# Patient Record
Sex: MALE | Employment: OTHER | URBAN - METROPOLITAN AREA
[De-identification: names, ages, dates, MRNs, and addresses within clinical notes are randomized per-mention and may not be internally consistent; named-entity substitution may affect disease eponyms.]

---

## 2018-01-02 ENCOUNTER — HOSPITAL ENCOUNTER (OUTPATIENT)
Age: 63
Setting detail: SPECIMEN
Discharge: HOME | End: 2018-01-02

## 2018-01-02 LAB
ABSOLUTE EOS #: 0.1 K/UL (ref 0–0.44)
ABSOLUTE IMMATURE GRANULOCYTE: 0.04 K/UL (ref 0–0.3)
ABSOLUTE LYMPH #: 1.85 K/UL (ref 1.1–3.7)
ABSOLUTE MONO #: 0.49 K/UL (ref 0.1–1.2)
ALBUMIN SERPL-MCNC: 4.2 G/DL (ref 3.5–5.2)
ALBUMIN/GLOBULIN RATIO: 1.1 (ref 1–2.5)
ALP BLD-CCNC: 65 U/L (ref 40–129)
ALT SERPL-CCNC: 13 U/L (ref 5–41)
AMYLASE: 134 U/L (ref 28–100)
ANION GAP SERPL CALCULATED.3IONS-SCNC: 16 MMOL/L (ref 9–17)
AST SERPL-CCNC: 19 U/L
BASOPHILS # BLD: 0 % (ref 0–2)
BASOPHILS ABSOLUTE: 0.04 K/UL (ref 0–0.2)
BILIRUB SERPL-MCNC: 0.52 MG/DL (ref 0.3–1.2)
BUN BLDV-MCNC: 10 MG/DL (ref 8–23)
BUN/CREAT BLD: ABNORMAL (ref 9–20)
C-REACTIVE PROTEIN: 0.5 MG/L (ref 0–5)
CALCIUM SERPL-MCNC: 8.9 MG/DL (ref 8.6–10.4)
CHLORIDE BLD-SCNC: 97 MMOL/L (ref 98–107)
CO2: 26 MMOL/L (ref 20–31)
CREAT SERPL-MCNC: 0.85 MG/DL (ref 0.7–1.2)
DIFFERENTIAL TYPE: ABNORMAL
EOSINOPHILS RELATIVE PERCENT: 1 % (ref 1–4)
GFR AFRICAN AMERICAN: >60 ML/MIN
GFR NON-AFRICAN AMERICAN: >60 ML/MIN
GFR SERPL CREATININE-BSD FRML MDRD: ABNORMAL ML/MIN/{1.73_M2}
GFR SERPL CREATININE-BSD FRML MDRD: ABNORMAL ML/MIN/{1.73_M2}
GLUCOSE BLD-MCNC: 146 MG/DL (ref 70–99)
HCT VFR BLD CALC: 45.7 % (ref 40.7–50.3)
HEMOGLOBIN: 16 G/DL (ref 13–17)
IMMATURE GRANULOCYTES: 0 %
LIPASE: 37 U/L (ref 13–60)
LYMPHOCYTES # BLD: 20 % (ref 24–43)
MCH RBC QN AUTO: 33.2 PG (ref 25.2–33.5)
MCHC RBC AUTO-ENTMCNC: 35 G/DL (ref 28.4–34.8)
MCV RBC AUTO: 94.8 FL (ref 82.6–102.9)
MONOCYTES # BLD: 5 % (ref 3–12)
PDW BLD-RTO: 12 % (ref 11.8–14.4)
PLATELET # BLD: 224 K/UL (ref 138–453)
PLATELET ESTIMATE: ABNORMAL
PMV BLD AUTO: 9.6 FL (ref 8.1–13.5)
POTASSIUM SERPL-SCNC: 4.6 MMOL/L (ref 3.7–5.3)
RBC # BLD: 4.82 M/UL (ref 4.21–5.77)
RBC # BLD: ABNORMAL 10*6/UL
SEDIMENTATION RATE, ERYTHROCYTE: 3 MM (ref 0–10)
SEG NEUTROPHILS: 74 % (ref 36–65)
SEGMENTED NEUTROPHILS ABSOLUTE COUNT: 6.57 K/UL (ref 1.5–8.1)
SODIUM BLD-SCNC: 139 MMOL/L (ref 135–144)
TOTAL PROTEIN: 7.9 G/DL (ref 6.4–8.3)
TRIGL SERPL-MCNC: 305 MG/DL
WBC # BLD: 9.1 K/UL (ref 3.5–11.3)
WBC # BLD: ABNORMAL 10*3/UL

## 2021-02-23 ENCOUNTER — HOSPITAL ENCOUNTER (INPATIENT)
Age: 66
LOS: 3 days | Discharge: HOME | DRG: 683 | End: 2021-02-26
Attending: EMERGENCY MEDICINE | Admitting: HOSPITALIST

## 2021-02-23 ENCOUNTER — APPOINTMENT (OUTPATIENT)
Dept: CT IMAGING | Age: 66
DRG: 683 | End: 2021-02-23

## 2021-02-23 ENCOUNTER — APPOINTMENT (OUTPATIENT)
Dept: GENERAL RADIOLOGY | Age: 66
DRG: 683 | End: 2021-02-23

## 2021-02-23 DIAGNOSIS — E16.2 HYPOGLYCEMIA: ICD-10-CM

## 2021-02-23 DIAGNOSIS — I63.532 ACUTE ISCHEMIC LEFT PCA STROKE (HCC): ICD-10-CM

## 2021-02-23 DIAGNOSIS — E83.42 HYPOMAGNESEMIA: Primary | ICD-10-CM

## 2021-02-23 PROBLEM — N17.9 AKI (ACUTE KIDNEY INJURY) (HCC): Status: ACTIVE | Noted: 2021-02-23

## 2021-02-23 LAB
ABSOLUTE EOS #: 0.1 K/UL (ref 0–0.44)
ABSOLUTE IMMATURE GRANULOCYTE: 0.05 K/UL (ref 0–0.3)
ABSOLUTE LYMPH #: 1.18 K/UL (ref 1.1–3.7)
ABSOLUTE MONO #: 0.58 K/UL (ref 0.1–1.2)
ALBUMIN SERPL-MCNC: 3.5 G/DL (ref 3.5–5.2)
ALBUMIN/GLOBULIN RATIO: ABNORMAL (ref 1–2.5)
ALP BLD-CCNC: 61 U/L (ref 40–129)
ALT SERPL-CCNC: 22 U/L (ref 5–41)
ANION GAP SERPL CALCULATED.3IONS-SCNC: 14 MMOL/L (ref 9–17)
AST SERPL-CCNC: 37 U/L
BASOPHILS # BLD: 1 % (ref 0–2)
BASOPHILS ABSOLUTE: 0.03 K/UL (ref 0–0.2)
BILIRUB SERPL-MCNC: 0.36 MG/DL (ref 0.3–1.2)
BUN BLDV-MCNC: 19 MG/DL (ref 8–23)
BUN/CREAT BLD: 13 (ref 9–20)
CALCIUM SERPL-MCNC: 8 MG/DL (ref 8.6–10.4)
CHLORIDE BLD-SCNC: 95 MMOL/L (ref 98–107)
CHP ED QC CHECK: NORMAL
CO2: 23 MMOL/L (ref 20–31)
CREAT SERPL-MCNC: 1.41 MG/DL (ref 0.7–1.2)
DIFFERENTIAL TYPE: ABNORMAL
EOSINOPHILS RELATIVE PERCENT: 2 % (ref 1–4)
ESTIMATED AVERAGE GLUCOSE: 126 MG/DL
GFR AFRICAN AMERICAN: >60 ML/MIN
GFR NON-AFRICAN AMERICAN: 51 ML/MIN
GFR SERPL CREATININE-BSD FRML MDRD: ABNORMAL ML/MIN/{1.73_M2}
GFR SERPL CREATININE-BSD FRML MDRD: ABNORMAL ML/MIN/{1.73_M2}
GLUCOSE BLD-MCNC: 254 MG/DL (ref 75–110)
GLUCOSE BLD-MCNC: 256 MG/DL (ref 75–110)
GLUCOSE BLD-MCNC: 258 MG/DL (ref 75–110)
GLUCOSE BLD-MCNC: 281 MG/DL (ref 75–110)
GLUCOSE BLD-MCNC: 74 MG/DL
GLUCOSE BLD-MCNC: 74 MG/DL (ref 75–110)
GLUCOSE BLD-MCNC: 88 MG/DL (ref 70–99)
GLUCOSE BLD-MCNC: 94 MG/DL (ref 75–110)
HBA1C MFR BLD: 6 % (ref 4–6)
HCT VFR BLD CALC: 35.2 % (ref 40.7–50.3)
HEMOGLOBIN: 12.3 G/DL (ref 13–17)
IMMATURE GRANULOCYTES: 1 %
LACTIC ACID: 1.9 MMOL/L (ref 0.5–2.2)
LIPASE: 78 U/L (ref 13–60)
LYMPHOCYTES # BLD: 19 % (ref 24–43)
MAGNESIUM: 0.9 MG/DL (ref 1.6–2.6)
MCH RBC QN AUTO: 35 PG (ref 25.2–33.5)
MCHC RBC AUTO-ENTMCNC: 34.9 G/DL (ref 28.4–34.8)
MCV RBC AUTO: 100.3 FL (ref 82.6–102.9)
MONOCYTES # BLD: 9 % (ref 3–12)
MYOGLOBIN: 52 NG/ML (ref 28–72)
NRBC AUTOMATED: 0 PER 100 WBC
PDW BLD-RTO: 13.6 % (ref 11.8–14.4)
PLATELET # BLD: 122 K/UL (ref 138–453)
PLATELET ESTIMATE: ABNORMAL
PMV BLD AUTO: 9.3 FL (ref 8.1–13.5)
POTASSIUM SERPL-SCNC: 3.5 MMOL/L (ref 3.7–5.3)
RBC # BLD: 3.51 M/UL (ref 4.21–5.77)
RBC # BLD: ABNORMAL 10*6/UL
SEG NEUTROPHILS: 68 % (ref 36–65)
SEGMENTED NEUTROPHILS ABSOLUTE COUNT: 4.36 K/UL (ref 1.5–8.1)
SODIUM BLD-SCNC: 132 MMOL/L (ref 135–144)
TOTAL CK: 94 U/L (ref 39–308)
TOTAL PROTEIN: 6.6 G/DL (ref 6.4–8.3)
TROPONIN INTERP: NORMAL
TROPONIN INTERP: NORMAL
TROPONIN T: NORMAL NG/ML
TROPONIN T: NORMAL NG/ML
TROPONIN, HIGH SENSITIVITY: 16 NG/L (ref 0–22)
TROPONIN, HIGH SENSITIVITY: 16 NG/L (ref 0–22)
WBC # BLD: 6.3 K/UL (ref 3.5–11.3)
WBC # BLD: ABNORMAL 10*3/UL

## 2021-02-23 PROCEDURE — 85025 COMPLETE CBC W/AUTO DIFF WBC: CPT

## 2021-02-23 PROCEDURE — 6360000002 HC RX W HCPCS: Performed by: HOSPITALIST

## 2021-02-23 PROCEDURE — 82550 ASSAY OF CK (CPK): CPT

## 2021-02-23 PROCEDURE — 71045 X-RAY EXAM CHEST 1 VIEW: CPT

## 2021-02-23 PROCEDURE — 96366 THER/PROPH/DIAG IV INF ADDON: CPT

## 2021-02-23 PROCEDURE — 6360000002 HC RX W HCPCS: Performed by: EMERGENCY MEDICINE

## 2021-02-23 PROCEDURE — 96365 THER/PROPH/DIAG IV INF INIT: CPT

## 2021-02-23 PROCEDURE — 99284 EMERGENCY DEPT VISIT MOD MDM: CPT

## 2021-02-23 PROCEDURE — 2580000003 HC RX 258: Performed by: HOSPITALIST

## 2021-02-23 PROCEDURE — 83605 ASSAY OF LACTIC ACID: CPT

## 2021-02-23 PROCEDURE — 93005 ELECTROCARDIOGRAM TRACING: CPT | Performed by: EMERGENCY MEDICINE

## 2021-02-23 PROCEDURE — 2580000003 HC RX 258: Performed by: EMERGENCY MEDICINE

## 2021-02-23 PROCEDURE — 1200000000 HC SEMI PRIVATE

## 2021-02-23 PROCEDURE — 83735 ASSAY OF MAGNESIUM: CPT

## 2021-02-23 PROCEDURE — 84484 ASSAY OF TROPONIN QUANT: CPT

## 2021-02-23 PROCEDURE — 83874 ASSAY OF MYOGLOBIN: CPT

## 2021-02-23 PROCEDURE — 6370000000 HC RX 637 (ALT 250 FOR IP): Performed by: EMERGENCY MEDICINE

## 2021-02-23 PROCEDURE — 83036 HEMOGLOBIN GLYCOSYLATED A1C: CPT

## 2021-02-23 PROCEDURE — 70450 CT HEAD/BRAIN W/O DYE: CPT

## 2021-02-23 PROCEDURE — 80053 COMPREHEN METABOLIC PANEL: CPT

## 2021-02-23 PROCEDURE — 36415 COLL VENOUS BLD VENIPUNCTURE: CPT

## 2021-02-23 PROCEDURE — 6370000000 HC RX 637 (ALT 250 FOR IP): Performed by: HOSPITALIST

## 2021-02-23 PROCEDURE — 96361 HYDRATE IV INFUSION ADD-ON: CPT

## 2021-02-23 PROCEDURE — 82947 ASSAY GLUCOSE BLOOD QUANT: CPT

## 2021-02-23 PROCEDURE — 83690 ASSAY OF LIPASE: CPT

## 2021-02-23 RX ORDER — LOSARTAN POTASSIUM 100 MG/1
100 TABLET ORAL DAILY
COMMUNITY

## 2021-02-23 RX ORDER — POTASSIUM CHLORIDE 20 MEQ/1
40 TABLET, EXTENDED RELEASE ORAL PRN
Status: DISCONTINUED | OUTPATIENT
Start: 2021-02-23 | End: 2021-02-26 | Stop reason: HOSPADM

## 2021-02-23 RX ORDER — LORAZEPAM 1 MG/1
1 TABLET ORAL
Status: DISCONTINUED | OUTPATIENT
Start: 2021-02-23 | End: 2021-02-26 | Stop reason: HOSPADM

## 2021-02-23 RX ORDER — LORAZEPAM 2 MG/ML
3 INJECTION INTRAMUSCULAR
Status: DISCONTINUED | OUTPATIENT
Start: 2021-02-23 | End: 2021-02-26 | Stop reason: HOSPADM

## 2021-02-23 RX ORDER — LORAZEPAM 2 MG/ML
4 INJECTION INTRAMUSCULAR
Status: DISCONTINUED | OUTPATIENT
Start: 2021-02-23 | End: 2021-02-26 | Stop reason: HOSPADM

## 2021-02-23 RX ORDER — ACETAMINOPHEN 325 MG/1
650 TABLET ORAL EVERY 6 HOURS PRN
Status: DISCONTINUED | OUTPATIENT
Start: 2021-02-23 | End: 2021-02-23

## 2021-02-23 RX ORDER — SODIUM CHLORIDE 0.9 % (FLUSH) 0.9 %
10 SYRINGE (ML) INJECTION EVERY 12 HOURS SCHEDULED
Status: DISCONTINUED | OUTPATIENT
Start: 2021-02-23 | End: 2021-02-23

## 2021-02-23 RX ORDER — PROMETHAZINE HYDROCHLORIDE 25 MG/1
12.5 TABLET ORAL EVERY 6 HOURS PRN
Status: DISCONTINUED | OUTPATIENT
Start: 2021-02-23 | End: 2021-02-23

## 2021-02-23 RX ORDER — SODIUM CHLORIDE 0.9 % (FLUSH) 0.9 %
10 SYRINGE (ML) INJECTION PRN
Status: DISCONTINUED | OUTPATIENT
Start: 2021-02-23 | End: 2021-02-26 | Stop reason: HOSPADM

## 2021-02-23 RX ORDER — MAGNESIUM SULFATE 1 G/100ML
1000 INJECTION INTRAVENOUS PRN
Status: DISCONTINUED | OUTPATIENT
Start: 2021-02-23 | End: 2021-02-26 | Stop reason: HOSPADM

## 2021-02-23 RX ORDER — SENNA PLUS 8.6 MG/1
1 TABLET ORAL 2 TIMES DAILY PRN
Status: DISCONTINUED | OUTPATIENT
Start: 2021-02-23 | End: 2021-02-26 | Stop reason: HOSPADM

## 2021-02-23 RX ORDER — ACETAMINOPHEN 650 MG/1
650 SUPPOSITORY RECTAL EVERY 6 HOURS PRN
Status: DISCONTINUED | OUTPATIENT
Start: 2021-02-23 | End: 2021-02-23

## 2021-02-23 RX ORDER — GLIMEPIRIDE 4 MG/1
4 TABLET ORAL 2 TIMES DAILY
Status: ON HOLD | COMMUNITY
End: 2021-02-26 | Stop reason: HOSPADM

## 2021-02-23 RX ORDER — SODIUM CHLORIDE 9 MG/ML
INJECTION, SOLUTION INTRAVENOUS CONTINUOUS
Status: DISCONTINUED | OUTPATIENT
Start: 2021-02-23 | End: 2021-02-24

## 2021-02-23 RX ORDER — MAGNESIUM SULFATE IN WATER 40 MG/ML
2000 INJECTION, SOLUTION INTRAVENOUS ONCE
Status: COMPLETED | OUTPATIENT
Start: 2021-02-23 | End: 2021-02-23

## 2021-02-23 RX ORDER — PAROXETINE HYDROCHLORIDE 40 MG/1
40 TABLET, FILM COATED ORAL DAILY
COMMUNITY

## 2021-02-23 RX ORDER — LORAZEPAM 1 MG/1
2 TABLET ORAL
Status: DISCONTINUED | OUTPATIENT
Start: 2021-02-23 | End: 2021-02-26 | Stop reason: HOSPADM

## 2021-02-23 RX ORDER — ATENOLOL 50 MG/1
50 TABLET ORAL DAILY
Status: ON HOLD | COMMUNITY
End: 2022-06-09 | Stop reason: HOSPADM

## 2021-02-23 RX ORDER — POLYETHYLENE GLYCOL 3350 17 G/17G
17 POWDER, FOR SOLUTION ORAL DAILY PRN
Status: DISCONTINUED | OUTPATIENT
Start: 2021-02-23 | End: 2021-02-26 | Stop reason: HOSPADM

## 2021-02-23 RX ORDER — ATENOLOL 25 MG/1
50 TABLET ORAL DAILY
Status: DISCONTINUED | OUTPATIENT
Start: 2021-02-23 | End: 2021-02-26 | Stop reason: HOSPADM

## 2021-02-23 RX ORDER — LORAZEPAM 2 MG/ML
2 INJECTION INTRAMUSCULAR
Status: DISCONTINUED | OUTPATIENT
Start: 2021-02-23 | End: 2021-02-26 | Stop reason: HOSPADM

## 2021-02-23 RX ORDER — ACETAMINOPHEN 650 MG/1
650 SUPPOSITORY RECTAL EVERY 6 HOURS PRN
Status: DISCONTINUED | OUTPATIENT
Start: 2021-02-23 | End: 2021-02-26 | Stop reason: HOSPADM

## 2021-02-23 RX ORDER — HEPARIN SODIUM 5000 [USP'U]/ML
5000 INJECTION, SOLUTION INTRAVENOUS; SUBCUTANEOUS 2 TIMES DAILY
Status: DISCONTINUED | OUTPATIENT
Start: 2021-02-23 | End: 2021-02-26 | Stop reason: HOSPADM

## 2021-02-23 RX ORDER — PROMETHAZINE HYDROCHLORIDE 12.5 MG/1
12.5 TABLET ORAL EVERY 6 HOURS PRN
Status: DISCONTINUED | OUTPATIENT
Start: 2021-02-23 | End: 2021-02-26 | Stop reason: HOSPADM

## 2021-02-23 RX ORDER — ONDANSETRON 2 MG/ML
4 INJECTION INTRAMUSCULAR; INTRAVENOUS EVERY 6 HOURS PRN
Status: DISCONTINUED | OUTPATIENT
Start: 2021-02-23 | End: 2021-02-26 | Stop reason: HOSPADM

## 2021-02-23 RX ORDER — SODIUM CHLORIDE 0.9 % (FLUSH) 0.9 %
10 SYRINGE (ML) INJECTION PRN
Status: DISCONTINUED | OUTPATIENT
Start: 2021-02-23 | End: 2021-02-23

## 2021-02-23 RX ORDER — ACETAMINOPHEN 325 MG/1
650 TABLET ORAL EVERY 6 HOURS PRN
Status: DISCONTINUED | OUTPATIENT
Start: 2021-02-23 | End: 2021-02-26 | Stop reason: HOSPADM

## 2021-02-23 RX ORDER — POTASSIUM CHLORIDE 7.45 MG/ML
10 INJECTION INTRAVENOUS PRN
Status: DISCONTINUED | OUTPATIENT
Start: 2021-02-23 | End: 2021-02-26 | Stop reason: HOSPADM

## 2021-02-23 RX ORDER — LOSARTAN POTASSIUM 100 MG/1
100 TABLET ORAL DAILY
Status: DISCONTINUED | OUTPATIENT
Start: 2021-02-23 | End: 2021-02-26 | Stop reason: HOSPADM

## 2021-02-23 RX ORDER — LORAZEPAM 1 MG/1
4 TABLET ORAL
Status: DISCONTINUED | OUTPATIENT
Start: 2021-02-23 | End: 2021-02-26 | Stop reason: HOSPADM

## 2021-02-23 RX ORDER — SODIUM CHLORIDE 0.9 % (FLUSH) 0.9 %
10 SYRINGE (ML) INJECTION EVERY 12 HOURS SCHEDULED
Status: DISCONTINUED | OUTPATIENT
Start: 2021-02-23 | End: 2021-02-26 | Stop reason: HOSPADM

## 2021-02-23 RX ORDER — LORAZEPAM 1 MG/1
3 TABLET ORAL
Status: DISCONTINUED | OUTPATIENT
Start: 2021-02-23 | End: 2021-02-26 | Stop reason: HOSPADM

## 2021-02-23 RX ORDER — LORAZEPAM 2 MG/ML
1 INJECTION INTRAMUSCULAR
Status: DISCONTINUED | OUTPATIENT
Start: 2021-02-23 | End: 2021-02-26 | Stop reason: HOSPADM

## 2021-02-23 RX ORDER — FAMOTIDINE 20 MG/1
20 TABLET, FILM COATED ORAL 2 TIMES DAILY
Status: DISCONTINUED | OUTPATIENT
Start: 2021-02-23 | End: 2021-02-26 | Stop reason: HOSPADM

## 2021-02-23 RX ORDER — 0.9 % SODIUM CHLORIDE 0.9 %
1000 INTRAVENOUS SOLUTION INTRAVENOUS ONCE
Status: COMPLETED | OUTPATIENT
Start: 2021-02-23 | End: 2021-02-23

## 2021-02-23 RX ORDER — ONDANSETRON 2 MG/ML
4 INJECTION INTRAMUSCULAR; INTRAVENOUS EVERY 6 HOURS PRN
Status: DISCONTINUED | OUTPATIENT
Start: 2021-02-23 | End: 2021-02-23

## 2021-02-23 RX ADMIN — LOSARTAN POTASSIUM 100 MG: 100 TABLET, FILM COATED ORAL at 16:05

## 2021-02-23 RX ADMIN — SODIUM CHLORIDE: 9 INJECTION, SOLUTION INTRAVENOUS at 17:20

## 2021-02-23 RX ADMIN — ATENOLOL 50 MG: 25 TABLET ORAL at 16:05

## 2021-02-23 RX ADMIN — FAMOTIDINE 20 MG: 20 TABLET ORAL at 21:42

## 2021-02-23 RX ADMIN — SODIUM CHLORIDE 1000 ML: 9 INJECTION, SOLUTION INTRAVENOUS at 09:01

## 2021-02-23 RX ADMIN — MAGNESIUM SULFATE HEPTAHYDRATE 2000 MG: 40 INJECTION, SOLUTION INTRAVENOUS at 10:38

## 2021-02-23 RX ADMIN — HEPARIN SODIUM 5000 UNITS: 5000 INJECTION INTRAVENOUS; SUBCUTANEOUS at 21:41

## 2021-02-23 ASSESSMENT — ENCOUNTER SYMPTOMS
BLOOD IN STOOL: 0
BACK PAIN: 0
EYE REDNESS: 0
SHORTNESS OF BREATH: 0
DIARRHEA: 1
VOMITING: 1
TROUBLE SWALLOWING: 0
ABDOMINAL PAIN: 0

## 2021-02-23 ASSESSMENT — PAIN SCALES - GENERAL: PAINLEVEL_OUTOF10: 0

## 2021-02-23 NOTE — CARE COORDINATION
Case Management Initial Discharge Plan  Sandra Perez,         Readmission Risk              Risk of Unplanned Readmission:        0             Met with:patient to discuss discharge plans. Information verified: address, contacts, phone number, , insurance Yes  PCP: DENNIS Garibay  Date of last visit: 3 months ago    Insurance Provider: self pay    Discharge Planning  Current Residence:   apartment  Living Arrangements:   alone   Home has 2 stories/10 stairs to climb  Support Systems:   friends  Current Services PTA:   no Supplier: none  Patient able to perform ADL's:Independent  DME used to aid ambulation prior to admission: none  During admission: none    Potential Assistance Needed:   tbd    Pharmacy: [facility]   Potential Assistance Purchasing Medications:   no  Does patient want to participate in local refill/ meds to beds program?   no    Patient agreeable to home care: No  [place] of choice provided:  yes      Type of Home Care Services:     Patient expects to be discharged to:   home    Prior SNF/Rehab Placement and Facility: no  Agreeable to SNF/Rehab: No  [place] of choice provided: yes   Evaluation: yes    Expected Discharge date:   21  Follow Up Appointment: Best Day/ Time:      Transportation provider: self/friends  Transportation arrangements needed for discharge: tbd    Discharge Plan:   Patient lives at home alone in [address] [facility] apartment with 10 steps to enter. Patient was independent with adl's prior to admission. Patient is admitted for Hypomagnesemia and Hypoglycemia. Patient is prescribed Metformin. Patient to have MRI and PT/OT consult. Discharge needs tbd.         Electronically signed by BRANDON Franco on 21 at 12:54 PM EST

## 2021-02-23 NOTE — PROGRESS NOTES
Pt admitted to room 2026 from ER  Oriented to room and call light/tv controls. Bed in lowest position, wheels locked, 2/4 side rails up  Call light in reach, room free of clutter, adequate lighting provided.   Electronically signed by Leila Smart RN on 2/23/2021 at 6:56 PM

## 2021-02-23 NOTE — ED NOTES
Pt given boxed lunch at this time. Pt is A&Ox4 with no complaints. Will continue to monitor.      Donna Hobbs RN  02/23/21 9831

## 2021-02-23 NOTE — H&P
History & Physical  [facility]    Adult Hospitalist      Name: Laura Soni  MRN: [id number]     Acct: [de-identified]  Room: [facility]/    Admit Date: 2/23/2021  8:36 AM  PCP: DENNIS Madden    Primary Problem  Active Problems:    ADRIANA (acute kidney injury) ([facility] [address]  Resolved Problems:    * No resolved hospital problems. *        Assesment:     · Presyncope  · Generalized weakness  · Acute renal failure  · Hyponatremia  · Hypoglycemia  · Hypo magnesemia  · Hypertension  · Diabetes type 2  ·   Tobacco use   · Alcohol use        Plan:     · Admit to MedSurg telemetry  · O2 maintain oxygen saturation greater than 92%  · Serial troponin  · MRI brain  · Carotid Doppler  · IV hydration  · Monitor creatinine  · Hold Metformin  · Check hemoglobin A1c  · Monitor blood glucose  ·  CIWA protocol  · Continue to monitor/telemetry/CBC with differential daily/BMP daily  · DVT and GI prophylaxis.   Continue medications as below      Scheduled Meds:   sodium chloride flush  10 mL Intravenous 2 times per day    famotidine  20 mg Oral BID    heparin (porcine)  5,000 Units Subcutaneous BID    losartan  100 mg Oral Daily    atenolol  50 mg Oral Daily    sodium chloride flush  10 mL Intravenous 2 times per day     Continuous Infusions:   sodium chloride 75 mL/hr at 02/23/21 1720     PRN Meds:      sodium chloride flush, 10 mL, PRN      potassium chloride, 40 mEq, PRN    Or      potassium alternative oral replacement, 40 mEq, PRN    Or      potassium chloride, 10 mEq, PRN      magnesium sulfate, 1,000 mg, PRN      promethazine, 12.5 mg, Q6H PRN    Or      ondansetron, 4 mg, Q6H PRN      senna, 1 tablet, BID PRN      acetaminophen, 650 mg, Q6H PRN    Or      acetaminophen, 650 mg, Q6H PRN      sodium chloride flush, 10 mL, PRN      promethazine, 12.5 mg, Q6H PRN    Or      ondansetron, 4 mg, Q6H PRN      polyethylene glycol, 17 g, Daily PRN      acetaminophen, 650 mg, Q6H PRN    Or   acetaminophen, 650 mg, Q6H PRN      LORazepam, 1 mg, Q1H PRN    Or      LORazepam, 1 mg, Q1H PRN    Or      LORazepam, 2 mg, Q1H PRN    Or      LORazepam, 2 mg, Q1H PRN    Or      LORazepam, 3 mg, Q1H PRN    Or      LORazepam, 3 mg, Q1H PRN    Or      LORazepam, 4 mg, Q1H PRN    Or      LORazepam, 4 mg, Q1H PRN        Chief Complaint:     Chief Complaint   Patient presents with    Fatigue    Hypoglycemia         History of Present Illness:      Nitin Rodríguez is a 60 y.o.  male who presents with Fatigue and Hypoglycemia    81-year-old gentleman past medical history of hypertension, diabetes presented to ER complaining of generalized fatigue and fall/near syncope. Per description, patient called ambulance this morning since he was feeling very weak. He stated that his legs could not carry his weight and he dropped to his knees and fell to the ground. On arrival of EMS his blood glucose was 50. Patient was given dextrose which improved his blood glucose to 70s. Patient was on the ground for almost an hour. Patient denies any loss of consciousness. Patient denies any chest pain, cough, cold, change urination, bowel habit or rash. Patient past medical history significant for hypertension and diabetes. He takes Metformin. Patient also stated that he smokes cigarettes 2 packs per day. Also admits to drinking wine 2-3 drinks every night. On presentation sodium was 132, potassium was 3.5. Creatinine was 1.41. His creatinine was normal before. Troponin was negative. WBC was normal, hemoglobin was 12.3. Blood pressure was elevated 167/78. Patient had a CT brain without any acute intracranial abnormality. X-ray chest was unremarkable. I have personally reviewed the past medical history, past surgical history, medications, social history, and family history, and summarized in the note. Review of Systems:     All 10 point system is reviewed and negative otherwise mentioned in HPI. Past Medical History:     Past Medical History:   Diagnosis Date    Diabetes mellitus ([facility] [address]     Hypertension         Past Surgical History:     Past Surgical History:   Procedure Laterality Date    KNEE SURGERY          Medications Prior to Admission:       Prior to Admission medications    Medication Sig Start Date End Date Taking? Authorizing Provider   glimepiride (AMARYL) 4 MG tablet Take 4 mg by mouth 2 times daily   Yes Historical Provider, MD   PARoxetine (PAXIL) 40 MG tablet Take 40 mg by mouth daily   Yes Historical Provider, MD   atenolol (TENORMIN) 50 MG tablet Take 50 mg by mouth daily   Yes Historical Provider, MD   losartan (COZAAR) 100 MG tablet Take 100 mg by mouth daily   Yes Historical Provider, MD   metFORMIN (GLUCOPHAGE) 500 MG tablet Take 1,000 mg by mouth 2 times daily (with meals)     Historical Provider, MD        Allergies:       Patient has no known allergies. Social History:     Tobacco:    reports that he has been smoking. He has never used smokeless tobacco.  Alcohol:      reports current alcohol use. Drug Use:  reports no history of drug use. Family History:     History reviewed. No pertinent family history.       Physical Exam:     Vitals:  BP (!) 157/85   Pulse 68   Temp 97.5 °F (36.4 °C) (Oral)   Resp 18   Ht 5' 7\" (1.702 m)   Wt 170 lb (77.1 kg)   SpO2 98%   BMI 26.63 kg/m²   Temp (24hrs), Av.5 °F (36.4 °C), Min:97.5 °F (36.4 °C), Max:97.5 °F (36.4 °C)          General appearance - alert, well appearing, and in no acute distress  Mental status - oriented to person, place, and time with normal affect  Head - normocephalic and atraumatic  Eyes - pupils equal and reactive, extraocular eye movements intact, conjunctiva clear  Ears - hearing appears to be intact  Nose - no drainage noted  Mouth - mucous membranes moist  Neck - supple, no carotid bruits, thyroid not palpable  Chest - clear to auscultation, normal effort  Heart - normal rate, regular rhythm, no murmur

## 2021-02-23 NOTE — ED NOTES
Pt is requesting his BP meds. No home meds on patient's list. [id number] Janette Howard per Dr. Judson Scruggs to order home meds and give as needed. Pt has no other needs. Pt denies cp, sob at this time. Respirations even and unlabored. Pt eating apple sauce at this time. Will continue to monitor.       Kvng Berman, VIPIN  02/23/21 7769

## 2021-02-23 NOTE — ED PROVIDER NOTES
EMERGENCY DEPARTMENT ENCOUNTER    Pt Name: Saman Ta  MRN: [id number]  [place] 1955  Date of evaluation: 2/23/21  CHIEF COMPLAINT       Chief Complaint   Patient presents with    Fatigue    Hypoglycemia     HISTORY OF PRESENT ILLNESS   Patient is a 76-year-old male here with fatigue, generalized weakness. He has history of hypertension diabetes. He called ambulance this morning after he states his legs felt very weak and he dropped to his knees and fell to the ground. He was hypoglycemic in the 50s and given dextrose by EMS and came up to the 70s he was down on the ground for about an hour. Denies hitting his head or passing out. Denies headache neck pain back pain. Denies any proceeding chest pain shortness of breath or abdominal pain. Denies palpitations. He states he drinks alcohol about 3 days a week and drink last night. He is on Metformin, not on insulin. He states he is taking his medications as prescribed. He has been having vomiting and loose stools as well. Denies any hematemesis melena hematochezia. Denies fever cough. REVIEW OF SYSTEMS     Review of Systems   Constitutional: Positive for fatigue. Negative for fever. HENT: Negative for trouble swallowing. Eyes: Negative for redness. Respiratory: Negative for shortness of breath. Cardiovascular: Negative for chest pain. Gastrointestinal: Positive for diarrhea and vomiting. Negative for abdominal pain and blood in stool. Genitourinary: Negative for difficulty urinating. Musculoskeletal: Negative for back pain, neck pain and neck stiffness. Skin: Negative for rash. Neurological: Positive for weakness and numbness. Negative for seizures and headaches. Psychiatric/Behavioral: Negative for confusion.      PASTMEDICAL HISTORY     Past Medical History:   Diagnosis Date    Diabetes mellitus ([address] [address]     Hypertension      SURGICAL HISTORY       Past Surgical History:   Procedure Laterality Date    KNEE SURGERY CURRENT MEDICATIONS       Previous Medications    METFORMIN (GLUCOPHAGE) 500 MG TABLET    Take 500 mg by mouth 2 times daily (with meals)     ALLERGIES     has No Known Allergies. FAMILY HISTORY     has no family status information on file. SOCIAL HISTORY       Social History     Tobacco Use    Smoking status: Current Every Day Smoker    Smokeless tobacco: Never Used   Substance Use Topics    Alcohol use: Yes    Drug use: No     PHYSICAL EXAM     INITIAL VITALS: BP (!) 167/78   Pulse 78   Temp 97.5 °F (36.4 °C) (Oral)   Resp 18   Ht 5' 7\" (1.702 m)   Wt 170 lb (77.1 kg)   SpO2 98%   BMI 26.63 kg/m²    Physical Exam  Vitals signs and nursing note reviewed. Constitutional:       General: He is not in acute distress. Appearance: Normal appearance. He is diaphoretic. He is not ill-appearing or toxic-appearing. HENT:      Head: Normocephalic and atraumatic. Mouth/Throat:      Mouth: Mucous membranes are dry. Pharynx: Oropharynx is clear. Eyes:      General: No visual field deficit. Extraocular Movements: Extraocular movements intact. Pupils: Pupils are equal, round, and reactive to light. Neck:      Musculoskeletal: Normal range of motion and neck supple. No neck rigidity. Cardiovascular:      Rate and Rhythm: Normal rate and regular rhythm. Pulses: Normal pulses. Pulmonary:      Effort: Pulmonary effort is normal. No respiratory distress. Abdominal:      General: There is no distension. Palpations: Abdomen is soft. There is no mass. Tenderness: There is no abdominal tenderness. There is no guarding or rebound. Musculoskeletal: Normal range of motion. General: No deformity. Cervical back: Normal.      Thoracic back: Normal.      Lumbar back: Normal.      Right lower leg: No edema. Left lower leg: No edema. Skin:     General: Skin is warm. Capillary Refill: Capillary refill takes less than 2 seconds. Findings: No rash. Neurological:      General: No focal deficit present. Mental Status: He is alert and oriented to person, place, and time. GCS: GCS eye subscore is 4. GCS verbal subscore is 5. GCS motor subscore is 6. Cranial Nerves: No cranial nerve deficit, dysarthria or facial asymmetry. Sensory: Sensation is intact. Motor: Motor function is intact. No pronator drift. Coordination: Finger-Nose-Finger Test normal.      Comments: Cranial nerves II through XII grossly intact   Psychiatric:         Thought Content: Thought content normal.         MEDICAL DECISION MAKING:          Please see ED Course below for MDM/ED course. DDx: Arrhythmia, infection, dehydration, stroke, ACS    All patient's question's and concerns were answered prior to disposition and patient and/or family expressed understanding and agreement of treatment plan. CRITICAL CARE:   CRITICAL CARE: There was a high probability of clinically significant/life threatening deterioration in this patient's condition which required my urgent intervention. Total critical care time was 32 minutes. This excludes any time for separately reportable procedures. NIH STROKE SCALE:            PROCEDURES:    Procedures    DIAGNOSTIC RESULTS   EKG:All EKG's are interpreted by the Emergency Department Physician who either signs or Co-signs this chart in the absence of a cardiologist.    Normal sinus rhythm rate of 73 normal intervals normal axis no ST elevations or depressions nonspecific ST anterior changes, prolonged QT, abnormal EKG; when compared to prior on 7/31/2016 no significant changes    RADIOLOGY:All plain film, CT, MRI, and formal ultrasound images (except ED bedside ultrasound) are read by the radiologist, see reports below, unless otherwisenoted in MDM or here. CT HEAD WO CONTRAST   Preliminary Result   No acute intracranial abnormality. Senescent changes including chronic   microvascular change. Sinus inflammation. XR CHEST 1 VIEW   Final Result   No acute process. LABS: All lab results were reviewed by myself, and all abnormals are listed below. Labs Reviewed   CBC WITH AUTO DIFFERENTIAL - Abnormal; Notable for the following components:       Result Value    RBC 3.51 (*)     Hemoglobin 12.3 (*)     Hematocrit 35.2 (*)     MCH 35.0 (*)     MCHC 34.9 (*)     Platelets 461 (*)     Seg Neutrophils 68 (*)     Lymphocytes 19 (*)     Immature Granulocytes 1 (*)     All other components within normal limits   COMPREHENSIVE METABOLIC PANEL W/ REFLEX TO MG FOR LOW K - Abnormal; Notable for the following components:    CREATININE 1.41 (*)     Calcium 8.0 (*)     Sodium 132 (*)     Potassium 3.5 (*)     Chloride 95 (*)     GFR Non- 51 (*)     All other components within normal limits   LIPASE - Abnormal; Notable for the following components:    Lipase 78 (*)     All other components within normal limits   MAGNESIUM - Abnormal; Notable for the following components:    Magnesium 0.9 (*)     All other components within normal limits   POC GLUCOSE FINGERSTICK - Abnormal; Notable for the following components:    POC Glucose 74 (*)     All other components within normal limits   POCT GLUCOSE - Normal   TROP/MYOGLOBIN   CK   LACTIC ACID   POC GLUCOSE FINGERSTICK       EMERGENCY DEPARTMENTCOURSE:     Patient is a 36-year-old male with hypertension diabetes here after fatigue generalized weakness and fall. He was hypoglycemic for EMS received dextrose. Recheck of his sugar here is 94. He is in no distress he does appear slightly diaphoretic. He is afebrile nontoxic. No focal neurologic deficits. No signs of trauma. Will check cardiac work-up, infectious work-up, CT head and chest x-ray, will give fluids and reassess. Patient's work-up shows hypomagnesemia of 0.9. He is also been hypoglycemic prior to arrival.  Recheck here again is in the 76s. Given food to eat. His CT head and chest x-ray are negative. No significant leukocytosis or anemia. Replacing magnesium and potassium. Spoke with Dr. Nehemiah Grant who will admit the patient. Vitals:    Vitals:    02/23/21 0901 02/23/21 1158   BP: (!) 145/87 (!) 167/78   Pulse: 70 78   Resp: 16 18   Temp: 97.5 °F (36.4 °C)    TempSrc: Oral    SpO2: 98% 98%   Weight: 170 lb (77.1 kg)    Height: 5' 7\" (1.702 m)        The patient was given the following medications while in the emergency department:  Orders Placed This Encounter   Medications    0.9 % sodium chloride bolus    magnesium sulfate 2000 mg in 50 mL IVPB premix     CONSULTS:  IP CONSULT TO HOSPITALIST  IP CONSULT TO SOCIAL WORK    FINAL IMPRESSION      1. Hypomagnesemia    2. Hypoglycemia          DISPOSITION/PLAN   DISPOSITION Admitted 02/23/2021 12:21:52 PM      PATIENT REFERRED TO:  No follow-up provider specified. DISCHARGE MEDICATIONS:  New Prescriptions    No medications on file     Alisa Corcoran MD  Attending Emergency Physician    This note was created with the assistance of a speech-recognition program. While intending to generate a document that actually reflects the content of the visit, no guarantees can be provided that every mistake has been identified and corrected by editing.                     Alisa Corcoran MD  02/23/21 6148

## 2021-02-23 NOTE — ED NOTES
Bed: 23  Expected date: 2/23/21  Expected time: 8:33 AM  [facility]  Comments:  [facility]       Laci Wallace , [place]  02/23/21 6562

## 2021-02-23 NOTE — ED NOTES
Pt to ed via ems with c/o low blood sugar and weakness. Pt states he has been feeling weak and fatigued for the past 2-3 days. Pt states he got up this morning and his legs gave out. Pt states he was on the floor for approximately 1-2 hours. Pt denies loc. Pt denies recent fever or chills. Pt denies chest pain or sob. Pt blood sugar was in 50s for ems. Pt was given amp of dextrose. Pt states he has not eaten breakfast yet this morning. Pt a&ox3. Skin warm and dry. Respirations even and non-labored.       Mil Mora RN  02/23/21 0658

## 2021-02-23 NOTE — ED NOTES
Pt updated on plan of care and that we are still waiting on bed assignment. Pt given water. MRI checklist completed. No other needs. Will continue to monitor.       Brittany Gomez RN  02/23/21 4177

## 2021-02-24 ENCOUNTER — APPOINTMENT (OUTPATIENT)
Dept: MRI IMAGING | Age: 66
DRG: 683 | End: 2021-02-24

## 2021-02-24 LAB
ABSOLUTE EOS #: 0.14 K/UL (ref 0–0.44)
ABSOLUTE IMMATURE GRANULOCYTE: 0.02 K/UL (ref 0–0.3)
ABSOLUTE LYMPH #: 1.67 K/UL (ref 1.1–3.7)
ABSOLUTE MONO #: 0.58 K/UL (ref 0.1–1.2)
ANION GAP SERPL CALCULATED.3IONS-SCNC: 12 MMOL/L (ref 9–17)
BASOPHILS # BLD: 1 % (ref 0–2)
BASOPHILS ABSOLUTE: 0.03 K/UL (ref 0–0.2)
BUN BLDV-MCNC: 12 MG/DL (ref 8–23)
BUN/CREAT BLD: 11 (ref 9–20)
CALCIUM SERPL-MCNC: 8.3 MG/DL (ref 8.6–10.4)
CHLORIDE BLD-SCNC: 99 MMOL/L (ref 98–107)
CO2: 25 MMOL/L (ref 20–31)
CREAT SERPL-MCNC: 1.1 MG/DL (ref 0.7–1.2)
DIFFERENTIAL TYPE: ABNORMAL
EKG ATRIAL RATE: 73 BPM
EKG P AXIS: 85 DEGREES
EKG P-R INTERVAL: 136 MS
EKG Q-T INTERVAL: 426 MS
EKG QRS DURATION: 84 MS
EKG QTC CALCULATION (BAZETT): 469 MS
EKG R AXIS: 56 DEGREES
EKG T AXIS: 117 DEGREES
EKG VENTRICULAR RATE: 73 BPM
EOSINOPHILS RELATIVE PERCENT: 2 % (ref 1–4)
GFR AFRICAN AMERICAN: >60 ML/MIN
GFR NON-AFRICAN AMERICAN: >60 ML/MIN
GFR SERPL CREATININE-BSD FRML MDRD: ABNORMAL ML/MIN/{1.73_M2}
GFR SERPL CREATININE-BSD FRML MDRD: ABNORMAL ML/MIN/{1.73_M2}
GLUCOSE BLD-MCNC: 148 MG/DL (ref 70–99)
GLUCOSE BLD-MCNC: 156 MG/DL (ref 75–110)
GLUCOSE BLD-MCNC: 172 MG/DL (ref 75–110)
GLUCOSE BLD-MCNC: 206 MG/DL (ref 75–110)
GLUCOSE BLD-MCNC: 274 MG/DL (ref 75–110)
HCT VFR BLD CALC: 36.9 % (ref 40.7–50.3)
HEMOGLOBIN: 12.7 G/DL (ref 13–17)
IMMATURE GRANULOCYTES: 0 %
LYMPHOCYTES # BLD: 27 % (ref 24–43)
MAGNESIUM: 1.3 MG/DL (ref 1.6–2.6)
MCH RBC QN AUTO: 34.8 PG (ref 25.2–33.5)
MCHC RBC AUTO-ENTMCNC: 34.4 G/DL (ref 28.4–34.8)
MCV RBC AUTO: 101.1 FL (ref 82.6–102.9)
MONOCYTES # BLD: 10 % (ref 3–12)
MYOGLOBIN: 33 NG/ML (ref 28–72)
NRBC AUTOMATED: 0 PER 100 WBC
PDW BLD-RTO: 13.6 % (ref 11.8–14.4)
PLATELET # BLD: 137 K/UL (ref 138–453)
PLATELET ESTIMATE: ABNORMAL
PMV BLD AUTO: 9.8 FL (ref 8.1–13.5)
POTASSIUM SERPL-SCNC: 3.9 MMOL/L (ref 3.7–5.3)
RBC # BLD: 3.65 M/UL (ref 4.21–5.77)
RBC # BLD: ABNORMAL 10*6/UL
SEG NEUTROPHILS: 60 % (ref 36–65)
SEGMENTED NEUTROPHILS ABSOLUTE COUNT: 3.66 K/UL (ref 1.5–8.1)
SODIUM BLD-SCNC: 136 MMOL/L (ref 135–144)
TOTAL CK: 86 U/L (ref 39–308)
TROPONIN INTERP: NORMAL
TROPONIN INTERP: NORMAL
TROPONIN T: NORMAL NG/ML
TROPONIN T: NORMAL NG/ML
TROPONIN, HIGH SENSITIVITY: 18 NG/L (ref 0–22)
TROPONIN, HIGH SENSITIVITY: 18 NG/L (ref 0–22)
WBC # BLD: 6.1 K/UL (ref 3.5–11.3)
WBC # BLD: ABNORMAL 10*3/UL

## 2021-02-24 PROCEDURE — 97530 THERAPEUTIC ACTIVITIES: CPT

## 2021-02-24 PROCEDURE — 97163 PT EVAL HIGH COMPLEX 45 MIN: CPT

## 2021-02-24 PROCEDURE — 36415 COLL VENOUS BLD VENIPUNCTURE: CPT

## 2021-02-24 PROCEDURE — 97116 GAIT TRAINING THERAPY: CPT

## 2021-02-24 PROCEDURE — 6370000000 HC RX 637 (ALT 250 FOR IP): Performed by: HOSPITALIST

## 2021-02-24 PROCEDURE — 1200000000 HC SEMI PRIVATE

## 2021-02-24 PROCEDURE — 6370000000 HC RX 637 (ALT 250 FOR IP): Performed by: FAMILY MEDICINE

## 2021-02-24 PROCEDURE — 80048 BASIC METABOLIC PNL TOTAL CA: CPT

## 2021-02-24 PROCEDURE — 6360000002 HC RX W HCPCS: Performed by: HOSPITALIST

## 2021-02-24 PROCEDURE — 70551 MRI BRAIN STEM W/O DYE: CPT

## 2021-02-24 PROCEDURE — 97165 OT EVAL LOW COMPLEX 30 MIN: CPT

## 2021-02-24 PROCEDURE — 83874 ASSAY OF MYOGLOBIN: CPT

## 2021-02-24 PROCEDURE — 83735 ASSAY OF MAGNESIUM: CPT

## 2021-02-24 PROCEDURE — 82550 ASSAY OF CK (CPK): CPT

## 2021-02-24 PROCEDURE — 2580000003 HC RX 258: Performed by: HOSPITALIST

## 2021-02-24 PROCEDURE — 82947 ASSAY GLUCOSE BLOOD QUANT: CPT

## 2021-02-24 PROCEDURE — 84484 ASSAY OF TROPONIN QUANT: CPT

## 2021-02-24 PROCEDURE — 93010 ELECTROCARDIOGRAM REPORT: CPT | Performed by: INTERNAL MEDICINE

## 2021-02-24 PROCEDURE — 85025 COMPLETE CBC W/AUTO DIFF WBC: CPT

## 2021-02-24 PROCEDURE — 6370000000 HC RX 637 (ALT 250 FOR IP): Performed by: EMERGENCY MEDICINE

## 2021-02-24 PROCEDURE — 93880 EXTRACRANIAL BILAT STUDY: CPT

## 2021-02-24 RX ORDER — ASPIRIN 81 MG/1
81 TABLET, CHEWABLE ORAL DAILY
Status: DISCONTINUED | OUTPATIENT
Start: 2021-02-24 | End: 2021-02-26 | Stop reason: HOSPADM

## 2021-02-24 RX ORDER — ATORVASTATIN CALCIUM 10 MG/1
10 TABLET, FILM COATED ORAL NIGHTLY
Status: DISCONTINUED | OUTPATIENT
Start: 2021-02-24 | End: 2021-02-25

## 2021-02-24 RX ORDER — NICOTINE POLACRILEX 4 MG
15 LOZENGE BUCCAL PRN
Status: DISCONTINUED | OUTPATIENT
Start: 2021-02-24 | End: 2021-02-26 | Stop reason: HOSPADM

## 2021-02-24 RX ORDER — LOSARTAN POTASSIUM 100 MG/1
100 TABLET ORAL DAILY
Status: DISCONTINUED | OUTPATIENT
Start: 2021-02-24 | End: 2021-02-24 | Stop reason: SDUPTHER

## 2021-02-24 RX ORDER — DEXTROSE MONOHYDRATE 25 G/50ML
12.5 INJECTION, SOLUTION INTRAVENOUS PRN
Status: DISCONTINUED | OUTPATIENT
Start: 2021-02-24 | End: 2021-02-26 | Stop reason: HOSPADM

## 2021-02-24 RX ORDER — DEXTROSE MONOHYDRATE 50 MG/ML
100 INJECTION, SOLUTION INTRAVENOUS PRN
Status: DISCONTINUED | OUTPATIENT
Start: 2021-02-24 | End: 2021-02-26 | Stop reason: HOSPADM

## 2021-02-24 RX ORDER — ATENOLOL 50 MG/1
50 TABLET ORAL DAILY
Status: DISCONTINUED | OUTPATIENT
Start: 2021-02-24 | End: 2021-02-24 | Stop reason: SDUPTHER

## 2021-02-24 RX ORDER — PAROXETINE HYDROCHLORIDE 20 MG/1
40 TABLET, FILM COATED ORAL DAILY
Status: DISCONTINUED | OUTPATIENT
Start: 2021-02-24 | End: 2021-02-26 | Stop reason: HOSPADM

## 2021-02-24 RX ADMIN — ASPIRIN 81 MG: 81 TABLET, CHEWABLE ORAL at 18:48

## 2021-02-24 RX ADMIN — HEPARIN SODIUM 5000 UNITS: 5000 INJECTION INTRAVENOUS; SUBCUTANEOUS at 10:33

## 2021-02-24 RX ADMIN — PAROXETINE HYDROCHLORIDE 40 MG: 20 TABLET, FILM COATED ORAL at 18:48

## 2021-02-24 RX ADMIN — ATENOLOL 50 MG: 25 TABLET ORAL at 10:33

## 2021-02-24 RX ADMIN — FAMOTIDINE 20 MG: 20 TABLET ORAL at 10:33

## 2021-02-24 RX ADMIN — MAGNESIUM SULFATE 1000 MG: 1 INJECTION INTRAVENOUS at 18:51

## 2021-02-24 RX ADMIN — HEPARIN SODIUM 5000 UNITS: 5000 INJECTION INTRAVENOUS; SUBCUTANEOUS at 21:46

## 2021-02-24 RX ADMIN — ATORVASTATIN CALCIUM 10 MG: 10 TABLET, FILM COATED ORAL at 21:46

## 2021-02-24 RX ADMIN — MAGNESIUM SULFATE 1000 MG: 1 INJECTION INTRAVENOUS at 14:43

## 2021-02-24 RX ADMIN — INSULIN LISPRO 3 UNITS: 100 INJECTION, SOLUTION INTRAVENOUS; SUBCUTANEOUS at 22:59

## 2021-02-24 RX ADMIN — LOSARTAN POTASSIUM 100 MG: 100 TABLET, FILM COATED ORAL at 10:33

## 2021-02-24 RX ADMIN — MAGNESIUM SULFATE 1000 MG: 1 INJECTION INTRAVENOUS at 16:09

## 2021-02-24 RX ADMIN — SODIUM CHLORIDE, PRESERVATIVE FREE 10 ML: 5 INJECTION INTRAVENOUS at 10:33

## 2021-02-24 RX ADMIN — MAGNESIUM SULFATE 1000 MG: 1 INJECTION INTRAVENOUS at 13:15

## 2021-02-24 RX ADMIN — FAMOTIDINE 20 MG: 20 TABLET ORAL at 21:46

## 2021-02-24 NOTE — PROGRESS NOTES
Physical Therapy    Facility/Department: [facility]  Initial Assessment    NAME: Rosi Sarmiento  : 1955  MRN: [id number]    Date of Service: 2021    Discharge Recommendations:  Home with assist PRN        Assessment   Assessment: No further PT intervention required at this time  Prognosis: Excellent  Decision Making: High Complexity  PT Education: PT Role;General Safety  Patient Education: Fall prevention handout/ankle pumps handout  No Skilled PT: Independent with functional mobility   REQUIRES PT FOLLOW UP: No  Activity Tolerance  Activity Tolerance: Patient Tolerated treatment well       Patient Diagnosis(es): The primary encounter diagnosis was Hypomagnesemia. A diagnosis of Hypoglycemia was also pertinent to this visit. has a past medical history of Diabetes mellitus ([facility] [address] and Hypertension. has a past surgical history that includes knee surgery. Restrictions  Restrictions/Precautions  Restrictions/Precautions: General Precautions, Fall Risk(Recent seizure w/ subsequent fall)  Required Braces or Orthoses?: No  Position Activity Restriction  Other position/activity restrictions: RUE IV, up as ancelmo  Vision/Hearing        Subjective  General  Chart Reviewed: Yes  Patient assessed for rehabilitation services?: Yes  Response To Previous Treatment: Not applicable  Family / Caregiver Present: No  Follows Commands: Within Functional Limits  General Comment  Comments: OK for PT per Antonio Kothari RN  Pain Screening  Patient Currently in Pain: Denies  Vital Signs  Patient Currently in Pain: Denies       Orientation  Orientation  Overall Orientation Status: Within Normal Limits  Social/Functional History  Social/Functional History  Lives With: Alone  Type of Home: Apartment(hotel/apt 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   Laundry unit is on the main floor)  Home Access: Level entry  Bathroom Shower/Tub: Walk-in shower Type of devices: Gait belt, Left in chair, Call light within reach(No chair alarm needed per Jess Nielsen RN)  Restraints  Initially in place: No    G-Code       OutComes Score                                                  AM-PAC Score             Goals  Short term goals  Time Frame for Short term goals: No further PT intervention required at this time  Patient Goals   Patient goals : Home ASAP,find out why I had a seizure       Therapy Time   Individual Concurrent Group Co-treatment   Time In 1102         Time Out 1134         Minutes [address] [place], [address] [facility]

## 2021-02-24 NOTE — CARE COORDINATION
Social Work-Received referral to meet with patient due to alcohol abuse. ER  completed a SBIRT assessment. Met with patient. He admits to drinking 3-4 mixed drinks a night. He states that it has not caused any problems for him. He does not drive intoxicated. He does not feel that he has an issue with alcohol abuse. He declined any resources.  Corazon Mojica

## 2021-02-24 NOTE — PROGRESS NOTES
Occupational Therapy  DATE: 2021    NAME: Johanna Yi  MRN: [id number]   : 1955    Patient not seen this date for Occupational Therapy due to:  [] Blood transfusion in progress  [] Cancel by RN  [] Hemodialysis  []  Refusal by Patient   [] Spine Precautions   [] Strict Bedrest  [] Surgery  [x] Testing- attempted OT eval however pt with testing in room; continue to follow      [] Other        [] PT being discontinued at this time. Patient independent. No further needs. [] PT being discontinued at this time as the patient has been transferred to hospice care. No further needs.     Taryn Riggs, OT

## 2021-02-24 NOTE — PROGRESS NOTES
  acetaminophen, 650 mg, Q6H PRN      polyethylene glycol, 17 g, Daily PRN      LORazepam, 1 mg, Q1H PRN    Or      LORazepam, 1 mg, Q1H PRN    Or      LORazepam, 2 mg, Q1H PRN    Or      LORazepam, 2 mg, Q1H PRN    Or      LORazepam, 3 mg, Q1H PRN    Or      LORazepam, 3 mg, Q1H PRN    Or      LORazepam, 4 mg, Q1H PRN    Or      LORazepam, 4 mg, Q1H PRN        Chief Complaint:     Chief Complaint   Patient presents with    Fatigue    Hypoglycemia         History of Present Illness:        Patient seen and examined at bedside. No overnight events. No acute complaints today. afebrile   Denies any chest pain, shortness of breath, palpitation, headache, dizziness, cough, cold, changes in urination or bowel habits    HPI:      Dinaelys Zamora is a 60 y.o.  male who presents with Fatigue and Hypoglycemia    78-year-old gentleman past medical history of hypertension, diabetes presented to ER complaining of generalized fatigue and fall/near syncope. Per description, patient called ambulance this morning since he was feeling very weak. He stated that his legs could not carry his weight and he dropped to his knees and fell to the ground. On arrival of EMS his blood glucose was 50. Patient was given dextrose which improved his blood glucose to 70s. Patient was on the ground for almost an hour. Patient denies any loss of consciousness. Patient denies any chest pain, cough, cold, change urination, bowel habit or rash. Patient past medical history significant for hypertension and diabetes. He takes Metformin. Patient also stated that he smokes cigarettes 2 packs per day. Also admits to drinking wine 2-3 drinks every night. On presentation sodium was 132, potassium was 3.5. Creatinine was 1.41. His creatinine was normal before. Troponin was negative. WBC was normal, hemoglobin was 12.3. Blood pressure was elevated 167/78. Patient had a CT brain without any acute intracranial abnormality. X-ray chest was unremarkable. I have personally reviewed the past medical history, past surgical history, medications, social history, and family history, and summarized in the note. Review of Systems:     All 10 point system is reviewed and negative otherwise mentioned in HPI. Past Medical History:     Past Medical History:   Diagnosis Date    Diabetes mellitus ([facility] [address]     Hypertension         Past Surgical History:     Past Surgical History:   Procedure Laterality Date    KNEE SURGERY          Medications Prior to Admission:       Prior to Admission medications    Medication Sig Start Date End Date Taking? Authorizing Provider   glimepiride (AMARYL) 4 MG tablet Take 4 mg by mouth 2 times daily   Yes Historical Provider, MD   PARoxetine (PAXIL) 40 MG tablet Take 40 mg by mouth daily   Yes Historical Provider, MD   atenolol (TENORMIN) 50 MG tablet Take 50 mg by mouth daily   Yes Historical Provider, MD   losartan (COZAAR) 100 MG tablet Take 100 mg by mouth daily   Yes Historical Provider, MD   metFORMIN (GLUCOPHAGE) 500 MG tablet Take 1,000 mg by mouth 2 times daily (with meals)     Historical Provider, MD        Allergies:       Patient has no known allergies. Social History:     Tobacco:    reports that he has been smoking. He started smoking about 47 years ago. He has been smoking about 1.50 packs per day. He has never used smokeless tobacco.  Alcohol:      reports current alcohol use of about 4.0 standard drinks of alcohol per week. Drug Use:  reports no history of drug use. Family History:     History reviewed. No pertinent family history.       Physical Exam: Vitals:  BP (!) 185/84   Pulse 63   Temp 98.1 °F (36.7 °C) (Oral)   Resp 12   Ht 5' 7\" (1.702 m)   Wt 174 lb 9.6 oz (79.2 kg)   SpO2 97%   BMI 27.35 kg/m²   Temp (24hrs), Av.2 °F (36.8 °C), Min:98.1 °F (36.7 °C), Max:98.2 °F (36.8 °C)          General appearance - alert, well appearing, and in no acute distress  Mental status - oriented to person, place, and time with normal affect  Head - normocephalic and atraumatic  Eyes - pupils equal and reactive, extraocular eye movements intact, conjunctiva clear  Ears - hearing appears to be intact  Nose - no drainage noted  Mouth - mucous membranes moist  Neck - supple, no carotid bruits, thyroid not palpable  Chest - clear to auscultation, normal effort  Heart - normal rate, regular rhythm, no murmur  Abdomen - soft, nontender, nondistended, bowel sounds present all four quadrants, no masses, hepatomegaly or splenomegaly  Neurological - normal speech, no focal findings or movement disorder noted, cranial nerves II through XII grossly intact  Extremities - peripheral pulses palpable, no pedal edema or calf pain with palpation  Skin - no gross lesions, rashes, or induration noted        Data:     Labs:    Hematology:  Recent Labs     21  0921  0552   WBC 6.3 6.1   RBC 3.51* 3.65*   HGB 12.3* 12.7*   HCT 35.2* 36.9*   .3 101.1   MCH 35.0* 34.8*   MCHC 34.9* 34.4   RDW 13.6 13.6   * 137*   MPV 9.3 9.8     Chemistry:  Recent Labs     21  0919 21  1046 21  1752 21  0214 21  0552   *  --   --   --  136   K 3.5*  --   --   --  3.9   CL 95*  --   --   --  99   CO2 23  --   --   --  25   GLUCOSE 88 74  --   --  148*   BUN 19  --   --   --  12   CREATININE 1.41*  --   --   --  1.10   MG 0.9*  --   --   --  1.3*   ANIONGAP 14  --   --   --  12   LABGLOM 51*  --   --   --  >60   GFRAA >60  --   --   --  >60   CALCIUM 8.0*  --   --   --  8.3*   TROPHS 16  --  16 18 18   CKTOTAL 94  --   --   --  86 MYOGLOBIN 52  --   --   --  33     Recent Labs     02/23/21  0919 02/23/21  1042 02/23/21  1258 02/23/21  1747 02/23/21  1839 02/23/21 2002 02/24/21  1132   PROT 6.6  --   --   --   --   --   --    LABALBU 3.5  --   --   --   --   --   --    LABA1C 6.0  --   --   --   --   --   --    AST 37  --   --   --   --   --   --    ALT 22  --   --   --   --   --   --    ALKPHOS 61  --   --   --   --   --   --    BILITOT 0.36  --   --   --   --   --   --    LIPASE 78*  --   --   --   --   --   --    POCGLU  --  74* 258* 256* 254* 281* 156*       Lab Results   Component Value Date    INR 1.2 07/31/2016    PROTIME 12.4 (H) 07/31/2016       Lab Results   Component Value Date/Time    SPECIAL NOT REPORTED 07/31/2016 06:00 AM     Lab Results   Component Value Date/Time    CULTURE NO GROWTH 6 DAYS 07/31/2016 06:00 AM    CULTURE  07/31/2016 06:00 AM     Performed at [facility], [address] [place] [address] [phone]       No results found for: POCPH, PHART, PH, POCPCO2, MXW8XXP, PCO2, POCPO2, PO2ART, PO2, POCHCO3, YLM6BWH, HCO3, NBEA, PBEA, BEART, BE, THGBART, THB, QNO1VUD, DSCR8DBN, P3LZAQRA, O2SAT, FIO2    Radiology:    Ct Head Wo Contrast    Result Date: 2/23/2021  No acute intracranial abnormality. Senescent changes including chronic microvascular change. Sinus inflammation. Xr Chest 1 View    Result Date: 2/23/2021  No acute process. All radiological studies reviewed                Code Status:  Full Code    Electronically signed by Juan Pablo Irby MD on 2/24/2021 at 4:21 PM     Copy sent to Dr. Xu Monsivais PA    This note was created with the assistance of a speech-recognition program.  Although the intention is to generate a document that actually reflects the content of the visit, no guarantees can be provided that every mistake has been identified and corrected by editing. Note was updated later by me after  physical examination and  completion of the assessment.

## 2021-02-24 NOTE — PROGRESS NOTES
Occupational Therapy   Occupational Therapy Initial Assessment  Date: 2021   Patient Name: Johanna Yi  MRN: [id number]     : 1955    RN Bettie Saha reports patient is medically stable for therapy treatment this date. Chart reviewed prior to treatment and patient is agreeable for therapy. All lines intact and patient positioned comfortably at end of treatment. All patient needs addressed prior to ending therapy session. Date of Service: 2021    Discharge Recommendations:  Home independently, Home with assist PRN  OT Equipment Recommendations  Equipment Needed: Yes  Mobility Devices: ADL Assistive Devices  ADL Assistive Devices: Reacher;Long-handled Shoe Horn;Long-handled Sponge; Toileting - 3-in-1 Commode;Emergency Alert System    Assessment   Assessment: Pt does not require skilled OT at this setting and is I in room. OT eval and 1 tx only for OT education. DC OT once completed. Prognosis: Good  Decision Making: Low Complexity  OT Education: OT Role;Plan of Care;Energy Conservation  Patient Education: safety in function, call light/fall prevention, recommendations for no further OT after eval  REQUIRES OT FOLLOW UP: No  Activity Tolerance  Activity Tolerance: Patient Tolerated treatment well  Activity Tolerance: fair plus  Safety Devices  Safety Devices in place: Yes  Type of devices: Call light within reach; Left in chair;Gait belt;Nurse notified           Patient Diagnosis(es): The primary encounter diagnosis was Hypomagnesemia. A diagnosis of Hypoglycemia was also pertinent to this visit. has a past medical history of Diabetes mellitus ([facility] [address] and Hypertension. has a past surgical history that includes knee surgery.       PER H&P: Johanna Yi is a 60 y.o.  male who presents with Fatigue and Hypoglycemia     52-year-old gentleman past medical history of hypertension, diabetes presented to ER complaining of generalized fatigue and fall/near syncope     Restrictions Restrictions/Precautions  Restrictions/Precautions: Seizure  Position Activity Restriction  Other position/activity restrictions: RUE IV, up as ancelmo    Subjective   General  Chart Reviewed: Yes  Patient assessed for rehabilitation services?: Yes  Family / Caregiver Present: No  Patient Currently in Pain: Denies  Vital Signs  Patient Currently in Pain: Denies     Social/Functional History  Social/Functional History  Lives With: Alone  Type of Home: Apartment(hotel/apt 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 Objective   Vision: Impaired(pt denies visual changes)  Vision Exceptions: Wears glasses at all times  Hearing: Within functional limits    Orientation  Overall Orientation Status: Within Functional Limits  Observation/Palpation  Posture: Good  Observation: RUE IV  Edema: none  Balance  Sitting Balance: Independent  Standing Balance: Independent  Functional Mobility  Functional - Mobility Device: No device  Activity: (in room and gusman and pt manages own IV pole)  Assist Level: Independent  Functional Mobility Comments: Pt is steady and with no LOB. Toilet Transfers  Toilet - Technique: Ambulating  Equipment Used: Grab bars  Toilet Transfer: Independent; Modified independent     ADL  Feeding: Independent Grooming: Independent  UE Bathing: Independent  LE Bathing: Independent  UE Dressing: Independent  LE Dressing: Independent  Toileting: Independent  Tone RUE  RUE Tone: Normotonic  Tone LUE  LUE Tone: Normotonic  Coordination  Movements Are Fluid And Coordinated: Yes     Bed mobility  Supine to Sit: Modified independent  Sit to Supine: Modified independent  Comment: Pt with good use of rail as needed. Transfers  Stand Step Transfers: Independent(pt manages own IV pole)  Sit to stand: Independent  Stand to sit: Independent  Transfer Comments: Pt with no safety issues noted. Cognition  Overall Cognitive Status: WFL  Perception  Overall Perceptual Status: WFL     Sensation  Overall Sensation Status: WFL        LUE AROM (degrees)  LUE AROM : WFL  RUE AROM (degrees)  RUE AROM : WFL  LUE Strength  Gross LUE Strength: WFL  LUE Strength Comment: BUE strength grossly 5/5  RUE Strength  Gross RUE Strength: WFL                   Plan   Plan  Times per week: OT eval and 1 tx only for education  Current Treatment Recommendations: Patient/Caregiver Education & Training                                                 AM-PAC Score   24    *Verbal edu provided to pt on safe ADL completion techniques and tips during bathing/showering, and toileting; EC/WS techniques of pacing, posturing, body mechanics, planning and organizing tasks, avoiding fatigue, and task simplification in regards to ADLs of eating, grooming, bathing/showering, dressing, and IADLs of cooking, meal cleanup, marketing and meal planning, laundry, bed making, and housework. Goals  Short term goals  Time Frame for Short term goals: NO formal goals established as pt is I in room. OT eval and 1 tx only for education. Short term goal 1: Pt to verbalize good understanding of OT education for EC/WS tech.(MET)  Patient Goals   Patient goals : Pt states I just want to get home!        Therapy Time   Individual Concurrent Group Co-treatment Time In 1052(plus 10 min chart review/RN communication)         Time Out 1119         Minutes [address] [place], [place]

## 2021-02-24 NOTE — PROGRESS NOTES
Dr Rafy Gould contacted regarding the lack of a Mg level redraw after replacement. Dr stated leave it until the morning lab draw.   Restated levels prior and what was done no new orders recd

## 2021-02-24 NOTE — FLOWSHEET NOTE
Patient states well, states better, states about numbness that was in his body. Nom major needs expressed.  shared in presence, . Follow up as needed. 02/23/21 2047   Encounter Summary   Services provided to: Patient   Referral/Consult From: [facility] Family members   Continue Visiting   (2--23-21)   Length of Encounter 15 minutes   Spiritual Assessment Completed Yes   Routine   Type Initial   Assessment Approachable;Calm   Intervention Explored feelings, thoughts, concerns; Discussed illness/injury and it's impact; Discussed belief system/Christian practices/justice   Outcome Expressed gratitude;Receptive

## 2021-02-25 ENCOUNTER — APPOINTMENT (OUTPATIENT)
Dept: MRI IMAGING | Age: 66
DRG: 683 | End: 2021-02-25

## 2021-02-25 LAB
ABSOLUTE EOS #: 0.16 K/UL (ref 0–0.44)
ABSOLUTE IMMATURE GRANULOCYTE: 0.02 K/UL (ref 0–0.3)
ABSOLUTE LYMPH #: 1.57 K/UL (ref 1.1–3.7)
ABSOLUTE MONO #: 0.63 K/UL (ref 0.1–1.2)
ANION GAP SERPL CALCULATED.3IONS-SCNC: 10 MMOL/L (ref 9–17)
BASOPHILS # BLD: 1 % (ref 0–2)
BASOPHILS ABSOLUTE: 0.05 K/UL (ref 0–0.2)
BUN BLDV-MCNC: 11 MG/DL (ref 8–23)
BUN/CREAT BLD: 11 (ref 9–20)
CALCIUM SERPL-MCNC: 8.7 MG/DL (ref 8.6–10.4)
CHLORIDE BLD-SCNC: 98 MMOL/L (ref 98–107)
CHOLESTEROL/HDL RATIO: 2.1
CHOLESTEROL: 185 MG/DL
CO2: 26 MMOL/L (ref 20–31)
CREAT SERPL-MCNC: 0.97 MG/DL (ref 0.7–1.2)
DIFFERENTIAL TYPE: ABNORMAL
EOSINOPHILS RELATIVE PERCENT: 3 % (ref 1–4)
GFR AFRICAN AMERICAN: >60 ML/MIN
GFR NON-AFRICAN AMERICAN: >60 ML/MIN
GFR SERPL CREATININE-BSD FRML MDRD: ABNORMAL ML/MIN/{1.73_M2}
GFR SERPL CREATININE-BSD FRML MDRD: ABNORMAL ML/MIN/{1.73_M2}
GLUCOSE BLD-MCNC: 114 MG/DL (ref 75–110)
GLUCOSE BLD-MCNC: 121 MG/DL (ref 70–99)
GLUCOSE BLD-MCNC: 180 MG/DL (ref 75–110)
GLUCOSE BLD-MCNC: 181 MG/DL (ref 75–110)
GLUCOSE BLD-MCNC: 191 MG/DL (ref 75–110)
HCT VFR BLD CALC: 36.5 % (ref 40.7–50.3)
HDLC SERPL-MCNC: 87 MG/DL
HEMOGLOBIN: 12.5 G/DL (ref 13–17)
IMMATURE GRANULOCYTES: 0 %
LDL CHOLESTEROL: 79 MG/DL (ref 0–130)
LV EF: 65 %
LVEF MODALITY: NORMAL
LYMPHOCYTES # BLD: 26 % (ref 24–43)
MAGNESIUM: 1.8 MG/DL (ref 1.6–2.6)
MCH RBC QN AUTO: 34.9 PG (ref 25.2–33.5)
MCHC RBC AUTO-ENTMCNC: 34.2 G/DL (ref 28.4–34.8)
MCV RBC AUTO: 102 FL (ref 82.6–102.9)
MONOCYTES # BLD: 10 % (ref 3–12)
MYOGLOBIN: 35 NG/ML (ref 28–72)
NRBC AUTOMATED: 0 PER 100 WBC
PDW BLD-RTO: 13.3 % (ref 11.8–14.4)
PLATELET # BLD: 125 K/UL (ref 138–453)
PLATELET ESTIMATE: ABNORMAL
PMV BLD AUTO: 10.2 FL (ref 8.1–13.5)
POTASSIUM SERPL-SCNC: 3.6 MMOL/L (ref 3.7–5.3)
RBC # BLD: 3.58 M/UL (ref 4.21–5.77)
RBC # BLD: ABNORMAL 10*6/UL
SEG NEUTROPHILS: 60 % (ref 36–65)
SEGMENTED NEUTROPHILS ABSOLUTE COUNT: 3.72 K/UL (ref 1.5–8.1)
SODIUM BLD-SCNC: 134 MMOL/L (ref 135–144)
TOTAL CK: 77 U/L (ref 39–308)
TRIGL SERPL-MCNC: 93 MG/DL
VLDLC SERPL CALC-MCNC: NORMAL MG/DL (ref 1–30)
WBC # BLD: 6.2 K/UL (ref 3.5–11.3)
WBC # BLD: ABNORMAL 10*3/UL

## 2021-02-25 PROCEDURE — 85025 COMPLETE CBC W/AUTO DIFF WBC: CPT

## 2021-02-25 PROCEDURE — 6370000000 HC RX 637 (ALT 250 FOR IP): Performed by: PSYCHIATRY & NEUROLOGY

## 2021-02-25 PROCEDURE — 80048 BASIC METABOLIC PNL TOTAL CA: CPT

## 2021-02-25 PROCEDURE — 70546 MR ANGIOGRAPH HEAD W/O&W/DYE: CPT

## 2021-02-25 PROCEDURE — 80061 LIPID PANEL: CPT

## 2021-02-25 PROCEDURE — 6360000002 HC RX W HCPCS: Performed by: FAMILY MEDICINE

## 2021-02-25 PROCEDURE — 82550 ASSAY OF CK (CPK): CPT

## 2021-02-25 PROCEDURE — 99254 IP/OBS CNSLTJ NEW/EST MOD 60: CPT | Performed by: SURGERY

## 2021-02-25 PROCEDURE — 6370000000 HC RX 637 (ALT 250 FOR IP): Performed by: FAMILY MEDICINE

## 2021-02-25 PROCEDURE — 6360000004 HC RX CONTRAST MEDICATION: Performed by: PSYCHIATRY & NEUROLOGY

## 2021-02-25 PROCEDURE — 82947 ASSAY GLUCOSE BLOOD QUANT: CPT

## 2021-02-25 PROCEDURE — 1200000000 HC SEMI PRIVATE

## 2021-02-25 PROCEDURE — 36415 COLL VENOUS BLD VENIPUNCTURE: CPT

## 2021-02-25 PROCEDURE — 99223 1ST HOSP IP/OBS HIGH 75: CPT | Performed by: PSYCHIATRY & NEUROLOGY

## 2021-02-25 PROCEDURE — 2580000003 HC RX 258: Performed by: HOSPITALIST

## 2021-02-25 PROCEDURE — 70552 MRI BRAIN STEM W/DYE: CPT

## 2021-02-25 PROCEDURE — 6370000000 HC RX 637 (ALT 250 FOR IP): Performed by: HOSPITALIST

## 2021-02-25 PROCEDURE — 6370000000 HC RX 637 (ALT 250 FOR IP): Performed by: EMERGENCY MEDICINE

## 2021-02-25 PROCEDURE — 93306 TTE W/DOPPLER COMPLETE: CPT

## 2021-02-25 PROCEDURE — 83874 ASSAY OF MYOGLOBIN: CPT

## 2021-02-25 PROCEDURE — 70549 MR ANGIOGRAPH NECK W/O&W/DYE: CPT

## 2021-02-25 PROCEDURE — 83735 ASSAY OF MAGNESIUM: CPT

## 2021-02-25 PROCEDURE — 6360000002 HC RX W HCPCS: Performed by: HOSPITALIST

## 2021-02-25 PROCEDURE — A9579 GAD-BASE MR CONTRAST NOS,1ML: HCPCS | Performed by: PSYCHIATRY & NEUROLOGY

## 2021-02-25 RX ORDER — ATORVASTATIN CALCIUM 40 MG/1
40 TABLET, FILM COATED ORAL NIGHTLY
Status: DISCONTINUED | OUTPATIENT
Start: 2021-02-25 | End: 2021-02-26 | Stop reason: HOSPADM

## 2021-02-25 RX ORDER — NICOTINE 21 MG/24HR
1 PATCH, TRANSDERMAL 24 HOURS TRANSDERMAL DAILY PRN
Status: DISCONTINUED | OUTPATIENT
Start: 2021-02-25 | End: 2021-02-26 | Stop reason: HOSPADM

## 2021-02-25 RX ORDER — AMLODIPINE BESYLATE 5 MG/1
5 TABLET ORAL DAILY
Status: DISCONTINUED | OUTPATIENT
Start: 2021-02-25 | End: 2021-02-26 | Stop reason: HOSPADM

## 2021-02-25 RX ORDER — HYDRALAZINE HYDROCHLORIDE 20 MG/ML
10 INJECTION INTRAMUSCULAR; INTRAVENOUS EVERY 6 HOURS PRN
Status: DISCONTINUED | OUTPATIENT
Start: 2021-02-25 | End: 2021-02-26 | Stop reason: HOSPADM

## 2021-02-25 RX ADMIN — AMLODIPINE BESYLATE 5 MG: 5 TABLET ORAL at 13:39

## 2021-02-25 RX ADMIN — HYDRALAZINE HYDROCHLORIDE 10 MG: 20 INJECTION INTRAMUSCULAR; INTRAVENOUS at 15:08

## 2021-02-25 RX ADMIN — ATENOLOL 50 MG: 25 TABLET ORAL at 05:09

## 2021-02-25 RX ADMIN — HEPARIN SODIUM 5000 UNITS: 5000 INJECTION INTRAVENOUS; SUBCUTANEOUS at 08:24

## 2021-02-25 RX ADMIN — FAMOTIDINE 20 MG: 20 TABLET ORAL at 20:56

## 2021-02-25 RX ADMIN — SODIUM CHLORIDE, PRESERVATIVE FREE 10 ML: 5 INJECTION INTRAVENOUS at 08:25

## 2021-02-25 RX ADMIN — PAROXETINE HYDROCHLORIDE 40 MG: 20 TABLET, FILM COATED ORAL at 08:25

## 2021-02-25 RX ADMIN — ASPIRIN 81 MG: 81 TABLET, CHEWABLE ORAL at 08:31

## 2021-02-25 RX ADMIN — INSULIN LISPRO 2 UNITS: 100 INJECTION, SOLUTION INTRAVENOUS; SUBCUTANEOUS at 20:55

## 2021-02-25 RX ADMIN — METFORMIN HYDROCHLORIDE 500 MG: 500 TABLET ORAL at 16:48

## 2021-02-25 RX ADMIN — HEPARIN SODIUM 5000 UNITS: 5000 INJECTION INTRAVENOUS; SUBCUTANEOUS at 20:55

## 2021-02-25 RX ADMIN — GADOTERIDOL 16 ML: 279.3 INJECTION, SOLUTION INTRAVENOUS at 13:25

## 2021-02-25 RX ADMIN — LOSARTAN POTASSIUM 100 MG: 100 TABLET, FILM COATED ORAL at 05:08

## 2021-02-25 RX ADMIN — LORAZEPAM 1 MG: 2 INJECTION, SOLUTION INTRAMUSCULAR; INTRAVENOUS at 20:55

## 2021-02-25 RX ADMIN — ATORVASTATIN CALCIUM 40 MG: 40 TABLET, FILM COATED ORAL at 20:56

## 2021-02-25 RX ADMIN — SODIUM CHLORIDE, PRESERVATIVE FREE 10 ML: 5 INJECTION INTRAVENOUS at 21:00

## 2021-02-25 RX ADMIN — HYDRALAZINE HYDROCHLORIDE 10 MG: 20 INJECTION INTRAMUSCULAR; INTRAVENOUS at 01:25

## 2021-02-25 RX ADMIN — FAMOTIDINE 20 MG: 20 TABLET ORAL at 08:25

## 2021-02-25 ASSESSMENT — PAIN SCALES - GENERAL
PAINLEVEL_OUTOF10: 0

## 2021-02-25 NOTE — CONSULTS
VASCULAR SURGERY   CONSULT        Name: Sánchez Machado  MRN: [id number]     Acct: [de-identified]  Room: 2026/2026-01    Admit Date: 2/23/2021  PCP: DENNIS Garcia    Physician Requesting Consult:  Dr. Ilene Ocampo    Reason for Consult: Severe symptomatic left carotid stenosis    Chief Complaint:     Chief Complaint   Patient presents with    Fatigue    Hypoglycemia         History Obtained From:     patient, electronic medical record and nursing    History of Present Illness:      Sánchez Machado is a  60 y.o.  male who presents with Fatigue and Hypoglycemia  Found to have severe greater than 80% left internal carotid artery stenosis by duplex and MRA. MRA report is inaccurate and on review of the images there is a high-grade stenosis at the origin of the left internal carotid. [facility] radiology has been notified to amend the report. 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 On questioning me denies any amaurosis fugax or lateralizing symptoms. Patient states that on admission he had bilateral weakness which resolved. He does smoke approximately 2 packs of cigarettes per day and has done so since age 17. He denies any family history of aortic aneurysm. He is diabetic. Past Medical History:     Past Medical History:   Diagnosis Date    Diabetes mellitus ([address]     Hypertension         Past Surgical History:     Past Surgical History:   Procedure Laterality Date    KNEE SURGERY          Medications Prior to Admission:       Prior to Admission medications    Medication Sig Start Date End Date Taking?  Authorizing Provider   glimepiride (AMARYL) 4 MG tablet Take 4 mg by mouth 2 times daily   Yes Historical Provider, MD   PARoxetine (PAXIL) 40 MG tablet Take 40 mg by mouth daily   Yes Historical Provider, MD   atenolol (TENORMIN) 50 MG tablet Take 50 mg by mouth daily   Yes Historical Provider, MD losartan (COZAAR) 100 MG tablet Take 100 mg by mouth daily   Yes Historical Provider, MD   metFORMIN (GLUCOPHAGE) 500 MG tablet Take 1,000 mg by mouth 2 times daily (with meals)     Historical Provider, MD        Allergies:       Patient has no known allergies. Social History:     Tobacco:    reports that he has been smoking. He started smoking about 47 years ago. He has been smoking about 1.50 packs per day. He has never used smokeless tobacco.  Alcohol:      reports current alcohol use of about 4.0 standard drinks of alcohol per week. Drug Use:  reports no history of drug use. Family History:     History reviewed. No pertinent family history.     Review of Systems:     Positive and Negative as described in HPI    Constitutional:  negative for  fevers, chills, sweats, fatigue, and weight loss  HEENT:  negative for vision or hearing changes,   Respiratory:  negative for shortness of breath, cough, or congestion  Cardiovascular:  negative for  chest pain, palpitations  Gastrointestinal:  negative for nausea, vomiting, diarrhea, constipation, abdominal pain  Genitourinary:  negative for frequency, dysuria  Integument/Breast:  negative for rash, skin lesions  Musculoskeletal:  negative for muscle aches or joint pain  Neurological:  negative for headaches, dizziness, lightheadedness, numbness, pain and tingling extremities  Behavior/Psych:  negative for depression and anxiety    Code Status:  Full Code    Physical Exam:     Vitals:  BP (!) 175/70   Pulse 65   Temp 98.3 °F (36.8 °C) (Oral)   Resp 17   Ht 5' 7\" (1.702 m)   Wt 174 lb 11.2 oz (79.2 kg)   SpO2 98%   BMI 27.36 kg/m²   Temp (24hrs), Av.1 °F (36.7 °C), Min:97.4 °F (36.3 °C), Max:98.8 °F (37.1 °C)      General appearance - alert, well appearing and in no acute distress  Mental status - oriented to person, place and time with normal affect  Head - normocephalic and atraumatic Eyes - pupils equal and reactive, extraocular eye movements intact, conjunctiva clear  Ears - hearing appears to be intact  Nose - no drainage noted  Mouth - mucous membranes moist  Neck - supple, left carotid bruit, thyroid not palpable, no JVD  Chest - clear to auscultation, normal effort  Heart - normal rate, regular rhythm, no murmurs  Abdomen - soft, non-tender, non-distended, bowel sounds present all four quadrants, no masses, hepatomegaly, splenomegaly or aortic enlargement  Neurological - normal speech, no focal findings or movement disorder noted, cranial nerves II through XII grossly intact  Extremities - peripheral pulses by Doppler distally, no pedal edema or calf pain with palpation  Skin - no gross lesions, rashes, or induration noted      Data:     Hematology:  Recent Labs     02/23/21  0919 02/24/21  0552 02/25/21  0557   WBC 6.3 6.1 6.2   RBC 3.51* 3.65* 3.58*   HGB 12.3* 12.7* 12.5*   HCT 35.2* 36.9* 36.5*   .3 101.1 102.0   MCH 35.0* 34.8* 34.9*   MCHC 34.9* 34.4 34.2   RDW 13.6 13.6 13.3   * 137* 125*   MPV 9.3 9.8 10.2   SEGS 68* 60 60   LYMPHOPCT 19* 27 26   MONOPCT 9 10 10   EOSRELPCT 2 2 3   BASOPCT 1 1 1     Chemistry:  Recent Labs     02/23/21  0919 02/23/21  1046 02/23/21  1752 02/24/21  0214 02/24/21  0552 02/25/21  0557   *  --   --   --  136 134*   K 3.5*  --   --   --  3.9 3.6*   CL 95*  --   --   --  99 98   CO2 23  --   --   --  25 26   GLUCOSE 88 74  --   --  148* 121*   BUN 19  --   --   --  12 11   CREATININE 1.41*  --   --   --  1.10 0.97   MG 0.9*  --   --   --  1.3* 1.8   ANIONGAP 14  --   --   --  12 10   LABGLOM 51*  --   --   --  >60 >60   GFRAA >60  --   --   --  >60 >60   CALCIUM 8.0*  --   --   --  8.3* 8.7   TROPHS 16  --  16 18 18  --    CKTOTAL 94  --   --   --  86 77   MYOGLOBIN 52  --   --   --  33 35   LACTA 1.9  --   --   --   --   --      Recent Labs     02/23/21  0919 02/25/21  0557   PROT 6.6  --    LABALBU 3.5  --      --

## 2021-02-25 NOTE — CONSULTS
[facility] Neurology   IN-PATIENT SERVICE      NEUROLOGY CONSULT  NOTE            Date:   2/25/2021  Patient name:  Gricel Fournier  Date of admission:  2/23/2021  YOB: 1955      Chief Complaint:     Chief Complaint   Patient presents with    Fatigue    Hypoglycemia       Reason for Consult:      Stroke    History of Present Illness: The patient is a 60 y.o. male who presents with Fatigue and Hypoglycemia  . The patient was seen and examined and the chart was reviewed. Tuesday morning patient states he got up out of bed and felt very fatigued, weakness in all 4 extremities and fell to the ground. There was no loss of consciousness. EMS was called, patient found to be hypoglycemic in the 50s. He is admitted for further work-up. He had an MRI brain ordered by primary team which showed area of diffusion restriction left occipital region consistent with acute infarct. Neurology was consulted for further work-up. Patient underwent carotid ultrasound showing severe stenosis left ICA 70-99%. Systolic blood pressures have been ranging from 140 up to 200s. He was started on aspirin 81 mg Lipitor 10 mg yesterday. He does have history of hypertension, diabetes and smokes 1.5 pack/day. LDL cholesterol 79. Hemoglobin A1c 6.0. Only he is sitting up at bedside with no neurologic deficits. He denies any headache. Denies any visual field deficits. He states his strength feels back to normal.  He is eager to go home. Past Medical History:     Past Medical History:   Diagnosis Date    Diabetes mellitus ([address]     Hypertension         Past Surgical History:     Past Surgical History:   Procedure Laterality Date    KNEE SURGERY          Medications Prior to Admission:     Prior to Admission medications    Medication Sig Start Date End Date Taking?  Authorizing Provider   glimepiride (AMARYL) 4 MG tablet Take 4 mg by mouth 2 times daily   Yes Historical Provider, MD PARoxetine (PAXIL) 40 MG tablet Take 40 mg by mouth daily   Yes Historical Provider, MD   atenolol (TENORMIN) 50 MG tablet Take 50 mg by mouth daily   Yes Historical Provider, MD   losartan (COZAAR) 100 MG tablet Take 100 mg by mouth daily   Yes Historical Provider, MD   metFORMIN (GLUCOPHAGE) 500 MG tablet Take 1,000 mg by mouth 2 times daily (with meals)     Historical Provider, MD        Allergies:     Patient has no known allergies. Social History:     Tobacco:    reports that he has been smoking. He started smoking about 47 years ago. He has been smoking about 1.50 packs per day. He has never used smokeless tobacco.  Alcohol:      reports current alcohol use of about 4.0 standard drinks of alcohol per week. Drug Use:  reports no history of drug use. Family History:     History reviewed. No pertinent family history. Review of Systems:       Constitutional Negative for fever and chills   HEENT Negative for ear discharge, ear pain, nosebleed. Negative for photophobia, headache. Musculoskeletal Negative for joint pain, negative for myalgia   Respiratory Negative for cough, dyspnea. Negative for hemoptysis and sputum. Cardiovascular Negative for palpitations, chest pain. Negative for orthopnea, claudication. Gastrointestinal Negative for nausea, vomiting. Negative for abdominal pain, diarrhea, blood in stool   Genitourinary  Negative for dysuria, hematuria. Negative for suprapubic pain. Negative for bladder incontinence. Skin Negative for rash or itching   Hematology Negative for ecchymosis, anemia   Psychiatric Negative for anxiety, depression.  Negative for suicidal ideation, hallucinations         Physical Exam:   BP (!) 141/75   Pulse 63   Temp 97.4 °F (36.3 °C) (Oral)   Resp 18   Ht 5' 7\" (1.702 m)   Wt 174 lb 9.6 oz (79.2 kg)   SpO2 99%   BMI 27.35 kg/m²   Temp (24hrs), Av.2 °F (36.8 °C), Min:97.4 °F (36.3 °C), Max:98.8 °F (37.1 °C)        General examination: General Appearance:  alert, well appearing, and in no acute distress  HEENT: Normocephalic, atraumatic, moist mucus membranes  Neck: supple, no carotid bruits, (-) nuchal rigidity  Lungs:  Respirations unlabored, chest wall no deformity, BS normal  Cardiovascular: normal rate, regular rhythm  Abdomen: Soft, nontender, nondistended, normal bowel sounds  Skin: No gross lesions, rashes, bruising or bleeding on exposed skin area  Extremities:  peripheral pulses palpable, no cyanosis, clubbing or edema  Psych: normal affect      Neurological examination:    Mental status   Alert and oriented x 3; following all commands; speech is fluent, no dysarthria, aphasia. Cranial nerves   II - visual fields intact to confrontation; pupils reactive  III, IV, VI  extraocular muscles intact; no WILBERT; no nystagmus; no ptosis   V - normal facial sensation                                                               VII - normal facial symmetry                                                             VIII - intact hearing                                                                             IX, X - symmetrical palate elevation                                               XI - symmetrical shoulder shrug                                                       XII - midline tongue without atrophy or fasciculation     Motor function  Strength: 5/5 RUE, 5/5 RLE, 5/5 LUE, 5/5  LLE  Normal bulk and tone. No tremors                      Sensory function Intact to touch, pin, vibration, proprioception throughout     Cerebellar Intact finger-nose-finger testing. Intact heel-shin testing. No dysdiadochokinesia present. Reflex function 2/4 symmetric throughout . Downgoing plantar response bilaterally.  (-)Goldberg's sign bilaterally    Gait                  Normal station and gait             Diagnostics:      Laboratory Testing:  CBC:   Recent Labs     02/23/21  0919 02/24/21  0552 02/25/21  0557   WBC 6.3 6.1 6.2 HGB 12.3* 12.7* 12.5*   * 137* 125*     BMP:    Recent Labs     02/23/21  0919 02/23/21  1046 02/24/21  0552 02/25/21  0557   *  --  136 134*   K 3.5*  --  3.9 3.6*   CL 95*  --  99 98   CO2 23  --  25 26   BUN 19  --  12 11   CREATININE 1.41*  --  1.10 0.97   GLUCOSE 88 74 148* 121*         Lab Results   Component Value Date    CHOL 185 02/25/2021    LDLCHOLESTEROL 79 02/25/2021    HDL 87 02/25/2021    TRIG 93 02/25/2021    ALT 22 02/23/2021    AST 37 02/23/2021    INR 1.2 07/31/2016    LABA1C 6.0 02/23/2021       Imaging/Diagnostics:      CT head: Hypodensity noted left occipital region. MRA head and neck: Pending    MRI brain: Area of restricted diffusion left occipital region, PCA distribution. Carotid ultrasound: Left ICA severe stenosis 70-99%    2D echo: Pending     I personally reviewed all of the above medications, clinical laboratory, imaging and other diagnostic tests. Impression:      1. Acute left occipital infarct  2. Severe left ICA stenosis  3. Tobacco abuse   4. hypertensive urgency    Plan:     ? Recheck MRI brain with contrast to rule out infection/inflammation/tumor due to somewhat atypical appearance of diffusion restriction  ? MRA head and neck with and without contrast  ? Left ICA stenosis would not explain the left occipital infarct, this requires further investigation  ? 2D echo is pending  ? Increase Lipitor to 40 mg  ? Maintain aspirin 81 mg daily  ? Normalize blood pressures gradually  ? PT OT  ? Discussed with patient at bedside. He is eager to to go home but explained that this is a significant finding and warrants further work-up. ? Will follow        Thank you for this very interesting consultation.       Electronically signed by Jose Freire DO on 2/25/2021 at 10:08 AM      Brian Horton  Neurology

## 2021-02-25 NOTE — PROGRESS NOTES
Added aspirin and Lipitor_  Progress Note  [facility].,    Adult Hospitalist      Name: Lyudmila Díaz  MRN: [id number]     Acct: [de-identified]  Room: 2026/2026-01    Admit Date: 2/23/2021  8:36 AM  PCP: DENNIS Barnes    Primary Problem  Active Problems:    ADRIANA (acute kidney injury) ([address]  Resolved Problems:    * No resolved hospital problems. *        Assesment:     · Presyncope  · acute/subacute ischemia in the left occipital lobe. · Generalized weakness  · Acute renal failure  · Hyponatremia  · Hypoglycemia  · Hypo magnesemia  · Hypertension  · Diabetes type 2  ·   Tobacco use   · Alcohol use        Plan:     · Admitted to [place] [address] telemetry  · O2 maintain oxygen saturation greater than 92%  · Serial troponin negative  · MRI brain-->Focus of acute/subacute ischemia in the left occipital lobe. · Continue aspirin, Lipitor increased to 40 mg p.o. daily  · Neurology following  · Repeat MRI brain with contrast, MRA head and neck  · Carotid Doppler showed 70% left ICA infarct  · Vascular consult  · DC IV hydration  · Monitor creatinine-improved  · Hold Metformin and glipizide  · Check hemoglobin A1c: 6  · Monitor blood glucose  ·  CIWA protocol  · Continue to monitor/telemetry/CBC with differential daily/BMP daily  · DVT and GI prophylaxis.   Continue medications as below      Scheduled Meds:   atorvastatin  40 mg Oral Nightly    amLODIPine  5 mg Oral Daily    PARoxetine  40 mg Oral Daily    aspirin  81 mg Oral Daily    insulin lispro  0-18 Units Subcutaneous TID WC    insulin lispro  0-9 Units Subcutaneous Nightly    sodium chloride flush  10 mL Intravenous 2 times per day    famotidine  20 mg Oral BID    heparin (porcine)  5,000 Units Subcutaneous BID    losartan  100 mg Oral Daily    atenolol  50 mg Oral Daily     Continuous Infusions:   dextrose       PRN Meds:      hydrALAZINE, 10 mg, Q6H PRN      glucose, 15 g, PRN      dextrose, 12.5 g, PRN      glucagon (rDNA), 1 mg, PRN   dextrose, 100 mL/hr, PRN      sodium chloride flush, 10 mL, PRN      potassium chloride, 40 mEq, PRN    Or      potassium alternative oral replacement, 40 mEq, PRN    Or      potassium chloride, 10 mEq, PRN      magnesium sulfate, 1,000 mg, PRN      promethazine, 12.5 mg, Q6H PRN    Or      ondansetron, 4 mg, Q6H PRN      senna, 1 tablet, BID PRN      acetaminophen, 650 mg, Q6H PRN    Or      acetaminophen, 650 mg, Q6H PRN      polyethylene glycol, 17 g, Daily PRN      LORazepam, 1 mg, Q1H PRN    Or      LORazepam, 1 mg, Q1H PRN    Or      LORazepam, 2 mg, Q1H PRN    Or      LORazepam, 2 mg, Q1H PRN    Or      LORazepam, 3 mg, Q1H PRN    Or      LORazepam, 3 mg, Q1H PRN    Or      LORazepam, 4 mg, Q1H PRN    Or      LORazepam, 4 mg, Q1H PRN        Chief Complaint:     Chief Complaint   Patient presents with    Fatigue    Hypoglycemia         History of Present Illness:        Patient seen and examined at bedside. No overnight events. No acute complaints today.    afebrile   Denies any chest pain, shortness of breath, palpitation, headache, dizziness, cough, cold, changes in urination or bowel habits  Anxious to leave    HPI:      Rosi Sarmiento is a 60 y.o.  male who presents with Fatigue and Hypoglycemia 62-year-old gentleman past medical history of hypertension, diabetes presented to ER complaining of generalized fatigue and fall/near syncope. Per description, patient called ambulance this morning since he was feeling very weak. He stated that his legs could not carry his weight and he dropped to his knees and fell to the ground. On arrival of EMS his blood glucose was 50. Patient was given dextrose which improved his blood glucose to 70s. Patient was on the ground for almost an hour. Patient denies any loss of consciousness. Patient denies any chest pain, cough, cold, change urination, bowel habit or rash. Patient past medical history significant for hypertension and diabetes. He takes Metformin. Patient also stated that he smokes cigarettes 2 packs per day. Also admits to drinking wine 2-3 drinks every night. On presentation sodium was 132, potassium was 3.5. Creatinine was 1.41. His creatinine was normal before. Troponin was negative. WBC was normal, hemoglobin was 12.3. Blood pressure was elevated 167/78. Patient had a CT brain without any acute intracranial abnormality. X-ray chest was unremarkable. I have personally reviewed the past medical history, past surgical history, medications, social history, and family history, and summarized in the note. Review of Systems:     All 10 point system is reviewed and negative otherwise mentioned in HPI. Past Medical History:     Past Medical History:   Diagnosis Date    Diabetes mellitus ([facility] [address]     Hypertension         Past Surgical History:     Past Surgical History:   Procedure Laterality Date    KNEE SURGERY          Medications Prior to Admission:       Prior to Admission medications    Medication Sig Start Date End Date Taking?  Authorizing Provider   glimepiride (AMARYL) 4 MG tablet Take 4 mg by mouth 2 times daily   Yes Historical Provider, MD   PARoxetine (PAXIL) 40 MG tablet Take 40 mg by mouth daily   Yes Historical Provider, MD atenolol (TENORMIN) 50 MG tablet Take 50 mg by mouth daily   Yes Historical Provider, MD   losartan (COZAAR) 100 MG tablet Take 100 mg by mouth daily   Yes Historical Provider, MD   metFORMIN (GLUCOPHAGE) 500 MG tablet Take 1,000 mg by mouth 2 times daily (with meals)     Historical Provider, MD        Allergies:       Patient has no known allergies. Social History:     Tobacco:    reports that he has been smoking. He started smoking about 47 years ago. He has been smoking about 1.50 packs per day. He has never used smokeless tobacco.  Alcohol:      reports current alcohol use of about 4.0 standard drinks of alcohol per week. Drug Use:  reports no history of drug use. Family History:     History reviewed. No pertinent family history.       Physical Exam:     Vitals:  BP (!) 148/95   Pulse 65   Temp 97.6 °F (36.4 °C) (Oral)   Resp 17   Ht 5' 7\" (1.702 m)   Wt 174 lb 9.6 oz (79.2 kg)   SpO2 99%   BMI 27.35 kg/m²   Temp (24hrs), Av.1 °F (36.7 °C), Min:97.4 °F (36.3 °C), Max:98.8 °F (37.1 °C)          General appearance - alert, well appearing, and in no acute distress  Mental status - oriented to person, place, and time with normal affect  Head - normocephalic and atraumatic  Eyes - pupils equal and reactive, extraocular eye movements intact, conjunctiva clear  Ears - hearing appears to be intact  Nose - no drainage noted  Mouth - mucous membranes moist  Neck - supple, no carotid bruits, thyroid not palpable  Chest - clear to auscultation, normal effort  Heart - normal rate, regular rhythm, no murmur  Abdomen - soft, nontender, nondistended, bowel sounds present all four quadrants, no masses, hepatomegaly or splenomegaly  Neurological - normal speech, no focal findings or movement disorder noted, cranial nerves II through XII grossly intact  Extremities - peripheral pulses palpable, no pedal edema or calf pain with palpation  Skin - no gross lesions, rashes, or induration noted        Data:     Labs: Hematology:  Recent Labs     02/23/21  0919 02/24/21  0552 02/25/21  0557   WBC 6.3 6.1 6.2   RBC 3.51* 3.65* 3.58*   HGB 12.3* 12.7* 12.5*   HCT 35.2* 36.9* 36.5*   .3 101.1 102.0   MCH 35.0* 34.8* 34.9*   MCHC 34.9* 34.4 34.2   RDW 13.6 13.6 13.3   * 137* 125*   MPV 9.3 9.8 10.2     Chemistry:  Recent Labs     02/23/21  0919 02/23/21  1046 02/23/21  1752 02/24/21 0214 02/24/21  0552 02/25/21  0557   *  --   --   --  136 134*   K 3.5*  --   --   --  3.9 3.6*   CL 95*  --   --   --  99 98   CO2 23  --   --   --  25 26   GLUCOSE 88 74  --   --  148* 121*   BUN 19  --   --   --  12 11   CREATININE 1.41*  --   --   --  1.10 0.97   MG 0.9*  --   --   --  1.3* 1.8   ANIONGAP 14  --   --   --  12 10   LABGLOM 51*  --   --   --  >60 >60   GFRAA >60  --   --   --  >60 >60   CALCIUM 8.0*  --   --   --  8.3* 8.7   TROPHS 16  --  16 18 18  --    CKTOTAL 94  --   --   --  86 77   MYOGLOBIN 52  --   --   --  33 35     Recent Labs     02/23/21  0919 02/23/21  0919 02/24/21  1132 02/24/21  1632 02/24/21 2014 02/24/21  2158 02/25/21  0557 02/25/21  0627 02/25/21  1108   PROT 6.6  --   --   --   --   --   --   --   --    LABALBU 3.5  --   --   --   --   --   --   --   --    LABA1C 6.0  --   --   --   --   --   --   --   --    AST 37  --   --   --   --   --   --   --   --    ALT 22  --   --   --   --   --   --   --   --    ALKPHOS 61  --   --   --   --   --   --   --   --    BILITOT 0.36  --   --   --   --   --   --   --   --    LIPASE 78*  --   --   --   --   --   --   --   --    CHOL  --   --   --   --   --   --  185  --   --    HDL  --   --   --   --   --   --  87  --   --    LDLCHOLESTEROL  --   --   --   --   --   --  79  --   --    CHOLHDLRATIO  --   --   --   --   --   --  2.1  --   --    TRIG  --   --   --   --   --   --  93  --   --    VLDL  --   --   --   --   --   --  NOT REPORTED  --   --    POCGLU  --    < > 156* 172* 274* 206*  --  114* 191*    < > = values in this interval not displayed. Lab Results   Component Value Date    INR 1.2 07/31/2016    PROTIME 12.4 (H) 07/31/2016       Lab Results   Component Value Date/Time    SPECIAL NOT REPORTED 07/31/2016 06:00 AM     Lab Results   Component Value Date/Time    CULTURE NO GROWTH 6 DAYS 07/31/2016 06:00 AM    CULTURE  07/31/2016 06:00 AM     Performed at [facility] with [facility], [address] [place] [address] [phone]       No results found for: POCPH, PHART, PH, POCPCO2, HNI7XNI, PCO2, POCPO2, PO2ART, PO2, POCHCO3, TYR6XZO, HCO3, NBEA, PBEA, BEART, BE, THGBART, THB, OPQ3TXG, IBTN9OKD, H0PVBWCU, O2SAT, FIO2    Radiology:    Ct Head Wo Contrast    Result Date: 2/23/2021  No acute intracranial abnormality. Senescent changes including chronic microvascular change. Sinus inflammation. Xr Chest 1 View    Result Date: 2/23/2021  No acute process. All radiological studies reviewed                Code Status:  Full Code    Electronically signed by Monico Agosto MD on 2/25/2021 at 12:55 PM     Copy sent to Dr. Ashish Mercer PA    This note was created with the assistance of a speech-recognition program.  Although the intention is to generate a document that actually reflects the content of the visit, no guarantees can be provided that every mistake has been identified and corrected by editing. Note was updated later by me after  physical examination and  completion of the assessment.

## 2021-02-25 NOTE — FLOWSHEET NOTE
Patient in chair on hi-flow O2; daughter-in-law Michelle Maynard) seated nearby. Patient is sleepy. Princess Mateo requests \"someone to pray the rosary\" with patient. Writer states a Spiritism  can follow up later. Princess Seaseverino expresses gratitude and agreement; requests prayer. Writer provides listening presence, supportive conversation, and prayer. Writer makes referral to  May who will follow up with nuno later today. Spiritual Care will follow as needed. 02/25/21 1120   Encounter Summary   Services provided to: Patient and family together   Referral/Consult From: [facility] Family members   Continue Visiting   (2/25/21)   Complexity of Encounter Moderate   Length of Encounter 15 minutes   Spiritual Assessment Completed Yes   Routine   Type Follow up   Spiritual/Religion   Type Spiritual support   Assessment Calm; Approachable   Intervention Active listening;Explored feelings, thoughts, concerns;Prayer   Outcome Comfort;Expressed gratitude;Engaged in conversation;Expressed feelings/needs/concerns

## 2021-02-25 NOTE — PLAN OF CARE
Problem: Falls - Risk of:  Goal: Will remain free from falls  Description: Will remain free from falls  Outcome: Ongoing  Note: Pt refused bed alarm-refusal form in chart. Siderails up x 2  Hourly rounding  Call light in reach  Instructed to call for assist.  Remains free from falls and accidental injury at this time   Floor free from obstacles  Bed is locked and in lowest position  Adequate lighting provided  [place] star and Stay with Me signs posted  Will continue to monitor.          Problem: Fluid Volume - Imbalance:  Goal: Absence of imbalanced fluid volume signs and symptoms  Description: Absence of imbalanced fluid volume signs and symptoms  Outcome: Ongoing

## 2021-02-26 ENCOUNTER — APPOINTMENT (OUTPATIENT)
Dept: INTERVENTIONAL RADIOLOGY/VASCULAR | Age: 66
DRG: 683 | End: 2021-02-26

## 2021-02-26 VITALS
WEIGHT: 174.3 LBS | HEART RATE: 85 BPM | BODY MASS INDEX: 27.36 KG/M2 | HEIGHT: 67 IN | TEMPERATURE: 97.9 F | SYSTOLIC BLOOD PRESSURE: 132 MMHG | DIASTOLIC BLOOD PRESSURE: 59 MMHG | RESPIRATION RATE: 20 BRPM | OXYGEN SATURATION: 100 %

## 2021-02-26 PROBLEM — I63.532 ACUTE ISCHEMIC LEFT PCA STROKE (HCC): Status: ACTIVE | Noted: 2021-02-26

## 2021-02-26 LAB
ABSOLUTE EOS #: 0.2 K/UL (ref 0–0.44)
ABSOLUTE IMMATURE GRANULOCYTE: 0.03 K/UL (ref 0–0.3)
ABSOLUTE LYMPH #: 1.48 K/UL (ref 1.1–3.7)
ABSOLUTE MONO #: 0.63 K/UL (ref 0.1–1.2)
ALBUMIN CSF: 680 MG/L (ref 70–350)
ALBUMIN INDEX: 188.9
ALBUMIN SERPL-MCNC: 3.6 G/DL (ref 3.5–5.2)
ANION GAP SERPL CALCULATED.3IONS-SCNC: 9 MMOL/L (ref 9–17)
APPEARANCE CSF: CLEAR
BASOPHILS # BLD: 1 % (ref 0–2)
BASOPHILS ABSOLUTE: 0.05 K/UL (ref 0–0.2)
BUN BLDV-MCNC: 14 MG/DL (ref 8–23)
BUN/CREAT BLD: 12 (ref 9–20)
CALCIUM SERPL-MCNC: 8.6 MG/DL (ref 8.6–10.4)
CASE NUMBER:: NORMAL
CHLORIDE BLD-SCNC: 99 MMOL/L (ref 98–107)
CO2: 25 MMOL/L (ref 20–31)
CREAT SERPL-MCNC: 1.18 MG/DL (ref 0.7–1.2)
CRYPTOCOCCUS NEOFORMANS/GATTI CSF FILM ARR.: NOT DETECTED
CYTOMEGALOVIRUS (CMV) CSF FILM ARRAY: NOT DETECTED
DIFFERENTIAL TYPE: ABNORMAL
ENTEROVIRUS CSF FILM ARRAY: NOT DETECTED
EOSINOPHILS RELATIVE PERCENT: 4 % (ref 1–4)
ESCHERICHIA COLI K1 CSF FILM ARRAY: NOT DETECTED
GFR AFRICAN AMERICAN: >60 ML/MIN
GFR NON-AFRICAN AMERICAN: >60 ML/MIN
GFR SERPL CREATININE-BSD FRML MDRD: ABNORMAL ML/MIN/{1.73_M2}
GFR SERPL CREATININE-BSD FRML MDRD: ABNORMAL ML/MIN/{1.73_M2}
GLUCOSE BLD-MCNC: 135 MG/DL (ref 75–110)
GLUCOSE BLD-MCNC: 137 MG/DL (ref 70–99)
GLUCOSE BLD-MCNC: 164 MG/DL (ref 75–110)
GLUCOSE BLD-MCNC: 285 MG/DL (ref 75–110)
GLUCOSE BLD-MCNC: 61 MG/DL (ref 75–110)
GLUCOSE, CSF: 90 MG/DL (ref 40–70)
HAEMOPHILUS INFLUENZA CSF FILM ARRAY: NOT DETECTED
HCT VFR BLD CALC: 36.2 % (ref 40.7–50.3)
HEMOGLOBIN: 12.5 G/DL (ref 13–17)
HHV-6 (HERPESVIRUS 6) CSF FILM ARRAY: NOT DETECTED
HSV-1 CSF FILM ARRAY: NOT DETECTED
HSV-2 CSF FILM ARRAY: NOT DETECTED
IGG CSF: 10.3 MG/DL (ref 0.5–7)
IGG INDEX CSF: 0.5
IGG SYNTHESIS RATE CSF: 2.4 MG/24 H
IGG: 1095 MG/DL (ref 700–1600)
IMMATURE GRANULOCYTES: 1 %
INR BLD: 1
LISTERIA MONOCYTOGENES CSF FILM ARRAY: NOT DETECTED
LYMPHOCYTES # BLD: 26 % (ref 24–43)
MCH RBC QN AUTO: 35 PG (ref 25.2–33.5)
MCHC RBC AUTO-ENTMCNC: 34.5 G/DL (ref 28.4–34.8)
MCV RBC AUTO: 101.4 FL (ref 82.6–102.9)
MONOCYTES # BLD: 11 % (ref 3–12)
MYOGLOBIN: 28 NG/ML (ref 28–72)
NEISSERIA MENIGITIDIS CSF FILM ARRAY: NOT DETECTED
NRBC AUTOMATED: 0 PER 100 WBC
OLIGOCLONAL BANDS: ABNORMAL
PARECHOVIRUS CSF FILM ARRAY: NOT DETECTED
PDW BLD-RTO: 13.4 % (ref 11.8–14.4)
PLATELET # BLD: 127 K/UL (ref 138–453)
PLATELET ESTIMATE: ABNORMAL
PMV BLD AUTO: 9.6 FL (ref 8.1–13.5)
POTASSIUM SERPL-SCNC: 3.9 MMOL/L (ref 3.7–5.3)
PROTEIN CSF: 111 MG/DL (ref 15–45)
PROTHROMBIN TIME: 12.8 SEC (ref 11.5–14.2)
RBC # BLD: 3.57 M/UL (ref 4.21–5.77)
RBC # BLD: ABNORMAL 10*6/UL
RBC CSF: 0 /MM3
SEG NEUTROPHILS: 57 % (ref 36–65)
SEGMENTED NEUTROPHILS ABSOLUTE COUNT: 3.38 K/UL (ref 1.5–8.1)
SODIUM BLD-SCNC: 133 MMOL/L (ref 135–144)
SPECIMEN DESCRIPTION: NORMAL
SPECIMEN DESCRIPTION: NORMAL
STREPTOCOCCUS AGALACTIAE CSF FILM ARRAY: NOT DETECTED
STREPTOCOCCUS PNEUMONIAE CSF FILM ARRAY: NOT DETECTED
SUPERNAT COLOR CSF: COLORLESS
TOTAL CK: 55 U/L (ref 39–308)
TUBE NUMBER CSF: 3
VARICELLA-ZOSTER CSF FILM ARRAY: NOT DETECTED
VOLUME CSF: 13
WBC # BLD: 5.8 K/UL (ref 3.5–11.3)
WBC # BLD: ABNORMAL 10*3/UL
WBC CSF: 0 /MM3
XANTHOCHROMIA: NORMAL

## 2021-02-26 PROCEDURE — 88112 CYTOPATH CELL ENHANCE TECH: CPT

## 2021-02-26 PROCEDURE — 36415 COLL VENOUS BLD VENIPUNCTURE: CPT

## 2021-02-26 PROCEDURE — 99233 SBSQ HOSP IP/OBS HIGH 50: CPT | Performed by: PSYCHIATRY & NEUROLOGY

## 2021-02-26 PROCEDURE — 00JU3ZZ INSPECTION OF SPINAL CANAL, PERCUTANEOUS APPROACH: ICD-10-PCS | Performed by: RADIOLOGY

## 2021-02-26 PROCEDURE — 82550 ASSAY OF CK (CPK): CPT

## 2021-02-26 PROCEDURE — 83916 OLIGOCLONAL BANDS: CPT

## 2021-02-26 PROCEDURE — 83874 ASSAY OF MYOGLOBIN: CPT

## 2021-02-26 PROCEDURE — 86592 SYPHILIS TEST NON-TREP QUAL: CPT

## 2021-02-26 PROCEDURE — 85610 PROTHROMBIN TIME: CPT

## 2021-02-26 PROCEDURE — 85025 COMPLETE CBC W/AUTO DIFF WBC: CPT

## 2021-02-26 PROCEDURE — 87483 CNS DNA AMP PROBE TYPE 12-25: CPT

## 2021-02-26 PROCEDURE — 87205 SMEAR GRAM STAIN: CPT

## 2021-02-26 PROCEDURE — 80048 BASIC METABOLIC PNL TOTAL CA: CPT

## 2021-02-26 PROCEDURE — 87070 CULTURE OTHR SPECIMN AEROBIC: CPT

## 2021-02-26 PROCEDURE — 82784 ASSAY IGA/IGD/IGG/IGM EACH: CPT

## 2021-02-26 PROCEDURE — 82040 ASSAY OF SERUM ALBUMIN: CPT

## 2021-02-26 PROCEDURE — 6360000002 HC RX W HCPCS: Performed by: FAMILY MEDICINE

## 2021-02-26 PROCEDURE — 2709999900 IR LUMBAR PUNCTURE FOR DIAGNOSIS

## 2021-02-26 PROCEDURE — 84157 ASSAY OF PROTEIN OTHER: CPT

## 2021-02-26 PROCEDURE — 6370000000 HC RX 637 (ALT 250 FOR IP): Performed by: FAMILY MEDICINE

## 2021-02-26 PROCEDURE — 2580000003 HC RX 258: Performed by: HOSPITALIST

## 2021-02-26 PROCEDURE — 62328 DX LMBR SPI PNXR W/FLUOR/CT: CPT

## 2021-02-26 PROCEDURE — 87015 SPECIMEN INFECT AGNT CONCNTJ: CPT

## 2021-02-26 PROCEDURE — 6370000000 HC RX 637 (ALT 250 FOR IP): Performed by: EMERGENCY MEDICINE

## 2021-02-26 PROCEDURE — 89050 BODY FLUID CELL COUNT: CPT

## 2021-02-26 PROCEDURE — 83873 ASSAY OF CSF PROTEIN: CPT

## 2021-02-26 PROCEDURE — 82945 GLUCOSE OTHER FLUID: CPT

## 2021-02-26 PROCEDURE — 6370000000 HC RX 637 (ALT 250 FOR IP): Performed by: HOSPITALIST

## 2021-02-26 PROCEDURE — 82947 ASSAY GLUCOSE BLOOD QUANT: CPT

## 2021-02-26 PROCEDURE — 99232 SBSQ HOSP IP/OBS MODERATE 35: CPT | Performed by: SURGERY

## 2021-02-26 PROCEDURE — 82042 OTHER SOURCE ALBUMIN QUAN EA: CPT

## 2021-02-26 RX ORDER — AMLODIPINE BESYLATE 5 MG/1
5 TABLET ORAL DAILY
Qty: 30 TABLET | Refills: 3 | Status: SHIPPED | OUTPATIENT
Start: 2021-02-27

## 2021-02-26 RX ORDER — ASPIRIN 81 MG/1
81 TABLET, CHEWABLE ORAL DAILY
Qty: 30 TABLET | Refills: 3 | Status: SHIPPED | OUTPATIENT
Start: 2021-02-26

## 2021-02-26 RX ORDER — ATORVASTATIN CALCIUM 40 MG/1
40 TABLET, FILM COATED ORAL NIGHTLY
Qty: 30 TABLET | Refills: 3 | Status: SHIPPED | OUTPATIENT
Start: 2021-02-26

## 2021-02-26 RX ADMIN — SODIUM CHLORIDE, PRESERVATIVE FREE 10 ML: 5 INJECTION INTRAVENOUS at 12:03

## 2021-02-26 RX ADMIN — METFORMIN HYDROCHLORIDE 500 MG: 500 TABLET ORAL at 18:17

## 2021-02-26 RX ADMIN — ATENOLOL 50 MG: 25 TABLET ORAL at 11:57

## 2021-02-26 RX ADMIN — HYDRALAZINE HYDROCHLORIDE 10 MG: 20 INJECTION INTRAMUSCULAR; INTRAVENOUS at 00:38

## 2021-02-26 RX ADMIN — METFORMIN HYDROCHLORIDE 500 MG: 500 TABLET ORAL at 11:57

## 2021-02-26 RX ADMIN — LOSARTAN POTASSIUM 100 MG: 100 TABLET, FILM COATED ORAL at 11:56

## 2021-02-26 RX ADMIN — INSULIN LISPRO 9 UNITS: 100 INJECTION, SOLUTION INTRAVENOUS; SUBCUTANEOUS at 12:17

## 2021-02-26 RX ADMIN — FAMOTIDINE 20 MG: 20 TABLET ORAL at 12:18

## 2021-02-26 RX ADMIN — PAROXETINE HYDROCHLORIDE 40 MG: 20 TABLET, FILM COATED ORAL at 11:56

## 2021-02-26 RX ADMIN — HYDRALAZINE HYDROCHLORIDE 10 MG: 20 INJECTION INTRAMUSCULAR; INTRAVENOUS at 05:59

## 2021-02-26 RX ADMIN — AMLODIPINE BESYLATE 5 MG: 5 TABLET ORAL at 12:03

## 2021-02-26 ASSESSMENT — PAIN SCALES - GENERAL: PAINLEVEL_OUTOF10: 0

## 2021-02-26 NOTE — DISCHARGE SUMMARY
[facility]    Adult Hospitalist      Patient ID: Valentine Johnson  MRN: [id number]     Acct:  [de-identified]       Patient's PCP: DENNIS Little    Admit Date: 2/23/2021     Discharge Date:   2/26/2021    Admitting Physician: Zoila Boateng MD    Discharge Physician: Zoila Boateng MD     CONSULTANTS: Patient Care Team:  DENNIS Caicedo as PCP - General (Physician Assistant)    PROCEDURES PERFORMED:     Active Discharge Diagnoses:  · Presyncope  · acute/subacute ischemia in the left occipital lobe. · Generalized weakness  · Acute renal failure  · Hyponatremia  · Hypoglycemia  · Hypo magnesemia  · Hypertension  · Diabetes type 2  ·   Tobacco use   · Alcohol use      Primary Problem  <principal problem not specified>    Hospital Course:   73-year-old gentleman past medical history of hypertension, diabetes presented to ER complaining of generalized fatigue and fall/near syncope. Per description, patient called ambulance this morning since he was feeling very weak. He stated that his legs could not carry his weight and he dropped to his knees and fell to the ground. On arrival of EMS his blood glucose was 50. Patient was given dextrose which improved his blood glucose to 70s. Patient was on the ground for almost an hour. Patient denies any loss of consciousness. Patient denies any chest pain, cough, cold, change urination, bowel habit or rash. Patient past medical history significant for hypertension and diabetes. He takes Metformin. Patient also stated that he smokes cigarettes 2 packs per day. Also admits to drinking wine 2-3 drinks every night. On presentation sodium was 132, potassium was 3.5. Creatinine was 1.41. His creatinine was normal before. Troponin was negative. WBC was normal, hemoglobin was 12.3. Blood pressure was elevated 167/78. Patient had a CT brain without any acute intracranial abnormality. X-ray chest was unremarkable.     Patient had MRI brain which shows focus of acute / subacute ischemia in the left occipital lobe. Aspirin was initiated. Lipitor was initiated and increased to 40 mg p.o. daily. Carotid Doppler showed 70% left ICA infarct. Vascular was consulted. They thought that left ICA stenosis is greater than 80%. They recommended for patient to undergo an elective left carotid endarterectomy after cardiac clearance for which patient will follow up outpatient. Neurology was consulted. As per Neurology, initial MRI findings are bit atypical.  Patient also has a lumbar puncture which was negative for any infection. Neurology was involved in the patient. Neurosurgery was consulted. They felt that patient most likely has left parieto-occipital lobe stroke. Patient was started on aspirin and Lipitor during hospital stay. He is already on Plavix. Patient will need a repeat MRI brain in 6 to 8 weeks and follow-up with neurology. Is also counseled on tobacco cessation. His diabetes medications were adjusted. Hemoglobin A1c was 6. His glipizide was discontinued. Metformin was reduced to 500 mg p.o. twice daily. Patient also received IV fluids. Creatinine was normalized. At the time of discharge patient was notably stable and asymptomatic. The plan was discussed in detail with patient who agreed with the plan and verbalized understanding . The patient was seen and examined on day of discharge and this discharge summary is in conjunction with any daily progress note from day of discharge.     Hospital Data:    Labs:    Hematology:  Recent Labs     02/24/21  0552 02/25/21  0557 02/26/21  0541 02/26/21  0809   WBC 6.1 6.2 5.8  --    RBC 3.65* 3.58* 3.57*  --    HGB 12.7* 12.5* 12.5*  --    HCT 36.9* 36.5* 36.2*  --    .1 102.0 101.4  --    MCH 34.8* 34.9* 35.0*  --    MCHC 34.4 34.2 34.5  --    RDW 13.6 13.3 13.4  --    * 125* 127*  --    MPV 9.8 10.2 9.6  --    INR  --   --   --  1.0     Chemistry: Recent Labs     02/23/21  1752 02/24/21  0214 02/24/21  0552 02/25/21  0557 02/26/21  0541   NA  --   --  136 134* 133*   K  --   --  3.9 3.6* 3.9   CL  --   --  99 98 99   CO2  --   --  25 26 25   GLUCOSE  --   --  148* 121* 137*   BUN  --   --  12 11 14   CREATININE  --   --  1.10 0.97 1.18   MG  --   --  1.3* 1.8  --    ANIONGAP  --   --  12 10 9   LABGLOM  --   --  >60 >60 >60   GFRAA  --   --  >60 >60 >60   CALCIUM  --   --  8.3* 8.7 8.6   TROPHS 16 18 18  --   --    CKTOTAL  --   --  86 77 55   MYOGLOBIN  --   --  33 35 28     Recent Labs     02/25/21  0557 02/25/21  0557 02/25/21  1108 02/25/21  1611 02/25/21 2016 02/26/21  0617 02/26/21  0930 02/26/21  1107 02/26/21  1601   LABALBU  --   --   --   --   --   --  3.6  --   --    CHOL 185  --   --   --   --   --   --   --   --    HDL 87  --   --   --   --   --   --   --   --    LDLCHOLESTEROL 79  --   --   --   --   --   --   --   --    CHOLHDLRATIO 2.1  --   --   --   --   --   --   --   --    TRIG 93  --   --   --   --   --   --   --   --    VLDL NOT REPORTED  --   --   --   --   --   --   --   --    POCGLU  --    < > 191* 180* 181* 135*  --  285* 61*    < > = values in this interval not displayed. Lab Results   Component Value Date    INR 1.0 02/26/2021    INR 1.2 07/31/2016    PROTIME 12.8 02/26/2021    PROTIME 12.4 (H) 07/31/2016     Lab Results   Component Value Date/Time    SPECIAL NOT REPORTED 02/26/2021 09:30 AM     Lab Results   Component Value Date/Time    CULTURE PENDING 02/26/2021 09:30 AM       No results found for: POCPH, PHART, PH, POCPCO2, LQP4NEM, PCO2, POCPO2, PO2ART, PO2, POCHCO3, MOE3NKF, HCO3, NBEA, PBEA, BEART, BE, THGBART, THB, FBI5WVF, YTNN0HTE, L1CFBIQU, O2SAT, FIO2    Radiology:    Ct Head Wo Contrast    Result Date: 2/23/2021  No acute intracranial abnormality. Senescent changes including chronic microvascular change. Sinus inflammation.      Mri Brain W Contrast    Result Date: 2/25/2021  Enhancement in the area of restricted diffusion and FLAIR signal abnormality in the medial left parietooccipital region. Acute/subacute ischemia remains a consideration. Other etiologies in the differential include an infectious etiology or underlying low-grade glioma. Follow-up pre and post-contrast MRI is recommended in 3 months. Xr Chest 1 View    Result Date: 2/23/2021  No acute process. Ir Lumbar Puncture For Diagnosis    Result Date: 2/26/2021  Successful fluoroscopic-guided lumbar puncture. Mra Neck W Wo Contrast    Addendum Date: 2/25/2021    ADDENDUM: On further evaluation per the referring physician's request there appears to be a severe stenosis at the origin of the left internal carotid artery. This may be better evaluated via CTA of the neck. Result Date: 2/25/2021  Unremarkable MRA of the neck. Mild stenosis in proximal and distal cavernous portion of the right internal carotid artery. Mild stenosis in the proximal supraclinoid portion of the left internal carotid. Mri Brain Wo Contrast    Result Date: 2/24/2021  Focus of acute/subacute ischemia in the left occipital lobe. Chronic sinusitis most pronounced in left maxillary sinus. Mra Head W Wo Contrast    Addendum Date: 2/25/2021    ADDENDUM: On further evaluation per the referring physician's request there appears to be a severe stenosis at the origin of the left internal carotid artery. This may be better evaluated via CTA of the neck. Result Date: 2/25/2021  Unremarkable MRA of the neck. Mild stenosis in proximal and distal cavernous portion of the right internal carotid artery. Mild stenosis in the proximal supraclinoid portion of the left internal carotid.          All radiological studies reviewed      Reviews of Symptoms:    A 10 point system is reviewed and  negative except described in hospital course    Physical Exam:    Vitals:  BP (!) 132/59   Pulse 85   Temp 97.9 °F (36.6 °C) (Oral)   Resp 20   Ht 5' 7\" (1.702 m)   Wt 174 lb 4.8 oz (79.1 CK DAILY    Myoglobin, Serum DAILY    Initiate Oxygen Therapy Protocol DAILY (RT)    Up as tolerated PRN    HYPOGLYCEMIA TREATMENT: blood glucose less than 50 mg/dL and patient  ALERT and TOLERATING PO PRN    HYPOGLYCEMIA TREATMENT: blood glucose less than 70 mg/dL and patient ALERT and TOLERATING PO PRN    HYPOGLYCEMIA TREATMENT: blood glucose less than 70 mg/dL and patient NOT ALERT or NPO PRN    POCT Glucose PRN          Diet: DIET GENERAL;    Discharge Medications:    [facility] CYA[facility][id number]    Printed on:02/26/21 4449   Medication Information                      amLODIPine (NORVASC) 5 MG tablet  Take 1 tablet by mouth daily             aspirin 81 MG chewable tablet  Take 1 tablet by mouth daily             atenolol (TENORMIN) 50 MG tablet  Take 50 mg by mouth daily             atorvastatin (LIPITOR) 40 MG tablet  Take 1 tablet by mouth nightly             losartan (COZAAR) 100 MG tablet  Take 100 mg by mouth daily             metFORMIN (GLUCOPHAGE) 500 MG tablet  Take 1 tablet by mouth 2 times daily (with meals)             PARoxetine (PAXIL) 40 MG tablet  Take 40 mg by mouth daily                 Code Status:  Full Code    Time Spent on discharge is  41 minutes in patient examination, evaluation, counseling as well as medication reconciliation, prescriptions for required medications, discharge plan and follow up. Electronically signed by Obey Khanna MD on 2/26/2021 at 4:51 PM     Thank you DENNIS King for the opportunity to be involved in this patient's care. This note was created with the assistance of a speech-recognition program.  Although the intention is to generate a document that actually reflects the content of the visit, no guarantees can be provided that every mistake has been identified and corrected by editing. Note was updated later by me after  physical examination and  completion of the assessment.

## 2021-02-26 NOTE — PROGRESS NOTES
Patient tolerated lumbar puncture without distress. 10 ml of clear fluid removed. Dry dressing to site. Patient returned to room. Nurse updated.

## 2021-02-26 NOTE — CONSULTS
Department of Neurosurgery  Attending Consult Note        Reason for Consult:  Abnormal MRI Brain  Requesting Physician:  Dr. Susanna Arriaza:  Weakness    History Obtained From:  patient, electronic medical record    HISTORY OF PRESENT ILLNESS:                The patient is a 60 y.o. male who presented to the emergency department three days ago after sudden on set of a feeling of weakness. He states that his legs gave out and he was unable to get back up. EMS was called and found his glucose was 50 on arrival, which improved to 70s with some dextrose. He had not had a feeling like this before. He had not been feeling particularly unwell prior to this episode. He does describe some tingling in his hands and feet over the weekend, which is not anything particularly unusual for him. He has since been up and about without difficulty. He has some chronic aches and pains that have not been any different than what he normally experiences. He denies any changes with vision or hearing. He is diabetic and has a substantial smoking history, smoking 2ppd.     Past Medical History:        Diagnosis Date    Diabetes mellitus ([facility] [address]     Hypertension      Past Surgical History:        Procedure Laterality Date    KNEE SURGERY       Current Medications:   Current Facility-Administered Medications: hydrALAZINE (APRESOLINE) injection 10 mg, 10 mg, Intravenous, Q6H PRN  atorvastatin (LIPITOR) tablet 40 mg, 40 mg, Oral, Nightly  amLODIPine (NORVASC) tablet 5 mg, 5 mg, Oral, Daily  nicotine (NICODERM CQ) 21 MG/24HR 1 patch, 1 patch, Transdermal, Daily PRN  metFORMIN (GLUCOPHAGE) tablet 500 mg, 500 mg, Oral, BID WC  PARoxetine (PAXIL) tablet 40 mg, 40 mg, Oral, Daily  [Held by provider] aspirin chewable tablet 81 mg, 81 mg, Oral, Daily  insulin lispro (HUMALOG) injection vial 0-18 Units, 0-18 Units, Subcutaneous, TID WC  insulin lispro (HUMALOG) injection vial 0-9 Units, 0-9 Units, Subcutaneous, Nightly glucose (GLUTOSE) 40 % oral gel 15 g, 15 g, Oral, PRN  dextrose 50 % IV solution, 12.5 g, Intravenous, PRN  glucagon (rDNA) injection 1 mg, 1 mg, Intramuscular, PRN  dextrose 5 % solution, 100 mL/hr, Intravenous, PRN  sodium chloride flush 0.9 % injection 10 mL, 10 mL, Intravenous, 2 times per day  sodium chloride flush 0.9 % injection 10 mL, 10 mL, Intravenous, PRN  potassium chloride (KLOR-CON M) extended release tablet 40 mEq, 40 mEq, Oral, PRN **OR** potassium bicarb-citric acid (EFFER-K) effervescent tablet 40 mEq, 40 mEq, Oral, PRN **OR** potassium chloride 10 mEq/100 mL IVPB (Peripheral Line), 10 mEq, Intravenous, PRN  magnesium sulfate 1000 mg in dextrose 5% 100 mL IVPB, 1,000 mg, Intravenous, PRN  promethazine (PHENERGAN) tablet 12.5 mg, 12.5 mg, Oral, Q6H PRN **OR** ondansetron (ZOFRAN) injection 4 mg, 4 mg, Intravenous, Q6H PRN  senna (SENOKOT) tablet 8.6 mg, 1 tablet, Oral, BID PRN  famotidine (PEPCID) tablet 20 mg, 20 mg, Oral, BID  acetaminophen (TYLENOL) tablet 650 mg, 650 mg, Oral, Q6H PRN **OR** acetaminophen (TYLENOL) suppository 650 mg, 650 mg, Rectal, Q6H PRN  [Held by provider] heparin (porcine) injection 5,000 Units, 5,000 Units, Subcutaneous, BID  losartan (COZAAR) tablet 100 mg, 100 mg, Oral, Daily  atenolol (TENORMIN) tablet 50 mg, 50 mg, Oral, Daily  polyethylene glycol (GLYCOLAX) packet 17 g, 17 g, Oral, Daily PRN  LORazepam (ATIVAN) tablet 1 mg, 1 mg, Oral, Q1H PRN **OR** LORazepam (ATIVAN) injection 1 mg, 1 mg, Intravenous, Q1H PRN **OR** LORazepam (ATIVAN) tablet 2 mg, 2 mg, Oral, Q1H PRN **OR** LORazepam (ATIVAN) injection 2 mg, 2 mg, Intravenous, Q1H PRN **OR** LORazepam (ATIVAN) tablet 3 mg, 3 mg, Oral, Q1H PRN **OR** LORazepam (ATIVAN) injection 3 mg, 3 mg, Intravenous, Q1H PRN **OR** LORazepam (ATIVAN) tablet 4 mg, 4 mg, Oral, Q1H PRN **OR** LORazepam (ATIVAN) injection 4 mg, 4 mg, Intravenous, Q1H PRN  Allergies:  Patient has no known allergies.     Social History: He reports smoking 2ppd. He drinks 2-3 glasses of wine or cocktails most every day. He denies any difficulty if he goes without drinking for any length of time. He denies illicit drug use. Family History:   History reviewed. No pertinent family history. REVIEW OF SYSTEMS:  As discussed in the history of present illness. PHYSICAL EXAM:  VITALS:  BP (!) 114/59   Pulse 85   Temp 97.9 °F (36.6 °C) (Oral)   Resp 20   Ht 5' 7\" (1.702 m)   Wt 174 lb 4.8 oz (79.1 kg)   SpO2 100%   BMI 27.30 kg/m²     Awake, alert, NAD  Answering questions appropriately with clear and fluent speech  PERRL, EOMI, Sensation intact x3 divisions bilaterally, face moves symmetrically, hearing intact to finger rub bilaterally, palate elevates in midline, visual fields full to confrontation  No pronator drift  MAEW with 5/5 strength in all major muscle groups  Sensation intact to light touch throughout  Does well with finger-to-nose testing and finger-nose-finger  Gait testing deferred - currently on flat bedrest after LP. DATA:  I personally reviewed a CT of the head, an MRI of the brain, and an MRA of the head and neck as well as the radiologists' reports. There is an area along the medial aspect of the left parieto-occipital region that demonstrates some hyopdensity on CT. There is diffusion restriction in a pattern consistent with acute or subacute ischemia on MRI. There is some enhancement in this area, but there is no discrete mass. There is mild FLAIR hyperintensity in the area. There is no blooming artifact on gradient echo. There is no hydrocephalus or midline shift. The area of concern does not demonstrate any mass effect. There does appear to be stenosis of the left internal carotid artery at its origin. IMPRESSION/RECOMMENDATIONS:      60year old man with likely stroke of the left parieto-occipital lobe. -Though the imaging findings are a bit atypical, I suspect this is most likely an acute or subacute stroke. There is no discrete mass on MRI, so I do not think this is metastatic disease. Glioma would be a consideration, but the pattern of enhancement would be unusual for a low grade lesion, and aside from enhancement there are no other features that would be particularly worrisome for a higher grade lesion. Given the patient's smoking history and the presence of significant stenosis in the left internal carotid artery he is at risk for stroke as well. An LP was just completed a short while ago, and CSF studies should help to rule out infection, which is another consideration. Based on the patient's clinical appearance I think infection is less likely. Also, his WBC count is normal at 5.8 and he has not had any fevers during his stay. I do think it would be prudent to repeat the MRI in the near future, sometime in the next 6-8 weeks. If there is still concern about the etiology of these findings after that image I would probably suggest biopsy at that time. I will arrange for him to have a repeat MRI of the brain with and without contrast in 6-8 weeks and follow-up with me after it is done.

## 2021-02-26 NOTE — PLAN OF CARE
Neurology ---    MRI Brain with contrast shows prominent enhancement in left occipital region, same area of diffusion restriction. This could be infarct but does not appear typical in appearance. Tumor/neoplasm vs infection vs inflammatory process also in the differential.              Pt would likely benefit from lumbar puncture to rule out infectious/inflammatory/neoplastic process. Also would benefit from neurosurgical consultation.      Carole Alvarez, [address] [facility]

## 2021-02-26 NOTE — CARE COORDINATION
Discharge Planning:    Met with patient at bedside to see if patient had any d/c needs. Patient denied any needs at this time.

## 2021-02-26 NOTE — PLAN OF CARE
Problem: Falls - Risk of:  Goal: Will remain free from falls  Description: Will remain free from falls  Outcome: Ongoing  Note: Siderails up x 2  Hourly rounding  Call light in reach  Remains free from falls and accidental injury at this time   Floor free from obstacles  Bed is locked and in lowest position  Adequate lighting provided  Patient signed bed alarm refusal form  Goal: Absence of physical injury  Description: Absence of physical injury  Outcome: Ongoing     Problem: Pain:  Goal: Pain level will decrease  Description: Pain level will decrease  Outcome: Ongoing  Note: Patient denies pain at this time     Problem: Fluid Volume - Imbalance:  Goal: Absence of imbalanced fluid volume signs and symptoms  Description: Absence of imbalanced fluid volume signs and symptoms  Outcome: Ongoing

## 2021-02-26 NOTE — PROGRESS NOTES
VASCULAR SURGERY  PROGRESS NOTE      2/26/2021 6:36 PM  Subjective:   Admit Date: 2/23/2021  PCP: DENNIS Sinclair    Chief Complaint   Patient presents with    Fatigue    Hypoglycemia     Interval History: No complaints. Plan for discharge noted. Diet: DIET GENERAL;    Medications:   Scheduled Meds:   atorvastatin  40 mg Oral Nightly    amLODIPine  5 mg Oral Daily    metFORMIN  500 mg Oral BID WC    PARoxetine  40 mg Oral Daily    [Held by provider] aspirin  81 mg Oral Daily    insulin lispro  0-18 Units Subcutaneous TID WC    insulin lispro  0-9 Units Subcutaneous Nightly    sodium chloride flush  10 mL Intravenous 2 times per day    famotidine  20 mg Oral BID    [Held by provider] heparin (porcine)  5,000 Units Subcutaneous BID    losartan  100 mg Oral Daily    atenolol  50 mg Oral Daily     Continuous Infusions:   dextrose           Labs:   CBC:   Recent Labs     02/24/21  0552 02/25/21  0557 02/26/21  0541   WBC 6.1 6.2 5.8   HGB 12.7* 12.5* 12.5*   * 125* 127*     BMP:    Recent Labs     02/24/21  0552 02/25/21  0557 02/26/21  0541    134* 133*   K 3.9 3.6* 3.9   CL 99 98 99   CO2 25 26 25   BUN 12 11 14   CREATININE 1.10 0.97 1.18   GLUCOSE 148* 121* 137*     Hepatic: No results for input(s): AST, ALT, ALB, BILITOT, ALKPHOS in the last 72 hours. Troponin: Invalid input(s): TROPONIN  BNP: No results for input(s): BNP in the last 72 hours.   Lipids:   Recent Labs     02/25/21  0557   CHOL 185   HDL 87     INR:   Recent Labs     02/26/21  0809   INR 1.0       Objective:   Vitals: BP (!) 132/59   Pulse 85   Temp 97.9 °F (36.6 °C) (Oral)   Resp 20   Ht 5' 7\" (1.702 m)   Wt 174 lb 4.8 oz (79.1 kg)   SpO2 100%   BMI 27.30 kg/m²   General appearance: alert, cooperative and no distress  Mental Status: oriented to person, place and time with normal affect  Neck: good carotid pulses, no JVD  Lungs: clear to auscultation bilaterally, normal effort

## 2021-02-26 NOTE — PLAN OF CARE
Problem: Falls - Risk of:  Goal: Will remain free from falls  Description: Will remain free from falls  2/26/2021 1403 by Jostin Hebert RN  Outcome: Ongoing  2/26/2021 0331 by Nikki Feliz RN  Outcome: Ongoing  Note: Siderails up x 2  Hourly rounding  Call light in reach  Instructed to call for assist.  Remains free from falls and accidental injury at this time   Floor free from obstacles  Bed is locked and in lowest position  Adequate lighting provided  Pt refuses bed alarm. [place]alling star posted. Will continue to monitor. 2/26/2021 0330 by Nikki Feliz RN  Outcome: Ongoing  Note: Siderails up x 2  Hourly rounding  Call light in reach. Remains free from falls and accidental injury at this time   Floor free from obstacles  Bed is locked and in lowest position  Adequate lighting provided  Pt refuses bed alarm-refusal form signed and placed in chart  Red Falling star and Stay with Me signs posted      Goal: Absence of physical injury  Description: Absence of physical injury  Outcome: Ongoing     Problem: Pain:  Goal: Pain level will decrease  Description: Pain level will decrease  Outcome: Ongoing     Problem: Fluid Volume - Imbalance:  Goal: Absence of imbalanced fluid volume signs and symptoms  Description: Absence of imbalanced fluid volume signs and symptoms  Outcome: Ongoing     Problem: Serum Glucose Level - Abnormal:  Goal: Ability to maintain appropriate glucose levels has stabilized  Description: Ability to maintain appropriate glucose levels has stabilized  2/26/2021 1403 by Jostin Hebert RN  Outcome: Ongoing  2/26/2021 0331 by Nikki Feliz RN  Outcome: Ongoing  Note: ACHS glucose checks. Humalog sliding scale ordered. Metformin resumed. Carb control diet. Will continue to monitor.

## 2021-02-26 NOTE — PROGRESS NOTES
[facility] Neurology   IN-PATIENT SERVICE      NEUROLOGY PROGRESS  NOTE            Date:   2/26/2021  Patient name:  Tasia Winston  Date of admission:  2/23/2021  YOB: 1955      Interval History:     Patient is resting in bed comfortably after undergoing lumbar puncture by IR. So far CSF results are showing 0 WBC, 0 RBC, 113 protein, 90 glucose. Neurosurgery recommendations to repeat imaging in 6 to 8 weeks. No current complaints at this time. 160s    History of Present Illness: The patient is a 60 y.o. male who presents with Fatigue and Hypoglycemia  . The patient was seen and examined and the chart was reviewed. Tuesday morning patient states he got up out of bed and felt very fatigued, weakness in all 4 extremities and fell to the ground. There was no loss of consciousness. EMS was called, patient found to be hypoglycemic in the 50s. He is admitted for further work-up. He had an MRI brain ordered by primary team which showed area of diffusion restriction left occipital region consistent with acute infarct. Neurology was consulted for further work-up. Patient underwent carotid ultrasound showing severe stenosis left ICA 70-99%. Systolic blood pressures have been ranging from 140 up to 200s. He was started on aspirin 81 mg Lipitor 10 mg yesterday. He does have history of hypertension, diabetes and smokes 1.5 pack/day. LDL cholesterol 79. Hemoglobin A1c 6.0.     Only he is sitting up at bedside with no neurologic deficits. He denies any headache. Denies any visual field deficits. He states his strength feels back to normal.  He is eager to go home.     Past Medical History:     Past Medical History:   Diagnosis Date    Diabetes mellitus ([address]     Hypertension         Past Surgical History:     Past Surgical History:   Procedure Laterality Date    KNEE SURGERY          Medications during admission:      atorvastatin  40 mg Oral Nightly  amLODIPine  5 mg Oral Daily    metFORMIN  500 mg Oral BID WC    PARoxetine  40 mg Oral Daily    [Held by provider] aspirin  81 mg Oral Daily    insulin lispro  0-18 Units Subcutaneous TID WC    insulin lispro  0-9 Units Subcutaneous Nightly    sodium chloride flush  10 mL Intravenous 2 times per day    famotidine  20 mg Oral BID    [Held by provider] heparin (porcine)  5,000 Units Subcutaneous BID    losartan  100 mg Oral Daily    atenolol  50 mg Oral Daily         Physical Exam:   BP (!) 145/68   Pulse 80   Temp 97.9 °F (36.6 °C) (Oral)   Resp 16   Ht 5' 7\" (1.702 m)   Wt 174 lb 4.8 oz (79.1 kg)   SpO2 97%   BMI 27.30 kg/m²   Temp (24hrs), Av.9 °F (36.6 °C), Min:97.5 °F (36.4 °C), Max:98.3 °F (36.8 °C)          Neurological examination:    Mental status   Alert and oriented x 3; following all commands; speech is fluent, no dysarthria, aphasia. Cranial nerves   II - visual fields intact to confrontation; pupils reactive  III, IV, VI  extraocular muscles intact; no WILBERT; no nystagmus; no ptosis   V - normal facial sensation                                                               VII - normal facial symmetry                                                             VIII - intact hearing                                                                             IX, X - symmetrical palate elevation                                               XI - symmetrical shoulder shrug                                                       XII - midline tongue without atrophy or fasciculation     Motor function  Strength: 5/5 RUE, 5/5 RLE, 5/5 LUE, 5/5  LLE  Normal bulk and tone. No tremors                      Sensory function Intact to touch, pin, vibration, proprioception throughout     Cerebellar Intact finger-nose-finger testing. Intact heel-shin testing. No dysdiadochokinesia present. Reflex function 2/4 symmetric throughout . Downgoing plantar response bilaterally. (-)Goldberg's sign bilaterally    Gait                  Normal station and gait             Diagnostics:      Laboratory Testing:  CBC:   Recent Labs     02/24/21  0552 02/25/21  0557 02/26/21  0541   WBC 6.1 6.2 5.8   HGB 12.7* 12.5* 12.5*   * 125* 127*     BMP:    Recent Labs     02/24/21  0552 02/25/21  0557 02/26/21  0541    134* 133*   K 3.9 3.6* 3.9   CL 99 98 99   CO2 25 26 25   BUN 12 11 14   CREATININE 1.10 0.97 1.18   GLUCOSE 148* 121* 137*         Lab Results   Component Value Date    CHOL 185 02/25/2021    LDLCHOLESTEROL 79 02/25/2021    HDL 87 02/25/2021    TRIG 93 02/25/2021    ALT 22 02/23/2021    AST 37 02/23/2021    INR 1.2 07/31/2016    LABA1C 6.0 02/23/2021         Imaging/Diagnostics:      CT head: Hypodensity noted left occipital region.     MRA head and neck:  Left cervical ICA high-grade stenosis.     MRI brain: Area of restricted diffusion left occipital region, PCA distribution. Enhancement in the area of restricted diffusion and FLAIR signal abnormality in the medial left parieto-occipital region. Acute/subacute ischemia versus infectious versus low-grade glioma.                      Carotid ultrasound: Left ICA severe stenosis 70-99%    2D echo:  Preserved EF. Otherwise normal.      I personally reviewed all of the above medications, clinical laboratory, imaging and other diagnostic tests. Impression:      1. Area of diffusion restriction, contrast-enhancement in the left occipital region suspicious for acute/subacute infarct versus low-grade glioma (considered less likely) versus infection (considered less likely)  2. Severe left cervical ICA stenosis  3. Hypertensive urgency  4. Tobacco abuse    Plan:     ? Await further CSF studies.   If no evidence of active infection patient is stable to be discharged from neurology standpoint at this time ? He will need repeat MRI brain in the next 6 to 8 weeks with and without contrast  ? Appreciate neurosurgery recommendations  ? Per vascular surgery follow-up as outpatient for carotid endarterectomy  ? At this time continue aspirin 81 mg, Lipitor 40 mg  ? Patient is independent with therapies  ?  Discussed plan with patient at bedside        Electronically signed by Monik Prieto DO on 2/26/2021 at 8:04 AM      Monik Prieto, [address] [place] [address]

## 2021-02-27 NOTE — PROGRESS NOTES
Discharge instructions given to patient and patient waiting for ride. All belongings in patient possession. Report given to [facility]

## 2021-02-27 NOTE — PROGRESS NOTES
Patient wheeled down to car awaiting him. Escorted by rn. Patient had all his belongings with him and he ambulated to the car by himself.

## 2021-02-28 LAB — MYELIN BASIC PROTEIN, CSF: 2.84 NG/ML (ref 0–5.5)

## 2021-03-01 LAB
CULTURE: NORMAL
DIRECT EXAM: NORMAL
Lab: NORMAL
SPECIMEN DESCRIPTION: NORMAL
SURGICAL PATHOLOGY REPORT: NORMAL
VDRL CSF SCREEN: NONREACTIVE

## 2021-03-02 LAB
CSF ISOELECTRIC FOCUSING INTERPRETATION: NORMAL
OLIGOCLONAL BANDS NUMBER: NORMAL BANDS (ref 0–1)
OLIGOCLONAL BANDS: NEGATIVE

## 2021-09-20 ENCOUNTER — APPOINTMENT (OUTPATIENT)
Dept: GENERAL RADIOLOGY | Age: 66
DRG: 038 | End: 2021-09-20
Payer: MEDICARE

## 2021-09-20 ENCOUNTER — HOSPITAL ENCOUNTER (INPATIENT)
Age: 66
LOS: 2 days | Discharge: HOME | DRG: 038 | End: 2021-09-23
Attending: STUDENT IN AN ORGANIZED HEALTH CARE EDUCATION/TRAINING PROGRAM | Admitting: FAMILY MEDICINE
Payer: MEDICARE

## 2021-09-20 ENCOUNTER — APPOINTMENT (OUTPATIENT)
Dept: CT IMAGING | Age: 66
DRG: 038 | End: 2021-09-20
Payer: MEDICARE

## 2021-09-20 DIAGNOSIS — R53.1 RIGHT SIDED WEAKNESS: Primary | ICD-10-CM

## 2021-09-20 DIAGNOSIS — F10.920 ACUTE ALCOHOLIC INTOXICATION WITHOUT COMPLICATION (HCC): ICD-10-CM

## 2021-09-20 LAB
ABSOLUTE EOS #: 0.17 K/UL (ref 0–0.44)
ABSOLUTE IMMATURE GRANULOCYTE: 0.02 K/UL (ref 0–0.3)
ABSOLUTE LYMPH #: 2.5 K/UL (ref 1.1–3.7)
ABSOLUTE MONO #: 0.55 K/UL (ref 0.1–1.2)
ANION GAP SERPL CALCULATED.3IONS-SCNC: 14 MMOL/L (ref 9–17)
BASOPHILS # BLD: 1 % (ref 0–2)
BASOPHILS ABSOLUTE: 0.07 K/UL (ref 0–0.2)
BUN BLDV-MCNC: 8 MG/DL (ref 8–23)
BUN/CREAT BLD: 8 (ref 9–20)
CALCIUM SERPL-MCNC: 9 MG/DL (ref 8.6–10.4)
CHLORIDE BLD-SCNC: 96 MMOL/L (ref 98–107)
CHP ED QC CHECK: NORMAL
CO2: 24 MMOL/L (ref 20–31)
CREAT SERPL-MCNC: 0.96 MG/DL (ref 0.7–1.2)
DIFFERENTIAL TYPE: ABNORMAL
EOSINOPHILS RELATIVE PERCENT: 3 % (ref 1–4)
ETHANOL PERCENT: 0.34 %
ETHANOL: 343 MG/DL
GFR AFRICAN AMERICAN: >60 ML/MIN
GFR NON-AFRICAN AMERICAN: >60 ML/MIN
GFR SERPL CREATININE-BSD FRML MDRD: ABNORMAL ML/MIN/{1.73_M2}
GFR SERPL CREATININE-BSD FRML MDRD: ABNORMAL ML/MIN/{1.73_M2}
GLUCOSE BLD-MCNC: 111 MG/DL
GLUCOSE BLD-MCNC: 98 MG/DL (ref 70–99)
HCT VFR BLD CALC: 37.7 % (ref 40.7–50.3)
HEMOGLOBIN: 13.3 G/DL (ref 13–17)
IMMATURE GRANULOCYTES: 0 %
INR BLD: 1
LYMPHOCYTES # BLD: 41 % (ref 24–43)
MAGNESIUM: 1.3 MG/DL (ref 1.6–2.6)
MCH RBC QN AUTO: 35.2 PG (ref 25.2–33.5)
MCHC RBC AUTO-ENTMCNC: 35.3 G/DL (ref 28.4–34.8)
MCV RBC AUTO: 99.7 FL (ref 82.6–102.9)
MONOCYTES # BLD: 9 % (ref 3–12)
MYOGLOBIN: 56 NG/ML (ref 28–72)
NRBC AUTOMATED: 0 PER 100 WBC
PARTIAL THROMBOPLASTIN TIME: 29 SEC (ref 23.9–33.8)
PDW BLD-RTO: 12.4 % (ref 11.8–14.4)
PLATELET # BLD: 271 K/UL (ref 138–453)
PLATELET ESTIMATE: ABNORMAL
PMV BLD AUTO: 8.7 FL (ref 8.1–13.5)
POTASSIUM SERPL-SCNC: 4.3 MMOL/L (ref 3.7–5.3)
PROTHROMBIN TIME: 12.7 SEC (ref 11.5–14.2)
RBC # BLD: 3.78 M/UL (ref 4.21–5.77)
RBC # BLD: ABNORMAL 10*6/UL
SEG NEUTROPHILS: 46 % (ref 36–65)
SEGMENTED NEUTROPHILS ABSOLUTE COUNT: 2.8 K/UL (ref 1.5–8.1)
SODIUM BLD-SCNC: 134 MMOL/L (ref 135–144)
TOTAL CK: 68 U/L (ref 39–308)
TROPONIN INTERP: NORMAL
TROPONIN INTERP: NORMAL
TROPONIN T: NORMAL NG/ML
TROPONIN T: NORMAL NG/ML
TROPONIN, HIGH SENSITIVITY: 18 NG/L (ref 0–22)
TROPONIN, HIGH SENSITIVITY: 19 NG/L (ref 0–22)
WBC # BLD: 6.1 K/UL (ref 3.5–11.3)
WBC # BLD: ABNORMAL 10*3/UL

## 2021-09-20 PROCEDURE — 83735 ASSAY OF MAGNESIUM: CPT

## 2021-09-20 PROCEDURE — 82947 ASSAY GLUCOSE BLOOD QUANT: CPT

## 2021-09-20 PROCEDURE — 85025 COMPLETE CBC W/AUTO DIFF WBC: CPT

## 2021-09-20 PROCEDURE — 93005 ELECTROCARDIOGRAM TRACING: CPT | Performed by: STUDENT IN AN ORGANIZED HEALTH CARE EDUCATION/TRAINING PROGRAM

## 2021-09-20 PROCEDURE — 70450 CT HEAD/BRAIN W/O DYE: CPT

## 2021-09-20 PROCEDURE — 71045 X-RAY EXAM CHEST 1 VIEW: CPT

## 2021-09-20 PROCEDURE — G0480 DRUG TEST DEF 1-7 CLASSES: HCPCS

## 2021-09-20 PROCEDURE — 70498 CT ANGIOGRAPHY NECK: CPT

## 2021-09-20 PROCEDURE — 2580000003 HC RX 258: Performed by: STUDENT IN AN ORGANIZED HEALTH CARE EDUCATION/TRAINING PROGRAM

## 2021-09-20 PROCEDURE — 96365 THER/PROPH/DIAG IV INF INIT: CPT

## 2021-09-20 PROCEDURE — 85730 THROMBOPLASTIN TIME PARTIAL: CPT

## 2021-09-20 PROCEDURE — 6360000004 HC RX CONTRAST MEDICATION: Performed by: STUDENT IN AN ORGANIZED HEALTH CARE EDUCATION/TRAINING PROGRAM

## 2021-09-20 PROCEDURE — 99285 EMERGENCY DEPT VISIT HI MDM: CPT

## 2021-09-20 PROCEDURE — 85610 PROTHROMBIN TIME: CPT

## 2021-09-20 PROCEDURE — 84484 ASSAY OF TROPONIN QUANT: CPT

## 2021-09-20 PROCEDURE — 83874 ASSAY OF MYOGLOBIN: CPT

## 2021-09-20 PROCEDURE — 82550 ASSAY OF CK (CPK): CPT

## 2021-09-20 PROCEDURE — 80048 BASIC METABOLIC PNL TOTAL CA: CPT

## 2021-09-20 RX ORDER — 0.9 % SODIUM CHLORIDE 0.9 %
80 INTRAVENOUS SOLUTION INTRAVENOUS ONCE
Status: COMPLETED | OUTPATIENT
Start: 2021-09-20 | End: 2021-09-20

## 2021-09-20 RX ORDER — SODIUM CHLORIDE 0.9 % (FLUSH) 0.9 %
10 SYRINGE (ML) INJECTION PRN
Status: DISCONTINUED | OUTPATIENT
Start: 2021-09-20 | End: 2021-09-22 | Stop reason: SDUPTHER

## 2021-09-20 RX ADMIN — IOPAMIDOL 75 ML: 755 INJECTION, SOLUTION INTRAVENOUS at 21:58

## 2021-09-20 RX ADMIN — SODIUM CHLORIDE 80 ML: 9 INJECTION, SOLUTION INTRAVENOUS at 21:58

## 2021-09-20 RX ADMIN — SODIUM CHLORIDE, PRESERVATIVE FREE 10 ML: 5 INJECTION INTRAVENOUS at 21:58

## 2021-09-20 ASSESSMENT — PAIN DESCRIPTION - ORIENTATION: ORIENTATION: LEFT;RIGHT

## 2021-09-20 ASSESSMENT — PAIN DESCRIPTION - FREQUENCY: FREQUENCY: CONTINUOUS

## 2021-09-20 ASSESSMENT — PAIN SCALES - GENERAL: PAINLEVEL_OUTOF10: 10

## 2021-09-20 ASSESSMENT — PAIN DESCRIPTION - LOCATION: LOCATION: HIP

## 2021-09-20 ASSESSMENT — PAIN DESCRIPTION - PAIN TYPE: TYPE: ACUTE PAIN

## 2021-09-21 ENCOUNTER — APPOINTMENT (OUTPATIENT)
Dept: MRI IMAGING | Age: 66
DRG: 038 | End: 2021-09-21
Payer: MEDICARE

## 2021-09-21 PROBLEM — R53.1 RIGHT SIDED WEAKNESS: Status: ACTIVE | Noted: 2021-09-21

## 2021-09-21 LAB
ANTI-XA UNFRAC HEPARIN: 0.34 IU/L (ref 0.3–0.7)
ANTI-XA UNFRAC HEPARIN: 0.43 IU/L (ref 0.3–0.7)
EKG ATRIAL RATE: 87 BPM
EKG P AXIS: 75 DEGREES
EKG P-R INTERVAL: 152 MS
EKG Q-T INTERVAL: 376 MS
EKG QRS DURATION: 86 MS
EKG QTC CALCULATION (BAZETT): 452 MS
EKG R AXIS: 35 DEGREES
EKG T AXIS: 79 DEGREES
EKG VENTRICULAR RATE: 87 BPM
GLUCOSE BLD-MCNC: 105 MG/DL (ref 75–110)
GLUCOSE BLD-MCNC: 111 MG/DL (ref 75–110)
GLUCOSE BLD-MCNC: 151 MG/DL (ref 75–110)

## 2021-09-21 PROCEDURE — 86850 RBC ANTIBODY SCREEN: CPT

## 2021-09-21 PROCEDURE — 6370000000 HC RX 637 (ALT 250 FOR IP): Performed by: STUDENT IN AN ORGANIZED HEALTH CARE EDUCATION/TRAINING PROGRAM

## 2021-09-21 PROCEDURE — 4A03X5D MEASUREMENT OF ARTERIAL FLOW, INTRACRANIAL, EXTERNAL APPROACH: ICD-10-PCS | Performed by: HOSPITALIST

## 2021-09-21 PROCEDURE — 6360000002 HC RX W HCPCS: Performed by: FAMILY MEDICINE

## 2021-09-21 PROCEDURE — 99222 1ST HOSP IP/OBS MODERATE 55: CPT | Performed by: PSYCHIATRY & NEUROLOGY

## 2021-09-21 PROCEDURE — 6360000002 HC RX W HCPCS: Performed by: STUDENT IN AN ORGANIZED HEALTH CARE EDUCATION/TRAINING PROGRAM

## 2021-09-21 PROCEDURE — 85520 HEPARIN ASSAY: CPT

## 2021-09-21 PROCEDURE — 86900 BLOOD TYPING SEROLOGIC ABO: CPT

## 2021-09-21 PROCEDURE — 70551 MRI BRAIN STEM W/O DYE: CPT

## 2021-09-21 PROCEDURE — 2060000000 HC ICU INTERMEDIATE R&B

## 2021-09-21 PROCEDURE — 6370000000 HC RX 637 (ALT 250 FOR IP): Performed by: FAMILY MEDICINE

## 2021-09-21 PROCEDURE — 83036 HEMOGLOBIN GLYCOSYLATED A1C: CPT

## 2021-09-21 PROCEDURE — 86901 BLOOD TYPING SEROLOGIC RH(D): CPT

## 2021-09-21 PROCEDURE — 36415 COLL VENOUS BLD VENIPUNCTURE: CPT

## 2021-09-21 RX ORDER — HYDRALAZINE HYDROCHLORIDE 20 MG/ML
10 INJECTION INTRAMUSCULAR; INTRAVENOUS EVERY 6 HOURS PRN
Status: DISCONTINUED | OUTPATIENT
Start: 2021-09-21 | End: 2021-09-23 | Stop reason: HOSPADM

## 2021-09-21 RX ORDER — LOSARTAN POTASSIUM 100 MG/1
100 TABLET ORAL DAILY
Status: DISCONTINUED | OUTPATIENT
Start: 2021-09-21 | End: 2021-09-23 | Stop reason: HOSPADM

## 2021-09-21 RX ORDER — ACETAMINOPHEN 325 MG/1
650 TABLET ORAL EVERY 4 HOURS PRN
Status: DISCONTINUED | OUTPATIENT
Start: 2021-09-21 | End: 2021-09-23 | Stop reason: HOSPADM

## 2021-09-21 RX ORDER — HEPARIN SODIUM 1000 [USP'U]/ML
2000 INJECTION, SOLUTION INTRAVENOUS; SUBCUTANEOUS PRN
Status: DISCONTINUED | OUTPATIENT
Start: 2021-09-21 | End: 2021-09-21

## 2021-09-21 RX ORDER — CLOPIDOGREL BISULFATE 75 MG/1
75 TABLET ORAL DAILY
Status: DISCONTINUED | OUTPATIENT
Start: 2021-09-22 | End: 2021-09-23 | Stop reason: HOSPADM

## 2021-09-21 RX ORDER — CLOPIDOGREL BISULFATE 75 MG/1
75 TABLET ORAL ONCE
Status: COMPLETED | OUTPATIENT
Start: 2021-09-21 | End: 2021-09-21

## 2021-09-21 RX ORDER — SODIUM CHLORIDE 0.9 % (FLUSH) 0.9 %
5-40 SYRINGE (ML) INJECTION EVERY 12 HOURS SCHEDULED
Status: DISCONTINUED | OUTPATIENT
Start: 2021-09-21 | End: 2021-09-22 | Stop reason: SDUPTHER

## 2021-09-21 RX ORDER — NICOTINE POLACRILEX 4 MG
15 LOZENGE BUCCAL PRN
Status: DISCONTINUED | OUTPATIENT
Start: 2021-09-21 | End: 2021-09-23 | Stop reason: HOSPADM

## 2021-09-21 RX ORDER — SODIUM CHLORIDE 0.9 % (FLUSH) 0.9 %
5-40 SYRINGE (ML) INJECTION PRN
Status: DISCONTINUED | OUTPATIENT
Start: 2021-09-21 | End: 2021-09-22 | Stop reason: SDUPTHER

## 2021-09-21 RX ORDER — HEPARIN SODIUM 10000 [USP'U]/100ML
5-30 INJECTION, SOLUTION INTRAVENOUS CONTINUOUS
Status: DISCONTINUED | OUTPATIENT
Start: 2021-09-21 | End: 2021-09-22

## 2021-09-21 RX ORDER — AMLODIPINE BESYLATE 5 MG/1
5 TABLET ORAL DAILY
Status: DISCONTINUED | OUTPATIENT
Start: 2021-09-21 | End: 2021-09-23 | Stop reason: HOSPADM

## 2021-09-21 RX ORDER — ASPIRIN 81 MG/1
324 TABLET, CHEWABLE ORAL ONCE
Status: COMPLETED | OUTPATIENT
Start: 2021-09-21 | End: 2021-09-21

## 2021-09-21 RX ORDER — HEPARIN SODIUM 1000 [USP'U]/ML
2000 INJECTION, SOLUTION INTRAVENOUS; SUBCUTANEOUS ONCE
Status: COMPLETED | OUTPATIENT
Start: 2021-09-21 | End: 2021-09-21

## 2021-09-21 RX ORDER — PAROXETINE HYDROCHLORIDE 20 MG/1
40 TABLET, FILM COATED ORAL DAILY
Status: DISCONTINUED | OUTPATIENT
Start: 2021-09-21 | End: 2021-09-23 | Stop reason: HOSPADM

## 2021-09-21 RX ORDER — ATENOLOL 50 MG/1
50 TABLET ORAL DAILY
Status: DISCONTINUED | OUTPATIENT
Start: 2021-09-21 | End: 2021-09-23 | Stop reason: HOSPADM

## 2021-09-21 RX ORDER — DEXTROSE MONOHYDRATE 50 MG/ML
100 INJECTION, SOLUTION INTRAVENOUS PRN
Status: DISCONTINUED | OUTPATIENT
Start: 2021-09-21 | End: 2021-09-23 | Stop reason: HOSPADM

## 2021-09-21 RX ORDER — ATORVASTATIN CALCIUM 40 MG/1
40 TABLET, FILM COATED ORAL NIGHTLY
Status: DISCONTINUED | OUTPATIENT
Start: 2021-09-21 | End: 2021-09-23 | Stop reason: HOSPADM

## 2021-09-21 RX ORDER — SODIUM CHLORIDE 9 MG/ML
25 INJECTION, SOLUTION INTRAVENOUS PRN
Status: DISCONTINUED | OUTPATIENT
Start: 2021-09-21 | End: 2021-09-22 | Stop reason: SDUPTHER

## 2021-09-21 RX ORDER — DEXTROSE MONOHYDRATE 25 G/50ML
12.5 INJECTION, SOLUTION INTRAVENOUS PRN
Status: DISCONTINUED | OUTPATIENT
Start: 2021-09-21 | End: 2021-09-23 | Stop reason: HOSPADM

## 2021-09-21 RX ORDER — ONDANSETRON 2 MG/ML
4 INJECTION INTRAMUSCULAR; INTRAVENOUS EVERY 6 HOURS PRN
Status: DISCONTINUED | OUTPATIENT
Start: 2021-09-21 | End: 2021-09-23 | Stop reason: HOSPADM

## 2021-09-21 RX ADMIN — HEPARIN SODIUM 12 UNITS/KG/HR: 10000 INJECTION, SOLUTION INTRAVENOUS at 01:43

## 2021-09-21 RX ADMIN — ASPIRIN 324 MG: 81 TABLET, CHEWABLE ORAL at 01:40

## 2021-09-21 RX ADMIN — PAROXETINE HYDROCHLORIDE 40 MG: 20 TABLET, FILM COATED ORAL at 18:19

## 2021-09-21 RX ADMIN — HYDRALAZINE HYDROCHLORIDE 10 MG: 20 INJECTION INTRAMUSCULAR; INTRAVENOUS at 21:31

## 2021-09-21 RX ADMIN — HEPARIN SODIUM 2000 UNITS: 1000 INJECTION INTRAVENOUS; SUBCUTANEOUS at 01:40

## 2021-09-21 RX ADMIN — ATORVASTATIN CALCIUM 40 MG: 40 TABLET, FILM COATED ORAL at 21:16

## 2021-09-21 RX ADMIN — CLOPIDOGREL BISULFATE 75 MG: 75 TABLET ORAL at 01:40

## 2021-09-21 RX ADMIN — INSULIN LISPRO 3 UNITS: 100 INJECTION, SOLUTION INTRAVENOUS; SUBCUTANEOUS at 18:24

## 2021-09-21 RX ADMIN — HYDRALAZINE HYDROCHLORIDE 10 MG: 20 INJECTION INTRAMUSCULAR; INTRAVENOUS at 10:50

## 2021-09-21 RX ADMIN — LOSARTAN POTASSIUM 100 MG: 100 TABLET, FILM COATED ORAL at 08:59

## 2021-09-21 RX ADMIN — ATENOLOL 50 MG: 50 TABLET ORAL at 08:59

## 2021-09-21 RX ADMIN — AMLODIPINE BESYLATE 5 MG: 5 TABLET ORAL at 08:59

## 2021-09-21 RX ADMIN — METFORMIN HYDROCHLORIDE 500 MG: 500 TABLET ORAL at 18:19

## 2021-09-21 ASSESSMENT — ENCOUNTER SYMPTOMS
BACK PAIN: 0
SHORTNESS OF BREATH: 0
COLOR CHANGE: 0
EYE DISCHARGE: 0
ABDOMINAL PAIN: 0
EYE REDNESS: 0

## 2021-09-21 ASSESSMENT — PAIN DESCRIPTION - PROGRESSION: CLINICAL_PROGRESSION: GRADUALLY IMPROVING

## 2021-09-21 ASSESSMENT — PAIN SCALES - GENERAL: PAINLEVEL_OUTOF10: 0

## 2021-09-21 NOTE — CONSULTS
VASCULAR SURGERY   CONSULT        Name: Sergey Knox  MRN: [id number]     Acct: [de-identified]  Room: [facility]/    Admit Date: 9/20/2021  PCP: DENNIS Rand    Physician Requesting Consult:  Dr. Anneliese Powers    Reason for Consult: Severe bilateral carotid stenosis, symptomatic on the left    Chief Complaint:     Chief Complaint   Patient presents with    Fatigue         History Obtained From:     patient, electronic medical record and nursing    History of Present Illness:      Sergey Knox is a  73 y.o.  male who presents with Fatigue  Initially presented with a fall and right-sided weakness. On work-up was found to be intoxicated however CTA of the carotids demonstrates at least 70-80% stenosis in the right internal carotid artery and on the left there is at least 95% stenosis in the proximal internal carotid with extensive soft plaque and ulceration. This was initially read as a dissection however it is not and instead is plaque ulceration. He denies any family history of aortic aneurysm. He does smoke approximately 1 1/2 packs of cigarettes per day and has done so for many years. Patient previously was evaluated in February with a small left-sided stroke and advised to undergo an endarterectomy at that time following recovery and outpatient cardiac work-up. He failed to follow-up. Past Medical History:     Past Medical History:   Diagnosis Date    Diabetes mellitus ([facility] [address]     Hypertension         Past Surgical History:     Past Surgical History:   Procedure Laterality Date    KNEE SURGERY          Medications Prior to Admission:       Prior to Admission medications    Medication Sig Start Date End Date Taking?  Authorizing Provider   aspirin 81 MG chewable tablet Take 1 tablet by mouth daily 2/26/21  Yes Blair Ball MD   metFORMIN (GLUCOPHAGE) 500 MG tablet Take 1 tablet by mouth 2 times daily (with meals) 2/26/21  Yes Blair Ball MD   atorvastatin (LIPITOR) 40 MG tablet Take 1 tablet by mouth nightly 21  Yes Josh Dickson MD   amLODIPine (NORVASC) 5 MG tablet Take 1 tablet by mouth daily 21  Yes Josh Dickson MD   PARoxetine (PAXIL) 40 MG tablet Take 40 mg by mouth daily   Yes Historical Provider, MD   atenolol (TENORMIN) 50 MG tablet Take 50 mg by mouth daily   Yes Historical Provider, MD   losartan (COZAAR) 100 MG tablet Take 100 mg by mouth daily   Yes Historical Provider, MD        Allergies:       Patient has no known allergies. Social History:     Tobacco:    reports that he has been smoking. He started smoking about 48 years ago. He has been smoking about 1.50 packs per day. He has never used smokeless tobacco.  Alcohol:      reports current alcohol use of about 4.0 standard drinks of alcohol per week. Drug Use:  reports no history of drug use. Family History:     History reviewed. No pertinent family history.     Review of Systems:     Positive and Negative as described in HPI    Constitutional:  negative for  fevers, chills, sweats, fatigue, and weight loss  HEENT:  negative for vision or hearing changes,   Respiratory:  negative for shortness of breath, cough, or congestion  Cardiovascular:  negative for  chest pain, palpitations  Gastrointestinal:  negative for nausea, vomiting, diarrhea, constipation, abdominal pain  Genitourinary:  negative for frequency, dysuria  Integument/Breast:  negative for rash, skin lesions  Musculoskeletal:  negative for muscle aches or joint pain  Neurological:  negative for headaches, dizziness, lightheadedness, numbness, pain and tingling extremities  Behavior/Psych:  negative for depression and anxiety    Code Status:  Full Code    Physical Exam:     Vitals:  BP (!) 168/86   Pulse 74   Temp 98.6 °F (37 °C) (Oral)   Resp 16   Ht 5' 6\" (1.676 m)   Wt 170 lb (77.1 kg)   SpO2 96%   BMI 27.44 kg/m²   Temp (24hrs), Av.6 °F (37 °C), Min:98.6 °F (37 °C), Max:98.6 °F (37 °C)      General appearance - alert, well appearing and in no acute distress  Mental status - oriented to person, place and time with normal affect  Head - normocephalic and atraumatic  Eyes - pupils equal and reactive, extraocular eye movements intact, conjunctiva clear  Ears - hearing appears to be intact  Nose - no drainage noted  Mouth - mucous membranes moist  Neck - supple, no carotid bruits, thyroid not palpable, no JVD  Chest - clear to auscultation, normal effort  Heart - normal rate, regular rhythm, no murmurs  Abdomen - soft, non-tender, non-distended, bowel sounds present all four quadrants, no masses, hepatomegaly, splenomegaly or aortic enlargement  Neurological - normal speech, mild right arm weakness, cranial nerves II through XII grossly intact  Extremities - peripheral pulses palpable, no pedal edema or calf pain with palpation  Skin - no gross lesions, rashes, or induration noted      Data:     Hematology:  Recent Labs     09/20/21 2100   WBC 6.1   RBC 3.78*   HGB 13.3   HCT 37.7*   MCV 99.7   MCH 35.2*   MCHC 35.3*   RDW 12.4      MPV 8.7   SEGS 46   LYMPHOPCT 41   MONOPCT 9   EOSRELPCT 3   BASOPCT 1   PROTIME 12.7   APTT 29.0   INR 1.0     Chemistry:  Recent Labs     09/20/21  2100 09/20/21  2140 09/20/21  2226   *  --   --    K 4.3  --   --    CL 96*  --   --    CO2 24  --   --    GLUCOSE 98 111  --    BUN 8  --   --    CREATININE 0.96  --   --    MG 1.3*  --   --    ANIONGAP 14  --   --    LABGLOM >60  --   --    GFRAA >60  --   --    CALCIUM 9.0  --   --    TROPHS 19  --  18   CKTOTAL 68  --   --    MYOGLOBIN 56  --   --      No results for input(s): PROT, LABALBU, EAG, M0WNRNQ, D0RWJFD, FT4, TSH, CORTISOL, PROLACTIN, AST, ALT, LDH, GGT, ALKPHOS, LABGGT, BILITOT, BILIDIR, AMMONIA, AMYLASE, LIPASE, LACTATE, CHOL, HDL, LDLCHOLESTEROL, CHOLHDLRATIO, TRIG, VLDL, PHENYTOIN, PHENYF, VALPROATE in the last 72 hours.   Glucose:  Recent Labs     09/20/21  2134   POCGLU 111*         Assessment:     Primary Problem  <principal

## 2021-09-21 NOTE — PROGRESS NOTES
Transitions of Care Pharmacy Service   Medication Review    The patient's list of current home medications has been reviewed. Source(s) of information: patient, Jackr (Josy Garcia)    Based on information provided by the above source(s), no changes to the patient's home medication list were necessary. Please feel free to call me with any questions about this encounter. Thank you. This note will be reviewed and co-signed by the [facility] Pharmacist. The pharmacist will review inpatient orders and contact the physician about any discrepancies. Abigail Reyes, pharmacy technician  Transitions [facility] Pharmacy Service  Phone:  [phone]  Fax: [phone]      Electronically signed by Abigail Reyes on 9/21/2021 at 11:06 AM     Prior to Admission medications    Medication Sig Start Date End Date Taking?  Authorizing Provider   aspirin 81 MG chewable tablet Take 1 tablet by mouth daily       metFORMIN (GLUCOPHAGE) 500 MG tablet Take 1 tablet by mouth 2 times daily (with meals)       atorvastatin (LIPITOR) 40 MG tablet Take 1 tablet by mouth nightly       amLODIPine (NORVASC) 5 MG tablet Take 1 tablet by mouth daily       PARoxetine (PAXIL) 40 MG tablet Take 40 mg by mouth daily       atenolol (TENORMIN) 50 MG tablet Take 50 mg by mouth daily       losartan (COZAAR) 100 MG tablet Take 100 mg by mouth daily

## 2021-09-21 NOTE — ED PROVIDER NOTES
[facility]  Emergency Department Encounter     Pt Name: Seble Bauer  MRN: [id number]  [place] 1955  Date of evaluation: 9/21/21  PCP:  Aurora Henning, [facility]       Chief Complaint   Patient presents with    Fatigue       HISTORY Mansi  (Location/Symptom, Timing/Onset, Context/Setting, Quality, Duration, Modifying Debara Bearded.)      Seble Bauer is a 73 y.o. male who presents with generalized fatigue with right sided weakness. Does have a history of left sided occipital stroke in the past with minimal residual right-sided deficits in February. States he has noticed that he has had right-sided deficits which have worsened over the past month. Does endorse some back pain which is chronic in nature. Worse with movement. Low back. Sharp. PAST MEDICAL / SURGICAL / SOCIAL / FAMILY HISTORY      has a past medical history of Diabetes mellitus ([address] and Hypertension. has a past surgical history that includes knee surgery. Social History     Socioeconomic History    Marital status:      Spouse name: Not on file    Number of children: Not on file    Years of education: Not on file    Highest education level: Not on file   Occupational History    Not on file   Tobacco Use    Smoking status: Current Every Day Smoker     Packs/day: 1.50     Start date: 6/1/1973    Smokeless tobacco: Never Used   Substance and Sexual Activity    Alcohol use:  Yes     Alcohol/week: 4.0 standard drinks     Types: 4 Cans of beer per week     Comment: Occassional, four times weekly    Drug use: No    Sexual activity: Yes     Partners: Female   Other Topics Concern    Not on file   Social History Narrative    Not on file     Social Determinants of Health     Financial Resource Strain:     Difficulty of Paying Living Expenses:    Food Insecurity:     Worried About Running Out of Food in the Last Year:     [address] in the Last Year: Transportation Needs:     Lack of Transportation (Medical):  Lack of Transportation (Non-Medical):    Physical Activity:     Days of Exercise per Week:     Minutes of Exercise per Session:    Stress:     Feeling of Stress :    Social Connections:     Frequency of Communication with Friends and Family:     Frequency of Social Gatherings with Friends and Family:     Attends Oriental orthodox Services:     Active Member of Clubs or Organizations:     Attends Club or Organization Meetings:     Marital Status:    Intimate Partner Violence:     Fear of Current or Ex-Partner:     Emotionally Abused:     Physically Abused:     Sexually Abused:        History reviewed. No pertinent family history. Allergies:  Patient has no known allergies. Home Medications:  Prior to Admission medications    Medication Sig Start Date End Date Taking? Authorizing Provider   aspirin 81 MG chewable tablet Take 1 tablet by mouth daily 2/26/21   Blair Ball MD   metFORMIN (GLUCOPHAGE) 500 MG tablet Take 1 tablet by mouth 2 times daily (with meals) 2/26/21   Blair Ball MD   atorvastatin (LIPITOR) 40 MG tablet Take 1 tablet by mouth nightly 2/26/21   Blair Ball MD   amLODIPine (NORVASC) 5 MG tablet Take 1 tablet by mouth daily 2/27/21   Blair Ball MD   PARoxetine (PAXIL) 40 MG tablet Take 40 mg by mouth daily    Historical Provider, MD   atenolol (TENORMIN) 50 MG tablet Take 50 mg by mouth daily    Historical Provider, MD   losartan (COZAAR) 100 MG tablet Take 100 mg by mouth daily    Historical Provider, MD       REVIEW OF SYSTEMS    (2-9 systems for level 4, 10 or more for level 5)      Review of Systems   Constitutional: Negative for chills and fever. Eyes: Negative for discharge and redness. Respiratory: Negative for shortness of breath. Cardiovascular: Negative for chest pain. Gastrointestinal: Negative for abdominal pain. Genitourinary: Negative for dysuria and flank pain. Musculoskeletal: Negative for arthralgias and back pain. Skin: Negative for color change and rash. Allergic/Immunologic: Negative for environmental allergies. Neurological: Positive for weakness. Psychiatric/Behavioral: Negative for agitation and confusion. PHYSICAL EXAM   (up to 7 for level 4, 8 or more for level 5)     INITIAL VITALS:    height is 5' 6\" (1.676 m) and weight is 170 lb (77.1 kg). His oral temperature is 98.6 °F (37 °C). His blood pressure is 146/76 (abnormal) and his pulse is 94. His respiration is 16 and oxygen saturation is 97%. Physical Exam  Vitals and nursing note reviewed. Constitutional:       Appearance: He is well-developed. HENT:      Head: Normocephalic and atraumatic. Nose: Nose normal.      Mouth/Throat:      Mouth: Mucous membranes are moist.   Eyes:      General: No scleral icterus. Conjunctiva/sclera: Conjunctivae normal.      Pupils: Pupils are equal, round, and reactive to light. Neck:      Trachea: No tracheal deviation. Cardiovascular:      Rate and Rhythm: Normal rate and regular rhythm. Heart sounds: Normal heart sounds. No murmur heard. No friction rub. No gallop. Pulmonary:      Effort: Pulmonary effort is normal. No respiratory distress. Breath sounds: Normal breath sounds. No wheezing or rales. Abdominal:      General: There is no distension. Palpations: Abdomen is soft. There is no mass. Tenderness: There is no abdominal tenderness. There is no guarding. Hernia: No hernia is present. Musculoskeletal:         General: Normal range of motion. Cervical back: Neck supple. Skin:     General: Skin is warm and dry. Findings: No erythema or rash. Neurological:      Mental Status: He is alert and oriented to person, place, and time.       Comments: No facial asymmetry, tongue midline on protrusion, no aphasia, no dysarthria, EOMI, PERRLA; 5/5 strength in upper and lower extremities on left side with 3+ strength in right upper extremity and 4/5 strength in right lower extremity no changes in sensation; finger-to-nose ataxic bilaterally     Psychiatric:         Behavior: Behavior normal.          INITIAL NIH STROKE SCALE    Time Performed:       Administer stroke scale items in the order listed. Record performance in each category after each subscale exam. Do not go back and change scores. Follow directions provided for each exam technique. Scores should reflect what the patient does, not what the clinician thinks the patient can do. The clinician should record answers while administering the exam and work quickly. Except where indicated, the patient should not be coached (i.e., repeated requests to patient to make a special effort). 1a.  Level of consciousness:  0 - alert; keenly responsive  1b. Level of consciousness questions:  0 - answers both questions correctly  1c. Level of consciousness questions:  0 - performs both tasks correctly  2. Best Gaze:  0 - normal  3. Visual:  1 - partial hemianopia  4. Facial Palsy:  0 - normal symmetric movement  5a. Motor left arm:  0 - no drift, limb holds 90 (or 45) degrees for full 10 seconds  5b. Motor right arm:  2 - some effort against gravity, limb cannot get to or maintain (if cued) 90 (or 45) degrees, drifts down to bed, but has some effort against gravity   6a. Motor left le - no drift; leg holds 30 degree position for full 5 seconds  6b. Motor right le - some effort against gravity; leg falls to bed by 5 seconds but has some effort against gravity  7. Limb Ataxia:  1 - present in one limb  8. Sensory:  0 - normal; no sensory loss  9. Best Language:  0 - no aphasia, normal  10. Dysarthria:  0 - normal  11.   Extinction and Inattention:  0 - no abnormality    TOTAL:  6      DIFFERENTIAL  DIAGNOSIS     PLAN (LABS / IMAGING / EKG):  Orders Placed This Encounter   Procedures    CT HEAD WO CONTRAST    CTA HEAD NECK W CONTRAST  XR CHEST PORTABLE    Basic Metabolic Panel    CBC Auto Differential    CK    Myoglobin    Troponin    Protime-INR    APTT    Magnesium    Troponin    Ethanol    HEPARIN LEVEL/ANTI-XA    CBC    Vital signs per unit routine    Notify physician    Notify physician    Up with assistance    Full Code    Inpatient consult to Hospitalist    Inpatient consult to Vascular Surgery    Inpatient Consult to Neurology    POCT Glucose    POC Glucose Fingerstick    EKG 12 Lead    PATIENT STATUS (FROM ED OR OR/PROCEDURAL) Inpatient       MEDICATIONS ORDERED:  Orders Placed This Encounter   Medications    0.9 % sodium chloride bolus    iopamidol (ISOVUE-370) 76 % injection 75 mL    sodium chloride flush 0.9 % injection 10 mL    aspirin chewable tablet 324 mg    clopidogrel (PLAVIX) tablet 75 mg    heparin 25,000 units in dextrose 5% 250 mL (premix) infusion    DISCONTD: heparin (porcine) injection 2,000 Units    heparin (porcine) injection 2,000 Units    sodium chloride flush 0.9 % injection 5-40 mL    sodium chloride flush 0.9 % injection 5-40 mL    0.9 % sodium chloride infusion    acetaminophen (TYLENOL) tablet 650 mg    ondansetron (ZOFRAN) injection 4 mg       DDX: Stroke versus dissection versus electrolyte normality versus alcohol intoxication    Initial MDM/Plan: 73 y.o. male who presents with right-sided weakness generalized fatigue. When pressed patient does state that he drinks \"a couple\" beers every day. Denies any withdrawal history. Will get CT CTA. Extensive laboratory work-up. Possible admission.     DIAGNOSTIC RESULTS / EMERGENCY DEPARTMENT COURSE / MDM     LABS:  Labs Reviewed   BASIC METABOLIC PANEL - Abnormal; Notable for the following components:       Result Value    Bun/Cre Ratio 8 (*)     Sodium 134 (*)     Chloride 96 (*)     All other components within normal limits   CBC WITH AUTO DIFFERENTIAL - Abnormal; Notable for the following components:    RBC 3.78 (*) Hematocrit 37.7 (*)     MCH 35.2 (*)     MCHC 35.3 (*)     All other components within normal limits   MAGNESIUM - Abnormal; Notable for the following components:    Magnesium 1.3 (*)     All other components within normal limits   ETHANOL - Abnormal; Notable for the following components:    Ethanol 343 (*)     Ethanol percent 0.343 (*)     All other components within normal limits   POC GLUCOSE FINGERSTICK - Abnormal; Notable for the following components:    POC Glucose 111 (*)     All other components within normal limits   POCT GLUCOSE - Normal   CK   MYOGLOBIN, SERUM   TROPONIN   PROTIME-INR   APTT   TROPONIN   HEPARIN LEVEL/ANTI-XA   HEPARIN LEVEL/ANTI-XA         RADIOLOGY:  CT HEAD WO CONTRAST    Result Date: 9/20/2021  EXAMINATION: CTA OF THE HEAD AND NECK WITH CONTRAST; CT OF THE HEAD WITHOUT CONTRAST 9/20/2021 9:57 pm: TECHNIQUE: CTA of the head and neck was performed with the administration of intravenous contrast. Multiplanar reformatted images are provided for review. MIP images are provided for review. Stenosis of the internal carotid arteries measured using NASCET criteria. Dose modulation, iterative reconstruction, and/or weight based adjustment of the mA/kV was utilized to reduce the radiation dose to as low as reasonably achievable.; CT of the head was performed without the administration of intravenous contrast. Dose modulation, iterative reconstruction, and/or weight based adjustment of the mA/kV was utilized to reduce the radiation dose to as low as reasonably achievable. Noncontrast CT of the head with reconstructed 2-D images are also provided for review. COMPARISON: None.  HISTORY: ORDERING SYSTEM PROVIDED HISTORY: History of left occipital stroke, worsening deficits over past month TECHNOLOGIST PROVIDED HISTORY: History of left occipital stroke, worsening deficits over past month Decision Support Exception - unselect if not a suspected or confirmed emergency medical condition->Emergency Medical Condition (MA) Reason for Exam: History left occipital stroke, worsening deficits over the past month, Hx: diabetes, htn Acuity: Acute Type of Exam: Initial; ORDERING SYSTEM PROVIDED HISTORY: History left occipital stroke, worsening deficits over the past month TECHNOLOGIST PROVIDED HISTORY: History left occipital stroke, worsening deficits over the past month Decision Support Exception - unselect if not a suspected or confirmed emergency medical condition->Emergency Medical Condition (MA) Reason for Exam: History left occipital stroke, worsening deficits over the past month, Hx: diabetes, htn Acuity: Acute Type of Exam: Initial FINDINGS: CT HEAD: BRAIN/VENTRICLES:  An old left occipital infarct is noted. No acute intracranial hemorrhage or extraaxial fluid collection. Grey-white differentiation is maintained. No evidence of mass, mass effect or midline shift. No evidence of hydrocephalus. ORBITS: The visualized portion of the orbits demonstrate no acute abnormality. SINUSES:  Left maxillary mucosal thickening with frothy debris. The remainder of the visualized paranasal sinuses and mastoid air cells demonstrate no acute abnormality. SOFT TISSUES/SKULL: No acute abnormality of the visualized skull or soft tissues. CTA NECK: AORTIC ARCH/ARCH VESSELS: No dissection or arterial injury. No significant stenosis of the brachiocephalic or subclavian arteries. CAROTID ARTERIES: Thick atherosclerotic calcified plaque is noted at the bilateral carotid bulbs and extending into the origin of the bilateral cervical ICAs. There is a short segment of high-grade stenosis at the origin of the right cervical ICA measuring about 70%. There is a short segment dissection flap at the origin of the left cervical ICA measuring about 1 cm length. VERTEBRAL ARTERIES: No dissection, arterial injury, or significant stenosis. SOFT TISSUES: The lung apices are clear. No cervical or superior mediastinal lymphadenopathy.   The Stenosis of the internal carotid arteries measured using NASCET criteria. Dose modulation, iterative reconstruction, and/or weight based adjustment of the mA/kV was utilized to reduce the radiation dose to as low as reasonably achievable.; CT of the head was performed without the administration of intravenous contrast. Dose modulation, iterative reconstruction, and/or weight based adjustment of the mA/kV was utilized to reduce the radiation dose to as low as reasonably achievable. Noncontrast CT of the head with reconstructed 2-D images are also provided for review. COMPARISON: None. HISTORY: ORDERING SYSTEM PROVIDED HISTORY: History of left occipital stroke, worsening deficits over past month TECHNOLOGIST PROVIDED HISTORY: History of left occipital stroke, worsening deficits over past month Decision Support Exception - unselect if not a suspected or confirmed emergency medical condition->Emergency Medical Condition (MA) Reason for Exam: History left occipital stroke, worsening deficits over the past month, Hx: diabetes, htn Acuity: Acute Type of Exam: Initial; ORDERING SYSTEM PROVIDED HISTORY: History left occipital stroke, worsening deficits over the past month TECHNOLOGIST PROVIDED HISTORY: History left occipital stroke, worsening deficits over the past month Decision Support Exception - unselect if not a suspected or confirmed emergency medical condition->Emergency Medical Condition (MA) Reason for Exam: History left occipital stroke, worsening deficits over the past month, Hx: diabetes, htn Acuity: Acute Type of Exam: Initial FINDINGS: CT HEAD: BRAIN/VENTRICLES:  An old left occipital infarct is noted. No acute intracranial hemorrhage or extraaxial fluid collection. Grey-white differentiation is maintained. No evidence of mass, mass effect or midline shift. No evidence of hydrocephalus. ORBITS: The visualized portion of the orbits demonstrate no acute abnormality.  SINUSES:  Left maxillary mucosal thickening normal  Axis: normal  Conduction: normal  ST Segments: no acute change  T Waves: no acute change  Q Waves: no acute change    Clinical Impression: no acute change, but this is a nonspecific EKG. All EKG's are interpreted by the Emergency Department Physician who either signs or Co-signs this chart in the absence of a cardiologist.    EMERGENCY DEPARTMENT COURSE:  ED Course as of Sep 21 0501   Mon Sep 20, 2021   2241 Ethanol(!!): 343 [MS]   Tue Sep 21, 2021   0025 There is a short segment dissection flap at the origin of the left  cervical ICA measuring about 1 cm length. [MS]   7621 Discussed with on-call stroke neurologist at [facility] [address] [facility]. No indication for immediate neuro endovascular intervention. Recommending heparin, 325 aspirin, 75 Plavix and reevaluate when sober. [MS]   [address]    [MS]      ED Course User Index  [MS] Sherryle Creamer, DO     Patient's ethanol markedly elevated at 343. Does still have significant weakness on the right when compared to left. Has a small 1 cm flap dissection to left ICA. I did discuss with Dr. Nikhil Kincaid findings who recommends heparin, Plavix, aspirin and reevaluation when sober. Case discussed with Dr. Brayan Varner who is agreeable to admission. Admission orders placed. PROCEDURES:  None    CONSULTS:  IP CONSULT TO HOSPITALIST  IP CONSULT TO VASCULAR SURGERY  IP CONSULT TO NEUROLOGY    CRITICAL CARE:  0    FINAL IMPRESSION      1. Right sided weakness    2. Acute alcoholic intoxication without complication ([employer] [facility] [address]          DISPOSITION / PLAN     DISPOSITION Admitted 09/21/2021 02:56:13 AM        PATIENTREFERRED TO:  No follow-up provider specified.     DISCHARGE MEDICATIONS:  New Prescriptions    No medications on file       Sherryle Creamer, DO  EmergencyMedicine Attending    (Please note that portions of this note were completed with a voice recognition program.  Efforts were made to edit the dictations but occasionally words are mis-transcribed.) Kym Hahn, DO  09/21/21 [id number]

## 2021-09-21 NOTE — ED NOTES
Patient eating boxed lunch. Appears to be resting comfortably, will continue to monitor.       Estevan Moncada RN  09/21/21 [id number]

## 2021-09-21 NOTE — H&P
History & Physical  [facility]ult [facility]      Name: Angelina Harrell  MRN: [id number]     Acct: [de-identified]  Room: [facility]/08    Admit Date: 9/20/2021  8:47 PM  PCP: DENNIS Kingsley    Primary Problem  Active Problems:    Right sided weakness  Resolved Problems:    * No resolved hospital problems.  *        Assesment:   Suspected left carotid artery dissection/plaque  History of left occipital CVA February 2021, with residual right-sided weakness  Acute on chronic alcoholism  Progressive right-sided weakness  Essential hypertension  Diabetes mellitus type 2  Mixed hyperlipidemia  Depression with anxiety  Tobacco abuse        Plan:   Admit patient to progressive unit   Oxygen to keep SPO2 more than 90%  Telemetry  Check vitals closely  CBC BMP daily  Watch for alcohol withdrawal, if needed we will start him on CIWA protocol  Patient started on Plavix but on hold for planned surgery  Aspirin on hold  Continue atorvastatin  Continue atenolol, amlodipine, losartan  Continue metformin, insulin sliding scale and close medical blood sugar  Continue Paxil  Consult neurology  Consult vascular surgery, case discussed with them, they have evaluated the study they recommended patient does not have dissection, is likely a plaque they are recommending endarterectomy  Consult cardiology for preoperative clearance      Scheduled Meds:   sodium chloride flush  5-40 mL IntraVENous 2 times per day    atenolol  50 mg Oral Daily    losartan  100 mg Oral Daily    amLODIPine  5 mg Oral Daily    atorvastatin  40 mg Oral Nightly    metFORMIN  500 mg Oral BID WC    PARoxetine  40 mg Oral Daily    insulin lispro  0-18 Units SubCUTAneous TID WC    insulin lispro  0-9 Units SubCUTAneous Nightly     Continuous Infusions:   heparin (PORCINE) Infusion 12 Units/kg/hr (09/21/21 0800)    sodium chloride      dextrose       PRN Meds:  sodium chloride flush, 5-40 mL, PRN  sodium chloride, 25 mL, PRN  acetaminophen, 650 mg, Q4H PRN  ondansetron, 4 mg, Q6H PRN  hydrALAZINE, 10 mg, Q6H PRN  glucose, 15 g, PRN  dextrose, 12.5 g, PRN  glucagon (rDNA), 1 mg, PRN  dextrose, 100 mL/hr, PRN  sodium chloride flush, 10 mL, PRN        Chief Complaint:     Chief Complaint   Patient presents with    Fatigue         History of Present Illness:      Darylene Michael is a 73 y.o.  male who presents with Fatigue  A 73-year-old gentleman has been admitted via emergency room, patient came to the ER with a complaint of having fatigue and right-sided weakness, patient have history of left-sided occipital stroke in the past, further patient has some minimal residual right-sided deficits that all happened in February, patient noticed that patient has right-sided deficits which have worsened, for past 1 month, patient also been having back pain, further also been drinking at home, patient initial testing in the emergency room included CT head which is negative for acute medical abnormalities, further CT head and neck showed short segment dissection flap of the origin of left cervical ICA measuring 1 cm, for further management    I have personally reviewed the past medical history, past surgical history, medications, social history, and family history, and summarized in the note. Review of Systems:     All 10 point system is reviewed and negative otherwise mentioned in HPI. Past Medical History:     Past Medical History:   Diagnosis Date    Diabetes mellitus ([facility] [place] [address]     Hypertension         Past Surgical History:     Past Surgical History:   Procedure Laterality Date    KNEE SURGERY          Medications Prior to Admission:       Prior to Admission medications    Medication Sig Start Date End Date Taking?  Authorizing Provider   aspirin 81 MG chewable tablet Take 1 tablet by mouth daily 2/26/21  Yes Claudetta Bias, MD   metFORMIN (GLUCOPHAGE) 500 MG tablet Take 1 tablet by mouth 2 times daily (with meals) 2/26/21  Yes Claudetta Bias, MD atorvastatin (LIPITOR) 40 MG tablet Take 1 tablet by mouth nightly 21  Yes Tez Calles MD   amLODIPine (NORVASC) 5 MG tablet Take 1 tablet by mouth daily 21  Yes Tez Calles MD   PARoxetine (PAXIL) 40 MG tablet Take 40 mg by mouth daily   Yes Historical Provider, MD   atenolol (TENORMIN) 50 MG tablet Take 50 mg by mouth daily   Yes Historical Provider, MD   losartan (COZAAR) 100 MG tablet Take 100 mg by mouth daily   Yes Historical Provider, MD        Allergies:       Patient has no known allergies. Social History:     Tobacco:    reports that he has been smoking. He started smoking about 48 years ago. He has been smoking about 1.50 packs per day. He has never used smokeless tobacco.  Alcohol:      reports current alcohol use of about 4.0 standard drinks of alcohol per week. Drug Use:  reports no history of drug use. Family History:     History reviewed. No pertinent family history.       Physical Exam:     Vitals:  BP (!) 168/86   Pulse 74   Temp 98.6 °F (37 °C) (Oral)   Resp 16   Ht 5' 6\" (1.676 m)   Wt 170 lb (77.1 kg)   SpO2 96%   BMI 27.44 kg/m²   Temp (24hrs), Av.6 °F (37 °C), Min:98.6 °F (37 °C), Max:98.6 °F (37 °C)          General appearance - alert, well appearing, and in no acute distress  Mental status - oriented to person, place, and time with normal affect  Head - normocephalic and atraumatic  Eyes - pupils equal and reactive, extraocular eye movements intact, conjunctiva clear  Ears - hearing appears to be intact  Nose - no drainage noted  Mouth - mucous membranes moist  Neck - supple, no carotid bruits, thyroid not palpable  Chest - clear to auscultation, normal effort  Heart - normal rate, regular rhythm, no murmur  Abdomen - soft, nontender, nondistended, bowel sounds present all four quadrants, no masses, hepatomegaly or splenomegaly  Neurological - normal speech, no focal findings or movement disorder noted, cranial nerves II through XII grossly intact  Extremities - peripheral pulses palpable, no pedal edema or calf pain with palpation  Skin - no gross lesions, rashes, or induration noted        Data:     Labs:    Hematology:  Recent Labs     09/20/21  2100   WBC 6.1   RBC 3.78*   HGB 13.3   HCT 37.7*   MCV 99.7   MCH 35.2*   MCHC 35.3*   RDW 12.4      MPV 8.7   INR 1.0     Chemistry:  Recent Labs     09/20/21  2100 09/20/21  2140 09/20/21  2226   *  --   --    K 4.3  --   --    CL 96*  --   --    CO2 24  --   --    GLUCOSE 98 111  --    BUN 8  --   --    CREATININE 0.96  --   --    MG 1.3*  --   --    ANIONGAP 14  --   --    LABGLOM >60  --   --    GFRAA >60  --   --    CALCIUM 9.0  --   --    TROPHS 19  --  18   CKTOTAL 68  --   --    MYOGLOBIN 56  --   --      Recent Labs     09/20/21  2134   POCGLU 111*       Lab Results   Component Value Date    INR 1.0 09/20/2021    INR 1.0 02/26/2021    INR 1.2 07/31/2016    PROTIME 12.7 09/20/2021    PROTIME 12.8 02/26/2021    PROTIME 12.4 (H) 07/31/2016       Lab Results   Component Value Date/Time    SPECIAL NOT REPORTED 02/26/2021 09:30 AM     Lab Results   Component Value Date/Time    CULTURE NO GROWTH 3 DAYS 02/26/2021 09:30 AM       No results found for: POCPH, PHART, PH, POCPCO2, KCE2ZDN, PCO2, POCPO2, PO2ART, PO2, POCHCO3, NOR6SYU, HCO3, NBEA, PBEA, BEART, BE, THGBART, THB, KWV4FIC, JJMC2GEB, J6XRRUHW, O2SAT, FIO2    Radiology:    CT HEAD WO CONTRAST    Result Date: 9/20/2021  1. No acute intracranial abnormality. 2. Old left occipital lobe infarct. 3. Thick atherosclerotic calcified plaque is noted at the bilateral carotid bulbs and extending into the origin of the bilateral cervical ICAs. 4. There is a short segment of high-grade stenosis at the origin of the right cervical ICA measuring about 70%. 5. There is a short segment dissection flap at the origin of the left cervical ICA measuring about 1 cm length. XR CHEST PORTABLE    Result Date: 9/20/2021  No acute process.      CTA HEAD NECK W CONTRAST    Result Date: 9/20/2021  1. No acute intracranial abnormality. 2. Old left occipital lobe infarct. 3. Thick atherosclerotic calcified plaque is noted at the bilateral carotid bulbs and extending into the origin of the bilateral cervical ICAs. 4. There is a short segment of high-grade stenosis at the origin of the right cervical ICA measuring about 70%. 5. There is a short segment dissection flap at the origin of the left cervical ICA measuring about 1 cm length. MRI BRAIN WO CONTRAST    Result Date: 9/21/2021  1. No acute intracranial abnormality. 2. Chronic infarction in the left parieto-occipital region. All radiological studies reviewed                Code Status:  Full Code    Electronically signed by Shaye Saldivar MD on 9/21/2021 at 5:07 PM     Copy sent to DENNIS Armendariz    This note was created with the assistance of a speech-recognition program.  Although the intention is to generate a document that actually reflects the content of the visit, no guarantees can be provided that every mistake has been identified and corrected by editing. Note was updated later by me after  physical examination and  completion of the assessment.

## 2021-09-21 NOTE — CONSULTS
NEUROLOGY INPATIENT CONSULT NOTE    9/21/2021         Rocío Hightower is a  73 y.o. male admitted on 9/20/2021 with  Right sided weakness [R53.1]      History is obtained mostly from the patient and the medical record and from the caregivers. Chart is reviewed and patient is examined. Briefly, this is a  73 y.o. male with hx of htn, dm and prior Lt occipital cva was admitted on 9/20/2021 with c/o generalized fatigue with right-sided weakness. He has baseline residual right-sided deficits from prior left occipital stroke in February and that weakness has worsened over the past 1 month. He also has chronic back pain. Home meds include aspirin 81 mg daily, atorvastatin 40 mg nightly. Vitals on admit: 164/104, 92/min, 16/min, 98.6 °F.   CT head on admit is negative for acute intracranial abnormalities. CTA head and neck showed a short segment dissection flap at the origin of left cervical ICA measuring 1 cm. Stroke team consulted; advised heparin, Plavix and aspirin and to reevaluate when patient is sober as his ethanol level markedly elevated at 343 on admit. Presently patient is more alert and awake and oriented x3. Following commands well. He also stated that he had recent falls from increased right-sided weakness. Yesterday he was working at [employer] and he fell down with right-sided weakness, making him to seek medical care. .  Denied LOC. He was ambulating independently prior to this hospitalization. Has been compliant with medications. Patient also admits to having chronic back pain but presently denies radiating pain and paresthesias down the extremities. Denies bladder dysfunction. No current facility-administered medications on file prior to encounter.      Current Outpatient Medications on File Prior to Encounter   Medication Sig Dispense Refill    aspirin 81 MG chewable tablet Take 1 tablet by mouth daily 30 tablet 3    metFORMIN (GLUCOPHAGE) 500 MG tablet Take 1 tablet by mouth 2 times daily (with meals) 60 tablet 3    atorvastatin (LIPITOR) 40 MG tablet Take 1 tablet by mouth nightly 30 tablet 3    amLODIPine (NORVASC) 5 MG tablet Take 1 tablet by mouth daily 30 tablet 3    PARoxetine (PAXIL) 40 MG tablet Take 40 mg by mouth daily      atenolol (TENORMIN) 50 MG tablet Take 50 mg by mouth daily      losartan (COZAAR) 100 MG tablet Take 100 mg by mouth daily       Allergies: Bhavna Araya has No Known Allergies. Past Medical History:   Diagnosis Date    Diabetes mellitus ([address]     Hypertension        Past Surgical History:   Procedure Laterality Date    KNEE SURGERY       Social History: Bhavna Araya  reports that he has been smoking. He started smoking about 48 years ago. He has been smoking about 1.50 packs per day. He has never used smokeless tobacco. He reports current alcohol use of about 4.0 standard drinks of alcohol per week. He reports that he does not use drugs. History reviewed. No pertinent family history. Current Medications:     sodium chloride flush  5-40 mL IntraVENous 2 times per day     PRN Meds include: sodium chloride flush, sodium chloride, acetaminophen, ondansetron, sodium chloride flush    ROS:   Constitutional Negative for fever and chills   HEENT Negative for ear discharge, ear pain, nosebleed   Eyes Negative for photophobia, pain and discharge   Respiratory Negative for hemoptysis and sputum   Cardiovascular Negative for orthopnea, claudication and PND   Gastrointestinal Negative for abdominal pain, diarrhea, blood in stool   Musculoskeletal  positive for chronic back pain and negative for joint pain, negative for myalgia   Skin Negative for rash or itching   Endo/heme/allergies Negative for polydipsia, environmental allergy   Psychiatric Negative for suicidal ideation.   Patient is not anxious           Objective:   BP (!) 179/105   Pulse 90   Temp 98.6 °F (37 °C) (Oral)   Resp 15   Ht 5' 6\" (1.676 m)   Wt 170 lb (77.1 kg)   SpO2 94% BMI 27.44 kg/m²     Blood pressure range: Systolic (29OBR), RHH:838 , Min:126 , UWZ:885   ; Diastolic (09XOL), IKN:73, Min:72, Max:134      Continuous infusions:    heparin (PORCINE) Infusion 12 Units/kg/hr (09/21/21 0143)    sodium chloride         ON EXAMINATION:  GENERAL  Appears comfortable and in no distress   HEENT  NC/ AT   NECK  Supple and no bruits heard   Cardiovascular  S1, S2 heard; radial pulse intact   MENTAL STATUS:  Alert, oriented, intact memory, no confusion, normal speech, normal language, no hallucination or delusion   CRANIAL NERVES: II     -       Pupils reactive b/l., Fundus exam: intact venous pulsations; right hemianopsia noted on visual threat. III,IV,VI -  EOMs full, no afferent defect, no                      WILBERT, no ptosis  V     -     Normal facial sensation  VII    -     Normal facial symmetry  VIII   -     Intact hearing  IX,X -     Symmetrical palate  XI    -     Symmetrical shoulder shrug  XII   -     Midline tongue, no atrophy    MOTOR FUNCTION:  Mild weakness of grade 4+/5 in right upper extremity, 5 -/5 in right lower extremity with normal bulk and normal tone. 5/5 in left upper and left lower extremities. Tremulousness of extremities noted on exertion. SENSORY FUNCTION:  Normal touch, normal pin in all 4 extremities. CEREBELLAR FUNCTION:  Intact fine motor control over upper limbs   REFLEX FUNCTION:  Symmetric, no perverted reflex, extensor plantar response on right side and flexor plantar response on left side.      STATION and GAIT  Not tested         Data:    Lab Results:     Lab Results   Component Value Date    CHOL 185 02/25/2021    LDLCHOLESTEROL 79 02/25/2021    HDL 87 02/25/2021    TRIG 93 02/25/2021    ALT 22 02/23/2021    AST 37 02/23/2021    INR 1.0 09/20/2021    LABA1C 6.0 02/23/2021     Hematology:  Recent Labs     09/20/21  2100   WBC 6.1   HGB 13.3   HCT 37.7*      INR 1.0     Chemistry:  Recent Labs     09/20/21  2100 09/20/21  2140   * --    K 4.3  --    CL 96*  --    CO2 24  --    GLUCOSE 98 111   BUN 8  --    CREATININE 0.96  --    MG 1.3*  --    CALCIUM 9.0  --      Recent Labs     09/20/21  2100   CKTOTAL 68       No results found for: PHENYTOIN, PHENYTOIN, VALPROATE, CBMZ    Toxicology (9/20/2021): Ethanol 343      Diagnostic data reviewed:  CT head (9/20/2021): No acute intracranial abnormality. Old left occipital lobe infarct. CTA head and neck 9/20/2021: There is a short segment high-grade stenosis at origin of right cervical ICA measuring about 70%. There is a short segment dissection flap at the origin of left cervical ICA measuring 1 cm. Thick atherosclerotic calcified plaque noted at bilateral carotid bulbs extending into origin of bilateral cervical ICAs. Impression and Plan: Mr. Darylene Michael is a 73 y.o. male with   Left carotid artery dissection (abnl CTA ) with exacerbation of baseline right-sided weakness  Hx of Lt occipital CVA (Feb 2021) with residual Rt hemiparesis  Initial CT head -ve; was initiated on IV heparin, ASA, Plavix; will get MRI brain now and proceed accordingly. Chronic alcoholism; recent falls with increased right-sided weakness  PT/OT/speech therapy eval today. Comorbid conditions include hypertension, diabetes. Care plan is discussed with patient and his nurse at bedside. Will follow with you. Thank you for consultation.              Misael Myrick MD 9/21/2021 11:07 AM

## 2021-09-21 NOTE — ED NOTES
Patient placed in hospital bed for comfort. Updated on plan of care. States that he still feels weakness on his right side and that he is \"unable to move. \" Patient was able to stand and pivot to hospital bed with standby assist.      Ranjeet Benitez RN  09/21/21 [id number]

## 2021-09-22 ENCOUNTER — ANESTHESIA EVENT (OUTPATIENT)
Dept: OPERATING ROOM | Age: 66
DRG: 038 | End: 2021-09-22
Payer: MEDICARE

## 2021-09-22 ENCOUNTER — ANESTHESIA (OUTPATIENT)
Dept: OPERATING ROOM | Age: 66
DRG: 038 | End: 2021-09-22
Payer: MEDICARE

## 2021-09-22 VITALS — DIASTOLIC BLOOD PRESSURE: 58 MMHG | SYSTOLIC BLOOD PRESSURE: 105 MMHG | TEMPERATURE: 98.8 F | OXYGEN SATURATION: 99 %

## 2021-09-22 LAB
ABO/RH: NORMAL
ABSOLUTE EOS #: <0.03 K/UL (ref 0–0.44)
ABSOLUTE IMMATURE GRANULOCYTE: 0.04 K/UL (ref 0–0.3)
ABSOLUTE LYMPH #: 0.99 K/UL (ref 1.1–3.7)
ABSOLUTE MONO #: 0.48 K/UL (ref 0.1–1.2)
ANION GAP SERPL CALCULATED.3IONS-SCNC: 12 MMOL/L (ref 9–17)
ANTI-XA UNFRAC HEPARIN: 0.41 IU/L (ref 0.3–0.7)
ANTIBODY SCREEN: NEGATIVE
ARM BAND NUMBER: NORMAL
BASOPHILS # BLD: 1 % (ref 0–2)
BASOPHILS ABSOLUTE: 0.04 K/UL (ref 0–0.2)
BUN BLDV-MCNC: 11 MG/DL (ref 8–23)
BUN/CREAT BLD: 7 (ref 9–20)
CALCIUM SERPL-MCNC: 7.8 MG/DL (ref 8.6–10.4)
CHLORIDE BLD-SCNC: 99 MMOL/L (ref 98–107)
CO2: 23 MMOL/L (ref 20–31)
CREAT SERPL-MCNC: 1.68 MG/DL (ref 0.7–1.2)
DIFFERENTIAL TYPE: ABNORMAL
EOSINOPHILS RELATIVE PERCENT: 0 % (ref 1–4)
ESTIMATED AVERAGE GLUCOSE: 117 MG/DL
EXPIRATION DATE: NORMAL
GFR AFRICAN AMERICAN: 50 ML/MIN
GFR NON-AFRICAN AMERICAN: 41 ML/MIN
GFR SERPL CREATININE-BSD FRML MDRD: ABNORMAL ML/MIN/{1.73_M2}
GFR SERPL CREATININE-BSD FRML MDRD: ABNORMAL ML/MIN/{1.73_M2}
GLUCOSE BLD-MCNC: 131 MG/DL (ref 75–110)
GLUCOSE BLD-MCNC: 138 MG/DL (ref 75–110)
GLUCOSE BLD-MCNC: 143 MG/DL (ref 75–110)
GLUCOSE BLD-MCNC: 150 MG/DL (ref 70–99)
GLUCOSE BLD-MCNC: 192 MG/DL (ref 75–110)
HBA1C MFR BLD: 5.7 % (ref 4–6)
HCT VFR BLD CALC: 31.5 % (ref 40.7–50.3)
HEMOGLOBIN: 10.6 G/DL (ref 13–17)
IMMATURE GRANULOCYTES: 1 %
INR BLD: 1.1
LYMPHOCYTES # BLD: 14 % (ref 24–43)
MCH RBC QN AUTO: 34.4 PG (ref 25.2–33.5)
MCHC RBC AUTO-ENTMCNC: 33.7 G/DL (ref 28.4–34.8)
MCV RBC AUTO: 102.3 FL (ref 82.6–102.9)
MONOCYTES # BLD: 7 % (ref 3–12)
NRBC AUTOMATED: 0 PER 100 WBC
PDW BLD-RTO: 12.5 % (ref 11.8–14.4)
PLATELET # BLD: 165 K/UL (ref 138–453)
PLATELET ESTIMATE: ABNORMAL
PMV BLD AUTO: 9.2 FL (ref 8.1–13.5)
POTASSIUM SERPL-SCNC: 4.5 MMOL/L (ref 3.7–5.3)
PROTHROMBIN TIME: 13.7 SEC (ref 11.5–14.2)
RBC # BLD: 3.08 M/UL (ref 4.21–5.77)
RBC # BLD: ABNORMAL 10*6/UL
SEG NEUTROPHILS: 78 % (ref 36–65)
SEGMENTED NEUTROPHILS ABSOLUTE COUNT: 5.43 K/UL (ref 1.5–8.1)
SODIUM BLD-SCNC: 134 MMOL/L (ref 135–144)
WBC # BLD: 7 K/UL (ref 3.5–11.3)
WBC # BLD: ABNORMAL 10*3/UL

## 2021-09-22 PROCEDURE — 6360000002 HC RX W HCPCS: Performed by: SPECIALIST

## 2021-09-22 PROCEDURE — 2500000003 HC RX 250 WO HCPCS: Performed by: SURGERY

## 2021-09-22 PROCEDURE — 2780000010 HC IMPLANT OTHER: Performed by: SURGERY

## 2021-09-22 PROCEDURE — 03UJ07Z SUPPLEMENT LEFT COMMON CAROTID ARTERY WITH AUTOLOGOUS TISSUE SUBSTITUTE, OPEN APPROACH: ICD-10-PCS | Performed by: SURGERY

## 2021-09-22 PROCEDURE — C1768 GRAFT, VASCULAR: HCPCS | Performed by: SURGERY

## 2021-09-22 PROCEDURE — 36415 COLL VENOUS BLD VENIPUNCTURE: CPT

## 2021-09-22 PROCEDURE — 6360000002 HC RX W HCPCS: Performed by: SURGERY

## 2021-09-22 PROCEDURE — 2580000003 HC RX 258: Performed by: SURGERY

## 2021-09-22 PROCEDURE — 85520 HEPARIN ASSAY: CPT

## 2021-09-22 PROCEDURE — 2060000000 HC ICU INTERMEDIATE R&B

## 2021-09-22 PROCEDURE — 82947 ASSAY GLUCOSE BLOOD QUANT: CPT

## 2021-09-22 PROCEDURE — 7100000001 HC PACU RECOVERY - ADDTL 15 MIN: Performed by: SURGERY

## 2021-09-22 PROCEDURE — 6360000002 HC RX W HCPCS: Performed by: FAMILY MEDICINE

## 2021-09-22 PROCEDURE — 2709999900 HC NON-CHARGEABLE SUPPLY: Performed by: SURGERY

## 2021-09-22 PROCEDURE — 3600000012 HC SURGERY LEVEL 2 ADDTL 15MIN: Performed by: SURGERY

## 2021-09-22 PROCEDURE — 6370000000 HC RX 637 (ALT 250 FOR IP): Performed by: SURGERY

## 2021-09-22 PROCEDURE — 85610 PROTHROMBIN TIME: CPT

## 2021-09-22 PROCEDURE — 88304 TISSUE EXAM BY PATHOLOGIST: CPT

## 2021-09-22 PROCEDURE — 80048 BASIC METABOLIC PNL TOTAL CA: CPT

## 2021-09-22 PROCEDURE — 2580000003 HC RX 258: Performed by: SPECIALIST

## 2021-09-22 PROCEDURE — 85025 COMPLETE CBC W/AUTO DIFF WBC: CPT

## 2021-09-22 PROCEDURE — 6360000002 HC RX W HCPCS: Performed by: ANESTHESIOLOGY

## 2021-09-22 PROCEDURE — 3700000001 HC ADD 15 MINUTES (ANESTHESIA): Performed by: SURGERY

## 2021-09-22 PROCEDURE — 2500000003 HC RX 250 WO HCPCS: Performed by: SPECIALIST

## 2021-09-22 PROCEDURE — 7100000000 HC PACU RECOVERY - FIRST 15 MIN: Performed by: SURGERY

## 2021-09-22 PROCEDURE — 3600000002 HC SURGERY LEVEL 2 BASE: Performed by: SURGERY

## 2021-09-22 PROCEDURE — 88311 DECALCIFY TISSUE: CPT

## 2021-09-22 PROCEDURE — 99232 SBSQ HOSP IP/OBS MODERATE 35: CPT | Performed by: PSYCHIATRY & NEUROLOGY

## 2021-09-22 PROCEDURE — 03CJ0ZZ EXTIRPATION OF MATTER FROM LEFT COMMON CAROTID ARTERY, OPEN APPROACH: ICD-10-PCS | Performed by: SURGERY

## 2021-09-22 PROCEDURE — 6370000000 HC RX 637 (ALT 250 FOR IP): Performed by: HOSPITALIST

## 2021-09-22 PROCEDURE — 3700000000 HC ANESTHESIA ATTENDED CARE: Performed by: SURGERY

## 2021-09-22 DEVICE — PATCH VASC W0.8XL8CM PERIPH BOV PERICARD N PVC N DEHP CRSS: Type: IMPLANTABLE DEVICE | Site: NECK | Status: FUNCTIONAL

## 2021-09-22 RX ORDER — SODIUM CHLORIDE 0.9 % (FLUSH) 0.9 %
5-40 SYRINGE (ML) INJECTION PRN
Status: DISCONTINUED | OUTPATIENT
Start: 2021-09-22 | End: 2021-09-23 | Stop reason: HOSPADM

## 2021-09-22 RX ORDER — PHENYLEPHRINE HCL IN 0.9% NACL 1 MG/10 ML
SYRINGE (ML) INTRAVENOUS PRN
Status: DISCONTINUED | OUTPATIENT
Start: 2021-09-22 | End: 2021-09-22 | Stop reason: SDUPTHER

## 2021-09-22 RX ORDER — HYDROCODONE BITARTRATE AND ACETAMINOPHEN 5; 325 MG/1; MG/1
1 TABLET ORAL EVERY 4 HOURS PRN
Status: DISCONTINUED | OUTPATIENT
Start: 2021-09-22 | End: 2021-09-23 | Stop reason: HOSPADM

## 2021-09-22 RX ORDER — SODIUM CHLORIDE 0.9 % (FLUSH) 0.9 %
5-40 SYRINGE (ML) INJECTION EVERY 12 HOURS SCHEDULED
Status: DISCONTINUED | OUTPATIENT
Start: 2021-09-22 | End: 2021-09-23 | Stop reason: HOSPADM

## 2021-09-22 RX ORDER — GLYCOPYRROLATE 1 MG/5 ML
SYRINGE (ML) INTRAVENOUS PRN
Status: DISCONTINUED | OUTPATIENT
Start: 2021-09-22 | End: 2021-09-22 | Stop reason: SDUPTHER

## 2021-09-22 RX ORDER — SODIUM CHLORIDE, SODIUM LACTATE, POTASSIUM CHLORIDE, CALCIUM CHLORIDE 600; 310; 30; 20 MG/100ML; MG/100ML; MG/100ML; MG/100ML
INJECTION, SOLUTION INTRAVENOUS CONTINUOUS
Status: DISCONTINUED | OUTPATIENT
Start: 2021-09-22 | End: 2021-09-23 | Stop reason: HOSPADM

## 2021-09-22 RX ORDER — SODIUM CHLORIDE, SODIUM LACTATE, POTASSIUM CHLORIDE, CALCIUM CHLORIDE 600; 310; 30; 20 MG/100ML; MG/100ML; MG/100ML; MG/100ML
INJECTION, SOLUTION INTRAVENOUS CONTINUOUS PRN
Status: DISCONTINUED | OUTPATIENT
Start: 2021-09-22 | End: 2021-09-22 | Stop reason: SDUPTHER

## 2021-09-22 RX ORDER — FENTANYL CITRATE 50 UG/ML
25 INJECTION, SOLUTION INTRAMUSCULAR; INTRAVENOUS EVERY 5 MIN PRN
Status: DISCONTINUED | OUTPATIENT
Start: 2021-09-22 | End: 2021-09-22 | Stop reason: HOSPADM

## 2021-09-22 RX ORDER — MORPHINE SULFATE 2 MG/ML
2 INJECTION, SOLUTION INTRAMUSCULAR; INTRAVENOUS
Status: DISCONTINUED | OUTPATIENT
Start: 2021-09-22 | End: 2021-09-23 | Stop reason: HOSPADM

## 2021-09-22 RX ORDER — LABETALOL HYDROCHLORIDE 5 MG/ML
5 INJECTION, SOLUTION INTRAVENOUS EVERY 10 MIN PRN
Status: DISCONTINUED | OUTPATIENT
Start: 2021-09-22 | End: 2021-09-22 | Stop reason: HOSPADM

## 2021-09-22 RX ORDER — HYDROMORPHONE HYDROCHLORIDE 1 MG/ML
0.5 INJECTION, SOLUTION INTRAMUSCULAR; INTRAVENOUS; SUBCUTANEOUS EVERY 5 MIN PRN
Status: DISCONTINUED | OUTPATIENT
Start: 2021-09-22 | End: 2021-09-22 | Stop reason: HOSPADM

## 2021-09-22 RX ORDER — 0.9 % SODIUM CHLORIDE 0.9 %
500 INTRAVENOUS SOLUTION INTRAVENOUS ONCE
Status: DISCONTINUED | OUTPATIENT
Start: 2021-09-22 | End: 2021-09-22 | Stop reason: SDUPTHER

## 2021-09-22 RX ORDER — ATORVASTATIN CALCIUM 20 MG/1
20 TABLET, FILM COATED ORAL NIGHTLY
Status: DISCONTINUED | OUTPATIENT
Start: 2021-09-23 | End: 2021-09-22 | Stop reason: SDUPTHER

## 2021-09-22 RX ORDER — FENTANYL CITRATE 50 UG/ML
INJECTION, SOLUTION INTRAMUSCULAR; INTRAVENOUS PRN
Status: DISCONTINUED | OUTPATIENT
Start: 2021-09-22 | End: 2021-09-22 | Stop reason: SDUPTHER

## 2021-09-22 RX ORDER — ONDANSETRON 2 MG/ML
4 INJECTION INTRAMUSCULAR; INTRAVENOUS
Status: COMPLETED | OUTPATIENT
Start: 2021-09-22 | End: 2021-09-22

## 2021-09-22 RX ORDER — HEPARIN SODIUM 1000 [USP'U]/ML
INJECTION, SOLUTION INTRAVENOUS; SUBCUTANEOUS PRN
Status: DISCONTINUED | OUTPATIENT
Start: 2021-09-22 | End: 2021-09-22 | Stop reason: SDUPTHER

## 2021-09-22 RX ORDER — PROMETHAZINE HYDROCHLORIDE 25 MG/ML
6.25 INJECTION, SOLUTION INTRAMUSCULAR; INTRAVENOUS
Status: DISCONTINUED | OUTPATIENT
Start: 2021-09-22 | End: 2021-09-22 | Stop reason: HOSPADM

## 2021-09-22 RX ORDER — LIDOCAINE HYDROCHLORIDE 20 MG/ML
INJECTION, SOLUTION EPIDURAL; INFILTRATION; INTRACAUDAL; PERINEURAL PRN
Status: DISCONTINUED | OUTPATIENT
Start: 2021-09-22 | End: 2021-09-22 | Stop reason: SDUPTHER

## 2021-09-22 RX ORDER — OXYCODONE HYDROCHLORIDE AND ACETAMINOPHEN 5; 325 MG/1; MG/1
2 TABLET ORAL PRN
Status: DISCONTINUED | OUTPATIENT
Start: 2021-09-22 | End: 2021-09-22 | Stop reason: HOSPADM

## 2021-09-22 RX ORDER — SODIUM CHLORIDE 9 MG/ML
25 INJECTION, SOLUTION INTRAVENOUS PRN
Status: DISCONTINUED | OUTPATIENT
Start: 2021-09-22 | End: 2021-09-23 | Stop reason: HOSPADM

## 2021-09-22 RX ORDER — HYDRALAZINE HYDROCHLORIDE 20 MG/ML
5 INJECTION INTRAMUSCULAR; INTRAVENOUS EVERY 10 MIN PRN
Status: DISCONTINUED | OUTPATIENT
Start: 2021-09-22 | End: 2021-09-22 | Stop reason: HOSPADM

## 2021-09-22 RX ORDER — ASPIRIN 81 MG/1
81 TABLET ORAL DAILY
Status: DISCONTINUED | OUTPATIENT
Start: 2021-09-22 | End: 2021-09-23 | Stop reason: HOSPADM

## 2021-09-22 RX ORDER — SODIUM CHLORIDE 9 MG/ML
INJECTION, SOLUTION INTRAVENOUS CONTINUOUS PRN
Status: DISCONTINUED | OUTPATIENT
Start: 2021-09-22 | End: 2021-09-22 | Stop reason: SDUPTHER

## 2021-09-22 RX ORDER — PROPOFOL 10 MG/ML
INJECTION, EMULSION INTRAVENOUS PRN
Status: DISCONTINUED | OUTPATIENT
Start: 2021-09-22 | End: 2021-09-22 | Stop reason: SDUPTHER

## 2021-09-22 RX ORDER — ONDANSETRON 2 MG/ML
INJECTION INTRAMUSCULAR; INTRAVENOUS PRN
Status: DISCONTINUED | OUTPATIENT
Start: 2021-09-22 | End: 2021-09-22 | Stop reason: SDUPTHER

## 2021-09-22 RX ORDER — OXYCODONE HYDROCHLORIDE AND ACETAMINOPHEN 5; 325 MG/1; MG/1
1 TABLET ORAL PRN
Status: DISCONTINUED | OUTPATIENT
Start: 2021-09-22 | End: 2021-09-22 | Stop reason: HOSPADM

## 2021-09-22 RX ORDER — MORPHINE SULFATE 4 MG/ML
4 INJECTION, SOLUTION INTRAMUSCULAR; INTRAVENOUS
Status: DISCONTINUED | OUTPATIENT
Start: 2021-09-22 | End: 2021-09-23 | Stop reason: HOSPADM

## 2021-09-22 RX ORDER — CEFAZOLIN SODIUM 1 G/3ML
INJECTION, POWDER, FOR SOLUTION INTRAMUSCULAR; INTRAVENOUS PRN
Status: DISCONTINUED | OUTPATIENT
Start: 2021-09-22 | End: 2021-09-22 | Stop reason: SDUPTHER

## 2021-09-22 RX ORDER — ROCURONIUM BROMIDE 10 MG/ML
INJECTION, SOLUTION INTRAVENOUS PRN
Status: DISCONTINUED | OUTPATIENT
Start: 2021-09-22 | End: 2021-09-22 | Stop reason: SDUPTHER

## 2021-09-22 RX ORDER — NEOSTIGMINE METHYLSULFATE 5 MG/5 ML
SYRINGE (ML) INTRAVENOUS PRN
Status: DISCONTINUED | OUTPATIENT
Start: 2021-09-22 | End: 2021-09-22 | Stop reason: SDUPTHER

## 2021-09-22 RX ORDER — PROTAMINE SULFATE 10 MG/ML
INJECTION, SOLUTION INTRAVENOUS PRN
Status: DISCONTINUED | OUTPATIENT
Start: 2021-09-22 | End: 2021-09-22 | Stop reason: SDUPTHER

## 2021-09-22 RX ADMIN — CEFAZOLIN SODIUM 2000 MG: 10 INJECTION, POWDER, FOR SOLUTION INTRAVENOUS at 23:08

## 2021-09-22 RX ADMIN — PROTAMINE SULFATE 15 MG: 10 INJECTION, SOLUTION INTRAVENOUS at 10:54

## 2021-09-22 RX ADMIN — LIDOCAINE HYDROCHLORIDE 80 MG: 20 INJECTION, SOLUTION EPIDURAL; INFILTRATION; INTRACAUDAL; PERINEURAL at 08:45

## 2021-09-22 RX ADMIN — HYDROCODONE BITARTRATE AND ACETAMINOPHEN 1 TABLET: 5; 325 TABLET ORAL at 16:49

## 2021-09-22 RX ADMIN — PROPOFOL 120 MG: 10 INJECTION, EMULSION INTRAVENOUS at 08:45

## 2021-09-22 RX ADMIN — HYDRALAZINE HYDROCHLORIDE 10 MG: 20 INJECTION INTRAMUSCULAR; INTRAVENOUS at 04:15

## 2021-09-22 RX ADMIN — ONDANSETRON 4 MG: 2 INJECTION, SOLUTION INTRAMUSCULAR; INTRAVENOUS at 11:01

## 2021-09-22 RX ADMIN — ROCURONIUM BROMIDE 50 MG: 10 INJECTION, SOLUTION INTRAVENOUS at 08:45

## 2021-09-22 RX ADMIN — Medication 100 MCG: at 09:03

## 2021-09-22 RX ADMIN — HEPARIN SODIUM 1000 UNITS: 1000 INJECTION, SOLUTION INTRAVENOUS; SUBCUTANEOUS at 10:18

## 2021-09-22 RX ADMIN — PHENYLEPHRINE HYDROCHLORIDE 35 MCG/MIN: 10 INJECTION, SOLUTION INTRAMUSCULAR; INTRAVENOUS; SUBCUTANEOUS at 09:30

## 2021-09-22 RX ADMIN — HYDROCODONE BITARTRATE AND ACETAMINOPHEN 1 TABLET: 5; 325 TABLET ORAL at 20:50

## 2021-09-22 RX ADMIN — Medication 3 MG: at 11:01

## 2021-09-22 RX ADMIN — HEPARIN SODIUM 12 UNITS/KG/HR: 10000 INJECTION, SOLUTION INTRAVENOUS at 02:40

## 2021-09-22 RX ADMIN — INSULIN LISPRO 2 UNITS: 100 INJECTION, SOLUTION INTRAVENOUS; SUBCUTANEOUS at 20:50

## 2021-09-22 RX ADMIN — ROCURONIUM BROMIDE 10 MG: 10 INJECTION, SOLUTION INTRAVENOUS at 10:12

## 2021-09-22 RX ADMIN — Medication 200 MCG: at 09:12

## 2021-09-22 RX ADMIN — METFORMIN HYDROCHLORIDE 500 MG: 500 TABLET ORAL at 16:54

## 2021-09-22 RX ADMIN — ATORVASTATIN CALCIUM 40 MG: 40 TABLET, FILM COATED ORAL at 20:50

## 2021-09-22 RX ADMIN — ASPIRIN 81 MG: 81 TABLET, COATED ORAL at 16:54

## 2021-09-22 RX ADMIN — ONDANSETRON 4 MG: 2 INJECTION INTRAMUSCULAR; INTRAVENOUS at 11:54

## 2021-09-22 RX ADMIN — Medication 200 MCG: at 09:09

## 2021-09-22 RX ADMIN — CEFAZOLIN SODIUM 2000 MG: 10 INJECTION, POWDER, FOR SOLUTION INTRAVENOUS at 15:08

## 2021-09-22 RX ADMIN — Medication 200 MCG: at 09:29

## 2021-09-22 RX ADMIN — Medication 0.4 MG: at 11:01

## 2021-09-22 RX ADMIN — ACETAMINOPHEN 650 MG: 325 TABLET ORAL at 15:06

## 2021-09-22 RX ADMIN — SODIUM CHLORIDE: 9 INJECTION, SOLUTION INTRAVENOUS at 08:33

## 2021-09-22 RX ADMIN — SODIUM CHLORIDE: 9 INJECTION, SOLUTION INTRAVENOUS at 08:56

## 2021-09-22 RX ADMIN — CEFAZOLIN 2000 MG: 1 INJECTION, POWDER, FOR SOLUTION INTRAMUSCULAR; INTRAVENOUS at 09:05

## 2021-09-22 RX ADMIN — ROCURONIUM BROMIDE 10 MG: 10 INJECTION, SOLUTION INTRAVENOUS at 09:26

## 2021-09-22 RX ADMIN — SODIUM CHLORIDE, POTASSIUM CHLORIDE, SODIUM LACTATE AND CALCIUM CHLORIDE: 600; 310; 30; 20 INJECTION, SOLUTION INTRAVENOUS at 15:13

## 2021-09-22 RX ADMIN — HEPARIN SODIUM 6000 UNITS: 1000 INJECTION, SOLUTION INTRAVENOUS; SUBCUTANEOUS at 09:49

## 2021-09-22 RX ADMIN — FENTANYL CITRATE 50 MCG: 50 INJECTION, SOLUTION INTRAMUSCULAR; INTRAVENOUS at 08:45

## 2021-09-22 RX ADMIN — SODIUM CHLORIDE, POTASSIUM CHLORIDE, SODIUM LACTATE AND CALCIUM CHLORIDE: 600; 310; 30; 20 INJECTION, SOLUTION INTRAVENOUS at 08:56

## 2021-09-22 ASSESSMENT — PAIN DESCRIPTION - ORIENTATION
ORIENTATION: LEFT

## 2021-09-22 ASSESSMENT — PAIN DESCRIPTION - PROGRESSION
CLINICAL_PROGRESSION: NOT CHANGED
CLINICAL_PROGRESSION: GRADUALLY WORSENING

## 2021-09-22 ASSESSMENT — PULMONARY FUNCTION TESTS
PIF_VALUE: 17
PIF_VALUE: 0
PIF_VALUE: 18
PIF_VALUE: 1
PIF_VALUE: 17
PIF_VALUE: 18
PIF_VALUE: 18
PIF_VALUE: 17
PIF_VALUE: 17
PIF_VALUE: 18
PIF_VALUE: 17
PIF_VALUE: 18
PIF_VALUE: 18
PIF_VALUE: 15
PIF_VALUE: 1
PIF_VALUE: 19
PIF_VALUE: 17
PIF_VALUE: 19
PIF_VALUE: 15
PIF_VALUE: 18
PIF_VALUE: 19
PIF_VALUE: 18
PIF_VALUE: 20
PIF_VALUE: 17
PIF_VALUE: 19
PIF_VALUE: 20
PIF_VALUE: 17
PIF_VALUE: 18
PIF_VALUE: 16
PIF_VALUE: 18
PIF_VALUE: 5
PIF_VALUE: 18
PIF_VALUE: 17
PIF_VALUE: 18
PIF_VALUE: 14
PIF_VALUE: 24
PIF_VALUE: 17
PIF_VALUE: 3
PIF_VALUE: 17
PIF_VALUE: 17
PIF_VALUE: 18
PIF_VALUE: 17
PIF_VALUE: 17
PIF_VALUE: 12
PIF_VALUE: 17
PIF_VALUE: 17
PIF_VALUE: 15
PIF_VALUE: 18
PIF_VALUE: 17
PIF_VALUE: 17
PIF_VALUE: 12
PIF_VALUE: 17
PIF_VALUE: 21
PIF_VALUE: 18
PIF_VALUE: 17
PIF_VALUE: 15
PIF_VALUE: 19
PIF_VALUE: 18
PIF_VALUE: 18
PIF_VALUE: 17
PIF_VALUE: 1
PIF_VALUE: 17
PIF_VALUE: 21
PIF_VALUE: 17
PIF_VALUE: 1
PIF_VALUE: 18
PIF_VALUE: 17
PIF_VALUE: 17
PIF_VALUE: 16
PIF_VALUE: 18
PIF_VALUE: 1
PIF_VALUE: 18
PIF_VALUE: 19
PIF_VALUE: 17
PIF_VALUE: 18
PIF_VALUE: 17
PIF_VALUE: 17
PIF_VALUE: 18
PIF_VALUE: 17
PIF_VALUE: 2
PIF_VALUE: 18
PIF_VALUE: 17
PIF_VALUE: 17
PIF_VALUE: 15
PIF_VALUE: 18
PIF_VALUE: 2
PIF_VALUE: 16
PIF_VALUE: 17
PIF_VALUE: 18
PIF_VALUE: 18
PIF_VALUE: 17
PIF_VALUE: 16
PIF_VALUE: 18
PIF_VALUE: 17
PIF_VALUE: 16
PIF_VALUE: 17
PIF_VALUE: 17
PIF_VALUE: 18
PIF_VALUE: 18
PIF_VALUE: 1
PIF_VALUE: 18
PIF_VALUE: 18
PIF_VALUE: 17
PIF_VALUE: 17
PIF_VALUE: 19
PIF_VALUE: 16
PIF_VALUE: 18
PIF_VALUE: 1
PIF_VALUE: 17
PIF_VALUE: 17
PIF_VALUE: 19
PIF_VALUE: 6
PIF_VALUE: 1
PIF_VALUE: 16
PIF_VALUE: 29
PIF_VALUE: 17
PIF_VALUE: 17
PIF_VALUE: 18
PIF_VALUE: 18
PIF_VALUE: 19
PIF_VALUE: 18
PIF_VALUE: 17
PIF_VALUE: 16
PIF_VALUE: 16
PIF_VALUE: 17
PIF_VALUE: 18
PIF_VALUE: 1
PIF_VALUE: 19
PIF_VALUE: 17
PIF_VALUE: 19
PIF_VALUE: 16
PIF_VALUE: 18
PIF_VALUE: 17
PIF_VALUE: 18
PIF_VALUE: 17
PIF_VALUE: 16
PIF_VALUE: 16
PIF_VALUE: 18
PIF_VALUE: 16
PIF_VALUE: 17
PIF_VALUE: 20
PIF_VALUE: 18
PIF_VALUE: 17
PIF_VALUE: 17
PIF_VALUE: 18
PIF_VALUE: 17
PIF_VALUE: 18
PIF_VALUE: 17
PIF_VALUE: 17

## 2021-09-22 ASSESSMENT — PAIN SCALES - GENERAL
PAINLEVEL_OUTOF10: 0
PAINLEVEL_OUTOF10: 4
PAINLEVEL_OUTOF10: 0
PAINLEVEL_OUTOF10: 8
PAINLEVEL_OUTOF10: 0
PAINLEVEL_OUTOF10: 8
PAINLEVEL_OUTOF10: 4
PAINLEVEL_OUTOF10: 8
PAINLEVEL_OUTOF10: 0
PAINLEVEL_OUTOF10: 8

## 2021-09-22 ASSESSMENT — PAIN DESCRIPTION - DESCRIPTORS
DESCRIPTORS: DISCOMFORT

## 2021-09-22 ASSESSMENT — PAIN DESCRIPTION - ONSET
ONSET: ON-GOING

## 2021-09-22 ASSESSMENT — PAIN DESCRIPTION - PAIN TYPE
TYPE: SURGICAL PAIN

## 2021-09-22 ASSESSMENT — PAIN - FUNCTIONAL ASSESSMENT
PAIN_FUNCTIONAL_ASSESSMENT: PREVENTS OR INTERFERES SOME ACTIVE ACTIVITIES AND ADLS
PAIN_FUNCTIONAL_ASSESSMENT: ACTIVITIES ARE NOT PREVENTED

## 2021-09-22 ASSESSMENT — PAIN DESCRIPTION - FREQUENCY
FREQUENCY: CONTINUOUS
FREQUENCY: CONTINUOUS

## 2021-09-22 ASSESSMENT — PAIN DESCRIPTION - LOCATION
LOCATION: NECK
LOCATION: NECK

## 2021-09-22 NOTE — PROGRESS NOTES
PATIENT NEURO CHECKS PERFORMED PRE-OP:  NO TONGUE DEVIATION   EYE'S FOLLOW  COMMNADS  EQUAL HAND GRASPS  ROM OF EXTREMITIES INTACT AND EQUAL  TOES BILATERAL MOVEMENT

## 2021-09-22 NOTE — PLAN OF CARE
Problem: Falls - Risk of:  Goal: Will remain free from falls  Description: Will remain free from falls  Outcome: Ongoing  Note: Pt fall risk, fall band present, falling star, safety alarm activated and in use as needed. Hourly rounding performed. Pt encouraged to use call light. See Fabián Ingram fall risk assessment. Goal: Absence of physical injury  Description: Absence of physical injury  Outcome: Ongoing  Note: Non-skid socks in place, up with assistance, bed in lowest position, bed exit & alarm as needed, provide toileting every 2 hours an d as needed. Problem: HEMODYNAMIC STATUS  Goal: Patient has stable vital signs and fluid balance  Outcome: Ongoing  Note: Monitor vital signs at least every shift & as needed. Monitor intake & output at least every shift. Problem: ACTIVITY INTOLERANCE/IMPAIRED MOBILITY  Goal: Mobility/activity is maintained at optimum level for patient  Outcome: Ongoing  Note: Assessed musculoskeletal functional level. Assessed ROM & ability to care for self. Problem: COMMUNICATION IMPAIRMENT  Goal: Ability to express needs and understand communication  Outcome: Ongoing  Note: Assess communication skills & deficits. Provide alternative means of communication. Work with case management regarding discharge needs & followup.

## 2021-09-22 NOTE — PLAN OF CARE
Problem: Falls - Risk of:  Goal: Will remain free from falls  Description: Will remain free from falls  Outcome: Met This Shift  Patient is a fall risk during this admission. Fall risk assessment was performed. Patient is absent of falls. Bed is in the lowest position. Wheels on the bed are locked. Call light and bed side table are within reach. Clutter is removed. Patient was educated to call out when needing assistance or wanting to get out of bed. Patient offered toileting assistance during rounding. Hourly rounds have been performed. Problem: HEMODYNAMIC STATUS  Goal: Patient has stable vital signs and fluid balance  Outcome: Ongoing       Problem: Pain:  Goal: Pain level will decrease  Description: Pain level will decrease  Outcome: Ongoing   Patient communicates presence of pain, pain characteristics, and pain relief response.

## 2021-09-22 NOTE — PROGRESS NOTES
Patient arrived to unit from recovery at this time. Per Report:   Patient underwent  Left carotid endarterectomy with Dr. Bridgette Moise     Pt received 4 Zofran & 50 of Fentanyl. Left neck incision dressing  clean dry intact     Pt denies all pain and complaints at this time. See flowsheets for further info. Writer will continue to monitor.

## 2021-09-22 NOTE — PROGRESS NOTES
Patient admitted from ED via hospital bed to Progressive Care Unit, room 1008. Bed alarm activated & side rails x2 elevated for patient safety. Vital signs taken. Placed on heart monitor. Admission database completed. Patient is on heparin gtt started in ED. Will monitor.

## 2021-09-22 NOTE — ANESTHESIA PRE PROCEDURE
Department of Anesthesiology  Preprocedure Note       Name:  Bhavna Araya   Age:  73 y.o.  :  1955                                          MRN:  [id number]         Date:  2021      Surgeon: Nelson Pelletier):  Jose Bocanegra MD    Procedure: Procedure(s):  CAROTID ENDARTERECTOMY    Medications prior to admission:   Prior to Admission medications    Medication Sig Start Date End Date Taking?  Authorizing Provider   aspirin 81 MG chewable tablet Take 1 tablet by mouth daily 21  Yes Aliyah Lauren MD   metFORMIN (GLUCOPHAGE) 500 MG tablet Take 1 tablet by mouth 2 times daily (with meals) 21  Yes Aliyah Lauren MD   atorvastatin (LIPITOR) 40 MG tablet Take 1 tablet by mouth nightly 21  Yes Aliyah Lauren MD   amLODIPine (NORVASC) 5 MG tablet Take 1 tablet by mouth daily 21  Yes Aliyah Lauren MD   PARoxetine (PAXIL) 40 MG tablet Take 40 mg by mouth daily   Yes Historical Provider, MD   atenolol (TENORMIN) 50 MG tablet Take 50 mg by mouth daily   Yes Historical Provider, MD   losartan (COZAAR) 100 MG tablet Take 100 mg by mouth daily   Yes Historical Provider, MD       Current medications:    Current Facility-Administered Medications   Medication Dose Route Frequency Provider Last Rate Last Admin    gelatin adsorbable (GELFOAM) sponge    PRAYSHA Bocanegra MD   1 each at 21 8513    thrombin kit    PRAYSHA Bocanegra MD   15,000 Units at 21 0944    sodium chloride 0.9 % 1,000 mL with heparin (porcine) 5,000 Units    PRAYSHA Bocanegra MD   1,000 mL at 21 0945    sodium chloride 0.9 % 1,000 mL with polymyxin B 500,000 Units    PRAYSHA Bocanegra MD   500 mL at 21 0948    [MAR Hold] heparin 25,000 units in dextrose 5% 250 mL (premix) infusion  5-30 Units/kg/hr IntraVENous Continuous Shay Hawkins MD 9.3 mL/hr at 21 0240 12 Units/kg/hr at 21 0240    [MAR Hold] sodium chloride flush 0.9 % injection 5-40 mL  5-40 mL IntraVENous 2 times per day Marissa Elizabeth MD       [facility] Hold] sodium chloride flush 0.9 % injection 5-40 mL  5-40 mL IntraVENous PRN Marissa Elizabeth MD        [facility] Hold] 0.9 % sodium chloride infusion  25 mL IntraVENous PRN Marissa Elizabeth MD        [MAR Hold] acetaminophen (TYLENOL) tablet 650 mg  650 mg Oral Q4H PRN Marissa Elizabeth MD        [MAR Hold] ondansetron (ZOFRAN) injection 4 mg  4 mg IntraVENous Q6H PRN Shay Hawkins MD        [MAR Hold] atenolol (TENORMIN) tablet 50 mg  50 mg Oral Daily Clair Gifford MD   50 mg at 09/21/21 0859    [MAR Hold] losartan (COZAAR) tablet 100 mg  100 mg Oral Daily Clair Gifford MD   100 mg at 09/21/21 0859    [MAR Hold] amLODIPine (NORVASC) tablet 5 mg  5 mg Oral Daily Chapo Diaz MD   5 mg at 09/21/21 0859    [MAR Hold] hydrALAZINE (APRESOLINE) injection 10 mg  10 mg IntraVENous Q6H PRN Chapo Diaz MD   10 mg at 09/22/21 0415    [MAR Hold] atorvastatin (LIPITOR) tablet 40 mg  40 mg Oral Nightly Clair Gifford MD   40 mg at 09/21/21 2116    [MAR Hold] metFORMIN (GLUCOPHAGE) tablet 500 mg  500 mg Oral BID  Clair Gifford MD   500 mg at 09/21/21 1819    [MAR Hold] PARoxetine (PAXIL) tablet 40 mg  40 mg Oral Daily Clair Gifford MD   40 mg at 09/21/21 1819    [MAR Hold] insulin lispro (HUMALOG) injection vial 0-18 Units  0-18 Units SubCUTAneous TID  Clair Gifford MD   3 Units at 09/21/21 1824    [MAR Hold] insulin lispro (HUMALOG) injection vial 0-9 Units  0-9 Units SubCUTAneous Nightly Chapo Diaz MD        [MAR Hold] glucose (GLUTOSE) 40 % oral gel 15 g  15 g Oral PRN Chapo Diaz MD        [facility] Hold] dextrose 50 % IV solution  12.5 g IntraVENous PRN Chapo Diaz MD        [MAR Hold] glucagon (rDNA) injection 1 mg  1 mg IntraMUSCular PRN Chapo Diaz MD        [facility] Hold] dextrose 5 % solution  100 mL/hr IntraVENous PRN Chapo Diaz MD        [Held by provider] clopidogrel (PLAVIX) tablet 75 mg  75 mg Oral Daily Clair Pedro Armstrong MD        [facility]] sodium chloride flush 0.9 % injection 10 mL  10 mL IntraVENous PRN Shay Hawkins MD   10 mL at 09/20/21 2158     Facility-Administered Medications Ordered in Other Encounters   Medication Dose Route Frequency Provider Last Rate Last Admin    0.9 % sodium chloride infusion   IntraVENous Continuous PRN Fredia Samuel, APRN - CRNA   New Bag at 09/22/21 0856    lidocaine PF 2 % injection   IntraVENous PRN Fredia Samuel, APRN - CRNA   80 mg at 09/22/21 0845    rocuronium (ZEMURON) injection   IntraVENous PRN Fredia Samuel, APRN - CRNA   10 mg at 09/22/21 1012    propofol injection   IntraVENous PRN Fredia Samuel, APRN - CRNA   120 mg at 09/22/21 0845    fentaNYL (SUBLIMAZE) injection   IntraVENous PRN Fredia Samuel, APRN - CRNA   50 mcg at 09/22/21 0845    phenylephrine (ANA MARÍA-SYNEPHRINE) 1 MG/10ML prefilled syringe   IntraVENous PRN Fredia Samuel, APRN - CRNA   200 mcg at 09/22/21 5331    ceFAZolin (ANCEF) injection   IntraVENous PRN Fredia Samuel, APRN - CRNA   2,000 mg at 09/22/21 0413    lactated ringers infusion   IntraVENous Continuous PRN Fredia Samuel, APRN - CRNA   New Bag at 09/22/21 0856    phenylephrine (ANA MARÍA-SYNEPHRINE) 50 mg in dextrose 5 % 250 mL infusion   IntraVENous Continuous PRN Fredia Samuel, APRN - CRNA 10.5 mL/hr at 09/22/21 1051 35 mcg/min at 09/22/21 1051    heparin (porcine) injection   IntraVENous PRN Fredia Samuel, APRN - CRNA   1,000 Units at 09/22/21 1018    protamine injection   IntraVENous PRN Fredia Samuel, APRN - CRNA   15 mg at 09/22/21 1054    ondansetron (ZOFRAN) injection   IntraVENous PRN Fredia Samuel, APRN - CRNA   4 mg at 09/22/21 1101    glycopyrrolate (ROBINUL) injection   IntraVENous PRN Fredia Samuel, APRN - CRNA   0.4 mg at 09/22/21 1101    neostigmine (PROSTIGMINE) injection   IntraVENous PRN Fredia Samuel, APRN - CRNA   3 mg at 09/22/21 1101       Allergies:  No Known Allergies    Problem List: Patient Active Problem List   Diagnosis Code    ADRIANA (acute kidney injury) ([facility] [address] N17.9    Acute ischemic left PCA stroke (HCC) Z53.531    Right sided weakness R53.1       Past Medical History:        Diagnosis Date    Diabetes mellitus ([facility] [address]     Hypertension        Past Surgical History:        Procedure Laterality Date    KNEE SURGERY         Social History:    Social History     Tobacco Use    Smoking status: Current Every Day Smoker     Packs/day: 1.50     Start date: 6/1/1973    Smokeless tobacco: Never Used   Substance Use Topics    Alcohol use: Yes     Alcohol/week: 4.0 standard drinks     Types: 4 Cans of beer per week     Comment: Occassional, four times weekly                                Ready to quit: Not Answered  Counseling given: Not Answered      Vital Signs (Current):   Vitals:    09/21/21 2349 09/22/21 0327 09/22/21 0510 09/22/21 0745   BP: (!) 147/70 (!) 169/94 128/76 (!) 174/79   Pulse: 76 79 71 79   Resp: 18 14 16 14   Temp: 98.6 °F (37 °C) 98.2 °F (36.8 °C)     TempSrc: Oral Oral  Oral   SpO2: 97% 97%  99%   Weight:       Height:                                                  BP Readings from Last 3 Encounters:   09/22/21 (!) 174/79   09/22/21 (!) 105/58   02/26/21 (!) 132/59       NPO Status:                                                                                 BMI:   Wt Readings from Last 3 Encounters:   09/21/21 156 lb 3.2 oz (70.9 kg)   02/26/21 174 lb 4.8 oz (79.1 kg)   07/31/16 190 lb (86.2 kg)     Body mass index is 25.21 kg/m².     CBC:   Lab Results   Component Value Date    WBC 6.1 09/20/2021    RBC 3.78 09/20/2021    HGB 13.3 09/20/2021    HCT 37.7 09/20/2021    MCV 99.7 09/20/2021    RDW 12.4 09/20/2021     09/20/2021       CMP:   Lab Results   Component Value Date     09/20/2021    K 4.3 09/20/2021    CL 96 09/20/2021    CO2 24 09/20/2021    BUN 8 09/20/2021    CREATININE 0.96 09/20/2021    GFRAA >60 09/20/2021    LABGLOM >60 09/20/2021    GLUCOSE 111 09/20/2021    PROT 6.6 02/23/2021    CALCIUM 9.0 09/20/2021    BILITOT 0.36 02/23/2021    ALKPHOS 61 02/23/2021    AST 37 02/23/2021    ALT 22 02/23/2021       POC Tests:   Recent Labs     09/22/21  0716   POCGLU 143*       Coags:   Lab Results   Component Value Date    PROTIME 13.7 09/22/2021    INR 1.1 09/22/2021    APTT 29.0 09/20/2021       HCG (If Applicable): No results found for: PREGTESTUR, PREGSERUM, HCG, HCGQUANT     ABGs: No results found for: PHART, PO2ART, ZAQ7QOK, TSK3IVZ, BEART, J7PNNKXV     Type & Screen (If Applicable):  No results found for: LABABO, LABRH    Drug/Infectious Status (If Applicable):  No results found for: HIV, HEPCAB    COVID-19 Screening (If Applicable): No results found for: COVID19        Anesthesia Evaluation  Patient summary reviewed and Nursing notes reviewed no history of anesthetic complications:   Airway: Mallampati: II  TM distance: >3 FB   Neck ROM: full  Mouth opening: > = 3 FB Dental: normal exam         Pulmonary:normal exam        (-) COPD                           Cardiovascular:  Exercise tolerance: no interval change,   (+) hypertension:,     (-) CAD and CABG/stent    ECG reviewed    Rate: normal                    Neuro/Psych:   (+) CVA:,             GI/Hepatic/Renal:        (-) GERD       Endo/Other:    (+) Diabetes, . Abdominal:             Vascular: Other Findings:             Anesthesia Plan      general     ASA 3       Induction: intravenous. arterial line  MIPS: Postoperative opioids intended and Prophylactic antiemetics administered. Anesthetic plan and risks discussed with patient. Plan discussed with CRNA.     Attending anesthesiologist reviewed and agrees with Preprocedure content              Yvette Medina DO   9/22/2021

## 2021-09-22 NOTE — PROGRESS NOTES
Progress note  [facility]    Adult [facility]      Name: Judy Gannon  MRN: [id number]     Acct: [de-identified]  Room: 2029/2029-01    Admit Date: 9/20/2021  8:47 PM  PCP: DENNIS Turner    Primary Problem  Active Problems:    Right sided weakness  Resolved Problems:    * No resolved hospital problems.  *        Assesment:   Suspected left carotid artery dissection/plaque  History of left occipital CVA February 2021, with residual right-sided weakness  Acute on chronic alcoholism  Progressive right-sided weakness  Essential hypertension  Diabetes mellitus type 2  Mixed hyperlipidemia  Depression with anxiety  Tobacco abuse        Plan:   Admit patient to progressive unit   Oxygen to keep SPO2 more than 90%  Telemetry  Check vitals closely  CBC BMP daily    Watch for alcohol withdrawal, if needed we will start him on CIWA protocol    Vascular saw the patient, s/p left carotid endarterectomy    Continue atorvastatin  Continue atenolol, amlodipine, losartan  Continue metformin, insulin sliding scale and close medical blood sugar  Continue Paxil  Consult neurology      Scheduled Meds:   sodium chloride flush  5-40 mL IntraVENous 2 times per day    [START ON 9/23/2021] enoxaparin  40 mg SubCUTAneous Daily    aspirin  81 mg Oral Daily    ceFAZolin (ANCEF) IVPB  2,000 mg IntraVENous Q8H    atenolol  50 mg Oral Daily    losartan  100 mg Oral Daily    amLODIPine  5 mg Oral Daily    atorvastatin  40 mg Oral Nightly    metFORMIN  500 mg Oral BID WC    PARoxetine  40 mg Oral Daily    insulin lispro  0-18 Units SubCUTAneous TID WC    insulin lispro  0-9 Units SubCUTAneous Nightly    [Held by provider] clopidogrel  75 mg Oral Daily     Continuous Infusions:   lactated ringers 125 mL/hr at 09/22/21 1513    sodium chloride      dextrose       PRN Meds:  sodium chloride flush, 5-40 mL, PRN  sodium chloride, 25 mL, PRN  morphine, 2 mg, Q2H PRN   Or  morphine, 4 mg, Q2H PRN  acetaminophen, 650 mg, Q4H PRN  ondansetron, 4 mg, Q6H PRN  hydrALAZINE, 10 mg, Q6H PRN  glucose, 15 g, PRN  dextrose, 12.5 g, PRN  glucagon (rDNA), 1 mg, PRN  dextrose, 100 mL/hr, PRN        Chief Complaint:     Chief Complaint   Patient presents with    Fatigue         History of Present Illness:        Patient seen and examined at bedside. Patient has some intermittent low blood pressures. No overnight events. No acute complaints today. afebrile   Denies any chest pain, shortness of breath, palpitation, headache, dizziness, cough, cold, changes in urination or bowel habits        HPI:    Killian Hopkins is a 73 y.o.  male who presents with Fatigue  A 31-year-old gentleman has been admitted via emergency room, patient came to the ER with a complaint of having fatigue and right-sided weakness, patient have history of left-sided occipital stroke in the past, further patient has some minimal residual right-sided deficits that all happened in February, patient noticed that patient has right-sided deficits which have worsened, for past 1 month, patient also been having back pain, further also been drinking at home, patient initial testing in the emergency room included CT head which is negative for acute medical abnormalities, further CT head and neck showed short segment dissection flap of the origin of left cervical ICA measuring 1 cm, for further management    I have personally reviewed the past medical history, past surgical history, medications, social history, and family history, and summarized in the note. Review of Systems:     All 10 point system is reviewed and negative otherwise mentioned in HPI.       Past Medical History:     Past Medical History:   Diagnosis Date    Diabetes mellitus ([facility] [address]     Hypertension         Past Surgical History:     Past Surgical History:   Procedure Laterality Date    CAROTID ENDARTERECTOMY  09/22/2021    CAROTID ENDARTERECTOMY (N/A Neck    CAROTID ENDARTERECTOMY N/A 9/22/2021 CAROTID ENDARTERECTOMY performed by Alex Steinberg MD at [facility] [address]          Medications Prior to Admission:       Prior to Admission medications    Medication Sig Start Date End Date Taking? Authorizing Provider   aspirin 81 MG chewable tablet Take 1 tablet by mouth daily 21  Yes Sarah Prescott MD   metFORMIN (GLUCOPHAGE) 500 MG tablet Take 1 tablet by mouth 2 times daily (with meals) 21  Yes Sarah Prescott MD   atorvastatin (LIPITOR) 40 MG tablet Take 1 tablet by mouth nightly 21  Yes Sarah Prescott MD   amLODIPine (NORVASC) 5 MG tablet Take 1 tablet by mouth daily 21  Yes Sarah Prescott MD   PARoxetine (PAXIL) 40 MG tablet Take 40 mg by mouth daily   Yes Historical Provider, MD   atenolol (TENORMIN) 50 MG tablet Take 50 mg by mouth daily   Yes Historical Provider, MD   losartan (COZAAR) 100 MG tablet Take 100 mg by mouth daily   Yes Historical Provider, MD        Allergies:       Patient has no known allergies. Social History:     Tobacco:    reports that he has been smoking. He started smoking about 48 years ago. He has been smoking about 1.50 packs per day. He has never used smokeless tobacco.  Alcohol:      reports current alcohol use of about 4.0 standard drinks of alcohol per week. Drug Use:  reports no history of drug use. Family History:     History reviewed. No pertinent family history.       Physical Exam:     Vitals:  BP (!) 104/49   Pulse 68   Temp 99.5 °F (37.5 °C) (Temporal)   Resp 16   Ht 5' 6\" (1.676 m)   Wt 156 lb 3.2 oz (70.9 kg)   SpO2 92%   BMI 25.21 kg/m²   Temp (24hrs), Av.2 °F (36.8 °C), Min:96.4 °F (35.8 °C), Max:99.5 °F (37.5 °C)          General appearance - alert, well appearing, and in no acute distress  Mental status - oriented to person, place, and time with normal affect  Head - normocephalic and atraumatic  Eyes - pupils equal and reactive, extraocular eye movements intact, conjunctiva clear  Ears - hearing appears to be intact  Nose - no drainage noted  Mouth - mucous membranes moist  Neck - supple, no carotid bruits, thyroid not palpable  Chest - clear to auscultation, normal effort  Heart - normal rate, regular rhythm, no murmur  Abdomen - soft, nontender, nondistended, bowel sounds present all four quadrants, no masses, hepatomegaly or splenomegaly  Neurological - normal speech, no focal findings or movement disorder noted, cranial nerves II through XII grossly intact  Extremities - peripheral pulses palpable, no pedal edema or calf pain with palpation  Skin - no gross lesions, rashes, or induration noted        Data:     Labs:    Hematology:  Recent Labs     09/20/21 2100 09/22/21  0614   WBC 6.1  --    RBC 3.78*  --    HGB 13.3  --    HCT 37.7*  --    MCV 99.7  --    MCH 35.2*  --    MCHC 35.3*  --    RDW 12.4  --      --    MPV 8.7  --    INR 1.0 1.1     Chemistry:  Recent Labs     09/20/21 2100 09/20/21 2140 09/20/21  2226   *  --   --    K 4.3  --   --    CL 96*  --   --    CO2 24  --   --    GLUCOSE 98 111  --    BUN 8  --   --    CREATININE 0.96  --   --    MG 1.3*  --   --    ANIONGAP 14  --   --    LABGLOM >60  --   --    GFRAA >60  --   --    CALCIUM 9.0  --   --    TROPHS 19  --  18   CKTOTAL 68  --   --    MYOGLOBIN 56  --   --      Recent Labs     09/20/21  2134 09/21/21  0730 09/21/21  1818 09/21/21  2108 09/22/21  0716 09/22/21  1133   LABA1C  --  5.7  --   --   --   --    POCGLU 111*  --  151* 105 143* 131*       Lab Results   Component Value Date    INR 1.1 09/22/2021    INR 1.0 09/20/2021    INR 1.0 02/26/2021    PROTIME 13.7 09/22/2021    PROTIME 12.7 09/20/2021    PROTIME 12.8 02/26/2021       Lab Results   Component Value Date/Time    SPECIAL NOT REPORTED 02/26/2021 09:30 AM     Lab Results   Component Value Date/Time    CULTURE NO GROWTH 3 DAYS 02/26/2021 09:30 AM       No results found for: POCPH, PHART, PH, POCPCO2, JSW9RFM, PCO2, POCPO2, PO2ART, PO2, POCHCO3, SDG1GQX, HCO3, NBEA, PBEA, BEART, BE, THGBART, THB, TOE4AED, NPCZ4QEH, H9TSENFI, O2SAT, FIO2    Radiology:    CT HEAD WO CONTRAST    Result Date: 9/20/2021  1. No acute intracranial abnormality. 2. Old left occipital lobe infarct. 3. Thick atherosclerotic calcified plaque is noted at the bilateral carotid bulbs and extending into the origin of the bilateral cervical ICAs. 4. There is a short segment of high-grade stenosis at the origin of the right cervical ICA measuring about 70%. 5. There is a short segment dissection flap at the origin of the left cervical ICA measuring about 1 cm length. XR CHEST PORTABLE    Result Date: 9/20/2021  No acute process. CTA HEAD NECK W CONTRAST    Result Date: 9/20/2021  1. No acute intracranial abnormality. 2. Old left occipital lobe infarct. 3. Thick atherosclerotic calcified plaque is noted at the bilateral carotid bulbs and extending into the origin of the bilateral cervical ICAs. 4. There is a short segment of high-grade stenosis at the origin of the right cervical ICA measuring about 70%. 5. There is a short segment dissection flap at the origin of the left cervical ICA measuring about 1 cm length. MRI BRAIN WO CONTRAST    Result Date: 9/21/2021  1. No acute intracranial abnormality. 2. Chronic infarction in the left parieto-occipital region. All radiological studies reviewed                Code Status:  Full Code    Electronically signed by Celia Lamb MD on 9/22/2021 at 3:42 PM     Copy sent to DENNIS Aparicio    This note was created with the assistance of a speech-recognition program.  Although the intention is to generate a document that actually reflects the content of the visit, no guarantees can be provided that every mistake has been identified and corrected by editing. Note was updated later by me after  physical examination and  completion of the assessment.

## 2021-09-22 NOTE — FLOWSHEET NOTE
Call placed to Dr Rosemarie Judd, re: informed that pt had not been seen by cardiology this visit, most recent echo reviewed and requested copy be placed on chart

## 2021-09-22 NOTE — PROGRESS NOTES
NEUROLOGY INPATIENT PROGRESS NOTE    9/22/2021         Bhavna Araya is a  73 y.o. male admitted on 9/20/2021 with  Right sided weakness [Z08.7]  Acute alcoholic intoxication without complication ([facility] [F69.752]      Briefly, this is a  73 y.o. male with hx of htn, dm and prior Lt occipital cva was admitted on 9/20/2021 with c/o generalized fatigue with right-sided weakness. He has baseline residual right-sided deficits from prior left occipital stroke in February and that weakness has worsened over the past 1 month. He also has chronic back pain. Home meds include aspirin 81 mg daily, atorvastatin 40 mg nightly. Vitals on admit: 164/104, 92/min, 16/min, 98.6 °F.   CT head on admit is negative for acute intracranial abnormalities. CTA head and neck showed a short segment dissection flap at the origin of left cervical ICA measuring 1 cm. Stroke team consulted; advised heparin, Plavix and aspirin and to reevaluate when patient is sober as his ethanol level markedly elevated at 343 on admit. Presently patient is more alert and awake and oriented x3. Following commands well. He also stated that he had recent falls from increased right-sided weakness. Yesterday he was working at [employer] and he fell down with right-sided weakness, making him to seek medical care. .  Denied LOC. He was ambulating independently prior to this hospitalization. Has been compliant with medications. Patient also admits to having chronic back pain but presently denies radiating pain and paresthesias down the extremities. Denies bladder dysfunction. 9/22/21: he had Left CEA today and he tolerated it well. He offers no new complaints. He still has ongoing rt sided weakness and it is unchanged. No current facility-administered medications on file prior to encounter.      Current Outpatient Medications on File Prior to Encounter   Medication Sig Dispense Refill    aspirin 81 MG chewable tablet Take 1 tablet by mouth daily 30 tablet 3    metFORMIN (GLUCOPHAGE) 500 MG tablet Take 1 tablet by mouth 2 times daily (with meals) 60 tablet 3    atorvastatin (LIPITOR) 40 MG tablet Take 1 tablet by mouth nightly 30 tablet 3    amLODIPine (NORVASC) 5 MG tablet Take 1 tablet by mouth daily 30 tablet 3    PARoxetine (PAXIL) 40 MG tablet Take 40 mg by mouth daily      atenolol (TENORMIN) 50 MG tablet Take 50 mg by mouth daily      losartan (COZAAR) 100 MG tablet Take 100 mg by mouth daily       Allergies: Angelina Harrell has No Known Allergies. Past Medical History:   Diagnosis Date    Diabetes mellitus ([facility]     Hypertension        Past Surgical History:   Procedure Laterality Date    KNEE SURGERY       Social History: Angelina Harrell  reports that he has been smoking. He started smoking about 48 years ago. He has been smoking about 1.50 packs per day. He has never used smokeless tobacco. He reports current alcohol use of about 4.0 standard drinks of alcohol per week. He reports that he does not use drugs. History reviewed. No pertinent family history.     Current Medications:     sodium chloride flush  5-40 mL IntraVENous 2 times per day    atenolol  50 mg Oral Daily    losartan  100 mg Oral Daily    amLODIPine  5 mg Oral Daily    atorvastatin  40 mg Oral Nightly    metFORMIN  500 mg Oral BID WC    PARoxetine  40 mg Oral Daily    insulin lispro  0-18 Units SubCUTAneous TID WC    insulin lispro  0-9 Units SubCUTAneous Nightly    [Held by provider] clopidogrel  75 mg Oral Daily     PRN Meds include: sodium chloride flush, sodium chloride, acetaminophen, ondansetron, hydrALAZINE, glucose, dextrose, glucagon (rDNA), dextrose, sodium chloride flush    ROS:   Constitutional Negative for fever and chills   HEENT Negative for ear discharge, ear pain, nosebleed   Eyes Negative for photophobia, pain and discharge   Respiratory Negative for hemoptysis and sputum   Cardiovascular Negative for orthopnea, claudication and PND Gastrointestinal Negative for abdominal pain, diarrhea, blood in stool   Musculoskeletal  positive for chronic back pain and negative for joint pain, negative for myalgia   Skin Negative for rash or itching   Endo/heme/allergies Negative for polydipsia, environmental allergy   Psychiatric Negative for suicidal ideation. Patient is not anxious           Objective:   BP (!) 174/79   Pulse 79   Temp 98.2 °F (36.8 °C) (Oral)   Resp 14   Ht 5' 6\" (1.676 m)   Wt 156 lb 3.2 oz (70.9 kg)   SpO2 99%   BMI 25.21 kg/m²     Blood pressure range: Systolic (95GBC), ISP:255 , Min:126 , GWJ:134   ; Diastolic (89HOH), GDQ:68, Min:70, Max:135      Continuous infusions:    heparin (PORCINE) Infusion 12 Units/kg/hr (09/22/21 0240)    sodium chloride      dextrose         ON EXAMINATION:  GENERAL  Appears comfortable and in no distress   HEENT  NC/ AT   NECK  Supple and no bruits heard   Cardiovascular  S1, S2 heard; radial pulse intact   MENTAL STATUS:  Alert, oriented, intact memory, no confusion, normal speech, normal language, no hallucination or delusion   CRANIAL NERVES: II     -       Pupils reactive b/l., Fundus exam: intact venous pulsations; right hemianopsia noted on visual threat. III,IV,VI -  EOMs full, no afferent defect, no                      WILBERT, no ptosis  V     -     Normal facial sensation  VII    -     Normal facial symmetry  VIII   -     Intact hearing  IX,X -     Symmetrical palate  XI    -     Symmetrical shoulder shrug  XII   -     Midline tongue, no atrophy    MOTOR FUNCTION:  Mild weakness of grade 4+/5 in right upper extremity, 5 -/5 in right lower extremity with normal bulk and normal tone. 5/5 in left upper and left lower extremities. Tremulousness of extremities noted on exertion. SENSORY FUNCTION:  Normal touch, normal pin in all 4 extremities.    CEREBELLAR FUNCTION:  Intact fine motor control over upper limbs   REFLEX FUNCTION:  Symmetric, no perverted reflex, extensor plantar response on right side and flexor plantar response on left side. STATION and GAIT  Not tested         Data:    Lab Results:     Lab Results   Component Value Date    CHOL 185 02/25/2021    LDLCHOLESTEROL 79 02/25/2021    HDL 87 02/25/2021    TRIG 93 02/25/2021    ALT 22 02/23/2021    AST 37 02/23/2021    INR 1.1 09/22/2021    LABA1C 6.0 02/23/2021     Hematology:  Recent Labs     09/20/21  2100 09/22/21  0614   WBC 6.1  --    HGB 13.3  --    HCT 37.7*  --      --    INR 1.0 1.1     Chemistry:  Recent Labs     09/20/21  2100 09/20/21  2140   *  --    K 4.3  --    CL 96*  --    CO2 24  --    GLUCOSE 98 111   BUN 8  --    CREATININE 0.96  --    MG 1.3*  --    CALCIUM 9.0  --      Recent Labs     09/20/21  2100   CKTOTAL 68       No results found for: PHENYTOIN, PHENYTOIN, VALPROATE, CBMZ    Toxicology (9/20/2021): Ethanol 343      Diagnostic data reviewed:  CT head (9/20/2021): No acute intracranial abnormality. Old left occipital lobe infarct. CTA head and neck 9/20/2021: There is a short segment high-grade stenosis at origin of right cervical ICA measuring about 70%. There is a short segment dissection flap at the origin of left cervical ICA measuring 1 cm. Thick atherosclerotic calcified plaque noted at bilateral carotid bulbs extending into origin of bilateral cervical ICAs. Impression and Plan: Mr. Claudy Méndez is a 73 y.o. male with   Lt carotid stenosis; s/p Lt CEA today; tolerated the procedure well. MRI brain is -ve; Heparin d/c'ed. Hx of Lt occipital CVA (Feb 2021) with residual Rt hemiparesis  Initial CT head -ve; was initiated on IV heparin, ASA, Plavix; will get MRI brain now and proceed accordingly. Chronic alcoholism; recent falls with increased right-sided weakness  PT/OT/speech therapy eval today. Comorbid conditions include hypertension, diabetes. Care plan is discussed with patient and his nurse at bedside. Will follow with you. Azael Galdamez MD 9/22/2021 7:40 PM

## 2021-09-22 NOTE — OP NOTE
[address] [facility] [address]                [address], [address] [place] [address]                                OPERATIVE REPORT    PATIENT NAME: Kathy Mendoza                  :        1955  MED REC NO:   [id number]                             ROOM:  ACCOUNT NO:   [de-identified]                           ADMIT DATE: 2021  PROVIDER:     Beckie Oneal MD    DATE OF PROCEDURE:  2021    PREOPERATIVE DIAGNOSIS:  Severe symptomatic 95% left internal carotid  artery stenosis with ulceration. POSTOPERATIVE DIAGNOSIS:  Severe symptomatic 95% left internal carotid  artery stenosis with ulceration and acute thrombus. PROCEDURE:  Left carotid endarterectomy with Vascu-Guard patch. SURGEON:  Beckie Oneal MD    ANESTHESIA:  General endotracheal.    ESTIMATED BLOOD LOSS:  50 mL. COMPLICATIONS:  None. OPERATIVE INDICATIONS:  The patient is a 30-year-old male who presents  with right-sided weakness with some residual right arm weakness. The  patient was known to have severe left carotid stenosis and had been  counseled to undergo a left carotid endarterectomy in 2021; however,  was lost to follow up. He now presents with weakness on the right side  and CTA demonstrates at least 95% stenosis in the left internal carotid  artery with 70-80% stenosis in the right internal carotid artery. On  the left side, there is a large area of plaque ulceration present. At  this time, he is being taken to the operating room for an elective left  carotid endarterectomy. Intraoperatively, he was noted to have a fresh  thrombus within the ulcer crater. OPERATIVE TECHNIQUE:  After informed consent had been obtained, the  patient was brought to the operating room and placed in the supine  position and general endotracheal anesthesia was induced. Left neck was  then prepped and draped in the usual sterile fashion. An oblique  incision was made in the left neck.   The subcutaneous tissue was sharply  taken down. Bleeding points being controlled with cautery. The  platysma was divided. The anterior border of the sternocleidomastoid  was dissected free. Internal jugular vein was readily identified and  common facial vein was suture ligated with 3-0 Vicryl along with the  external jugular vein. Carotid bifurcation was isolated and the common  internal and external carotid arteries were circumferentially dissected  and vessel loops placed around them. The superior thyroid artery was  secured with a zero silk Darden tie. Hypoglossal and vagus nerves were  identified and preserved. The patient was intravenously heparinized  with 7000 units of aqueous heparin. External carotid was clamped with a  Leanna followed by the internal carotid artery. Common carotid was  clamped with a Quintana clamp. Longitudinal arteriotomy was then made on  the common carotid and extended onto the internal carotid artery. There  was extensive plaque present at the bifurcation with a large ulcer  crater and fresh thrombus within the plaque ulcer crater. Vessels were  irrigated with heparinized saline and a 3 x 5 mm shunt was placed. Flow  was reestablished to the brain. Next, the carotid bifurcation was endarterectomized in a standard  fashion with excellent distal taper points of both the internal and  external carotid arteries. Proximally a large core plaque was  endarterectomized from the common carotid with a good distal taper. Plaque within the common also appeared to be quite soft and gelatinous. Once all plaque had been removed, the vessels were irrigated with  heparinized saline and small debris fragments were removed. Vascu-Guard  patch was brought in the field and fashioned. The arteriotomy was  closed in a patch angioplasty fashion using running 6-0 Prolene suture. Shunt was removed and vessel irrigated with heparinized saline.   Closure  was completed and flow reestablished first to the external carotid  followed by the internal carotid artery. Thrombin and Gelfoam was  placed at the closure site. Doppler was used to interrogate both the  internal and external carotid arteries and excellent phasic flow was  noted. Thrombin and Gelfoam was placed at the closure site. A 50 mg of  protamine was given intravenously. Wound was irrigated with antibiotic  solution and the Gelfoam removed. Suture line was inspected and found  to be hemostatic. Further irrigation was carried out and the platysma  was reapproximated using running 3-0 Vicryl suture followed by closure  of skin with interrupted 4-0 Monocryl subcuticular suture. Dermabond  was applied followed by a sterile dressing. The patient tolerated the  procedure well and was awakened in the operating room moving all four  extremities with tongue in the midline. Mild right arm weakness  remained at baseline.         Ena Gilliam MD    D: 09/22/2021 11:28:25       T: 09/22/2021 11:32:02     [id number]  Job#: [id number]     Doc#: [id number]    CC:  MD Shay Jeffers

## 2021-09-22 NOTE — ANESTHESIA POSTPROCEDURE EVALUATION
Department of Anesthesiology  Postprocedure Note    Patient: Claudy Méndez  MRN: [id number]  YOB: 1955  Date of evaluation: 9/22/2021  Time:  12:43 PM     Procedure Summary     Date: 09/22/21 Room / Location: [address] [place] [address] - INPATIENT    Anesthesia Start: 6896 Anesthesia Stop: 1127    Procedure: CAROTID ENDARTERECTOMY (N/A Neck) Diagnosis: (ulceration symptomatic)    Surgeons: Matilde Higgins MD Responsible Provider: Lyudmila Parnell DO    Anesthesia Type: general ASA Status: 3          Anesthesia Type: general    Elza Phase I:      Elza Phase II:      Last vitals: Reviewed and per EMR flowsheets.        Anesthesia Post Evaluation    Patient location during evaluation: PACU  Patient participation: complete - patient participated  Level of consciousness: awake and alert  Airway patency: patent  Nausea & Vomiting: no nausea and no vomiting  Complications: no  Cardiovascular status: hemodynamically stable  Respiratory status: acceptable  Hydration status: stable

## 2021-09-22 NOTE — FLOWSHEET NOTE
09/22/21 1540   Provider Notification   Reason for Communication Review case  (pain at incision site )   Provider Name Dr. Naa Daniels   Provider Notification Physician   Method of Communication Face to face   Response See orders   Notification Time 1     Pt reporting increased pain at incision site after PRN tylenol given. New order for Norco 1 tab q 4 hours.

## 2021-09-22 NOTE — BRIEF OP NOTE
Brief Postoperative Note      Patient: Nhung Goodrich  YOB: 1955  MRN: [id number]    Date of Procedure: 9/22/2021    Pre-Op Diagnosis: SYMPTOMATIC 95% LEFT INTERNAL CAROTID STENOSIS WITH ULCERATION    Post-Op Diagnosis: Same       Procedure(s):  LEFT CAROTID ENDARTERECTOMY WITH VASCUGUARD PATCH    Surgeon(s):  Chris Escamilla MD    Assistant:  Surgical Assistant: Fernie Armstrong    Anesthesia: General    Estimated Blood Loss (mL): less than 50     Complications: None    Specimens:   ID Type Source Tests Collected by Time Destination   A : LEFT CAROTID PLAQUE Tissue Carotid Arteries SURGICAL PATHOLOGY Chris Escamilla MD 9/22/2021 1039        Implants:  Implant Name Type Inv.  Item Serial No.  Lot No. LRB No. Used Action   PATCH VASC [id number] PERIPH BOV PERICARD N PVC N DEHP CRSS  PATCH VASC [id number] PERIPH BOV PERICARD N PVC N DEHP CRSS  Tamie Come [id number] Left 1 Implanted         Drains: * No LDAs found *    Findings: SEVERE STENOSIS WITH LARGE AREA OF PLAQUE ULCERATION WITH ACUTE THROMBUS PRESENT    Electronically signed by Chris Escamilla MD on 9/22/2021 at 11:08 AM

## 2021-09-22 NOTE — ACP (ADVANCE CARE PLANNING)
Advance Care Planning     Advance Care Planning Activator (Inpatient)  Conversation Note      Date of ACP Conversation: 9/22/2021     Conversation Conducted with: Patient with Decision Making Capacity    ACP Activator: Mook Bah RN    Health Care Decision Maker:     Current Designated Health Care Decision Maker:   Patient appoints friend, Marshall Bray, as decision maker on his behalf. Care Preferences    Ventilation: \"If you were in your present state of health and suddenly became very ill and were unable to breathe on your own, what would your preference be about the use of a ventilator (breathing machine) if it were available to you? \"      Would the patient desire the use of ventilator (breathing machine)?: yes    \"If your health worsens and it becomes clear that your chance of recovery is unlikely, what would your preference be about the use of a ventilator (breathing machine) if it were available to you? \"     Would the patient desire the use of ventilator (breathing machine)?: No      Resuscitation  \"CPR works best to restart the heart when there is a sudden event, like a heart attack, in someone who is otherwise healthy. Unfortunately, CPR does not typically restart the heart for people who have serious health conditions or who are very sick. \"    \"In the event your heart stopped as a result of an underlying serious health condition, would you want attempts to be made to restart your heart (answer \"yes\" for attempt to resuscitate) or would you prefer a natural death (answer \"no\" for do not attempt to resuscitate)? \" yes       [x] Yes   [] No   Educated Patient / Matt Duncan regarding differences between Advance Directives and portable DNR orders.     Length of ACP Conversation in minutes:      Conversation Outcomes:  [x] ACP discussion completed  [] Existing advance directive reviewed with patient; no changes to patient's previously recorded wishes  [] New Advance Directive completed  [] Portable Do Not Rescitate prepared for Provider review and signature  [] POLST/POST/MOLST/MOST prepared for Provider review and signature

## 2021-09-22 NOTE — PROGRESS NOTES
Paged Dr. Landis Nyhan regarding endarectomy scheduled for tomorrow around 12:30pm.  Okay to continue Heparin gtt, orders received for 2D ECHO for the morning, notify cardiology consult for cardiac clearance, PT-INR for the morning, and type & screen. Patient remains NPO. Orders implemented & patient informed. Will monitor.

## 2021-09-22 NOTE — ANESTHESIA PROCEDURE NOTES
Arterial Line:    An arterial line was placed using ultrasound guidance, in the OR for the following indication(s): continuous blood pressure monitoring. A 20 gauge (size), 1 and 3/4 inch (length), Arrow (type) catheter was placed, Seldinger technique used, into the right radial artery, secured by Tegaderm and tape. Anesthesia type: General    Events:  patient tolerated procedure well with no complications.   9/22/2021 8:40 AM9/22/2021 8:45 AM  Anesthesiologist: Yvette Medina DO  Performed: Anesthesiologist   Preanesthetic Checklist  Completed: patient identified, IV checked, site marked, risks and benefits discussed, surgical consent, monitors and equipment checked, pre-op evaluation, timeout performed, anesthesia consent given, oxygen available and patient being monitored

## 2021-09-22 NOTE — PROGRESS NOTES
DR Ena Longo notified per DR Perez order, update given , fluid bolus ordered  500ml ns.  Dc artline on discharge fron pacu

## 2021-09-23 VITALS
TEMPERATURE: 97.2 F | WEIGHT: 156 LBS | OXYGEN SATURATION: 97 % | HEIGHT: 66 IN | SYSTOLIC BLOOD PRESSURE: 129 MMHG | RESPIRATION RATE: 16 BRPM | BODY MASS INDEX: 25.07 KG/M2 | DIASTOLIC BLOOD PRESSURE: 70 MMHG | HEART RATE: 74 BPM

## 2021-09-23 LAB
ABSOLUTE EOS #: 0.14 K/UL (ref 0–0.44)
ABSOLUTE EOS #: 0.14 K/UL (ref 0–0.44)
ABSOLUTE IMMATURE GRANULOCYTE: 0.02 K/UL (ref 0–0.3)
ABSOLUTE IMMATURE GRANULOCYTE: 0.03 K/UL (ref 0–0.3)
ABSOLUTE LYMPH #: 1.36 K/UL (ref 1.1–3.7)
ABSOLUTE LYMPH #: 1.81 K/UL (ref 1.1–3.7)
ABSOLUTE MONO #: 0.51 K/UL (ref 0.1–1.2)
ABSOLUTE MONO #: 0.58 K/UL (ref 0.1–1.2)
ANION GAP SERPL CALCULATED.3IONS-SCNC: 12 MMOL/L (ref 9–17)
ANION GAP SERPL CALCULATED.3IONS-SCNC: 9 MMOL/L (ref 9–17)
BASOPHILS # BLD: 1 % (ref 0–2)
BASOPHILS # BLD: 1 % (ref 0–2)
BASOPHILS ABSOLUTE: 0.04 K/UL (ref 0–0.2)
BASOPHILS ABSOLUTE: 0.05 K/UL (ref 0–0.2)
BUN BLDV-MCNC: 12 MG/DL (ref 8–23)
BUN BLDV-MCNC: 13 MG/DL (ref 8–23)
BUN/CREAT BLD: 8 (ref 9–20)
BUN/CREAT BLD: 8 (ref 9–20)
CALCIUM SERPL-MCNC: 7.5 MG/DL (ref 8.6–10.4)
CALCIUM SERPL-MCNC: 7.8 MG/DL (ref 8.6–10.4)
CHLORIDE BLD-SCNC: 101 MMOL/L (ref 98–107)
CHLORIDE BLD-SCNC: 101 MMOL/L (ref 98–107)
CO2: 23 MMOL/L (ref 20–31)
CO2: 25 MMOL/L (ref 20–31)
CREAT SERPL-MCNC: 1.45 MG/DL (ref 0.7–1.2)
CREAT SERPL-MCNC: 1.65 MG/DL (ref 0.7–1.2)
DIFFERENTIAL TYPE: ABNORMAL
DIFFERENTIAL TYPE: ABNORMAL
EOSINOPHILS RELATIVE PERCENT: 2 % (ref 1–4)
EOSINOPHILS RELATIVE PERCENT: 2 % (ref 1–4)
GFR AFRICAN AMERICAN: 51 ML/MIN
GFR AFRICAN AMERICAN: 59 ML/MIN
GFR NON-AFRICAN AMERICAN: 42 ML/MIN
GFR NON-AFRICAN AMERICAN: 49 ML/MIN
GFR SERPL CREATININE-BSD FRML MDRD: ABNORMAL ML/MIN/{1.73_M2}
GLUCOSE BLD-MCNC: 106 MG/DL (ref 75–110)
GLUCOSE BLD-MCNC: 107 MG/DL (ref 70–99)
GLUCOSE BLD-MCNC: 130 MG/DL (ref 75–110)
GLUCOSE BLD-MCNC: 94 MG/DL (ref 70–99)
HCT VFR BLD CALC: 29.1 % (ref 40.7–50.3)
HCT VFR BLD CALC: 30 % (ref 40.7–50.3)
HEMOGLOBIN: 10.2 G/DL (ref 13–17)
HEMOGLOBIN: 9.9 G/DL (ref 13–17)
IMMATURE GRANULOCYTES: 0 %
IMMATURE GRANULOCYTES: 1 %
LYMPHOCYTES # BLD: 22 % (ref 24–43)
LYMPHOCYTES # BLD: 29 % (ref 24–43)
MAGNESIUM: 1 MG/DL (ref 1.6–2.6)
MCH RBC QN AUTO: 35.2 PG (ref 25.2–33.5)
MCH RBC QN AUTO: 35.3 PG (ref 25.2–33.5)
MCHC RBC AUTO-ENTMCNC: 34 G/DL (ref 28.4–34.8)
MCHC RBC AUTO-ENTMCNC: 34 G/DL (ref 28.4–34.8)
MCV RBC AUTO: 103.6 FL (ref 82.6–102.9)
MCV RBC AUTO: 103.8 FL (ref 82.6–102.9)
MONOCYTES # BLD: 10 % (ref 3–12)
MONOCYTES # BLD: 8 % (ref 3–12)
NRBC AUTOMATED: 0 PER 100 WBC
NRBC AUTOMATED: 0 PER 100 WBC
PDW BLD-RTO: 12.3 % (ref 11.8–14.4)
PDW BLD-RTO: 12.4 % (ref 11.8–14.4)
PHOSPHORUS: 3.4 MG/DL (ref 2.5–4.5)
PLATELET # BLD: 132 K/UL (ref 138–453)
PLATELET # BLD: 147 K/UL (ref 138–453)
PLATELET ESTIMATE: ABNORMAL
PLATELET ESTIMATE: ABNORMAL
PMV BLD AUTO: 9.3 FL (ref 8.1–13.5)
PMV BLD AUTO: 9.4 FL (ref 8.1–13.5)
POTASSIUM SERPL-SCNC: 3.8 MMOL/L (ref 3.7–5.3)
POTASSIUM SERPL-SCNC: 4.1 MMOL/L (ref 3.7–5.3)
RBC # BLD: 2.81 M/UL (ref 4.21–5.77)
RBC # BLD: 2.89 M/UL (ref 4.21–5.77)
RBC # BLD: ABNORMAL 10*6/UL
RBC # BLD: ABNORMAL 10*6/UL
SEG NEUTROPHILS: 60 % (ref 36–65)
SEG NEUTROPHILS: 64 % (ref 36–65)
SEGMENTED NEUTROPHILS ABSOLUTE COUNT: 3.8 K/UL (ref 1.5–8.1)
SEGMENTED NEUTROPHILS ABSOLUTE COUNT: 3.98 K/UL (ref 1.5–8.1)
SODIUM BLD-SCNC: 135 MMOL/L (ref 135–144)
SODIUM BLD-SCNC: 136 MMOL/L (ref 135–144)
SURGICAL PATHOLOGY REPORT: NORMAL
WBC # BLD: 6.1 K/UL (ref 3.5–11.3)
WBC # BLD: 6.3 K/UL (ref 3.5–11.3)
WBC # BLD: ABNORMAL 10*3/UL
WBC # BLD: ABNORMAL 10*3/UL

## 2021-09-23 PROCEDURE — 6370000000 HC RX 637 (ALT 250 FOR IP): Performed by: HOSPITALIST

## 2021-09-23 PROCEDURE — 85025 COMPLETE CBC W/AUTO DIFF WBC: CPT

## 2021-09-23 PROCEDURE — 80048 BASIC METABOLIC PNL TOTAL CA: CPT

## 2021-09-23 PROCEDURE — 2580000003 HC RX 258: Performed by: SURGERY

## 2021-09-23 PROCEDURE — 82947 ASSAY GLUCOSE BLOOD QUANT: CPT

## 2021-09-23 PROCEDURE — 99232 SBSQ HOSP IP/OBS MODERATE 35: CPT | Performed by: PSYCHIATRY & NEUROLOGY

## 2021-09-23 PROCEDURE — 36415 COLL VENOUS BLD VENIPUNCTURE: CPT

## 2021-09-23 PROCEDURE — 83735 ASSAY OF MAGNESIUM: CPT

## 2021-09-23 PROCEDURE — 6360000002 HC RX W HCPCS: Performed by: SURGERY

## 2021-09-23 PROCEDURE — 6370000000 HC RX 637 (ALT 250 FOR IP): Performed by: SURGERY

## 2021-09-23 PROCEDURE — 84100 ASSAY OF PHOSPHORUS: CPT

## 2021-09-23 RX ORDER — CLOPIDOGREL BISULFATE 75 MG/1
75 TABLET ORAL DAILY
Qty: 21 TABLET | Refills: 0 | Status: SHIPPED | OUTPATIENT
Start: 2021-09-23 | End: 2021-10-14

## 2021-09-23 RX ORDER — HYDROCODONE BITARTRATE AND ACETAMINOPHEN 5; 325 MG/1; MG/1
1 TABLET ORAL EVERY 4 HOURS PRN
Qty: 15 TABLET | Refills: 0 | Status: SHIPPED | OUTPATIENT
Start: 2021-09-23 | End: 2021-09-26

## 2021-09-23 RX ADMIN — LOSARTAN POTASSIUM 100 MG: 100 TABLET, FILM COATED ORAL at 08:01

## 2021-09-23 RX ADMIN — ASPIRIN 81 MG: 81 TABLET, COATED ORAL at 08:01

## 2021-09-23 RX ADMIN — SODIUM CHLORIDE, PRESERVATIVE FREE 10 ML: 5 INJECTION INTRAVENOUS at 08:04

## 2021-09-23 RX ADMIN — AMLODIPINE BESYLATE 5 MG: 5 TABLET ORAL at 08:01

## 2021-09-23 RX ADMIN — HYDROCODONE BITARTRATE AND ACETAMINOPHEN 1 TABLET: 5; 325 TABLET ORAL at 14:03

## 2021-09-23 RX ADMIN — HYDROCODONE BITARTRATE AND ACETAMINOPHEN 1 TABLET: 5; 325 TABLET ORAL at 04:59

## 2021-09-23 RX ADMIN — PAROXETINE HYDROCHLORIDE 40 MG: 20 TABLET, FILM COATED ORAL at 08:01

## 2021-09-23 RX ADMIN — ATENOLOL 50 MG: 50 TABLET ORAL at 08:01

## 2021-09-23 RX ADMIN — ENOXAPARIN SODIUM 40 MG: 40 INJECTION SUBCUTANEOUS at 08:01

## 2021-09-23 RX ADMIN — HYDROCODONE BITARTRATE AND ACETAMINOPHEN 1 TABLET: 5; 325 TABLET ORAL at 00:55

## 2021-09-23 RX ADMIN — HYDROCODONE BITARTRATE AND ACETAMINOPHEN 1 TABLET: 5; 325 TABLET ORAL at 09:14

## 2021-09-23 RX ADMIN — METFORMIN HYDROCHLORIDE 500 MG: 500 TABLET ORAL at 08:01

## 2021-09-23 ASSESSMENT — PAIN DESCRIPTION - PAIN TYPE
TYPE: SURGICAL PAIN

## 2021-09-23 ASSESSMENT — PAIN SCALES - GENERAL
PAINLEVEL_OUTOF10: 0
PAINLEVEL_OUTOF10: 6
PAINLEVEL_OUTOF10: 8
PAINLEVEL_OUTOF10: 0
PAINLEVEL_OUTOF10: 8
PAINLEVEL_OUTOF10: 8
PAINLEVEL_OUTOF10: 3

## 2021-09-23 ASSESSMENT — PAIN DESCRIPTION - LOCATION
LOCATION: NECK

## 2021-09-23 ASSESSMENT — PAIN DESCRIPTION - ORIENTATION
ORIENTATION: LEFT

## 2021-09-23 ASSESSMENT — PAIN DESCRIPTION - FREQUENCY: FREQUENCY: CONTINUOUS

## 2021-09-23 ASSESSMENT — PAIN DESCRIPTION - DESCRIPTORS: DESCRIPTORS: DISCOMFORT

## 2021-09-23 ASSESSMENT — PAIN DESCRIPTION - ONSET: ONSET: ON-GOING

## 2021-09-23 NOTE — FLOWSHEET NOTE
09/23/21 1428   Provider Notification   Reason for Communication Review case   Provider Name Dr. Delbert Carvajal    Provider Notification Physician   Method of Communication Face to face   Response Other (Comment)  (okay for pt to be discharge , hold plavix )   Notification Time [phone]

## 2021-09-23 NOTE — FLOWSHEET NOTE
Patient is awake and alert as he sits on the side of the bed. Patient is dresses in street clothes and states that he is being discharged. Patient is waiting for his cab. Patient states that he has family support from some friends and denies any spiritual or emotional concerns. Writer provides supportive conversation and listening presence. Patient expresses appreciation for the visit. Spiritual Care is available for further needs.        09/23/21 1830   Encounter Summary   Services provided to: Patient   Referral/Consult From: [facility]   Support System Friends/neighbors   Continue Visiting   (9/23/21)   Complexity of Encounter Low   Length of Encounter 15 minutes   Routine   Type Initial   Assessment Approachable   Intervention Active listening;Explored coping resources;Nurtured hope   Outcome Expressed gratitude

## 2021-09-23 NOTE — CONSULTS
Section of Cardiology   Consult Note      Reason for Consult: Pre and postop cardiac evaluation  Requesting Physician: Klaudia Dominguez MD    CHIEF COMPLAINT: Left-sided weakness    History Obtained From:  patient, electronic medical record, patient nurses    HISTORY OF PRESENT ILLNESS:      The patient is a 73 y.o. male with significant past medical history of CVA, carotid disease who presents with recurrent weakness in his arm. Initially carotid scan showed probable dissection which was not confirmed by the CAT scan. Patient was assessed by vascular surgery who recommended urgent surgery. Initially we were consulted to see the patient preoperatively however when I came to see the patient was already taken to the OR. Patient had an eventful surgery. The patient is complaining of pain in the incisional site however he is vital signs remained normal.  He denies chest pain. No shortness of breath. The patient is known to be heavy alcohol user and smoker. Never been seen by cardiologist according to him. Patient denies PND orthopnea. No edema. No abdominal pain. Denies any nausea or vomiting. He does not report any syncopal attacks. No fever chills or cough. Previous diagnostic testing for coronary artery disease includes: echocardiogram.   Previous history of cardiac disease includes: none. Coronary artery disease risk factors include: advanced age (older than 54 for men, 72 for women), male gender, sedentary lifestyle and smoking/ tobacco exposure.     Past Medical History:    Past Medical History:   Diagnosis Date    Diabetes mellitus ([address]     Hypertension      Past Surgical History:    Past Surgical History:   Procedure Laterality Date    CAROTID ENDARTERECTOMY  09/22/2021    CAROTID ENDARTERECTOMY (N/A Neck    CAROTID ENDARTERECTOMY N/A 9/22/2021    CAROTID ENDARTERECTOMY performed by Sandie Arroyo MD at [address] [place] [address] Medications:  Prior to Admission medications Medication Sig Start Date End Date Taking?  Authorizing Provider   aspirin 81 MG chewable tablet Take 1 tablet by mouth daily 2/26/21  Yes Hasmukh Briones MD   metFORMIN (GLUCOPHAGE) 500 MG tablet Take 1 tablet by mouth 2 times daily (with meals) 2/26/21  Yes Hasmukh Briones MD   atorvastatin (LIPITOR) 40 MG tablet Take 1 tablet by mouth nightly 2/26/21  Yes Hasmukh Briones MD   amLODIPine (NORVASC) 5 MG tablet Take 1 tablet by mouth daily 2/27/21  Yes Hasmukh Briones MD   PARoxetine (PAXIL) 40 MG tablet Take 40 mg by mouth daily   Yes Historical Provider, MD   atenolol (TENORMIN) 50 MG tablet Take 50 mg by mouth daily   Yes Historical Provider, MD   losartan (COZAAR) 100 MG tablet Take 100 mg by mouth daily   Yes Historical Provider, MD     Current Medications:    Current Facility-Administered Medications   Medication Dose Route Frequency Provider Last Rate Last Admin    lactated ringers infusion   IntraVENous Continuous Marval Heimlich,  mL/hr at 09/22/21 1513 New Bag at 09/22/21 1513    sodium chloride flush 0.9 % injection 5-40 mL  5-40 mL IntraVENous 2 times per day Marval Heimlich, MD   10 mL at 09/23/21 0804    sodium chloride flush 0.9 % injection 5-40 mL  5-40 mL IntraVENous PRN Marval Heimlich, MD        0.9 % sodium chloride infusion  25 mL IntraVENous PRN Marval Heimlich, MD        enoxaparin (LOVENOX) injection 40 mg  40 mg SubCUTAneous Daily Marval Heimlich, MD   40 mg at 09/23/21 0801    morphine (PF) injection 2 mg  2 mg IntraVENous Q2H PRN Marval Heimlich, MD        Or    morphine sulfate (PF) injection 4 mg  4 mg IntraVENous Q2H PRN Marval Heimlich, MD        aspirin EC tablet 81 mg  81 mg Oral Daily Marval Heimlich, MD   81 mg at 09/23/21 0801    HYDROcodone-acetaminophen (NORCO) 5-325 MG per tablet 1 tablet  1 tablet Oral Q4H PRN Hasmukh Briones MD   1 tablet at 09/23/21 0459    acetaminophen (TYLENOL) tablet 650 mg  650 mg Oral Q4H PRN Marval Heimlich, MD   650 mg at 09/22/21 1506    ondansetron (ZOFRAN) injection 4 mg  4 mg IntraVENous Q6H PRN Ailyn Corona MD        atenolol (TENORMIN) tablet 50 mg  50 mg Oral Daily Ailyn Corona MD   50 mg at 09/23/21 0801    losartan (COZAAR) tablet 100 mg  100 mg Oral Daily Ailyn Corona MD   100 mg at 09/23/21 0801    amLODIPine (NORVASC) tablet 5 mg  5 mg Oral Daily Ailyn Corona MD   5 mg at 09/23/21 0801    hydrALAZINE (APRESOLINE) injection 10 mg  10 mg IntraVENous Q6H PRN Ailyn Corona MD   10 mg at 09/22/21 0415    atorvastatin (LIPITOR) tablet 40 mg  40 mg Oral Nightly Ailyn Corona MD   40 mg at 09/22/21 2050    metFORMIN (GLUCOPHAGE) tablet 500 mg  500 mg Oral BID  Ailyn Corona MD   500 mg at 09/23/21 0801    PARoxetine (PAXIL) tablet 40 mg  40 mg Oral Daily Ailyn Corona MD   40 mg at 09/23/21 0801    insulin lispro (HUMALOG) injection vial 0-18 Units  0-18 Units SubCUTAneous TID  Ailyn Corona MD   3 Units at 09/21/21 1824    insulin lispro (HUMALOG) injection vial 0-9 Units  0-9 Units SubCUTAneous Nightly Ailyn Corona MD   2 Units at 09/22/21 2050    glucose (GLUTOSE) 40 % oral gel 15 g  15 g Oral PRN Ailyn Corona MD        dextrose 50 % IV solution  12.5 g IntraVENous PRN Ailyn Corona MD        glucagon (rDNA) injection 1 mg  1 mg IntraMUSCular PRN Ailyn Corona MD        dextrose 5 % solution  100 mL/hr IntraVENous PRN Ailyn Corona MD        [facility] by provider] clopidogrel (PLAVIX) tablet 75 mg  75 mg Oral Daily Ani Edwards MD         Allergies:  Patient has no known allergies. Social History:    Social History     Socioeconomic History    Marital status:       Spouse name: Not on file    Number of children: Not on file    Years of education: Not on file    Highest education level: Not on file   Occupational History    Not on file   Tobacco Use    Smoking status: Current Every Day Smoker     Packs/day: 1.50     Start date: 6/1/1973   [facility] Smokeless tobacco: Never Used   Substance and Sexual Activity    Alcohol use: Yes     Alcohol/week: 4.0 standard drinks     Types: 4 Cans of beer per week     Comment: Occassional, four times weekly    Drug use: No    Sexual activity: Yes     Partners: Female   Other Topics Concern    Not on file   Social History Narrative    Not on file     Social Determinants of Health     Financial Resource Strain:     Difficulty of Paying Living Expenses:    Food Insecurity:     Worried About Running Out of Food in the Last Year:     [address] in the Last Year:    Transportation Needs:     Lack of Transportation (Medical):  Lack of Transportation (Non-Medical):    Physical Activity:     Days of Exercise per Week:     Minutes of Exercise per Session:    Stress:     Feeling of Stress :    Social Connections:     Frequency of Communication with Friends and Family:     Frequency of Social Gatherings with Friends and Family:     Attends [employer] Services:     Active Member of Clubs or Organizations:     Attends Club or Organization Meetings:     Marital Status:    Intimate Partner Violence:     Fear of Current or Ex-Partner:     Emotionally Abused:     Physically Abused:     Sexually Abused:      Family History:   History reviewed. No pertinent family history.     · REVIEW OF SYSTEMS   Patient is a poor historian, he wants to go home    PHYSICAL EXAM:    Vitals:    VITALS:  /77   Pulse 75   Temp 97.3 °F (36.3 °C) (Oral)   Resp 16   Ht 5' 6\" (1.676 m)   Wt 156 lb (70.8 kg)   SpO2 97%   BMI 25.18 kg/m²   24HR INTAKE/OUTPUT:      Intake/Output Summary (Last 24 hours) at 9/23/2021 9184  Last data filed at 9/23/2021 0804  Gross per 24 hour   Intake 1430 ml   Output 80 ml   Net 1350 ml       CONSTITUTIONAL:  awake, alert, cooperative, no apparent distress, and appears stated age  EYES: Pupils equal, round and reactive to light, extra ocular muscles intact, sclera clear, conjunctiva normal  ENT: normocepalic, without obvious abnormality  NECK:  supple, symmetrical, trachea midline, no carotid bruit ,   No  JVD  BACK:  symmetric  LUNGS: Non-labored, good air exchange, clear to auscultation bilaterally, no crackles or wheezing  CARDIOVASCULAR:  Normal apical impulse, regular rate and rhythm, normal S1 and S2, no S3 or S4, and no murmur noted, no rub.  radial and bilateralpresent 2+  ABDOMEN:  No scars, normal bowel sounds, soft, non-distended, non-tender, no masses palpated, no hepatosplenomegally, no bruit. MUSCULOSKELETAL:  there is no redness, warmth, or swelling of the joints   No leg edema. NEUROLOGIC:  Awake, alert, oriented to name, place and time. SKIN:  no bruising or bleeding, normal skin color, texture, turgor and no jaundice    DATA:   ECG: Admission EKG shows sinus rhythm without acute ST-T changes.   ECHO: Date:  Was canceled from this admission  Echocardiogram from 7 months ago showed normal ejection fraction, no valvular disease and no shunt  Stress Test:  Not performed to date  Angiography:  Not performed to date    Cardiology Labs:  Recent Labs     09/20/21  2100 09/20/21 2226   MYOGLOBIN 56  --    CKTOTAL 68  --    TROPONINT NOT REPORTED NOT REPORTED     Warfarin PT/INR:  Lab Results   Component Value Date    PROTIME 13.7 09/22/2021    INR 1.1 09/22/2021     CBC:  Lab Results   Component Value Date    WBC 6.1 09/23/2021    RBC 2.81 09/23/2021    HGB 9.9 09/23/2021    HCT 29.1 09/23/2021    .6 09/23/2021    MCH 35.2 09/23/2021    MCHC 34.0 09/23/2021    RDW 12.4 09/23/2021     09/23/2021    MPV 9.4 09/23/2021     CMP:  Lab Results   Component Value Date     09/23/2021    K 4.1 09/23/2021     09/23/2021    CO2 25 09/23/2021    BUN 12 09/23/2021    CREATININE 1.45 09/23/2021    GFRAA 59 09/23/2021    LABGLOM 49 09/23/2021    GLUCOSE 107 09/23/2021    CALCIUM 7.5 09/23/2021     Magnesium:    Lab Results   Component Value Date    MG 1.0 09/23/2021     PTT:    Lab

## 2021-09-23 NOTE — PROGRESS NOTES
All discharge instructions given at this time as well as all patient belongings returned to patient. Pt denies any further questions regarding discharge at this time. Pt also given discharge packet with unit letter, discharge instructions/restrictions and medication handouts regarding all discharge medications and side effects. Pt denies any further issues at this time. Pt wheeled out to front discharge doors at this time. Pt left premises without any issues in private vehicle at this time.

## 2021-09-23 NOTE — DISCHARGE SUMMARY
[facility],    Adult Hospitalist      Patient ID: Killian Hopkins  MRN: [id number]     Acct:  [de-identified]       Patient's PCP: DENNIS Cedeno    Admit Date: 9/20/2021     Discharge Date:   09/23/2021    Admitting Physician: Abeba Lyons MD    Discharge Physician: Celia Lamb MD     CONSULTANTS: Patient Care Team:  DENNIS Berg as PCP - General (Physician Assistant)    PROCEDURES PERFORMED:     Active Discharge Diagnoses:  Suspected left carotid artery dissection/plaque  History of left occipital CVA February 2021, with residual right-sided weakness  Acute on chronic alcoholism  Progressive right-sided weakness  Essential hypertension  Diabetes mellitus type 2  Mixed hyperlipidemia  Depression with anxiety  Tobacco abuse         Primary Problem  <principal problem not specified>    Hospital Course:     A 78-year-old gentleman has been admitted via emergency room, patient came to the ER with a complaint of having fatigue and right-sided weakness, patient have history of left-sided occipital stroke in the past, further patient has some minimal residual right-sided deficits that all happened in February, patient noticed that patient has right-sided deficits which have worsened, for past 1 month, patient also been having back pain, further also been drinking at home, patient initial testing in the emergency room included CT head which is negative for acute medical abnormalities, further CT head and neck showed short segment dissection flap of the origin of left cervical ICA measuring 1 cm,Admitted for further management    Patient has had left-sided carotid endarterectomy. Postoperatively patient was doing well. Vascular cleared the patient to resume Plavix and aspirin. MRI brain was negative. Neurology evaluated the patient. They recommended to continue aspirin Plavix for 3 weeks and then do monotherapy of aspirin.       At the time of discharge patient Was hemodynamically stable and asymptomatic. The plan was discussed in detail with patient who agreed with the plan and verbalized understanding . The patient was seen and examined on day of discharge and this discharge summary is in conjunction with any daily progress note from day of discharge. Hospital Data:    Labs:    Hematology:  Recent Labs     09/20/21 2100 09/20/21 2100 09/22/21  0614 09/22/21  1605 09/23/21  0430 09/23/21  0726   WBC 6.1   < >  --  7.0 6.3 6.1   RBC 3.78*   < >  --  3.08* 2.89* 2.81*   HGB 13.3   < >  --  10.6* 10.2* 9.9*   HCT 37.7*   < >  --  31.5* 30.0* 29.1*   MCV 99.7   < >  --  102.3 103.8* 103.6*   MCH 35.2*   < >  --  34.4* 35.3* 35.2*   MCHC 35.3*   < >  --  33.7 34.0 34.0   RDW 12.4   < >  --  12.5 12.3 12.4      < >  --  165 147 132*   MPV 8.7   < >  --  9.2 9.3 9.4   INR 1.0  --  1.1  --   --   --     < > = values in this interval not displayed. Chemistry:  Recent Labs     09/20/21 2100 09/20/21 2100 09/20/21  2140 09/20/21 2226 09/22/21  1605 09/23/21  0430 09/23/21  0726   *   < >  --   --  134* 136 135   K 4.3   < >  --   --  4.5 3.8 4.1   CL 96*   < >  --   --  99 101 101   CO2 24   < >  --   --  23 23 25   GLUCOSE 98   < >   < >  --  150* 94 107*   BUN 8   < >  --   --  11 13 12   CREATININE 0.96   < >  --   --  1.68* 1.65* 1.45*   MG 1.3*  --   --   --   --  1.0*  --    ANIONGAP 14   < >  --   --  12 12 9   LABGLOM >60   < >  --   --  41* 42* 49*   GFRAA >60   < >  --   --  50* 51* 59*   CALCIUM 9.0   < >  --   --  7.8* 7.8* 7.5*   PHOS  --   --   --   --   --  3.4  --    TROPHS 19  --   --  18  --   --   --    CKTOTAL 68  --   --   --   --   --   --    MYOGLOBIN 56  --   --   --   --   --   --     < > = values in this interval not displayed.      Recent Labs     09/21/21  0730 09/21/21  1818 09/21/21  2108 09/22/21  0716 09/22/21  1133 09/22/21  1646 09/22/21  1914 09/23/21  0812   LABA1C 5.7  --   --   --   --   --   --   --    POCGLU  -- < > 105 143* 131* 138* 192* 106    < > = values in this interval not displayed. Lab Results   Component Value Date    INR 1.1 09/22/2021    INR 1.0 09/20/2021    INR 1.0 02/26/2021    PROTIME 13.7 09/22/2021    PROTIME 12.7 09/20/2021    PROTIME 12.8 02/26/2021     Lab Results   Component Value Date/Time    SPECIAL NOT REPORTED 02/26/2021 09:30 AM     Lab Results   Component Value Date/Time    CULTURE NO GROWTH 3 DAYS 02/26/2021 09:30 AM       No results found for: POCPH, PHART, PH, POCPCO2, LUZ2HVT, PCO2, POCPO2, PO2ART, PO2, POCHCO3, LBE2POL, HCO3, NBEA, PBEA, BEART, BE, THGBART, THB, SYA3NIY, XJSJ4UVA, S2VVUCDG, O2SAT, FIO2    Radiology:    CT HEAD WO CONTRAST    Result Date: 9/20/2021  1. No acute intracranial abnormality. 2. Old left occipital lobe infarct. 3. Thick atherosclerotic calcified plaque is noted at the bilateral carotid bulbs and extending into the origin of the bilateral cervical ICAs. 4. There is a short segment of high-grade stenosis at the origin of the right cervical ICA measuring about 70%. 5. There is a short segment dissection flap at the origin of the left cervical ICA measuring about 1 cm length. XR CHEST PORTABLE    Result Date: 9/20/2021  No acute process. CTA HEAD NECK W CONTRAST    Result Date: 9/20/2021  1. No acute intracranial abnormality. 2. Old left occipital lobe infarct. 3. Thick atherosclerotic calcified plaque is noted at the bilateral carotid bulbs and extending into the origin of the bilateral cervical ICAs. 4. There is a short segment of high-grade stenosis at the origin of the right cervical ICA measuring about 70%. 5. There is a short segment dissection flap at the origin of the left cervical ICA measuring about 1 cm length. MRI BRAIN WO CONTRAST    Result Date: 9/21/2021  1. No acute intracranial abnormality. 2. Chronic infarction in the left parieto-occipital region.          All radiological studies reviewed      Reviews of Symptoms:    A 10 point system is reviewed and  negative except described in hospital course    Physical Exam:    Vitals:  /77   Pulse 75   Temp 97.3 °F (36.3 °C) (Oral)   Resp 16   Ht 5' 6\" (1.676 m)   Wt 156 lb (70.8 kg)   SpO2 97%   BMI 25.18 kg/m²   Temp (24hrs), Av.5 °F (36.9 °C), Min:97.3 °F (36.3 °C), Max:99.5 °F (37.5 °C)      General appearance - alert, well appearing, and in no acute distress  Mental status - oriented to person, place, and time with normal affect  Head - normocephalic and atraumatic  Eyes - pupils equal and reactive, extraocular eye movements intact, conjunctiva clear  Ears - hearing appears to be intact  Nose - no drainage noted  Mouth - mucous membranes moist  Neck - supple, no carotid bruits, thyroid not palpable  Chest - clear to auscultation, normal effort  Heart - normal rate, regular rhythm, no murmur  Abdomen - soft, nontender, nondistended, bowel sounds present all four quadrants, no masses, hepatomegaly or splenomegaly  Neurological - normal speech, no focal findings or movement disorder noted, cranial nerves II through XII grossly intact  Extremities - peripheral pulses palpable, no pedal edema or calf pain with palpation  Skin - no gross lesions, rashes, or induration noted      Consults:  IP CONSULT TO HOSPITALIST  IP CONSULT TO VASCULAR SURGERY  IP CONSULT TO NEUROLOGY  IP CONSULT TO CARDIOLOGY    Disposition:Home    Discharged Condition: Stable    Follow Up: No follow-up provider specified.     Lab Frequency Next Occurrence   MRI BRAIN W WO CONTRAST Once 2021   CBC EVERY OTHER DAY    Up with assistance PRN    POCT glucose 4 TIMES DAILY BEFORE MEALS & AT BEDTIME    POCT glucose - If patient is NPO, TPN, or continuous tube feed and on HumaLOG NOW THEN EVERY 4 HOURS    HYPOGLYCEMIA TREATMENT: blood glucose less than 50 mg/dL and patient  ALERT and TOLERATING PO PRN    HYPOGLYCEMIA TREATMENT: blood glucose less than 70 mg/dL and patient ALERT and TOLERATING PO PRN    HYPOGLYCEMIA TREATMENT: blood glucose less than 70 mg/dL and patient NOT ALERT or NPO PRN    POCT Glucose PRN    Intake and output EVERY 8 HOURS    Initiate Oxygen Therapy Protocol DAILY (RT)    Incentive spirometry EVERY 2 HOURS WHILE AWAKE    CBC Auto Differential DAILY    Basic Metabolic Panel DAILY          Diet: ADULT DIET; Regular    Discharge Medications:    Osei Juarez   Home Medication Instructions [id number]    Printed on:09/23/21 1038   Medication Information                      amLODIPine (NORVASC) 5 MG tablet  Take 1 tablet by mouth daily             aspirin 81 MG chewable tablet  Take 1 tablet by mouth daily             atenolol (TENORMIN) 50 MG tablet  Take 50 mg by mouth daily             atorvastatin (LIPITOR) 40 MG tablet  Take 1 tablet by mouth nightly             HYDROcodone-acetaminophen (NORCO) 5-325 MG per tablet  Take 1 tablet by mouth every 4 hours as needed for Pain for up to 3 days. losartan (COZAAR) 100 MG tablet  Take 100 mg by mouth daily             metFORMIN (GLUCOPHAGE) 500 MG tablet  Take 1 tablet by mouth 2 times daily (with meals)             PARoxetine (PAXIL) 40 MG tablet  Take 40 mg by mouth daily                 Code Status:  Full Code    Time Spent on discharge is  35 mins in patient examination, evaluation, counseling as well as medication reconciliation, prescriptions for required medications, discharge plan and follow up. Electronically signed by Ana Pascal MD on 9/23/2021 at 10:38 AM     Thank you DENNIS Orellana for the opportunity to be involved in this patient's care. This note was created with the assistance of a speech-recognition program.  Although the intention is to generate a document that actually reflects the content of the visit, no guarantees can be provided that every mistake has been identified and corrected by editing. Note was updated later by me after  physical examination and  completion of the assessment.

## 2021-09-23 NOTE — PLAN OF CARE
Problem: Falls - Risk of:  Goal: Will remain free from falls  Description: Will remain free from falls  9/23/2021 0040 by Grace De La Torre RN  Outcome: Met This Shift    Goal: Absence of physical injury  Description: Absence of physical injury  Outcome: Met This Shift     Problem: HEMODYNAMIC STATUS  Goal: Patient has stable vital signs and fluid balance  9/23/2021 0040 by Grace De La Torre RN  Outcome: Ongoing    Problem: ACTIVITY INTOLERANCE/IMPAIRED MOBILITY  Goal: Mobility/activity is maintained at optimum level for patient  Outcome: Ongoing     Problem: COMMUNICATION IMPAIRMENT  Goal: Ability to express needs and understand communication  Outcome: Ongoing     Problem: Pain:  Goal: Pain level will decrease  Description: Pain level will decrease  9/23/2021 0040 by Grace De La Torre RN  Outcome: Ongoing  9/22/2021 1830 by Hayes Patrick RN  Outcome: Ongoing  Goal: Control of acute pain  Description: Control of acute pain  Outcome: Ongoing  Goal: Control of chronic pain  Description: Control of chronic pain  Outcome: Ongoing

## 2021-09-23 NOTE — FLOWSHEET NOTE
09/23/21 1337   Provider Notification   Reason for Communication Evaluate   Provider Name [id number]   Provider Notification Physician   Method of Communication Secure Message   Response Other (Comment)  (okay to be discharged . )   Notification Time [phone]

## 2021-09-23 NOTE — PROGRESS NOTES
VASCULAR SURGERY  PROGRESS NOTE  POST-OP CAROTID ENDARTERECTOMY    9/23/2021  2:16 PM     Francesca Vogel    1955   [id number]        SUBJECTIVE:  Patient awake and alert. No complaints. Good pain control. Denies nausea. Pre-operative right arm weakness has improved. OBJECTIVE    Physical  VITALS:  /70   Pulse 78   Temp 97.6 °F (36.4 °C) (Temporal)   Resp 16   Ht 5' 6\" (1.676 m)   Wt 156 lb (70.8 kg)   SpO2 97%   BMI 25.18 kg/m²     CONSTITUTIONAL:  awake, alert, cooperative, no apparent distress and appears stated age  NECK: Incision clean and dry with no unexpected swelling. NEUROLOGIC:  Mental status unchanged. Motor and sensory function intact. Tongue midline. Data  Hemoglobin   Date/Time Value Ref Range Status   09/23/2021 07:26 AM 9.9 (L) 13.0 - 17.0 g/dL Final     Hematocrit   Date/Time Value Ref Range Status   09/23/2021 07:26 AM 29.1 (L) 40.7 - 50.3 % Final     Sodium   Date/Time Value Ref Range Status   09/23/2021 07:26  135 - 144 mmol/L Final     Potassium   Date/Time Value Ref Range Status   09/23/2021 07:26 AM 4.1 3.7 - 5.3 mmol/L Final     Chloride   Date/Time Value Ref Range Status   09/23/2021 07:26  98 - 107 mmol/L Final     CO2   Date/Time Value Ref Range Status   09/23/2021 07:26 AM 25 20 - 31 mmol/L Final     BUN   Date/Time Value Ref Range Status   09/23/2021 07:26 AM 12 8 - 23 mg/dL Final       ASSESSMENT AND PLAN    73 y.o. male doing well status post Left Carotid Endarterectomy       Plan home today. Follow-up in Office. Continue Plavix and aspirin. Will need right carotid endarterectomy in 4 to 6 weeks.     Electronically signed by Lucita Llamas MD on 9/23/2021 at 2:16 PM

## 2021-09-23 NOTE — PROGRESS NOTES
NEUROLOGY INPATIENT PROGRESS NOTE    9/23/2021         Pito Lisa is a  73 y.o. male admitted on 9/20/2021 with  Right sided weakness [N64.1]  Acute alcoholic intoxication without complication ([facility] [address] [R49.224]      Briefly, this is a  73 y.o. male with hx of htn, dm and prior Lt occipital cva was admitted on 9/20/2021 with c/o generalized fatigue with right-sided weakness. He has baseline residual right-sided deficits from prior left occipital stroke in February and that weakness has worsened over the past 1 month. He also has chronic back pain. Home meds include aspirin 81 mg daily, atorvastatin 40 mg nightly. Vitals on admit: 164/104, 92/min, 16/min, 98.6 °F.   CT head on admit is negative for acute intracranial abnormalities. CTA head and neck showed a short segment dissection flap at the origin of left cervical ICA measuring 1 cm. Stroke team consulted; advised heparin, Plavix and aspirin and to reevaluate when patient is sober as his ethanol level markedly elevated at 343 on admit. Presently patient is more alert and awake and oriented x3. Following commands well. He also stated that he had recent falls from increased right-sided weakness. Yesterday he was working at [employer] and he fell down with right-sided weakness, making him to seek medical care. .  Denied LOC. He was ambulating independently prior to this hospitalization. Has been compliant with medications. Patient also admits to having chronic back pain but presently denies radiating pain and paresthesias down the extremities. Denies bladder dysfunction. 9/22/21: he had Left CEA today and he tolerated it well. He offers no new complaints. He still has ongoing rt sided weakness and it is unchanged. 9/23/21: wants to go home stating \"I am doing alright\". He offers no new complaints. No current facility-administered medications on file prior to encounter.      Current Outpatient Medications on File Prior to Encounter Medication Sig Dispense Refill    aspirin 81 MG chewable tablet Take 1 tablet by mouth daily 30 tablet 3    metFORMIN (GLUCOPHAGE) 500 MG tablet Take 1 tablet by mouth 2 times daily (with meals) 60 tablet 3    atorvastatin (LIPITOR) 40 MG tablet Take 1 tablet by mouth nightly 30 tablet 3    amLODIPine (NORVASC) 5 MG tablet Take 1 tablet by mouth daily 30 tablet 3    PARoxetine (PAXIL) 40 MG tablet Take 40 mg by mouth daily      atenolol (TENORMIN) 50 MG tablet Take 50 mg by mouth daily      losartan (COZAAR) 100 MG tablet Take 100 mg by mouth daily       Allergies: Agnelina Harrell has No Known Allergies. Past Medical History:   Diagnosis Date    Diabetes mellitus ([facility] [address]     Hypertension        Past Surgical History:   Procedure Laterality Date    CAROTID ENDARTERECTOMY  09/22/2021    CAROTID ENDARTERECTOMY (N/A Neck    CAROTID ENDARTERECTOMY N/A 9/22/2021    CAROTID ENDARTERECTOMY performed by Emiliano Delgado MD at [place] [address]       Social History: Angelina Harrell  reports that he has been smoking. He started smoking about 48 years ago. He has been smoking about 1.50 packs per day. He has never used smokeless tobacco. He reports current alcohol use of about 4.0 standard drinks of alcohol per week. He reports that he does not use drugs. History reviewed. No pertinent family history.     Current Medications:     sodium chloride flush  5-40 mL IntraVENous 2 times per day    enoxaparin  40 mg SubCUTAneous Daily    aspirin  81 mg Oral Daily    atenolol  50 mg Oral Daily    losartan  100 mg Oral Daily    amLODIPine  5 mg Oral Daily    atorvastatin  40 mg Oral Nightly    metFORMIN  500 mg Oral BID WC    PARoxetine  40 mg Oral Daily    insulin lispro  0-18 Units SubCUTAneous TID WC    insulin lispro  0-9 Units SubCUTAneous Nightly    [Held by provider] clopidogrel  75 mg Oral Daily     PRN Meds include: sodium chloride flush, sodium chloride, morphine **OR** morphine, HYDROcodone 5 mg - acetaminophen, acetaminophen, ondansetron, hydrALAZINE, glucose, dextrose, glucagon (rDNA), dextrose    ROS:   Constitutional Negative for fever and chills   HEENT Negative for ear discharge, ear pain, nosebleed   Eyes Negative for photophobia, pain and discharge   Respiratory Negative for hemoptysis and sputum   Cardiovascular Negative for orthopnea, claudication and PND   Gastrointestinal Negative for abdominal pain, diarrhea, blood in stool   Musculoskeletal  positive for chronic back pain and negative for joint pain, negative for myalgia   Skin Negative for rash or itching   Endo/heme/allergies Negative for polydipsia, environmental allergy   Psychiatric Negative for suicidal ideation. Patient is not anxious           Objective:   /70   Pulse 78   Temp 97.6 °F (36.4 °C) (Temporal)   Resp 16   Ht 5' 6\" (1.676 m)   Wt 156 lb (70.8 kg)   SpO2 97%   BMI 25.18 kg/m²     Blood pressure range: Systolic (95RXX), JKS:918 , Min:82 , KHZ:273   ; Diastolic (37DGQ), HCW:76, Min:46, Max:100      Continuous infusions:    lactated ringers 125 mL/hr at 09/22/21 1513    sodium chloride      dextrose         ON EXAMINATION:  GENERAL  Appears comfortable and in no distress   HEENT  NC/ AT   NECK  Supple and no bruits heard   Cardiovascular  S1, S2 heard; radial pulse intact   MENTAL STATUS:  Alert, oriented, intact memory, no confusion, normal speech, normal language, no hallucination or delusion   CRANIAL NERVES: II     -       Pupils reactive b/l., Fundus exam: intact venous pulsations; right hemianopsia noted on visual threat.     III,IV,VI -  EOMs full, no afferent defect, no                      WILBERT, no ptosis  V     -     Normal facial sensation  VII    -     Normal facial symmetry  VIII   -     Intact hearing  IX,X -     Symmetrical palate  XI    -     Symmetrical shoulder shrug  XII   -     Midline tongue, no atrophy    MOTOR FUNCTION:  Mild weakness of grade 4+/5 in right upper extremity, 5 -/5 in right lower extremity with normal bulk and normal tone. 5/5 in left upper and left lower extremities. Tremulousness of extremities noted on exertion. SENSORY FUNCTION:  Normal touch, normal pin in all 4 extremities. CEREBELLAR FUNCTION:  Intact fine motor control over upper limbs   REFLEX FUNCTION:  Symmetric, no perverted reflex, extensor plantar response on right side and flexor plantar response on left side. STATION and GAIT  Not tested         Data:    Lab Results:     Lab Results   Component Value Date    CHOL 185 02/25/2021    LDLCHOLESTEROL 79 02/25/2021    HDL 87 02/25/2021    TRIG 93 02/25/2021    ALT 22 02/23/2021    AST 37 02/23/2021    INR 1.1 09/22/2021    LABA1C 5.7 09/21/2021     Hematology:  Recent Labs     09/20/21 2100 09/20/21 2100 09/22/21  0614 09/22/21  1605 09/23/21  0430 09/23/21  0726   WBC 6.1   < >  --  7.0 6.3 6.1   HGB 13.3   < >  --  10.6* 10.2* 9.9*   HCT 37.7*   < >  --  31.5* 30.0* 29.1*      < >  --  165 147 132*   INR 1.0  --  1.1  --   --   --     < > = values in this interval not displayed. Chemistry:  Recent Labs     09/20/21 2100 09/20/21  2140 09/22/21  1605 09/23/21  0430 09/23/21  0726   *   < > 134* 136 135   K 4.3   < > 4.5 3.8 4.1   CL 96*   < > 99 101 101   CO2 24   < > 23 23 25   GLUCOSE 98   < > 150* 94 107*   BUN 8   < > 11 13 12   CREATININE 0.96   < > 1.68* 1.65* 1.45*   MG 1.3*  --   --  1.0*  --    CALCIUM 9.0   < > 7.8* 7.8* 7.5*    < > = values in this interval not displayed. Recent Labs     09/20/21 2100 09/21/21  0730   LABA1C  --  5.7   CKTOTAL 68  --        No results found for: PHENYTOIN, PHENYTOIN, VALPROATE, CBMZ    Toxicology (9/20/2021): Ethanol 343      Diagnostic data reviewed:  CT head (9/20/2021): No acute intracranial abnormality. Old left occipital lobe infarct. CTA head and neck 9/20/2021: There is a short segment high-grade stenosis at origin of right cervical ICA measuring about 70%.   There is a short segment dissection flap at the origin of left cervical ICA measuring 1 cm. Thick atherosclerotic calcified plaque noted at bilateral carotid bulbs extending into origin of bilateral cervical ICAs. Impression and Plan: Mr. Angelina Harrell is a 73 y.o. male with   Lt carotid stenosis; s/p Lt CEA (9/22/21)   MRI brain -ve; Heparin d/c'ed. Hx of Lt occipital CVA (Feb 2021) with residual Rt hemiparesis; continue ASA and Plavix for 3 wks then to d/c Plavix; but to continue ASA and Atorvastatin for stroke prevention; PT/ OT as outpatient. Chronic alcoholism; recent falls with increased right-sided weakness  Comorbid conditions include hypertension, diabetes. Ok to discharge with follow up appt in neurology clinic in 4-6 wks.              Apollo Anna MD 9/23/2021 1:31 PM

## 2021-09-23 NOTE — CARE COORDINATION
Patient is refusing SNF and HC. Plan is to discharge today. Friend to transport home. Cont' to follow.
with 10 steps to enter. Declines any skilled needs. Independent. [facility] is [facility]  P/O Thromboendartectomy  Does not require any DME. Continue to follow.   Has transport home            Electronically signed by Donna Painting RN on 9/22/21 at 3:51 PM EDT

## 2022-05-25 ENCOUNTER — APPOINTMENT (OUTPATIENT)
Dept: MRI IMAGING | Age: 67
DRG: 056 | End: 2022-05-25
Payer: MEDICARE

## 2022-05-25 ENCOUNTER — APPOINTMENT (OUTPATIENT)
Dept: CT IMAGING | Age: 67
DRG: 056 | End: 2022-05-25
Payer: MEDICARE

## 2022-05-25 ENCOUNTER — HOSPITAL ENCOUNTER (INPATIENT)
Age: 67
LOS: 15 days | Discharge: NURSING, REHAB OR LONG-TERM CARE | DRG: 056 | End: 2022-06-09
Attending: EMERGENCY MEDICINE | Admitting: FAMILY MEDICINE
Payer: MEDICARE

## 2022-05-25 DIAGNOSIS — R56.9 NEW ONSET SEIZURE (HCC): ICD-10-CM

## 2022-05-25 DIAGNOSIS — R56.9 SEIZURE (HCC): Primary | ICD-10-CM

## 2022-05-25 LAB
ABSOLUTE EOS #: 0.18 K/UL (ref 0–0.44)
ABSOLUTE IMMATURE GRANULOCYTE: 0.04 K/UL (ref 0–0.3)
ABSOLUTE LYMPH #: 1.3 K/UL (ref 1.1–3.7)
ABSOLUTE MONO #: 0.67 K/UL (ref 0.1–1.2)
ALBUMIN SERPL-MCNC: 3.9 G/DL (ref 3.5–5.2)
ALP BLD-CCNC: 78 U/L (ref 40–129)
ALT SERPL-CCNC: 11 U/L (ref 5–41)
AMPHETAMINE SCREEN URINE: NEGATIVE
ANION GAP SERPL CALCULATED.3IONS-SCNC: 17 MMOL/L (ref 9–17)
AST SERPL-CCNC: 24 U/L
BARBITURATE SCREEN URINE: NEGATIVE
BASOPHILS # BLD: 0 % (ref 0–2)
BASOPHILS ABSOLUTE: 0.03 K/UL (ref 0–0.2)
BENZODIAZEPINE SCREEN, URINE: NEGATIVE
BILIRUB SERPL-MCNC: 0.51 MG/DL (ref 0.3–1.2)
BUN BLDV-MCNC: 18 MG/DL (ref 8–23)
BUN/CREAT BLD: 14 (ref 9–20)
CALCIUM SERPL-MCNC: 9.1 MG/DL (ref 8.6–10.4)
CANNABINOID SCREEN URINE: NEGATIVE
CHLORIDE BLD-SCNC: 93 MMOL/L (ref 98–107)
CO2: 19 MMOL/L (ref 20–31)
COCAINE METABOLITE, URINE: NEGATIVE
CREAT SERPL-MCNC: 1.27 MG/DL (ref 0.7–1.2)
EOSINOPHILS RELATIVE PERCENT: 3 % (ref 1–4)
ETHANOL PERCENT: <0.01 %
ETHANOL: <10 MG/DL
GFR AFRICAN AMERICAN: >60 ML/MIN
GFR NON-AFRICAN AMERICAN: 57 ML/MIN
GFR SERPL CREATININE-BSD FRML MDRD: ABNORMAL ML/MIN/{1.73_M2}
GLUCOSE BLD-MCNC: 109 MG/DL (ref 75–110)
GLUCOSE BLD-MCNC: 225 MG/DL (ref 70–99)
HCT VFR BLD CALC: 43.2 % (ref 40.7–50.3)
HEMOGLOBIN: 14.6 G/DL (ref 13–17)
IMMATURE GRANULOCYTES: 1 %
LACTIC ACID: 4.9 MMOL/L (ref 0.5–2.2)
LIPASE: 31 U/L (ref 13–60)
LYMPHOCYTES # BLD: 18 % (ref 24–43)
MCH RBC QN AUTO: 31.7 PG (ref 25.2–33.5)
MCHC RBC AUTO-ENTMCNC: 33.8 G/DL (ref 28.4–34.8)
MCV RBC AUTO: 93.9 FL (ref 82.6–102.9)
METHADONE SCREEN, URINE: NEGATIVE
MONOCYTES # BLD: 9 % (ref 3–12)
NRBC AUTOMATED: 0 PER 100 WBC
OPIATES, URINE: NEGATIVE
OXYCODONE SCREEN URINE: NEGATIVE
PDW BLD-RTO: 12.7 % (ref 11.8–14.4)
PHENCYCLIDINE, URINE: NEGATIVE
PLATELET # BLD: 185 K/UL (ref 138–453)
PMV BLD AUTO: 9.2 FL (ref 8.1–13.5)
POTASSIUM SERPL-SCNC: 4.2 MMOL/L (ref 3.7–5.3)
RBC # BLD: 4.6 M/UL (ref 4.21–5.77)
SARS-COV-2, RAPID: NOT DETECTED
SEG NEUTROPHILS: 69 % (ref 36–65)
SEGMENTED NEUTROPHILS ABSOLUTE COUNT: 5.09 K/UL (ref 1.5–8.1)
SODIUM BLD-SCNC: 129 MMOL/L (ref 135–144)
SPECIMEN DESCRIPTION: NORMAL
TEST INFORMATION: NORMAL
TOTAL PROTEIN: 7 G/DL (ref 6.4–8.3)
WBC # BLD: 7.3 K/UL (ref 3.5–11.3)

## 2022-05-25 PROCEDURE — 80053 COMPREHEN METABOLIC PANEL: CPT

## 2022-05-25 PROCEDURE — 84146 ASSAY OF PROLACTIN: CPT

## 2022-05-25 PROCEDURE — 83605 ASSAY OF LACTIC ACID: CPT

## 2022-05-25 PROCEDURE — 99285 EMERGENCY DEPT VISIT HI MDM: CPT

## 2022-05-25 PROCEDURE — 70553 MRI BRAIN STEM W/O & W/DYE: CPT

## 2022-05-25 PROCEDURE — 87635 SARS-COV-2 COVID-19 AMP PRB: CPT

## 2022-05-25 PROCEDURE — 6360000004 HC RX CONTRAST MEDICATION: Performed by: EMERGENCY MEDICINE

## 2022-05-25 PROCEDURE — 6360000002 HC RX W HCPCS

## 2022-05-25 PROCEDURE — 2580000003 HC RX 258: Performed by: EMERGENCY MEDICINE

## 2022-05-25 PROCEDURE — 2500000003 HC RX 250 WO HCPCS: Performed by: FAMILY MEDICINE

## 2022-05-25 PROCEDURE — 96365 THER/PROPH/DIAG IV INF INIT: CPT

## 2022-05-25 PROCEDURE — 2580000003 HC RX 258: Performed by: PSYCHIATRY & NEUROLOGY

## 2022-05-25 PROCEDURE — 85025 COMPLETE CBC W/AUTO DIFF WBC: CPT

## 2022-05-25 PROCEDURE — 96376 TX/PRO/DX INJ SAME DRUG ADON: CPT

## 2022-05-25 PROCEDURE — 2580000003 HC RX 258: Performed by: FAMILY MEDICINE

## 2022-05-25 PROCEDURE — 83690 ASSAY OF LIPASE: CPT

## 2022-05-25 PROCEDURE — A9579 GAD-BASE MR CONTRAST NOS,1ML: HCPCS | Performed by: EMERGENCY MEDICINE

## 2022-05-25 PROCEDURE — 2000000000 HC ICU R&B

## 2022-05-25 PROCEDURE — 6360000002 HC RX W HCPCS: Performed by: FAMILY MEDICINE

## 2022-05-25 PROCEDURE — G0480 DRUG TEST DEF 1-7 CLASSES: HCPCS

## 2022-05-25 PROCEDURE — 93005 ELECTROCARDIOGRAM TRACING: CPT | Performed by: EMERGENCY MEDICINE

## 2022-05-25 PROCEDURE — 96361 HYDRATE IV INFUSION ADD-ON: CPT

## 2022-05-25 PROCEDURE — 70450 CT HEAD/BRAIN W/O DYE: CPT

## 2022-05-25 PROCEDURE — 80307 DRUG TEST PRSMV CHEM ANLYZR: CPT

## 2022-05-25 PROCEDURE — 71260 CT THORAX DX C+: CPT

## 2022-05-25 PROCEDURE — 6360000002 HC RX W HCPCS: Performed by: PSYCHIATRY & NEUROLOGY

## 2022-05-25 PROCEDURE — 82947 ASSAY GLUCOSE BLOOD QUANT: CPT

## 2022-05-25 PROCEDURE — 96375 TX/PRO/DX INJ NEW DRUG ADDON: CPT

## 2022-05-25 PROCEDURE — 6360000002 HC RX W HCPCS: Performed by: EMERGENCY MEDICINE

## 2022-05-25 RX ORDER — INSULIN LISPRO 100 [IU]/ML
0-6 INJECTION, SOLUTION INTRAVENOUS; SUBCUTANEOUS NIGHTLY
Status: DISCONTINUED | OUTPATIENT
Start: 2022-05-25 | End: 2022-05-30 | Stop reason: DRUGHIGH

## 2022-05-25 RX ORDER — LOSARTAN POTASSIUM 100 MG/1
100 TABLET ORAL DAILY
Status: DISCONTINUED | OUTPATIENT
Start: 2022-05-26 | End: 2022-05-25

## 2022-05-25 RX ORDER — ONDANSETRON 4 MG/1
4 TABLET, ORALLY DISINTEGRATING ORAL EVERY 8 HOURS PRN
Status: DISCONTINUED | OUTPATIENT
Start: 2022-05-25 | End: 2022-06-09 | Stop reason: HOSPADM

## 2022-05-25 RX ORDER — DEXTROSE MONOHYDRATE 50 MG/ML
100 INJECTION, SOLUTION INTRAVENOUS PRN
Status: DISCONTINUED | OUTPATIENT
Start: 2022-05-25 | End: 2022-06-09 | Stop reason: HOSPADM

## 2022-05-25 RX ORDER — MIDAZOLAM HYDROCHLORIDE 1 MG/ML
4 INJECTION INTRAMUSCULAR; INTRAVENOUS ONCE
Status: COMPLETED | OUTPATIENT
Start: 2022-05-25 | End: 2022-05-25

## 2022-05-25 RX ORDER — LORAZEPAM 2 MG/ML
1 INJECTION INTRAMUSCULAR EVERY 6 HOURS PRN
Status: DISCONTINUED | OUTPATIENT
Start: 2022-05-25 | End: 2022-05-26 | Stop reason: SDUPTHER

## 2022-05-25 RX ORDER — ACETAMINOPHEN 325 MG/1
650 TABLET ORAL EVERY 4 HOURS PRN
Status: DISCONTINUED | OUTPATIENT
Start: 2022-05-25 | End: 2022-06-09 | Stop reason: HOSPADM

## 2022-05-25 RX ORDER — MIDAZOLAM HYDROCHLORIDE 1 MG/ML
5 INJECTION INTRAMUSCULAR; INTRAVENOUS ONCE
Status: COMPLETED | OUTPATIENT
Start: 2022-05-25 | End: 2022-05-25

## 2022-05-25 RX ORDER — LORAZEPAM 2 MG/ML
2 INJECTION INTRAMUSCULAR ONCE
Status: COMPLETED | OUTPATIENT
Start: 2022-05-25 | End: 2022-05-25

## 2022-05-25 RX ORDER — ATENOLOL 50 MG/1
50 TABLET ORAL DAILY
Status: DISCONTINUED | OUTPATIENT
Start: 2022-05-25 | End: 2022-06-08

## 2022-05-25 RX ORDER — METOPROLOL TARTRATE 5 MG/5ML
5 INJECTION INTRAVENOUS EVERY 6 HOURS PRN
Status: DISCONTINUED | OUTPATIENT
Start: 2022-05-25 | End: 2022-06-09 | Stop reason: HOSPADM

## 2022-05-25 RX ORDER — SODIUM CHLORIDE 0.9 % (FLUSH) 0.9 %
10 SYRINGE (ML) INJECTION
Status: DISCONTINUED | OUTPATIENT
Start: 2022-05-25 | End: 2022-05-25

## 2022-05-25 RX ORDER — ATORVASTATIN CALCIUM 40 MG/1
40 TABLET, FILM COATED ORAL NIGHTLY
Status: DISCONTINUED | OUTPATIENT
Start: 2022-05-25 | End: 2022-06-09 | Stop reason: HOSPADM

## 2022-05-25 RX ORDER — ATENOLOL 50 MG/1
50 TABLET ORAL DAILY
Status: DISCONTINUED | OUTPATIENT
Start: 2022-05-26 | End: 2022-05-25

## 2022-05-25 RX ORDER — 0.9 % SODIUM CHLORIDE 0.9 %
1000 INTRAVENOUS SOLUTION INTRAVENOUS ONCE
Status: COMPLETED | OUTPATIENT
Start: 2022-05-25 | End: 2022-05-25

## 2022-05-25 RX ORDER — SODIUM CHLORIDE 9 MG/ML
INJECTION, SOLUTION INTRAVENOUS PRN
Status: DISCONTINUED | OUTPATIENT
Start: 2022-05-25 | End: 2022-06-09 | Stop reason: HOSPADM

## 2022-05-25 RX ORDER — AMLODIPINE BESYLATE 5 MG/1
5 TABLET ORAL DAILY
Status: DISCONTINUED | OUTPATIENT
Start: 2022-05-26 | End: 2022-05-25

## 2022-05-25 RX ORDER — LORAZEPAM 2 MG/ML
INJECTION INTRAMUSCULAR
Status: COMPLETED
Start: 2022-05-25 | End: 2022-05-25

## 2022-05-25 RX ORDER — AMLODIPINE BESYLATE 5 MG/1
5 TABLET ORAL DAILY
Status: DISCONTINUED | OUTPATIENT
Start: 2022-05-25 | End: 2022-06-07

## 2022-05-25 RX ORDER — LOSARTAN POTASSIUM 100 MG/1
100 TABLET ORAL DAILY
Status: DISCONTINUED | OUTPATIENT
Start: 2022-05-25 | End: 2022-06-09 | Stop reason: HOSPADM

## 2022-05-25 RX ORDER — SODIUM CHLORIDE 0.9 % (FLUSH) 0.9 %
5-40 SYRINGE (ML) INJECTION PRN
Status: DISCONTINUED | OUTPATIENT
Start: 2022-05-25 | End: 2022-06-09 | Stop reason: HOSPADM

## 2022-05-25 RX ORDER — 0.9 % SODIUM CHLORIDE 0.9 %
80 INTRAVENOUS SOLUTION INTRAVENOUS ONCE
Status: COMPLETED | OUTPATIENT
Start: 2022-05-25 | End: 2022-05-25

## 2022-05-25 RX ORDER — CLOPIDOGREL BISULFATE 75 MG/1
75 TABLET ORAL DAILY
Status: DISCONTINUED | OUTPATIENT
Start: 2022-05-26 | End: 2022-06-09 | Stop reason: HOSPADM

## 2022-05-25 RX ORDER — SODIUM CHLORIDE 0.9 % (FLUSH) 0.9 %
10 SYRINGE (ML) INJECTION ONCE
Status: DISCONTINUED | OUTPATIENT
Start: 2022-05-25 | End: 2022-06-09 | Stop reason: HOSPADM

## 2022-05-25 RX ORDER — ONDANSETRON 2 MG/ML
4 INJECTION INTRAMUSCULAR; INTRAVENOUS EVERY 6 HOURS PRN
Status: DISCONTINUED | OUTPATIENT
Start: 2022-05-25 | End: 2022-06-09 | Stop reason: HOSPADM

## 2022-05-25 RX ORDER — INSULIN LISPRO 100 [IU]/ML
0-12 INJECTION, SOLUTION INTRAVENOUS; SUBCUTANEOUS
Status: DISCONTINUED | OUTPATIENT
Start: 2022-05-26 | End: 2022-05-30 | Stop reason: DRUGHIGH

## 2022-05-25 RX ORDER — ASPIRIN 81 MG/1
81 TABLET, CHEWABLE ORAL DAILY
Status: DISCONTINUED | OUTPATIENT
Start: 2022-05-26 | End: 2022-06-09 | Stop reason: HOSPADM

## 2022-05-25 RX ORDER — PAROXETINE HYDROCHLORIDE 20 MG/1
40 TABLET, FILM COATED ORAL DAILY
Status: DISCONTINUED | OUTPATIENT
Start: 2022-05-26 | End: 2022-06-05

## 2022-05-25 RX ORDER — SODIUM CHLORIDE 0.9 % (FLUSH) 0.9 %
5-40 SYRINGE (ML) INJECTION EVERY 12 HOURS SCHEDULED
Status: DISCONTINUED | OUTPATIENT
Start: 2022-05-25 | End: 2022-06-09 | Stop reason: HOSPADM

## 2022-05-25 RX ADMIN — IOPAMIDOL 75 ML: 755 INJECTION, SOLUTION INTRAVENOUS at 15:40

## 2022-05-25 RX ADMIN — SODIUM CHLORIDE 1000 ML: 9 INJECTION, SOLUTION INTRAVENOUS at 13:59

## 2022-05-25 RX ADMIN — DEXTROSE MONOHYDRATE 5 MG/HR: 50 INJECTION, SOLUTION INTRAVENOUS at 23:40

## 2022-05-25 RX ADMIN — LORAZEPAM 2 MG: 2 INJECTION INTRAMUSCULAR; INTRAVENOUS at 15:18

## 2022-05-25 RX ADMIN — SODIUM CHLORIDE 1000 ML: 9 INJECTION, SOLUTION INTRAVENOUS at 14:49

## 2022-05-25 RX ADMIN — LORAZEPAM 2 MG: 2 INJECTION INTRAMUSCULAR; INTRAVENOUS at 13:35

## 2022-05-25 RX ADMIN — LORAZEPAM 2 MG: 2 INJECTION INTRAMUSCULAR; INTRAVENOUS at 13:42

## 2022-05-25 RX ADMIN — MIDAZOLAM 4 MG: 1 INJECTION INTRAMUSCULAR; INTRAVENOUS at 19:55

## 2022-05-25 RX ADMIN — MIDAZOLAM 5 MG: 1 INJECTION, SOLUTION INTRAMUSCULAR; INTRAVENOUS at 17:52

## 2022-05-25 RX ADMIN — SODIUM CHLORIDE 80 ML: 0.9 INJECTION, SOLUTION INTRAVENOUS at 15:41

## 2022-05-25 RX ADMIN — LEVETIRACETAM 500 MG: 100 INJECTION, SOLUTION INTRAVENOUS at 21:41

## 2022-05-25 RX ADMIN — LORAZEPAM 2 MG: 2 INJECTION INTRAMUSCULAR; INTRAVENOUS at 14:55

## 2022-05-25 RX ADMIN — LORAZEPAM 2 MG: 2 INJECTION INTRAMUSCULAR; INTRAVENOUS at 14:49

## 2022-05-25 RX ADMIN — GADOTERIDOL 18 ML: 279.3 INJECTION, SOLUTION INTRAVENOUS at 18:39

## 2022-05-25 RX ADMIN — LORAZEPAM 2 MG: 2 INJECTION INTRAMUSCULAR at 13:35

## 2022-05-25 RX ADMIN — LEVETIRACETAM 1000 MG: 500 INJECTION, SOLUTION, CONCENTRATE INTRAVENOUS at 13:59

## 2022-05-25 RX ADMIN — METOPROLOL TARTRATE 5 MG: 1 INJECTION, SOLUTION INTRAVENOUS at 22:26

## 2022-05-25 RX ADMIN — LORAZEPAM 1 MG: 2 INJECTION INTRAMUSCULAR; INTRAVENOUS at 22:26

## 2022-05-25 RX ADMIN — SODIUM CHLORIDE, PRESERVATIVE FREE 10 ML: 5 INJECTION INTRAVENOUS at 21:38

## 2022-05-25 RX ADMIN — LORAZEPAM 2 MG: 2 INJECTION INTRAMUSCULAR at 13:42

## 2022-05-25 RX ADMIN — MIDAZOLAM HYDROCHLORIDE 5 MG: 2 INJECTION, SOLUTION INTRAMUSCULAR; INTRAVENOUS at 15:34

## 2022-05-25 ASSESSMENT — LIFESTYLE VARIABLES
HOW OFTEN DO YOU HAVE A DRINK CONTAINING ALCOHOL: 4 OR MORE TIMES A WEEK
HOW MANY STANDARD DRINKS CONTAINING ALCOHOL DO YOU HAVE ON A TYPICAL DAY: 3 OR 4

## 2022-05-25 ASSESSMENT — PAIN - FUNCTIONAL ASSESSMENT: PAIN_FUNCTIONAL_ASSESSMENT: NONE - DENIES PAIN

## 2022-05-25 NOTE — ED NOTES
Pt attempting to sit upright and not following commands. Pt opening eyes and moving, but unable to follow commands.   Pt medicated and linen change performed d/t urinary incontinence       Cheryl Padilla RN  05/25/22 1977

## 2022-05-25 NOTE — ED NOTES
Pt to ED from home by EMS. Per EMS, neighbor went to check on pt when he was unresponsive. Per EMS, upon their arrival, pt acting post ictal.  Pt unsure if he has hx of seizures, states that he believes that he has had seizures in the past but unsure if he is prescribed anything for seizures. AT this time, pt awake and oriented x 4.      Vidhi Son, RN  05/25/22 1935

## 2022-05-25 NOTE — ED NOTES
Pt moving upper extremities and head, but remains un arousable to verbal stimuli.      Gay Modi RN  05/25/22 3258

## 2022-05-25 NOTE — ED NOTES
Pt remains unable to follow commands. Pt moving on stretcher and pulling off wires, unable to tolerated a BP.        Rachel Ochoa RN  05/25/22 7877

## 2022-05-25 NOTE — ED NOTES
Pt remains sedated, not responding to verbal or painful stimuli.   Dr. Rosana Abel aware and at bedside to assess pt       Sabi Walsh RN  05/25/22 0582

## 2022-05-25 NOTE — ED PROVIDER NOTES
EMERGENCY DEPARTMENT ENCOUNTER    Pt Name: Sergey Knox  MRN: [id number]  [place] 1955  Date of evaluation: 5/25/22  CHIEF COMPLAINT       Chief Complaint   Patient presents with    Seizures     HISTORY OF PRESENT ILLNESS   Patient is a 67-year-old male with PMH of CVA s/p left occipital CVA February 2021 with residual right-sided weakness, non-insulin-dependent diabetes, hypertension and alcohol use who is brought in by ancé for general malaise and seizure activity this morning. Jazlyn Lubin states that patient has been feeling under the weather for the past 2 days. Nothing specific, but has complained of nausea and poor appetite. This morning cancer witnessed him seizing while sitting on couch. No head strike. Patient returned to baseline and was brought into the ED by EMS. Patient drinks approximately 4 beers every evening. Last drink was last evening. No known history of seizures. No reports of fevers, cough, shortness of breath, chest pain, abdominal pain. I was called to patient's room for seizure activity. Patient placed on O2 nasal cannula and given Ativan 2 mg IV x2. Seizure broke and patient was loaded with Keppra 1 g. He is currently postictal, nonverbal with mild generalized agitation. REVIEW OF SYSTEMS     Review of Systems   All other systems reviewed and are negative.     PASTMEDICAL HISTORY     Past Medical History:   Diagnosis Date    Diabetes mellitus ([address]     Hypertension      SURGICAL HISTORY       Past Surgical History:   Procedure Laterality Date    CAROTID ENDARTERECTOMY  09/22/2021    CAROTID ENDARTERECTOMY (N/A Neck    CAROTID ENDARTERECTOMY N/A 9/22/2021    CAROTID ENDARTERECTOMY performed by Aretha Garcia MD at [address] [place] [address]       Previous Medications    AMLODIPINE (NORVASC) 5 MG TABLET    Take 1 tablet by mouth daily    ASPIRIN 81 MG CHEWABLE TABLET    Take 1 tablet by mouth daily    ATENOLOL (TENORMIN) 50 MG TABLET Take 50 mg by mouth daily    ATORVASTATIN (LIPITOR) 40 MG TABLET    Take 1 tablet by mouth nightly    CLOPIDOGREL (PLAVIX) 75 MG TABLET    Take 1 tablet by mouth daily for 21 days    LOSARTAN (COZAAR) 100 MG TABLET    Take 100 mg by mouth daily    METFORMIN (GLUCOPHAGE) 500 MG TABLET    Take 1 tablet by mouth 2 times daily (with meals)    PAROXETINE (PAXIL) 40 MG TABLET    Take 40 mg by mouth daily     ALLERGIES     has No Known Allergies. FAMILY HISTORY     has no family status information on file. SOCIAL HISTORY       Social History     Tobacco Use    Smoking status: Current Every Day Smoker     Packs/day: 1.50     Start date: 6/1/1973    Smokeless tobacco: Never Used   Substance Use Topics    Alcohol use: Yes     Alcohol/week: 4.0 standard drinks     Types: 4 Cans of beer per week     Comment: Occassional, four times weekly    Drug use: No     PHYSICAL EXAM     INITIAL VITALS: BP (!) 183/83   Pulse 91   Temp 97.8 °F (36.6 °C) (Oral)   Resp 20   Ht 5' 6\" (1.676 m)   Wt 180 lb (81.6 kg)   SpO2 100%   BMI 29.05 kg/m²    Physical Exam  Constitutional:       Appearance: He is ill-appearing. HENT:      Head: Normocephalic. Right Ear: External ear normal.      Left Ear: External ear normal.      Nose: Nose normal.   Eyes:      Extraocular Movements: Extraocular movements intact. Conjunctiva/sclera: Conjunctivae normal.      Pupils: Pupils are equal, round, and reactive to light. Cardiovascular:      Rate and Rhythm: Tachycardia present. Heart sounds: Normal heart sounds. Pulmonary:      Effort: Pulmonary effort is normal.      Breath sounds: Normal breath sounds. Abdominal:      General: Abdomen is flat. Skin:     General: Skin is dry. Neurological:      General: No focal deficit present. Mental Status: He is alert. Mental status is at baseline.    Psychiatric:         Mood and Affect: Mood normal.         Behavior: Behavior normal.         MEDICAL DECISION MAKING:   The patient is hemodynamically stable, afebrile, nontoxic-appearing. Physical exam unremarkable. Based on history and exam unclear etiology of seizure activity, consider encephalitis, head bleed, space-occupying lesion, alcohol withdrawal.  ED plan for basic labs, Ativan as needed, CT head, chest, AP, likely admission. DIAGNOSTIC RESULTS   EKG:All EKG's are interpreted by the Emergency Department Physician who either signs or Co-signs this chart in the absence of a cardiologist.        RADIOLOGY:All plain film, CT, MRI, and formal ultrasound images (except ED bedside ultrasound) are read by the radiologist, see reports below, unless otherwisenoted in MDM or here. MRI BRAIN W WO CONTRAST   Final Result   No acute intracranial abnormality. No mass effect or abnormal intracranial   enhancement. Old infarctions in the left frontal parietal lobes, new since September 21, 2021. Old infarction in the left occipital lobe, likely increased in size since   September 21, 2021. Laminar necrosis and gliosis associated with the old infarctions. Old lacunar infarcts in the left basal ganglia, likely increased. Mild parenchymal volume loss. CT CHEST ABDOMEN PELVIS W CONTRAST   Final Result   Small cysts noted in the liver. Diverticular disease of the colon without   diverticulitis. Significant thickening of the bladder wall, in keeping with   muscular hypertrophy or cystitis. Clinical correlation suggested. Degenerative changes demonstrated in the the thoracic and lumbar spine. RECOMMENDATIONS:         CT HEAD WO CONTRAST   Final Result   No acute intracranial hemorrhage or abnormal extra-axial collection. Relative to 9 months prior there are increased areas of encephalomalacia in   the left occipital lobe and new areas of encephalomalacia in the left   parietal lobe. If there is persistent clinical concern for acute on chronic   ischemia, MRI is more sensitive. LABS: All lab results were reviewed by myself, and all abnormals are listed below. Labs Reviewed   CBC WITH AUTO DIFFERENTIAL - Abnormal; Notable for the following components:       Result Value    Seg Neutrophils 69 (*)     Lymphocytes 18 (*)     Immature Granulocytes 1 (*)     All other components within normal limits   COMPREHENSIVE METABOLIC PANEL W/ REFLEX TO MG FOR LOW K - Abnormal; Notable for the following components:    Glucose 225 (*)     CREATININE 1.27 (*)     Sodium 129 (*)     Chloride 93 (*)     CO2 19 (*)     GFR Non- 57 (*)     All other components within normal limits   LACTIC ACID - Abnormal; Notable for the following components:    Lactic Acid 4.9 (*)     All other components within normal limits   COVID-19, RAPID   LIPASE   ETHANOL   URINE DRUG SCREEN       EMERGENCY DEPARTMENTCOURSE:   Patient remained stable in the ED. Only 1 seizure episode during ED stay. He would wake up, answer questions and follow commands however agitation continued. Because of persistent tachycardia and agitation patient was sedated with Versed. Labs:  Sodium 129  Potassium 4.2  Creatinine 1.27  Lactic 4.9  Glucose 225  WBC 7.3  Hemoglobin 14.6    Drug screen negative. Alcohol level negative. Negative rapid COVID. CT head negative for acute pathology. CT chest/AP negative for acute pathology. MRI negative for acute intracranial pathology and mass lesion. Unclear etiology of new onset seizures.   Consider encephalitis, alcohol withdrawal.    Discussed case with Dr. Ina Martines, accepts patient for admission to hospital.        Vitals:    Vitals:    05/25/22 1915 05/25/22 1930 05/25/22 1943 05/25/22 1945   BP: (!) 141/81 (!) 181/104  (!) 183/83   Pulse: 86 89 91 91   Resp: 21 22 24 20   Temp:       TempSrc:       SpO2: 100% 100% 100%    Weight:       Height:           The patient was given the following medications while in the emergency department:  Orders Placed This Encounter Medications    LORazepam (ATIVAN) 2 MG/ML injection     Celestine Rothman: cabinet override    LORazepam (ATIVAN) 2 MG/ML injection     AYAH EVI: cabinet override    0.9 % sodium chloride bolus    LORazepam (ATIVAN) injection 2 mg    LORazepam (ATIVAN) injection 2 mg    levETIRAcetam (KEPPRA) 1,000 mg in sodium chloride 0.9 % 100 mL IVPB    LORazepam (ATIVAN) injection 2 mg    0.9 % sodium chloride bolus    LORazepam (ATIVAN) injection 2 mg    0.9 % sodium chloride bolus    iopamidol (ISOVUE-370) 76 % injection 75 mL    sodium chloride flush 0.9 % injection 10 mL    LORazepam (ATIVAN) injection 2 mg    midazolam (VERSED) injection 5 mg    gadoteridol (PROHANCE) injection 18 mL    sodium chloride flush 0.9 % injection 10 mL    midazolam (VERSED) injection 5 mg    midazolam (VERSED) injection 4 mg     CONSULTS:  IP CONSULT TO INTERNAL MEDICINE  IP CONSULT TO NEUROLOGY    FINAL IMPRESSION      1. New onset seizure [facility])          DISPOSITION/PLAN   DISPOSITION Decision To Admit 05/25/2022 07:31:30 PM      PATIENT REFERRED TO:  No follow-up provider specified.   DISCHARGE MEDICATIONS:  New Prescriptions    No medications on file     Riaz Montero MD  Attending Emergency Physician                    Ron Staley MD  05/25/22 2009       Ron Staley MD  05/25/22 2015

## 2022-05-25 NOTE — ED NOTES
PT remains unable to lay still on stretcher. Dr. Americo Alcaraz aware.   Will medicate prior to [address], RN  05/25/22 7905

## 2022-05-25 NOTE — ED NOTES
Pt pulling off all telemetry and pulse ox and trying to get out of bed. Pt urged to remain in bed. Pt able to follow commands and open eyes on command. When writer urged pt to remain in bed, pt stated \"I want to get up\".   Dr. Urvashi Juarez notified     Elsi Rock RN  05/25/22 0174

## 2022-05-25 NOTE — ED NOTES
Pt remains moving around, unable to be taken to CT at this time       Cheryl Padilla, RN  05/25/22 4935

## 2022-05-25 NOTE — ED NOTES
Writer spoke with MRI, they have someone on the table right now and this pt will be next       Margarita Virgen RN  05/25/22 5245

## 2022-05-25 NOTE — ED NOTES
Per Dr. Annetta Baker, pt okay to go to MRI off monitor and without RN accompanying pt.      Denia Ojead RN  05/25/22 9625

## 2022-05-25 NOTE — ED NOTES
Pt becoming agitated and moving around on stretcher but remains unable to follow commands. RN at bedside, Dr. Camilla Wallis notified.   Awaiting orders     Peg VIPIN Sharif  05/25/22 9260

## 2022-05-25 NOTE — ED NOTES
Pt returned from MRI. Pt resting on stretcher at this time. Complete linen change performed d/t urinary incontinence. Pt placed back on full telemetry monitoring with alarms set. Pt remains on supplemental 2L NC with nasal trumpet in place.      Denia Ojeda RN  05/25/22 5153

## 2022-05-25 NOTE — ED NOTES
Pt remains resting on stretcher, eyes closed, chest rise / fall noted. Awaiting further orders / POC.   Pt on full telemetry monitoring with alarms set and remains on 2L NC     Ul[address] Lyn Staley, RN  05/25/22 1566

## 2022-05-26 ENCOUNTER — APPOINTMENT (OUTPATIENT)
Dept: GENERAL RADIOLOGY | Age: 67
DRG: 056 | End: 2022-05-26
Payer: MEDICARE

## 2022-05-26 LAB
ABSOLUTE EOS #: 0.07 K/UL (ref 0–0.44)
ABSOLUTE IMMATURE GRANULOCYTE: 0.04 K/UL (ref 0–0.3)
ABSOLUTE LYMPH #: 1.3 K/UL (ref 1.1–3.7)
ABSOLUTE MONO #: 0.95 K/UL (ref 0.1–1.2)
ALBUMIN SERPL-MCNC: 3.8 G/DL (ref 3.5–5.2)
ALP BLD-CCNC: 69 U/L (ref 40–129)
ALT SERPL-CCNC: 12 U/L (ref 5–41)
ANION GAP SERPL CALCULATED.3IONS-SCNC: 10 MMOL/L (ref 9–17)
AST SERPL-CCNC: 24 U/L
BASOPHILS # BLD: 0 % (ref 0–2)
BASOPHILS ABSOLUTE: 0.04 K/UL (ref 0–0.2)
BILIRUB SERPL-MCNC: 0.79 MG/DL (ref 0.3–1.2)
BILIRUBIN DIRECT: 0.21 MG/DL
BILIRUBIN, INDIRECT: 0.58 MG/DL (ref 0–1)
BUN BLDV-MCNC: 11 MG/DL (ref 8–23)
BUN/CREAT BLD: 11 (ref 9–20)
CALCIUM SERPL-MCNC: 8.7 MG/DL (ref 8.6–10.4)
CHLORIDE BLD-SCNC: 99 MMOL/L (ref 98–107)
CO2: 23 MMOL/L (ref 20–31)
CREAT SERPL-MCNC: 1.02 MG/DL (ref 0.7–1.2)
EKG ATRIAL RATE: 99 BPM
EKG P AXIS: 71 DEGREES
EKG P-R INTERVAL: 144 MS
EKG Q-T INTERVAL: 356 MS
EKG QRS DURATION: 90 MS
EKG QTC CALCULATION (BAZETT): 456 MS
EKG R AXIS: 33 DEGREES
EKG T AXIS: 84 DEGREES
EKG VENTRICULAR RATE: 99 BPM
EOSINOPHILS RELATIVE PERCENT: 1 % (ref 1–4)
ESTIMATED AVERAGE GLUCOSE: 131 MG/DL
FIO2: 30
FIO2: 44
GFR AFRICAN AMERICAN: >60 ML/MIN
GFR NON-AFRICAN AMERICAN: >60 ML/MIN
GFR SERPL CREATININE-BSD FRML MDRD: ABNORMAL ML/MIN/{1.73_M2}
GLUCOSE BLD-MCNC: 124 MG/DL (ref 75–110)
GLUCOSE BLD-MCNC: 129 MG/DL (ref 75–110)
GLUCOSE BLD-MCNC: 148 MG/DL (ref 70–99)
GLUCOSE BLD-MCNC: 171 MG/DL (ref 75–110)
GLUCOSE BLD-MCNC: 92 MG/DL (ref 75–110)
HBA1C MFR BLD: 6.2 % (ref 4–6)
HCT VFR BLD CALC: 37.9 % (ref 40.7–50.3)
HEMOGLOBIN: 12.8 G/DL (ref 13–17)
IMMATURE GRANULOCYTES: 0 %
LV EF: 53 %
LVEF MODALITY: NORMAL
LYMPHOCYTES # BLD: 13 % (ref 24–43)
MCH RBC QN AUTO: 31.4 PG (ref 25.2–33.5)
MCHC RBC AUTO-ENTMCNC: 33.8 G/DL (ref 28.4–34.8)
MCV RBC AUTO: 92.9 FL (ref 82.6–102.9)
MONOCYTES # BLD: 9 % (ref 3–12)
NEGATIVE BASE EXCESS, ART: 1 (ref 0–2)
NRBC AUTOMATED: 0 PER 100 WBC
PATIENT TEMP: 37
PDW BLD-RTO: 12.6 % (ref 11.8–14.4)
PLATELET # BLD: 172 K/UL (ref 138–453)
PMV BLD AUTO: 9.2 FL (ref 8.1–13.5)
POC HCO3: 23.8 MMOL/L (ref 21–28)
POC HCO3: 24.7 MMOL/L (ref 21–28)
POC O2 SATURATION: 86 % (ref 94–98)
POC O2 SATURATION: 97 % (ref 94–98)
POC PCO2: 36.3 MM HG (ref 35–48)
POC PCO2: 45.7 MM HG (ref 35–48)
POC PH: 7.34 (ref 7.35–7.45)
POC PH: 7.42 (ref 7.35–7.45)
POC PO2: 54.4 MM HG (ref 83–108)
POC PO2: 87.5 MM HG (ref 83–108)
POSITIVE BASE EXCESS, ART: 0 (ref 0–3)
POTASSIUM SERPL-SCNC: 3.6 MMOL/L (ref 3.7–5.3)
PROLACTIN: 34.57 NG/ML (ref 4.04–15.2)
RBC # BLD: 4.08 M/UL (ref 4.21–5.77)
SEG NEUTROPHILS: 77 % (ref 36–65)
SEGMENTED NEUTROPHILS ABSOLUTE COUNT: 7.9 K/UL (ref 1.5–8.1)
SODIUM BLD-SCNC: 132 MMOL/L (ref 135–144)
TOTAL PROTEIN: 6.6 G/DL (ref 6.4–8.3)
WBC # BLD: 10.3 K/UL (ref 3.5–11.3)

## 2022-05-26 PROCEDURE — 80076 HEPATIC FUNCTION PANEL: CPT

## 2022-05-26 PROCEDURE — 71045 X-RAY EXAM CHEST 1 VIEW: CPT

## 2022-05-26 PROCEDURE — 99222 1ST HOSP IP/OBS MODERATE 55: CPT | Performed by: PSYCHIATRY & NEUROLOGY

## 2022-05-26 PROCEDURE — 36573 INSJ PICC RS&I 5 YR+: CPT

## 2022-05-26 PROCEDURE — 6360000002 HC RX W HCPCS: Performed by: PSYCHIATRY & NEUROLOGY

## 2022-05-26 PROCEDURE — C9254 INJECTION, LACOSAMIDE: HCPCS | Performed by: PSYCHIATRY & NEUROLOGY

## 2022-05-26 PROCEDURE — 6370000000 HC RX 637 (ALT 250 FOR IP): Performed by: INTERNAL MEDICINE

## 2022-05-26 PROCEDURE — 6360000002 HC RX W HCPCS: Performed by: FAMILY MEDICINE

## 2022-05-26 PROCEDURE — 2000000000 HC ICU R&B

## 2022-05-26 PROCEDURE — 2580000003 HC RX 258: Performed by: PSYCHIATRY & NEUROLOGY

## 2022-05-26 PROCEDURE — 2700000000 HC OXYGEN THERAPY PER DAY

## 2022-05-26 PROCEDURE — 85025 COMPLETE CBC W/AUTO DIFF WBC: CPT

## 2022-05-26 PROCEDURE — 36600 WITHDRAWAL OF ARTERIAL BLOOD: CPT

## 2022-05-26 PROCEDURE — 83036 HEMOGLOBIN GLYCOSYLATED A1C: CPT

## 2022-05-26 PROCEDURE — 2580000003 HC RX 258: Performed by: FAMILY MEDICINE

## 2022-05-26 PROCEDURE — 82947 ASSAY GLUCOSE BLOOD QUANT: CPT

## 2022-05-26 PROCEDURE — 82803 BLOOD GASES ANY COMBINATION: CPT

## 2022-05-26 PROCEDURE — 80048 BASIC METABOLIC PNL TOTAL CA: CPT

## 2022-05-26 PROCEDURE — 36415 COLL VENOUS BLD VENIPUNCTURE: CPT

## 2022-05-26 PROCEDURE — 94761 N-INVAS EAR/PLS OXIMETRY MLT: CPT

## 2022-05-26 PROCEDURE — 6360000002 HC RX W HCPCS: Performed by: INTERNAL MEDICINE

## 2022-05-26 PROCEDURE — 2500000003 HC RX 250 WO HCPCS: Performed by: FAMILY MEDICINE

## 2022-05-26 PROCEDURE — 93306 TTE W/DOPPLER COMPLETE: CPT

## 2022-05-26 PROCEDURE — 6370000000 HC RX 637 (ALT 250 FOR IP): Performed by: FAMILY MEDICINE

## 2022-05-26 PROCEDURE — 93880 EXTRACRANIAL BILAT STUDY: CPT

## 2022-05-26 PROCEDURE — 94660 CPAP INITIATION&MGMT: CPT

## 2022-05-26 PROCEDURE — 94640 AIRWAY INHALATION TREATMENT: CPT

## 2022-05-26 RX ORDER — HYDRALAZINE HYDROCHLORIDE 20 MG/ML
10 INJECTION INTRAMUSCULAR; INTRAVENOUS EVERY 6 HOURS PRN
Status: DISCONTINUED | OUTPATIENT
Start: 2022-05-26 | End: 2022-06-09 | Stop reason: HOSPADM

## 2022-05-26 RX ORDER — LORAZEPAM 1 MG/1
1 TABLET ORAL
Status: DISCONTINUED | OUTPATIENT
Start: 2022-05-26 | End: 2022-06-09 | Stop reason: HOSPADM

## 2022-05-26 RX ORDER — SODIUM CHLORIDE 0.9 % (FLUSH) 0.9 %
5-40 SYRINGE (ML) INJECTION PRN
Status: DISCONTINUED | OUTPATIENT
Start: 2022-05-26 | End: 2022-05-26 | Stop reason: SDUPTHER

## 2022-05-26 RX ORDER — LORAZEPAM 2 MG/ML
3 INJECTION INTRAMUSCULAR
Status: DISCONTINUED | OUTPATIENT
Start: 2022-05-26 | End: 2022-06-09 | Stop reason: HOSPADM

## 2022-05-26 RX ORDER — LORAZEPAM 2 MG/ML
1 INJECTION INTRAMUSCULAR
Status: DISCONTINUED | OUTPATIENT
Start: 2022-05-26 | End: 2022-06-09 | Stop reason: HOSPADM

## 2022-05-26 RX ORDER — SODIUM CHLORIDE 9 MG/ML
INJECTION, SOLUTION INTRAVENOUS CONTINUOUS
Status: DISCONTINUED | OUTPATIENT
Start: 2022-05-26 | End: 2022-05-27

## 2022-05-26 RX ORDER — LORAZEPAM 1 MG/1
2 TABLET ORAL
Status: DISCONTINUED | OUTPATIENT
Start: 2022-05-26 | End: 2022-06-09 | Stop reason: HOSPADM

## 2022-05-26 RX ORDER — IPRATROPIUM BROMIDE AND ALBUTEROL SULFATE 2.5; .5 MG/3ML; MG/3ML
1 SOLUTION RESPIRATORY (INHALATION) 2 TIMES DAILY
Status: DISCONTINUED | OUTPATIENT
Start: 2022-05-27 | End: 2022-05-31

## 2022-05-26 RX ORDER — SODIUM CHLORIDE 0.9 % (FLUSH) 0.9 %
5-40 SYRINGE (ML) INJECTION EVERY 12 HOURS SCHEDULED
Status: DISCONTINUED | OUTPATIENT
Start: 2022-05-26 | End: 2022-05-26 | Stop reason: SDUPTHER

## 2022-05-26 RX ORDER — GAUZE BANDAGE 2" X 2"
100 BANDAGE TOPICAL DAILY
Status: DISCONTINUED | OUTPATIENT
Start: 2022-05-26 | End: 2022-05-26 | Stop reason: SDUPTHER

## 2022-05-26 RX ORDER — ALBUTEROL SULFATE 2.5 MG/3ML
2.5 SOLUTION RESPIRATORY (INHALATION) EVERY 4 HOURS PRN
Status: DISCONTINUED | OUTPATIENT
Start: 2022-05-26 | End: 2022-06-09 | Stop reason: HOSPADM

## 2022-05-26 RX ORDER — LORAZEPAM 1 MG/1
3 TABLET ORAL
Status: DISCONTINUED | OUTPATIENT
Start: 2022-05-26 | End: 2022-06-09 | Stop reason: HOSPADM

## 2022-05-26 RX ORDER — LORAZEPAM 2 MG/ML
4 INJECTION INTRAMUSCULAR
Status: DISCONTINUED | OUTPATIENT
Start: 2022-05-26 | End: 2022-06-09 | Stop reason: HOSPADM

## 2022-05-26 RX ORDER — LORAZEPAM 2 MG/ML
2 INJECTION INTRAMUSCULAR
Status: DISCONTINUED | OUTPATIENT
Start: 2022-05-26 | End: 2022-06-09 | Stop reason: HOSPADM

## 2022-05-26 RX ORDER — HALOPERIDOL 5 MG/ML
5 INJECTION INTRAMUSCULAR EVERY 6 HOURS PRN
Status: DISCONTINUED | OUTPATIENT
Start: 2022-05-26 | End: 2022-06-09 | Stop reason: HOSPADM

## 2022-05-26 RX ORDER — LORAZEPAM 1 MG/1
4 TABLET ORAL
Status: DISCONTINUED | OUTPATIENT
Start: 2022-05-26 | End: 2022-06-09 | Stop reason: HOSPADM

## 2022-05-26 RX ORDER — IPRATROPIUM BROMIDE AND ALBUTEROL SULFATE 2.5; .5 MG/3ML; MG/3ML
1 SOLUTION RESPIRATORY (INHALATION)
Status: DISCONTINUED | OUTPATIENT
Start: 2022-05-26 | End: 2022-05-26

## 2022-05-26 RX ORDER — SODIUM CHLORIDE 9 MG/ML
INJECTION, SOLUTION INTRAVENOUS PRN
Status: DISCONTINUED | OUTPATIENT
Start: 2022-05-26 | End: 2022-05-26 | Stop reason: SDUPTHER

## 2022-05-26 RX ADMIN — IPRATROPIUM BROMIDE AND ALBUTEROL SULFATE 1 AMPULE: .5; 2.5 SOLUTION RESPIRATORY (INHALATION) at 16:54

## 2022-05-26 RX ADMIN — LACOSAMIDE 200 MG: 10 INJECTION, SOLUTION INTRAVENOUS at 15:13

## 2022-05-26 RX ADMIN — IPRATROPIUM BROMIDE AND ALBUTEROL SULFATE 1 AMPULE: .5; 2.5 SOLUTION RESPIRATORY (INHALATION) at 19:42

## 2022-05-26 RX ADMIN — LORAZEPAM 3 MG: 2 INJECTION INTRAMUSCULAR at 09:56

## 2022-05-26 RX ADMIN — HALOPERIDOL LACTATE 5 MG: 5 INJECTION, SOLUTION INTRAMUSCULAR at 00:33

## 2022-05-26 RX ADMIN — SODIUM CHLORIDE: 9 INJECTION, SOLUTION INTRAVENOUS at 06:20

## 2022-05-26 RX ADMIN — LORAZEPAM 2 MG: 2 INJECTION INTRAMUSCULAR at 07:18

## 2022-05-26 RX ADMIN — DEXTROSE MONOHYDRATE 12.5 MG/HR: 50 INJECTION, SOLUTION INTRAVENOUS at 05:15

## 2022-05-26 RX ADMIN — HYDRALAZINE HYDROCHLORIDE 10 MG: 20 INJECTION INTRAMUSCULAR; INTRAVENOUS at 20:19

## 2022-05-26 RX ADMIN — FOLIC ACID: 5 INJECTION, SOLUTION INTRAMUSCULAR; INTRAVENOUS; SUBCUTANEOUS at 08:17

## 2022-05-26 RX ADMIN — LACOSAMIDE 200 MG: 10 INJECTION, SOLUTION INTRAVENOUS at 21:02

## 2022-05-26 RX ADMIN — SODIUM CHLORIDE 0.2 MCG/KG/HR: 9 INJECTION, SOLUTION INTRAVENOUS at 13:51

## 2022-05-26 RX ADMIN — LORAZEPAM 2 MG: 2 INJECTION INTRAMUSCULAR at 08:47

## 2022-05-26 RX ADMIN — METOPROLOL TARTRATE 5 MG: 1 INJECTION, SOLUTION INTRAVENOUS at 09:13

## 2022-05-26 RX ADMIN — INSULIN LISPRO 2 UNITS: 100 INJECTION, SOLUTION INTRAVENOUS; SUBCUTANEOUS at 08:17

## 2022-05-26 RX ADMIN — LORAZEPAM 2 MG: 2 INJECTION INTRAMUSCULAR at 06:14

## 2022-05-26 RX ADMIN — METOPROLOL TARTRATE 5 MG: 1 INJECTION, SOLUTION INTRAVENOUS at 16:07

## 2022-05-26 RX ADMIN — SODIUM CHLORIDE, PRESERVATIVE FREE 10 ML: 5 INJECTION INTRAVENOUS at 20:20

## 2022-05-26 RX ADMIN — LORAZEPAM 1 MG: 2 INJECTION INTRAMUSCULAR; INTRAVENOUS at 04:04

## 2022-05-26 RX ADMIN — LORAZEPAM 2 MG: 2 INJECTION INTRAMUSCULAR at 11:18

## 2022-05-26 RX ADMIN — LEVETIRACETAM 500 MG: 100 INJECTION, SOLUTION INTRAVENOUS at 09:50

## 2022-05-26 ASSESSMENT — PAIN SCALES - GENERAL: PAINLEVEL_OUTOF10: 0

## 2022-05-26 NOTE — PROGRESS NOTES
Transitions of Care Pharmacy Service   Medication Review    The patient's list of current home medications has been reviewed and updated. Source(s) of information: [facility], Patient's anderson Montoya. She states he has been having issues getting scripts renewed from his doctor which is why he is overdue on filling so many medications. The only meds that have been filled recently are losartan, atenolol, and paxil. The rest were last filled fall of 2021. Anderson states he needs new scripts for the rest.     Please feel free to call with any questions about this encounter. Thank you. Priya Lutz [facility]  Transitions of Care Pharmacy Service  Phone:  [phone]  Fax: [phone]        Prior to Admission medications    Medication Sig Start Date End Date Taking?  Authorizing Provider   clopidogrel (PLAVIX) 75 MG tablet Take 1 tablet by mouth daily for 21 days 9/23/21 10/14/21  Edu Royal MD   aspirin 81 MG chewable tablet Take 1 tablet by mouth daily 2/26/21   Edu Royal MD   metFORMIN (GLUCOPHAGE) 500 MG tablet Take 1 tablet by mouth 2 times daily (with meals) 2/26/21   Edu Royal MD   atorvastatin (LIPITOR) 40 MG tablet Take 1 tablet by mouth nightly 2/26/21   Edu Royal MD   amLODIPine (NORVASC) 5 MG tablet Take 1 tablet by mouth daily 2/27/21   Edu Royal MD   PARoxetine (PAXIL) 40 MG tablet Take 40 mg by mouth daily    Historical Provider, MD   atenolol (TENORMIN) 50 MG tablet Take 50 mg by mouth daily    Historical Provider, MD   losartan (COZAAR) 100 MG tablet Take 100 mg by mouth daily    Historical Provider, MD

## 2022-05-26 NOTE — CONSULTS
[address]  RYLEY Al[address], [address] [facility]  Ph: [phone] or [phone]  FAX: [phone]        Reason for consult: Witnessed seizure and encephalopathy. I had the pleasure of seeing your patient in neurology consultation for his symptoms. As you would recall Emery Lal is a 73 y.o. yo male admitted on 5/25/2022. The history is obtained from the patient medical records. The patient has a past medical history of left temporoparietal ischemic stroke in February 2021 secondary to left ICA dissection for which he eventually underwent left ICA CEA in September 2021. The patient also has a past medical history of alcoholism. Reportedly, he had a witnessed generalized tonic-clonic seizure witnessed by the neighbor. Upon arrival, he was initially alert awake oriented x4 however had another seizure for which she received Ativan 2 mg +2 mg and Keppra 1 g. The patient was agitated, restless and requiring restraints. There is no previous history of seizure disorder. CT scan of the head was negative for acute process. Patient typically underwent MRI scan of the brain which was also negative for acute process. For his agitation, he has been started on CIWA protocol and receiving Versed. He is also on Precedex. Past Medical History:   Diagnosis Date    Diabetes mellitus ([facility] [address]     Hypertension      Past Surgical History:   Procedure Laterality Date    CAROTID ENDARTERECTOMY  09/22/2021    CAROTID ENDARTERECTOMY (N/A Neck    CAROTID ENDARTERECTOMY N/A 9/22/2021    CAROTID ENDARTERECTOMY performed by Asiya Osman MD at [address] [place] [address]       Social History     Socioeconomic History    Marital status:       Spouse name: Not on file    Number of children: Not on file    Years of education: Not on file    Highest education level: Not on file   Occupational History    Not on file   Tobacco Use    Smoking status: Current Every Day Smoker     Packs/day: 1.50     Start date: 6/1/1973    Smokeless tobacco: Never Used   Substance and Sexual Activity    Alcohol use: Yes     Alcohol/week: 4.0 standard drinks     Types: 4 Cans of beer per week     Comment: Occassional, four times weekly    Drug use: No    Sexual activity: Yes     Partners: Female   Other Topics Concern    Not on file   Social History Narrative    Not on file     Social Determinants of Health     Financial Resource Strain:     Difficulty of Paying Living Expenses: Not on file   Food Insecurity:     Worried About Running Out of Food in the Last Year: Not on file    Dylon of Food in the Last Year: Not on file   Transportation Needs:     Lack of Transportation (Medical): Not on file    Lack of Transportation (Non-Medical): Not on file   Physical Activity:     Days of Exercise per Week: Not on file    Minutes of Exercise per Session: Not on file   Stress:     Feeling of Stress : Not on file   Social Connections:     Frequency of Communication with Friends and Family: Not on file    Frequency of Social Gatherings with Friends and Family: Not on file    Attends Confucianist Services: Not on file    Active Member of [address] [place] [address] [employer] or Organizations: Not on file    Attends Club or Organization Meetings: Not on file    Marital Status: Not on file   Intimate Partner Violence:     Fear of Current or Ex-Partner: Not on file    Emotionally Abused: Not on file    Physically Abused: Not on file    Sexually Abused: Not on file   Housing Stability:     Unable to Pay for Housing in the Last Year: Not on file    Number of [place] in the Last Year: Not on file    Unstable Housing in the Last Year: Not on file     History reviewed. No pertinent family history.    No Known Allergies   BP (!) 155/62   Pulse 93   Temp 97.4 °F (36.3 °C) (Axillary)   Resp 22   Ht 5' 6\" (1.676 m)   Wt 180 lb (81.6 kg)   SpO2 90%   BMI 29.05 kg/m²      ROS: Unable to obtain due to patient's including those of syntax and sound a- like substitutions which may escape proofreading. In such instances, actual meaning can be extrapolated by contextual derivation.

## 2022-05-26 NOTE — RT PROTOCOL NOTE
RT Inhaler-Nebulizer Bronchodilator Protocol Note    There is a bronchodilator order in the chart from a provider indicating to follow the RT Bronchodilator Protocol and there is an Initiate RT Inhaler-Nebulizer Bronchodilator Protocol order as well (see protocol at bottom of note). CXR Findings:  No results found. The findings from the last RT Protocol Assessment were as follows:   History Pulmonary Disease: Smoker 15 pack years or more  Respiratory Pattern: Dyspnea on exertion or RR 21-25 bpm  Breath Sounds: Severe inspiratory and expiratory wheezing or severely diminished  Cough: Weak, productive  Indication for Bronchodilator Therapy: Decreased or absent breath sounds  Bronchodilator Assessment Score: 13    Aerosolized bronchodilator medication orders have been revised according to the RT Inhaler-Nebulizer Bronchodilator Protocol below. Respiratory Therapist to perform RT Therapy Protocol Assessment initially then follow the protocol. Repeat RT Therapy Protocol Assessment PRN for score 0-3 or on second treatment, BID, and PRN for scores above 3. No Indications - adjust the frequency to every 6 hours PRN wheezing or bronchospasm, if no treatments needed after 48 hours then discontinue using Per Protocol order mode. If indication present, adjust the RT bronchodilator orders based on the Bronchodilator Assessment Score as indicated below. Use Inhaler orders unless patient has one or more of the following: on home nebulizer, not able to hold breath for 10 seconds, is not alert and oriented, cannot activate and use MDI correctly, or respiratory rate 25 breaths per minute or more, then use the equivalent nebulizer order(s) with same Frequency and PRN reasons based on the score. If a patient is on this medication at home then do not decrease Frequency below that used at home.     0-3 - enter or revise RT bronchodilator order(s) to equivalent RT Bronchodilator order with Frequency of every 4 hours PRN for wheezing or increased work of breathing using Per Protocol order mode. 4-6 - enter or revise RT Bronchodilator order(s) to two equivalent RT bronchodilator orders with one order with BID Frequency and one order with Frequency of every 4 hours PRN wheezing or increased work of breathing using Per Protocol order mode. 7-10 - enter or revise RT Bronchodilator order(s) to two equivalent RT bronchodilator orders with one order with TID Frequency and one order with Frequency of every 4 hours PRN wheezing or increased work of breathing using Per Protocol order mode. 11-13 - enter or revise RT Bronchodilator order(s) to one equivalent RT bronchodilator order with QID Frequency and an Albuterol order with Frequency of every 4 hours PRN wheezing or increased work of breathing using Per Protocol order mode. Greater than 13 - enter or revise RT Bronchodilator order(s) to one equivalent RT bronchodilator order with every 4 hours Frequency and an Albuterol order with Frequency of every 2 hours PRN wheezing or increased work of breathing using Per Protocol order mode. RT to enter RT Home Evaluation for COPD & MDI Assessment order using Per Protocol order mode.     Electronically signed by Miguelangel Melvin RCP on 5/26/2022 at 5:47 PM

## 2022-05-26 NOTE — PROGRESS NOTES
Patient continues to be very restless, has been getting ativan almost every hour. Page sent to Dr. Matthew Castillo that patient is labored breathing and restless.

## 2022-05-26 NOTE — PROGRESS NOTES
Patient has hx of alcohol abuse per patient's fiance. Blood work in ED showed no evidence of ethanol in tox screen. Dr. Jessi Barron wants order for CIWA scale. First CIWA score 11. Writer administered 2mg of ativan.

## 2022-05-26 NOTE — PROGRESS NOTES
Patient's friend Simon Anaya was able to contact patient's son, Andrew Daly, his number is [phone].

## 2022-05-26 NOTE — PROGRESS NOTES
RN administered Haldol IM and waited to see how patient responded before initiated restraints. Patient still pulling off monitor cords. Writer checked with [employer], Brisa Jorgensen, to see if it is ok to initiate soft restraints if patient is under seizure precautions. [facility] policy regarding restraints state no contraindications when under seizure precautions. Restraints ordered and initiated per protocol. RN notified patient's Chung Feeler, of restraints. All questions asked to satisfaction. Will continue to monitor patient.

## 2022-05-26 NOTE — PROGRESS NOTES
Patient admitted to ICU room 1114. /104(129), . Neurology was consulted by ED RN and ordered IV Keppra BID. First dose started. Patient is very lethargic and restless; RN unable to obtain admission database at this time. Patient trying to get out of bed and pulling at monitor cords. No PRN BP or anxiety medications ordered. Writer contacted Dr. Kirk Knox who ordered Lopressor 5 mg IV Q6hrs PRN for SBP >150 and Ativan 1 mg IV Q6hrs PRN. Both Lopressor and Ativan given. Will continue to monitor.

## 2022-05-26 NOTE — H&P
History & Physical  [facility].,    Adult Hospitalist      Name: Daiana Gray  MRN: [id number]     Acct: [de-identified]  Room: [address] [place] [address]    Admit Date: 5/25/2022 12:59 PM  PCP: DENNIS Washburn    Primary Problem  Principal Problem:    Seizure ([address]  Resolved Problems:    * No resolved hospital problems. *        Assesment:     · New onset seizure  · Old infarct left frontal parietal lobes, new since 9/2021  · Old infarction left occipital lobe, increased since 9/2021  · 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 / Encephalomalacia   · Old lacunar infarcts left basal ganglia   · Mild brain parenchymal volume loss  · Alcohol dependence   · Reported right hemiparesis   · Essential hypertension  · Diabetes mellitus type 2  · Overweight   · Diverticulosis  · Agitation   · Delirium tremens     · Acute respiratory failure with hypoxia         Plan:     · Admit ICU  · Monitor vitals closely  · Keep SpO2 above 90%  · Is/ Os  · IV fluids  · Ativan Prn  · Haldol for agitation  · Soft restraints   · Neuro consulted in ED  · Cardene gtt for BP control    · NPO  · Neuro checks  · Seizure precaution  · Fall precaution   · CIWA protocol   · accu checks AC and HS  · ISS medium  · Hypoglycemia protocol  · Serial cardiac enzymes  · EKG  · Consult Pulmonology  · CBC, BMP  · Prolactin   · DVT and GI prophylaxis. Chief Complaint:     Chief Complaint   Patient presents with    Seizures         History of Present Illness:      Daiana Gray is a 73 y.o.  male who presents with Seizures    Pt admitted through the ED after an episode of seizure at home. Apparently pt lives at home with his fiancee. According to her, he was sitting in his chair yesterday morning when he developed a seizure. It is difficult to determine what kind of seizure it was. His fiancee is not at bedside right now. However it was confirmed that the pt has never had a similar episode before in his life.  It is also reported that he wanted to stop alcohol use however she reported him having a beer the evening before. Urine drug screen has been reported unremarkable and ETOH level was zero. Alcohol withdrawal seizure was suspected though seemed less likely if pt had continued to drink alcohol. Pt apparently drinks 4 beers daily    Pt was admitted to the ICU where he continued to remain agitated. Higher and frequent use of Ativan and later Haldol were ineffective. Pt required soft restraints since he was unsafe for himself trying to get out of bed and trying to pull out Iv    Pt was hypertensive with BP > 200/140 which was not improved w prn meds and was started on Cardene infusion. However pt continued to remain agitated and it was suspected he may be in DTs. Pt was placed on the CIWA protocol and was started on IV fluids and Thiamine, Folate    ROS is difficult to obtain presently    I have personally reviewed the past medical history, past surgical history, medications, social history, and family history, and summarized in the note. Review of Systems:     All 10 point system is reviewed and negative otherwise mentioned in HPI. Past Medical History:     Past Medical History:   Diagnosis Date    Diabetes mellitus ([facility] [address]     Hypertension         Past Surgical History:     Past Surgical History:   Procedure Laterality Date    CAROTID ENDARTERECTOMY  09/22/2021    CAROTID ENDARTERECTOMY (N/A Neck    CAROTID ENDARTERECTOMY N/A 9/22/2021    CAROTID ENDARTERECTOMY performed by Levi Herrera MD at [address] [place] [address]          Medications Prior to Admission:       Prior to Admission medications    Medication Sig Start Date End Date Taking?  Authorizing Provider   clopidogrel (PLAVIX) 75 MG tablet Take 1 tablet by mouth daily for 21 days 9/23/21 10/14/21  Zoraida Robledo MD   aspirin 81 MG chewable tablet Take 1 tablet by mouth daily 2/26/21   Zoraida Robledo MD   metFORMIN (GLUCOPHAGE) 500 MG tablet Take 1 tablet by mouth 2 times daily (with meals) 21   Sarah Prescott MD   atorvastatin (LIPITOR) 40 MG tablet Take 1 tablet by mouth nightly 21   Sarah Prescott MD   amLODIPine (NORVASC) 5 MG tablet Take 1 tablet by mouth daily 21   Sarah Prescott MD   PARoxetine (PAXIL) 40 MG tablet Take 40 mg by mouth daily    Historical Provider, MD   atenolol (TENORMIN) 50 MG tablet Take 50 mg by mouth daily    Historical Provider, MD   losartan (COZAAR) 100 MG tablet Take 100 mg by mouth daily    Historical Provider, MD        Allergies:       Patient has no known allergies. Social History:     Tobacco:    reports that he has been smoking. He started smoking about 49 years ago. He has been smoking about 1.50 packs per day. He has never used smokeless tobacco.  Alcohol:      reports current alcohol use of about 4.0 standard drinks of alcohol per week. Drug Use:  reports no history of drug use. Family History:     History reviewed. No pertinent family history.       Physical Exam:     Vitals:  BP (!) 209/178   Pulse 100   Temp 98.1 °F (36.7 °C) (Oral)   Resp 23   Ht 5' 6\" (1.676 m)   Wt 180 lb (81.6 kg)   SpO2 100%   BMI 29.05 kg/m²   Temp (24hrs), Av °F (36.7 °C), Min:97.8 °F (36.6 °C), Max:98.1 °F (36.7 °C)      General appearance - stupor, and in mild acute distress  Mental status - not oriented to person, place, and time with normal affect  Head - normocephalic and atraumatic  Eyes - pupils equal and reactive, extraocular eye movements intact, conjunctiva clear  Ears - hearing unable to evaluate sec to pt condition  Nose - no drainage noted  Mouth - mucous membranes moist  Neck - supple, no carotid bruits, thyroid not palpable  Chest - clear to auscultation, normal effort  Heart - normal rate, regular rhythm, no murmur  Abdomen - soft, nontender, nondistended, bowel sounds present all four quadrants, no masses, hepatomegaly or splenomegaly  Neurological - stupor  Extremities - peripheral pulses palpable, no pedal edema or calf pain with palpation  Skin - no gross lesions, rashes, or induration noted        Data:     Labs:    Hematology:  Recent Labs     05/25/22  1307   WBC 7.3   RBC 4.60   HGB 14.6   HCT 43.2   MCV 93.9   MCH 31.7   MCHC 33.8   RDW 12.7      MPV 9.2     Chemistry:  Recent Labs     05/25/22  1307   *   K 4.2   CL 93*   CO2 19*   GLUCOSE 225*   BUN 18   CREATININE 1.27*   ANIONGAP 17   LABGLOM 57*   GFRAA >60   CALCIUM 9.1     Recent Labs     05/25/22  1307   PROT 7.0   LABALBU 3.9   AST 24   ALT 11   ALKPHOS 78   BILITOT 0.51   LIPASE 31       Lab Results   Component Value Date    INR 1.1 09/22/2021    INR 1.0 09/20/2021    INR 1.0 02/26/2021    PROTIME 13.7 09/22/2021    PROTIME 12.7 09/20/2021    PROTIME 12.8 02/26/2021       Lab Results   Component Value Date/Time    SPECIAL NOT REPORTED 02/26/2021 09:30 AM     Lab Results   Component Value Date/Time    CULTURE NO GROWTH 3 DAYS 02/26/2021 09:30 AM       No results found for: POCPH, PHART, PH, POCPCO2, SGQ5ZQL, PCO2, POCPO2, PO2ART, PO2, POCHCO3, VIV6CPW, HCO3, NBEA, PBEA, BEART, BE, THGBART, THB, IWH3GAF, SJMH4MQI, X5XAVDFK, O2SAT, FIO2    Radiology:    CT HEAD WO CONTRAST    Result Date: 5/25/2022  No acute intracranial hemorrhage or abnormal extra-axial collection. Relative to 9 months prior there are increased areas of encephalomalacia in the left occipital lobe and new areas of encephalomalacia in the left parietal lobe. If there is persistent clinical concern for acute on chronic ischemia, MRI is more sensitive. CT CHEST ABDOMEN PELVIS W CONTRAST    Result Date: 5/25/2022  Small cysts noted in the liver. Diverticular disease of the colon without diverticulitis. Significant thickening of the bladder wall, in keeping with muscular hypertrophy or cystitis. Clinical correlation suggested. Degenerative changes demonstrated in the the thoracic and lumbar spine.  RECOMMENDATIONS:     MRI BRAIN W WO CONTRAST    Result Date: 5/25/2022  No acute intracranial abnormality. No mass effect or abnormal intracranial enhancement. Old infarctions in the left frontal parietal lobes, new since September 21, 2021. Old infarction in the left occipital lobe, likely increased in size since September 21, 2021. Laminar necrosis and gliosis associated with the old infarctions. Old lacunar infarcts in the left basal ganglia, likely increased. Mild parenchymal volume loss. All radiological studies reviewed                Code Status:  Full Code    Electronically signed by Christine Gan MD on 5/25/2022 at 11:09 PM     Copy sent to DENNIS Duke    This note was created with the assistance of a speech-recognition program.  Although the intention is to generate a document that actually reflects the content of the visit, no guarantees can be provided that every mistake has been identified and corrected by editing. Note was updated later by me after  physical examination and  completion of the assessment.

## 2022-05-26 NOTE — RT PROTOCOL NOTE
RT Inhaler-Nebulizer Bronchodilator Protocol Note    There is a bronchodilator order in the chart from a provider indicating to follow the RT Bronchodilator Protocol and there is an Initiate RT Inhaler-Nebulizer Bronchodilator Protocol order as well (see protocol at bottom of note). CXR Findings:  No results found. The findings from the last RT Protocol Assessment were as follows:   History Pulmonary Disease: Smoker 15 pack years or more  Respiratory Pattern: Dyspnea on exertion or RR 21-25 bpm  Breath Sounds: Slightly diminished and/or crackles  Cough: Strong, spontaneous, non-productive  Indication for Bronchodilator Therapy: Decreased or absent breath sounds  Bronchodilator Assessment Score: 5    Aerosolized bronchodilator medication orders have been revised according to the RT Inhaler-Nebulizer Bronchodilator Protocol below. Respiratory Therapist to perform RT Therapy Protocol Assessment initially then follow the protocol. Repeat RT Therapy Protocol Assessment PRN for score 0-3 or on second treatment, BID, and PRN for scores above 3. No Indications - adjust the frequency to every 6 hours PRN wheezing or bronchospasm, if no treatments needed after 48 hours then discontinue using Per Protocol order mode. If indication present, adjust the RT bronchodilator orders based on the Bronchodilator Assessment Score as indicated below. Use Inhaler orders unless patient has one or more of the following: on home nebulizer, not able to hold breath for 10 seconds, is not alert and oriented, cannot activate and use MDI correctly, or respiratory rate 25 breaths per minute or more, then use the equivalent nebulizer order(s) with same Frequency and PRN reasons based on the score. If a patient is on this medication at home then do not decrease Frequency below that used at home.     0-3 - enter or revise RT bronchodilator order(s) to equivalent RT Bronchodilator order with Frequency of every 4 hours PRN for wheezing or increased work of breathing using Per Protocol order mode. 4-6 - enter or revise RT Bronchodilator order(s) to two equivalent RT bronchodilator orders with one order with BID Frequency and one order with Frequency of every 4 hours PRN wheezing or increased work of breathing using Per Protocol order mode. 7-10 - enter or revise RT Bronchodilator order(s) to two equivalent RT bronchodilator orders with one order with TID Frequency and one order with Frequency of every 4 hours PRN wheezing or increased work of breathing using Per Protocol order mode. 11-13 - enter or revise RT Bronchodilator order(s) to one equivalent RT bronchodilator order with QID Frequency and an Albuterol order with Frequency of every 4 hours PRN wheezing or increased work of breathing using Per Protocol order mode. Greater than 13 - enter or revise RT Bronchodilator order(s) to one equivalent RT bronchodilator order with every 4 hours Frequency and an Albuterol order with Frequency of every 2 hours PRN wheezing or increased work of breathing using Per Protocol order mode. RT to enter RT Home Evaluation for COPD & MDI Assessment order using Per Protocol order mode.     Electronically signed by Pollo Cheung RCP on 5/26/2022 at 7:49 PM

## 2022-05-26 NOTE — PLAN OF CARE
Problem: Discharge Planning  Goal: Discharge to home or other facility with appropriate resources  Outcome: Progressing     Problem: Safety - Medical Restraint  Goal: Remains free of injury from restraints (Restraint for Interference with Medical Device)  Description: INTERVENTIONS:  1. Determine that other, less restrictive measures have been tried or would not be effective before applying the restraint  2. Evaluate the patient's condition at the time of restraint application  3. Inform patient/family regarding the reason for restraint  4.  Q2H: Monitor safety, psychosocial status, comfort, nutrition and hydration  Outcome: Progressing  Flowsheets  Taken 5/26/2022 0200  Remains free of injury from restraints (restraint for interference with medical device):   Determine that other, less restrictive measures have been tried or would not be effective before applying the restraint   Evaluate the patient's condition at the time of restraint application   Inform patient/family regarding the reason for restraint   Every 2 hours: Monitor safety, psychosocial status, comfort, nutrition and hydration  Taken 5/26/2022 0100  Remains free of injury from restraints (restraint for interference with medical device):   Determine that other, less restrictive measures have been tried or would not be effective before applying the restraint   Evaluate the patient's condition at the time of restraint application   Inform patient/family regarding the reason for restraint   Every 2 hours: Monitor safety, psychosocial status, comfort, nutrition and hydration     Problem: Neurosensory - Adult  Goal: Achieves stable or improved neurological status  Outcome: Progressing  Goal: Absence of seizures  Outcome: Progressing  Goal: Remains free of injury related to seizures activity  Outcome: Progressing  Goal: Achieves maximal functionality and self care  Outcome: Progressing     Problem: Respiratory - Adult  Goal: Achieves optimal ventilation and oxygenation  Outcome: Progressing     Problem: Cardiovascular - Adult  Goal: Maintains optimal cardiac output and hemodynamic stability  Outcome: Progressing  Goal: Absence of cardiac dysrhythmias or at baseline  Outcome: Progressing     Problem: Skin/Tissue Integrity - Adult  Goal: Skin integrity remains intact  Outcome: Progressing  Goal: Incisions, wounds, or drain sites healing without S/S of infection  Outcome: Progressing  Goal: Oral mucous membranes remain intact  Outcome: Progressing     Problem: Metabolic/Fluid and Electrolytes - Adult  Goal: Electrolytes maintained within normal limits  Outcome: Progressing  Goal: Hemodynamic stability and optimal renal function maintained  Outcome: Progressing  Goal: Glucose maintained within prescribed range  Outcome: Progressing     Problem: Anxiety  Goal: Will report anxiety at manageable levels  Description: INTERVENTIONS:  1. Administer medication as ordered  2. Teach and rehearse alternative coping skills  3. Provide emotional support with 1:1 interaction with staff  Outcome: Progressing     Problem: Confusion  Goal: Confusion, delirium, dementia, or psychosis is improved or at baseline  Description: INTERVENTIONS:  1. Assess for possible contributors to thought disturbance, including medications, impaired vision or hearing, underlying metabolic abnormalities, dehydration, psychiatric diagnoses, and notify attending LIP  2. Dixie high risk fall precautions, as indicated  3. Provide frequent short contacts to provide reality reorientation, refocusing and direction  4. Decrease environmental stimuli, including noise as appropriate  5. Monitor and intervene to maintain adequate nutrition, hydration, elimination, sleep and activity  6. If unable to ensure safety without constant attention obtain sitter and review sitter guidelines with assigned personnel  7.  Initiate Psychosocial CNS and Spiritual Care consult, as indicated  Outcome: Progressing     Problem: Behavior  Goal: Pt/Family maintain appropriate behavior and adhere to behavioral management agreement, if implemented  Description: INTERVENTIONS:  1. Assess patient/family's coping skills and  non-compliant behavior (including use of illegal substances)  2. Notify security of behavior or suspected illegal substances which indicate the need for search of the patient and/or belongings  3. Encourage verbalization of thoughts and concerns in a socially appropriate manner  4. Utilize positive, consistent limit setting strategies supporting safety of patient, staff and others  5. Encourage participation in the decision making process about the behavioral management agreement  6. Implement a Health Care Agreement if patient meets criteria  7. If a patient's behavior jeopardizes the safety of the patient, staff, or others refer to organization policy. If a visitor's behavior poses a threat to safety call refer to organization policy.   8. Initiate consult with , Psychosocial CNS, Spiritual Care as appropriate  Outcome: Progressing

## 2022-05-26 NOTE — PROGRESS NOTES
Patient remains pulling all monitor cords and gown off, trying to get out of bed. RN contacted Dr. Gregg Nina again who orders Haldol 5mg IM Q6hrs PRN and soft physical restraints for max 6 hours.

## 2022-05-26 NOTE — ED NOTES
Writer spoke with Dr. Kennedy Black.   Verbal order for 500mg keppra 2x daily and agrees to consult tomorrow       Elsi Rock RN  05/25/22 3410

## 2022-05-26 NOTE — FLOWSHEET NOTE
[facility]  PROGRESS NOTE    Room # 5275/4632-78   Name: Irene Jeff            Zoroastrianism: None     Reason for visit: Routine    I visited the patient. Admit Date & Time: 5/25/2022 12:59 PM    Assessment:  Irene Jeff is a 73 y.o. male in the hospital do to a seizure. Upon entering the room the patient was sleeping, no family members present. Intervention:  Writer provided a silent prayer of healing, comfort, and rest during his stay. Writer also left a prayer card as additional spiritual support. Plan:  Chaplains will remain available to offer spiritual and emotional support as needed.     Electronically signed by Kami Thompson on 5/26/2022 at 12:23 PM.  Rj     05/26/22 1219   Encounter Summary   Service Provided For: Patient   Referral/Consult From: Leonides   Last Encounter  05/26/22  (5/26/2022 Sleeping)   Complexity of Encounter Low   Begin Time 1100   End Time  1110   Total Time Calculated 10 min   Encounter    Type Initial Screen/Assessment   Assessment/Intervention/Outcome   Assessment Unable to assess  (Sleeping)   Intervention Prayer (assurance of)/Belva;Read/Provided Scripture   Plan and Referrals   Plan/Referrals Continue to visit, (comment)

## 2022-05-26 NOTE — PROGRESS NOTES
Patient's work of breathing and oxygen demands have increased throughout the day. Dr. Frank Mosley notified and orders placed for a PICC, duoneb, stat chest xray, and ABG.

## 2022-05-26 NOTE — ED NOTES
Pt resting on stretcher, remains on full telemetry monitoring with alarms set.   Awaiting floor bed       Jordyn Maldonado RN  05/25/22 2049

## 2022-05-27 ENCOUNTER — APPOINTMENT (OUTPATIENT)
Dept: INTERVENTIONAL RADIOLOGY/VASCULAR | Age: 67
DRG: 056 | End: 2022-05-27
Payer: MEDICARE

## 2022-05-27 LAB
ABSOLUTE EOS #: 0.05 K/UL (ref 0–0.44)
ABSOLUTE IMMATURE GRANULOCYTE: 0.03 K/UL (ref 0–0.3)
ABSOLUTE LYMPH #: 1.08 K/UL (ref 1.1–3.7)
ABSOLUTE MONO #: 0.96 K/UL (ref 0.1–1.2)
AMMONIA: 23 UMOL/L (ref 16–60)
ANION GAP SERPL CALCULATED.3IONS-SCNC: 12 MMOL/L (ref 9–17)
BASOPHILS # BLD: 0 % (ref 0–2)
BASOPHILS ABSOLUTE: 0.03 K/UL (ref 0–0.2)
BUN BLDV-MCNC: 10 MG/DL (ref 8–23)
BUN/CREAT BLD: 9 (ref 9–20)
CALCIUM SERPL-MCNC: 8.7 MG/DL (ref 8.6–10.4)
CHLORIDE BLD-SCNC: 98 MMOL/L (ref 98–107)
CO2: 24 MMOL/L (ref 20–31)
CREAT SERPL-MCNC: 1.14 MG/DL (ref 0.7–1.2)
EOSINOPHILS RELATIVE PERCENT: 1 % (ref 1–4)
FIO2: 30
GFR AFRICAN AMERICAN: >60 ML/MIN
GFR NON-AFRICAN AMERICAN: >60 ML/MIN
GFR SERPL CREATININE-BSD FRML MDRD: ABNORMAL ML/MIN/{1.73_M2}
GLUCOSE BLD-MCNC: 125 MG/DL (ref 75–110)
GLUCOSE BLD-MCNC: 127 MG/DL (ref 70–99)
GLUCOSE BLD-MCNC: 135 MG/DL (ref 75–110)
GLUCOSE BLD-MCNC: 154 MG/DL (ref 75–110)
GLUCOSE BLD-MCNC: 169 MG/DL (ref 75–110)
HCT VFR BLD CALC: 37.4 % (ref 40.7–50.3)
HEMOGLOBIN: 12.7 G/DL (ref 13–17)
IMMATURE GRANULOCYTES: 0 %
LYMPHOCYTES # BLD: 10 % (ref 24–43)
MCH RBC QN AUTO: 32.2 PG (ref 25.2–33.5)
MCHC RBC AUTO-ENTMCNC: 34 G/DL (ref 28–38)
MCV RBC AUTO: 94.9 FL (ref 82.6–102.9)
MODE: NORMAL
MONOCYTES # BLD: 9 % (ref 3–12)
PATIENT TEMP: 37
PDW BLD-RTO: 13.1 % (ref 11.8–14.4)
PLATELET # BLD: 169 K/UL (ref 138–453)
PMV BLD AUTO: 9.3 FL (ref 8.1–13.5)
POC HCO3: 24.4 MMOL/L (ref 21–28)
POC O2 SATURATION: 97 % (ref 94–98)
POC PCO2: 39.7 MM HG (ref 35–48)
POC PH: 7.4 (ref 7.35–7.45)
POC PO2: 87.5 MM HG (ref 83–108)
POC TCO2: 25 MMOL/L (ref 22–30)
POSITIVE BASE EXCESS, ART: 0 (ref 0–3)
POTASSIUM SERPL-SCNC: 3.8 MMOL/L (ref 3.7–5.3)
RBC # BLD: 3.94 M/UL (ref 4.21–5.77)
SEG NEUTROPHILS: 80 % (ref 36–65)
SEGMENTED NEUTROPHILS ABSOLUTE COUNT: 8.49 K/UL (ref 1.5–8.1)
SODIUM BLD-SCNC: 134 MMOL/L (ref 135–144)
WBC # BLD: 10.6 K/UL (ref 3.5–11.3)

## 2022-05-27 PROCEDURE — 2000000000 HC ICU R&B

## 2022-05-27 PROCEDURE — 99233 SBSQ HOSP IP/OBS HIGH 50: CPT | Performed by: PSYCHIATRY & NEUROLOGY

## 2022-05-27 PROCEDURE — 6360000002 HC RX W HCPCS: Performed by: NURSE PRACTITIONER

## 2022-05-27 PROCEDURE — 94761 N-INVAS EAR/PLS OXIMETRY MLT: CPT

## 2022-05-27 PROCEDURE — 2580000003 HC RX 258: Performed by: FAMILY MEDICINE

## 2022-05-27 PROCEDURE — 82803 BLOOD GASES ANY COMBINATION: CPT

## 2022-05-27 PROCEDURE — C9254 INJECTION, LACOSAMIDE: HCPCS | Performed by: PSYCHIATRY & NEUROLOGY

## 2022-05-27 PROCEDURE — 36415 COLL VENOUS BLD VENIPUNCTURE: CPT

## 2022-05-27 PROCEDURE — 02HV33Z INSERTION OF INFUSION DEVICE INTO SUPERIOR VENA CAVA, PERCUTANEOUS APPROACH: ICD-10-PCS | Performed by: INTERNAL MEDICINE

## 2022-05-27 PROCEDURE — 6370000000 HC RX 637 (ALT 250 FOR IP): Performed by: FAMILY MEDICINE

## 2022-05-27 PROCEDURE — 6370000000 HC RX 637 (ALT 250 FOR IP): Performed by: INTERNAL MEDICINE

## 2022-05-27 PROCEDURE — C1751 CATH, INF, PER/CENT/MIDLINE: HCPCS

## 2022-05-27 PROCEDURE — 36600 WITHDRAWAL OF ARTERIAL BLOOD: CPT

## 2022-05-27 PROCEDURE — 2700000000 HC OXYGEN THERAPY PER DAY

## 2022-05-27 PROCEDURE — 2500000003 HC RX 250 WO HCPCS: Performed by: FAMILY MEDICINE

## 2022-05-27 PROCEDURE — 2580000003 HC RX 258: Performed by: PSYCHIATRY & NEUROLOGY

## 2022-05-27 PROCEDURE — 82374 ASSAY BLOOD CARBON DIOXIDE: CPT

## 2022-05-27 PROCEDURE — 80048 BASIC METABOLIC PNL TOTAL CA: CPT

## 2022-05-27 PROCEDURE — 94640 AIRWAY INHALATION TREATMENT: CPT

## 2022-05-27 PROCEDURE — 82947 ASSAY GLUCOSE BLOOD QUANT: CPT

## 2022-05-27 PROCEDURE — 6360000002 HC RX W HCPCS: Performed by: FAMILY MEDICINE

## 2022-05-27 PROCEDURE — 2580000003 HC RX 258: Performed by: INTERNAL MEDICINE

## 2022-05-27 PROCEDURE — 85025 COMPLETE CBC W/AUTO DIFF WBC: CPT

## 2022-05-27 PROCEDURE — 6360000002 HC RX W HCPCS: Performed by: PSYCHIATRY & NEUROLOGY

## 2022-05-27 PROCEDURE — 94660 CPAP INITIATION&MGMT: CPT

## 2022-05-27 PROCEDURE — 6360000002 HC RX W HCPCS: Performed by: INTERNAL MEDICINE

## 2022-05-27 PROCEDURE — 82140 ASSAY OF AMMONIA: CPT

## 2022-05-27 RX ORDER — BUDESONIDE 0.5 MG/2ML
0.5 INHALANT ORAL 2 TIMES DAILY
Status: DISCONTINUED | OUTPATIENT
Start: 2022-05-27 | End: 2022-06-09 | Stop reason: HOSPADM

## 2022-05-27 RX ORDER — METHYLPREDNISOLONE SODIUM SUCCINATE 40 MG/ML
40 INJECTION, POWDER, LYOPHILIZED, FOR SOLUTION INTRAMUSCULAR; INTRAVENOUS EVERY 6 HOURS
Status: DISCONTINUED | OUTPATIENT
Start: 2022-05-27 | End: 2022-06-02

## 2022-05-27 RX ORDER — SODIUM CHLORIDE 9 MG/ML
INJECTION, SOLUTION INTRAVENOUS CONTINUOUS
Status: DISCONTINUED | OUTPATIENT
Start: 2022-05-27 | End: 2022-05-29

## 2022-05-27 RX ORDER — PHENOBARBITAL SODIUM 65 MG/ML
65 INJECTION INTRAMUSCULAR 2 TIMES DAILY
Status: DISCONTINUED | OUTPATIENT
Start: 2022-05-27 | End: 2022-05-28

## 2022-05-27 RX ORDER — ENOXAPARIN SODIUM 100 MG/ML
40 INJECTION SUBCUTANEOUS DAILY
Status: DISCONTINUED | OUTPATIENT
Start: 2022-05-27 | End: 2022-06-09 | Stop reason: HOSPADM

## 2022-05-27 RX ORDER — PHENOBARBITAL SODIUM 65 MG/ML
130 INJECTION INTRAMUSCULAR ONCE
Status: COMPLETED | OUTPATIENT
Start: 2022-05-27 | End: 2022-05-27

## 2022-05-27 RX ADMIN — SODIUM CHLORIDE 0.2 MCG/KG/HR: 9 INJECTION, SOLUTION INTRAVENOUS at 22:51

## 2022-05-27 RX ADMIN — HYDRALAZINE HYDROCHLORIDE 10 MG: 20 INJECTION INTRAMUSCULAR; INTRAVENOUS at 23:03

## 2022-05-27 RX ADMIN — HYDRALAZINE HYDROCHLORIDE 10 MG: 20 INJECTION INTRAMUSCULAR; INTRAVENOUS at 17:49

## 2022-05-27 RX ADMIN — METHYLPREDNISOLONE SODIUM SUCCINATE 40 MG: 40 INJECTION, POWDER, FOR SOLUTION INTRAMUSCULAR; INTRAVENOUS at 12:21

## 2022-05-27 RX ADMIN — PHENOBARBITAL SODIUM 130 MG: 65 INJECTION INTRAMUSCULAR; INTRAVENOUS at 12:14

## 2022-05-27 RX ADMIN — LORAZEPAM 2 MG: 2 INJECTION INTRAMUSCULAR at 03:45

## 2022-05-27 RX ADMIN — BUDESONIDE 500 MCG: 0.5 SUSPENSION RESPIRATORY (INHALATION) at 19:24

## 2022-05-27 RX ADMIN — INSULIN LISPRO 1 UNITS: 100 INJECTION, SOLUTION INTRAVENOUS; SUBCUTANEOUS at 20:27

## 2022-05-27 RX ADMIN — LACOSAMIDE 200 MG: 10 INJECTION, SOLUTION INTRAVENOUS at 21:12

## 2022-05-27 RX ADMIN — SODIUM CHLORIDE: 9 INJECTION, SOLUTION INTRAVENOUS at 17:20

## 2022-05-27 RX ADMIN — ENOXAPARIN SODIUM 40 MG: 100 INJECTION SUBCUTANEOUS at 12:21

## 2022-05-27 RX ADMIN — PHENOBARBITAL SODIUM 65 MG: 65 INJECTION INTRAMUSCULAR; INTRAVENOUS at 20:45

## 2022-05-27 RX ADMIN — LORAZEPAM 3 MG: 2 INJECTION INTRAMUSCULAR at 21:46

## 2022-05-27 RX ADMIN — METOPROLOL TARTRATE 5 MG: 1 INJECTION, SOLUTION INTRAVENOUS at 01:09

## 2022-05-27 RX ADMIN — LORAZEPAM 2 MG: 2 INJECTION INTRAMUSCULAR at 00:40

## 2022-05-27 RX ADMIN — FOLIC ACID: 5 INJECTION, SOLUTION INTRAMUSCULAR; INTRAVENOUS; SUBCUTANEOUS at 08:57

## 2022-05-27 RX ADMIN — ATORVASTATIN CALCIUM 40 MG: 40 TABLET, FILM COATED ORAL at 20:27

## 2022-05-27 RX ADMIN — LORAZEPAM 2 MG: 2 INJECTION INTRAMUSCULAR at 04:47

## 2022-05-27 RX ADMIN — IPRATROPIUM BROMIDE AND ALBUTEROL SULFATE 1 AMPULE: 2.5; .5 SOLUTION RESPIRATORY (INHALATION) at 07:50

## 2022-05-27 RX ADMIN — METHYLPREDNISOLONE SODIUM SUCCINATE 40 MG: 40 INJECTION, POWDER, FOR SOLUTION INTRAMUSCULAR; INTRAVENOUS at 18:21

## 2022-05-27 RX ADMIN — HYDRALAZINE HYDROCHLORIDE 10 MG: 20 INJECTION INTRAMUSCULAR; INTRAVENOUS at 04:26

## 2022-05-27 RX ADMIN — IPRATROPIUM BROMIDE AND ALBUTEROL SULFATE 1 AMPULE: 2.5; .5 SOLUTION RESPIRATORY (INHALATION) at 19:24

## 2022-05-27 RX ADMIN — LACOSAMIDE 200 MG: 10 INJECTION, SOLUTION INTRAVENOUS at 09:40

## 2022-05-27 RX ADMIN — SODIUM CHLORIDE, PRESERVATIVE FREE 10 ML: 5 INJECTION INTRAVENOUS at 09:03

## 2022-05-27 RX ADMIN — INSULIN LISPRO 2 UNITS: 100 INJECTION, SOLUTION INTRAVENOUS; SUBCUTANEOUS at 17:14

## 2022-05-27 NOTE — PROGRESS NOTES
Spoke with Dr Seferino Whaley at 20:05 - pt's SBP is 157-169 after PRN Q6 Metoprolol 5mg was given at 1600.  Per Dr Seferino Whaley, okay to give Hydralazine 10mg IVP Q6 PRN for SBP >160

## 2022-05-27 NOTE — PROGRESS NOTES
Located within [facility].,    Adult Hospitalist      Name: Neena Sommer  MRN: [id number]     Acct: [de-identified]  Room: [address] [place] [address]    Admit Date: 5/25/2022 12:59 PM  PCP: DENNIS Anderson    Primary Problem  Principal Problem:    Seizure ([address]  Resolved Problems:    * No resolved hospital problems. *        Assesment:     · New onset seizure  · Old infarct left frontal parietal lobes, new since 9/2021  · Old infarction left occipital lobe, increased since 9/2021  · 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 / Encephalomalacia   · Old lacunar infarcts left basal ganglia   · Mild brain parenchymal volume loss  · Alcohol dependence   · Reported right hemiparesis   · Essential hypertension  · Diabetes mellitus type 2  · Overweight   · Diverticulosis  · Agitation   · Delirium tremens     · Acute respiratory failure with hypoxia         Plan:     · Admit ICU  · Monitor vitals closely  · Keep SpO2 above 90%  · Is/ Os  · IV fluids  · Ativan Prn  · Haldol for agitation  · Soft restraints   · Neuro consulted in ED  · Cardene gtt for BP control    · NPO  · Neuro checks  · Seizure precaution  · Fall precaution   · CIWA protocol   · accu checks AC and HS  · ISS medium  · Hypoglycemia protocol  · Serial cardiac enzymes  · EKG  · Consult Pulmonology  · CBC, BMP  · Prolactin   · Precedex weaned off per neurology  · Vimpat per neurology  · DVT and GI prophylaxis. Chief Complaint:     Chief Complaint   Patient presents with    Seizures         History of Present Illness:          Patient seen and examined at bedside  Last 24-hour events reviewed  Patient continues to be hypoxic  Is on facial mask  Still  Stuporous  In mild respiratory distress now  Still agitated intermittently    Review of system unable to obtain because of patient condition      Initial HPI  Neena Sommer is a 73 y.o.  male who presents with Seizures    Pt admitted through the ED after an episode of seizure at home.  Apparently pt lives at home with his hilda. According to her, he was sitting in his chair yesterday morning when he developed a seizure. It is difficult to determine what kind of seizure it was. His fibarbarae is not at bedside right now. However it was confirmed that the pt has never had a similar episode before in his life. It is also reported that he wanted to stop alcohol use however she reported him having a beer the evening before. Urine drug screen has been reported unremarkable and ETOH level was zero. Alcohol withdrawal seizure was suspected though seemed less likely if pt had continued to drink alcohol. Pt apparently drinks 4 beers daily    Pt was admitted to the ICU where he continued to remain agitated. Higher and frequent use of Ativan and later Haldol were ineffective. Pt required soft restraints since he was unsafe for himself trying to get out of bed and trying to pull out Iv    Pt was hypertensive with BP > 200/140 which was not improved w prn meds and was started on Cardene infusion. However pt continued to remain agitated and it was suspected he may be in DTs. Pt was placed on the CIWA protocol and was started on IV fluids and Thiamine, Folate    ROS is difficult to obtain presently    I have personally reviewed the past medical history, past surgical history, medications, social history, and family history, and summarized in the note. Review of Systems:     All 10 point system is reviewed and negative otherwise mentioned in HPI.       Past Medical History:     Past Medical History:   Diagnosis Date    Diabetes mellitus ([address]     Hypertension         Past Surgical History:     Past Surgical History:   Procedure Laterality Date    CAROTID ENDARTERECTOMY  09/22/2021    CAROTID ENDARTERECTOMY (N/A Neck    CAROTID ENDARTERECTOMY N/A 9/22/2021    CAROTID ENDARTERECTOMY performed by Marval Heimlich, MD at [facility] INS PICC VAD W SQ PORT GREATER THAN 5  5/26/2022    IR INS PICC VAD W SQ PORT GREATER THAN 5 conjunctiva clear  Ears - hearing unable to evaluate sec to pt condition  Nose - no drainage noted  Mouth - mucous membranes moist  Neck - supple, no carotid bruits, thyroid not palpable  Chest - clear to auscultation, normal effort  Heart - normal rate, regular rhythm, no murmur  Abdomen - soft, nontender, nondistended, bowel sounds present all four quadrants, no masses, hepatomegaly or splenomegaly  Neurological - stupor  Extremities - peripheral pulses palpable, no pedal edema or calf pain with palpation  Skin - no gross lesions, rashes, or induration noted        Data:     Labs:    Hematology:  Recent Labs     05/25/22  1307 05/26/22  0338 05/27/22  0504   WBC 7.3 10.3 10.6   RBC 4.60 4.08* 3.94*   HGB 14.6 12.8* 12.7*   HCT 43.2 37.9* 37.4*   MCV 93.9 92.9 94.9   MCH 31.7 31.4 32.2   MCHC 33.8 33.8 34.0   RDW 12.7 12.6 13.1    172 169   MPV 9.2 9.2 9.3     Chemistry:  Recent Labs     05/25/22  1307 05/26/22  0338 05/27/22  0504   * 132* 134*   K 4.2 3.6* 3.8   CL 93* 99 98   CO2 19* 23 24   GLUCOSE 225* 148* 127*   BUN 18 11 10   CREATININE 1.27* 1.02 1.14   ANIONGAP 17 10 12   LABGLOM 57* >60 >60   GFRAA >60 >60 >60   CALCIUM 9.1 8.7 8.7     Recent Labs     05/25/22  1307 05/25/22  2313 05/26/22  0338 05/26/22  0801 05/26/22  1211 05/26/22  1625 05/26/22  1913 05/27/22  0712 05/27/22  1127 05/27/22  1212 05/27/22  1627   PROT 7.0  --  6.6  --   --   --   --   --   --   --   --    LABALBU 3.9  --  3.8  --   --   --   --   --   --   --   --    LABA1C  --   --  6.2*  --   --   --   --   --   --   --   --    AST 24  --  24  --   --   --   --   --   --   --   --    ALT 11  --  12  --   --   --   --   --   --   --   --    ALKPHOS 78  --  69  --   --   --   --   --   --   --   --    BILITOT 0.51  --  0.79  --   --   --   --   --   --   --   --    BILIDIR  --   --  0.21  --   --   --   --   --   --   --   --    AMMONIA  --   --   --   --   --   --   --   --   --  23  --    LIPASE 31  --   --   --   -- --   --   --   --   --   --    POCGLU  --    < >  --    < > 92 124* 129* 125* 135*  --  154*    < > = values in this interval not displayed. Lab Results   Component Value Date    INR 1.1 09/22/2021    INR 1.0 09/20/2021    INR 1.0 02/26/2021    PROTIME 13.7 09/22/2021    PROTIME 12.7 09/20/2021    PROTIME 12.8 02/26/2021       Lab Results   Component Value Date/Time    SPECIAL NOT REPORTED 02/26/2021 09:30 AM     Lab Results   Component Value Date/Time    CULTURE NO GROWTH 3 DAYS 02/26/2021 09:30 AM       Lab Results   Component Value Date    POCPH 7.396 05/27/2022    POCPCO2 39.7 05/27/2022    POCPO2 87.5 05/27/2022    POCHCO3 24.4 05/27/2022    NBEA 1 05/26/2022    PBEA 0 05/27/2022    PTYE8YLC 97 05/27/2022    FIO2 30.0 05/27/2022       Radiology:    CT HEAD WO CONTRAST    Result Date: 5/25/2022  No acute intracranial hemorrhage or abnormal extra-axial collection. Relative to 9 months prior there are increased areas of encephalomalacia in the left occipital lobe and new areas of encephalomalacia in the left parietal lobe. If there is persistent clinical concern for acute on chronic ischemia, MRI is more sensitive. CT CHEST ABDOMEN PELVIS W CONTRAST    Result Date: 5/25/2022  Small cysts noted in the liver. Diverticular disease of the colon without diverticulitis. Significant thickening of the bladder wall, in keeping with muscular hypertrophy or cystitis. Clinical correlation suggested. Degenerative changes demonstrated in the the thoracic and lumbar spine. RECOMMENDATIONS:     MRI BRAIN W WO CONTRAST    Result Date: 5/25/2022  No acute intracranial abnormality. No mass effect or abnormal intracranial enhancement. Old infarctions in the left frontal parietal lobes, new since September 21, 2021. Old infarction in the left occipital lobe, likely increased in size since September 21, 2021. Laminar necrosis and gliosis associated with the old infarctions.  Old lacunar infarcts in the left basal ganglia, likely increased. Mild parenchymal volume loss. All radiological studies reviewed                Code Status:  Full Code    Electronically signed by Shay Cooley MD on 5/27/2022 at 7:03 PM     Copy sent to DENNIS Bronw    This note was created with the assistance of a speech-recognition program.  Although the intention is to generate a document that actually reflects the content of the visit, no guarantees can be provided that every mistake has been identified and corrected by editing. Note was updated later by me after  physical examination and  completion of the assessment.

## 2022-05-27 NOTE — PROGRESS NOTES
Spoke with Helena RT at bedside about patient's snoring. Evaluated patient - currently on bipap 30%. O2 sat 98% and ABG at 2048 showed improvement from previous ABG at 1646. Will continue to monitor patient closely.

## 2022-05-27 NOTE — PROGRESS NOTES
[facility]) Neurology Specialist  [address]  [place], [address] [facility]:  [phone] or [phone]  FAX:  [phone]            Brief history: Stephen Jimenez is a 73 y.o. old male admitted on 5/25/2022 with encephalopathy and seizures     Subjective: The patient continues to be agitated. Intermittently following commands. On restraints. Objective: BP (!) 151/88   Pulse 90   Temp 98 °F (36.7 °C) (Axillary)   Resp 26   Ht 5' 6\" (1.676 m)   Wt 171 lb 4.8 oz (77.7 kg)   SpO2 100%   BMI 27.65 kg/m²       Medications:    PHENobarbital  65 mg IntraVENous BID    methylPREDNISolone  40 mg IntraVENous Q6H    budesonide  0.5 mg Nebulization BID    enoxaparin  40 mg SubCUTAneous Daily    thiamine and folic acid IVPB   IntraVENous Daily    lacosamide (VIMPAT) IVPB  200 mg IntraVENous BID    ipratropium-albuterol  1 ampule Inhalation BID    sodium chloride flush  10 mL IntraVENous Once    sodium chloride flush  5-40 mL IntraVENous 2 times per day    insulin lispro  0-12 Units SubCUTAneous TID WC    insulin lispro  0-6 Units SubCUTAneous Nightly    aspirin  81 mg Oral Daily    atorvastatin  40 mg Oral Nightly    clopidogrel  75 mg Oral Daily    metFORMIN  500 mg Oral BID WC    PARoxetine  40 mg Oral Daily    amLODIPine  5 mg Oral Daily    atenolol  50 mg Oral Daily    losartan  100 mg Oral Daily        Mental status   Patient is drowsy. No spontaneous eye opening. Patient is having speech dysarthria. Able to follow one-step commands   Cranial nerves   Pupil response: Present   Corneal Reflex: Present    Oculocephalic reflex: Present     Cough reflex: Present        Motor and sensory function  Spontaneous limb movement in all ext.    DTR Intact symmetric  Babinski Absent   Gait Not tested      Lab Results   Component Value Date    LDLCHOLESTEROL 79 02/25/2021     No components found for: CHLPL  Lab Results   Component Value Date    TRIG 93 02/25/2021    TRIG 305 (H) 01/02/2018     Lab Results   Component Value Date    HDL 87 02/25/2021     No results found for: LDLCALC  No results found for: LABVLDL  Lab Results   Component Value Date    LABA1C 6.2 (H) 05/26/2022     Lab Results   Component Value Date     05/26/2022     No results found for: [id number]   Neurological work up:  CT head  CTA head and neck  MRI brain     2 D echo     Assessment and Recommendations:   Left temporal parietal ischemic stroke February 2021, 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 He has been started on phenobarbital for alcohol withdrawal.  Received 130 mg x 1 and 65 mg bid. Precedex has been weaned off. Continued Vimpat 200 mg bid    Will follow. Elva Bernal MD  Neurology    This note is created with the assistance of a speech-recognition program. While intending to generate a document that actually reflects the content of the visit, the document can still have some errors including those of syntax and sound a- like substitutions which may escape proofreading. In such instances, actual meaning can be extrapolated by contextual derivation.

## 2022-05-27 NOTE — PLAN OF CARE
Problem: Discharge Planning  Goal: Discharge to home or other facility with appropriate resources  Outcome: Progressing  Flowsheets (Taken 5/27/2022 0403)  Discharge to home or other facility with appropriate resources: Identify barriers to discharge with patient and caregiver     Problem: Safety - Medical Restraint  Goal: Remains free of injury from restraints (Restraint for Interference with Medical Device)  Description: INTERVENTIONS:  1. Determine that other, less restrictive measures have been tried or would not be effective before applying the restraint  2. Evaluate the patient's condition at the time of restraint application  3. Inform patient/family regarding the reason for restraint  4.  Q2H: Monitor safety, psychosocial status, comfort, nutrition and hydration  Outcome: Progressing     Problem: Neurosensory - Adult  Goal: Achieves stable or improved neurological status  Outcome: Progressing  Goal: Absence of seizures  Outcome: Progressing  Flowsheets (Taken 5/27/2022 0403)  Absence of seizures: Support airway/breathing, administer oxygen as needed  Goal: Remains free of injury related to seizures activity  Outcome: Progressing  Goal: Achieves maximal functionality and self care  Outcome: Progressing     Problem: Respiratory - Adult  Goal: Achieves optimal ventilation and oxygenation  Outcome: Progressing     Problem: Cardiovascular - Adult  Goal: Maintains optimal cardiac output and hemodynamic stability  Outcome: Progressing  Goal: Absence of cardiac dysrhythmias or at baseline  Outcome: Progressing     Problem: Skin/Tissue Integrity - Adult  Goal: Skin integrity remains intact  Outcome: Progressing  Goal: Incisions, wounds, or drain sites healing without S/S of infection  Outcome: Progressing  Goal: Oral mucous membranes remain intact  Outcome: Progressing     Problem: Metabolic/Fluid and Electrolytes - Adult  Goal: Electrolytes maintained within normal limits  Outcome: Progressing  Goal: Hemodynamic stability and optimal renal function maintained  Outcome: Progressing  Goal: Glucose maintained within prescribed range  Outcome: Progressing     Problem: Anxiety  Goal: Will report anxiety at manageable levels  Description: INTERVENTIONS:  1. Administer medication as ordered  2. Teach and rehearse alternative coping skills  3. Provide emotional support with 1:1 interaction with staff  Outcome: Progressing     Problem: Confusion  Goal: Confusion, delirium, dementia, or psychosis is improved or at baseline  Description: INTERVENTIONS:  1. Assess for possible contributors to thought disturbance, including medications, impaired vision or hearing, underlying metabolic abnormalities, dehydration, psychiatric diagnoses, and notify attending LIP  2. Enloe high risk fall precautions, as indicated  3. Provide frequent short contacts to provide reality reorientation, refocusing and direction  4. Decrease environmental stimuli, including noise as appropriate  5. Monitor and intervene to maintain adequate nutrition, hydration, elimination, sleep and activity  6. If unable to ensure safety without constant attention obtain sitter and review sitter guidelines with assigned personnel  7. Initiate Psychosocial CNS and Spiritual Care consult, as indicated  Outcome: Progressing     Problem: Behavior  Goal: Pt/Family maintain appropriate behavior and adhere to behavioral management agreement, if implemented  Description: INTERVENTIONS:  1. Assess patient/family's coping skills and  non-compliant behavior (including use of illegal substances)  2. Notify security of behavior or suspected illegal substances which indicate the need for search of the patient and/or belongings  3. Encourage verbalization of thoughts and concerns in a socially appropriate manner  4. Utilize positive, consistent limit setting strategies supporting safety of patient, staff and others  5.  Encourage participation in the decision making process about the behavioral management agreement  6. Implement a Health Care Agreement if patient meets criteria  7. If a patient's behavior jeopardizes the safety of the patient, staff, or others refer to organization policy. If a visitor's behavior poses a threat to safety call refer to organization policy. 8. Initiate consult with , Psychosocial CNS, Spiritual Care as appropriate  Outcome: Progressing     Problem: Chronic Conditions and Co-morbidities  Goal: Patient's chronic conditions and co-morbidity symptoms are monitored and maintained or improved  Outcome: Progressing     Problem: Pain  Goal: Verbalizes/displays adequate comfort level or baseline comfort level  Outcome: Progressing     Problem: ABCDS Injury Assessment  Goal: Absence of physical injury  Outcome: Progressing     Problem: Safety - Violent/Self-destructive Restraint  Goal: Remains free of injury from restraints (Restraint for Violent/Self-Destructive Behavior)  Description: INTERVENTIONS:  1. Determine that de-escalation and other, less restrictive measures have been tried or would not be effective before applying the restraint  2. Identify and document the criteria for restraint  3. Evaluate the patient's condition at the time of restraint application  4. Inform patient/family regarding the reason for restraint/seclusion  5. Q2H: Monitor comfort, nutrition and hydration needs  6. Q15M: Perform safety checks including skin, circulation, sensory, respiratory and psychological status  7. Ensure continuous observation  8.  Identify and implement measures to help patient regain control, assess readiness for release and initiate progressive release per policy  Outcome: Completed  Flowsheets (Taken 5/27/2022 0403)  Remains Free of Injury from Restraints (Restraint for Violent/Self-destructive Behavior): Determine that de-escalation and other, less restrictive measures have been tried or would not be effective before applying the restraint  Note: No restraints upon beginning of shift

## 2022-05-27 NOTE — CARE COORDINATION
Case Management Initial Discharge Plan  Reyes Irby,             Met with:spouse/SO to discuss discharge plans. Information verified: address, contacts, phone number, , insurance Yes  Insurance Provider: medicare  : No    Emergency Contact/Next of Kin name & number: Lindsay/ALANA    [phone]  Who are involved in patient's support system? GF    PCP: DENNIS Hyde  Date of last visit: 2 months ago      Discharge Planning    Living Arrangements:        Home has 1 stories  0 stairs to climb to get into front door, 0stairs to climb to reach second floor  Location of bedroom/bathroom in home main    Patient able to perform ADL's:Independent    Current Services (outpatient & in home) none  DME equipment: 0  DME provider: 0    Is patient receiving oral anticoagulation therapy? No    If indicated:   Physician managing anticoagulation treatment:   Where does patient obtain lab work for ATC treatment? Does patient have any issues/concerns obtaining medications? No  If yes, what are patient's concerns? Is there a preferred Pharmacy after hours or on weekends? Yes    If yes, which pharmacy? [facility] [address] Needed:       Patient agreeable to home care: No  [place] of choice provided:  n/a    Prior SNF/Rehab Placement and Facility: n/a  Agreeable to SNF/Rehab: No  [place] of choice provided: n/a     Evaluation: no    Expected Discharge date:       Patient expects to be discharged to: If home: is the family and/or caregiver wiling & able to provide support at home? yes  Who will be providing this support?  Self and GF    Follow Up Appointment: Best Day/ Time:      Transportation provider:   Transportation arrangements needed for discharge: No    Readmission Risk              Risk of Unplanned Readmission:  17             Does patient have a readmission risk score greater than 14?: Yes  If yes, follow-up appointment must be made within 7 days

## 2022-05-27 NOTE — CONSULTS
Pulmonary Medicine and [address] Dottie Sanderson MD      Patient - Ricarda Berkowitz   MRN -  [id number]   [facility] # - [de-identified]   - 1955      Date of Admission -  2022 12:59 PM  Date of evaluation -  2022  Room - [address] [place] [address]   Hospital Day - 2  Consulting - No att. providers found Primary Care Physician - DENNIS Etienne     Reason for Consult    Critical care management     Assessment   · Acute respiratory insufficiency with hypoxia   · New onset seizure   · Alcohol abuse/ dependence/ withdrawal   · History of CVA   · Obstructive sleep apnea  · Active smoking/ likely COPD   · Severe right Carotid artery stenosis  · HTN, DM    Recommendations   · Continue BIPAP support, monitor closely for need for intubation    · 4 liters/min via nasal cannula to keep spO2 greater than 92% as tolerated  · IV solumedrol 40mg Q6 hours   · Obtain ABG  · Check ammonia level   · Ipratropium- Albuterol BID   · Budesonide via nebulizer BID   · Precedex, titrate to RAAS goal of 0 to -1   · Ativan per CIWA protocol   · Vimpat 200mg BID, phenobarbital 65mg BID, neurology following   · Seizure precautions  · Cardene drip was stopped, management of HTN with home regimen and hydralazine 10mg as needed for SBP greater than 160  · Consider vascular surgery consult   · X-ray chest in am  · Labs: CBC and BMP in am  · DVT prophylaxis with low molecular weight heparin  · Will follow with you    Problem List      Patient Active Problem List   Diagnosis    ADRIANA (acute kidney injury) ([facility]    Acute ischemic left PCA stroke ([facility]    Right sided weakness    [facility])       HPI     Ricarda Berkowitz is 73 y.o. male, with a PMH of alcohol abuse, CVA in , DM and hypertension who presented to the emergency department 22 for malaise and seizure activity. Patient's fiance states he had been complaining of poor appetite and nausea for the last two days and called EMS when the patient had a witnessed seizure.  He drinks approximately 4 beers per day, his last drink was on 5/24/22. Patient was placed on oxygen via nasal cannula, given ativan and Keppra and admitted to the ICU. He is currently on BIPAP 16/8 30% fiO2 and is minimally responsive to painful stimuli with increased work of breathing and accessory muscle use.      PMHx   Past Medical History      Diagnosis Date    Diabetes mellitus ([address]     Hypertension       Past Surgical History        Procedure Laterality Date    CAROTID ENDARTERECTOMY  09/22/2021    CAROTID ENDARTERECTOMY (N/A Neck    CAROTID ENDARTERECTOMY N/A 9/22/2021    CAROTID ENDARTERECTOMY performed by Asiya Osman MD at [address] IR INS PICC VAD W SQ PORT GREATER THAN 5  5/26/2022    IR INS PICC VAD W SQ PORT GREATER THAN 5 5/26/2022 [facility]    KNEE SURGERY         Meds    Current Medications    PHENobarbital  130 mg IntraVENous Once    Followed by   Gutierrez PHENobarbital  65 mg IntraVENous BID    thiamine and folic acid IVPB   IntraVENous Daily    lacosamide (VIMPAT) IVPB  200 mg IntraVENous BID    ipratropium-albuterol  1 ampule Inhalation BID    sodium chloride flush  10 mL IntraVENous Once    sodium chloride flush  5-40 mL IntraVENous 2 times per day    insulin lispro  0-12 Units SubCUTAneous TID WC    insulin lispro  0-6 Units SubCUTAneous Nightly    aspirin  81 mg Oral Daily    atorvastatin  40 mg Oral Nightly    clopidogrel  75 mg Oral Daily    metFORMIN  500 mg Oral BID WC    PARoxetine  40 mg Oral Daily    amLODIPine  5 mg Oral Daily    atenolol  50 mg Oral Daily    losartan  100 mg Oral Daily     haloperidol lactate, LORazepam **OR** LORazepam **OR** LORazepam **OR** LORazepam **OR** LORazepam **OR** LORazepam **OR** LORazepam **OR** LORazepam, albuterol, hydrALAZINE, sodium chloride flush, sodium chloride, acetaminophen, ondansetron **OR** ondansetron, metoprolol, glucose, dextrose bolus **OR** dextrose bolus, glucagon (rDNA), dextrose  IV Drips/Infusions   sodium chloride 75 mL/hr at 05/26/22 0622    dexmedetomidine (PRECEDEX) IV infusion 0.4 mcg/kg/hr (05/27/22 0939)    sodium chloride Stopped (05/26/22 2008)    dextrose      niCARdipene (CARDENE) infusion Stopped (05/26/22 0915)     Home Medications  Medications Prior to Admission: clopidogrel (PLAVIX) 75 MG tablet, Take 1 tablet by mouth daily for 21 days  aspirin 81 MG chewable tablet, Take 1 tablet by mouth daily  metFORMIN (GLUCOPHAGE) 500 MG tablet, Take 1 tablet by mouth 2 times daily (with meals)  atorvastatin (LIPITOR) 40 MG tablet, Take 1 tablet by mouth nightly  amLODIPine (NORVASC) 5 MG tablet, Take 1 tablet by mouth daily  PARoxetine (PAXIL) 40 MG tablet, Take 40 mg by mouth daily  atenolol (TENORMIN) 50 MG tablet, Take 50 mg by mouth daily  losartan (COZAAR) 100 MG tablet, Take 100 mg by mouth daily    Allergies    Patient has no known allergies. Social History     Social History     Tobacco Use    Smoking status: Current Every Day Smoker     Packs/day: 1.50     Start date: 6/1/1973    Smokeless tobacco: Never Used   Substance Use Topics    Alcohol use: Yes     Alcohol/week: 4.0 standard drinks     Types: 4 Cans of beer per week     Comment: Occassional, four times weekly     Family History    History reviewed. No pertinent family history. ROS - 11 systems   Unable to obtain detailed ROS secondary to patient lethargy     Vitals     height is 5' 6\" (1.676 m) and weight is 171 lb 4.8 oz (77.7 kg). His axillary temperature is 98.5 °F (36.9 °C). His blood pressure is 143/129 (abnormal) and his pulse is 92. His respiration is 20 and oxygen saturation is 97%. Body mass index is 27.65 kg/m². I/O        Intake/Output Summary (Last 24 hours) at 5/27/2022 1125  Last data filed at 5/27/2022 0630  Gross per 24 hour   Intake 313.34 ml   Output 1450 ml   Net -1136.66 ml     I/O last 3 completed shifts:   In: 730.7 [I.V.:452.4; IV Piggyback:278.3]  Out: 1450 [Urine:1450]   Patient Vitals for the past 96 hrs (Last 3 readings):   Weight   05/27/22 0630 171 lb 4.8 oz (77.7 kg)   05/25/22 1308 180 lb (81.6 kg)     Exam   General Appearance  Minimally responsive to pain  HEENT - Head is normocephalic, atraumatic. Pupil reactive to light  Neck - Supple,  trachea midline and straight  Lungs -  Poor air movement, mild wheezing, tachypnea, accessory muscle use   Cardiovascular - Heart sounds are normal.  Regular rhythm normal rate without murmur, gallop or rub.   Abdomen - Soft, nontender, nondistended, no masses or organomegaly  Neurologic - Minimally responsive  Skin - No bruising or bleeding  Extremities - No cyanosis, clubbing or edema    Labs  - Old records and notes have been reviewed in [facility]   CBC     Lab Results   Component Value Date    WBC 10.6 05/27/2022    RBC 3.94 05/27/2022    HGB 12.7 05/27/2022    HCT 37.4 05/27/2022     05/27/2022    MCV 94.9 05/27/2022    MCH 32.2 05/27/2022    MCHC 34.0 05/27/2022    RDW 13.1 05/27/2022    LYMPHOPCT 10 05/27/2022    MONOPCT 9 05/27/2022    BASOPCT 0 05/27/2022    MONOSABS 0.96 05/27/2022    LYMPHSABS 1.08 05/27/2022    EOSABS 0.05 05/27/2022    BASOSABS 0.03 05/27/2022    DIFFTYPE NOT REPORTED 09/23/2021     BMP   Lab Results   Component Value Date     05/27/2022    K 3.8 05/27/2022    CL 98 05/27/2022    CO2 24 05/27/2022    BUN 10 05/27/2022    CREATININE 1.14 05/27/2022    GLUCOSE 127 05/27/2022    CALCIUM 8.7 05/27/2022    MG 1.0 09/23/2021     LFTS  Lab Results   Component Value Date    ALKPHOS 69 05/26/2022    ALT 12 05/26/2022    AST 24 05/26/2022    PROT 6.6 05/26/2022    BILITOT 0.79 05/26/2022    BILIDIR 0.21 05/26/2022    IBILI 0.58 05/26/2022    LABALBU 3.8 05/26/2022     PTT  Lab Results   Component Value Date    APTT 29.0 09/20/2021     INR   Lab Results   Component Value Date    INR 1.1 09/22/2021    INR 1.0 09/20/2021    INR 1.0 02/26/2021    PROTIME 13.7 09/22/2021    PROTIME 12.7 09/20/2021    PROTIME 12.8 02/26/2021 Radiology    CXR  5/26/2022      CT Scan chest, abdomen and pelvis   5/25/22    (See actual reports for details)    \"Thank you for asking us to see this patient\"    Case discussed with nurse and patient. Questions and concerns addressed.   Electronically signed by     Jam Kahn MD on 5/27/2022 at 1:51 PM  Pulmonary Critical Care and Sleep Medicine,  [facility]  Cell: [phone]  Office: [phone]

## 2022-05-28 ENCOUNTER — APPOINTMENT (OUTPATIENT)
Dept: GENERAL RADIOLOGY | Age: 67
DRG: 056 | End: 2022-05-28
Payer: MEDICARE

## 2022-05-28 LAB
ABSOLUTE EOS #: 0 K/UL (ref 0–0.4)
ABSOLUTE IMMATURE GRANULOCYTE: 0.1 K/UL (ref 0–0.3)
ABSOLUTE LYMPH #: 0.29 K/UL (ref 1–4.8)
ABSOLUTE MONO #: 0.19 K/UL (ref 0.2–0.8)
ANION GAP SERPL CALCULATED.3IONS-SCNC: 11 MMOL/L (ref 9–17)
BASOPHILS # BLD: 0 %
BASOPHILS ABSOLUTE: 0 K/UL (ref 0–0.2)
BUN BLDV-MCNC: 18 MG/DL (ref 8–23)
BUN/CREAT BLD: 17 (ref 9–20)
CALCIUM SERPL-MCNC: 8.9 MG/DL (ref 8.6–10.4)
CHLORIDE BLD-SCNC: 101 MMOL/L (ref 98–107)
CO2: 22 MMOL/L (ref 20–31)
CREAT SERPL-MCNC: 1.08 MG/DL (ref 0.7–1.2)
EOSINOPHILS RELATIVE PERCENT: 0 % (ref 1–4)
GFR AFRICAN AMERICAN: >60 ML/MIN
GFR NON-AFRICAN AMERICAN: >60 ML/MIN
GFR SERPL CREATININE-BSD FRML MDRD: ABNORMAL ML/MIN/{1.73_M2}
GLUCOSE BLD-MCNC: 157 MG/DL (ref 75–110)
GLUCOSE BLD-MCNC: 190 MG/DL (ref 75–110)
GLUCOSE BLD-MCNC: 220 MG/DL (ref 70–99)
GLUCOSE BLD-MCNC: 221 MG/DL (ref 75–110)
HCT VFR BLD CALC: 36.4 % (ref 40.7–50.3)
HEMOGLOBIN: 12 G/DL (ref 13–17)
IMMATURE GRANULOCYTES: 1 %
LYMPHOCYTES # BLD: 3 % (ref 24–44)
MCH RBC QN AUTO: 31.8 PG (ref 25.2–33.5)
MCHC RBC AUTO-ENTMCNC: 33 G/DL (ref 28–38)
MCV RBC AUTO: 96.6 FL (ref 82.6–102.9)
MONOCYTES # BLD: 2 % (ref 1–7)
PDW BLD-RTO: 13.1 % (ref 11.8–14.4)
PLATELET # BLD: 160 K/UL (ref 138–453)
PMV BLD AUTO: 9.4 FL (ref 8.1–13.5)
POTASSIUM SERPL-SCNC: 4.3 MMOL/L (ref 3.7–5.3)
RBC # BLD: 3.77 M/UL (ref 4.21–5.77)
SEG NEUTROPHILS: 94 % (ref 36–66)
SEGMENTED NEUTROPHILS ABSOLUTE COUNT: 8.92 K/UL (ref 1.8–7.7)
SODIUM BLD-SCNC: 134 MMOL/L (ref 135–144)
WBC # BLD: 9.5 K/UL (ref 3.5–11.3)

## 2022-05-28 PROCEDURE — 80048 BASIC METABOLIC PNL TOTAL CA: CPT

## 2022-05-28 PROCEDURE — 2580000003 HC RX 258: Performed by: FAMILY MEDICINE

## 2022-05-28 PROCEDURE — 6360000002 HC RX W HCPCS: Performed by: FAMILY MEDICINE

## 2022-05-28 PROCEDURE — 2000000000 HC ICU R&B

## 2022-05-28 PROCEDURE — 2580000003 HC RX 258

## 2022-05-28 PROCEDURE — 2700000000 HC OXYGEN THERAPY PER DAY

## 2022-05-28 PROCEDURE — 6370000000 HC RX 637 (ALT 250 FOR IP): Performed by: FAMILY MEDICINE

## 2022-05-28 PROCEDURE — 6360000002 HC RX W HCPCS: Performed by: INTERNAL MEDICINE

## 2022-05-28 PROCEDURE — 6360000002 HC RX W HCPCS: Performed by: NURSE PRACTITIONER

## 2022-05-28 PROCEDURE — C9254 INJECTION, LACOSAMIDE: HCPCS | Performed by: PSYCHIATRY & NEUROLOGY

## 2022-05-28 PROCEDURE — 2500000003 HC RX 250 WO HCPCS: Performed by: FAMILY MEDICINE

## 2022-05-28 PROCEDURE — 6370000000 HC RX 637 (ALT 250 FOR IP): Performed by: INTERNAL MEDICINE

## 2022-05-28 PROCEDURE — 94761 N-INVAS EAR/PLS OXIMETRY MLT: CPT

## 2022-05-28 PROCEDURE — 85025 COMPLETE CBC W/AUTO DIFF WBC: CPT

## 2022-05-28 PROCEDURE — 71045 X-RAY EXAM CHEST 1 VIEW: CPT

## 2022-05-28 PROCEDURE — 6360000002 HC RX W HCPCS: Performed by: PSYCHIATRY & NEUROLOGY

## 2022-05-28 PROCEDURE — 36415 COLL VENOUS BLD VENIPUNCTURE: CPT

## 2022-05-28 PROCEDURE — 99232 SBSQ HOSP IP/OBS MODERATE 35: CPT | Performed by: PSYCHIATRY & NEUROLOGY

## 2022-05-28 PROCEDURE — 2580000003 HC RX 258: Performed by: PSYCHIATRY & NEUROLOGY

## 2022-05-28 PROCEDURE — 82947 ASSAY GLUCOSE BLOOD QUANT: CPT

## 2022-05-28 PROCEDURE — 94640 AIRWAY INHALATION TREATMENT: CPT

## 2022-05-28 PROCEDURE — 94660 CPAP INITIATION&MGMT: CPT

## 2022-05-28 RX ORDER — PHENOBARBITAL SODIUM 65 MG/ML
65 INJECTION INTRAMUSCULAR 3 TIMES DAILY
Status: DISCONTINUED | OUTPATIENT
Start: 2022-05-28 | End: 2022-06-01

## 2022-05-28 RX ADMIN — METHYLPREDNISOLONE SODIUM SUCCINATE 40 MG: 40 INJECTION, POWDER, FOR SOLUTION INTRAMUSCULAR; INTRAVENOUS at 05:39

## 2022-05-28 RX ADMIN — PHENOBARBITAL SODIUM 65 MG: 65 INJECTION INTRAMUSCULAR; INTRAVENOUS at 09:09

## 2022-05-28 RX ADMIN — METHYLPREDNISOLONE SODIUM SUCCINATE 40 MG: 40 INJECTION, POWDER, FOR SOLUTION INTRAMUSCULAR; INTRAVENOUS at 00:16

## 2022-05-28 RX ADMIN — LORAZEPAM 2 MG: 2 INJECTION INTRAMUSCULAR at 13:50

## 2022-05-28 RX ADMIN — PHENOBARBITAL SODIUM 65 MG: 65 INJECTION INTRAMUSCULAR; INTRAVENOUS at 19:50

## 2022-05-28 RX ADMIN — LORAZEPAM 4 MG: 2 INJECTION INTRAMUSCULAR at 20:50

## 2022-05-28 RX ADMIN — LORAZEPAM 3 MG: 2 INJECTION INTRAMUSCULAR at 18:31

## 2022-05-28 RX ADMIN — IPRATROPIUM BROMIDE AND ALBUTEROL SULFATE 1 AMPULE: 2.5; .5 SOLUTION RESPIRATORY (INHALATION) at 19:27

## 2022-05-28 RX ADMIN — INSULIN LISPRO 4 UNITS: 100 INJECTION, SOLUTION INTRAVENOUS; SUBCUTANEOUS at 09:15

## 2022-05-28 RX ADMIN — IPRATROPIUM BROMIDE AND ALBUTEROL SULFATE 1 AMPULE: 2.5; .5 SOLUTION RESPIRATORY (INHALATION) at 07:47

## 2022-05-28 RX ADMIN — LORAZEPAM 2 MG: 2 INJECTION INTRAMUSCULAR at 08:51

## 2022-05-28 RX ADMIN — LACOSAMIDE 200 MG: 10 INJECTION, SOLUTION INTRAVENOUS at 08:38

## 2022-05-28 RX ADMIN — ENOXAPARIN SODIUM 40 MG: 100 INJECTION SUBCUTANEOUS at 08:39

## 2022-05-28 RX ADMIN — HYDRALAZINE HYDROCHLORIDE 10 MG: 20 INJECTION INTRAMUSCULAR; INTRAVENOUS at 05:39

## 2022-05-28 RX ADMIN — DEXTROSE MONOHYDRATE 5 MG/HR: 50 INJECTION, SOLUTION INTRAVENOUS at 19:28

## 2022-05-28 RX ADMIN — METHYLPREDNISOLONE SODIUM SUCCINATE 40 MG: 40 INJECTION, POWDER, FOR SOLUTION INTRAMUSCULAR; INTRAVENOUS at 18:43

## 2022-05-28 RX ADMIN — INSULIN LISPRO 2 UNITS: 100 INJECTION, SOLUTION INTRAVENOUS; SUBCUTANEOUS at 16:42

## 2022-05-28 RX ADMIN — METHYLPREDNISOLONE SODIUM SUCCINATE 40 MG: 40 INJECTION, POWDER, FOR SOLUTION INTRAMUSCULAR; INTRAVENOUS at 12:31

## 2022-05-28 RX ADMIN — INSULIN LISPRO 2 UNITS: 100 INJECTION, SOLUTION INTRAVENOUS; SUBCUTANEOUS at 12:35

## 2022-05-28 RX ADMIN — FOLIC ACID: 5 INJECTION, SOLUTION INTRAMUSCULAR; INTRAVENOUS; SUBCUTANEOUS at 09:29

## 2022-05-28 RX ADMIN — LORAZEPAM 2 MG: 2 INJECTION INTRAMUSCULAR at 17:31

## 2022-05-28 RX ADMIN — BUDESONIDE 500 MCG: 0.5 SUSPENSION RESPIRATORY (INHALATION) at 19:27

## 2022-05-28 RX ADMIN — LACOSAMIDE 200 MG: 10 INJECTION, SOLUTION INTRAVENOUS at 21:31

## 2022-05-28 RX ADMIN — LORAZEPAM 3 MG: 2 INJECTION INTRAMUSCULAR at 19:50

## 2022-05-28 RX ADMIN — INSULIN LISPRO 1 UNITS: 100 INJECTION, SOLUTION INTRAVENOUS; SUBCUTANEOUS at 21:36

## 2022-05-28 RX ADMIN — LORAZEPAM 2 MG: 2 INJECTION INTRAMUSCULAR at 12:22

## 2022-05-28 RX ADMIN — LORAZEPAM 3 MG: 2 INJECTION INTRAMUSCULAR at 01:13

## 2022-05-28 RX ADMIN — LORAZEPAM 2 MG: 2 INJECTION INTRAMUSCULAR at 03:13

## 2022-05-28 RX ADMIN — SODIUM CHLORIDE: 9 INJECTION, SOLUTION INTRAVENOUS at 16:32

## 2022-05-28 RX ADMIN — SODIUM CHLORIDE 0.6 MCG/KG/HR: 9 INJECTION, SOLUTION INTRAVENOUS at 05:38

## 2022-05-28 RX ADMIN — METOPROLOL TARTRATE 5 MG: 1 INJECTION, SOLUTION INTRAVENOUS at 13:42

## 2022-05-28 RX ADMIN — BUDESONIDE 500 MCG: 0.5 SUSPENSION RESPIRATORY (INHALATION) at 07:47

## 2022-05-28 NOTE — PROGRESS NOTES
Consult Note    Reason for Consult: Urinary retention    Requesting Physician:  Sammy Blum MD    HISTORY OF PRESENT ILLNESS:    The patient is a 73 y.o. male who presents with malaise and seizure activity at home. Per patient fiance, he has been having lack of appetite and nausea for 2 days, upon EMS arrival he had a witnessed seizure. He is a daily drinker and reports drinking 2 to 4 beers per day, his last drink was on 5/24/22. Patient was hypertensive in the 200s/100s and was briefly placed on a Cardene gtt, this has been weaned off. He is currently receiving prn hydralazine and lopressor. He has been unable to take PO medication due to mental status and NPO orders. On admission patient was noted to have an elevated creatinine of 1.27 on 5/25, this has gradually improved and is 1.08 today. His baseline creatinine ranges 1.2-1.6 in September 2021. Patient was noted to have a decrease in urine output over the last 24 hours. Over night he only had 225 ml measured output. Peterson was inserted at 0600 and he put out 400 ml immediately. Patient is slightly agitated today and is only oriented self. Most of this information was retrieved from thorough chart review and nursing staff. Review Of Systems:  Unable to obtain due to current mental and clinical status. Past Medical History:   Diagnosis Date    Diabetes mellitus ([address]     Hypertension        Past Surgical History:   Procedure Laterality Date    CAROTID ENDARTERECTOMY  09/22/2021    CAROTID ENDARTERECTOMY (N/A Neck    CAROTID ENDARTERECTOMY N/A 9/22/2021    CAROTID ENDARTERECTOMY performed by Margie Cabello MD at [address] IR INS PICC VAD W SQ PORT GREATER THAN 5  5/26/2022    IR INS PICC VAD W SQ PORT GREATER THAN 5 5/26/2022 [facility]    KNEE SURGERY         Prior to Admission medications    Medication Sig Start Date End Date Taking?  Authorizing Provider   clopidogrel (PLAVIX) 75 MG tablet Take 1 tablet by mouth daily for 21 days 9/23/21 10/14/21  Gilbert Crooks MD   aspirin 81 MG chewable tablet Take 1 tablet by mouth daily 2/26/21   Gilbert Crooks MD   metFORMIN (GLUCOPHAGE) 500 MG tablet Take 1 tablet by mouth 2 times daily (with meals) 2/26/21   Gilbert Crooks MD   atorvastatin (LIPITOR) 40 MG tablet Take 1 tablet by mouth nightly 2/26/21   Gilbert Crooks MD   amLODIPine (NORVASC) 5 MG tablet Take 1 tablet by mouth daily 2/27/21   Gilbert Crooks MD   PARoxetine (PAXIL) 40 MG tablet Take 40 mg by mouth daily    Historical Provider, MD   atenolol (TENORMIN) 50 MG tablet Take 50 mg by mouth daily    Historical Provider, MD   losartan (COZAAR) 100 MG tablet Take 100 mg by mouth daily    Historical Provider, MD       Scheduled Meds:   PHENobarbital  65 mg IntraVENous BID    methylPREDNISolone  40 mg IntraVENous Q6H    budesonide  0.5 mg Nebulization BID    enoxaparin  40 mg SubCUTAneous Daily    thiamine and folic acid IVPB   IntraVENous Daily    lacosamide (VIMPAT) IVPB  200 mg IntraVENous BID    ipratropium-albuterol  1 ampule Inhalation BID    sodium chloride flush  10 mL IntraVENous Once    sodium chloride flush  5-40 mL IntraVENous 2 times per day    insulin lispro  0-12 Units SubCUTAneous TID WC    insulin lispro  0-6 Units SubCUTAneous Nightly    aspirin  81 mg Oral Daily    atorvastatin  40 mg Oral Nightly    clopidogrel  75 mg Oral Daily    metFORMIN  500 mg Oral BID WC    PARoxetine  40 mg Oral Daily    amLODIPine  5 mg Oral Daily    atenolol  50 mg Oral Daily    losartan  100 mg Oral Daily     Continuous Infusions:   sodium chloride 125 mL/hr at 05/28/22 0618    dexmedetomidine (PRECEDEX) IV infusion Stopped (05/28/22 0627)    sodium chloride Stopped (05/26/22 2008)    dextrose      niCARdipene (CARDENE) infusion Stopped (05/26/22 0915)     PRN Meds:haloperidol lactate, LORazepam **OR** LORazepam **OR** LORazepam **OR** LORazepam **OR** LORazepam **OR** LORazepam **OR** LORazepam **OR** LORazepam, albuterol, hydrALAZINE, sodium chloride flush, sodium chloride, acetaminophen, ondansetron **OR** ondansetron, metoprolol, glucose, dextrose bolus **OR** dextrose bolus, glucagon (rDNA), dextrose    No Known Allergies    Social History     Socioeconomic History    Marital status:      Spouse name: Not on file    Number of children: Not on file    Years of education: Not on file    Highest education level: Not on file   Occupational History    Not on file   Tobacco Use    Smoking status: Current Every Day Smoker     Packs/day: 1.50     Start date: 6/1/1973    Smokeless tobacco: Never Used   Substance and Sexual Activity    Alcohol use: Yes     Alcohol/week: 4.0 standard drinks     Types: 4 Cans of beer per week     Comment: Occassional, four times weekly    Drug use: No    Sexual activity: Yes     Partners: Female   Other Topics Concern    Not on file   Social History Narrative    Not on file     Social Determinants of Health     Financial Resource Strain:     Difficulty of Paying Living Expenses: Not on file   Food Insecurity:     Worried About Running Out of Food in the Last Year: Not on file    Dylon of Food in the Last Year: Not on file   Transportation Needs:     Lack of Transportation (Medical): Not on file    Lack of Transportation (Non-Medical):  Not on file   Physical Activity:     Days of Exercise per Week: Not on file    Minutes of Exercise per Session: Not on file   Stress:     Feeling of Stress : Not on file   Social Connections:     Frequency of Communication with Friends and Family: Not on file    Frequency of Social Gatherings with Friends and Family: Not on file    Attends Shinto Services: Not on file    Active Member of Clubs or Organizations: Not on file    Attends Club or Organization Meetings: Not on file    Marital Status: Not on file   Intimate Partner Violence:     Fear of Current or Ex-Partner: Not on file    Emotionally Abused: Not on file  Physically Abused: Not on file    Sexually Abused: Not on file   Housing Stability:     Unable to Pay for Housing in the Last Year: Not on file    Number of Places Lived in the Last Year: Not on file    Unstable Housing in the Last Year: Not on file       History reviewed. No pertinent family history. Physical Exam:  Vitals:    05/28/22 0600 05/28/22 0630 05/28/22 0749 05/28/22 0800   BP:  132/74     Pulse: 87 80  100   Resp:   27    Temp:       TempSrc:       SpO2: 98% 97%  95%   Weight: 172 lb 6.4 oz (78.2 kg)      Height:         I/O last 3 completed shifts: In: 722.6 [I.V.:520.3; IV Piggyback:202.3]  Out: 1675 [Urine:1675]    General:  Awake, not in distress. Appears to be stated age. HEENT: Atraumatic, normocephalic. Anicteric sclera. Pink and moist oral mucosa. No carotid bruit. No JVD. Chest: Bilateral air entry, clear to auscultation, no wheezing, rhonchi or rales. Cardiovascular: RRR, S1S2, no murmur, rub or gallop. No lower extremity edema. Abdomen: Soft, non tender to palpation. Active bowel sounds x 4 quadrants. Musculoskeletal: No cyanosis or clubbing. Integumentary: Pink, warm and dry. Free from rash or lesions. Skin turgor normal.  CNS: Oriented to person, disoriented to time, situation and place. Speech clear. Face symmetrical. No tremor.      Data:  CBC:   Lab Results   Component Value Date    WBC 9.5 05/28/2022    HGB 12.0 (L) 05/28/2022    HCT 36.4 (L) 05/28/2022    MCV 96.6 05/28/2022     05/28/2022     BMP:    Lab Results   Component Value Date     (L) 05/28/2022     (L) 05/27/2022     (L) 05/26/2022    K 4.3 05/28/2022    K 3.8 05/27/2022    K 3.6 (L) 05/26/2022     05/28/2022    CL 98 05/27/2022    CL 99 05/26/2022    CO2 22 05/28/2022    CO2 24 05/27/2022    CO2 23 05/26/2022    BUN 18 05/28/2022    BUN 10 05/27/2022    BUN 11 05/26/2022    CREATININE 1.08 05/28/2022    CREATININE 1.14 05/27/2022    CREATININE 1.02 05/26/2022    GLUCOSE 220 (H) 05/28/2022    GLUCOSE 127 (H) 05/27/2022    GLUCOSE 148 (H) 05/26/2022     CMP:   Lab Results   Component Value Date     05/28/2022    K 4.3 05/28/2022     05/28/2022    CO2 22 05/28/2022    BUN 18 05/28/2022    CREATININE 1.08 05/28/2022    GLUCOSE 220 05/28/2022    CALCIUM 8.9 05/28/2022    PROT 6.6 05/26/2022    LABALBU 3.8 05/26/2022    BILITOT 0.79 05/26/2022    ALKPHOS 69 05/26/2022    AST 24 05/26/2022    ALT 12 05/26/2022      Hepatic:   Lab Results   Component Value Date    AST 24 05/26/2022    AST 24 05/25/2022    AST 37 02/23/2021    ALT 12 05/26/2022    ALT 11 05/25/2022    ALT 22 02/23/2021    BILITOT 0.79 05/26/2022    BILITOT 0.51 05/25/2022    BILITOT 0.36 02/23/2021    ALKPHOS 69 05/26/2022    ALKPHOS 78 05/25/2022    ALKPHOS 61 02/23/2021     BNP: No results found for: BNP  Lipids:   Lab Results   Component Value Date    CHOL 185 02/25/2021    HDL 87 02/25/2021     INR:   Lab Results   Component Value Date    INR 1.1 09/22/2021    INR 1.0 09/20/2021    INR 1.0 02/26/2021     PTH: No results found for: PTH  Phosphorus:    Lab Results   Component Value Date    PHOS 3.4 09/23/2021     Ionized Calcium: No results found for: IONCA  Magnesium:   Lab Results   Component Value Date    MG 1.0 09/23/2021     Albumin:   Lab Results   Component Value Date    LABALBU 3.8 05/26/2022     Last 3 CK, CKMB, Troponin: @LABRCNT(CKTOTAL:3,CKMB:3,TROPONINI:3)       URINE:)No results found for: Alvaro Areola    Radiology:  Reviewed. Assessment and Plan to follow. Patient seen in collaboration with Dr. Fabian Ray. Electronically signed by CHARLENE Arenas - CNP  on 5/28/2022 at 9:14 AM   [facility].   Ph: [phone]      Physician Addendum  I have personally seen and examined pt at bed side, I have reviewed documentation, agree with above note  This is a 81-year-old male with history of EtOH use, presented with seizure activity, has been going through DT, had elevated blood pressure required Cardene drip initially, currently on hydralazine and Lopressor IV, he could not take oral medication secondary to current mental status, at presentation he was found to be hyponatremic and in acute kidney injury  Initial sodium was 129 improved to 134, Initial creatinine was 1.27, improved to 1.08, yesterday he had low urine output, bladder scan was not working accurately, the patient was found to have urinary retention after Peterson catheter placement. Pressure was 143/92 with a pulse of 110 respiratory rate was 16 and satting 95% on 2 L  General:  Awake, not in distress. Restless   HEENT: Atraumatic, normocephalic. Anicteric sclera. Pink and moist oral mucosa. No carotid bruit. No JVD. Chest: Bilateral air entry, clear to auscultation, no wheezing, rhonchi or rales. Cardiovascular: RRR, S1S2, no murmur, rub or gallop. No lower extremity edema. Abdomen: Soft, non tender to palpation. Active bowel sounds x 4 quadrants. Musculoskeletal: No cyanosis or clubbing. Integumentary: Pink, warm and dry. Free from rash or lesions. Skin turgor normal.  CNS: Oriented x1, restless    Assessment and plan:  1. EtOH withdrawal  2.  urinary retention  3.  acute kidney injury  4.  hypertension with worsening blood pressure secondary to DT  5.   Hyponatremia    The patient is unable to take oral medications safely  Continue IV metoprolol and IV hydralazine as needed  Continue gentle hydration with normal saline  Serum sodium level improved  Creatinine improved   Rest of electrolytes are okay  Acid-base balance is within normal range  Continue Peterson catheter for now, will benefit from starting Flomax when tolerating p.o. intake  We will continue to follow    Electronically signed by Herb Elena MD on 05/28/22 1:24 PM

## 2022-05-28 NOTE — FLOWSHEET NOTE
05/28/22 0559   Treatment Team Notification   Reason for Communication Change in status  (response to previous message)   Team Member Name Dr. Med Modi Attending Provider   Method of Communication Secure Message   Response See orders   Notification Time 0129     Per Dr. Zane Elizondo nephrology for decreased UOP/urinary retention, place morris, Cardene gtt PRN for hypertension, increase fluids to 125 m/hr
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
## 2022-05-28 NOTE — PROGRESS NOTES
Precedex gtt titrated to 0.4 and pt BP increased to 175/135. Pt is disoriented x4 and has increasing tremors/sweats. CIWA performed with score of 20.     3mg of Ativan given. Precedex titrated to 0.6 mcg/kg/hr.

## 2022-05-28 NOTE — PROGRESS NOTES
Pt restless in bed occ pulling on cords/wires, eyes mostly not open and pt on NC mumbling answers to simple questions mostly unable to understand what pt is saying. Will continue to monitor.

## 2022-05-28 NOTE — PROGRESS NOTES
Pt is continuously restless and anxious. Has mild tremors and paroxysmal sweats. Pt describes a \"hot and cold\" feeling and states the feeling of \"being hit by a car\". BP: 199/86 HR:112    CIWA scale total of 16. 3mg of Ativan given per scale and order protocol.

## 2022-05-28 NOTE — PROGRESS NOTES
Pt now resting comfortably, precedex at 0.6 mcg/kg/hr    No improvement in urine output, Dr. Allison Fitzgerald notified x2, no response at this time. New condom cath placed.      HR down to 80s-90s  SBP 150s, will hold off on cardene gtt for now

## 2022-05-28 NOTE — RT PROTOCOL NOTE
Bronchodilator order with Frequency of every 4 hours PRN for wheezing or increased work of breathing using Per Protocol order mode. 4-6 - enter or revise RT Bronchodilator order(s) to two equivalent RT bronchodilator orders with one order with BID Frequency and one order with Frequency of every 4 hours PRN wheezing or increased work of breathing using Per Protocol order mode. 7-10 - enter or revise RT Bronchodilator order(s) to two equivalent RT bronchodilator orders with one order with TID Frequency and one order with Frequency of every 4 hours PRN wheezing or increased work of breathing using Per Protocol order mode. 11-13 - enter or revise RT Bronchodilator order(s) to one equivalent RT bronchodilator order with QID Frequency and an Albuterol order with Frequency of every 4 hours PRN wheezing or increased work of breathing using Per Protocol order mode. Greater than 13 - enter or revise RT Bronchodilator order(s) to one equivalent RT bronchodilator order with every 4 hours Frequency and an Albuterol order with Frequency of every 2 hours PRN wheezing or increased work of breathing using Per Protocol order mode. RT to enter RT Home Evaluation for COPD & MDI Assessment order using Per Protocol order mode.     Electronically signed by Henry Lopez RCP on 5/28/2022 at 11:23 AM

## 2022-05-28 NOTE — PROGRESS NOTES
Pulmonary Critical Care Progress Note  Brady Shine MD     Patient seen for the follow up of hypoxia, Seizure ([facility] [address]     Subjective:  He is in bed, restless. He is oriented to person and place. He is off of precedex at this time. He is on 2 L nasal cannula. He is having urinary retention, now has a morris catheter. He would not answer my questions. Examination:  Vitals: BP (!) 143/92   Pulse (!) 110   Temp 96.8 °F (36 °C) (Oral)   Resp 16   Ht 5' 6\" (1.676 m)   Wt 172 lb 6.4 oz (78.2 kg)   SpO2 95%   BMI 27.83 kg/m²   General appearance: alert and cooperative with exam,, restless  Neck: No JVD  Lungs: diminished air exchange with some rhonchi   Heart: regular rate and rhythm, S1, S2 normal, no gallop  Abdomen: Soft, non tender, + BS  Extremities: no cyanosis or clubbing.  No significant edema    LABs:  CBC:   Recent Labs     05/27/22  0504 05/28/22  0454   WBC 10.6 9.5   HGB 12.7* 12.0*   HCT 37.4* 36.4*    160     BMP:   Recent Labs     05/27/22  0504 05/28/22  0454   * 134*   K 3.8 4.3   CO2 24 22   BUN 10 18   CREATININE 1.14 1.08   LABGLOM >60 >60   GLUCOSE 127* 220*     LIVER PROFILE:  Recent Labs     05/25/22  1307 05/26/22  0338   AST 24 24   ALT 11 12   LABALBU 3.9 3.8     ABG:  Lab Results   Component Value Date    FIO2 30.0 05/27/2022       Lab Results   Component Value Date    POCPH 7.396 05/27/2022    POCPCO2 39.7 05/27/2022    POCPO2 87.5 05/27/2022    POCHCO3 24.4 05/27/2022    NBEA 1 05/26/2022    PBEA 0 05/27/2022    UGMU2ABQ 97 05/27/2022    FIO2 30.0 05/27/2022     Radiology:  Chest xray   5/28/2022      Impression:  · Acute respiratory insufficiency with hypoxia   · New onset seizure   · Alcohol abuse/ dependence/ withdrawal   · History of CVA   · Obstructive sleep apnea  · Active smoking/ likely COPD   · Severe right Carotid artery stenosis  · HTN, DM    Recommendations:  · Continue BIPAP support as needed   · 2 liters/min via nasal cannula to keep spO2 greater than 92% as tolerated  · IV solumedrol 40mg Q6 hours   · Ipratropium- Albuterol BID   · Budesonide via nebulizer BID   · Precedex, titrate to RAAS goal of 0 to -1   · Ativan per CIWA protocol   · Vimpat 200mg BID, phenobarbital 65mg BID, neurology following   · Nephology consult for urinary retention   · Seizure precautions  · Cardene drip was stopped, management of HTN with home regimen and hydralazine 10mg as needed for SBP greater than 160  · Consider vascular surgery consult   · X-ray chest in am  · Labs: CBC and BMP in am  · DVT prophylaxis with low molecular weight heparin  · Will follow with you  Electronically signed by     Blanca Camara MD on 5/28/2022 at 1:08 PM  Pulmonary Critical Care and Sleep Medicine,  [facility]  Cell: [phone]  Office: [phone]

## 2022-05-28 NOTE — PROGRESS NOTES
[facility].,    Adult Hospitalist      Name: Irene Jeff  MRN: [id number]     Acct: [de-identified]  Room: [facility]2762-52    Admit Date: 5/25/2022 12:59 PM  PCP: DENNIS Mg    Primary Problem  Principal Problem:    Seizure ([address]  Resolved Problems:    * No resolved hospital problems. 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, new since 9/2021  · Old infarction left occipital lobe, increased since 9/2021  · 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 / Encephalomalacia   · Old lacunar infarcts left basal ganglia   · Mild brain parenchymal volume loss  · Alcohol dependence   · Reported right hemiparesis   · Essential hypertension  · Diabetes mellitus type 2  · Overweight   · Diverticulosis  · Agitation   · Delirium tremens     · Acute respiratory failure with hypoxia   · Oliguria        Plan:     · Admit ICU  · Monitor vitals closely  · Keep SpO2 above 90%  · Is/ Os  · IV fluids at 75 mL/h  · Ativan Prn  · Haldol for agitation  · Soft restraints-DC  · Neuro consulted in ED  · Cardene gtt for BP control    · NPO  · Neuro checks  · Seizure precaution  · Fall precaution   · CIWA protocol   · accu checks AC and HS  · ISS medium  · Hypoglycemia protocol  · Serial cardiac enzymes  · EKG  · Consult Pulmonology  · CBC, BMP  · Prolactin   · Precedex weaned off per neurology-required Precedex 5/27 night again  · Vimpat per neurology  · DVT and GI prophylaxis.         Chief Complaint:     Chief Complaint   Patient presents with    Seizures         History of Present Illness:          Patient seen and examined at bedside  Last 24-hour events reviewed with nursing    Patient was agitated during the night  He was trying to get out of bed and pull out IV lines  He had reduced urine output during the night  Patient is much better now  However continues to be hypoxic  Presently on 5 L/min via nasal cannula  Patient is confused and talking to himself at times  Difficult to comprehend at times  Appears to be paranoid, says he trusts no one  Patient is directable  Respiratory distress is improved      Review of system unable to obtain because of patient condition      Initial HPI  Judy Gannon is a 73 y.o.  male who presents with Seizures    Pt admitted through the ED after an episode of seizure at home. Apparently pt lives at home with his fiancee. According to her, he was sitting in his chair yesterday morning when he developed a seizure. It is difficult to determine what kind of seizure it was. His fiancee is not at bedside right now. However it was confirmed that the pt has never had a similar episode before in his life. It is also reported that he wanted to stop alcohol use however she reported him having a beer the evening before. Urine drug screen has been reported unremarkable and ETOH level was zero. Alcohol withdrawal seizure was suspected though seemed less likely if pt had continued to drink alcohol. Pt apparently drinks 4 beers daily    Pt was admitted to the ICU where he continued to remain agitated. Higher and frequent use of Ativan and later Haldol were ineffective. Pt required soft restraints since he was unsafe for himself trying to get out of bed and trying to pull out Iv    Pt was hypertensive with BP > 200/140 which was not improved w prn meds and was started on Cardene infusion. However pt continued to remain agitated and it was suspected he may be in DTs. Pt was placed on the CIWA protocol and was started on IV fluids and Thiamine, Folate    ROS is difficult to obtain presently    I have personally reviewed the past medical history, past surgical history, medications, social history, and family history, and summarized in the note.     Review of Systems:     All 10 point system is reviewed and unable to obtain because of patient condition      Past Medical History:     Past Medical History:   Diagnosis Date    Diabetes mellitus ([address]     Hypertension         Past Surgical History:     Past Surgical History:   Procedure Laterality Date    CAROTID ENDARTERECTOMY  09/22/2021    CAROTID ENDARTERECTOMY (N/A Neck    CAROTID ENDARTERECTOMY N/A 9/22/2021    CAROTID ENDARTERECTOMY performed by Bud Miller MD at [facility] IR INS PICC VAD W SQ PORT GREATER THAN 5  5/26/2022    IR INS PICC VAD W SQ PORT GREATER THAN 5 5/26/2022 [facility]    KNEE SURGERY          Medications Prior to Admission:       Prior to Admission medications    Medication Sig Start Date End Date Taking? Authorizing Provider   clopidogrel (PLAVIX) 75 MG tablet Take 1 tablet by mouth daily for 21 days 9/23/21 10/14/21  Angela Sanchez MD   aspirin 81 MG chewable tablet Take 1 tablet by mouth daily 2/26/21   Angela Sanchez MD   metFORMIN (GLUCOPHAGE) 500 MG tablet Take 1 tablet by mouth 2 times daily (with meals) 2/26/21   Angela Sanchez MD   atorvastatin (LIPITOR) 40 MG tablet Take 1 tablet by mouth nightly 2/26/21   Angela Sanchez MD   amLODIPine (NORVASC) 5 MG tablet Take 1 tablet by mouth daily 2/27/21   Angela Sanchez MD   PARoxetine (PAXIL) 40 MG tablet Take 40 mg by mouth daily    Historical Provider, MD   atenolol (TENORMIN) 50 MG tablet Take 50 mg by mouth daily    Historical Provider, MD   losartan (COZAAR) 100 MG tablet Take 100 mg by mouth daily    Historical Provider, MD        Allergies:       Patient has no known allergies. Social History:     Tobacco:    reports that he has been smoking. He started smoking about 49 years ago. He has been smoking about 1.50 packs per day. He has never used smokeless tobacco.  Alcohol:      reports current alcohol use of about 4.0 standard drinks of alcohol per week. Drug Use:  reports no history of drug use. Family History:     History reviewed. No pertinent family history.       Physical Exam: Vitals:  BP (!) 143/92   Pulse 97   Temp 96.8 °F (36 °C) (Oral)   Resp 16   Ht 5' 6\" (1.676 m)   Wt 172 lb 6.4 oz (78.2 kg)   SpO2 98%   BMI 27.83 kg/m²   Temp (24hrs), Av.7 °F (36.5 °C), Min:96.8 °F (36 °C), Max:98.1 °F (36.7 °C)      General appearance -confused, and in mild acute distress  Mental status - not oriented to person, place, and time with normal affect  Head - normocephalic and atraumatic  Eyes - pupils equal and reactive, extraocular eye movements intact, conjunctiva clear  Ears - hearing unable to evaluate sec to pt condition  Nose - no drainage noted  Mouth - mucous membranes moist  Neck - supple, no carotid bruits, thyroid not palpable  Chest - clear to auscultation, slightly increased effort  Heart - normal rate, regular rhythm, no murmur  Abdomen - soft, nontender, nondistended, bowel sounds present all four quadrants, no masses, hepatomegaly or splenomegaly  Neurological - stupor  Extremities - peripheral pulses palpable, no pedal edema or calf pain with palpation  Skin - no gross lesions, rashes, or induration noted        Data:     Labs:    Hematology:  Recent Labs     22  05022  0454   WBC 10.3 10.6 9.5   RBC 4.08* 3.94* 3.77*   HGB 12.8* 12.7* 12.0*   HCT 37.9* 37.4* 36.4*   MCV 92.9 94.9 96.6   MCH 31.4 32.2 31.8   MCHC 33.8 34.0 33.0   RDW 12.6 13.1 13.1    169 160   MPV 9.2 9.3 9.4     Chemistry:  Recent Labs     22  0504 22  0454   * 134* 134*   K 3.6* 3.8 4.3   CL 99 98 101   CO2 23 24 22   GLUCOSE 148* 127* 220*   BUN 11 10 18   CREATININE 1.02 1.14 1.08   ANIONGAP 10 12 11   LABGLOM >60 >60 >60   GFRAA >60 >60 >60   CALCIUM 8.7 8.7 8.9     Recent Labs     22  1307 22  2313 22  0338 22  0801 22  1913 22  0712 22  1127 22  1212 22  1627 22  1940 22  0719   PROT 7.0  --  6.6  --   --   --   --   --   --   --   --    LABALBU 3.9  --  3.8  -- --   --   --   --   --   --   --    LABA1C  --   --  6.2*  --   --   --   --   --   --   --   --    AST 24  --  24  --   --   --   --   --   --   --   --    ALT 11  --  12  --   --   --   --   --   --   --   --    ALKPHOS 78  --  69  --   --   --   --   --   --   --   --    BILITOT 0.51  --  0.79  --   --   --   --   --   --   --   --    BILIDIR  --   --  0.21  --   --   --   --   --   --   --   --    AMMONIA  --   --   --   --   --   --   --  23  --   --   --    LIPASE 31  --   --   --   --   --   --   --   --   --   --    POCGLU  --    < >  --    < > 129* 125* 135*  --  154* 169* 221*    < > = values in this interval not displayed. Lab Results   Component Value Date    INR 1.1 09/22/2021    INR 1.0 09/20/2021    INR 1.0 02/26/2021    PROTIME 13.7 09/22/2021    PROTIME 12.7 09/20/2021    PROTIME 12.8 02/26/2021       Lab Results   Component Value Date/Time    SPECIAL NOT REPORTED 02/26/2021 09:30 AM     Lab Results   Component Value Date/Time    CULTURE NO GROWTH 3 DAYS 02/26/2021 09:30 AM       Lab Results   Component Value Date    POCPH 7.396 05/27/2022    POCPCO2 39.7 05/27/2022    POCPO2 87.5 05/27/2022    POCHCO3 24.4 05/27/2022    NBEA 1 05/26/2022    PBEA 0 05/27/2022    IAPB8ALH 97 05/27/2022    FIO2 30.0 05/27/2022       Radiology:    CT HEAD WO CONTRAST    Result Date: 5/25/2022  No acute intracranial hemorrhage or abnormal extra-axial collection. Relative to 9 months prior there are increased areas of encephalomalacia in the left occipital lobe and new areas of encephalomalacia in the left parietal lobe. If there is persistent clinical concern for acute on chronic ischemia, MRI is more sensitive. CT CHEST ABDOMEN PELVIS W CONTRAST    Result Date: 5/25/2022  Small cysts noted in the liver. Diverticular disease of the colon without diverticulitis. Significant thickening of the bladder wall, in keeping with muscular hypertrophy or cystitis. Clinical correlation suggested.  Degenerative changes demonstrated in the the thoracic and lumbar spine. RECOMMENDATIONS:     MRI BRAIN W WO CONTRAST    Result Date: 5/25/2022  No acute intracranial abnormality. No mass effect or abnormal intracranial enhancement. Old infarctions in the left frontal parietal lobes, new since September 21, 2021. Old infarction in the left occipital lobe, likely increased in size since September 21, 2021. Laminar necrosis and gliosis associated with the old infarctions. Old lacunar infarcts in the left basal ganglia, likely increased. Mild parenchymal volume loss. All radiological studies reviewed                Code Status:  Full Code    Electronically signed by Nelda Chiu MD on 5/28/2022 at 10:51 AM     Copy sent to DENNIS New    This note was created with the assistance of a speech-recognition program.  Although the intention is to generate a document that actually reflects the content of the visit, no guarantees can be provided that every mistake has been identified and corrected by editing. Note was updated later by me after  physical examination and  completion of the assessment.

## 2022-05-28 NOTE — PLAN OF CARE
Problem: Neurosensory - Adult  Goal: Achieves stable or improved neurological status  Outcome: Progressing  Goal: Absence of seizures  Outcome: Progressing  Goal: Remains free of injury related to seizures activity  Outcome: Progressing  Goal: Achieves maximal functionality and self care  Outcome: Progressing     Problem: Respiratory - Adult  Goal: Achieves optimal ventilation and oxygenation  Outcome: Progressing     Problem: Cardiovascular - Adult  Goal: Maintains optimal cardiac output and hemodynamic stability  Outcome: Progressing  Goal: Absence of cardiac dysrhythmias or at baseline  Outcome: Progressing     Problem: Skin/Tissue Integrity - Adult  Goal: Skin integrity remains intact  Outcome: Progressing  Goal: Incisions, wounds, or drain sites healing without S/S of infection  Outcome: Progressing  Goal: Oral mucous membranes remain intact  Outcome: Progressing     Problem: Musculoskeletal - Adult  Goal: Return mobility to safest level of function  Outcome: Progressing     Problem: Genitourinary - Adult  Goal: Absence of urinary retention  Outcome: Progressing  Goal: Urinary catheter remains patent  Outcome: Progressing

## 2022-05-28 NOTE — PROGRESS NOTES
[facility]) Neurology Specialist  [address]  [place], [address] [facility]:  [phone] or [phone]  FAX:  [phone]            Brief history: Mario Yeung is a 73 y.o. old male admitted on 5/25/2022 with encephalopathy and seizures     Subjective: No new neurological events overnight. The patient continues to be restless and agitated and pulling the lines. Overall agitation appears to be improved from yesterday. Objective: BP (!) 169/84   Pulse (!) 121   Temp 97.3 °F (36.3 °C) (Temporal)   Resp 22   Ht 5' 6\" (1.676 m)   Wt 172 lb 6.4 oz (78.2 kg)   SpO2 97%   BMI 27.83 kg/m²       Medications:    PHENobarbital  65 mg IntraVENous BID    methylPREDNISolone  40 mg IntraVENous Q6H    budesonide  0.5 mg Nebulization BID    enoxaparin  40 mg SubCUTAneous Daily    thiamine and folic acid IVPB   IntraVENous Daily    lacosamide (VIMPAT) IVPB  200 mg IntraVENous BID    ipratropium-albuterol  1 ampule Inhalation BID    sodium chloride flush  10 mL IntraVENous Once    sodium chloride flush  5-40 mL IntraVENous 2 times per day    insulin lispro  0-12 Units SubCUTAneous TID WC    insulin lispro  0-6 Units SubCUTAneous Nightly    aspirin  81 mg Oral Daily    atorvastatin  40 mg Oral Nightly    clopidogrel  75 mg Oral Daily    metFORMIN  500 mg Oral BID WC    PARoxetine  40 mg Oral Daily    amLODIPine  5 mg Oral Daily    atenolol  50 mg Oral Daily    losartan  100 mg Oral Daily        Mental status   Patient is awake. Makes brief eye contact. Oriented to self, name of the hospital but not the month of the year. Patient is having speech dysarthria improved from yesterday. Able to follow one-step commands   Cranial nerves   Pupil response: Present   Corneal Reflex: Present    Oculocephalic reflex: Present     Cough reflex: Present        Motor and sensory function  Spontaneous limb movement in all ext.    DTR Intact symmetric  Babinski Absent   Gait Not tested Lab Results   Component Value Date    LDLCHOLESTEROL 79 02/25/2021     No components found for: CHLPL  Lab Results   Component Value Date    TRIG 93 02/25/2021    TRIG 305 (H) 01/02/2018     Lab Results   Component Value Date    HDL 87 02/25/2021     No results found for: LDLCALC  No results found for: LABVLDL  Lab Results   Component Value Date    LABA1C 6.2 (H) 05/26/2022     Lab Results   Component Value Date     05/26/2022     No results found for: [id number]   Neurological work up:  CT head  CTA head and neck  MRI brain     2 D echo     Assessment and Recommendations:   Left temporal parietal ischemic stroke February 2021, likely secondary to left ICA dissection status post left CEA 9/2021  Seizure secondary to above  History of alcoholism, possible alcohol withdrawal    The patient continues to be agitated but appears somewhat improved from yesterday. I will increase phenobarbital to 65 mg 3 times daily    Continue CIWA protocol as being done    Continued Vimpat 200 mg twice a day    Weaning off Precedex as being done    Will follow. Tone Abraham MD  Neurology    This note is created with the assistance of a speech-recognition program. While intending to generate a document that actually reflects the content of the visit, the document can still have some errors including those of syntax and sound a- like substitutions which may escape proofreading. In such instances, actual meaning can be extrapolated by contextual derivation.

## 2022-05-28 NOTE — PROGRESS NOTES
Physician Progress Note      PATIENTJeral Pump  CSN #:                  [id number]  :                       1955  ADMIT DATE:       2022 12:59 PM  [address] [place] DATE:  RESPONDING  PROVIDER #:        Kalina Shepherd MD          QUERY TEXT:    Pt admitted with Seizures, Alcohol dependence and has encephalopathy   documented. If possible, please document in progress notes and discharge   summary further specificity regarding the type of encephalopathy:    The medical record reflects the following:  Risk Factors: Seizure, post ictal state, Alcohol dependence, Encephalomalacia  Clinical Indicators:  consult note neuro:  Agitation could also appears to   be multifactorial secondary to alcohol withdrawal and from [address]. I would   discontinue Keppra and start patient on Vimpat 200 mg twice daily.    consult note neuro:  ? The patient continues to encephalopathic?? ?Agitation? appears to be multifactorial secondary to alcohol withdrawal and   from [address] ?  Treatment: Ativan/Haldol/versed given for persistent agitation ,  CIWA,   Precedex, Cardene gtt;  Keppra stopped and changed to Vimpat  Options provided:  -- Agitation and AMS secondary to postictal state following seizure,   encephalopathy ruled out after study  -- Agitation and AMS secondary to alcohol withdrawal delirium, encephalopathy   ruled out after study  -- Agitation and AMS secondary to alcohol withdrawal delirium and postictal   state following seizure, encephalopathy ruled out after study  -- Hypertensive encephalopathy  -- Other - I will add my own diagnosis  -- Disagree - Not applicable / Not valid  -- Disagree - Clinically unable to determine / Unknown  -- Refer to Clinical Documentation Reviewer    PROVIDER RESPONSE TEXT:    This patient has agitation and AMS secondary to alcohol withdrawal delirium   and postictal state following seizure, encephalopathy has been ruled out after   study.     Query created by: Rich Ivory Teran on 5/27/2022 12:18 PM      Electronically signed by:  Ke Sanford MD 5/28/2022 12:43 PM

## 2022-05-28 NOTE — PROGRESS NOTES
Pts hilda Montoya called and then visited at bedside. She was updated and questions answered. Pt became more restless and agitated with audible wheezing noted while Lindsay visiting so RN enc pt to relax, Jadon Roberts encouraged to leave and ativan given. Staff stayed at bedside till pt calmed down and stopped kicking staff and swinging arms. Pt repeatedly stated he wanted to \"get the fuck out of here and to go home\". Pt informed he needs to relax and cooperate with staff.

## 2022-05-28 NOTE — PLAN OF CARE
Problem: Neurosensory - Adult  Goal: Absence of seizures  5/28/2022 1319 by Sukumar Dhillon RN  Outcome: Progressing  5/28/2022 0646 by Monet Dolan RN  Outcome: Progressing     Problem: Skin/Tissue Integrity - Adult  Goal: Skin integrity remains intact  5/28/2022 1319 by Sukumar Dhillon RN  Outcome: Progressing  Flowsheets (Taken 5/28/2022 0830)  Skin Integrity Remains Intact: Monitor for areas of redness and/or skin breakdown  5/28/2022 0646 by Monet Dolan RN  Outcome: Progressing     Problem: Pain  Goal: Verbalizes/displays adequate comfort level or baseline comfort level  Outcome: Progressing   Continue to monitor skin integrity and IV sites. Patient will have progressive improvement with pain scale with interventions. No seizure activity noted this shift, side rails padded X4 will continue to monitor. Pt restless and agitated at times pulling on penis and cords. Multiple reminders to leave medical equipment alone and stop touching himself when staff in room unsuccessful. Ativan given PRN and pt rests for a few minutes after dose. Will continue to monitor.

## 2022-05-29 ENCOUNTER — APPOINTMENT (OUTPATIENT)
Dept: GENERAL RADIOLOGY | Age: 67
DRG: 056 | End: 2022-05-29
Payer: MEDICARE

## 2022-05-29 LAB
ABSOLUTE EOS #: 0 K/UL (ref 0–0.4)
ABSOLUTE IMMATURE GRANULOCYTE: 0.33 K/UL (ref 0–0.3)
ABSOLUTE LYMPH #: 0.33 K/UL (ref 1–4.8)
ABSOLUTE MONO #: 0.44 K/UL (ref 0.2–0.8)
ANION GAP SERPL CALCULATED.3IONS-SCNC: 11 MMOL/L (ref 9–17)
BASOPHILS # BLD: 0 %
BASOPHILS ABSOLUTE: 0 K/UL (ref 0–0.2)
BUN BLDV-MCNC: 22 MG/DL (ref 8–23)
BUN/CREAT BLD: 19 (ref 9–20)
CALCIUM SERPL-MCNC: 8.5 MG/DL (ref 8.6–10.4)
CHLORIDE BLD-SCNC: 106 MMOL/L (ref 98–107)
CO2: 20 MMOL/L (ref 20–31)
CREAT SERPL-MCNC: 1.16 MG/DL (ref 0.7–1.2)
EOSINOPHILS RELATIVE PERCENT: 0 % (ref 1–4)
GFR AFRICAN AMERICAN: >60 ML/MIN
GFR NON-AFRICAN AMERICAN: >60 ML/MIN
GFR SERPL CREATININE-BSD FRML MDRD: ABNORMAL ML/MIN/{1.73_M2}
GLUCOSE BLD-MCNC: 146 MG/DL (ref 75–110)
GLUCOSE BLD-MCNC: 155 MG/DL (ref 75–110)
GLUCOSE BLD-MCNC: 188 MG/DL (ref 75–110)
GLUCOSE BLD-MCNC: 207 MG/DL (ref 75–110)
GLUCOSE BLD-MCNC: 216 MG/DL (ref 70–99)
HCT VFR BLD CALC: 36.1 % (ref 40.7–50.3)
HEMOGLOBIN: 11.8 G/DL (ref 13–17)
IMMATURE GRANULOCYTES: 3 %
LYMPHOCYTES # BLD: 3 % (ref 24–44)
MCH RBC QN AUTO: 32.1 PG (ref 25.2–33.5)
MCHC RBC AUTO-ENTMCNC: 32.7 G/DL (ref 28–38)
MCV RBC AUTO: 98.1 FL (ref 82.6–102.9)
MONOCYTES # BLD: 4 % (ref 1–7)
PDW BLD-RTO: 13.3 % (ref 11.8–14.4)
PLATELET # BLD: 170 K/UL (ref 138–453)
PMV BLD AUTO: 9.6 FL (ref 8.1–13.5)
POTASSIUM SERPL-SCNC: 4.5 MMOL/L (ref 3.7–5.3)
RBC # BLD: 3.68 M/UL (ref 4.21–5.77)
SEG NEUTROPHILS: 90 % (ref 36–66)
SEGMENTED NEUTROPHILS ABSOLUTE COUNT: 10 K/UL (ref 1.8–7.7)
SODIUM BLD-SCNC: 137 MMOL/L (ref 135–144)
WBC # BLD: 11.1 K/UL (ref 3.5–11.3)

## 2022-05-29 PROCEDURE — 6360000002 HC RX W HCPCS: Performed by: FAMILY MEDICINE

## 2022-05-29 PROCEDURE — 94640 AIRWAY INHALATION TREATMENT: CPT

## 2022-05-29 PROCEDURE — 85025 COMPLETE CBC W/AUTO DIFF WBC: CPT

## 2022-05-29 PROCEDURE — 2580000003 HC RX 258: Performed by: FAMILY MEDICINE

## 2022-05-29 PROCEDURE — 82947 ASSAY GLUCOSE BLOOD QUANT: CPT

## 2022-05-29 PROCEDURE — 2580000003 HC RX 258

## 2022-05-29 PROCEDURE — 71045 X-RAY EXAM CHEST 1 VIEW: CPT

## 2022-05-29 PROCEDURE — 80048 BASIC METABOLIC PNL TOTAL CA: CPT

## 2022-05-29 PROCEDURE — 94761 N-INVAS EAR/PLS OXIMETRY MLT: CPT

## 2022-05-29 PROCEDURE — 6360000002 HC RX W HCPCS: Performed by: NURSE PRACTITIONER

## 2022-05-29 PROCEDURE — 6360000002 HC RX W HCPCS: Performed by: PSYCHIATRY & NEUROLOGY

## 2022-05-29 PROCEDURE — 2500000003 HC RX 250 WO HCPCS: Performed by: FAMILY MEDICINE

## 2022-05-29 PROCEDURE — 99232 SBSQ HOSP IP/OBS MODERATE 35: CPT | Performed by: PSYCHIATRY & NEUROLOGY

## 2022-05-29 PROCEDURE — 2500000003 HC RX 250 WO HCPCS: Performed by: INTERNAL MEDICINE

## 2022-05-29 PROCEDURE — 2580000003 HC RX 258: Performed by: PSYCHIATRY & NEUROLOGY

## 2022-05-29 PROCEDURE — 6370000000 HC RX 637 (ALT 250 FOR IP): Performed by: FAMILY MEDICINE

## 2022-05-29 PROCEDURE — 6360000002 HC RX W HCPCS: Performed by: INTERNAL MEDICINE

## 2022-05-29 PROCEDURE — 6370000000 HC RX 637 (ALT 250 FOR IP): Performed by: INTERNAL MEDICINE

## 2022-05-29 PROCEDURE — 2700000000 HC OXYGEN THERAPY PER DAY

## 2022-05-29 PROCEDURE — A4216 STERILE WATER/SALINE, 10 ML: HCPCS | Performed by: INTERNAL MEDICINE

## 2022-05-29 PROCEDURE — 2580000003 HC RX 258: Performed by: INTERNAL MEDICINE

## 2022-05-29 PROCEDURE — 36415 COLL VENOUS BLD VENIPUNCTURE: CPT

## 2022-05-29 PROCEDURE — C9254 INJECTION, LACOSAMIDE: HCPCS | Performed by: PSYCHIATRY & NEUROLOGY

## 2022-05-29 PROCEDURE — 94660 CPAP INITIATION&MGMT: CPT

## 2022-05-29 PROCEDURE — 2000000000 HC ICU R&B

## 2022-05-29 RX ORDER — DEXTROSE MONOHYDRATE 50 MG/ML
100 INJECTION, SOLUTION INTRAVENOUS PRN
Status: DISCONTINUED | OUTPATIENT
Start: 2022-05-29 | End: 2022-05-29 | Stop reason: SDUPTHER

## 2022-05-29 RX ADMIN — INSULIN LISPRO 2 UNITS: 100 INJECTION, SOLUTION INTRAVENOUS; SUBCUTANEOUS at 12:59

## 2022-05-29 RX ADMIN — METOPROLOL TARTRATE 5 MG: 1 INJECTION, SOLUTION INTRAVENOUS at 23:04

## 2022-05-29 RX ADMIN — HYDRALAZINE HYDROCHLORIDE 10 MG: 20 INJECTION INTRAMUSCULAR; INTRAVENOUS at 14:35

## 2022-05-29 RX ADMIN — ENOXAPARIN SODIUM 40 MG: 100 INJECTION SUBCUTANEOUS at 09:07

## 2022-05-29 RX ADMIN — PHENOBARBITAL SODIUM 65 MG: 65 INJECTION INTRAMUSCULAR; INTRAVENOUS at 19:39

## 2022-05-29 RX ADMIN — METHYLPREDNISOLONE SODIUM SUCCINATE 40 MG: 40 INJECTION, POWDER, FOR SOLUTION INTRAMUSCULAR; INTRAVENOUS at 12:32

## 2022-05-29 RX ADMIN — INSULIN LISPRO 4 UNITS: 100 INJECTION, SOLUTION INTRAVENOUS; SUBCUTANEOUS at 09:11

## 2022-05-29 RX ADMIN — I.V. FAT EMULSION 100 ML: 20 EMULSION INTRAVENOUS at 18:01

## 2022-05-29 RX ADMIN — SODIUM CHLORIDE: 9 INJECTION, SOLUTION INTRAVENOUS at 05:42

## 2022-05-29 RX ADMIN — LORAZEPAM 4 MG: 2 INJECTION INTRAMUSCULAR at 19:34

## 2022-05-29 RX ADMIN — CALCIUM GLUCONATE: 94 INJECTION, SOLUTION INTRAVENOUS at 18:00

## 2022-05-29 RX ADMIN — HYDRALAZINE HYDROCHLORIDE 10 MG: 20 INJECTION INTRAMUSCULAR; INTRAVENOUS at 05:40

## 2022-05-29 RX ADMIN — IPRATROPIUM BROMIDE AND ALBUTEROL SULFATE 1 AMPULE: 2.5; .5 SOLUTION RESPIRATORY (INHALATION) at 19:14

## 2022-05-29 RX ADMIN — HYDRALAZINE HYDROCHLORIDE 10 MG: 20 INJECTION INTRAMUSCULAR; INTRAVENOUS at 20:07

## 2022-05-29 RX ADMIN — LORAZEPAM 4 MG: 2 INJECTION INTRAMUSCULAR at 17:18

## 2022-05-29 RX ADMIN — INSULIN LISPRO 2 UNITS: 100 INJECTION, SOLUTION INTRAVENOUS; SUBCUTANEOUS at 16:30

## 2022-05-29 RX ADMIN — BUDESONIDE 500 MCG: 0.5 SUSPENSION RESPIRATORY (INHALATION) at 19:14

## 2022-05-29 RX ADMIN — SODIUM CHLORIDE 1.2 MCG/KG/HR: 9 INJECTION, SOLUTION INTRAVENOUS at 04:42

## 2022-05-29 RX ADMIN — METOPROLOL TARTRATE 5 MG: 1 INJECTION, SOLUTION INTRAVENOUS at 16:05

## 2022-05-29 RX ADMIN — BUDESONIDE 500 MCG: 0.5 SUSPENSION RESPIRATORY (INHALATION) at 08:17

## 2022-05-29 RX ADMIN — SODIUM CHLORIDE, PRESERVATIVE FREE 20 MG: 5 INJECTION INTRAVENOUS at 21:09

## 2022-05-29 RX ADMIN — SODIUM CHLORIDE 1.5 MCG/KG/HR: 9 INJECTION, SOLUTION INTRAVENOUS at 21:36

## 2022-05-29 RX ADMIN — PHENOBARBITAL SODIUM 65 MG: 65 INJECTION INTRAMUSCULAR; INTRAVENOUS at 12:33

## 2022-05-29 RX ADMIN — FOLIC ACID: 5 INJECTION, SOLUTION INTRAMUSCULAR; INTRAVENOUS; SUBCUTANEOUS at 09:08

## 2022-05-29 RX ADMIN — METHYLPREDNISOLONE SODIUM SUCCINATE 40 MG: 40 INJECTION, POWDER, FOR SOLUTION INTRAMUSCULAR; INTRAVENOUS at 18:10

## 2022-05-29 RX ADMIN — LACOSAMIDE 200 MG: 10 INJECTION, SOLUTION INTRAVENOUS at 09:54

## 2022-05-29 RX ADMIN — METHYLPREDNISOLONE SODIUM SUCCINATE 40 MG: 40 INJECTION, POWDER, FOR SOLUTION INTRAMUSCULAR; INTRAVENOUS at 05:51

## 2022-05-29 RX ADMIN — IPRATROPIUM BROMIDE AND ALBUTEROL SULFATE 1 AMPULE: 2.5; .5 SOLUTION RESPIRATORY (INHALATION) at 08:17

## 2022-05-29 RX ADMIN — HALOPERIDOL LACTATE 5 MG: 5 INJECTION, SOLUTION INTRAMUSCULAR at 20:31

## 2022-05-29 RX ADMIN — SODIUM CHLORIDE 1.2 MCG/KG/HR: 9 INJECTION, SOLUTION INTRAVENOUS at 00:26

## 2022-05-29 RX ADMIN — METHYLPREDNISOLONE SODIUM SUCCINATE 40 MG: 40 INJECTION, POWDER, FOR SOLUTION INTRAMUSCULAR; INTRAVENOUS at 00:25

## 2022-05-29 RX ADMIN — LACOSAMIDE 200 MG: 10 INJECTION, SOLUTION INTRAVENOUS at 21:14

## 2022-05-29 RX ADMIN — PHENOBARBITAL SODIUM 65 MG: 65 INJECTION INTRAMUSCULAR; INTRAVENOUS at 09:15

## 2022-05-29 RX ADMIN — INSULIN LISPRO 1 UNITS: 100 INJECTION, SOLUTION INTRAVENOUS; SUBCUTANEOUS at 21:09

## 2022-05-29 RX ADMIN — SODIUM CHLORIDE 0.5 MCG/KG/HR: 9 INJECTION, SOLUTION INTRAVENOUS at 16:28

## 2022-05-29 ASSESSMENT — PAIN SCALES - GENERAL: PAINLEVEL_OUTOF10: 0

## 2022-05-29 NOTE — PROGRESS NOTES
Pulmonary Critical Care Progress Note  Aditi Maravilla MD     Patient seen for the follow up of hypoxia, Seizure ([facility] [address]     Subjective:  He is in bed, not responsive. Last night he was placed back on Cardene and precedex, he was being aggressive. Cardene has since been turned off. He is on BiPAP. He would not answer my questions or wake up when stimulated. Examination:  Vitals: BP (!) 143/78   Pulse 88   Temp (!) 96 °F (35.6 °C) (Temporal)   Resp 20   Ht 5' 6\" (1.676 m)   Wt 172 lb 6.4 oz (78.2 kg)   SpO2 99%   BMI 27.83 kg/m²   General appearance: alert and cooperative with exam,, restless  Neck: No JVD  Lungs: diminished air exchange  Heart: regular rate and rhythm, S1, S2 normal, no gallop  Abdomen: Soft, non tender, + BS  Extremities: no cyanosis or clubbing.  No significant edema    LABs:  CBC:   Recent Labs     05/28/22  0454 05/29/22  0356   WBC 9.5 11.1   HGB 12.0* 11.8*   HCT 36.4* 36.1*    170     BMP:   Recent Labs     05/28/22  0454 05/29/22  0356   * 137   K 4.3 4.5   CO2 22 20   BUN 18 22   CREATININE 1.08 1.16   LABGLOM >60 >60   GLUCOSE 220* 216*     ABG:  Lab Results   Component Value Date    FIO2 30.0 05/27/2022       Lab Results   Component Value Date    POCPH 7.396 05/27/2022    POCPCO2 39.7 05/27/2022    POCPO2 87.5 05/27/2022    POCHCO3 24.4 05/27/2022    NBEA 1 05/26/2022    PBEA 0 05/27/2022    UIAE9IDY 97 05/27/2022    FIO2 30.0 05/27/2022     Radiology:  Chest xray   5/29/2022:    5/28/2022      Impression:  · Acute respiratory insufficiency with hypoxia   · New onset seizure   · Alcohol abuse/ dependence/ withdrawal   · History of CVA   · Obstructive sleep apnea  · Active smoking/ likely COPD   · Severe right Carotid artery stenosis  · HTN, DM    Recommendations:  · Continue BIPAP support as needed   · 2 liters/min via nasal cannula to keep spO2 greater than 92% as tolerated  · IV solumedrol 40mg Q6 hours   · Ipratropium- Albuterol BID   · Budesonide via nebulizer BID · Precedex, titrate to RAAS goal of 0 to -1, wean down  · Ativan per CIWA protocol   · Vimpat 200mg BID, phenobarbital 65mg BID, neurology following   · Nephology consult for urinary retention   · Seizure precautions  · Cardene drip was stopped, management of HTN with home regimen and hydralazine 10mg as needed for SBP greater than 160  · Consider vascular surgery consult   · X-ray chest and procalcitonin in am, if right lower lobe infiltrate becomes more prominent or high procalcitonin will consider empiric antibiotics.   · Labs: CBC and BMP in am  · DVT prophylaxis with low molecular weight heparin  · Will follow with you  Electronically signed by     Jam Kahn MD on 5/29/2022 at 12:37 PM  Pulmonary Critical Care and Sleep Medicine,  [facility]  Cell: [phone]  Office: [phone]

## 2022-05-29 NOTE — PLAN OF CARE
Problem: Skin/Tissue Integrity - Adult  Goal: Skin integrity remains intact  Outcome: Progressing  Flowsheets (Taken 5/29/2022 0900)  Skin Integrity Remains Intact: Monitor for areas of redness and/or skin breakdown     Problem: Pain  Goal: Verbalizes/displays adequate comfort level or baseline comfort level  Outcome: Progressing   Continue to monitor skin integrity and IV sites, pt has preventative mepilex on sacrum and a mepilex over a small skin tear on left arm. Fall precautions in place will continue to monitor, bed alarm in use, and frequent rounding done.

## 2022-05-29 NOTE — PROGRESS NOTES
[facility]    Adult Hospitalist      Name: Reyes Irby  MRN: [id number]     Acct: [de-identified]  Room: [address] [place], [place] [id number]-    Admit Date: 5/25/2022 12:59 PM  PCP: DENNIS Hyde    Primary Problem  Principal Problem:    Seizure ([address]  Resolved Problems:    * No resolved hospital problems. 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, new since 9/2021  · Old infarction left occipital lobe, increased since 9/2021  · 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 / Encephalomalacia   · Old lacunar infarcts left basal ganglia   · Mild brain parenchymal volume loss  · Alcohol dependence   · Reported right hemiparesis   · Essential hypertension  · Diabetes mellitus type 2  · Overweight   · Diverticulosis  · Agitation   · Delirium tremens     · Acute respiratory failure with hypoxia   · Oliguria  · Carotid artery disease         Plan:     · Admit ICU  · Monitor vitals closely  · Keep SpO2 above 90%  · Is/ Os  · IV fluids at 75 mL/h  · Ativan Prn  · Haldol for agitation  · Soft restraints-DC  · Neuro consulted in ED  · Cardene gtt for BP control    · NPO  · Neuro checks  · Seizure precaution  · Fall precaution   · CIWA protocol   · accu checks AC and HS  · ISS medium  · Hypoglycemia protocol  · Serial cardiac enzymes  · EKG  · Consult Pulmonology  · CBC, BMP  · Prolactin   · Precedex weaned off now  · Vimpat per neurology  · Consult Vascular   · DVT and GI prophylaxis.         Chief Complaint:     Chief Complaint   Patient presents with    Seizures         History of Present Illness:          Patient seen and examined at bedside  Last 24-hour events reviewed with nursing    Patient was agitated during the night  He was trying to get out of bed and pull out IV lines  Was placed back on Precedex  Also required Cardene during the night  Urine output has not improved  Patient continues to be hypoxic  Presently on 4-5 L/min via nasal cannula  Respiratory distress is improved      Review of system unable to obtain because of patient condition      Initial HPI  Pito Lisa is a 73 y.o.  male who presents with Seizures    Pt admitted through the ED after an episode of seizure at home. Apparently pt lives at home with his fiancee. According to her, he was sitting in his chair yesterday morning when he developed a seizure. It is difficult to determine what kind of seizure it was. His fiancee is not at bedside right now. However it was confirmed that the pt has never had a similar episode before in his life. It is also reported that he wanted to stop alcohol use however she reported him having a beer the evening before. Urine drug screen has been reported unremarkable and ETOH level was zero. Alcohol withdrawal seizure was suspected though seemed less likely if pt had continued to drink alcohol. Pt apparently drinks 4 beers daily    Pt was admitted to the ICU where he continued to remain agitated. Higher and frequent use of Ativan and later Haldol were ineffective. Pt required soft restraints since he was unsafe for himself trying to get out of bed and trying to pull out Iv    Pt was hypertensive with BP > 200/140 which was not improved w prn meds and was started on Cardene infusion. However pt continued to remain agitated and it was suspected he may be in DTs. Pt was placed on the CIWA protocol and was started on IV fluids and Thiamine, Folate    ROS is difficult to obtain presently    I have personally reviewed the past medical history, past surgical history, medications, social history, and family history, and summarized in the note.     Review of Systems:     All 10 point system is reviewed and unable to obtain because of patient condition      Past Medical History:     Past Medical History:   Diagnosis Date    Diabetes mellitus ([facility] [address]     Hypertension Past Surgical History:     Past Surgical History:   Procedure Laterality Date    CAROTID ENDARTERECTOMY  09/22/2021    CAROTID ENDARTERECTOMY (N/A Neck    CAROTID ENDARTERECTOMY N/A 9/22/2021    CAROTID ENDARTERECTOMY performed by Jose Bocanegra MD at [address] IR INS PICC VAD W SQ PORT GREATER THAN 5  5/26/2022    IR INS PICC VAD W SQ PORT GREATER THAN 5 5/26/2022 [facility]    KNEE SURGERY          Medications Prior to Admission:       Prior to Admission medications    Medication Sig Start Date End Date Taking? Authorizing Provider   clopidogrel (PLAVIX) 75 MG tablet Take 1 tablet by mouth daily for 21 days 9/23/21 10/14/21  Aliyah Lauren MD   aspirin 81 MG chewable tablet Take 1 tablet by mouth daily 2/26/21   Aliyah Lauren MD   metFORMIN (GLUCOPHAGE) 500 MG tablet Take 1 tablet by mouth 2 times daily (with meals) 2/26/21   Aliyah Lauren MD   atorvastatin (LIPITOR) 40 MG tablet Take 1 tablet by mouth nightly 2/26/21   Aliyah Lauren MD   amLODIPine (NORVASC) 5 MG tablet Take 1 tablet by mouth daily 2/27/21   Aliyah Lauren MD   PARoxetine (PAXIL) 40 MG tablet Take 40 mg by mouth daily    Historical Provider, MD   atenolol (TENORMIN) 50 MG tablet Take 50 mg by mouth daily    Historical Provider, MD   losartan (COZAAR) 100 MG tablet Take 100 mg by mouth daily    Historical Provider, MD        Allergies:       Patient has no known allergies. Social History:     Tobacco:    reports that he has been smoking. He started smoking about 49 years ago. He has been smoking about 1.50 packs per day. He has never used smokeless tobacco.  Alcohol:      reports current alcohol use of about 4.0 standard drinks of alcohol per week. Drug Use:  reports no history of drug use. Family History:     History reviewed. No pertinent family history.       Physical Exam:     Vitals:  BP (!) 161/88   Pulse 86   Temp 96.9 °F (36.1 °C) (Temporal)   Resp 16   Ht 5' 6\" (1.676 m)   Wt 172 lb 6.4 oz (78.2 kg)   SpO2 98%   BMI 27.83 kg/m²   Temp (24hrs), Av.9 °F (36.1 °C), Min:96 °F (35.6 °C), Max:97.7 °F (36.5 °C)      General appearance -confused, and in mild acute distress  Mental status - not oriented to person, place, and time with normal affect  Head - normocephalic and atraumatic  Eyes - pupils equal and reactive, extraocular eye movements intact, conjunctiva clear  Ears - hearing unable to evaluate sec to pt condition  Nose - no drainage noted  Mouth - mucous membranes moist  Neck - supple, no carotid bruits, thyroid not palpable  Chest - clear to auscultation, slightly increased effort  Heart - normal rate, regular rhythm, no murmur  Abdomen - soft, nontender, nondistended, bowel sounds present all four quadrants, no masses, hepatomegaly or splenomegaly  Neurological - stupor  Extremities - peripheral pulses palpable, no pedal edema or calf pain with palpation  Skin - no gross lesions, rashes, or induration noted        Data:     Labs:    Hematology:  Recent Labs     22  0504 22  0454 22  0356   WBC 10.6 9.5 11.1   RBC 3.94* 3.77* 3.68*   HGB 12.7* 12.0* 11.8*   HCT 37.4* 36.4* 36.1*   MCV 94.9 96.6 98.1   MCH 32.2 31.8 32.1   MCHC 34.0 33.0 32.7   RDW 13.1 13.1 13.3    160 170   MPV 9.3 9.4 9.6     Chemistry:  Recent Labs     22  0504 22  0454 22  0356   * 134* 137   K 3.8 4.3 4.5   CL 98 101 106   CO2 24 22 20   GLUCOSE 127* 220* 216*   BUN 10 18 22   CREATININE 1.14 1.08 1.16   ANIONGAP 12 11 11   LABGLOM >60 >60 >60   GFRAA >60 >60 >60   CALCIUM 8.7 8.9 8.5*     Recent Labs     22  1212 22  1627 22  1940 22  0719 22  1131 22  1634 22  0719 22  1117   AMMONIA 23  --   --   --   --   --   --   --    POCGLU  --    < > 169* 221* 190* 157* 207* 188*    < > = values in this interval not displayed.        Lab Results   Component Value Date    INR 1.1 2021    INR 1.0 2021    INR 1.0 2021 PROTIME 13.7 09/22/2021    PROTIME 12.7 09/20/2021    PROTIME 12.8 02/26/2021       Lab Results   Component Value Date/Time    SPECIAL NOT REPORTED 02/26/2021 09:30 AM     Lab Results   Component Value Date/Time    CULTURE NO GROWTH 3 DAYS 02/26/2021 09:30 AM       Lab Results   Component Value Date    POCPH 7.396 05/27/2022    POCPCO2 39.7 05/27/2022    POCPO2 87.5 05/27/2022    POCHCO3 24.4 05/27/2022    NBEA 1 05/26/2022    PBEA 0 05/27/2022    QNDO7TGL 97 05/27/2022    FIO2 30.0 05/27/2022       Radiology:    CT HEAD WO CONTRAST    Result Date: 5/25/2022  No acute intracranial hemorrhage or abnormal extra-axial collection. Relative to 9 months prior there are increased areas of encephalomalacia in the left occipital lobe and new areas of encephalomalacia in the left parietal lobe. If there is persistent clinical concern for acute on chronic ischemia, MRI is more sensitive. CT CHEST ABDOMEN PELVIS W CONTRAST    Result Date: 5/25/2022  Small cysts noted in the liver. Diverticular disease of the colon without diverticulitis. Significant thickening of the bladder wall, in keeping with muscular hypertrophy or cystitis. Clinical correlation suggested. Degenerative changes demonstrated in the the thoracic and lumbar spine. RECOMMENDATIONS:     MRI BRAIN W WO CONTRAST    Result Date: 5/25/2022  No acute intracranial abnormality. No mass effect or abnormal intracranial enhancement. Old infarctions in the left frontal parietal lobes, new since September 21, 2021. Old infarction in the left occipital lobe, likely increased in size since September 21, 2021. Laminar necrosis and gliosis associated with the old infarctions. Old lacunar infarcts in the left basal ganglia, likely increased. Mild parenchymal volume loss.          All radiological studies reviewed                Code Status:  Full Code    Electronically signed by Rajwinder Meadows MD on 5/29/2022 at 1:51 PM     Copy sent to Dr. Dominic Mendes, PA    This note was created with the assistance of a speech-recognition program.  Although the intention is to generate a document that actually reflects the content of the visit, no guarantees can be provided that every mistake has been identified and corrected by editing. Note was updated later by me after  physical examination and  completion of the assessment.

## 2022-05-29 NOTE — PROGRESS NOTES
Pt had a brief moment of being alert and oriented and stated I know you, I am at the hospital, its May 2022, my birthday is [birth year], then was confused and picking/pulling on wires etc.

## 2022-05-29 NOTE — CONSULTS
Reason for Follow up: Urinary Retention     HISTORY:    Patient seen and examined. He is on BiPAP and sedated on precedex. Precedex actively being weaned. Creatinine ranging 1.08 to 1. 16. Corrected Na 139. Patient required Cardene gtt overnight, this has been stopped. SBP now ranging 120s to 140s.    24 hour morris output: 825 ml    Review Of Systems:   Unable to obtain because current mental status, sedated on precedex    Scheduled Meds:   PHENobarbital  65 mg IntraVENous TID    methylPREDNISolone  40 mg IntraVENous Q6H    budesonide  0.5 mg Nebulization BID    enoxaparin  40 mg SubCUTAneous Daily    thiamine and folic acid IVPB   IntraVENous Daily    lacosamide (VIMPAT) IVPB  200 mg IntraVENous BID    ipratropium-albuterol  1 ampule Inhalation BID    sodium chloride flush  10 mL IntraVENous Once    sodium chloride flush  5-40 mL IntraVENous 2 times per day    insulin lispro  0-12 Units SubCUTAneous TID WC    insulin lispro  0-6 Units SubCUTAneous Nightly    aspirin  81 mg Oral Daily    atorvastatin  40 mg Oral Nightly    clopidogrel  75 mg Oral Daily    metFORMIN  500 mg Oral BID WC    PARoxetine  40 mg Oral Daily    amLODIPine  5 mg Oral Daily    atenolol  50 mg Oral Daily    losartan  100 mg Oral Daily   Continuous Infusions:   sodium chloride 75 mL/hr at 05/29/22 0554    dexmedetomidine (PRECEDEX) IV infusion 0.4 mcg/kg/hr (05/29/22 0950)    sodium chloride Stopped (05/26/22 2008)    dextrose      niCARdipene (CARDENE) infusion Stopped (05/28/22 2318)     PRN Meds:haloperidol lactate, LORazepam **OR** LORazepam **OR** LORazepam **OR** LORazepam **OR** LORazepam **OR** LORazepam **OR** LORazepam **OR** LORazepam, albuterol, hydrALAZINE, sodium chloride flush, sodium chloride, acetaminophen, ondansetron **OR** ondansetron, metoprolol, glucose, dextrose bolus **OR** dextrose bolus, glucagon (rDNA), dextrose    No Known Allergies    Physical Exam:  Blood pressure (!) 143/78, pulse 88, temperature (!) 96 °F (35.6 °C), temperature source Temporal, resp. rate 20, height 5' 6\" (1.676 m), weight 172 lb 6.4 oz (78.2 kg), SpO2 99 %. In: 2740.2 [I.V.:2595.4]  Out: 865   In: 2740.2   Out: 865 [Urine:865]    General:  not in distress. Appears to be stated age. HEENT: Atraumatic, normocephalic. Anicteric sclera. Pink and moist oral mucosa. No carotid bruit. No JVD. Chest: Bilateral air entry, clear to auscultation, no wheezing, rhonchi or rales. Cardiovascular: RRR, S1S2, no murmur, rub or gallop. Has no lower extremity edema. Abdomen: Soft, non tender to palpation. Active bowel sounds x 4 quadrants. Musculoskeletal: No cyanosis or clubbing. Integumentary: Pink, warm and dry. Free from rash or lesions. Skin turgor normal.  CNS: lethargic on precedex, FRANCISCO.      Data:  CBC:   Lab Results   Component Value Date    WBC 11.1 05/29/2022    HGB 11.8 (L) 05/29/2022    HCT 36.1 (L) 05/29/2022    MCV 98.1 05/29/2022     05/29/2022     BMP:    Lab Results   Component Value Date     05/29/2022    K 4.5 05/29/2022     05/29/2022    CO2 20 05/29/2022    BUN 22 05/29/2022    CREATININE 1.16 05/29/2022    GLUCOSE 216 (H) 05/29/2022     CMP:   Lab Results   Component Value Date     05/29/2022    K 4.5 05/29/2022     05/29/2022    CO2 20 05/29/2022    BUN 22 05/29/2022    CREATININE 1.16 05/29/2022    GLUCOSE 216 (H) 05/29/2022    CALCIUM 8.5 (L) 05/29/2022    PROT 6.6 05/26/2022    LABALBU 3.8 05/26/2022    BILITOT 0.79 05/26/2022    ALKPHOS 69 05/26/2022    AST 24 05/26/2022    ALT 12 05/26/2022      Hepatic:   Lab Results   Component Value Date    AST 24 05/26/2022    ALT 12 05/26/2022    BILITOT 0.79 05/26/2022    ALKPHOS 69 05/26/2022     BNP: No results found for: BNP  Lipids:   Lab Results   Component Value Date    CHOL 185 02/25/2021    HDL 87 02/25/2021     INR:   Lab Results   Component Value Date    INR 1.1 09/22/2021     PTH: No results found for: PTH  Phosphorus:    Lab Results   Component Value Date    PHOS 3.4 09/23/2021     Ionized Calcium: No results found for: IONCA  Magnesium:   Lab Results   Component Value Date    MG 1.0 (L) 09/23/2021     Albumin:   Lab Results   Component Value Date    LABALBU 3.8 05/26/2022     Last 3 CK, CKMB, Troponin: @LABRCNT(CKTOTAL:3,CKMB:3,TROPONINI:3)       URINE:)No results found for: Ritchie Forde    Radiology:   Reviewed. Assessment and Plan to follow. Patient seen in collaboration with Dr. Jamie Jauregui. Electronically signed by CHARLENE Rangel CNP  on 5/29/2022 at 10:37 AM  [facility]. Ph: [phone]      Physician Addendum  I have personally seen and examined pt at bed side, I have reviewed documentation, agree with above note    Interval history:  Was agitated overnight  Blood pressure became elevated required transient use of Cardene drip, blood pressure improved after the agitation resolved  Serum sodium continued to be in normal range  Creatinine has been ranging between 1.02-1.2    History of present illness: This is a 79-year-old male with history of EtOH use, presented with seizure activity, has been going through DT, had elevated blood pressure required Cardene drip initially, currently on hydralazine and Lopressor IV, he could not take oral medication secondary to current mental status, at presentation he was found to be hyponatremic and in acute kidney injury  Initial sodium was 129 improved to 134, Initial creatinine was 1.27, improved to 1.08, yesterday he had low urine output, bladder scan was not working accurately, the patient was found to have urinary retention after Peterson catheter placement.     Pressure was 167/96 with a pulse of  83respiratory rate was 20   General:  Awake, not in distress. Restless   HEENT: Atraumatic, normocephalic. Anicteric sclera. Pink and moist oral mucosa. No carotid bruit. No JVD.   Chest: Bilateral air entry, clear to auscultation, no wheezing, rhonchi or rales. Cardiovascular: RRR, S1S2, no murmur, rub or gallop. No lower extremity edema. Abdomen: Soft, non tender to palpation. Active bowel sounds x 4 quadrants. Musculoskeletal: No cyanosis or clubbing. Integumentary: Pink, warm and dry. Free from rash or lesions. Skin turgor normal.  CNS: Oriented x1, restless     Assessment and plan:  1. EtOH withdrawal  2.  urinary retention  3.  acute kidney injury  4.  hypertension with worsening blood pressure secondary to DT  5.   Hyponatremia     continue IV metoprolol and IV hydralazine as needed, oral medications are on hold  Continue IV hydration, increased rate to 100 mL/h  Plans for TPN noted, will decrease IV fluids whenever TPN is started  Serum sodium level improved to normal range  Creatinine been stable   rest of electrolytes are okay  Acid-base balance is within normal range  Continue Peterson catheter for now, will benefit from starting Flomax when tolerating p.o. intake  We will continue to follow     Electronically signed by Fabiana Walton MD on 05/29/22 12:52 PM

## 2022-05-29 NOTE — PROGRESS NOTES
Restraints removed/order dicontinued. Pt placed on BiPAP, 30% FiO2, respiratory notified. Spoke with patient about MRI results.  He is still having pain on and off.  He would like to come into the office and discuss results and possible surgery with Dr Chapman.  appt will be made.

## 2022-05-29 NOTE — PROGRESS NOTES
Pt moving a little in bed for the first time today with precedex being weaned down. Will continue to monitor.

## 2022-05-29 NOTE — RT PROTOCOL NOTE
RT Inhaler-Nebulizer Bronchodilator Protocol Note    There is a bronchodilator order in the chart from a provider indicating to follow the RT Bronchodilator Protocol and there is an Initiate RT Inhaler-Nebulizer Bronchodilator Protocol order as well (see protocol at bottom of note). CXR Findings:  XR CHEST PORTABLE    Result Date: 5/29/2022  Interval developing right basilar opacity which may be related atelectasis or developing focal airspace disease. XR CHEST PORTABLE    Result Date: 5/28/2022  No significant finding in the chest.       The findings from the last RT Protocol Assessment were as follows:   History Pulmonary Disease: Chronic pulmonary disease  Respiratory Pattern: Dyspnea on exertion or RR 21-25 bpm  Breath Sounds: Slightly diminished and/or crackles  Cough: Strong, spontaneous, non-productive  Indication for Bronchodilator Therapy: Decreased or absent breath sounds  Bronchodilator Assessment Score: 6    Aerosolized bronchodilator medication orders have been revised according to the RT Inhaler-Nebulizer Bronchodilator Protocol below. Respiratory Therapist to perform RT Therapy Protocol Assessment initially then follow the protocol. Repeat RT Therapy Protocol Assessment PRN for score 0-3 or on second treatment, BID, and PRN for scores above 3. No Indications - adjust the frequency to every 6 hours PRN wheezing or bronchospasm, if no treatments needed after 48 hours then discontinue using Per Protocol order mode. If indication present, adjust the RT bronchodilator orders based on the Bronchodilator Assessment Score as indicated below. Use Inhaler orders unless patient has one or more of the following: on home nebulizer, not able to hold breath for 10 seconds, is not alert and oriented, cannot activate and use MDI correctly, or respiratory rate 25 breaths per minute or more, then use the equivalent nebulizer order(s) with same Frequency and PRN reasons based on the score.   If a patient is on this medication at home then do not decrease Frequency below that used at home. 0-3 - enter or revise RT bronchodilator order(s) to equivalent RT Bronchodilator order with Frequency of every 4 hours PRN for wheezing or increased work of breathing using Per Protocol order mode. 4-6 - enter or revise RT Bronchodilator order(s) to two equivalent RT bronchodilator orders with one order with BID Frequency and one order with Frequency of every 4 hours PRN wheezing or increased work of breathing using Per Protocol order mode. 7-10 - enter or revise RT Bronchodilator order(s) to two equivalent RT bronchodilator orders with one order with TID Frequency and one order with Frequency of every 4 hours PRN wheezing or increased work of breathing using Per Protocol order mode. 11-13 - enter or revise RT Bronchodilator order(s) to one equivalent RT bronchodilator order with QID Frequency and an Albuterol order with Frequency of every 4 hours PRN wheezing or increased work of breathing using Per Protocol order mode. Greater than 13 - enter or revise RT Bronchodilator order(s) to one equivalent RT bronchodilator order with every 4 hours Frequency and an Albuterol order with Frequency of every 2 hours PRN wheezing or increased work of breathing using Per Protocol order mode. RT to enter RT Home Evaluation for COPD & MDI Assessment order using Per Protocol order mode.     Electronically signed by Key Thompson RCP on 5/29/2022 at 9:54 AM

## 2022-05-29 NOTE — PLAN OF CARE
Problem: Discharge Planning  Goal: Discharge to home or other facility with appropriate resources  Recent Flowsheet Documentation  Taken 5/28/2022 1736 by Mirtha Beal RN  Discharge to home or other facility with appropriate resources:   Identify barriers to discharge with patient and caregiver   Refer to discharge planning if patient needs post-hospital services based on physician order or complex needs related to functional status, cognitive ability or social support system   Arrange for needed discharge resources and transportation as appropriate   Identify discharge learning needs (meds, wound care, etc)  5/28/2022 1319 by Mirtha Beal RN  Outcome: Progressing     Problem: Safety - Medical Restraint  Goal: Remains free of injury from restraints (Restraint for Interference with Medical Device)  Description: INTERVENTIONS:  1. Determine that other, less restrictive measures have been tried or would not be effective before applying the restraint  2. Evaluate the patient's condition at the time of restraint application  3. Inform patient/family regarding the reason for restraint  4.  Q2H: Monitor safety, psychosocial status, comfort, nutrition and hydration  5/28/2022 1319 by Mirtha Beal RN  Outcome: Progressing     Problem: Neurosensory - Adult  Goal: Achieves stable or improved neurological status  5/28/2022 1319 by Mirtha Beal RN  Outcome: Progressing  Goal: Absence of seizures  5/29/2022 0206 by Jory Laureano RN  Outcome: Progressing  5/28/2022 1319 by Mirtha Beal RN  Outcome: Progressing  Goal: Remains free of injury related to seizures activity  5/29/2022 0206 by Jory Laureano RN  Outcome: Progressing  5/28/2022 1319 by Mirtha Beal RN  Outcome: Progressing  Goal: Achieves maximal functionality and self care  5/28/2022 1319 by Mirtha Beal RN  Outcome: Progressing     Problem: Respiratory - Adult  Goal: Achieves optimal ventilation and oxygenation  5/29/2022 0206 by Alvah Moras, RN  Outcome: Progressing  5/28/2022 1319 by Franny Munson RN  Outcome: Progressing     Problem: Cardiovascular - Adult  Goal: Maintains optimal cardiac output and hemodynamic stability  5/29/2022 0206 by Phillip Cottrell RN  Outcome: Progressing  5/28/2022 1319 by Franny Munson RN  Outcome: Progressing  Goal: Absence of cardiac dysrhythmias or at baseline  5/29/2022 0206 by Phillip Cottrell RN  Outcome: Progressing  5/28/2022 1319 by Franny Munson RN  Outcome: Progressing     Problem: Skin/Tissue Integrity - Adult  Goal: Skin integrity remains intact  5/29/2022 0206 by Phillip Cottrell RN  Outcome: Progressing  Flowsheets (Taken 5/29/2022 0000)  Skin Integrity Remains Intact: Monitor for areas of redness and/or skin breakdown  5/28/2022 1319 by Franny Munson RN  Outcome: Progressing  Flowsheets (Taken 5/28/2022 0830)  Skin Integrity Remains Intact: Monitor for areas of redness and/or skin breakdown  Goal: Incisions, wounds, or drain sites healing without S/S of infection  5/29/2022 0206 by Phillip Cottrell RN  Outcome: Progressing  5/28/2022 1319 by Franny Munson RN  Outcome: Progressing  Goal: Oral mucous membranes remain intact  5/29/2022 0206 by Phillip Cottrell RN  Outcome: Progressing  5/28/2022 1319 by Franny Munson RN  Outcome: Progressing     Problem: Metabolic/Fluid and Electrolytes - Adult  Goal: Electrolytes maintained within normal limits  5/28/2022 1319 by Franny Munson RN  Outcome: Progressing  Goal: Hemodynamic stability and optimal renal function maintained  5/28/2022 1319 by Franny Munson RN  Outcome: Progressing  Goal: Glucose maintained within prescribed range  5/28/2022 1319 by Franny Munson RN  Outcome: Progressing     Problem: Anxiety  Goal: Will report anxiety at manageable levels  Description: INTERVENTIONS:  1. Administer medication as ordered  2. Teach and rehearse alternative coping skills  3.  Provide emotional support with 1:1 interaction with staff  5/28/2022 1319 by Lian Romero RN  Outcome: Progressing     Problem: Confusion  Goal: Confusion, delirium, dementia, or psychosis is improved or at baseline  Description: INTERVENTIONS:  1. Assess for possible contributors to thought disturbance, including medications, impaired vision or hearing, underlying metabolic abnormalities, dehydration, psychiatric diagnoses, and notify attending LIP  2. [place] high risk fall precautions, as indicated  3. Provide frequent short contacts to provide reality reorientation, refocusing and direction  4. Decrease environmental stimuli, including noise as appropriate  5. Monitor and intervene to maintain adequate nutrition, hydration, elimination, sleep and activity  6. If unable to ensure safety without constant attention obtain sitter and review sitter guidelines with assigned personnel  7. Initiate Psychosocial CNS and Spiritual Care consult, as indicated  5/28/2022 1319 by Lian Romero RN  Outcome: Progressing     Problem: Behavior  Goal: Pt/Family maintain appropriate behavior and adhere to behavioral management agreement, if implemented  Description: INTERVENTIONS:  1. Assess patient/family's coping skills and  non-compliant behavior (including use of illegal substances)  2. Notify security of behavior or suspected illegal substances which indicate the need for search of the patient and/or belongings  3. Encourage verbalization of thoughts and concerns in a socially appropriate manner  4. Utilize positive, consistent limit setting strategies supporting safety of patient, staff and others  5. Encourage participation in the decision making process about the behavioral management agreement  6. Implement a Health Care Agreement if patient meets criteria  7. If a patient's behavior jeopardizes the safety of the patient, staff, or others refer to organization policy.  If a visitor's behavior poses a threat to safety call refer to organization policy.   8. Initiate consult with , Psychosocial CNS, Spiritual Care as appropriate  5/28/2022 1319 by Sukumar Dhillon RN  Outcome: Progressing     Problem: Chronic Conditions and Co-morbidities  Goal: Patient's chronic conditions and co-morbidity symptoms are monitored and maintained or improved  5/29/2022 0206 by Monet Dolan RN  Outcome: Progressing  5/28/2022 1319 by Sukumar Dhillon RN  Outcome: Progressing     Problem: Pain  Goal: Verbalizes/displays adequate comfort level or baseline comfort level  5/28/2022 1319 by Sukumar Dhillon RN  Outcome: Progressing     Problem: ABCDS Injury Assessment  Goal: Absence of physical injury  5/29/2022 0206 by Monet Dolan RN  Outcome: Progressing  5/28/2022 1319 by Sukumar Dhillon RN  Outcome: Progressing  Flowsheets (Taken 5/28/2022 0830)  Absence of Physical Injury: Implement safety measures based on patient assessment     Problem: Musculoskeletal - Adult  Goal: Return mobility to safest level of function  5/28/2022 1319 by Sukumar Dhillon RN  Outcome: Progressing     Problem: Genitourinary - Adult  Goal: Absence of urinary retention  5/29/2022 0206 by Monet Dolan RN  Outcome: Progressing  5/28/2022 1319 by Sukumar Dhillon RN  Outcome: Progressing  Goal: Urinary catheter remains patent  5/29/2022 0206 by Monet Dolan RN  Outcome: Progressing  5/28/2022 1319 by Sukumar Dhillon RN  Outcome: Progressing

## 2022-05-29 NOTE — PROGRESS NOTES
[facility] ([facility]) Neurology Specialist  [address], [address] [facility] [address]:  [phone] or [phone]  FAX:  [phone]            Brief history: Miguel A Dyson is a 73 y.o. old male admitted on 5/25/2022 with encephalopathy and seizures     Subjective: Overnight the patient was noted to be restless and agitated requiring Ativan and now on Precedex. This morning, he continues to be intermittently agitated and restless requiring restraints. Objective: BP (!) 157/78   Pulse (!) 106   Temp 97.6 °F (36.4 °C) (Oral)   Resp 22   Ht 5' 6\" (1.676 m)   Wt 172 lb 6.4 oz (78.2 kg)   SpO2 96%   BMI 27.83 kg/m²       Medications:    fat emulsion  100 mL IntraVENous Daily    PHENobarbital  65 mg IntraVENous TID    methylPREDNISolone  40 mg IntraVENous Q6H    budesonide  0.5 mg Nebulization BID    enoxaparin  40 mg SubCUTAneous Daily    thiamine and folic acid IVPB   IntraVENous Daily    lacosamide (VIMPAT) IVPB  200 mg IntraVENous BID    ipratropium-albuterol  1 ampule Inhalation BID    sodium chloride flush  10 mL IntraVENous Once    sodium chloride flush  5-40 mL IntraVENous 2 times per day    insulin lispro  0-12 Units SubCUTAneous TID WC    insulin lispro  0-6 Units SubCUTAneous Nightly    aspirin  81 mg Oral Daily    atorvastatin  40 mg Oral Nightly    clopidogrel  75 mg Oral Daily    metFORMIN  500 mg Oral BID WC    PARoxetine  40 mg Oral Daily    amLODIPine  5 mg Oral Daily    atenolol  50 mg Oral Daily    losartan  100 mg Oral Daily        Mental status   Patient is awake. On BiPAP. Makes eye contact. Oriented to self, knows that he is in the [facility] not oriented to the month of the year. Cranial nerves   Pupil response: Present   Corneal Reflex: Present    Oculocephalic reflex: Present     Cough reflex: Present        Motor and sensory function  Spontaneous limb movement in all ext.    DTR Intact symmetric  Babinski Absent   Gait Not tested      Lab Results   Component Value Date    LDLCHOLESTEROL 79 02/25/2021     No components found for: CHLPL  Lab Results   Component Value Date    TRIG 93 02/25/2021    TRIG 305 (H) 01/02/2018     Lab Results   Component Value Date    HDL 87 02/25/2021     No results found for: LDLCALC  No results found for: LABVLDL  Lab Results   Component Value Date    LABA1C 6.2 (H) 05/26/2022     Lab Results   Component Value Date     05/26/2022     No results found for: [id number]   Neurological work up:  CT head  CTA head and neck  MRI brain     2 D echo     Assessment and Recommendations:   Left temporal parietal ischemic stroke February 2021, 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 Currently on phenobarbital 65 mg 3 times daily and CIWA protocol    No witnessed seizures. Recommend continued Vimpat 200 mg twice a day    Continued weaning of Precedex as patient tolerates    We will follow. Hollie López MD  Neurology    This note is created with the assistance of a speech-recognition program. While intending to generate a document that actually reflects the content of the visit, the document can still have some errors including those of syntax and sound a- like substitutions which may escape proofreading. In such instances, actual meaning can be extrapolated by contextual derivation.

## 2022-05-29 NOTE — PROGRESS NOTES
Pt very agitated and restless in bed pulling at morris and telemetry wires despite reminders to leave medical equipment alone. Ativan given per Courtney Oviedo RN as precedex can not be titrated at this time. Will continue to monitor.

## 2022-05-29 NOTE — PROGRESS NOTES
At beginning of shift, pt was combative and was placed in bilateral soft wrist restraints by carson RN. Pt remained extremely restless, attempting to get out of restraints and climb out of bed, as well as pulling at morris cath. Pt has incomprehensible speech and is not following commands of answering questions appropriately. Pt restarted on cardene gtt as SBP was in the 250s, now well maintained and titrating down, HR was also in the 120s-130s    Pt was extremely confused and agitated, requiring PRN doses of ativan per CIWA scale, as well as restarting precedex gtt    Pt was on 2L via NC and was unable to place on bipap until patient was more relaxed    Pt is now calm and resting comfortably, HR 70s-80s,  and cardene is now off. Pt was able to be placed on bipap and is tolerating well at this time. Dr. China Karft notified of earlier events by Ryley Gardner RN with no new changes.

## 2022-05-29 NOTE — RT PROTOCOL NOTE
RT Inhaler-Nebulizer Bronchodilator Protocol Note    There is a bronchodilator order in the chart from a provider indicating to follow the RT Bronchodilator Protocol and there is an Initiate RT Inhaler-Nebulizer Bronchodilator Protocol order as well (see protocol at bottom of note). CXR Findings:  XR CHEST PORTABLE    Result Date: 5/29/2022  Interval developing right basilar opacity which may be related atelectasis or developing focal airspace disease. XR CHEST PORTABLE    Result Date: 5/28/2022  No significant finding in the chest.       The findings from the last RT Protocol Assessment were as follows:   History Pulmonary Disease: Chronic pulmonary disease  Respiratory Pattern: Dyspnea on exertion or RR 21-25 bpm  Breath Sounds: Slightly diminished and/or crackles  Cough: Strong, spontaneous, non-productive  Indication for Bronchodilator Therapy: Decreased or absent breath sounds  Bronchodilator Assessment Score: 6    Aerosolized bronchodilator medication orders have been revised according to the RT Inhaler-Nebulizer Bronchodilator Protocol below. Respiratory Therapist to perform RT Therapy Protocol Assessment initially then follow the protocol. Repeat RT Therapy Protocol Assessment PRN for score 0-3 or on second treatment, BID, and PRN for scores above 3. No Indications - adjust the frequency to every 6 hours PRN wheezing or bronchospasm, if no treatments needed after 48 hours then discontinue using Per Protocol order mode. If indication present, adjust the RT bronchodilator orders based on the Bronchodilator Assessment Score as indicated below. Use Inhaler orders unless patient has one or more of the following: on home nebulizer, not able to hold breath for 10 seconds, is not alert and oriented, cannot activate and use MDI correctly, or respiratory rate 25 breaths per minute or more, then use the equivalent nebulizer order(s) with same Frequency and PRN reasons based on the score.   If a patient is on this medication at home then do not decrease Frequency below that used at home. 0-3 - enter or revise RT bronchodilator order(s) to equivalent RT Bronchodilator order with Frequency of every 4 hours PRN for wheezing or increased work of breathing using Per Protocol order mode. 4-6 - enter or revise RT Bronchodilator order(s) to two equivalent RT bronchodilator orders with one order with BID Frequency and one order with Frequency of every 4 hours PRN wheezing or increased work of breathing using Per Protocol order mode. 7-10 - enter or revise RT Bronchodilator order(s) to two equivalent RT bronchodilator orders with one order with TID Frequency and one order with Frequency of every 4 hours PRN wheezing or increased work of breathing using Per Protocol order mode. 11-13 - enter or revise RT Bronchodilator order(s) to one equivalent RT bronchodilator order with QID Frequency and an Albuterol order with Frequency of every 4 hours PRN wheezing or increased work of breathing using Per Protocol order mode. Greater than 13 - enter or revise RT Bronchodilator order(s) to one equivalent RT bronchodilator order with every 4 hours Frequency and an Albuterol order with Frequency of every 2 hours PRN wheezing or increased work of breathing using Per Protocol order mode. RT to enter RT Home Evaluation for COPD & MDI Assessment order using Per Protocol order mode.     Electronically signed by Ailyn Goldstein RCP on 5/29/2022 at 7:20 PM

## 2022-05-30 ENCOUNTER — APPOINTMENT (OUTPATIENT)
Dept: GENERAL RADIOLOGY | Age: 67
DRG: 056 | End: 2022-05-30
Payer: MEDICARE

## 2022-05-30 LAB
ABSOLUTE EOS #: 0 K/UL (ref 0–0.4)
ABSOLUTE IMMATURE GRANULOCYTE: 0.08 K/UL (ref 0–0.3)
ABSOLUTE LYMPH #: 0.24 K/UL (ref 1–4.8)
ABSOLUTE MONO #: 0.56 K/UL (ref 0.2–0.8)
ANION GAP SERPL CALCULATED.3IONS-SCNC: 11 MMOL/L (ref 9–17)
BASOPHILS # BLD: 0 %
BASOPHILS ABSOLUTE: 0 K/UL (ref 0–0.2)
BUN BLDV-MCNC: 32 MG/DL (ref 8–23)
BUN/CREAT BLD: 25 (ref 9–20)
CALCIUM SERPL-MCNC: 8.7 MG/DL (ref 8.6–10.4)
CHLORIDE BLD-SCNC: 106 MMOL/L (ref 98–107)
CO2: 19 MMOL/L (ref 20–31)
CREAT SERPL-MCNC: 1.26 MG/DL (ref 0.7–1.2)
EOSINOPHILS RELATIVE PERCENT: 0 % (ref 1–4)
GFR AFRICAN AMERICAN: >60 ML/MIN
GFR NON-AFRICAN AMERICAN: 57 ML/MIN
GFR SERPL CREATININE-BSD FRML MDRD: ABNORMAL ML/MIN/{1.73_M2}
GLUCOSE BLD-MCNC: 149 MG/DL (ref 75–110)
GLUCOSE BLD-MCNC: 323 MG/DL (ref 75–110)
GLUCOSE BLD-MCNC: 324 MG/DL (ref 75–110)
GLUCOSE BLD-MCNC: 375 MG/DL (ref 75–110)
GLUCOSE BLD-MCNC: 395 MG/DL (ref 70–99)
HCT VFR BLD CALC: 35.5 % (ref 40.7–50.3)
HEMOGLOBIN: 11.6 G/DL (ref 13–17)
IMMATURE GRANULOCYTES: 1 %
LYMPHOCYTES # BLD: 3 % (ref 24–44)
MAGNESIUM: 1.9 MG/DL (ref 1.6–2.6)
MCH RBC QN AUTO: 31.9 PG (ref 25.2–33.5)
MCHC RBC AUTO-ENTMCNC: 32.7 G/DL (ref 28–38)
MCV RBC AUTO: 97.5 FL (ref 82.6–102.9)
MONOCYTES # BLD: 7 % (ref 1–7)
PDW BLD-RTO: 13.5 % (ref 11.8–14.4)
PHOSPHORUS: 3.2 MG/DL (ref 2.5–4.5)
PLATELET # BLD: 194 K/UL (ref 138–453)
PMV BLD AUTO: 9.8 FL (ref 8.1–13.5)
POTASSIUM SERPL-SCNC: 4.3 MMOL/L (ref 3.7–5.3)
PROCALCITONIN: 0.03 NG/ML
RBC # BLD: 3.64 M/UL (ref 4.21–5.77)
SEG NEUTROPHILS: 89 % (ref 36–66)
SEGMENTED NEUTROPHILS ABSOLUTE COUNT: 7.12 K/UL (ref 1.8–7.7)
SODIUM BLD-SCNC: 136 MMOL/L (ref 135–144)
TRIGL SERPL-MCNC: 146 MG/DL
WBC # BLD: 8 K/UL (ref 3.5–11.3)

## 2022-05-30 PROCEDURE — 94640 AIRWAY INHALATION TREATMENT: CPT

## 2022-05-30 PROCEDURE — 94761 N-INVAS EAR/PLS OXIMETRY MLT: CPT

## 2022-05-30 PROCEDURE — 6360000002 HC RX W HCPCS: Performed by: FAMILY MEDICINE

## 2022-05-30 PROCEDURE — 6370000000 HC RX 637 (ALT 250 FOR IP): Performed by: INTERNAL MEDICINE

## 2022-05-30 PROCEDURE — 6360000002 HC RX W HCPCS: Performed by: INTERNAL MEDICINE

## 2022-05-30 PROCEDURE — 2500000003 HC RX 250 WO HCPCS: Performed by: INTERNAL MEDICINE

## 2022-05-30 PROCEDURE — 2700000000 HC OXYGEN THERAPY PER DAY

## 2022-05-30 PROCEDURE — 6360000002 HC RX W HCPCS: Performed by: NURSE PRACTITIONER

## 2022-05-30 PROCEDURE — 80048 BASIC METABOLIC PNL TOTAL CA: CPT

## 2022-05-30 PROCEDURE — 84478 ASSAY OF TRIGLYCERIDES: CPT

## 2022-05-30 PROCEDURE — 2580000003 HC RX 258: Performed by: PSYCHIATRY & NEUROLOGY

## 2022-05-30 PROCEDURE — 71045 X-RAY EXAM CHEST 1 VIEW: CPT

## 2022-05-30 PROCEDURE — 82947 ASSAY GLUCOSE BLOOD QUANT: CPT

## 2022-05-30 PROCEDURE — 6370000000 HC RX 637 (ALT 250 FOR IP): Performed by: FAMILY MEDICINE

## 2022-05-30 PROCEDURE — 84145 PROCALCITONIN (PCT): CPT

## 2022-05-30 PROCEDURE — 36415 COLL VENOUS BLD VENIPUNCTURE: CPT

## 2022-05-30 PROCEDURE — 2500000003 HC RX 250 WO HCPCS: Performed by: FAMILY MEDICINE

## 2022-05-30 PROCEDURE — 85025 COMPLETE CBC W/AUTO DIFF WBC: CPT

## 2022-05-30 PROCEDURE — 2580000003 HC RX 258: Performed by: FAMILY MEDICINE

## 2022-05-30 PROCEDURE — C9254 INJECTION, LACOSAMIDE: HCPCS | Performed by: PSYCHIATRY & NEUROLOGY

## 2022-05-30 PROCEDURE — 6360000002 HC RX W HCPCS: Performed by: PSYCHIATRY & NEUROLOGY

## 2022-05-30 PROCEDURE — 83735 ASSAY OF MAGNESIUM: CPT

## 2022-05-30 PROCEDURE — 2580000003 HC RX 258: Performed by: INTERNAL MEDICINE

## 2022-05-30 PROCEDURE — 94660 CPAP INITIATION&MGMT: CPT

## 2022-05-30 PROCEDURE — 83036 HEMOGLOBIN GLYCOSYLATED A1C: CPT

## 2022-05-30 PROCEDURE — 84100 ASSAY OF PHOSPHORUS: CPT

## 2022-05-30 PROCEDURE — 2000000000 HC ICU R&B

## 2022-05-30 PROCEDURE — A4216 STERILE WATER/SALINE, 10 ML: HCPCS | Performed by: INTERNAL MEDICINE

## 2022-05-30 RX ORDER — CLONIDINE 0.2 MG/24H
1 PATCH, EXTENDED RELEASE TRANSDERMAL WEEKLY
Status: DISCONTINUED | OUTPATIENT
Start: 2022-05-30 | End: 2022-06-05

## 2022-05-30 RX ORDER — INSULIN LISPRO 100 [IU]/ML
12 INJECTION, SOLUTION INTRAVENOUS; SUBCUTANEOUS ONCE
Status: COMPLETED | OUTPATIENT
Start: 2022-05-30 | End: 2022-05-30

## 2022-05-30 RX ORDER — INSULIN LISPRO 100 [IU]/ML
0-18 INJECTION, SOLUTION INTRAVENOUS; SUBCUTANEOUS EVERY 6 HOURS
Status: DISCONTINUED | OUTPATIENT
Start: 2022-05-30 | End: 2022-06-09 | Stop reason: HOSPADM

## 2022-05-30 RX ORDER — FUROSEMIDE 10 MG/ML
20 INJECTION INTRAMUSCULAR; INTRAVENOUS ONCE
Status: COMPLETED | OUTPATIENT
Start: 2022-05-30 | End: 2022-05-30

## 2022-05-30 RX ORDER — INSULIN LISPRO 100 [IU]/ML
0-9 INJECTION, SOLUTION INTRAVENOUS; SUBCUTANEOUS EVERY 6 HOURS
Status: DISCONTINUED | OUTPATIENT
Start: 2022-05-30 | End: 2022-05-30

## 2022-05-30 RX ADMIN — DEXMEDETOMIDINE HYDROCHLORIDE 1.5 MCG/KG/HR: 100 INJECTION, SOLUTION INTRAVENOUS at 01:36

## 2022-05-30 RX ADMIN — SODIUM CHLORIDE, PRESERVATIVE FREE 20 MG: 5 INJECTION INTRAVENOUS at 20:08

## 2022-05-30 RX ADMIN — PHENOBARBITAL SODIUM 65 MG: 65 INJECTION INTRAMUSCULAR; INTRAVENOUS at 09:16

## 2022-05-30 RX ADMIN — INSULIN LISPRO 3 UNITS: 100 INJECTION, SOLUTION INTRAVENOUS; SUBCUTANEOUS at 18:24

## 2022-05-30 RX ADMIN — SODIUM CHLORIDE, PRESERVATIVE FREE 20 MG: 5 INJECTION INTRAVENOUS at 08:58

## 2022-05-30 RX ADMIN — SODIUM CHLORIDE, PRESERVATIVE FREE 10 ML: 5 INJECTION INTRAVENOUS at 20:13

## 2022-05-30 RX ADMIN — IPRATROPIUM BROMIDE AND ALBUTEROL SULFATE 1 AMPULE: 2.5; .5 SOLUTION RESPIRATORY (INHALATION) at 09:32

## 2022-05-30 RX ADMIN — IPRATROPIUM BROMIDE AND ALBUTEROL SULFATE 1 AMPULE: 2.5; .5 SOLUTION RESPIRATORY (INHALATION) at 19:21

## 2022-05-30 RX ADMIN — LORAZEPAM 2 MG: 2 INJECTION INTRAMUSCULAR at 17:03

## 2022-05-30 RX ADMIN — METHYLPREDNISOLONE SODIUM SUCCINATE 40 MG: 40 INJECTION, POWDER, FOR SOLUTION INTRAMUSCULAR; INTRAVENOUS at 18:25

## 2022-05-30 RX ADMIN — FUROSEMIDE 20 MG: 10 INJECTION, SOLUTION INTRAMUSCULAR; INTRAVENOUS at 13:19

## 2022-05-30 RX ADMIN — I.V. FAT EMULSION 100 ML: 20 EMULSION INTRAVENOUS at 18:17

## 2022-05-30 RX ADMIN — METHYLPREDNISOLONE SODIUM SUCCINATE 40 MG: 40 INJECTION, POWDER, FOR SOLUTION INTRAMUSCULAR; INTRAVENOUS at 12:36

## 2022-05-30 RX ADMIN — ENOXAPARIN SODIUM 40 MG: 100 INJECTION SUBCUTANEOUS at 08:56

## 2022-05-30 RX ADMIN — PHENOBARBITAL SODIUM 65 MG: 65 INJECTION INTRAMUSCULAR; INTRAVENOUS at 20:05

## 2022-05-30 RX ADMIN — BUDESONIDE 500 MCG: 0.5 SUSPENSION RESPIRATORY (INHALATION) at 09:33

## 2022-05-30 RX ADMIN — CALCIUM GLUCONATE: 94 INJECTION, SOLUTION INTRAVENOUS at 18:13

## 2022-05-30 RX ADMIN — DEXTROSE MONOHYDRATE 2.5 MG/HR: 50 INJECTION, SOLUTION INTRAVENOUS at 01:51

## 2022-05-30 RX ADMIN — LORAZEPAM 2 MG: 2 INJECTION INTRAMUSCULAR at 21:25

## 2022-05-30 RX ADMIN — BUDESONIDE 500 MCG: 0.5 SUSPENSION RESPIRATORY (INHALATION) at 19:21

## 2022-05-30 RX ADMIN — HALOPERIDOL LACTATE 5 MG: 5 INJECTION, SOLUTION INTRAMUSCULAR at 22:07

## 2022-05-30 RX ADMIN — METHYLPREDNISOLONE SODIUM SUCCINATE 40 MG: 40 INJECTION, POWDER, FOR SOLUTION INTRAMUSCULAR; INTRAVENOUS at 00:31

## 2022-05-30 RX ADMIN — DEXMEDETOMIDINE HYDROCHLORIDE 1.1 MCG/KG/HR: 100 INJECTION, SOLUTION INTRAVENOUS at 12:06

## 2022-05-30 RX ADMIN — INSULIN LISPRO 12 UNITS: 100 INJECTION, SOLUTION INTRAVENOUS; SUBCUTANEOUS at 12:40

## 2022-05-30 RX ADMIN — METHYLPREDNISOLONE SODIUM SUCCINATE 40 MG: 40 INJECTION, POWDER, FOR SOLUTION INTRAMUSCULAR; INTRAVENOUS at 06:12

## 2022-05-30 RX ADMIN — INSULIN LISPRO 12 UNITS: 100 INJECTION, SOLUTION INTRAVENOUS; SUBCUTANEOUS at 09:27

## 2022-05-30 RX ADMIN — LACOSAMIDE 200 MG: 10 INJECTION, SOLUTION INTRAVENOUS at 21:05

## 2022-05-30 RX ADMIN — DEXTROSE MONOHYDRATE 2.5 MG/HR: 50 INJECTION, SOLUTION INTRAVENOUS at 18:36

## 2022-05-30 RX ADMIN — SODIUM CHLORIDE, PRESERVATIVE FREE 10 ML: 5 INJECTION INTRAVENOUS at 09:00

## 2022-05-30 RX ADMIN — DEXMEDETOMIDINE HYDROCHLORIDE 1.5 MCG/KG/HR: 100 INJECTION, SOLUTION INTRAVENOUS at 21:16

## 2022-05-30 RX ADMIN — HALOPERIDOL LACTATE 5 MG: 5 INJECTION, SOLUTION INTRAMUSCULAR at 16:05

## 2022-05-30 RX ADMIN — PHENOBARBITAL SODIUM 65 MG: 65 INJECTION INTRAMUSCULAR; INTRAVENOUS at 12:41

## 2022-05-30 RX ADMIN — LACOSAMIDE 200 MG: 10 INJECTION, SOLUTION INTRAVENOUS at 09:24

## 2022-05-30 RX ADMIN — FOLIC ACID: 5 INJECTION, SOLUTION INTRAMUSCULAR; INTRAVENOUS; SUBCUTANEOUS at 10:38

## 2022-05-30 ASSESSMENT — PAIN SCALES - GENERAL: PAINLEVEL_OUTOF10: 0

## 2022-05-30 NOTE — PLAN OF CARE
Problem: Discharge Planning  Goal: Discharge to home or other facility with appropriate resources  5/29/2022 1923 by David Pearson RN  Outcome: Progressing     Problem: Safety - Medical Restraint  Goal: Remains free of injury from restraints (Restraint for Interference with Medical Device)  Description: INTERVENTIONS:  1. Determine that other, less restrictive measures have been tried or would not be effective before applying the restraint  2. Evaluate the patient's condition at the time of restraint application  3. Inform patient/family regarding the reason for restraint  4.  Q2H: Monitor safety, psychosocial status, comfort, nutrition and hydration  Recent Flowsheet Documentation  Taken 5/29/2022 2000 by Vida Homans, RN  Remains free of injury from restraints (restraint for interference with medical device): Determine that other, less restrictive measures have been tried or would not be effective before applying the restraint  Taken 5/29/2022 1944 by Vida Homans, RN  Remains free of injury from restraints (restraint for interference with medical device): Determine that other, less restrictive measures have been tried or would not be effective before applying the restraint  5/29/2022 1923 by David Pearson RN  Outcome: Progressing     Problem: Neurosensory - Adult  Goal: Achieves stable or improved neurological status  5/29/2022 2300 by Vida Homans, RN  Outcome: Progressing  5/29/2022 1923 by David Pearson RN  Outcome: Progressing  Goal: Absence of seizures  5/29/2022 2300 by Vida Homans, RN  Outcome: Progressing  5/29/2022 1923 by David Pearson RN  Outcome: Progressing  Goal: Remains free of injury related to seizures activity  5/29/2022 2300 by Vida Homans, RN  Outcome: Progressing  5/29/2022 1923 by David Pearson RN  Outcome: Progressing  Goal: Achieves maximal functionality and self care  5/29/2022 1923 by David Pearson RN  Outcome: Progressing     Problem: Respiratory - Adult  Goal: Achieves optimal ventilation and oxygenation  5/29/2022 2300 by Katrina Peoples RN  Outcome: Progressing  5/29/2022 1923 by Francheska Newman RN  Outcome: Progressing     Problem: Cardiovascular - Adult  Goal: Maintains optimal cardiac output and hemodynamic stability  5/29/2022 2300 by Katrina Peoples RN  Outcome: Progressing  5/29/2022 1923 by Francheska Newman RN  Outcome: Progressing  Goal: Absence of cardiac dysrhythmias or at baseline  5/29/2022 2300 by Katrina Peoples RN  Outcome: Progressing  5/29/2022 1923 by Francheska Newman RN  Outcome: Progressing     Problem: Skin/Tissue Integrity - Adult  Goal: Skin integrity remains intact  5/29/2022 2300 by Katrina Peoples RN  Outcome: Progressing  5/29/2022 1923 by Francheska Newman RN  Outcome: Progressing  Flowsheets (Taken 5/29/2022 0900)  Skin Integrity Remains Intact: Monitor for areas of redness and/or skin breakdown  Goal: Incisions, wounds, or drain sites healing without S/S of infection  5/29/2022 2300 by Katrina Peoples RN  Outcome: Progressing  5/29/2022 1923 by Francheska Newman RN  Outcome: Progressing  Goal: Oral mucous membranes remain intact  5/29/2022 1923 by Francheska Newman RN  Outcome: Progressing     Problem: Metabolic/Fluid and Electrolytes - Adult  Goal: Electrolytes maintained within normal limits  5/29/2022 1923 by Francheska Newman RN  Outcome: Progressing  Goal: Hemodynamic stability and optimal renal function maintained  5/29/2022 1923 by Francheska Newman RN  Outcome: Progressing  Goal: Glucose maintained within prescribed range  5/29/2022 1923 by Francheska Newman RN  Outcome: Progressing     Problem: Anxiety  Goal: Will report anxiety at manageable levels  Description: INTERVENTIONS:  1. Administer medication as ordered  2. Teach and rehearse alternative coping skills  3.  Provide emotional support with 1:1 interaction with staff  5/29/2022 1923 by Francheska Newman RN  Outcome: Progressing     Problem: appropriate  5/29/2022 2300 by Sparkle Shaw RN  Outcome: Progressing  5/29/2022 1923 by Felisa Palomo RN  Outcome: Progressing     Problem: Chronic Conditions and Co-morbidities  Goal: Patient's chronic conditions and co-morbidity symptoms are monitored and maintained or improved  5/29/2022 2300 by Sparkle Shaw RN  Outcome: Progressing  5/29/2022 1923 by Felisa Palomo RN  Outcome: Progressing     Problem: Pain  Goal: Verbalizes/displays adequate comfort level or baseline comfort level  5/29/2022 1923 by Felisa Palomo RN  Outcome: Progressing     Problem: ABCDS Injury Assessment  Goal: Absence of physical injury  5/29/2022 1923 by Felisa Palomo RN  Outcome: Progressing  Flowsheets (Taken 5/29/2022 0900)  Absence of Physical Injury: Implement safety measures based on patient assessment     Problem: Musculoskeletal - Adult  Goal: Return mobility to safest level of function  5/29/2022 1923 by Felisa Palomo RN  Outcome: Progressing     Problem: Genitourinary - Adult  Goal: Absence of urinary retention  5/29/2022 2300 by Sparkle Shaw RN  Outcome: Progressing  5/29/2022 1923 by Felisa Palomo RN  Outcome: Progressing  Goal: Urinary catheter remains patent  5/29/2022 2300 by Sparkle Shaw RN  Outcome: Progressing  5/29/2022 1923 by Felisa Palomo RN  Outcome: Progressing     Problem: Nutrition Deficit:  Goal: Optimize nutritional status  5/29/2022 1923 by Felisa Palomo RN  Outcome: Progressing

## 2022-05-30 NOTE — PROGRESS NOTES
End Of Shift Note  [facility]  Critical events on shift: Pt either sedated or agitated stating he wants to, \"get the fuck out of here and go home\" pulling very intently on wires and cords. PICC line drsg changed with a second RN to hold arm in place. Vitals:    Vitals:    05/30/22 1630 05/30/22 1645 05/30/22 1700 05/30/22 1730   BP: (!) 153/72 (!) 160/67 137/79 (!) 146/78   Pulse: 89 88 91 91   Resp:   24 20   Temp:       TempSrc:       SpO2: 94% 93% 93% 93%   Weight:       Height:            I&O:     Intake/Output Summary (Last 24 hours) at 5/30/2022 1839  Last data filed at 5/30/2022 1830  Gross per 24 hour   Intake 2268.58 ml   Output 2800 ml   Net -531.42 ml       Resp Status: Bipap 30% this am and then 2-3L NC the rest of the shift.     Ventilator Settings:     / / /FiO2 : 30 %    Critical Care IV infusions:   dexmedetomidine (PRECEDEX) IV infusion 1.5 mcg/kg/hr (05/30/22 1817)    PN-Adult 2-in-1 Central Line (Standard) 53.4 mL/hr at 05/30/22 1817    sodium chloride Stopped (05/26/22 2008)    dextrose      niCARdipene (CARDENE) infusion 2.5 mg/hr (05/30/22 1836)        LDA:   PICC Double Lumen 37/19/58 Right Basilic (Active)   Number of days: 4       Peripheral IV 05/29/22 Distal;Right;Dorsal Forearm (Active)   Number of days: 0       Urinary Catheter [place] (Active)   Number of days: 2       Puncture 02/26/21 Back Lower (Active)   Number of days: 458       Incision 09/22/21 Neck Left (Active)   Number of days: 250

## 2022-05-30 NOTE — PROGRESS NOTES
Reason for Follow up: Urinary Retention     Subjective:    Pt seen and examined  Bipap off  Sleepy  Good UOP  BP stable. Per CNP note   Patient seen and examined. On bipap. SBP trending 100-180s. BP more stable overnight and this am. On cardene drip. Corrected . Bicarb 19. No BM since admission. Creatinine stable 1.2. Glucose trending 300s.   Urine Output 1125 mL yesterday    Review Of Systems:   Unable to obtain because current mental status, sedated on precedex    Scheduled Meds:   insulin lispro  0-18 Units SubCUTAneous Q6H    fat emulsion  100 mL IntraVENous Daily    famotidine (PEPCID) injection  20 mg IntraVENous BID    PHENobarbital  65 mg IntraVENous TID    methylPREDNISolone  40 mg IntraVENous Q6H    budesonide  0.5 mg Nebulization BID    enoxaparin  40 mg SubCUTAneous Daily    thiamine and folic acid IVPB   IntraVENous Daily    lacosamide (VIMPAT) IVPB  200 mg IntraVENous BID    ipratropium-albuterol  1 ampule Inhalation BID    sodium chloride flush  10 mL IntraVENous Once    sodium chloride flush  5-40 mL IntraVENous 2 times per day    aspirin  81 mg Oral Daily    atorvastatin  40 mg Oral Nightly    clopidogrel  75 mg Oral Daily    metFORMIN  500 mg Oral BID WC    PARoxetine  40 mg Oral Daily    amLODIPine  5 mg Oral Daily    atenolol  50 mg Oral Daily    losartan  100 mg Oral Daily   Continuous Infusions:   dexmedetomidine (PRECEDEX) IV infusion 1.1 mcg/kg/hr (05/30/22 1040)    PN-Adult 2-in-1 Central Line (Standard) 41.7 mL/hr at 05/30/22 0530    sodium chloride Stopped (05/26/22 2008)    dextrose      niCARdipene (CARDENE) infusion 2.5 mg/hr (05/30/22 0530)     PRN Meds:haloperidol lactate, LORazepam **OR** LORazepam **OR** LORazepam **OR** LORazepam **OR** LORazepam **OR** LORazepam **OR** LORazepam **OR** LORazepam, albuterol, hydrALAZINE, sodium chloride flush, sodium chloride, acetaminophen, ondansetron **OR** ondansetron, metoprolol, glucose, dextrose bolus **OR** dextrose bolus, glucagon (rDNA), dextrose    No Known Allergies    Physical Exam:  Blood pressure (!) 147/84, pulse 86, temperature 97.6 °F (36.4 °C), temperature source Axillary, resp. rate 16, height 5' 6\" (1.676 m), weight 175 lb 2 oz (79.4 kg), SpO2 96 %. In: 2092.7 [I.V.:1330.7]  Out: 1125   In: 2092.7   Out: 1125 [Urine:1125]    General:  not in distress. Appears to be stated age. On bipap. HEENT: Atraumatic, normocephalic. Anicteric sclera. Pink and moist oral mucosa. Chest: Bilateral air entry, clear to auscultation, no wheezing, rhonchi or rales. Cardiovascular: RRR, S1S2, no murmur, rub or gallop. Has no lower extremity edema. Abdomen: Soft, non tender to palpation. Active bowel sounds. Musculoskeletal: No cyanosis or clubbing. Integumentary: Pink, warm and dry. Free from rash or lesions. Skin turgor normal.  CNS: lethargic on precedex.     Data:  CBC:   Lab Results   Component Value Date    WBC 8.0 05/30/2022    HGB 11.6 (L) 05/30/2022    HCT 35.5 (L) 05/30/2022    MCV 97.5 05/30/2022     05/30/2022     BMP:    Lab Results   Component Value Date     05/30/2022    K 4.3 05/30/2022     05/30/2022    CO2 19 (L) 05/30/2022    BUN 32 (H) 05/30/2022    CREATININE 1.26 (H) 05/30/2022    GLUCOSE 395 (H) 05/30/2022     CMP:   Lab Results   Component Value Date     05/30/2022    K 4.3 05/30/2022     05/30/2022    CO2 19 (L) 05/30/2022    BUN 32 (H) 05/30/2022    CREATININE 1.26 (H) 05/30/2022    GLUCOSE 395 (H) 05/30/2022    CALCIUM 8.7 05/30/2022    PROT 6.6 05/26/2022    LABALBU 3.8 05/26/2022    BILITOT 0.79 05/26/2022    ALKPHOS 69 05/26/2022    AST 24 05/26/2022    ALT 12 05/26/2022      Hepatic:   Lab Results   Component Value Date    AST 24 05/26/2022    ALT 12 05/26/2022    BILITOT 0.79 05/26/2022    ALKPHOS 69 05/26/2022     BNP: No results found for: BNP  Lipids:   Lab Results   Component Value Date    CHOL 185 02/25/2021    HDL 87 02/25/2021     INR:   Lab Results   Component Value Date    INR 1.1 09/22/2021     PTH: No results found for: PTH  Phosphorus:    Lab Results   Component Value Date    PHOS 3.2 05/30/2022     Ionized Calcium: No results found for: IONCA  Magnesium:   Lab Results   Component Value Date    MG 1.9 05/30/2022     Albumin:   Lab Results   Component Value Date    LABALBU 3.8 05/26/2022     Last 3 CK, CKMB, Troponin: @LABRCNT(CKTOTAL:3,CKMB:3,TROPONINI:3)       URINE:)No results found for: Warren Simeon    Radiology:   Reviewed. Assessment and Plan to follow. 1.  EtOH withdrawal  2.  Urinary retention  3.  Acute kidney injury  4.  Hypertension with worsening blood pressure secondary to DT  5.  Hyponatremia    Plan:    Good eGFR  SNa 136  TPN  FU labs  Peterson for now  BP better controlled    Electronically signed by Celio Hoyt MD  on 5/30/2022 at 12:04 PM  [facility].   Ph: [phone]

## 2022-05-30 NOTE — PROGRESS NOTES
Restraint orders discontinued. Restraints removed. Pt restless and anxious. No longer combative with staff and no longer pulling at lines.

## 2022-05-30 NOTE — PROGRESS NOTES
Pts 0600 blood sugar was 345. Recheck was 375. No coverage available at this time. Dr. China Kraft messaged via [employer], awaiting response.

## 2022-05-30 NOTE — CONSULTS
Patient:   Lisa Mason   :    1955   Referring/PCP: DENNIS Samuels  Date:     2022  Consultant:   Zahra Flores MD, MD    Subjective: This is a 74 yo male with a history of left CEA  and presents with a complex history of grand mal type seizure at home, at least one or two more seizures while here in the hospital.  He is an alcoholic and a history of a left old occipital and parietal stroke on MRI, no new evidence of stroke on imaging. His carotid duplex shows velocities suggestive of 50-69% stenosis by velocity criteria. Old CTA neck from a couple years ago shows calcified plaque on the right with evidence of 60-70% Stenosis. Currently, he is on precidex for withdrawal and intermittent BiPap and from an exam standpoint he is not able to participate in his history. His clinical picture is unchanged and no new stroke/TIA symptoms. Prior to Admission medications    Medication Sig Start Date End Date Taking?  Authorizing Provider   clopidogrel (PLAVIX) 75 MG tablet Take 1 tablet by mouth daily for 21 days 9/23/21 10/14/21  Renee Rudd MD   aspirin 81 MG chewable tablet Take 1 tablet by mouth daily 21   Renee Rudd MD   metFORMIN (GLUCOPHAGE) 500 MG tablet Take 1 tablet by mouth 2 times daily (with meals) 21   Renee Rudd MD   atorvastatin (LIPITOR) 40 MG tablet Take 1 tablet by mouth nightly 21   Renee Rudd MD   amLODIPine (NORVASC) 5 MG tablet Take 1 tablet by mouth daily 21   Renee Rudd MD   PARoxetine (PAXIL) 40 MG tablet Take 40 mg by mouth daily    Historical Provider, MD   atenolol (TENORMIN) 50 MG tablet Take 50 mg by mouth daily    Historical Provider, MD   losartan (COZAAR) 100 MG tablet Take 100 mg by mouth daily    Historical Provider, MD      Scheduled Medications:    insulin lispro  0-18 Units SubCUTAneous Q6H    fat emulsion  100 mL IntraVENous Daily    famotidine (PEPCID) injection  20 mg IntraVENous BID    PHENobarbital  65 mg IntraVENous TID    methylPREDNISolone  40 mg IntraVENous Q6H    budesonide  0.5 mg Nebulization BID    enoxaparin  40 mg SubCUTAneous Daily    thiamine and folic acid IVPB   IntraVENous Daily    lacosamide (VIMPAT) IVPB  200 mg IntraVENous BID    ipratropium-albuterol  1 ampule Inhalation BID    sodium chloride flush  10 mL IntraVENous Once    sodium chloride flush  5-40 mL IntraVENous 2 times per day    aspirin  81 mg Oral Daily    atorvastatin  40 mg Oral Nightly    clopidogrel  75 mg Oral Daily    metFORMIN  500 mg Oral BID WC    PARoxetine  40 mg Oral Daily    amLODIPine  5 mg Oral Daily    atenolol  50 mg Oral Daily    losartan  100 mg Oral Daily     Infusions:    dexmedetomidine (PRECEDEX) IV infusion 1.1 mcg/kg/hr (05/30/22 1040)    PN-Adult 2-in-1 Central Line (Standard) 41.7 mL/hr at 05/30/22 0530    sodium chloride Stopped (05/26/22 2008)    dextrose      niCARdipene (CARDENE) infusion 2.5 mg/hr (05/30/22 0530)     PRN Medications: haloperidol lactate, LORazepam **OR** LORazepam **OR** LORazepam **OR** LORazepam **OR** LORazepam **OR** LORazepam **OR** LORazepam **OR** LORazepam, albuterol, hydrALAZINE, sodium chloride flush, sodium chloride, acetaminophen, ondansetron **OR** ondansetron, metoprolol, glucose, dextrose bolus **OR** dextrose bolus, glucagon (rDNA), dextrose  Allergies: No Known Allergies    Past Medical History:   Diagnosis Date    Diabetes mellitus ([facility]     Hypertension      Past Surgical History:   Procedure Laterality Date    CAROTID ENDARTERECTOMY  09/22/2021    CAROTID ENDARTERECTOMY (N/A Neck    CAROTID ENDARTERECTOMY N/A 9/22/2021    CAROTID ENDARTERECTOMY performed by Sharon Gaona MD at [address] [place] [address] IR INS PICC VAD W SQ PORT GREATER THAN 5  5/26/2022    IR INS PICC VAD W SQ PORT GREATER THAN 5 5/26/2022 [facility]    KNEE SURGERY         Social:   Social History     Tobacco Use    Smoking status: Current Every Day Smoker     Packs/day: 1.50     Start date: 6/1/1973    Smokeless tobacco: Never Used   Substance Use Topics    Alcohol use: Yes     Alcohol/week: 4.0 standard drinks     Types: 4 Cans of beer per week     Comment: Occassional, four times weekly     Family: History reviewed. No pertinent family history. Objective:     Physical Exam:   BP (!) 147/84   Pulse 86   Temp 97.6 °F (36.4 °C) (Axillary)   Resp 16   Ht 5' 6\" (1.676 m)   Wt 175 lb 2 oz (79.4 kg)   SpO2 96%   BMI 28.27 kg/m²     Constitutional: NAD, on Bipap in the room, also on precidex and not able to participate in examination, still in alcohol withdrawal  HEENT: PERRLA, mucous membranes moist  Skin:  Warm and dry, well perfused. Pulses:  Radial pulses 2+ bilaterally, faint DP and PT pulses bilaterally. Lab and Imaging Review   Recent Labs     05/28/22  0454 05/29/22  0356 05/30/22  0514   WBC 9.5 11.1 8.0   HGB 12.0* 11.8* 11.6*   MCV 96.6 98.1 97.5    170 194   * 137 136   K 4.3 4.5 4.3    106 106   CO2 22 20 19*   BUN 18 22 32*   CREATININE 1.08 1.16 1.26*   GLUCOSE 220* 216* 395*   CALCIUM 8.9 8.5* 8.7   MG  --   --  1.9       Assessment:     Patient Active Problem List    Diagnosis Date Noted    [facility] 05/25/2022    Right sided weakness 09/21/2021    Acute ischemic left PCA stroke ([facility] 02/26/2021    ADRIANA [facility]acute kidney injury) ([facility] 02/23/2021         Plan:   Patient with presentation with seizures etiology probably ETOH withdrawal and previous old Occiptal/parietal stroke as the nidus. Right ICA stenosis 50-69% currently asymptomatic, MRI shows no new acute stroke, and stable left post-carotid endarterectomy with no significant stenosis. Plan for continued ICU support. No vascular surgery intervention at this time. He will need follow up outpatient with Dr. Bea Sepulveda for discussion of long-term management of his right ICA stenosis.   Currently, no signs of acute stroke on MRI related to the right ICA stenosis. It appears he is still on precedex infusion and given phenobarbital/siezure medications as well. Neurology following along, Appreciate help.        Magno De Jesus MD

## 2022-05-30 NOTE — PLAN OF CARE
Problem: Skin/Tissue Integrity - Adult  Goal: Skin integrity remains intact  Outcome: Progressing  Flowsheets (Taken 5/30/2022 0830)  Skin Integrity Remains Intact: Monitor for areas of redness and/or skin breakdown     Problem: Pain  Goal: Verbalizes/displays adequate comfort level or baseline comfort level  Outcome: Progressing   Continue to monitor skin integrity and IV sites. Patient will have progressive improvement with pain scale with interventions.

## 2022-05-30 NOTE — PROGRESS NOTES
Attempting to titrate down on Precedex. Pt becoming increasingly agitated with lab and x-ray. Pt is not redirectable or following commands. Disoriented x4 with moaning/yelling/slurred speech. Precedex at 1.3mcg/kg/hr.

## 2022-05-30 NOTE — PROGRESS NOTES
Reason for Follow up: Urinary Retention     HISTORY:    Patient seen and examined. On bipap. SBP trending 100-180s. BP more stable overnight and this am. On cardene drip. Corrected . Bicarb 19. No BM since admission. Creatinine stable 1.2. Glucose trending 300s.   Urine Output 1125 mL yesterday    Review Of Systems:   Unable to obtain because current mental status, sedated on precedex    Scheduled Meds:   insulin lispro  0-18 Units SubCUTAneous Q6H    fat emulsion  100 mL IntraVENous Daily    famotidine (PEPCID) injection  20 mg IntraVENous BID    PHENobarbital  65 mg IntraVENous TID    methylPREDNISolone  40 mg IntraVENous Q6H    budesonide  0.5 mg Nebulization BID    enoxaparin  40 mg SubCUTAneous Daily    thiamine and folic acid IVPB   IntraVENous Daily    lacosamide (VIMPAT) IVPB  200 mg IntraVENous BID    ipratropium-albuterol  1 ampule Inhalation BID    sodium chloride flush  10 mL IntraVENous Once    sodium chloride flush  5-40 mL IntraVENous 2 times per day    aspirin  81 mg Oral Daily    atorvastatin  40 mg Oral Nightly    clopidogrel  75 mg Oral Daily    metFORMIN  500 mg Oral BID WC    PARoxetine  40 mg Oral Daily    amLODIPine  5 mg Oral Daily    atenolol  50 mg Oral Daily    losartan  100 mg Oral Daily   Continuous Infusions:   dexmedetomidine (PRECEDEX) IV infusion 1.2 mcg/kg/hr (05/30/22 0855)    PN-Adult 2-in-1 Central Line (Standard) 41.7 mL/hr at 05/30/22 0530    sodium chloride Stopped (05/26/22 2008)    dextrose      niCARdipene (CARDENE) infusion 2.5 mg/hr (05/30/22 0530)     PRN Meds:haloperidol lactate, LORazepam **OR** LORazepam **OR** LORazepam **OR** LORazepam **OR** LORazepam **OR** LORazepam **OR** LORazepam **OR** LORazepam, albuterol, hydrALAZINE, sodium chloride flush, sodium chloride, acetaminophen, ondansetron **OR** ondansetron, metoprolol, glucose, dextrose bolus **OR** dextrose bolus, glucagon (rDNA), dextrose    No Known Allergies    Physical Exam:  Blood pressure (!) 142/71, pulse 84, temperature 97.6 °F (36.4 °C), temperature source Axillary, resp. rate 15, height 5' 6\" (1.676 m), weight 175 lb 2 oz (79.4 kg), SpO2 97 %. In: 2092.7 [I.V.:1330.7]  Out: 1125   In: 2092.7   Out: 1125 [Urine:1125]    General:  not in distress. Appears to be stated age. On bipap. HEENT: Atraumatic, normocephalic. Anicteric sclera. Pink and moist oral mucosa. Chest: Bilateral air entry, clear to auscultation, no wheezing, rhonchi or rales. Cardiovascular: RRR, S1S2, no murmur, rub or gallop. Has no lower extremity edema. Abdomen: Soft, non tender to palpation. Active bowel sounds. Musculoskeletal: No cyanosis or clubbing. Integumentary: Pink, warm and dry. Free from rash or lesions. Skin turgor normal.  CNS: lethargic on precedex.     Data:  CBC:   Lab Results   Component Value Date    WBC 8.0 05/30/2022    HGB 11.6 (L) 05/30/2022    HCT 35.5 (L) 05/30/2022    MCV 97.5 05/30/2022     05/30/2022     BMP:    Lab Results   Component Value Date     05/30/2022    K 4.3 05/30/2022     05/30/2022    CO2 19 (L) 05/30/2022    BUN 32 (H) 05/30/2022    CREATININE 1.26 (H) 05/30/2022    GLUCOSE 395 (H) 05/30/2022     CMP:   Lab Results   Component Value Date     05/30/2022    K 4.3 05/30/2022     05/30/2022    CO2 19 (L) 05/30/2022    BUN 32 (H) 05/30/2022    CREATININE 1.26 (H) 05/30/2022    GLUCOSE 395 (H) 05/30/2022    CALCIUM 8.7 05/30/2022    PROT 6.6 05/26/2022    LABALBU 3.8 05/26/2022    BILITOT 0.79 05/26/2022    ALKPHOS 69 05/26/2022    AST 24 05/26/2022    ALT 12 05/26/2022      Hepatic:   Lab Results   Component Value Date    AST 24 05/26/2022    ALT 12 05/26/2022    BILITOT 0.79 05/26/2022    ALKPHOS 69 05/26/2022     BNP: No results found for: BNP  Lipids:   Lab Results   Component Value Date    CHOL 185 02/25/2021    HDL 87 02/25/2021     INR:   Lab Results   Component Value Date    INR 1.1 09/22/2021     PTH: No results found for: PTH  Phosphorus:    Lab Results   Component Value Date    PHOS 3.2 05/30/2022     Ionized Calcium: No results found for: IONCA  Magnesium:   Lab Results   Component Value Date    MG 1.9 05/30/2022     Albumin:   Lab Results   Component Value Date    LABALBU 3.8 05/26/2022     Last 3 CK, CKMB, Troponin: @LABRCNT(CKTOTAL:3,CKMB:3,TROPONINI:3)       URINE:)No results found for: Kami Noriega    Radiology:   Reviewed. Assessment and Plan to follow. Patient seen in collaboration with Dr. Ruthie Ferguson. Electronically signed by CHARLENE Valente CNP  on 5/30/2022 at 9:56 AM  [facility].   Ph: [phone]

## 2022-05-30 NOTE — PROGRESS NOTES
Upon shift arrival, pt is combative and disoriented x4. Swinging at RT during breathing trx. Dr Dolan Lowers paged via [employer]. No answer. Called at personal phone number and given orders for restraints.

## 2022-05-30 NOTE — PROGRESS NOTES
Pulmonary Critical Care Progress Note  Valentine Denis MD     Patient seen for the follow up of hypoxia, Seizure ([facility] [address]     Subjective:  Patient is awake but he is confused. He is on oxygen via nasal cannula. He was aggressive overnight requiring restraints. He currently is on Cardene, Precedex drip as well as TPN. Examination:  Vitals: BP (!) 147/84   Pulse 86   Temp 97.6 °F (36.4 °C) (Axillary)   Resp 16   Ht 5' 6\" (1.676 m)   Wt 175 lb 2 oz (79.4 kg)   SpO2 96%   BMI 28.27 kg/m²   General appearance: alert, confused  Neck: No JVD  Lungs: diminished air exchange  Heart: regular rate and rhythm, S1, S2 normal, no gallop  Abdomen: Soft, non tender, + BS  Extremities: no cyanosis or clubbing. No significant edema    LABs:  CBC:   Recent Labs     05/29/22  0356 05/30/22  0514   WBC 11.1 8.0   HGB 11.8* 11.6*   HCT 36.1* 35.5*    194     BMP:   Recent Labs     05/29/22  0356 05/30/22  0514    136   K 4.5 4.3   CO2 20 19*   BUN 22 32*   CREATININE 1.16 1.26*   LABGLOM >60 57*   GLUCOSE 216* 395*     ABG:  Lab Results   Component Value Date    FIO2 30.0 05/27/2022       Lab Results   Component Value Date    POCPH 7.396 05/27/2022    POCPCO2 39.7 05/27/2022    POCPO2 87.5 05/27/2022    POCHCO3 24.4 05/27/2022    NBEA 1 05/26/2022    PBEA 0 05/27/2022    HNFT8CUK 97 05/27/2022    FIO2 30.0 05/27/2022   Results for Vi Garcia (MRN [id number]) as of 5/30/2022 11:34   Ref.  Range 5/30/2022 05:14   Procalcitonin Latest Ref Range: <0.09 ng/mL 0.03     Radiology:  Chest xray     \  Impression:  · Acute respiratory insufficiency with hypoxia   · New onset seizure   · Alcohol abuse/ dependence/ withdrawal   · History of CVA   · Obstructive sleep apnea  · Active smoking/ likely COPD   · Severe right Carotid artery stenosis, status post left CEA 9/21  · HTN, DM    Recommendations:  · Continue BIPAP support as needed   · 2 liters/min via nasal cannula to keep spO2 greater than 92% as tolerated  · IV solumedrol 40mg Q6 hours   · Ipratropium- Albuterol BID   · Budesonide via nebulizer BID   · Precedex, titrate to RAAS goal of 0 to -1, wean down  · Ativan per CIWA protocol   · Cardene drip, wean as able  · Vimpat 200mg BID, phenobarbital 65mg BID, neurology following   · Nephology following  · Continue TPN  · IV Lasix 20 mg x 1 dose  · Seizure precautions  · Vascular surgery input noted  · Will monitor off of antibiotics at this time. His right lower lobe infiltrate is improving and procalcitonin is normal.  If infiltrate starts to worsen, his white count elevates or starts to have a fever will start on antibiotics.   · Discussed with RN  · Labs: CBC and BMP in am  · DVT prophylaxis with low molecular weight heparin  · Will follow with you      Electronically signed by     Michelle Schumacher MD on 5/30/2022 at 1:00 PM  Pulmonary Critical Care and Sleep Medicine,  [facility]  Cell: [phone]  Office: [phone]

## 2022-05-30 NOTE — PROGRESS NOTES
[facility].,    Adult Hospitalist      Name: Mario Yeung  MRN: [id number]     Acct: [de-identified]  Room: [address] [place] [address]    Admit Date: 5/25/2022 12:59 PM  PCP: DENNIS Carrasco    Primary Problem  Principal Problem:    Seizure ([address]  Resolved Problems:    * No resolved hospital problems. 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 · Seizure precautions  · TPN  · CIWA protocol  · Critical care on board  ·   · Waxing and waning mental status   · phenobarbital and Vimpat  · Neurology following  ·   · Monitor blood pressure  · Cardene drip  · Start Clonidine patch  · Nephrology on board  ·   · Vascular evaluated the the patient,   ·   · DVT and GI prophylaxis  ·           Chief Complaint:     Chief Complaint   Patient presents with    Seizures         History of Present Illness:          Patient seen and examined at bedside  Last 24-hour events reviewed with nursing    Patient I sconfused.   No acute events reported  No seizure activity reported  Afebrile      Review of system unable to obtain because of patient condition      Initial HPI  Mario Yueng is a 73 y.o.  male who presents with Seizures    Pt admitted through the ED after an episode of seizure at home. Apparently pt lives at home with his fijessica. According to her, he was sitting in his chair yesterday morning when he developed a seizure. It is difficult to determine what kind of seizure it was. His fibarbarae is not at bedside right now. However it was confirmed that the pt has never had a similar episode before in his life. It is also reported that he wanted to stop alcohol use however she reported him having a beer the evening before. Urine drug screen has been reported unremarkable and ETOH level was zero. Alcohol withdrawal seizure was suspected though seemed less likely if pt had continued to drink alcohol. Pt apparently drinks 4 beers daily    Pt was admitted to the ICU where he continued to remain agitated. Higher and frequent use of Ativan and later Haldol were ineffective. Pt required soft restraints since he was unsafe for himself trying to get out of bed and trying to pull out Iv    Pt was hypertensive with BP > 200/140 which was not improved w prn meds and was started on Cardene infusion. However pt continued to remain agitated and it was suspected he may be in DTs. Pt was placed on the CIWA protocol and was started on IV fluids and Thiamine, Folate    ROS is difficult to obtain presently    I have personally reviewed the past medical history, past surgical history, medications, social history, and family history, and summarized in the note.     Review of Systems:     All 10 point system is reviewed and unable to obtain because of patient condition      Past Medical History:     Past Medical History:   Diagnosis Date    Diabetes mellitus ([facility] [address]     Hypertension         Past Surgical History:     Past Surgical History:   Procedure Laterality Date    CAROTID ENDARTERECTOMY  09/22/2021    CAROTID ENDARTERECTOMY (N/A Neck    CAROTID ENDARTERECTOMY N/A 9/22/2021    CAROTID ENDARTERECTOMY performed by Levi Herrera MD at [facility] OR    IR INS PICC VAD W SQ PORT GREATER THAN 5  2022    IR INS PICC VAD W SQ PORT GREATER THAN 5 2022 [facility]    KNEE SURGERY          Medications Prior to Admission:       Prior to Admission medications    Medication Sig Start Date End Date Taking? Authorizing Provider   clopidogrel (PLAVIX) 75 MG tablet Take 1 tablet by mouth daily for 21 days 9/23/21 10/14/21  Wilfredo Varma MD   aspirin 81 MG chewable tablet Take 1 tablet by mouth daily 21   Wilfredo Varma MD   metFORMIN (GLUCOPHAGE) 500 MG tablet Take 1 tablet by mouth 2 times daily (with meals) 21   Wilfredo Varma MD   atorvastatin (LIPITOR) 40 MG tablet Take 1 tablet by mouth nightly 21   Wilfredo Varma MD   amLODIPine (NORVASC) 5 MG tablet Take 1 tablet by mouth daily 21   Wilfredo Varma MD   PARoxetine (PAXIL) 40 MG tablet Take 40 mg by mouth daily    Historical Provider, MD   atenolol (TENORMIN) 50 MG tablet Take 50 mg by mouth daily    Historical Provider, MD   losartan (COZAAR) 100 MG tablet Take 100 mg by mouth daily    Historical Provider, MD        Allergies:       Patient has no known allergies. Social History:     Tobacco:    reports that he has been smoking. He started smoking about 49 years ago. He has been smoking about 1.50 packs per day. He has never used smokeless tobacco.  Alcohol:      reports current alcohol use of about 4.0 standard drinks of alcohol per week. Drug Use:  reports no history of drug use. Family History:     History reviewed. No pertinent family history.       Physical Exam:     Vitals:  BP (!) 131/107   Pulse 94   Temp 97.9 °F (36.6 °C) (Oral)   Resp 24   Ht 5' 6\" (1.676 m)   Wt 175 lb 2 oz (79.4 kg)   SpO2 93%   BMI 28.27 kg/m²   Temp (24hrs), Av.5 °F (36.4 °C), Min:96.4 °F (35.8 °C), Max:98.1 °F (36.7 °C)      General appearance -confused, and in mild acute distress  Mental status - not oriented to person, place, and time with normal affect  Head - normocephalic and atraumatic  Eyes - pupils equal and reactive, extraocular eye movements intact, conjunctiva clear  Ears - hearing unable to evaluate sec to pt condition  Nose - no drainage noted  Mouth - mucous membranes moist  Neck - supple, no carotid bruits, thyroid not palpable  Chest - clear to auscultation, slightly increased effort  Heart - normal rate, regular rhythm, no murmur  Abdomen - soft, nontender, nondistended, bowel sounds present all four quadrants, no masses, hepatomegaly or splenomegaly  Neurological - stupor  Extremities - peripheral pulses palpable, no pedal edema or calf pain with palpation  Skin - no gross lesions, rashes, or induration noted        Data:     Labs:    Hematology:  Recent Labs     05/28/22 0454 05/29/22  0356 05/30/22  0514   WBC 9.5 11.1 8.0   RBC 3.77* 3.68* 3.64*   HGB 12.0* 11.8* 11.6*   HCT 36.4* 36.1* 35.5*   MCV 96.6 98.1 97.5   MCH 31.8 32.1 31.9   MCHC 33.0 32.7 32.7   RDW 13.1 13.3 13.5    170 194   MPV 9.4 9.6 9.8     Chemistry:  Recent Labs     05/28/22 0454 05/29/22  0356 05/30/22  0514   * 137 136   K 4.3 4.5 4.3    106 106   CO2 22 20 19*   GLUCOSE 220* 216* 395*   BUN 18 22 32*   CREATININE 1.08 1.16 1.26*   MG  --   --  1.9   ANIONGAP 11 11 11   LABGLOM >60 >60 57*   GFRAA >60 >60 >60   CALCIUM 8.9 8.5* 8.7   PHOS  --   --  3.2     Recent Labs     05/29/22  1117 05/29/22  1617 05/29/22  1913 05/30/22  0514 05/30/22  0602 05/30/22  0730 05/30/22  1107   TRIG  --   --   --  146  --   --   --    POCGLU 188* 155* 146*  --  375* 323* 324*       Lab Results   Component Value Date    INR 1.1 09/22/2021    INR 1.0 09/20/2021    INR 1.0 02/26/2021    PROTIME 13.7 09/22/2021    PROTIME 12.7 09/20/2021    PROTIME 12.8 02/26/2021       Lab Results   Component Value Date/Time    SPECIAL NOT REPORTED 02/26/2021 09:30 AM     Lab Results   Component Value Date/Time    CULTURE NO GROWTH 3 DAYS 02/26/2021 09:30 AM       Lab Results   Component Value Date POCPH 7.396 05/27/2022    POCPCO2 39.7 05/27/2022    POCPO2 87.5 05/27/2022    POCHCO3 24.4 05/27/2022    NBEA 1 05/26/2022    PBEA 0 05/27/2022    JJEN5TPL 97 05/27/2022    FIO2 30.0 05/27/2022       Radiology:    CT HEAD WO CONTRAST    Result Date: 5/25/2022  No acute intracranial hemorrhage or abnormal extra-axial collection. Relative to 9 months prior there are increased areas of encephalomalacia in the left occipital lobe and new areas of encephalomalacia in the left parietal lobe. If there is persistent clinical concern for acute on chronic ischemia, MRI is more sensitive. CT CHEST ABDOMEN PELVIS W CONTRAST    Result Date: 5/25/2022  Small cysts noted in the liver. Diverticular disease of the colon without diverticulitis. Significant thickening of the bladder wall, in keeping with muscular hypertrophy or cystitis. Clinical correlation suggested. Degenerative changes demonstrated in the the thoracic and lumbar spine. RECOMMENDATIONS:     MRI BRAIN W WO CONTRAST    Result Date: 5/25/2022  No acute intracranial abnormality. No mass effect or abnormal intracranial enhancement. Old infarctions in the left frontal parietal lobes, new since September 21, 2021. Old infarction in the left occipital lobe, likely increased in size since September 21, 2021. Laminar necrosis and gliosis associated with the old infarctions. Old lacunar infarcts in the left basal ganglia, likely increased. Mild parenchymal volume loss. All radiological studies reviewed                Code Status:  Full Code    Electronically signed by Katerina Caputo MD on 5/30/2022 at 1:27 PM     Copy sent to DENNIS Saenz    This note was created with the assistance of a speech-recognition program.  Although the intention is to generate a document that actually reflects the content of the visit, no guarantees can be provided that every mistake has been identified and corrected by editing.      Note was updated later by me after  physical examination and  completion of the assessment.

## 2022-05-30 NOTE — PROGRESS NOTES
Nutrition Assessment     Type and Reason for Visit: Reassess    Nutrition Recommendations/Plan:   1. Continue NPO diet  2. Increase central, custom TPN to 50 mL/hr (1200 mL total volume), 100 mL lipids. TPN and lipids provides 1256 kcal and 60 gm of protein   3. Monitor TPN rate, TPN tolerance, diet advancement and labs     Malnutrition Assessment:  Malnutrition Status: At risk for malnutrition (Comment)    Nutrition Assessment:  Patient is sedated on Precedex due to alcohol withdrawal. Patient remains NPO with TPN nutrition. Elevated glucose levels are noted. Physician ordered high dose insulin sliding scale. Will advance TPN rate to 50 mL/hr and add 35 units of insulin. Will slowly advance TPN to goal rate in order to adjust for elevated glucose levels. Will monitor TPN rate, tolerance and labs. Estimated Daily Nutrient Needs:  Energy (kcal):  1825 kcal (MSJ: 1 activity factor; 1.2 stess factor) Weight Used for Energy Requirements: Current     Protein (g):  84 gm of protein (1.3 gm/kg) Weight Used for Protein Requirements: Ideal          Nutrition Related Findings:   Edema: +1 non-pitting BUE. Hypoactive bowel sounds. Disoriented.  Alcohol abuse and withdrawal Wound Type: None    Current Nutrition Therapies:    Diet NPO  PN-Adult 2-in-1 [employer] (Custom)  PN-Adult 2-in-1 [employer] (Custom)    Anthropometric Measures:  · Height: 5' 6\" (167.6 cm)  · Current Body Wt: 175 lb (79.4 kg)   · BMI: 28.3    Nutrition Diagnosis:   · Inadequate oral intake related to psychological cause or life stress,cognitive or neurological impairment as evidenced by NPO or clear liquid status due to medical condition,nutrition support - parenteral nutrition      Nutrition Interventions:   Food and/or Nutrient Delivery: Continue NPO  Nutrition Education/Counseling: Education not indicated  Coordination of Nutrition Care: Continue to monitor while inpatient       Goals:  Previous Goal Met: Progressing toward Goal(s)  Goals: Initiate nutrition support       Nutrition Monitoring and Evaluation:   Behavioral-Environmental Outcomes: None Identified  Food/Nutrient Intake Outcomes: Parenteral Nutrition Intake/Tolerance,Diet Advancement/Tolerance  Physical Signs/Symptoms Outcomes: Biochemical Data,Fluid Status or Edema,Skin,Weight    Discharge Planning:     Too soon to determine           Bettie JONESN, RDN, LDN  Lead Clinical Dietitian  RD Office Phone [phone]

## 2022-05-31 ENCOUNTER — APPOINTMENT (OUTPATIENT)
Dept: GENERAL RADIOLOGY | Age: 67
DRG: 056 | End: 2022-05-31
Payer: MEDICARE

## 2022-05-31 LAB
ABSOLUTE EOS #: 0 K/UL (ref 0–0.4)
ABSOLUTE IMMATURE GRANULOCYTE: 0.07 K/UL (ref 0–0.3)
ABSOLUTE LYMPH #: 0.22 K/UL (ref 1–4.8)
ABSOLUTE MONO #: 0.43 K/UL (ref 0.2–0.8)
ANION GAP SERPL CALCULATED.3IONS-SCNC: 9 MMOL/L (ref 9–17)
BASOPHILS # BLD: 0 %
BASOPHILS ABSOLUTE: 0 K/UL (ref 0–0.2)
BUN BLDV-MCNC: 31 MG/DL (ref 8–23)
BUN/CREAT BLD: 26 (ref 9–20)
CALCIUM SERPL-MCNC: 9 MG/DL (ref 8.6–10.4)
CHLORIDE BLD-SCNC: 106 MMOL/L (ref 98–107)
CO2: 25 MMOL/L (ref 20–31)
CREAT SERPL-MCNC: 1.19 MG/DL (ref 0.7–1.2)
EOSINOPHILS RELATIVE PERCENT: 0 % (ref 1–4)
ESTIMATED AVERAGE GLUCOSE: 134 MG/DL
GFR AFRICAN AMERICAN: >60 ML/MIN
GFR NON-AFRICAN AMERICAN: >60 ML/MIN
GFR SERPL CREATININE-BSD FRML MDRD: ABNORMAL ML/MIN/{1.73_M2}
GLUCOSE BLD-MCNC: 149 MG/DL (ref 75–110)
GLUCOSE BLD-MCNC: 224 MG/DL (ref 75–110)
GLUCOSE BLD-MCNC: 244 MG/DL (ref 75–110)
GLUCOSE BLD-MCNC: 252 MG/DL (ref 70–99)
GLUCOSE BLD-MCNC: 278 MG/DL (ref 75–110)
GLUCOSE BLD-MCNC: 307 MG/DL (ref 75–110)
HBA1C MFR BLD: 6.3 % (ref 4–6)
HCT VFR BLD CALC: 36.8 % (ref 40.7–50.3)
HEMOGLOBIN: 12.2 G/DL (ref 13–17)
IMMATURE GRANULOCYTES: 1 %
LYMPHOCYTES # BLD: 3 % (ref 24–44)
MAGNESIUM: 1.7 MG/DL (ref 1.6–2.6)
MCH RBC QN AUTO: 31.9 PG (ref 25.2–33.5)
MCHC RBC AUTO-ENTMCNC: 33.2 G/DL (ref 28–38)
MCV RBC AUTO: 96.1 FL (ref 82.6–102.9)
MONOCYTES # BLD: 6 % (ref 1–7)
PDW BLD-RTO: 13.1 % (ref 11.8–14.4)
PHOSPHORUS: 2.5 MG/DL (ref 2.5–4.5)
PLATELET # BLD: 208 K/UL (ref 138–453)
PMV BLD AUTO: 9.7 FL (ref 8.1–13.5)
POTASSIUM SERPL-SCNC: 3.3 MMOL/L (ref 3.7–5.3)
RBC # BLD: 3.83 M/UL (ref 4.21–5.77)
SEG NEUTROPHILS: 90 % (ref 36–66)
SEGMENTED NEUTROPHILS ABSOLUTE COUNT: 6.48 K/UL (ref 1.8–7.7)
SODIUM BLD-SCNC: 140 MMOL/L (ref 135–144)
WBC # BLD: 7.2 K/UL (ref 3.5–11.3)

## 2022-05-31 PROCEDURE — 6360000002 HC RX W HCPCS: Performed by: INTERNAL MEDICINE

## 2022-05-31 PROCEDURE — 6360000002 HC RX W HCPCS: Performed by: FAMILY MEDICINE

## 2022-05-31 PROCEDURE — A4216 STERILE WATER/SALINE, 10 ML: HCPCS | Performed by: INTERNAL MEDICINE

## 2022-05-31 PROCEDURE — 2500000003 HC RX 250 WO HCPCS: Performed by: FAMILY MEDICINE

## 2022-05-31 PROCEDURE — 94660 CPAP INITIATION&MGMT: CPT

## 2022-05-31 PROCEDURE — 95819 EEG AWAKE AND ASLEEP: CPT

## 2022-05-31 PROCEDURE — 6360000002 HC RX W HCPCS: Performed by: NURSE PRACTITIONER

## 2022-05-31 PROCEDURE — 36415 COLL VENOUS BLD VENIPUNCTURE: CPT

## 2022-05-31 PROCEDURE — 95816 EEG AWAKE AND DROWSY: CPT | Performed by: PSYCHIATRY & NEUROLOGY

## 2022-05-31 PROCEDURE — 2700000000 HC OXYGEN THERAPY PER DAY

## 2022-05-31 PROCEDURE — 80048 BASIC METABOLIC PNL TOTAL CA: CPT

## 2022-05-31 PROCEDURE — 84100 ASSAY OF PHOSPHORUS: CPT

## 2022-05-31 PROCEDURE — 2580000003 HC RX 258: Performed by: INTERNAL MEDICINE

## 2022-05-31 PROCEDURE — 83735 ASSAY OF MAGNESIUM: CPT

## 2022-05-31 PROCEDURE — 94761 N-INVAS EAR/PLS OXIMETRY MLT: CPT

## 2022-05-31 PROCEDURE — 2500000003 HC RX 250 WO HCPCS: Performed by: INTERNAL MEDICINE

## 2022-05-31 PROCEDURE — 2000000000 HC ICU R&B

## 2022-05-31 PROCEDURE — 2580000003 HC RX 258: Performed by: FAMILY MEDICINE

## 2022-05-31 PROCEDURE — 85025 COMPLETE CBC W/AUTO DIFF WBC: CPT

## 2022-05-31 PROCEDURE — 94640 AIRWAY INHALATION TREATMENT: CPT

## 2022-05-31 PROCEDURE — 6370000000 HC RX 637 (ALT 250 FOR IP): Performed by: FAMILY MEDICINE

## 2022-05-31 PROCEDURE — 2580000003 HC RX 258: Performed by: PSYCHIATRY & NEUROLOGY

## 2022-05-31 PROCEDURE — 71045 X-RAY EXAM CHEST 1 VIEW: CPT

## 2022-05-31 PROCEDURE — C9254 INJECTION, LACOSAMIDE: HCPCS | Performed by: PSYCHIATRY & NEUROLOGY

## 2022-05-31 PROCEDURE — 6360000002 HC RX W HCPCS: Performed by: PSYCHIATRY & NEUROLOGY

## 2022-05-31 PROCEDURE — 6370000000 HC RX 637 (ALT 250 FOR IP): Performed by: INTERNAL MEDICINE

## 2022-05-31 RX ORDER — POTASSIUM CHLORIDE 7.45 MG/ML
10 INJECTION INTRAVENOUS PRN
Status: DISCONTINUED | OUTPATIENT
Start: 2022-05-31 | End: 2022-06-09 | Stop reason: HOSPADM

## 2022-05-31 RX ORDER — POTASSIUM CHLORIDE 29.8 MG/ML
20 INJECTION INTRAVENOUS PRN
Status: DISCONTINUED | OUTPATIENT
Start: 2022-05-31 | End: 2022-06-09 | Stop reason: HOSPADM

## 2022-05-31 RX ORDER — IPRATROPIUM BROMIDE AND ALBUTEROL SULFATE 2.5; .5 MG/3ML; MG/3ML
1 SOLUTION RESPIRATORY (INHALATION) 3 TIMES DAILY
Status: DISCONTINUED | OUTPATIENT
Start: 2022-05-31 | End: 2022-06-09 | Stop reason: HOSPADM

## 2022-05-31 RX ADMIN — DEXTROSE MONOHYDRATE 5 MG/HR: 50 INJECTION, SOLUTION INTRAVENOUS at 05:44

## 2022-05-31 RX ADMIN — METHYLPREDNISOLONE SODIUM SUCCINATE 40 MG: 40 INJECTION, POWDER, FOR SOLUTION INTRAMUSCULAR; INTRAVENOUS at 00:18

## 2022-05-31 RX ADMIN — SODIUM CHLORIDE, PRESERVATIVE FREE 10 ML: 5 INJECTION INTRAVENOUS at 08:22

## 2022-05-31 RX ADMIN — LORAZEPAM 3 MG: 2 INJECTION INTRAMUSCULAR at 12:33

## 2022-05-31 RX ADMIN — DEXMEDETOMIDINE HYDROCHLORIDE 1 MCG/KG/HR: 100 INJECTION, SOLUTION INTRAVENOUS at 18:32

## 2022-05-31 RX ADMIN — DEXTROSE MONOHYDRATE 7.5 MG/HR: 50 INJECTION, SOLUTION INTRAVENOUS at 13:20

## 2022-05-31 RX ADMIN — IPRATROPIUM BROMIDE AND ALBUTEROL SULFATE 1 AMPULE: 2.5; .5 SOLUTION RESPIRATORY (INHALATION) at 14:08

## 2022-05-31 RX ADMIN — BUDESONIDE 500 MCG: 0.5 SUSPENSION RESPIRATORY (INHALATION) at 21:37

## 2022-05-31 RX ADMIN — ENOXAPARIN SODIUM 40 MG: 100 INJECTION SUBCUTANEOUS at 09:49

## 2022-05-31 RX ADMIN — DEXTROSE MONOHYDRATE 5 MG/HR: 50 INJECTION, SOLUTION INTRAVENOUS at 19:26

## 2022-05-31 RX ADMIN — INSULIN LISPRO 6 UNITS: 100 INJECTION, SOLUTION INTRAVENOUS; SUBCUTANEOUS at 05:54

## 2022-05-31 RX ADMIN — SODIUM CHLORIDE, PRESERVATIVE FREE 20 MG: 5 INJECTION INTRAVENOUS at 19:47

## 2022-05-31 RX ADMIN — LORAZEPAM 2 MG: 2 INJECTION INTRAMUSCULAR at 16:53

## 2022-05-31 RX ADMIN — METHYLPREDNISOLONE SODIUM SUCCINATE 40 MG: 40 INJECTION, POWDER, FOR SOLUTION INTRAMUSCULAR; INTRAVENOUS at 05:55

## 2022-05-31 RX ADMIN — PHENOBARBITAL SODIUM 65 MG: 65 INJECTION INTRAMUSCULAR; INTRAVENOUS at 13:38

## 2022-05-31 RX ADMIN — IPRATROPIUM BROMIDE AND ALBUTEROL SULFATE 1 AMPULE: 2.5; .5 SOLUTION RESPIRATORY (INHALATION) at 07:57

## 2022-05-31 RX ADMIN — CALCIUM GLUCONATE: 94 INJECTION, SOLUTION INTRAVENOUS at 18:12

## 2022-05-31 RX ADMIN — METHYLPREDNISOLONE SODIUM SUCCINATE 40 MG: 40 INJECTION, POWDER, FOR SOLUTION INTRAMUSCULAR; INTRAVENOUS at 19:46

## 2022-05-31 RX ADMIN — LACOSAMIDE 200 MG: 10 INJECTION, SOLUTION INTRAVENOUS at 09:59

## 2022-05-31 RX ADMIN — PHENOBARBITAL SODIUM 65 MG: 65 INJECTION INTRAMUSCULAR; INTRAVENOUS at 19:58

## 2022-05-31 RX ADMIN — INSULIN LISPRO 12 UNITS: 100 INJECTION, SOLUTION INTRAVENOUS; SUBCUTANEOUS at 17:35

## 2022-05-31 RX ADMIN — METOPROLOL TARTRATE 5 MG: 1 INJECTION, SOLUTION INTRAVENOUS at 16:57

## 2022-05-31 RX ADMIN — INSULIN LISPRO 6 UNITS: 100 INJECTION, SOLUTION INTRAVENOUS; SUBCUTANEOUS at 00:18

## 2022-05-31 RX ADMIN — PHENOBARBITAL SODIUM 65 MG: 65 INJECTION INTRAMUSCULAR; INTRAVENOUS at 08:23

## 2022-05-31 RX ADMIN — POTASSIUM CHLORIDE 20 MEQ: 29.8 INJECTION, SOLUTION INTRAVENOUS at 13:45

## 2022-05-31 RX ADMIN — I.V. FAT EMULSION 100 ML: 20 EMULSION INTRAVENOUS at 18:18

## 2022-05-31 RX ADMIN — INSULIN LISPRO 9 UNITS: 100 INJECTION, SOLUTION INTRAVENOUS; SUBCUTANEOUS at 13:38

## 2022-05-31 RX ADMIN — POTASSIUM CHLORIDE 20 MEQ: 29.8 INJECTION, SOLUTION INTRAVENOUS at 11:31

## 2022-05-31 RX ADMIN — SODIUM CHLORIDE, PRESERVATIVE FREE 20 MG: 5 INJECTION INTRAVENOUS at 08:23

## 2022-05-31 RX ADMIN — METHYLPREDNISOLONE SODIUM SUCCINATE 40 MG: 40 INJECTION, POWDER, FOR SOLUTION INTRAMUSCULAR; INTRAVENOUS at 13:38

## 2022-05-31 RX ADMIN — FOLIC ACID: 5 INJECTION, SOLUTION INTRAMUSCULAR; INTRAVENOUS; SUBCUTANEOUS at 08:39

## 2022-05-31 RX ADMIN — LACOSAMIDE 200 MG: 10 INJECTION, SOLUTION INTRAVENOUS at 21:01

## 2022-05-31 RX ADMIN — METOPROLOL TARTRATE 5 MG: 1 INJECTION, SOLUTION INTRAVENOUS at 11:07

## 2022-05-31 RX ADMIN — IPRATROPIUM BROMIDE AND ALBUTEROL SULFATE 1 AMPULE: 2.5; .5 SOLUTION RESPIRATORY (INHALATION) at 21:37

## 2022-05-31 RX ADMIN — BUDESONIDE 500 MCG: 0.5 SUSPENSION RESPIRATORY (INHALATION) at 07:57

## 2022-05-31 RX ADMIN — HYDRALAZINE HYDROCHLORIDE 10 MG: 20 INJECTION INTRAMUSCULAR; INTRAVENOUS at 11:56

## 2022-05-31 RX ADMIN — DEXMEDETOMIDINE HYDROCHLORIDE 1.5 MCG/KG/HR: 100 INJECTION, SOLUTION INTRAVENOUS at 06:39

## 2022-05-31 NOTE — PROGRESS NOTES
Pt calm and cooperative, medicated as ordered. In bed, eyes closed, restraints in place for safety purposes. Call light in reach, continue to monitor for safety.

## 2022-05-31 NOTE — PROGRESS NOTES
Pt began swinging arms around, potentially pulling out a line. Dr Lety Bowers notified for order to re-establish restraints.

## 2022-05-31 NOTE — PROGRESS NOTES
[address] Hospitalist        5/31/2022   4:18 PM    Name:  Destin Brito  MRN:    [id number]     Acct:     [de-identified]   Room:  [address] [place] [address] Day: 10     Admit Date: 5/25/2022 12:59 PM  PCP: DENNIS Kearney    C/C:   Chief Complaint   Patient presents with    Seizures       Assessment:          · New onset seizure  · Altered mental status secondary to alcohol withdrawal delirium and postictal state following seizure  · Old infarct left frontal parietal lobes, new since 9/2021  · Old infarction left occipital lobe, increased since 9/2021  · 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 / Encephalomalacia   · Old lacunar infarcts left basal ganglia   · Mild brain parenchymal volume loss  · Alcohol dependence   · Reported right hemiparesis   · Essential hypertension  · Diabetes mellitus type 2  · Overweight   · Diverticulosis  · Agitation   · Delirium tremens     · Acute respiratory failure with hypoxia   · Oliguria  · Carotid artery disease         Plan:      · Patient currently in ICU  · O2 to maintain oxygen saturation greater than 92%  · BiPAP as needed  · Amiodarone  · DuoNebs  · IV lasix x 1  ·    · Precedex gtt.   · Seizure precautions  · IV Solu-Medrol  · TPN  · CIWA protocol  · Critical care on board  ·    · Waxing and waning mental status   · phenobarbital and Vimpat  · Neurology following  · Add zyprexa  · Keep prn haldol  ·    · Monitor blood pressure  · Cardene drip, weaning  · IV Lopressor as needed  · Continue clonidine patch  · Nephrology on board  ·    · Vascular evaluated the the patient,   ·    · DVT and GI prophylaxis  · Continue Med as below      Scheduled Meds:   ipratropium-albuterol  1 ampule Inhalation TID    insulin lispro  0-18 Units SubCUTAneous Q6H    cloNIDine  1 patch TransDERmal Weekly    fat emulsion  100 mL IntraVENous Daily    famotidine (PEPCID) injection  20 mg IntraVENous BID    PHENobarbital  65 mg IntraVENous TID    methylPREDNISolone  40 mg IntraVENous Q6H    budesonide  0.5 mg Nebulization BID    enoxaparin  40 mg SubCUTAneous Daily    thiamine and folic acid IVPB   IntraVENous Daily    lacosamide (VIMPAT) IVPB  200 mg IntraVENous BID    sodium chloride flush  10 mL IntraVENous Once    sodium chloride flush  5-40 mL IntraVENous 2 times per day    aspirin  81 mg Oral Daily    atorvastatin  40 mg Oral Nightly    clopidogrel  75 mg Oral Daily    metFORMIN  500 mg Oral BID WC    PARoxetine  40 mg Oral Daily    amLODIPine  5 mg Oral Daily    atenolol  50 mg Oral Daily    losartan  100 mg Oral Daily     Continuous Infusions:   PN-Adult 2-in-1 Central Line (Standard)      dexmedetomidine (PRECEDEX) IV infusion 0.8 mcg/kg/hr (05/31/22 1157)    PN-Adult 2-in-1 Central Line (Standard) 53.4 mL/hr at 05/31/22 1157    sodium chloride Stopped (05/26/22 2008)    dextrose      niCARdipene (CARDENE) infusion 7.5 mg/hr (05/31/22 1320)     PRN Meds:  potassium chloride, 20 mEq, PRN   Or  potassium chloride, 10 mEq, PRN  haloperidol lactate, 5 mg, Q6H PRN  LORazepam, 1 mg, Q1H PRN   Or  LORazepam, 1 mg, Q1H PRN   Or  LORazepam, 2 mg, Q1H PRN   Or  LORazepam, 2 mg, Q1H PRN   Or  LORazepam, 3 mg, Q1H PRN   Or  LORazepam, 3 mg, Q1H PRN   Or  LORazepam, 4 mg, Q1H PRN   Or  LORazepam, 4 mg, Q1H PRN  albuterol, 2.5 mg, Q4H PRN  hydrALAZINE, 10 mg, Q6H PRN  sodium chloride flush, 5-40 mL, PRN  sodium chloride, , PRN  acetaminophen, 650 mg, Q4H PRN  ondansetron, 4 mg, Q8H PRN   Or  ondansetron, 4 mg, Q6H PRN  metoprolol, 5 mg, Q6H PRN  glucose, 4 tablet, PRN  dextrose bolus, 125 mL, PRN   Or  dextrose bolus, 250 mL, PRN  glucagon (rDNA), 1 mg, PRN  dextrose, 100 mL/hr, PRN            Subjective:     Patient seen and examined at bedside. No overnight events. No acute complaints today.   Afebrile      ROS:  Unable to assess due to pt mental status        Physical Examination:      Vitals:  BP (!) 156/75   Pulse (!) 113   Temp 97.7 °F (36.5 °C) (Axillary)   Resp (!) 33   Ht 5' 6\" (1.676 m)   Wt 175 lb 2 oz (79.4 kg)   SpO2 93%   BMI 28.27 kg/m²   Temp (24hrs), Av.3 °F (36.3 °C), Min:96.9 °F (36.1 °C), Max:97.7 °F (36.5 °C)    Weight:   Wt Readings from Last 3 Encounters:   22 175 lb 2 oz (79.4 kg)   21 156 lb (70.8 kg)   21 174 lb 4.8 oz (79.1 kg)     I/O last 3 completed shifts:  I/O last 3 completed shifts: In: 3744.2 [I.V.:1682.8; IV Piggyback:207.6]  Out: 4834 [Urine:4150]     Recent Labs     22  1723 22  0011 22  0550 22  1123   POCGLU 149* 224* 244* 278*         General appearance - sleepy  Mental status - oriented to person, place, and time with normal affect  Head - normocephalic and atraumatic  Eyes - pupils equal and reactive, extraocular eye movements intact, conjunctiva clear  Ears - hearing appears to be intact  Nose - no drainage noted  Mouth - mucous membranes moist  Neck - supple, no carotid bruits, thyroid not palpable  Chest - clear to auscultation, normal effort  Heart - normal rate, regular rhythm, no murmur  Abdomen - soft, nontender, nondistended, bowel sounds present all four quadrants, no masses, hepatomegaly or splenomegaly  Neurological - normal speech, no focal findings or movement disorder noted, cranial nerves II through XII grossly intact  Extremities - peripheral pulses palpable, no pedal edema or calf pain with palpation  Skin - no gross lesions, rashes, or induration noted        Medications:      Allergies: No Known Allergies    Current Meds:   Current Facility-Administered Medications:     ipratropium-albuterol (DUONEB) nebulizer solution 1 ampule, 1 ampule, Inhalation, TID, Lucita Jaime MD, 1 ampule at 22 1408    potassium chloride 20 mEq/50 mL IVPB (Central Line), 20 mEq, IntraVENous, PRN, Last Rate: 50 mL/hr at 22 1345, 20 mEq at 22 1345 **OR** potassium chloride 10 mEq/100 mL IVPB (Peripheral Line), 10 mEq, IntraVENous, PRN, Federico Simmer U Cheryl Alvarez MD    PN-Adult 2-in-1 Central Line (Custom), , IntraVENous, Continuous TPN, Olivia Madsen MD    dexmedetomidine [facility] 1,000 mcg in sodium chloride 0.9 % 250 mL infusion, 0.1-1.5 mcg/kg/hr, IntraVENous, Continuous, Shay Hawkins MD, Last Rate: 16.32 mL/hr at 05/31/22 1157, 0.8 mcg/kg/hr at 05/31/22 1157    insulin lispro (HUMALOG) injection vial 0-18 Units, 0-18 Units, SubCUTAneous, Q6H, Shay Hawkins MD, 9 Units at 05/31/22 1338    PN-Adult 2-in-1 [employer] (Custom), , IntraVENous, Continuous TPN, Olivia Madsen MD, Last Rate: 53.4 mL/hr at 05/31/22 1157, Rate Verify at 05/31/22 1157    cloNIDine (CATAPRES) 0.2 MG/24HR 1 patch, 1 patch, TransDERmal, Weekly, Neeta Gifford MD, 1 patch at 05/30/22 1453    fat emulsion 20 % infusion 100 mL, 100 mL, IntraVENous, Daily, Olivia Madsen MD, Stopped at 05/31/22 0547    famotidine (PEPCID) 20 mg in sodium chloride (PF) 10 mL injection, 20 mg, IntraVENous, BID, Olivia Madsen MD, 20 mg at 05/31/22 0823    [COMPLETED] PHENobarbital (LUMINAL) injection 130 mg, 130 mg, IntraVENous, Once, 130 mg at 05/27/22 1214 **FOLLOWED BY** PHENobarbital (LUMINAL) injection 65 mg, 65 mg, IntraVENous, TID, Dustin Pringle MD, 65 mg at 05/31/22 1338    methylPREDNISolone sodium (SOLU-MEDROL) injection 40 mg, 40 mg, IntraVENous, Q6H, CHARLENE Sarabia - CNP, 40 mg at 05/31/22 1338    budesonide (PULMICORT) nebulizer suspension 500 mcg, 0.5 mg, Nebulization, BID, Chinyere Inéss APRN - CNP, 500 mcg at 05/31/22 0757    enoxaparin (LOVENOX) injection 40 mg, 40 mg, SubCUTAneous, Daily, Chinyere Jenni, APRN - CNP, 40 mg at 05/31/22 0949    haloperidol lactate (HALDOL) injection 5 mg, 5 mg, IntraMUSCular, Q6H PRN, Shay Hawkins MD, 5 mg at 05/30/22 2207    LORazepam (ATIVAN) tablet 1 mg, 1 mg, Oral, Q1H PRN **OR** LORazepam (ATIVAN) injection 1 mg, 1 mg, IntraVENous, Q1H PRN **OR** LORazepam (ATIVAN) tablet 2 mg, 2 mg, Oral, Q1H PRN **OR** LORazepam (ATIVAN) injection 2 mg, 2 mg, IntraVENous, Q1H PRN, 2 mg at 05/30/22 2125 **OR** LORazepam (ATIVAN) tablet 3 mg, 3 mg, Oral, Q1H PRN **OR** LORazepam (ATIVAN) injection 3 mg, 3 mg, IntraVENous, Q1H PRN, 3 mg at 05/31/22 1233 **OR** LORazepam (ATIVAN) tablet 4 mg, 4 mg, Oral, Q1H PRN **OR** LORazepam (ATIVAN) injection 4 mg, 4 mg, IntraVENous, Q1H PRN, hSay Hawkins MD, 4 mg at 87/75/42 5498    folic acid 1 mg, thiamine (B-1) 100 mg in sodium chloride 0.9 % 50 mL IVPB, , IntraVENous, Daily, Shay Hawkins MD, Stopped at 05/31/22 1122    lacosamide (VIMPAT) 200 mg in sodium chloride 0.9 % 70 mL IVPB, 200 mg, IntraVENous, BID, Gilbert Sebastian MD, Stopped at 05/31/22 1029    albuterol (PROVENTIL) nebulizer solution 2.5 mg, 2.5 mg, Nebulization, Q4H PRN, Roma Phelps MD    hydrALAZINE (APRESOLINE) injection 10 mg, 10 mg, IntraVENous, Q6H PRN, Roma Phelps MD, 10 mg at 05/31/22 1156    sodium chloride flush 0.9 % injection 10 mL, 10 mL, IntraVENous, Once, Sonja Carvajal MD    sodium chloride flush 0.9 % injection 5-40 mL, 5-40 mL, IntraVENous, 2 times per day, Bessy Lion MD, 10 mL at 05/31/22 0822    sodium chloride flush 0.9 % injection 5-40 mL, 5-40 mL, IntraVENous, PRN, Shay Hawkins MD    0.9 % sodium chloride infusion, , IntraVENous, PRN, Bessy Lion MD, Stopped at 05/26/22 2008    acetaminophen (TYLENOL) tablet 650 mg, 650 mg, Oral, Q4H PRN, Shay Hawkins MD    ondansetron (ZOFRAN-ODT) disintegrating tablet 4 mg, 4 mg, Oral, Q8H PRN **OR** ondansetron (ZOFRAN) injection 4 mg, 4 mg, IntraVENous, Q6H PRN, Shay Hawkins MD    metoprolol (LOPRESSOR) injection 5 mg, 5 mg, IntraVENous, Q6H PRN, Shay Hawkins MD, 5 mg at 05/31/22 1107    glucose chewable tablet 16 g, 4 tablet, Oral, PRN, Shay Hawkins MD    dextrose bolus 10% 125 mL, 125 mL, IntraVENous, PRN **OR** dextrose bolus 10% 250 mL, 250 mL, IntraVENous, PRN, Shay Hawkins MD    glucagon (rDNA) injection 1 mg, 1 mg, IntraMUSCular, PRN, Sammy Blum MD    dextrose 5 % solution, 100 mL/hr, IntraVENous, PRN, Shay Hawkins MD    aspirin chewable tablet 81 mg, 81 mg, Oral, Daily, Shay Hawkins MD    atorvastatin (LIPITOR) tablet 40 mg, 40 mg, Oral, Nightly, Shay Hawkins MD, 40 mg at 05/27/22 2027    clopidogrel (PLAVIX) tablet 75 mg, 75 mg, Oral, Daily, Shay Hawkins MD    metFORMIN (GLUCOPHAGE) tablet 500 mg, 500 mg, Oral, BID WC, Shay Hawkins MD    PARoxetine (PAXIL) tablet 40 mg, 40 mg, Oral, Daily, Shay Hawkins MD    amLODIPine (NORVASC) tablet 5 mg, 5 mg, Oral, Daily, Shay Hawkins MD    atenolol (TENORMIN) tablet 50 mg, 50 mg, Oral, Daily, Shay Hawkins MD    losartan (COZAAR) tablet 100 mg, 100 mg, Oral, Daily, Shay Hawkins MD    niCARdipine (CARDENE) 50 mg in dextrose 5 % 250 mL infusion, 2.5-15 mg/hr, IntraVENous, Continuous, Shay Hawkins MD, Last Rate: 37.5 mL/hr at 05/31/22 1320, 7.5 mg/hr at 05/31/22 1320      I/O (24Hr):     Intake/Output Summary (Last 24 hours) at 5/31/2022 1618  Last data filed at 5/31/2022 1157  Gross per 24 hour   Intake 3432.34 ml   Output 2750 ml   Net 682.34 ml       Data:           Labs:    Hematology:  Recent Labs     05/29/22  0356 05/30/22  0514 05/31/22  0329   WBC 11.1 8.0 7.2   RBC 3.68* 3.64* 3.83*   HGB 11.8* 11.6* 12.2*   HCT 36.1* 35.5* 36.8*   MCV 98.1 97.5 96.1   MCH 32.1 31.9 31.9   MCHC 32.7 32.7 33.2   RDW 13.3 13.5 13.1    194 208   MPV 9.6 9.8 9.7     Chemistry:  Recent Labs     05/29/22  0356 05/30/22  0514 05/31/22  0329    136 140   K 4.5 4.3 3.3*    106 106   CO2 20 19* 25   GLUCOSE 216* 395* 252*   BUN 22 32* 31*   CREATININE 1.16 1.26* 1.19   MG  --  1.9 1.7   ANIONGAP 11 11 9   LABGLOM >60 57* >60   GFRAA >60 >60 >60   CALCIUM 8.5* 8.7 9.0   PHOS  --  3.2 2.5     Recent Labs     05/30/22  0514 05/30/22  0602 05/30/22  0730 05/30/22  1107 05/30/22  1723 05/31/22  0011 05/31/22  0550 05/31/22  1123   LABA1C 6.3*  --   --   --   --   --   --   -- TRIG 146  --   --   --   --   --   --   --    POCGLU  --    < > 323* 324* 149* 224* 244* 278*    < > = values in this interval not displayed. Lab Results   Component Value Date/Time    SPECIAL NOT REPORTED 02/26/2021 09:30 AM     Lab Results   Component Value Date/Time    CULTURE NO GROWTH 3 DAYS 02/26/2021 09:30 AM       Lab Results   Component Value Date    POCPH 7.396 05/27/2022    POCPCO2 39.7 05/27/2022    POCPO2 87.5 05/27/2022    POCHCO3 24.4 05/27/2022    NBEA 1 05/26/2022    PBEA 0 05/27/2022    OGGF7EKT 97 05/27/2022    FIO2 30.0 05/27/2022       Radiology:    XR CHEST (SINGLE VIEW FRONTAL)    Result Date: 5/26/2022  Mild hypoventilatory changes noted both lung bases. CT HEAD WO CONTRAST    Result Date: 5/25/2022  No acute intracranial hemorrhage or abnormal extra-axial collection. Relative to 9 months prior there are increased areas of encephalomalacia in the left occipital lobe and new areas of encephalomalacia in the left parietal lobe. If there is persistent clinical concern for acute on chronic ischemia, MRI is more sensitive. XR CHEST PORTABLE    Result Date: 5/31/2022  Stable support line interval increase in now moderate left basilar opacity likely related to combined pleural-parenchymal process, pneumonia the likely etiology     XR CHEST PORTABLE    Result Date: 5/30/2022  Elevated left hemidiaphragm with apparent atelectasis left base. Slight improvement in right basilar opacity which may represent resolving atelectasis or improved minimal airspace disease. XR CHEST PORTABLE    Result Date: 5/29/2022  Interval developing right basilar opacity which may be related atelectasis or developing focal airspace disease. XR CHEST PORTABLE    Result Date: 5/28/2022  No significant finding in the chest.     CT CHEST ABDOMEN PELVIS W CONTRAST    Result Date: 5/25/2022  Small cysts noted in the liver. Diverticular disease of the colon without diverticulitis.   Significant thickening of the bladder wall, in keeping with muscular hypertrophy or cystitis. Clinical correlation suggested. Degenerative changes demonstrated in the the thoracic and lumbar spine. RECOMMENDATIONS:     MRI BRAIN W WO CONTRAST    Result Date: 5/25/2022  No acute intracranial abnormality. No mass effect or abnormal intracranial enhancement. Old infarctions in the left frontal parietal lobes, new since September 21, 2021. Old infarction in the left occipital lobe, likely increased in size since September 21, 2021. Laminar necrosis and gliosis associated with the old infarctions. Old lacunar infarcts in the left basal ganglia, likely increased. Mild parenchymal volume loss. All radiological studies reviewed  Code Status:  Full Code        Electronically signed by Dyan Garcia MD on 5/31/2022 at 4:18 PM    This note was created with the assistance of a speech-recognition program.  Although the intention is to generate a document that actually reflects the content of the visit, no guarantees can be provided that every mistake has been identified and corrected by editing. Note was updated later by me after  physical examination and  completion of the assessment.

## 2022-05-31 NOTE — PROGRESS NOTES
Pt in restraints. Precidex titrated down. Pt tolerated well until dose of 0.6. Pt then became agitated and began hallucinating. CIWA score 20- pt given 3mg ativan. Pt was then able to relax.

## 2022-05-31 NOTE — PLAN OF CARE
Problem: Discharge Planning  Goal: Discharge to home or other facility with appropriate resources  5/31/2022 0057 by Roe Molina RN  Outcome: Progressing  Flowsheets  Taken 5/31/2022 0000  Discharge to home or other facility with appropriate resources: Identify barriers to discharge with patient and caregiver  Taken 5/30/2022 2000  Discharge to home or other facility with appropriate resources: Identify barriers to discharge with patient and caregiver  5/30/2022 1846 by Lian Romero RN  Outcome: Not Progressing     Problem: Safety - Medical Restraint  Goal: Remains free of injury from restraints (Restraint for Interference with Medical Device)  Description: INTERVENTIONS:  1. Determine that other, less restrictive measures have been tried or would not be effective before applying the restraint  2. Evaluate the patient's condition at the time of restraint application  3. Inform patient/family regarding the reason for restraint  4.  Q2H: Monitor safety, psychosocial status, comfort, nutrition and hydration  Recent Flowsheet Documentation  Taken 5/31/2022 0000 by Roe Molina RN  Remains free of injury from restraints (restraint for interference with medical device): Determine that other, less restrictive measures have been tried or would not be effective before applying the restraint  Taken 5/30/2022 2200 by Roe Molina RN  Remains free of injury from restraints (restraint for interference with medical device): Determine that other, less restrictive measures have been tried or would not be effective before applying the restraint  Taken 5/30/2022 2000 by oRe Molina RN  Remains free of injury from restraints (restraint for interference with medical device): Determine that other, less restrictive measures have been tried or would not be effective before applying the restraint  Taken 5/30/2022 1936 by Roe Molina RN  Remains free of injury from restraints (restraint for interference with medical device): Determine that other, less restrictive measures have been tried or would not be effective before applying the restraint  5/30/2022 1846 by Hannah Perry RN  Outcome: Completed     Problem: Neurosensory - Adult  Goal: Achieves stable or improved neurological status  5/31/2022 0057 by Sharon Borges RN  Outcome: Progressing  5/30/2022 1846 by Hannah Perry RN  Outcome: Not Progressing  Goal: Absence of seizures  5/31/2022 0057 by Sharon Borges RN  Outcome: Progressing  Flowsheets  Taken 5/31/2022 0000  Absence of seizures: Support airway/breathing, administer oxygen as needed  Taken 5/30/2022 2000  Absence of seizures: Support airway/breathing, administer oxygen as needed  5/30/2022 1846 by Hannah Perry RN  Outcome: Progressing  Goal: Remains free of injury related to seizures activity  5/31/2022 0057 by Sharon Borges RN  Outcome: Progressing  5/30/2022 1846 by Hannah Perry RN  Outcome: Progressing  Goal: Achieves maximal functionality and self care  5/31/2022 0057 by Sharon Borges RN  Outcome: Progressing  5/30/2022 1846 by Hannah Perry RN  Outcome: Not Progressing     Problem: Respiratory - Adult  Goal: Achieves optimal ventilation and oxygenation  5/31/2022 0057 by Sharon Borges RN  Outcome: Progressing  5/30/2022 1846 by Hannah Perry RN  Outcome: Progressing     Problem: Cardiovascular - Adult  Goal: Maintains optimal cardiac output and hemodynamic stability  5/31/2022 0057 by Sharon Borges RN  Outcome: Progressing  5/30/2022 1846 by Hannah Perry RN  Outcome: Progressing  Goal: Absence of cardiac dysrhythmias or at baseline  5/31/2022 0057 by Sharon Borges RN  Outcome: Progressing  5/30/2022 1846 by Hannah Perry RN  Outcome: Progressing     Problem: Skin/Tissue Integrity - Adult  Goal: Skin integrity remains intact  5/31/2022 0057 by Sharon Borges RN  Outcome: Progressing  Flowsheets  Taken 5/31/2022 0000  Skin Integrity Remains Intact: Monitor for areas of redness and/or skin breakdown  Taken 5/30/2022 2000  Skin Integrity Remains Intact: Monitor for areas of redness and/or skin breakdown  5/30/2022 1846 by Sancho Overton RN  Outcome: Progressing  Flowsheets (Taken 5/30/2022 0830)  Skin Integrity Remains Intact: Monitor for areas of redness and/or skin breakdown  Goal: Incisions, wounds, or drain sites healing without S/S of infection  5/31/2022 0057 by John Kwong RN  Outcome: Progressing  5/30/2022 1846 by Sancho Overton RN  Outcome: Progressing  Goal: Oral mucous membranes remain intact  5/31/2022 0057 by John Kwong RN  Outcome: Progressing  5/30/2022 1846 by Sancho Overton RN  Outcome: Progressing     Problem: Metabolic/Fluid and Electrolytes - Adult  Goal: Electrolytes maintained within normal limits  5/31/2022 0057 by John Kwong RN  Outcome: Progressing  5/30/2022 1846 by Sancho Overton RN  Outcome: Progressing  Goal: Hemodynamic stability and optimal renal function maintained  5/31/2022 0057 by John Kwong RN  Outcome: Progressing  5/30/2022 1846 by Sancho Overton RN  Outcome: Progressing  Goal: Glucose maintained within prescribed range  5/31/2022 0057 by John Kwong RN  Outcome: Progressing  5/30/2022 1846 by Sancho Overton RN  Outcome: Progressing     Problem: Musculoskeletal - Adult  Goal: Return mobility to safest level of function  5/31/2022 0057 by John Kwong RN  Outcome: Progressing  5/30/2022 1846 by Sancho Overton RN  Outcome: Progressing     Problem: Genitourinary - Adult  Goal: Absence of urinary retention  5/31/2022 0057 by John Kwong RN  Outcome: Progressing  5/30/2022 1846 by Sancho Overton RN  Outcome: Progressing  Goal: Urinary catheter remains patent  5/31/2022 0057 by John Kwong RN  Outcome: Progressing  5/30/2022 1846 by Sancho Overton RN  Outcome: Progressing     Problem: Anxiety  Goal: Will report anxiety at manageable levels  Description: INTERVENTIONS:  1.  Administer medication as ordered  2. Teach and rehearse alternative coping skills  3. Provide emotional support with 1:1 interaction with staff  5/31/2022 0057 by Dayana Falcon RN  Outcome: Progressing  5/30/2022 1846 by Mandy Olson RN  Outcome: Progressing     Problem: Confusion  Goal: Confusion, delirium, dementia, or psychosis is improved or at baseline  Description: INTERVENTIONS:  1. Assess for possible contributors to thought disturbance, including medications, impaired vision or hearing, underlying metabolic abnormalities, dehydration, psychiatric diagnoses, and notify attending LIP  2. [place] high risk fall precautions, as indicated  3. Provide frequent short contacts to provide reality reorientation, refocusing and direction  4. Decrease environmental stimuli, including noise as appropriate  5. Monitor and intervene to maintain adequate nutrition, hydration, elimination, sleep and activity  6. If unable to ensure safety without constant attention obtain sitter and review sitter guidelines with assigned personnel  7. Initiate Psychosocial CNS and Spiritual Care consult, as indicated  5/31/2022 0057 by Dayana Falcon RN  Outcome: Progressing  5/30/2022 1846 by Mandy Olson RN  Outcome: Progressing     Problem: Behavior  Goal: Pt/Family maintain appropriate behavior and adhere to behavioral management agreement, if implemented  Description: INTERVENTIONS:  1. Assess patient/family's coping skills and  non-compliant behavior (including use of illegal substances)  2. Notify security of behavior or suspected illegal substances which indicate the need for search of the patient and/or belongings  3. Encourage verbalization of thoughts and concerns in a socially appropriate manner  4. Utilize positive, consistent limit setting strategies supporting safety of patient, staff and others  5. Encourage participation in the decision making process about the behavioral management agreement  6.  Implement a Health Care Agreement if patient meets criteria  7. If a patient's behavior jeopardizes the safety of the patient, staff, or others refer to organization policy. If a visitor's behavior poses a threat to safety call refer to organization policy.   8. Initiate consult with , Psychosocial CNS, Spiritual Care as appropriate  5/31/2022 0057 by Melisa Chaparro RN  Outcome: Progressing  5/30/2022 1846 by Vandana Peña RN  Outcome: Progressing     Problem: Chronic Conditions and Co-morbidities  Goal: Patient's chronic conditions and co-morbidity symptoms are monitored and maintained or improved  5/31/2022 0057 by Melisa Chaparro RN  Outcome: Progressing  5/30/2022 1846 by Vandana Peña RN  Outcome: Progressing     Problem: Pain  Goal: Verbalizes/displays adequate comfort level or baseline comfort level  5/31/2022 0057 by Melisa Chaparro RN  Outcome: Progressing  5/30/2022 1846 by Vandana Peña RN  Outcome: Progressing     Problem: ABCDS Injury Assessment  Goal: Absence of physical injury  5/31/2022 0057 by Melisa Chaparro RN  Outcome: Progressing  5/30/2022 1846 by Vandana Peña RN  Outcome: Progressing  Flowsheets (Taken 5/30/2022 0830)  Absence of Physical Injury: Implement safety measures based on patient assessment     Problem: Nutrition Deficit:  Goal: Optimize nutritional status  5/31/2022 0057 by Melisa Chaparro RN  Outcome: Progressing  5/30/2022 1846 by Vandana Peña RN  Outcome: Progressing

## 2022-05-31 NOTE — PROGRESS NOTES
Reason for Follow up: Urinary Retention     Subjective:  SBP trending 120-160s. On cardene drip. Has not been receiving PRN meds. Na stable K 3.3. Replacement ordered. Glucose improving. Creatinine stable 1.19  Urine Output 3500 mL yesterday  Encephalopathic. Review Of Systems:   Unable to obtain because current mental status.     Scheduled Meds:   ipratropium-albuterol  1 ampule Inhalation TID    insulin lispro  0-18 Units SubCUTAneous Q6H    cloNIDine  1 patch TransDERmal Weekly    fat emulsion  100 mL IntraVENous Daily    famotidine (PEPCID) injection  20 mg IntraVENous BID    PHENobarbital  65 mg IntraVENous TID    methylPREDNISolone  40 mg IntraVENous Q6H    budesonide  0.5 mg Nebulization BID    enoxaparin  40 mg SubCUTAneous Daily    thiamine and folic acid IVPB   IntraVENous Daily    lacosamide (VIMPAT) IVPB  200 mg IntraVENous BID    sodium chloride flush  10 mL IntraVENous Once    sodium chloride flush  5-40 mL IntraVENous 2 times per day    aspirin  81 mg Oral Daily    atorvastatin  40 mg Oral Nightly    clopidogrel  75 mg Oral Daily    metFORMIN  500 mg Oral BID WC    PARoxetine  40 mg Oral Daily    amLODIPine  5 mg Oral Daily    atenolol  50 mg Oral Daily    losartan  100 mg Oral Daily   Continuous Infusions:   dexmedetomidine (PRECEDEX) IV infusion 1.4 mcg/kg/hr (05/31/22 0805)    PN-Adult 2-in-1 Central Line (Standard) 53.4 mL/hr at 05/31/22 0805    sodium chloride Stopped (05/26/22 2008)    dextrose      niCARdipene (CARDENE) infusion 7.5 mg/hr (05/31/22 0805)     PRN Meds:potassium chloride **OR** potassium chloride, haloperidol lactate, LORazepam **OR** LORazepam **OR** LORazepam **OR** LORazepam **OR** LORazepam **OR** LORazepam **OR** LORazepam **OR** LORazepam, albuterol, hydrALAZINE, sodium chloride flush, sodium chloride, acetaminophen, ondansetron **OR** ondansetron, metoprolol, glucose, dextrose bolus **OR** dextrose bolus, glucagon (rDNA), dextrose    No Known Allergies    Physical Exam:  Blood pressure (!) 145/71, pulse 92, temperature 97.7 °F (36.5 °C), temperature source Axillary, resp. rate 20, height 5' 6\" (1.676 m), weight 175 lb 2 oz (79.4 kg), SpO2 95 %. In: 2907.6 [I.V.:1404.8]  Out: 3500   In: 2907.6   Out: 3500 [Urine:3500]    General:  Not in distress. Appears to be stated age. HEENT: Atraumatic, normocephalic. Anicteric sclera. Pink and moist oral mucosa. Chest: Bilateral air entry, clear to auscultation, no wheezing, rhonchi or rales. Cardiovascular: RRR, S1S2, no murmur, rub or gallop. Has no lower extremity edema. Abdomen: Soft, non tender to palpation. Active bowel sounds. Musculoskeletal: No cyanosis or clubbing. Integumentary: Pink, warm and dry. Free from rash or lesions. Skin turgor normal.  CNS: lethargic on precedex.     Data:  CBC:   Lab Results   Component Value Date    WBC 7.2 05/31/2022    HGB 12.2 (L) 05/31/2022    HCT 36.8 (L) 05/31/2022    MCV 96.1 05/31/2022     05/31/2022     BMP:    Lab Results   Component Value Date     05/31/2022    K 3.3 (L) 05/31/2022     05/31/2022    CO2 25 05/31/2022    BUN 31 (H) 05/31/2022    CREATININE 1.19 05/31/2022    GLUCOSE 252 (H) 05/31/2022     CMP:   Lab Results   Component Value Date     05/31/2022    K 3.3 (L) 05/31/2022     05/31/2022    CO2 25 05/31/2022    BUN 31 (H) 05/31/2022    CREATININE 1.19 05/31/2022    GLUCOSE 252 (H) 05/31/2022    CALCIUM 9.0 05/31/2022    PROT 6.6 05/26/2022    LABALBU 3.8 05/26/2022    BILITOT 0.79 05/26/2022    ALKPHOS 69 05/26/2022    AST 24 05/26/2022    ALT 12 05/26/2022      Hepatic:   Lab Results   Component Value Date    AST 24 05/26/2022    ALT 12 05/26/2022    BILITOT 0.79 05/26/2022    ALKPHOS 69 05/26/2022     BNP: No results found for: BNP  Lipids:   Lab Results   Component Value Date    CHOL 185 02/25/2021    HDL 87 02/25/2021     INR:   Lab Results   Component Value Date    INR 1.1 09/22/2021     PTH: No results found for: PTH  Phosphorus:    Lab Results   Component Value Date    PHOS 2.5 05/31/2022     Ionized Calcium: No results found for: IONCA  Magnesium:   Lab Results   Component Value Date    MG 1.7 05/31/2022     Albumin:   Lab Results   Component Value Date    LABALBU 3.8 05/26/2022     Last 3 CK, CKMB, Troponin: @LABRCNT(CKTOTAL:3,CKMB:3,TROPONINI:3)       URINE:)No results found for: Camron Good    Radiology:   Reviewed. Assessment and Plan to follow. Pt seen in collaboration with Dr. Kam Florentino. Electronically signed by CHARLENE Anderson CNP  on 5/31/2022 at 11:21 AM  [facility].   Ph: [phone]

## 2022-05-31 NOTE — PROGRESS NOTES
Physical Therapy  DATE: 2022    NAME: Sharmila Monroy  MRN: [id number]   : 1955    Patient not seen this date for Physical Therapy due to:      [x] Cancel by RN or physician due to: VIPIN Burroughs reporting pt not appropriate for therapy eval at this time. States she had to give pt ativan & requests PT ck back tomorrow. PT will continue to follow and ck back as able. [] Hemodialysis    [] Critical Lab Value Level     [] Blood transfusion in progress    [] Acute or unstable cardiovascular status   _MAP < 55 or more than >115  _HR < 40 or > 130    [] Acute or unstable pulmonary status   -FiO2 > 60%   _RR < 5 or >40    _O2 sats < 85%    [] Strict Bedrest    [] Off Unit for surgery or procedure    [] Off Unit for testing       [] Pending imaging to R/O fracture    [] Refusal by Patient      [] Other      [] PT being discontinued at this time. Patient independent. No further needs. [] PT being discontinued at this time as the patient has been transferred to hospice care. No further needs.       Nikko Bryan, PT

## 2022-05-31 NOTE — PLAN OF CARE
Problem: Neurosensory - Adult  Goal: Achieves stable or improved neurological status  5/31/2022 1400 by Micah Metz RN  Outcome: Progressing  Flowsheets (Taken 5/31/2022 0800)  Achieves stable or improved neurological status: Assess for and report changes in neurological status  5/31/2022 0057 by Rudy Mejia RN  Outcome: Progressing   Pt remains neurologically intact   Problem: Neurosensory - Adult  Goal: Absence of seizures  5/31/2022 1400 by Micah Metz RN  Outcome: Progressing  Flowsheets  Taken 5/31/2022 0800 by Micah Metz RN  Absence of seizures: Monitor for seizure activity.   If seizure occurs, document type and location of movements and any associated apnea  Taken 5/31/2022 0400 by Rudy Mejia RN  Absence of seizures: Support airway/breathing, administer oxygen as needed  5/31/2022 0057 by Rudy Mejia RN  Outcome: Progressing  Flowsheets  Taken 5/31/2022 0000  Absence of seizures: Support airway/breathing, administer oxygen as needed  Taken 5/30/2022 2000  Absence of seizures: Support airway/breathing, administer oxygen as needed   Pt remains free of seizures

## 2022-05-31 NOTE — PROCEDURES
PATIENT:   Stephen Bias  DICTATING PHYSICIAN:  Kelsey Mena MD    TECHNIQUE:  22 channels of EEG and 1 channel of EKG were recorded utilizing the [employer] 10/20 System on a patient described as uncooperative. CLINICAL HISTORY: This is a 73 y.o. male with The encounter diagnosis was New onset seizure ([address] .  EEG is being performed to evaluate for seizure activity. Medications: Scheduled Meds:   ipratropium-albuterol  1 ampule Inhalation TID    insulin lispro  0-18 Units SubCUTAneous Q6H    cloNIDine  1 patch TransDERmal Weekly    fat emulsion  100 mL IntraVENous Daily    famotidine (PEPCID) injection  20 mg IntraVENous BID    PHENobarbital  65 mg IntraVENous TID    methylPREDNISolone  40 mg IntraVENous Q6H    budesonide  0.5 mg Nebulization BID    enoxaparin  40 mg SubCUTAneous Daily    thiamine and folic acid IVPB   IntraVENous Daily    lacosamide (VIMPAT) IVPB  200 mg IntraVENous BID    sodium chloride flush  10 mL IntraVENous Once    sodium chloride flush  5-40 mL IntraVENous 2 times per day    aspirin  81 mg Oral Daily    atorvastatin  40 mg Oral Nightly    clopidogrel  75 mg Oral Daily    metFORMIN  500 mg Oral BID WC    PARoxetine  40 mg Oral Daily    amLODIPine  5 mg Oral Daily    atenolol  50 mg Oral Daily    losartan  100 mg Oral Daily         EEG FINDINGS:     The waking background activity consists of a symmetric but poorly sustained 7-8 Hz, 20-30uV posteriorly predominant rhythm. Other findings: No areas of focal slowing were noted. No paroxysmal features or epileptiform activity was noted. Increased amounts of anteriorly predominant beta activity were noted. Activating procedures:   Hyperventilation: was not performed  Photic stimulation: No response    Sleep: no sleep elements were seen        IMPRESSION:  This is an abnormal EEG due to mild diffuse slowing. No evidence of epileptiform activity is noted.  These findings are consistent with a mild diffuse encephalopathy. This is a non-specific finding and can be seen in toxic, metabolic and parainfectious processes. Malcom Goldsmith M.D.   Clinical neurophysiologist

## 2022-05-31 NOTE — PROGRESS NOTES
Pulmonary Critical Care Progress Note  Arvind Walter MD     Patient seen for the follow up of hypoxia, Seizure ([address]     Subjective:  Patient is awake but he is confused. He is getting EEG. He is on oxygen via nasal cannula. He was aggressive overnight requiring restraints and increasing Precedex to 1.5 mg. He currently is on Cardene, Precedex drip as well as TPN. Examination:  Vitals: BP (!) 145/71   Pulse 92   Temp 97.7 °F (36.5 °C) (Axillary)   Resp 20   Ht 5' 6\" (1.676 m)   Wt 175 lb 2 oz (79.4 kg)   SpO2 95%   BMI 28.27 kg/m²   General appearance: alert, confused  Neck: No JVD  Lungs: diminished air exchange  Heart: regular rate and rhythm, S1, S2 normal, no gallop  Abdomen: Soft, non tender, + BS  Extremities: no cyanosis or clubbing. No significant edema    LABs:  CBC:   Recent Labs     05/30/22  0514 05/31/22  0329   WBC 8.0 7.2   HGB 11.6* 12.2*   HCT 35.5* 36.8*    208     BMP:   Recent Labs     05/30/22  0514 05/31/22  0329    140   K 4.3 3.3*   CO2 19* 25   BUN 32* 31*   CREATININE 1.26* 1.19   LABGLOM 57* >60   GLUCOSE 395* 252*     ABG:  Lab Results   Component Value Date    FIO2 30.0 05/27/2022       Lab Results   Component Value Date    POCPH 7.396 05/27/2022    POCPCO2 39.7 05/27/2022    POCPO2 87.5 05/27/2022    POCHCO3 24.4 05/27/2022    NBEA 1 05/26/2022    PBEA 0 05/27/2022    RMCW8EJZ 97 05/27/2022    FIO2 30.0 05/27/2022   Results for Delio Butler (MRN [id number]) as of 5/30/2022 11:34   Ref.  Range 5/30/2022 05:14   Procalcitonin Latest Ref Range: <0.09 ng/mL 0.03     Radiology:  Chest xray: 5/31/2022      Impression:  · Acute respiratory insufficiency with hypoxia   · New onset seizure   · Alcohol abuse/ dependence/ withdrawal   · History of CVA   · Obstructive sleep apnea  · Active smoking/ likely COPD   · Severe right Carotid artery stenosis, status post left CEA 9/21  · HTN, DM    Recommendations:  · Continue BIPAP support as needed   · 2 liters/min via nasal cannula to keep spO2 greater than 92% as tolerated  · Decrease IV Solu-Medrol to 30 mg IV every 8 hours  · Ipratropium- Albuterol BID   · Budesonide via nebulizer BID   · Precedex, titrate to RAAS goal of 0 to -1, wean down  · Ativan per CIWA protocol   · Cardene drip, wean as able  · Vimpat 200mg BID, phenobarbital 65mg BID, neurology following   · Nephology following  · Continue TPN  · Replace potassium  · Seizure precautions  · Vascular surgery input noted  · Will monitor off of antibiotics at this time. Left basilar infiltrate with no fever or leukocytosis,? Atelectasis. We will do radiologic follow-up without starting antibiotics.     · Discussed with RN  · Labs: CBC and BMP in am  · DVT prophylaxis with low molecular weight heparin  · Will follow with you    Electronically signed by   Shannan Bradley MD on 5/31/2022 at 10:31 AM  Pulmonary Critical Care and Sleep Medicine,  [facility]  Cell: [phone]  Office: [phone]

## 2022-05-31 NOTE — RT PROTOCOL NOTE
RT Inhaler-Nebulizer Bronchodilator Protocol Note    There is a bronchodilator order in the chart from a provider indicating to follow the RT Bronchodilator Protocol and there is an Initiate RT Inhaler-Nebulizer Bronchodilator Protocol order as well (see protocol at bottom of note). CXR Findings:  XR CHEST PORTABLE    Result Date: 5/30/2022  Elevated left hemidiaphragm with apparent atelectasis left base. Slight improvement in right basilar opacity which may represent resolving atelectasis or improved minimal airspace disease. The findings from the last RT Protocol Assessment were as follows:   History Pulmonary Disease: Chronic pulmonary disease  Respiratory Pattern: Dyspnea on exertion or RR 21-25 bpm  Breath Sounds: Intermittent or unilateral wheezes  Cough: Weak, productive  Indication for Bronchodilator Therapy: Decreased or absent breath sounds  Bronchodilator Assessment Score: 10    Aerosolized bronchodilator medication orders have been revised according to the RT Inhaler-Nebulizer Bronchodilator Protocol below. Respiratory Therapist to perform RT Therapy Protocol Assessment initially then follow the protocol. Repeat RT Therapy Protocol Assessment PRN for score 0-3 or on second treatment, BID, and PRN for scores above 3. No Indications - adjust the frequency to every 6 hours PRN wheezing or bronchospasm, if no treatments needed after 48 hours then discontinue using Per Protocol order mode. If indication present, adjust the RT bronchodilator orders based on the Bronchodilator Assessment Score as indicated below. Use Inhaler orders unless patient has one or more of the following: on home nebulizer, not able to hold breath for 10 seconds, is not alert and oriented, cannot activate and use MDI correctly, or respiratory rate 25 breaths per minute or more, then use the equivalent nebulizer order(s) with same Frequency and PRN reasons based on the score.   If a patient is on this medication at home then do not decrease Frequency below that used at home. 0-3 - enter or revise RT bronchodilator order(s) to equivalent RT Bronchodilator order with Frequency of every 4 hours PRN for wheezing or increased work of breathing using Per Protocol order mode. 4-6 - enter or revise RT Bronchodilator order(s) to two equivalent RT bronchodilator orders with one order with BID Frequency and one order with Frequency of every 4 hours PRN wheezing or increased work of breathing using Per Protocol order mode. 7-10 - enter or revise RT Bronchodilator order(s) to two equivalent RT bronchodilator orders with one order with TID Frequency and one order with Frequency of every 4 hours PRN wheezing or increased work of breathing using Per Protocol order mode. 11-13 - enter or revise RT Bronchodilator order(s) to one equivalent RT bronchodilator order with QID Frequency and an Albuterol order with Frequency of every 4 hours PRN wheezing or increased work of breathing using Per Protocol order mode. Greater than 13 - enter or revise RT Bronchodilator order(s) to one equivalent RT bronchodilator order with every 4 hours Frequency and an Albuterol order with Frequency of every 2 hours PRN wheezing or increased work of breathing using Per Protocol order mode. RT to enter RT Home Evaluation for COPD & MDI Assessment order using Per Protocol order mode.     Electronically signed by Vamsi Chen RCP on 5/31/2022 at 8:11 AM

## 2022-05-31 NOTE — PROGRESS NOTES
Reason for Follow up: ADRIANA    Subjective:    Pt seen and examined at the bed side  SBP trending 120-160s. On cardene drip. Has not been receiving PRN meds. Na stable K 3.3. Replacement ordered. Glucose improving. Creatinine stable 1.19  Urine Output 3500 mL yesterday  Encephalopathic. Review Of Systems:   Unable to obtain because current mental status.     Scheduled Meds:   ipratropium-albuterol  1 ampule Inhalation TID    insulin lispro  0-18 Units SubCUTAneous Q6H    cloNIDine  1 patch TransDERmal Weekly    fat emulsion  100 mL IntraVENous Daily    famotidine (PEPCID) injection  20 mg IntraVENous BID    PHENobarbital  65 mg IntraVENous TID    methylPREDNISolone  40 mg IntraVENous Q6H    budesonide  0.5 mg Nebulization BID    enoxaparin  40 mg SubCUTAneous Daily    thiamine and folic acid IVPB   IntraVENous Daily    lacosamide (VIMPAT) IVPB  200 mg IntraVENous BID    sodium chloride flush  10 mL IntraVENous Once    sodium chloride flush  5-40 mL IntraVENous 2 times per day    aspirin  81 mg Oral Daily    atorvastatin  40 mg Oral Nightly    clopidogrel  75 mg Oral Daily    metFORMIN  500 mg Oral BID WC    PARoxetine  40 mg Oral Daily    amLODIPine  5 mg Oral Daily    atenolol  50 mg Oral Daily    losartan  100 mg Oral Daily   Continuous Infusions:   dexmedetomidine (PRECEDEX) IV infusion 0.8 mcg/kg/hr (05/31/22 1157)    PN-Adult 2-in-1 Central Line (Standard) 53.4 mL/hr at 05/31/22 1157    sodium chloride Stopped (05/26/22 2008)    dextrose      niCARdipene (CARDENE) infusion 7.5 mg/hr (05/31/22 1157)     PRN Meds:potassium chloride **OR** potassium chloride, haloperidol lactate, LORazepam **OR** LORazepam **OR** LORazepam **OR** LORazepam **OR** LORazepam **OR** LORazepam **OR** LORazepam **OR** LORazepam, albuterol, hydrALAZINE, sodium chloride flush, sodium chloride, acetaminophen, ondansetron **OR** ondansetron, metoprolol, glucose, dextrose bolus **OR** dextrose bolus, glucagon (rDNA), dextrose    No Known Allergies    Physical Exam:  Blood pressure (!) 167/87, pulse (!) 106, temperature 97.7 °F (36.5 °C), temperature source Axillary, resp. rate 20, height 5' 6\" (1.676 m), weight 175 lb 2 oz (79.4 kg), SpO2 95 %. In: 3432.3 [I.V.:1633.6]  Out: 3500   In: 3432.3   Out: 3500 [Urine:3500]    General:  Not in distress. Appears to be stated age. HEENT: Atraumatic, normocephalic. Anicteric sclera. Pink and moist oral mucosa. Chest: Bilateral air entry, clear to auscultation, no wheezing, rhonchi or rales. Cardiovascular: RRR, S1S2, no murmur, rub or gallop. Has no lower extremity edema. Abdomen: Soft, non tender to palpation. Active bowel sounds. Musculoskeletal: No cyanosis or clubbing. Integumentary: Pink, warm and dry. Free from rash or lesions. Skin turgor normal.  CNS: lethargic on precedex.     Data:  CBC:   Lab Results   Component Value Date    WBC 7.2 05/31/2022    HGB 12.2 (L) 05/31/2022    HCT 36.8 (L) 05/31/2022    MCV 96.1 05/31/2022     05/31/2022     BMP:    Lab Results   Component Value Date     05/31/2022    K 3.3 (L) 05/31/2022     05/31/2022    CO2 25 05/31/2022    BUN 31 (H) 05/31/2022    CREATININE 1.19 05/31/2022    GLUCOSE 252 (H) 05/31/2022     CMP:   Lab Results   Component Value Date     05/31/2022    K 3.3 (L) 05/31/2022     05/31/2022    CO2 25 05/31/2022    BUN 31 (H) 05/31/2022    CREATININE 1.19 05/31/2022    GLUCOSE 252 (H) 05/31/2022    CALCIUM 9.0 05/31/2022    PROT 6.6 05/26/2022    LABALBU 3.8 05/26/2022    BILITOT 0.79 05/26/2022    ALKPHOS 69 05/26/2022    AST 24 05/26/2022    ALT 12 05/26/2022      Hepatic:   Lab Results   Component Value Date    AST 24 05/26/2022    ALT 12 05/26/2022    BILITOT 0.79 05/26/2022    ALKPHOS 69 05/26/2022     BNP: No results found for: BNP  Lipids:   Lab Results   Component Value Date    CHOL 185 02/25/2021    HDL 87 02/25/2021     INR:   Lab Results   Component Value Date    INR 1.1 09/22/2021     PTH: No results found for: PTH  Phosphorus:    Lab Results   Component Value Date    PHOS 2.5 05/31/2022     Ionized Calcium: No results found for: IONCA  Magnesium:   Lab Results   Component Value Date    MG 1.7 05/31/2022     Albumin:   Lab Results   Component Value Date    LABALBU 3.8 05/26/2022     Last 3 CK, CKMB, Troponin: @LABRCNT(CKTOTAL:3,CKMB:3,TROPONINI:3)       URINE:)No results found for: Majo Clutter    Radiology:   Reviewed. Assessment:    ADRIANA-improved  Urinary retention  Hyponatremia-Corrected  Hypokalemia  ETOH withdrawal    Plan:    Give Lopressor 5 mg iv q 6 hrs  Wean off Cardene  SNa normal  K supplements  Good eGFR  TPN  Thiamine and Folic acid  FU labs    Electronically signed by Naveen Verdugo MD  on 5/31/2022 at 12:10 PM  [facility].   Ph: [phone]

## 2022-05-31 NOTE — PROGRESS NOTES
Nutrition Assessment     Type and Reason for Visit: Reassess    Nutrition Recommendations/Plan:   1. Continue NPO diet  2. Increase central, custom TPN to 70 mL/hr (1680 mL total volume), 100 mL lipids. TPN and lipids provides 1678 kcal and 84 gm of protein   3. Monitor TPN rate, TPN tolerance, swallowing status, diet advancement and labs     Malnutrition Assessment:  Malnutrition Status: At risk for malnutrition (Comment)    Nutrition Assessment:  Patient is on less precedex for agitation and pulling on cords and wires. At this time patient remains NPO with TPN and lipids. RN reports patient has slurred speech and will most likely require a swallow study before diet is advanced. Will increase TPN rate to 70 mL/hr (1680 ml total volume). Monitor TPN rate, TPN tolerance, labs, swallowing status and diet advancement. Estimated Daily Nutrient Needs:  Energy (kcal):  1825 kcal (MSJ: 1 activity factor; 1.2 stess factor) Weight Used for Energy Requirements: Current     Protein (g):  84 gm of protein (1.3 gm/kg) Weight Used for Protein Requirements: Ideal          Nutrition Related Findings:   Edema: trace BUE. Active bowel sounds.  Alcohol abuse and withdrawal. Alert and oriented but agitated Wound Type: None    Current Nutrition Therapies:    Diet NPO  PN-Adult 2-in-1 Central Line (Custom)    Anthropometric Measures:  · Height: 5' 6\" (167.6 cm)  · Current Body Wt: 175 lb (79.4 kg)   · BMI: 28.3    Nutrition Diagnosis:   · Inadequate oral intake related to psychological cause or life stress,cognitive or neurological impairment as evidenced by NPO or clear liquid status due to medical condition,nutrition support - parenteral nutrition      Nutrition Interventions:   Food and/or Nutrient Delivery: Continue NPO,Modify Parenteral Nutrition  Nutrition Education/Counseling: Education not indicated  Coordination of Nutrition Care: Continue to monitor while inpatient       Goals:  Previous Goal Met: Progressing toward Goal(s)  Goals: Initiate nutrition support       Nutrition Monitoring and Evaluation:   Behavioral-Environmental Outcomes: None Identified  Food/Nutrient Intake Outcomes: Parenteral Nutrition Intake/Tolerance,Diet Advancement/Tolerance  Physical Signs/Symptoms Outcomes: Biochemical Data,Fluid Status or Edema,Chewing or Swallowing,Weight,Skin    Discharge Planning:     Too soon to determine             Gera FRANCIS, RDN, LDN  Lead Clinical Dietitian  RD Office Phone [phone]

## 2022-05-31 NOTE — PROGRESS NOTES
Pt able to be weaned down to 0.6 of Precedex this AM/afternoon. At this time patient was able to state name, , that he was at [facility] but was confused on situation and date. Able to be reoriented and discussed staying calm and not pulling on anything and we would be able to get him moving faster. Pt agreeable and was able to follow commands throughout morning and into early afternoon. Aprroximately 1230, pt getting more anxious, aggressive, yelling and being verbally abusive to writer. Ativan given per CIWA protocol and Precedex increased after patient refused to calm down. Remains on 3L O2.

## 2022-06-01 ENCOUNTER — APPOINTMENT (OUTPATIENT)
Dept: GENERAL RADIOLOGY | Age: 67
DRG: 056 | End: 2022-06-01
Payer: MEDICARE

## 2022-06-01 LAB
ABSOLUTE EOS #: 0 K/UL (ref 0–0.4)
ABSOLUTE IMMATURE GRANULOCYTE: 0 K/UL (ref 0–0.3)
ABSOLUTE LYMPH #: 0.2 K/UL (ref 1–4.8)
ABSOLUTE MONO #: 0.59 K/UL (ref 0.2–0.8)
ANION GAP SERPL CALCULATED.3IONS-SCNC: 9 MMOL/L (ref 9–17)
BASOPHILS # BLD: 0 %
BASOPHILS ABSOLUTE: 0 K/UL (ref 0–0.2)
BUN BLDV-MCNC: 36 MG/DL (ref 8–23)
BUN/CREAT BLD: 26 (ref 9–20)
CALCIUM SERPL-MCNC: 8.9 MG/DL (ref 8.6–10.4)
CHLORIDE BLD-SCNC: 107 MMOL/L (ref 98–107)
CO2: 25 MMOL/L (ref 20–31)
CREAT SERPL-MCNC: 1.39 MG/DL (ref 0.7–1.2)
EOSINOPHILS RELATIVE PERCENT: 0 % (ref 1–4)
GFR AFRICAN AMERICAN: >60 ML/MIN
GFR NON-AFRICAN AMERICAN: 51 ML/MIN
GFR SERPL CREATININE-BSD FRML MDRD: ABNORMAL ML/MIN/{1.73_M2}
GLUCOSE BLD-MCNC: 138 MG/DL (ref 75–110)
GLUCOSE BLD-MCNC: 235 MG/DL (ref 75–110)
GLUCOSE BLD-MCNC: 294 MG/DL (ref 75–110)
GLUCOSE BLD-MCNC: 294 MG/DL (ref 75–110)
GLUCOSE BLD-MCNC: 300 MG/DL (ref 75–110)
GLUCOSE BLD-MCNC: 303 MG/DL (ref 70–99)
GLUCOSE BLD-MCNC: 305 MG/DL (ref 75–110)
GLUCOSE BLD-MCNC: 330 MG/DL (ref 75–110)
HCT VFR BLD CALC: 34.6 % (ref 40.7–50.3)
HEMOGLOBIN: 11.3 G/DL (ref 13–17)
IMMATURE GRANULOCYTES: 0 %
LYMPHOCYTES # BLD: 3 % (ref 24–44)
MAGNESIUM: 1.9 MG/DL (ref 1.6–2.6)
MCH RBC QN AUTO: 31.9 PG (ref 25.2–33.5)
MCHC RBC AUTO-ENTMCNC: 32.7 G/DL (ref 28–38)
MCV RBC AUTO: 97.7 FL (ref 82.6–102.9)
MONOCYTES # BLD: 9 % (ref 1–7)
PDW BLD-RTO: 13.2 % (ref 11.8–14.4)
PHOSPHORUS: 3.1 MG/DL (ref 2.5–4.5)
PLATELET # BLD: 188 K/UL (ref 138–453)
PMV BLD AUTO: 10.1 FL (ref 8.1–13.5)
POTASSIUM SERPL-SCNC: 3.7 MMOL/L (ref 3.7–5.3)
PROCALCITONIN: 0.04 NG/ML
RBC # BLD: 3.54 M/UL (ref 4.21–5.77)
SEG NEUTROPHILS: 88 % (ref 36–66)
SEGMENTED NEUTROPHILS ABSOLUTE COUNT: 5.81 K/UL (ref 1.8–7.7)
SODIUM BLD-SCNC: 141 MMOL/L (ref 135–144)
WBC # BLD: 6.6 K/UL (ref 3.5–11.3)

## 2022-06-01 PROCEDURE — C9254 INJECTION, LACOSAMIDE: HCPCS | Performed by: PSYCHIATRY & NEUROLOGY

## 2022-06-01 PROCEDURE — 2580000003 HC RX 258: Performed by: INTERNAL MEDICINE

## 2022-06-01 PROCEDURE — 6360000002 HC RX W HCPCS: Performed by: NURSE PRACTITIONER

## 2022-06-01 PROCEDURE — 2580000003 HC RX 258: Performed by: FAMILY MEDICINE

## 2022-06-01 PROCEDURE — 6360000002 HC RX W HCPCS: Performed by: PSYCHIATRY & NEUROLOGY

## 2022-06-01 PROCEDURE — 94761 N-INVAS EAR/PLS OXIMETRY MLT: CPT

## 2022-06-01 PROCEDURE — A4216 STERILE WATER/SALINE, 10 ML: HCPCS | Performed by: INTERNAL MEDICINE

## 2022-06-01 PROCEDURE — 2700000000 HC OXYGEN THERAPY PER DAY

## 2022-06-01 PROCEDURE — 2000000000 HC ICU R&B

## 2022-06-01 PROCEDURE — 94640 AIRWAY INHALATION TREATMENT: CPT

## 2022-06-01 PROCEDURE — 2500000003 HC RX 250 WO HCPCS: Performed by: INTERNAL MEDICINE

## 2022-06-01 PROCEDURE — 36415 COLL VENOUS BLD VENIPUNCTURE: CPT

## 2022-06-01 PROCEDURE — 85025 COMPLETE CBC W/AUTO DIFF WBC: CPT

## 2022-06-01 PROCEDURE — 94660 CPAP INITIATION&MGMT: CPT

## 2022-06-01 PROCEDURE — 2580000003 HC RX 258: Performed by: PSYCHIATRY & NEUROLOGY

## 2022-06-01 PROCEDURE — 71045 X-RAY EXAM CHEST 1 VIEW: CPT

## 2022-06-01 PROCEDURE — 2500000003 HC RX 250 WO HCPCS: Performed by: FAMILY MEDICINE

## 2022-06-01 PROCEDURE — 80048 BASIC METABOLIC PNL TOTAL CA: CPT

## 2022-06-01 PROCEDURE — 6360000002 HC RX W HCPCS: Performed by: INTERNAL MEDICINE

## 2022-06-01 PROCEDURE — 84145 PROCALCITONIN (PCT): CPT

## 2022-06-01 PROCEDURE — 83735 ASSAY OF MAGNESIUM: CPT

## 2022-06-01 PROCEDURE — 6370000000 HC RX 637 (ALT 250 FOR IP): Performed by: INTERNAL MEDICINE

## 2022-06-01 PROCEDURE — 82947 ASSAY GLUCOSE BLOOD QUANT: CPT

## 2022-06-01 PROCEDURE — 84100 ASSAY OF PHOSPHORUS: CPT

## 2022-06-01 PROCEDURE — 6360000002 HC RX W HCPCS: Performed by: FAMILY MEDICINE

## 2022-06-01 PROCEDURE — 6370000000 HC RX 637 (ALT 250 FOR IP): Performed by: FAMILY MEDICINE

## 2022-06-01 RX ORDER — PHENOBARBITAL SODIUM 65 MG/ML
65 INJECTION INTRAMUSCULAR 2 TIMES DAILY
Status: DISCONTINUED | OUTPATIENT
Start: 2022-06-01 | End: 2022-06-04

## 2022-06-01 RX ADMIN — INSULIN LISPRO 9 UNITS: 100 INJECTION, SOLUTION INTRAVENOUS; SUBCUTANEOUS at 06:20

## 2022-06-01 RX ADMIN — I.V. FAT EMULSION 100 ML: 20 EMULSION INTRAVENOUS at 18:51

## 2022-06-01 RX ADMIN — SODIUM CHLORIDE, PRESERVATIVE FREE 10 ML: 5 INJECTION INTRAVENOUS at 08:42

## 2022-06-01 RX ADMIN — INSULIN LISPRO 12 UNITS: 100 INJECTION, SOLUTION INTRAVENOUS; SUBCUTANEOUS at 18:45

## 2022-06-01 RX ADMIN — LORAZEPAM 2 MG: 2 INJECTION INTRAMUSCULAR at 21:41

## 2022-06-01 RX ADMIN — INSULIN LISPRO 12 UNITS: 100 INJECTION, SOLUTION INTRAVENOUS; SUBCUTANEOUS at 11:07

## 2022-06-01 RX ADMIN — METHYLPREDNISOLONE SODIUM SUCCINATE 40 MG: 40 INJECTION, POWDER, FOR SOLUTION INTRAMUSCULAR; INTRAVENOUS at 13:00

## 2022-06-01 RX ADMIN — DEXMEDETOMIDINE HYDROCHLORIDE 1.3 MCG/KG/HR: 100 INJECTION, SOLUTION INTRAVENOUS at 15:40

## 2022-06-01 RX ADMIN — SODIUM CHLORIDE, PRESERVATIVE FREE 10 ML: 5 INJECTION INTRAVENOUS at 01:34

## 2022-06-01 RX ADMIN — INSULIN LISPRO 9 UNITS: 100 INJECTION, SOLUTION INTRAVENOUS; SUBCUTANEOUS at 00:42

## 2022-06-01 RX ADMIN — BUDESONIDE 500 MCG: 0.5 SUSPENSION RESPIRATORY (INHALATION) at 19:49

## 2022-06-01 RX ADMIN — ENOXAPARIN SODIUM 40 MG: 100 INJECTION SUBCUTANEOUS at 08:30

## 2022-06-01 RX ADMIN — DEXTROSE MONOHYDRATE 2.5 MG/HR: 50 INJECTION, SOLUTION INTRAVENOUS at 14:04

## 2022-06-01 RX ADMIN — SODIUM CHLORIDE, PRESERVATIVE FREE 20 MG: 5 INJECTION INTRAVENOUS at 08:30

## 2022-06-01 RX ADMIN — DEXTROSE MONOHYDRATE 7.5 MG/HR: 50 INJECTION, SOLUTION INTRAVENOUS at 04:07

## 2022-06-01 RX ADMIN — FOLIC ACID: 5 INJECTION, SOLUTION INTRAMUSCULAR; INTRAVENOUS; SUBCUTANEOUS at 08:34

## 2022-06-01 RX ADMIN — BUDESONIDE 500 MCG: 0.5 SUSPENSION RESPIRATORY (INHALATION) at 07:36

## 2022-06-01 RX ADMIN — LACOSAMIDE 200 MG: 10 INJECTION, SOLUTION INTRAVENOUS at 21:47

## 2022-06-01 RX ADMIN — METHYLPREDNISOLONE SODIUM SUCCINATE 40 MG: 40 INJECTION, POWDER, FOR SOLUTION INTRAMUSCULAR; INTRAVENOUS at 06:11

## 2022-06-01 RX ADMIN — LORAZEPAM 3 MG: 2 INJECTION INTRAMUSCULAR at 01:47

## 2022-06-01 RX ADMIN — PHENOBARBITAL SODIUM 65 MG: 65 INJECTION INTRAMUSCULAR; INTRAVENOUS at 08:30

## 2022-06-01 RX ADMIN — HYDRALAZINE HYDROCHLORIDE 10 MG: 20 INJECTION INTRAMUSCULAR; INTRAVENOUS at 14:21

## 2022-06-01 RX ADMIN — LORAZEPAM 1 MG: 2 INJECTION INTRAMUSCULAR; INTRAVENOUS at 11:07

## 2022-06-01 RX ADMIN — SODIUM CHLORIDE, PRESERVATIVE FREE 20 MG: 5 INJECTION INTRAVENOUS at 19:31

## 2022-06-01 RX ADMIN — DEXMEDETOMIDINE HYDROCHLORIDE 1.2 MCG/KG/HR: 100 INJECTION, SOLUTION INTRAVENOUS at 04:52

## 2022-06-01 RX ADMIN — IPRATROPIUM BROMIDE AND ALBUTEROL SULFATE 1 AMPULE: 2.5; .5 SOLUTION RESPIRATORY (INHALATION) at 19:49

## 2022-06-01 RX ADMIN — IPRATROPIUM BROMIDE AND ALBUTEROL SULFATE 1 AMPULE: 2.5; .5 SOLUTION RESPIRATORY (INHALATION) at 07:36

## 2022-06-01 RX ADMIN — METOPROLOL TARTRATE 5 MG: 1 INJECTION, SOLUTION INTRAVENOUS at 19:31

## 2022-06-01 RX ADMIN — METHYLPREDNISOLONE SODIUM SUCCINATE 40 MG: 40 INJECTION, POWDER, FOR SOLUTION INTRAMUSCULAR; INTRAVENOUS at 01:34

## 2022-06-01 RX ADMIN — METOPROLOL TARTRATE 5 MG: 1 INJECTION, SOLUTION INTRAVENOUS at 10:51

## 2022-06-01 RX ADMIN — CALCIUM GLUCONATE: 94 INJECTION, SOLUTION INTRAVENOUS at 18:48

## 2022-06-01 RX ADMIN — PHENOBARBITAL SODIUM 65 MG: 65 INJECTION INTRAMUSCULAR; INTRAVENOUS at 20:18

## 2022-06-01 RX ADMIN — SODIUM CHLORIDE, PRESERVATIVE FREE 10 ML: 5 INJECTION INTRAVENOUS at 20:23

## 2022-06-01 RX ADMIN — LACOSAMIDE 200 MG: 10 INJECTION, SOLUTION INTRAVENOUS at 10:09

## 2022-06-01 RX ADMIN — HALOPERIDOL LACTATE 5 MG: 5 INJECTION, SOLUTION INTRAMUSCULAR at 15:48

## 2022-06-01 RX ADMIN — METHYLPREDNISOLONE SODIUM SUCCINATE 40 MG: 40 INJECTION, POWDER, FOR SOLUTION INTRAMUSCULAR; INTRAVENOUS at 19:31

## 2022-06-01 RX ADMIN — IPRATROPIUM BROMIDE AND ALBUTEROL SULFATE 1 AMPULE: 2.5; .5 SOLUTION RESPIRATORY (INHALATION) at 14:24

## 2022-06-01 NOTE — PROGRESS NOTES
[address] Hospitalist        6/1/2022   4:51 PM    Name:  Destin Brito  MRN:    [id number]     Acct:     [de-identified]   Room:  [address] [place] [address] Day: 7     Admit Date: 5/25/2022 12:59 PM  PCP: DENNIS Kearney    C/C:   Chief Complaint   Patient presents with    Seizures       Assessment:          · New onset seizure  · Altered mental status secondary to alcohol withdrawal delirium and postictal state following seizure  · Old infarct left frontal parietal lobes, new since 9/2021  · Old infarction left occipital lobe, increased since 9/2021  · 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 / Encephalomalacia   · Old lacunar infarcts left basal ganglia   · Mild brain parenchymal volume loss  · Alcohol dependence   · Reported right hemiparesis   · Essential hypertension  · Diabetes mellitus type 2  · Overweight   · Diverticulosis  · Agitation   · Delirium tremens     · Acute respiratory failure with hypoxia   · Oliguria  · Carotid artery disease         Plan:      · Patient currently in ICU  · O2 to maintain oxygen saturation greater than 92%  · BiPAP as needed  · Amiodarone  · DuoNebs  · IV lasix x 1  ·    · Precedex gtt.   · Seizure precautions  · IV Solu-Medrol  · TPN  · CIWA protocol  · Critical care on board  ·    · Waxing and waning mental status   · phenobarbital and Vimpat  · Neurology following  · Zyprexa  · As needed Haldol  ·    · Monitor blood pressure  · Cardene drip, weaning  · IV Lopressor as needed  · Continue clonidine patch  · Nephrology on board  ·    · Vascular evaluated the the patient,   ·    · DVT and GI prophylaxis  · Continue Med as below      Scheduled Meds:   PHENobarbital  65 mg IntraVENous BID    ipratropium-albuterol  1 ampule Inhalation TID    insulin lispro  0-18 Units SubCUTAneous Q6H    cloNIDine  1 patch TransDERmal Weekly    fat emulsion  100 mL IntraVENous Daily    famotidine (PEPCID) injection  20 mg IntraVENous BID    methylPREDNISolone  40 mg IntraVENous Q6H    budesonide  0.5 mg Nebulization BID    enoxaparin  40 mg SubCUTAneous Daily    thiamine and folic acid IVPB   IntraVENous Daily    lacosamide (VIMPAT) IVPB  200 mg IntraVENous BID    sodium chloride flush  10 mL IntraVENous Once    sodium chloride flush  5-40 mL IntraVENous 2 times per day    aspirin  81 mg Oral Daily    atorvastatin  40 mg Oral Nightly    clopidogrel  75 mg Oral Daily    metFORMIN  500 mg Oral BID WC    PARoxetine  40 mg Oral Daily    amLODIPine  5 mg Oral Daily    atenolol  50 mg Oral Daily    losartan  100 mg Oral Daily     Continuous Infusions:   PN-Adult 2-in-1 Central Line (Standard)      PN-Adult 2-in-1 Central Line (Standard) 74 mL/hr at 06/01/22 1309    dexmedetomidine (PRECEDEX) IV infusion 1.3 mcg/kg/hr (06/01/22 1540)    sodium chloride Stopped (05/26/22 2008)    dextrose      niCARdipene (CARDENE) infusion 2.5 mg/hr (06/01/22 1404)     PRN Meds:  potassium chloride, 20 mEq, PRN   Or  potassium chloride, 10 mEq, PRN  haloperidol lactate, 5 mg, Q6H PRN  LORazepam, 1 mg, Q1H PRN   Or  LORazepam, 1 mg, Q1H PRN   Or  LORazepam, 2 mg, Q1H PRN   Or  LORazepam, 2 mg, Q1H PRN   Or  LORazepam, 3 mg, Q1H PRN   Or  LORazepam, 3 mg, Q1H PRN   Or  LORazepam, 4 mg, Q1H PRN   Or  LORazepam, 4 mg, Q1H PRN  albuterol, 2.5 mg, Q4H PRN  hydrALAZINE, 10 mg, Q6H PRN  sodium chloride flush, 5-40 mL, PRN  sodium chloride, , PRN  acetaminophen, 650 mg, Q4H PRN  ondansetron, 4 mg, Q8H PRN   Or  ondansetron, 4 mg, Q6H PRN  metoprolol, 5 mg, Q6H PRN  glucose, 4 tablet, PRN  dextrose bolus, 125 mL, PRN   Or  dextrose bolus, 250 mL, PRN  glucagon (rDNA), 1 mg, PRN  dextrose, 100 mL/hr, PRN            Subjective:     Patient seen and examined at bedside. No overnight events. No acute complaints today.   Afebrile      ROS:  Unable to assess due to pt mental status        Physical Examination:      Vitals:  BP (!) 164/149   Pulse 86   Temp 98.6 °F (37 °C) (Oral)   Resp (!) 39   Ht 5' 6\" (1.676 m)   Wt 183 lb 0.7 oz (83 kg)   SpO2 100%   BMI 29.54 kg/m²   Temp (24hrs), Av.4 °F (36.3 °C), Min:96.9 °F (36.1 °C), Max:98.6 °F (37 °C)    Weight:   Wt Readings from Last 3 Encounters:   22 183 lb 0.7 oz (83 kg)   21 156 lb (70.8 kg)   21 174 lb 4.8 oz (79.1 kg)     I/O last 3 completed shifts:  I/O last 3 completed shifts: In: 4677.1 [I.V.:.4; IV Piggyback:301.1]  Out: 5 [Urine:2900]     Recent Labs     22  0627 22  0703 22  1059 22  1635   POCGLU 294* 305* 330* 300*         General appearance - sleepy  Mental status - oriented to person, place, and time with normal affect  Head - normocephalic and atraumatic  Eyes - pupils equal and reactive, extraocular eye movements intact, conjunctiva clear  Ears - hearing appears to be intact  Nose - no drainage noted  Mouth - mucous membranes moist  Neck - supple, no carotid bruits, thyroid not palpable  Chest - clear to auscultation, normal effort  Heart - normal rate, regular rhythm, no murmur  Abdomen - soft, nontender, nondistended, bowel sounds present all four quadrants, no masses, hepatomegaly or splenomegaly  Neurological - normal speech, no focal findings or movement disorder noted, cranial nerves II through XII grossly intact  Extremities - peripheral pulses palpable, no pedal edema or calf pain with palpation  Skin - no gross lesions, rashes, or induration noted        Medications:      Allergies: No Known Allergies    Current Meds:   Current Facility-Administered Medications:     [COMPLETED] PHENobarbital (LUMINAL) injection 130 mg, 130 mg, IntraVENous, Once, 130 mg at 22 1214 **FOLLOWED BY** PHENobarbital (LUMINAL) injection 65 mg, 65 mg, IntraVENous, BID, Charmayne Castor, MD    PN-Adult 2-in-1 Central Line (Custom), , IntraVENous, Continuous TPN, Shashank Moser MD    ipratropium-albuterol (DUONEB) nebulizer solution 1 ampule, 1 ampule, Inhalation, TID, Kortney Leroy MD, 1 ampule at 06/01/22 1424    potassium chloride 20 mEq/50 mL IVPB (Central Line), 20 mEq, IntraVENous, PRN, Stopped at 05/31/22 1445 **OR** potassium chloride 10 mEq/100 mL IVPB (Peripheral Line), 10 mEq, IntraVENous, PRN, Kortney Leroy MD    PN-Adult 2-in-1 Central Line (Custom), , IntraVENous, Continuous TPN, Kortney Leroy MD, Last Rate: 74 mL/hr at 06/01/22 1309, Rate Verify at 06/01/22 1309    dexmedetomidine (PRECEDEX) 1,000 mcg in sodium chloride 0.9 % 250 mL infusion, 0.1-1.5 mcg/kg/hr, IntraVENous, Continuous, Shay Hawkins MD, Last Rate: 26.52 mL/hr at 06/01/22 1540, 1.3 mcg/kg/hr at 06/01/22 1540    insulin lispro (HUMALOG) injection vial 0-18 Units, 0-18 Units, SubCUTAneous, Q6H, Shay Hawkins MD, 12 Units at 06/01/22 1107    cloNIDine (CATAPRES) 0.2 MG/24HR 1 patch, 1 patch, TransDERmal, Weekly, Allie Gifford MD, 1 patch at 05/30/22 1453    fat emulsion 20 % infusion 100 mL, 100 mL, IntraVENous, Daily, Kortney Leroy MD, Stopped at 06/01/22 0545    famotidine (PEPCID) 20 mg in sodium chloride (PF) 10 mL injection, 20 mg, IntraVENous, BID, Kortney Leroy MD, 20 mg at 06/01/22 0830    methylPREDNISolone sodium (SOLU-MEDROL) injection 40 mg, 40 mg, IntraVENous, Q6H, Dianne Ramirez APRN - CNP, 40 mg at 06/01/22 1300    budesonide (PULMICORT) nebulizer suspension 500 mcg, 0.5 mg, Nebulization, BID, Pablo Huseyin, APRN - CNP, 500 mcg at 06/01/22 0736    enoxaparin (LOVENOX) injection 40 mg, 40 mg, SubCUTAneous, Daily, Pablo Huseyin APRN - CNP, 40 mg at 06/01/22 0830    haloperidol lactate (HALDOL) injection 5 mg, 5 mg, IntraMUSCular, Q6H PRN, Shay Hawkins MD, 5 mg at 06/01/22 1548    LORazepam (ATIVAN) tablet 1 mg, 1 mg, Oral, Q1H PRN **OR** LORazepam (ATIVAN) injection 1 mg, 1 mg, IntraVENous, Q1H PRN, 1 mg at 06/01/22 1107 **OR** LORazepam (ATIVAN) tablet 2 mg, 2 mg, Oral, Q1H PRN **OR** LORazepam (ATIVAN) injection 2 mg, 2 mg, IntraVENous, Q1H PRN, 2 mg at 05/31/22 1653 **OR** LORazepam (ATIVAN) tablet 3 mg, 3 mg, Oral, Q1H PRN **OR** LORazepam (ATIVAN) injection 3 mg, 3 mg, IntraVENous, Q1H PRN, 3 mg at 06/01/22 0147 **OR** LORazepam (ATIVAN) tablet 4 mg, 4 mg, Oral, Q1H PRN **OR** LORazepam (ATIVAN) injection 4 mg, 4 mg, IntraVENous, Q1H PRN, Shay Hawkins MD, 4 mg at 95/72/74 2622    folic acid 1 mg, thiamine (B-1) 100 mg in sodium chloride 0.9 % 50 mL IVPB, , IntraVENous, Daily, Shay Hawkins MD, Stopped at 06/01/22 0905    lacosamide (VIMPAT) 200 mg in sodium chloride 0.9 % 70 mL IVPB, 200 mg, IntraVENous, BID, Jose Guadalupe Berrios MD, Stopped at 06/01/22 1039    albuterol (PROVENTIL) nebulizer solution 2.5 mg, 2.5 mg, Nebulization, Q4H PRN, Shashank Moser MD    hydrALAZINE (APRESOLINE) injection 10 mg, 10 mg, IntraVENous, Q6H PRN, Shashank Moser MD, 10 mg at 06/01/22 1421    sodium chloride flush 0.9 % injection 10 mL, 10 mL, IntraVENous, Once, Erin Zamudio MD    sodium chloride flush 0.9 % injection 5-40 mL, 5-40 mL, IntraVENous, 2 times per day, Cierra Walker MD, 10 mL at 06/01/22 0842    sodium chloride flush 0.9 % injection 5-40 mL, 5-40 mL, IntraVENous, PRN, Shay Hawkins MD, 10 mL at 06/01/22 0134    0.9 % sodium chloride infusion, , IntraVENous, PRN, Cierra Walker MD, Stopped at 05/26/22 2008    acetaminophen (TYLENOL) tablet 650 mg, 650 mg, Oral, Q4H PRN, Shay Hawkins MD    ondansetron (ZOFRAN-ODT) disintegrating tablet 4 mg, 4 mg, Oral, Q8H PRN **OR** ondansetron (ZOFRAN) injection 4 mg, 4 mg, IntraVENous, Q6H PRN, Shay Hawkins MD    metoprolol (LOPRESSOR) injection 5 mg, 5 mg, IntraVENous, Q6H PRN, Shay Hawkins MD, 5 mg at 06/01/22 1051    glucose chewable tablet 16 g, 4 tablet, Oral, PRN, Shay Hawkins MD    dextrose bolus 10% 125 mL, 125 mL, IntraVENous, PRN **OR** dextrose bolus 10% 250 mL, 250 mL, IntraVENous, PRN, Shay Hawkins MD    glucagon (rDNA) injection 1 mg, 1 mg, IntraMUSCular, PRN, Cierra Walker MD    dextrose 5 % solution, 100 mL/hr, IntraVENous, PRN, Shay Hawkins MD    aspirin chewable tablet 81 mg, 81 mg, Oral, Daily, Shay Hawkins MD    atorvastatin (LIPITOR) tablet 40 mg, 40 mg, Oral, Nightly, Shay Hawkins MD, 40 mg at 05/27/22 2027    clopidogrel (PLAVIX) tablet 75 mg, 75 mg, Oral, Daily, Shay Hawkins MD    metFORMIN (GLUCOPHAGE) tablet 500 mg, 500 mg, Oral, BID WC, Shay Hawkins MD    PARoxetine (PAXIL) tablet 40 mg, 40 mg, Oral, Daily, Shay Hawkins MD    amLODIPine (NORVASC) tablet 5 mg, 5 mg, Oral, Daily, Shay Hawkins MD    atenolol (TENORMIN) tablet 50 mg, 50 mg, Oral, Daily, Shay Hawkins MD    losartan (COZAAR) tablet 100 mg, 100 mg, Oral, Daily, Shay Hawkins MD    niCARdipine (CARDENE) 50 mg in dextrose 5 % 250 mL infusion, 2.5-15 mg/hr, IntraVENous, Continuous, Shay Hawkins MD, Last Rate: 12.5 mL/hr at 06/01/22 1404, 2.5 mg/hr at 06/01/22 1404      I/O (24Hr):     Intake/Output Summary (Last 24 hours) at 6/1/2022 1651  Last data filed at 6/1/2022 1309  Gross per 24 hour   Intake 3365.48 ml   Output 1550 ml   Net 1815.48 ml       Data:           Labs:    Hematology:  Recent Labs     05/30/22  0514 05/31/22  0329 06/01/22  0345   WBC 8.0 7.2 6.6   RBC 3.64* 3.83* 3.54*   HGB 11.6* 12.2* 11.3*   HCT 35.5* 36.8* 34.6*   MCV 97.5 96.1 97.7   MCH 31.9 31.9 31.9   MCHC 32.7 33.2 32.7   RDW 13.5 13.1 13.2    208 188   MPV 9.8 9.7 10.1     Chemistry:  Recent Labs     05/30/22  0514 05/31/22  0329 06/01/22  0345    140 141   K 4.3 3.3* 3.7    106 107   CO2 19* 25 25   GLUCOSE 395* 252* 303*   BUN 32* 31* 36*   CREATININE 1.26* 1.19 1.39*   MG 1.9 1.7 1.9   ANIONGAP 11 9 9   LABGLOM 57* >60 51*   GFRAA >60 >60 >60   CALCIUM 8.7 9.0 8.9   PHOS 3.2 2.5 3.1     Recent Labs     05/30/22  0514 05/30/22  0602 06/01/22  0013 06/01/22  0615 06/01/22  0627 06/01/22  0703 06/01/22  1059 06/01/22  1635   LABA1C 6.3*  --   --   --   --   --   --   --    TRIG 146  --   --   --   --   -- --   --    POCGLU  --    < > 294* 138* 294* 305* 330* 300*    < > = values in this interval not displayed. Lab Results   Component Value Date/Time    SPECIAL NOT REPORTED 02/26/2021 09:30 AM     Lab Results   Component Value Date/Time    CULTURE NO GROWTH 3 DAYS 02/26/2021 09:30 AM       Lab Results   Component Value Date    POCPH 7.396 05/27/2022    POCPCO2 39.7 05/27/2022    POCPO2 87.5 05/27/2022    POCHCO3 24.4 05/27/2022    NBEA 1 05/26/2022    PBEA 0 05/27/2022    CVIO6JVV 97 05/27/2022    FIO2 30.0 05/27/2022       Radiology:    XR CHEST (SINGLE VIEW FRONTAL)    Result Date: 5/26/2022  Mild hypoventilatory changes noted both lung bases. CT HEAD WO CONTRAST    Result Date: 5/25/2022  No acute intracranial hemorrhage or abnormal extra-axial collection. Relative to 9 months prior there are increased areas of encephalomalacia in the left occipital lobe and new areas of encephalomalacia in the left parietal lobe. If there is persistent clinical concern for acute on chronic ischemia, MRI is more sensitive. XR CHEST PORTABLE    Result Date: 5/31/2022  Stable support line interval increase in now moderate left basilar opacity likely related to combined pleural-parenchymal process, pneumonia the likely etiology     XR CHEST PORTABLE    Result Date: 5/30/2022  Elevated left hemidiaphragm with apparent atelectasis left base. Slight improvement in right basilar opacity which may represent resolving atelectasis or improved minimal airspace disease. XR CHEST PORTABLE    Result Date: 5/29/2022  Interval developing right basilar opacity which may be related atelectasis or developing focal airspace disease. XR CHEST PORTABLE    Result Date: 5/28/2022  No significant finding in the chest.     CT CHEST ABDOMEN PELVIS W CONTRAST    Result Date: 5/25/2022  Small cysts noted in the liver. Diverticular disease of the colon without diverticulitis.   Significant thickening of the bladder wall, in keeping with muscular hypertrophy or cystitis. Clinical correlation suggested. Degenerative changes demonstrated in the the thoracic and lumbar spine. RECOMMENDATIONS:     MRI BRAIN W WO CONTRAST    Result Date: 5/25/2022  No acute intracranial abnormality. No mass effect or abnormal intracranial enhancement. Old infarctions in the left frontal parietal lobes, new since September 21, 2021. Old infarction in the left occipital lobe, likely increased in size since September 21, 2021. Laminar necrosis and gliosis associated with the old infarctions. Old lacunar infarcts in the left basal ganglia, likely increased. Mild parenchymal volume loss. All radiological studies reviewed  Code Status:  Full Code        Electronically signed by Katerina Caputo MD on 6/1/2022 at 4:51 PM    This note was created with the assistance of a speech-recognition program.  Although the intention is to generate a document that actually reflects the content of the visit, no guarantees can be provided that every mistake has been identified and corrected by editing. Note was updated later by me after  physical examination and  completion of the assessment.

## 2022-06-01 NOTE — PROGRESS NOTES
End Of Shift Note  [facility]  Summary of shift: Writer turned off cardene gtt  from [id number]. Attempted to keep off with use of prn meds, however SBP in 170s. Patient became increasingly agitated as day went on, precedex titrated according to RASS goal. Patient removed peripheral IV, restratins re-applied to keep patient from removing PICC. Prn haldol given at [phone] d/t patient kicking staff and becoming verbally aggressive. Urine output for shift 1200ml. Vitals:    Vitals:    06/01/22 1421 06/01/22 1424 06/01/22 1430 06/01/22 1904   BP: (!) 166/87  (!) 164/149    Pulse:  88 86    Resp:  (!) 44 (!) 39    Temp:    98.2 °F (36.8 °C)   TempSrc:    Oral   SpO2:  95% 100%    Weight:       Height:            I&O:     Intake/Output Summary (Last 24 hours) at 6/1/2022 1920  Last data filed at 6/1/2022 1900  Gross per 24 hour   Intake 3215.68 ml   Output 1850 ml   Net 1365.68 ml       Resp Status: clear and diminished bilaterally, remained on 4L NC.     Ventilator Settings:     / / /FiO2 : 30 %    Critical Care IV infusions:   PN-Adult 2-in-1 Central Line (Standard) 70 mL/hr at 06/01/22 1900    dexmedetomidine (PRECEDEX) IV infusion 1.5 mcg/kg/hr (06/01/22 1900)    sodium chloride Stopped (05/26/22 2008)    dextrose      niCARdipene (CARDENE) infusion 2.5 mg/hr (06/01/22 1900)        LDA:   PICC Double Lumen 89/51/96 Right Basilic (Active)   Number of days: 6       Urinary Catheter Peterson (Active)   Number of days: 4       Incision 09/22/21 Neck Left (Active)   Number of days: 252

## 2022-06-01 NOTE — PROGRESS NOTES
End Of Shift Note  [address] ICU  Summary of shift: Patient tolerating bipap, following simple commands but speech remains slurred and difficult to comprehend. No PRN BP medications given, cardene infusing at current rate for most of the night. New peripheral IV placed in left wrist, multiple attempts needed.    Vitals:    Vitals:    06/01/22 0515 06/01/22 0530 06/01/22 0545 06/01/22 0600   BP: (!) 141/67 117/67 124/64 111/64   Pulse: 71 73 73 72   Resp:       Temp:       TempSrc:       SpO2: 97% 97% 97% 98%   Weight:       Height:            I&O:     Intake/Output Summary (Last 24 hours) at 6/1/2022 0647  Last data filed at 6/1/2022 0622  Gross per 24 hour   Intake 3201.42 ml   Output 1550 ml   Net 1651.42 ml       Resp Status: 3 liters nasal cannula and bipap at 30% at night    Ventilator Settings:     / / /FiO2 : 30 %    Critical Care IV infusions:   PN-Adult 2-in-1 Central Line (Standard) 74 mL/hr at 06/01/22 0622    dexmedetomidine (PRECEDEX) IV infusion 1.2 mcg/kg/hr (06/01/22 0622)    sodium chloride Stopped (05/26/22 2008)    dextrose      niCARdipene (CARDENE) infusion 7.5 mg/hr (06/01/22 0622)        LDA:   PICC Double Lumen 04/61/11 Right Basilic (Active)   Number of days: 5       Peripheral IV 06/01/22 Left Forearm (Active)   Number of days: 0       Urinary Catheter Peterson (Active)   Number of days: 4       Incision 09/22/21 Neck Left (Active)   Number of days: 252

## 2022-06-01 NOTE — PROGRESS NOTES
Reason for Follow up: ADRIANA    Subjective:  SBP trending 110-150s. On cardene drip at lower rate. Received PRN metoprolol earlier today. Very agitated  And more hypertensive on lower dose precedex. Na 145. K better. Glucose elevated. Insulin increased in TPN. Creatinine trending higher at 1.39. Peterson in place. Urine Output 1550 mL yesterday  Encephalopathic. Review Of Systems:   Unable to obtain because current mental status.     Scheduled Meds:   PHENobarbital  65 mg IntraVENous BID    ipratropium-albuterol  1 ampule Inhalation TID    insulin lispro  0-18 Units SubCUTAneous Q6H    cloNIDine  1 patch TransDERmal Weekly    fat emulsion  100 mL IntraVENous Daily    famotidine (PEPCID) injection  20 mg IntraVENous BID    methylPREDNISolone  40 mg IntraVENous Q6H    budesonide  0.5 mg Nebulization BID    enoxaparin  40 mg SubCUTAneous Daily    thiamine and folic acid IVPB   IntraVENous Daily    lacosamide (VIMPAT) IVPB  200 mg IntraVENous BID    sodium chloride flush  10 mL IntraVENous Once    sodium chloride flush  5-40 mL IntraVENous 2 times per day    aspirin  81 mg Oral Daily    atorvastatin  40 mg Oral Nightly    clopidogrel  75 mg Oral Daily    metFORMIN  500 mg Oral BID WC    PARoxetine  40 mg Oral Daily    amLODIPine  5 mg Oral Daily    atenolol  50 mg Oral Daily    losartan  100 mg Oral Daily   Continuous Infusions:   PN-Adult 2-in-1 Central Line (Standard)      PN-Adult 2-in-1 Central Line (Standard) 74 mL/hr at 06/01/22 1309    dexmedetomidine (PRECEDEX) IV infusion 1.4 mcg/kg/hr (06/01/22 1309)    sodium chloride Stopped (05/26/22 2008)    dextrose      niCARdipene (CARDENE) infusion 2.5 mg/hr (06/01/22 1309)     PRN Meds:potassium chloride **OR** potassium chloride, haloperidol lactate, LORazepam **OR** LORazepam **OR** LORazepam **OR** LORazepam **OR** LORazepam **OR** LORazepam **OR** LORazepam **OR** LORazepam, albuterol, hydrALAZINE, sodium chloride flush, sodium chloride, acetaminophen, ondansetron **OR** ondansetron, metoprolol, glucose, dextrose bolus **OR** dextrose bolus, glucagon (rDNA), dextrose    No Known Allergies    Physical Exam:  Blood pressure (!) 149/91, pulse 91, temperature 98.6 °F (37 °C), temperature source Oral, resp. rate (!) 47, height 5' 6\" (1.676 m), weight 183 lb 0.7 oz (83 kg), SpO2 94 %. In: 4056.4 [I.V.:1648.6]  Out: 1550   In: 4056.4   Out: 1550 [Urine:1550]    General:  Not in distress. Appears to be stated age. HEENT: Atraumatic, normocephalic. Anicteric sclera. Pink and moist oral mucosa. Chest: Bilateral air entry, coarse to auscultation. Cardiovascular: RRR, S1S2, no murmur, rub or gallop. Has no lower extremity edema. Abdomen: Soft, non tender to palpation. Active bowel sounds. Musculoskeletal: No cyanosis or clubbing. Integumentary: Pink, warm and dry. Free from rash or lesions. Skin turgor normal.  CNS: lethargic on precedex.     Data:  CBC:   Lab Results   Component Value Date    WBC 6.6 06/01/2022    HGB 11.3 (L) 06/01/2022    HCT 34.6 (L) 06/01/2022    MCV 97.7 06/01/2022     06/01/2022     BMP:    Lab Results   Component Value Date     06/01/2022    K 3.7 06/01/2022     06/01/2022    CO2 25 06/01/2022    BUN 36 (H) 06/01/2022    CREATININE 1.39 (H) 06/01/2022    GLUCOSE 303 (H) 06/01/2022     CMP:   Lab Results   Component Value Date     06/01/2022    K 3.7 06/01/2022     06/01/2022    CO2 25 06/01/2022    BUN 36 (H) 06/01/2022    CREATININE 1.39 (H) 06/01/2022    GLUCOSE 303 (H) 06/01/2022    CALCIUM 8.9 06/01/2022    PROT 6.6 05/26/2022    LABALBU 3.8 05/26/2022    BILITOT 0.79 05/26/2022    ALKPHOS 69 05/26/2022    AST 24 05/26/2022    ALT 12 05/26/2022      Hepatic:   Lab Results   Component Value Date    AST 24 05/26/2022    ALT 12 05/26/2022    BILITOT 0.79 05/26/2022    ALKPHOS 69 05/26/2022     BNP: No results found for: BNP  Lipids:   Lab Results   Component Value Date    CHOL 185 02/25/2021 HDL 87 02/25/2021     INR:   Lab Results   Component Value Date    INR 1.1 09/22/2021     PTH: No results found for: PTH  Phosphorus:    Lab Results   Component Value Date    PHOS 3.1 06/01/2022     Ionized Calcium: No results found for: IONCA  Magnesium:   Lab Results   Component Value Date    MG 1.9 06/01/2022     Albumin:   Lab Results   Component Value Date    LABALBU 3.8 05/26/2022     Last 3 CK, CKMB, Troponin: @LABRCNT(CKTOTAL:3,CKMB:3,TROPONINI:3)       URINE:)No results found for: Alvaro Godwin    Radiology:   Reviewed. Assessment/Plan: To follow. Electronically signed by CHARLENE Mena CNP  on 6/1/2022 at 1:44 PM  [facility]. Ph: [phone]    Assessment:  Acute kidney injury  Urinary retention  Hyponatremia  Hypokalemia  Hypertension with worsening blood pressure secondary to DT  EtOH withdrawal    Plan  On TPN  Peterson for now  Monitor BP   FU labs    Electronically signed by Toribio Renee MD on 6/1/2022 at 2:35 PM  [facility].   Ph: [phone]

## 2022-06-01 NOTE — PROGRESS NOTES
Upon shift assessment, patient able to state some of his name and some of his , but slurs and mumbles words. Not oriented to time or place at this time. Pt appears restless and agitated but denies any pain at this time.  Will continue to monitor

## 2022-06-01 NOTE — PROGRESS NOTES
Occupational Therapy  Merged with [facility]  Occupational Therapy Not Seen Note    Patient not available for Occupational Therapy due to:    [] Testing:    [] Hemodialysis    [x] Cancelled by RN:6/1: Cx per RN Arlene Whiteside pt is not appropriate for OT eval at this time. Pt requiring increased sedation and has been agitated. Will continue to follow.      []Refusal by Patient:    [] Surgery:     [] Intubation:     [] Pain Medication:    [] Sedation:     [] Spine Precautions :    [] Medical Instability:    [] Other:      Dwayne Stoddard, OT

## 2022-06-01 NOTE — PLAN OF CARE
Problem: Neurosensory - Adult  Goal: Absence of seizures  5/31/2022 1400 by Sandra Spaulding RN  Outcome: Progressing  Flowsheets  Taken 5/31/2022 0800 by Sandra Spaulding RN  Absence of seizures: Monitor for seizure activity. If seizure occurs, document type and location of movements and any associated apnea  Taken 5/31/2022 0400 by Bettina Simmons RN  Absence of seizures: Support airway/breathing, administer oxygen as needed     Problem: Safety - Medical Restraint  Goal: Remains free of injury from restraints (Restraint for Interference with Medical Device)  Description: INTERVENTIONS:  1. Determine that other, less restrictive measures have been tried or would not be effective before applying the restraint  2. Evaluate the patient's condition at the time of restraint application  3. Inform patient/family regarding the reason for restraint  4.  Q2H: Monitor safety, psychosocial status, comfort, nutrition and hydration  Recent Flowsheet Documentation  Taken 5/31/2022 2000 by Andrea Pimentel RN  Remains free of injury from restraints (restraint for interference with medical device): Determine that other, less restrictive measures have been tried or would not be effective before applying the restraint

## 2022-06-01 NOTE — PROGRESS NOTES
Pulmonary Critical Care Progress Note  Toby Perez MD     Patient seen for the follow up of hypoxia, Seizure ([address]     Subjective:  He is sleeping with occasionally snoring. He is on oxygen via nasal cannula. He currently is on Cardene, Precedex drip as well as TPN. Examination:  Vitals: BP (!) 166/87   Pulse 88   Temp 98.6 °F (37 °C) (Oral)   Resp (!) 44   Ht 5' 6\" (1.676 m)   Wt 183 lb 0.7 oz (83 kg)   SpO2 95%   BMI 29.54 kg/m²   General appearance: Sleeping with periods of confusion  Neck: No JVD  Lungs: diminished air exchange  Heart: regular rate and rhythm, S1, S2 normal, no gallop  Abdomen: Soft, non tender, + BS  Extremities: no cyanosis or clubbing. No significant edema    LABs:  CBC:   Recent Labs     05/31/22  0329 06/01/22  0345   WBC 7.2 6.6   HGB 12.2* 11.3*   HCT 36.8* 34.6*    188     BMP:   Recent Labs     05/31/22  0329 06/01/22  0345    141   K 3.3* 3.7   CO2 25 25   BUN 31* 36*   CREATININE 1.19 1.39*   LABGLOM >60 51*   GLUCOSE 252* 303*     ABG:  Lab Results   Component Value Date    FIO2 30.0 05/27/2022       Lab Results   Component Value Date    POCPH 7.396 05/27/2022    POCPCO2 39.7 05/27/2022    POCPO2 87.5 05/27/2022    POCHCO3 24.4 05/27/2022    NBEA 1 05/26/2022    PBEA 0 05/27/2022    VWUA8YNV 97 05/27/2022    FIO2 30.0 05/27/2022   Results for Archie Brizuela (MRN [id number]) as of 5/30/2022 11:34   Ref.  Range 5/30/2022 05:14   Procalcitonin Latest Ref Range: <0.09 ng/mL 0.03     Radiology:  Chest xray: 6/1/2022      Impression:  · Acute respiratory insufficiency with hypoxia   · New onset seizure   · Alcohol abuse/ dependence/ withdrawal   · History of CVA   · Obstructive sleep apnea  · Active smoking/ likely COPD   · Severe right Carotid artery stenosis, status post left CEA 9/21  · HTN, DM    Recommendations:  · Continue BIPAP support as needed   · 2 liters/min via nasal cannula to keep spO2 greater than 92% as tolerated  · IV Solu-Medrol at 30 mg IV every 8 hours  · Ipratropium- Albuterol BID   · Budesonide via nebulizer BID   · Precedex drip, titrate to RAAS goal of 0 to -1, wean down  · Ativan per CIWA protocol   · Cardene drip, wean as able  · Vimpat 200mg BID, phenobarbital 65mg BID, neurology following   · Nephology following  · Continue TPN  · Seizure precautions  · Vascular surgery input noted  · Will monitor off of antibiotics at this time. Left basilar infiltrate with no fever or leukocytosis,? Atelectasis. We will do radiologic follow-up without starting antibiotics.     · Discussed with RN  · Labs: CBC and BMP in am  · DVT prophylaxis with low molecular weight heparin  · Will follow with you    Electronically signed by   Gualberto Luo MD on 6/1/2022 at 2:34 PM  Pulmonary Critical Care and Sleep Medicine,  [facility]  Cell: [phone]  Office: [phone]

## 2022-06-01 NOTE — PLAN OF CARE
Problem: Safety - Medical Restraint  Goal: Remains free of injury from restraints (Restraint for Interference with Medical Device)  Description: INTERVENTIONS:  1. Determine that other, less restrictive measures have been tried or would not be effective before applying the restraint  2. Evaluate the patient's condition at the time of restraint application  3. Inform patient/family regarding the reason for restraint  4. Q2H: Monitor safety, psychosocial status, comfort, nutrition and hydration  Recent Flowsheet Documentation  Taken 6/1/2022 1855 by Gutierrez Reddy RN  Remains free of injury from restraints (restraint for interference with medical device):   Evaluate the patient's condition at the time of restraint application   Inform patient/family regarding the reason for restraint   Every 2 hours: Monitor safety, psychosocial status, comfort, nutrition and hydration     Problem: Safety - Medical Restraint  Goal: Remains free of injury from restraints (Restraint for Interference with Medical Device)  Description: INTERVENTIONS:  1. Determine that other, less restrictive measures have been tried or would not be effective before applying the restraint  2. Evaluate the patient's condition at the time of restraint application  3. Inform patient/family regarding the reason for restraint  4.  Q2H: Monitor safety, psychosocial status, comfort, nutrition and hydration  Outcome: Progressing  Flowsheets (Taken 6/1/2022 1855)  Remains free of injury from restraints (restraint for interference with medical device):   Evaluate the patient's condition at the time of restraint application   Inform patient/family regarding the reason for restraint   Every 2 hours: Monitor safety, psychosocial status, comfort, nutrition and hydration     Problem: Respiratory - Adult  Goal: Achieves optimal ventilation and oxygenation  Outcome: Progressing     Problem: Skin/Tissue Integrity - Adult  Goal: Skin integrity remains intact  Outcome: Adequate for Discharge  Goal: Incisions, wounds, or drain sites healing without S/S of infection  Outcome: Adequate for Discharge  Goal: Oral mucous membranes remain intact  Outcome: Adequate for Discharge     Problem: Metabolic/Fluid and Electrolytes - Adult  Goal: Electrolytes maintained within normal limits  Outcome: Progressing  Goal: Hemodynamic stability and optimal renal function maintained  Outcome: Progressing  Goal: Glucose maintained within prescribed range  Outcome: Progressing     Problem: Anxiety  Goal: Will report anxiety at manageable levels  Description: INTERVENTIONS:  1. Administer medication as ordered  2. Teach and rehearse alternative coping skills  3. Provide emotional support with 1:1 interaction with staff  Outcome: Not Progressing     Problem: Confusion  Goal: Confusion, delirium, dementia, or psychosis is improved or at baseline  Description: INTERVENTIONS:  1. Assess for possible contributors to thought disturbance, including medications, impaired vision or hearing, underlying metabolic abnormalities, dehydration, psychiatric diagnoses, and notify attending LIP  2. Deridder high risk fall precautions, as indicated  3. Provide frequent short contacts to provide reality reorientation, refocusing and direction  4. Decrease environmental stimuli, including noise as appropriate  5. Monitor and intervene to maintain adequate nutrition, hydration, elimination, sleep and activity  6. If unable to ensure safety without constant attention obtain sitter and review sitter guidelines with assigned personnel  7. Initiate Psychosocial CNS and Spiritual Care consult, as indicated  Outcome: Not Progressing     Problem: Behavior  Goal: Pt/Family maintain appropriate behavior and adhere to behavioral management agreement, if implemented  Description: INTERVENTIONS:  1. Assess patient/family's coping skills and  non-compliant behavior (including use of illegal substances)  2.  Notify security of behavior or suspected illegal substances which indicate the need for search of the patient and/or belongings  3. Encourage verbalization of thoughts and concerns in a socially appropriate manner  4. Utilize positive, consistent limit setting strategies supporting safety of patient, staff and others  5. Encourage participation in the decision making process about the behavioral management agreement  6. Implement a Health Care Agreement if patient meets criteria  7. If a patient's behavior jeopardizes the safety of the patient, staff, or others refer to organization policy. If a visitor's behavior poses a threat to safety call refer to organization policy.   8. Initiate consult with , Psychosocial CNS, Spiritual Care as appropriate  Outcome: Not Progressing

## 2022-06-01 NOTE — PROGRESS NOTES
Nutrition Assessment     Type and Reason for Visit: Reassess    Nutrition Recommendations/Plan:   1. Continue NPO diet  2. Continue central, custom TPN at 70 mL/hr (1678 mL total volume) and 100 mL of lipids. Increase insulin to 62 units. TPN and lipids provide 1678 kcal and 84 gm of protein  3. Monitor TPN rate, TPN tolerance, swallowing status, diet advancement and labs     Malnutrition Assessment:  Malnutrition Status: At risk for malnutrition (Comment)    Nutrition Assessment:  Patient became very agitated, aggressive and verbally abusive with Precedex turned down. RN reports patient is now on more sedation. Patient remains NPO with TPN and lipids. Patient will need a swallow study before diet is advanced. Will continue TPN rate at 70 mL/hr (1680 mL total volume). Glucose leves continue to be elevated. Insulin will be increased to 62 units in the TPN bag. Monitor TPN rate, TPN tolerance, labs, swallowing status and diet advancement. Estimated Daily Nutrient Needs:  Energy (kcal):  1825 kcal (MSJ: 1 activity factor; 1.2 stess factor) Weight Used for Energy Requirements: Other (Comment)     Protein (g):  84 gm of protein (1.3 gm/kg) Weight Used for Protein Requirements: Ideal        Fluid (ml/day):         Nutrition Related Findings:   Edema: trace BUE. Active bowel sounds. Alcohol abuse and withdrawal. Increased aggression, agitation, yelling and verbal abuse.  Precedex used for sedation Wound Type: None    Current Nutrition Therapies:    Diet NPO  PN-Adult 2-in-1 Central Line (Custom)    Anthropometric Measures:  · Height: 5' 6\" (167.6 cm)  · Current Body Wt: 183 lb (83 kg)   · BMI: 29.6    Nutrition Diagnosis:   · Inadequate oral intake related to psychological cause or life stress,cognitive or neurological impairment as evidenced by NPO or clear liquid status due to medical condition,nutrition support - parenteral nutrition      Nutrition Interventions:   Food and/or Nutrient Delivery: Continue NPO,Modify Parenteral Nutrition  Nutrition Education/Counseling: Education not indicated  Coordination of Nutrition Care: Continue to monitor while inpatient       Goals:  Previous Goal Met: Progressing toward Goal(s)  Goals: Tolerate nutrition support at goal rate       Nutrition Monitoring and Evaluation:   Behavioral-Environmental Outcomes: None Identified  Food/Nutrient Intake Outcomes: Parenteral Nutrition Intake/Tolerance,Diet Advancement/Tolerance  Physical Signs/Symptoms Outcomes: Biochemical Data,Fluid Status or Edema,Skin,Weight,GI Status    Discharge Planning:     Too soon to determine         Docia Brian  MFN, RDN, LDN  Lead Clinical Dietitian  RD Office Phone [phone]

## 2022-06-01 NOTE — PROGRESS NOTES
Physical Therapy  DATE: 2022    NAME: Rocío Hightower  MRN: [id number]   : 1955    Patient not seen this date for Physical Therapy due to:      [x] Cancel by RN or physician due to: (Hold PT per Juan M Santoyo as pt sedated)    [] Hemodialysis    [] Critical Lab Value Level     [] Blood transfusion in progress    [] Acute or unstable cardiovascular status   _MAP < 55 or more than >115  _HR < 40 or > 130    [] Acute or unstable pulmonary status   -FiO2 > 60%   _RR < 5 or >40    _O2 sats < 85%    [] Strict Bedrest    [] Off Unit for surgery or procedure    [] Off Unit for testing       [] Pending imaging to R/O fracture    [] Refusal by Patient      [] Other      [] PT being discontinued at this time. Patient independent. No further needs. [] PT being discontinued at this time as the patient has been transferred to hospice care. No further needs.       Lyric Park, PT  10:38

## 2022-06-01 NOTE — RT PROTOCOL NOTE
RT Inhaler-Nebulizer Bronchodilator Protocol Note    There is a bronchodilator order in the chart from a provider indicating to follow the RT Bronchodilator Protocol and there is an Initiate RT Inhaler-Nebulizer Bronchodilator Protocol order as well (see protocol at bottom of note). CXR Findings:  XR CHEST PORTABLE    Result Date: 6/1/2022  Stable support line Improvement in now mild mild left basilar atelectasis versus infiltrate with small left effusion     XR CHEST PORTABLE    Result Date: 5/31/2022  Stable support line interval increase in now moderate left basilar opacity likely related to combined pleural-parenchymal process, pneumonia the likely etiology       The findings from the last RT Protocol Assessment were as follows:   History Pulmonary Disease: Chronic pulmonary disease  Respiratory Pattern: Dyspnea on exertion or RR 21-25 bpm  Breath Sounds: Slightly diminished and/or crackles  Cough: Weak, productive  Indication for Bronchodilator Therapy: Decreased or absent breath sounds  Bronchodilator Assessment Score: 8    Aerosolized bronchodilator medication orders have been revised according to the RT Inhaler-Nebulizer Bronchodilator Protocol below. Respiratory Therapist to perform RT Therapy Protocol Assessment initially then follow the protocol. Repeat RT Therapy Protocol Assessment PRN for score 0-3 or on second treatment, BID, and PRN for scores above 3. No Indications - adjust the frequency to every 6 hours PRN wheezing or bronchospasm, if no treatments needed after 48 hours then discontinue using Per Protocol order mode. If indication present, adjust the RT bronchodilator orders based on the Bronchodilator Assessment Score as indicated below.   Use Inhaler orders unless patient has one or more of the following: on home nebulizer, not able to hold breath for 10 seconds, is not alert and oriented, cannot activate and use MDI correctly, or respiratory rate 25 breaths per minute or more, then use the equivalent nebulizer order(s) with same Frequency and PRN reasons based on the score. If a patient is on this medication at home then do not decrease Frequency below that used at home. 0-3 - enter or revise RT bronchodilator order(s) to equivalent RT Bronchodilator order with Frequency of every 4 hours PRN for wheezing or increased work of breathing using Per Protocol order mode. 4-6 - enter or revise RT Bronchodilator order(s) to two equivalent RT bronchodilator orders with one order with BID Frequency and one order with Frequency of every 4 hours PRN wheezing or increased work of breathing using Per Protocol order mode. 7-10 - enter or revise RT Bronchodilator order(s) to two equivalent RT bronchodilator orders with one order with TID Frequency and one order with Frequency of every 4 hours PRN wheezing or increased work of breathing using Per Protocol order mode. 11-13 - enter or revise RT Bronchodilator order(s) to one equivalent RT bronchodilator order with QID Frequency and an Albuterol order with Frequency of every 4 hours PRN wheezing or increased work of breathing using Per Protocol order mode. Greater than 13 - enter or revise RT Bronchodilator order(s) to one equivalent RT bronchodilator order with every 4 hours Frequency and an Albuterol order with Frequency of every 2 hours PRN wheezing or increased work of breathing using Per Protocol order mode. RT to enter RT Home Evaluation for COPD & MDI Assessment order using Per Protocol order mode.     Electronically signed by Lesley Rivero RCP on 6/1/2022 at 7:42 AM

## 2022-06-02 LAB
ABSOLUTE EOS #: 0 K/UL (ref 0–0.44)
ABSOLUTE IMMATURE GRANULOCYTE: 0.08 K/UL (ref 0–0.3)
ABSOLUTE LYMPH #: 0.23 K/UL (ref 1.1–3.7)
ABSOLUTE MONO #: 0.68 K/UL (ref 0.1–1.2)
ANION GAP SERPL CALCULATED.3IONS-SCNC: 9 MMOL/L (ref 9–17)
BASOPHILS # BLD: 0 % (ref 0–2)
BASOPHILS ABSOLUTE: 0 K/UL (ref 0–0.2)
BUN BLDV-MCNC: 38 MG/DL (ref 8–23)
BUN/CREAT BLD: 31 (ref 9–20)
CALCIUM SERPL-MCNC: 8.8 MG/DL (ref 8.6–10.4)
CHLORIDE BLD-SCNC: 108 MMOL/L (ref 98–107)
CO2: 25 MMOL/L (ref 20–31)
CREAT SERPL-MCNC: 1.22 MG/DL (ref 0.7–1.2)
EOSINOPHILS RELATIVE PERCENT: 0 % (ref 1–4)
GFR AFRICAN AMERICAN: >60 ML/MIN
GFR NON-AFRICAN AMERICAN: 59 ML/MIN
GFR SERPL CREATININE-BSD FRML MDRD: ABNORMAL ML/MIN/{1.73_M2}
GLUCOSE BLD-MCNC: 176 MG/DL (ref 75–110)
GLUCOSE BLD-MCNC: 179 MG/DL (ref 75–110)
GLUCOSE BLD-MCNC: 196 MG/DL (ref 70–99)
GLUCOSE BLD-MCNC: 297 MG/DL (ref 75–110)
HCT VFR BLD CALC: 35.5 % (ref 40.7–50.3)
HEMOGLOBIN: 11.7 G/DL (ref 13–17)
IMMATURE GRANULOCYTES: 1 %
LYMPHOCYTES # BLD: 3 % (ref 24–43)
MAGNESIUM: 1.9 MG/DL (ref 1.6–2.6)
MCH RBC QN AUTO: 32.1 PG (ref 25.2–33.5)
MCHC RBC AUTO-ENTMCNC: 33 G/DL (ref 28–38)
MCV RBC AUTO: 97.3 FL (ref 82.6–102.9)
MONOCYTES # BLD: 9 % (ref 3–12)
PDW BLD-RTO: 13.2 % (ref 11.8–14.4)
PHOSPHORUS: 4.6 MG/DL (ref 2.5–4.5)
PLATELET # BLD: 215 K/UL (ref 138–453)
PMV BLD AUTO: 9.9 FL (ref 8.1–13.5)
POTASSIUM SERPL-SCNC: 3.7 MMOL/L (ref 3.7–5.3)
RBC # BLD: 3.65 M/UL (ref 4.21–5.77)
SEG NEUTROPHILS: 87 % (ref 36–65)
SEGMENTED NEUTROPHILS ABSOLUTE COUNT: 6.61 K/UL (ref 1.5–8.1)
SODIUM BLD-SCNC: 142 MMOL/L (ref 135–144)
WBC # BLD: 7.6 K/UL (ref 3.5–11.3)

## 2022-06-02 PROCEDURE — 2500000003 HC RX 250 WO HCPCS: Performed by: FAMILY MEDICINE

## 2022-06-02 PROCEDURE — A4216 STERILE WATER/SALINE, 10 ML: HCPCS | Performed by: INTERNAL MEDICINE

## 2022-06-02 PROCEDURE — 99232 SBSQ HOSP IP/OBS MODERATE 35: CPT | Performed by: PSYCHIATRY & NEUROLOGY

## 2022-06-02 PROCEDURE — 6360000002 HC RX W HCPCS: Performed by: INTERNAL MEDICINE

## 2022-06-02 PROCEDURE — 80048 BASIC METABOLIC PNL TOTAL CA: CPT

## 2022-06-02 PROCEDURE — 94640 AIRWAY INHALATION TREATMENT: CPT

## 2022-06-02 PROCEDURE — C9254 INJECTION, LACOSAMIDE: HCPCS | Performed by: PSYCHIATRY & NEUROLOGY

## 2022-06-02 PROCEDURE — 84100 ASSAY OF PHOSPHORUS: CPT

## 2022-06-02 PROCEDURE — 6360000002 HC RX W HCPCS: Performed by: FAMILY MEDICINE

## 2022-06-02 PROCEDURE — 2700000000 HC OXYGEN THERAPY PER DAY

## 2022-06-02 PROCEDURE — 2580000003 HC RX 258: Performed by: PSYCHIATRY & NEUROLOGY

## 2022-06-02 PROCEDURE — 6370000000 HC RX 637 (ALT 250 FOR IP): Performed by: INTERNAL MEDICINE

## 2022-06-02 PROCEDURE — 83735 ASSAY OF MAGNESIUM: CPT

## 2022-06-02 PROCEDURE — 36415 COLL VENOUS BLD VENIPUNCTURE: CPT

## 2022-06-02 PROCEDURE — 85025 COMPLETE CBC W/AUTO DIFF WBC: CPT

## 2022-06-02 PROCEDURE — 2000000000 HC ICU R&B

## 2022-06-02 PROCEDURE — 94761 N-INVAS EAR/PLS OXIMETRY MLT: CPT

## 2022-06-02 PROCEDURE — 6360000002 HC RX W HCPCS: Performed by: NURSE PRACTITIONER

## 2022-06-02 PROCEDURE — 94660 CPAP INITIATION&MGMT: CPT

## 2022-06-02 PROCEDURE — 2580000003 HC RX 258: Performed by: FAMILY MEDICINE

## 2022-06-02 PROCEDURE — 2580000003 HC RX 258: Performed by: INTERNAL MEDICINE

## 2022-06-02 PROCEDURE — 6360000002 HC RX W HCPCS: Performed by: PSYCHIATRY & NEUROLOGY

## 2022-06-02 PROCEDURE — 2500000003 HC RX 250 WO HCPCS: Performed by: INTERNAL MEDICINE

## 2022-06-02 PROCEDURE — 6370000000 HC RX 637 (ALT 250 FOR IP): Performed by: FAMILY MEDICINE

## 2022-06-02 PROCEDURE — 82947 ASSAY GLUCOSE BLOOD QUANT: CPT

## 2022-06-02 RX ORDER — OLANZAPINE 5 MG/1
5 TABLET, ORALLY DISINTEGRATING ORAL NIGHTLY
Status: DISCONTINUED | OUTPATIENT
Start: 2022-06-02 | End: 2022-06-05

## 2022-06-02 RX ORDER — METHYLPREDNISOLONE SODIUM SUCCINATE 40 MG/ML
40 INJECTION, POWDER, LYOPHILIZED, FOR SOLUTION INTRAMUSCULAR; INTRAVENOUS EVERY 8 HOURS
Status: DISCONTINUED | OUTPATIENT
Start: 2022-06-02 | End: 2022-06-06

## 2022-06-02 RX ADMIN — HYDRALAZINE HYDROCHLORIDE 10 MG: 20 INJECTION INTRAMUSCULAR; INTRAVENOUS at 08:55

## 2022-06-02 RX ADMIN — POTASSIUM CHLORIDE: 2 INJECTION, SOLUTION, CONCENTRATE INTRAVENOUS at 17:57

## 2022-06-02 RX ADMIN — IPRATROPIUM BROMIDE AND ALBUTEROL SULFATE 1 AMPULE: 2.5; .5 SOLUTION RESPIRATORY (INHALATION) at 07:52

## 2022-06-02 RX ADMIN — DEXTROSE MONOHYDRATE 15 MG/HR: 50 INJECTION, SOLUTION INTRAVENOUS at 22:33

## 2022-06-02 RX ADMIN — METOPROLOL TARTRATE 5 MG: 1 INJECTION, SOLUTION INTRAVENOUS at 01:35

## 2022-06-02 RX ADMIN — HALOPERIDOL LACTATE 5 MG: 5 INJECTION, SOLUTION INTRAMUSCULAR at 22:37

## 2022-06-02 RX ADMIN — METHYLPREDNISOLONE SODIUM SUCCINATE 40 MG: 40 INJECTION, POWDER, FOR SOLUTION INTRAMUSCULAR; INTRAVENOUS at 06:04

## 2022-06-02 RX ADMIN — HYDRALAZINE HYDROCHLORIDE 10 MG: 20 INJECTION INTRAMUSCULAR; INTRAVENOUS at 21:46

## 2022-06-02 RX ADMIN — DEXMEDETOMIDINE HYDROCHLORIDE 1.5 MCG/KG/HR: 100 INJECTION, SOLUTION INTRAVENOUS at 02:16

## 2022-06-02 RX ADMIN — METHYLPREDNISOLONE SODIUM SUCCINATE 40 MG: 40 INJECTION, POWDER, FOR SOLUTION INTRAMUSCULAR; INTRAVENOUS at 14:14

## 2022-06-02 RX ADMIN — PHENOBARBITAL SODIUM 65 MG: 65 INJECTION INTRAMUSCULAR; INTRAVENOUS at 20:07

## 2022-06-02 RX ADMIN — SODIUM CHLORIDE, PRESERVATIVE FREE 20 MG: 5 INJECTION INTRAVENOUS at 20:07

## 2022-06-02 RX ADMIN — LORAZEPAM 2 MG: 2 INJECTION INTRAMUSCULAR at 02:59

## 2022-06-02 RX ADMIN — METHYLPREDNISOLONE SODIUM SUCCINATE 40 MG: 40 INJECTION, POWDER, FOR SOLUTION INTRAMUSCULAR; INTRAVENOUS at 00:09

## 2022-06-02 RX ADMIN — BUDESONIDE 500 MCG: 0.5 SUSPENSION RESPIRATORY (INHALATION) at 07:52

## 2022-06-02 RX ADMIN — INSULIN LISPRO 3 UNITS: 100 INJECTION, SOLUTION INTRAVENOUS; SUBCUTANEOUS at 11:24

## 2022-06-02 RX ADMIN — LORAZEPAM 2 MG: 2 INJECTION INTRAMUSCULAR at 19:40

## 2022-06-02 RX ADMIN — PHENOBARBITAL SODIUM 65 MG: 65 INJECTION INTRAMUSCULAR; INTRAVENOUS at 09:48

## 2022-06-02 RX ADMIN — BUDESONIDE 500 MCG: 0.5 SUSPENSION RESPIRATORY (INHALATION) at 20:30

## 2022-06-02 RX ADMIN — DEXMEDETOMIDINE HYDROCHLORIDE 0.7 MCG/KG/HR: 100 INJECTION, SOLUTION INTRAVENOUS at 11:23

## 2022-06-02 RX ADMIN — IPRATROPIUM BROMIDE AND ALBUTEROL SULFATE 1 AMPULE: 2.5; .5 SOLUTION RESPIRATORY (INHALATION) at 20:30

## 2022-06-02 RX ADMIN — DEXTROSE MONOHYDRATE 2.5 MG/HR: 50 INJECTION, SOLUTION INTRAVENOUS at 12:40

## 2022-06-02 RX ADMIN — LACOSAMIDE 150 MG: 10 INJECTION, SOLUTION INTRAVENOUS at 21:18

## 2022-06-02 RX ADMIN — INSULIN LISPRO 6 UNITS: 100 INJECTION, SOLUTION INTRAVENOUS; SUBCUTANEOUS at 00:10

## 2022-06-02 RX ADMIN — LORAZEPAM 2 MG: 2 INJECTION INTRAMUSCULAR at 21:44

## 2022-06-02 RX ADMIN — LORAZEPAM 3 MG: 2 INJECTION INTRAMUSCULAR at 09:56

## 2022-06-02 RX ADMIN — INSULIN LISPRO 9 UNITS: 100 INJECTION, SOLUTION INTRAVENOUS; SUBCUTANEOUS at 17:53

## 2022-06-02 RX ADMIN — OLANZAPINE 5 MG: 5 TABLET, ORALLY DISINTEGRATING ORAL at 20:05

## 2022-06-02 RX ADMIN — FOLIC ACID: 5 INJECTION, SOLUTION INTRAMUSCULAR; INTRAVENOUS; SUBCUTANEOUS at 08:52

## 2022-06-02 RX ADMIN — METHYLPREDNISOLONE SODIUM SUCCINATE 40 MG: 40 INJECTION, POWDER, FOR SOLUTION INTRAMUSCULAR; INTRAVENOUS at 21:51

## 2022-06-02 RX ADMIN — HYDRALAZINE HYDROCHLORIDE 10 MG: 20 INJECTION INTRAMUSCULAR; INTRAVENOUS at 00:12

## 2022-06-02 RX ADMIN — METOPROLOL TARTRATE 5 MG: 1 INJECTION, SOLUTION INTRAVENOUS at 15:54

## 2022-06-02 RX ADMIN — LORAZEPAM 4 MG: 2 INJECTION INTRAMUSCULAR at 23:05

## 2022-06-02 RX ADMIN — DEXTROSE MONOHYDRATE 15 MG/HR: 50 INJECTION, SOLUTION INTRAVENOUS at 19:03

## 2022-06-02 RX ADMIN — IPRATROPIUM BROMIDE AND ALBUTEROL SULFATE 1 AMPULE: 2.5; .5 SOLUTION RESPIRATORY (INHALATION) at 14:12

## 2022-06-02 RX ADMIN — INSULIN LISPRO 3 UNITS: 100 INJECTION, SOLUTION INTRAVENOUS; SUBCUTANEOUS at 06:04

## 2022-06-02 RX ADMIN — SODIUM CHLORIDE, PRESERVATIVE FREE 20 MG: 5 INJECTION INTRAVENOUS at 08:55

## 2022-06-02 RX ADMIN — I.V. FAT EMULSION 100 ML: 20 EMULSION INTRAVENOUS at 17:58

## 2022-06-02 RX ADMIN — SODIUM CHLORIDE, PRESERVATIVE FREE 10 ML: 5 INJECTION INTRAVENOUS at 09:48

## 2022-06-02 RX ADMIN — METOPROLOL TARTRATE 5 MG: 1 INJECTION, SOLUTION INTRAVENOUS at 09:54

## 2022-06-02 RX ADMIN — LACOSAMIDE 200 MG: 10 INJECTION, SOLUTION INTRAVENOUS at 09:47

## 2022-06-02 RX ADMIN — ENOXAPARIN SODIUM 40 MG: 100 INJECTION SUBCUTANEOUS at 08:54

## 2022-06-02 RX ADMIN — LORAZEPAM 4 MG: 2 INJECTION INTRAMUSCULAR at 07:40

## 2022-06-02 RX ADMIN — LORAZEPAM 2 MG: 2 INJECTION INTRAMUSCULAR at 15:26

## 2022-06-02 RX ADMIN — LORAZEPAM 2 MG: 2 INJECTION INTRAMUSCULAR at 08:55

## 2022-06-02 RX ADMIN — HYDRALAZINE HYDROCHLORIDE 10 MG: 20 INJECTION INTRAMUSCULAR; INTRAVENOUS at 15:07

## 2022-06-02 NOTE — PROGRESS NOTES
Care: Continue to monitor while inpatient       Goals:  Previous Goal Met: Progressing toward Goal(s)  Goals: Tolerate nutrition support at goal rate       Nutrition Monitoring and Evaluation:   Behavioral-Environmental Outcomes: None Identified  Food/Nutrient Intake Outcomes: Parenteral Nutrition Intake/Tolerance,Diet Advancement/Tolerance  Physical Signs/Symptoms Outcomes: Biochemical Data,Fluid Status or Edema,Skin,Weight    Discharge Planning:     Too soon to determine         Temi JONESN, RDN, LDN  Lead Clinical Dietitian  RD Office Phone [phone]

## 2022-06-02 NOTE — PROGRESS NOTES
Reason for Follow up: ADRIANA    Subjective:  SBP trending 130-160s. Off cardene drip currently. On bipap. Sedated. Na 142. K ok. Glucose improving. Creatinine improving to 1.22  Urine Output 2140 mL yesterday  Encephalopathic. Review Of Systems:   Unable to obtain because current mental status.     Scheduled Meds:   PHENobarbital  65 mg IntraVENous BID    ipratropium-albuterol  1 ampule Inhalation TID    insulin lispro  0-18 Units SubCUTAneous Q6H    cloNIDine  1 patch TransDERmal Weekly    fat emulsion  100 mL IntraVENous Daily    famotidine (PEPCID) injection  20 mg IntraVENous BID    methylPREDNISolone  40 mg IntraVENous Q6H    budesonide  0.5 mg Nebulization BID    enoxaparin  40 mg SubCUTAneous Daily    thiamine and folic acid IVPB   IntraVENous Daily    lacosamide (VIMPAT) IVPB  200 mg IntraVENous BID    sodium chloride flush  10 mL IntraVENous Once    sodium chloride flush  5-40 mL IntraVENous 2 times per day    aspirin  81 mg Oral Daily    atorvastatin  40 mg Oral Nightly    clopidogrel  75 mg Oral Daily    metFORMIN  500 mg Oral BID WC    PARoxetine  40 mg Oral Daily    amLODIPine  5 mg Oral Daily    atenolol  50 mg Oral Daily    losartan  100 mg Oral Daily   Continuous Infusions:   PN-Adult 2-in-1 Central Line (Standard) 70 mL/hr at 06/02/22 5261    dexmedetomidine (PRECEDEX) IV infusion 1.5 mcg/kg/hr (06/02/22 6383)    sodium chloride Stopped (05/26/22 2008)    dextrose      niCARdipene (CARDENE) infusion Stopped (06/02/22 0033)     PRN Meds:potassium chloride **OR** potassium chloride, haloperidol lactate, LORazepam **OR** LORazepam **OR** LORazepam **OR** LORazepam **OR** LORazepam **OR** LORazepam **OR** LORazepam **OR** LORazepam, albuterol, hydrALAZINE, sodium chloride flush, sodium chloride, acetaminophen, ondansetron **OR** ondansetron, metoprolol, glucose, dextrose bolus **OR** dextrose bolus, glucagon (rDNA), dextrose    No Known Allergies    Physical Exam:  Blood pressure (!) 140/93, pulse 76, temperature 97.2 °F (36.2 °C), temperature source Temporal, resp. rate 18, height 5' 6\" (1.676 m), weight 183 lb 0.7 oz (83 kg), SpO2 96 %. In: 2831.4 [I.V.:892.1]  Out: 2140   In: 2831.4   Out: 2140 [Urine:2140]    General:  Not in distress. Appears to be stated age. On bipap. HEENT: Atraumatic, normocephalic. Anicteric sclera. Pink and moist oral mucosa. Chest: Bilateral air entry, coarse to auscultation. Cardiovascular: RRR, S1S2, no murmur, rub or gallop. Has no lower extremity edema. Abdomen: Soft, non tender to palpation. Active bowel sounds. Musculoskeletal: No cyanosis or clubbing. Integumentary: Pink, warm and dry. Free from rash or lesions. Skin turgor normal.  CNS: lethargic on precedex.     Data:  CBC:   Lab Results   Component Value Date    WBC 7.6 06/02/2022    HGB 11.7 (L) 06/02/2022    HCT 35.5 (L) 06/02/2022    MCV 97.3 06/02/2022     06/02/2022     BMP:    Lab Results   Component Value Date     06/02/2022    K 3.7 06/02/2022     (H) 06/02/2022    CO2 25 06/02/2022    BUN 38 (H) 06/02/2022    CREATININE 1.22 (H) 06/02/2022    GLUCOSE 196 (H) 06/02/2022     CMP:   Lab Results   Component Value Date     06/02/2022    K 3.7 06/02/2022     (H) 06/02/2022    CO2 25 06/02/2022    BUN 38 (H) 06/02/2022    CREATININE 1.22 (H) 06/02/2022    GLUCOSE 196 (H) 06/02/2022    CALCIUM 8.8 06/02/2022    PROT 6.6 05/26/2022    LABALBU 3.8 05/26/2022    BILITOT 0.79 05/26/2022    ALKPHOS 69 05/26/2022    AST 24 05/26/2022    ALT 12 05/26/2022      Hepatic:   Lab Results   Component Value Date    AST 24 05/26/2022    ALT 12 05/26/2022    BILITOT 0.79 05/26/2022    ALKPHOS 69 05/26/2022     BNP: No results found for: BNP  Lipids:   Lab Results   Component Value Date    CHOL 185 02/25/2021    HDL 87 02/25/2021     INR:   Lab Results   Component Value Date    INR 1.1 09/22/2021     PTH: No results found for: PTH  Phosphorus:    Lab Results   Component Value Date    PHOS 4.6 (H) 06/02/2022     Ionized Calcium: No results found for: IONCA  Magnesium:   Lab Results   Component Value Date    MG 1.9 06/02/2022     Albumin:   Lab Results   Component Value Date    LABALBU 3.8 05/26/2022     Last 3 CK, CKMB, Troponin: @LABRCNT(CKTOTAL:3,CKMB:3,TROPONINI:3)       URINE:)No results found for: Hailey Mcgarry    Radiology:   Reviewed. Assessment/Plan: To follow. Pt seen in collaboration with Dr. Rinku Matos. Electronically signed by CHARLENE Nuñez CNP  on 6/2/2022 at 7:09 AM  [facility]. Ph: [phone]    Assessment:  Acute kidney injury, Cr is better. Urinary retention. Hyponatremia, improved. Hypokalemia, improved. Hypertension with worsening blood pressure secondary to DT  EtOH withdrawal     Plan  On TPN  [place] for now  Monitor BP   FU labs      Electronically signed by Matt Dhillon MD on 6/2/2022 at 10:29 AM  [facility].   Ph: [phone]

## 2022-06-02 NOTE — PROGRESS NOTES
Physical Therapy  DATE: 2022    NAME: Rupal Crowley  MRN: [id number]   : 1955    Patient not seen this date for Physical Therapy due to:      [x] Cancel by RN or physician due to: RN Shani Marrero states pt is not appropriate for therapy eval this date. Reports pt \"sometimes responds to me, sometimes doesn't\". PT will continue to follow and ck back as able. [] Hemodialysis    [] Critical Lab Value Level     [] Blood transfusion in progress    [] Acute or unstable cardiovascular status   _MAP < 55 or more than >115  _HR < 40 or > 130    [] Acute or unstable pulmonary status   -FiO2 > 60%   _RR < 5 or >40    _O2 sats < 85%    [] Strict Bedrest    [] Off Unit for surgery or procedure    [] Off Unit for testing       [] Pending imaging to R/O fracture    [] Refusal by Patient      [] Other      [] PT being discontinued at this time. Patient independent. No further needs. [] PT being discontinued at this time as the patient has been transferred to hospice care. No further needs.       Trinh Hernandes, PT

## 2022-06-02 NOTE — PROGRESS NOTES
HPI/Hospital course: This is a 77 y/o WM with a history of alcoholism, prior strokes and tobacco abuse who presented after a seizure and was then noted to be in alcohol withdrawal. His hospital course has been complicated by continued delirium and agitation and difficulties weaning from sedation. EEG negative for seizure activity. No seizures noted during hospitalization. Past Medical History:   Diagnosis Date    CVA (cerebral vascular accident) ([facility] [address] 02/2021    With residual right sided weakness    Diabetes mellitus ([facility] [address]     Hypertension        Past Surgical History:   Procedure Laterality Date    CAROTID ENDARTERECTOMY  09/22/2021    CAROTID ENDARTERECTOMY (N/A Neck    CAROTID ENDARTERECTOMY N/A 9/22/2021    CAROTID ENDARTERECTOMY performed by Sailaja Isaac MD at [address] [place] [address] IR INS PICC VAD W SQ PORT GREATER THAN 5  5/26/2022    IR INS PICC VAD W SQ PORT GREATER THAN 5 5/26/2022 [facility]    KNEE SURGERY         History reviewed. No pertinent family history. Social History     Socioeconomic History    Marital status:      Spouse name: None    Number of children: None    Years of education: None    Highest education level: None   Occupational History    None   Tobacco Use    Smoking status: Current Every Day Smoker     Packs/day: 1.50     Start date: 6/1/1973    Smokeless tobacco: Never Used   Substance and Sexual Activity    Alcohol use:  Yes     Alcohol/week: 4.0 standard drinks     Types: 4 Cans of beer per week     Comment: Occassional, four times weekly    Drug use: No    Sexual activity: Yes     Partners: Female   Other Topics Concern    None   Social History Narrative    None     Social Determinants of Health     Financial Resource Strain:     Difficulty of Paying Living Expenses: Not on file   Food Insecurity:     Worried About Running Out of Food in the Last Year: Not on file    Dylon of Food in the Last Year: Not on file   Transportation Needs:  Lack of Transportation (Medical): Not on file    Lack of Transportation (Non-Medical):  Not on file   Physical Activity:     Days of Exercise per Week: Not on file    Minutes of Exercise per Session: Not on file   Stress:     Feeling of Stress : Not on file   Social Connections:     Frequency of Communication with Friends and Family: Not on file    Frequency of Social Gatherings with Friends and Family: Not on file    Attends Je[employer] Services: Not on file    Active Member of [address] [place] [address] or Organizations: Not on file    Attends Club or Organization Meetings: Not on file    Marital Status: Not on file   Intimate Partner Violence:     Fear of Current or Ex-Partner: Not on file    Emotionally Abused: Not on file    Physically Abused: Not on file    Sexually Abused: Not on file   Housing Stability:     Unable to Pay for Housing in the Last Year: Not on file    Number of [place] in the Last Year: Not on file    Unstable Housing in the Last Year: Not on file       Current Facility-Administered Medications   Medication Dose Route Frequency Provider Last Rate Last Admin    methylPREDNISolone sodium (SOLU-MEDROL) injection 40 mg  40 mg IntraVENous Q8H Clair Gifford MD        OLANZapine zydis (ZYPREXA) disintegrating tablet 5 mg  5 mg Oral Nightly Marie Joshi MD        PN-Adult 2-in-1 Central Line (Custom)   IntraVENous Continuous TPN Shashank Moser MD        PHENobarbital (LUMINAL) injection 65 mg  65 mg IntraVENous BID Charmayne Castor, MD   65 mg at 06/02/22 1927    PN-Adult 2-in-1 Central Line (Custom)   IntraVENous Continuous TPN Shashank Moser MD 70 mL/hr at 06/02/22 0621 Rate Verify at 06/02/22 0621    ipratropium-albuterol (DUONEB) nebulizer solution 1 ampule  1 ampule Inhalation TID Shashank Moser MD   1 ampule at 06/02/22 0752    potassium chloride 20 mEq/50 mL IVPB (Central Line)  20 mEq IntraVENous PRN Shashank Moser MD   Stopped at 05/31/22 1445    Or    potassium chloride 10 mEq/100 mL IVPB (Peripheral Line)  10 mEq IntraVENous PRN Carmen Nevarez MD        dexmedetomidine (PRECEDEX) 1,000 mcg in sodium chloride 0.9 % 250 mL infusion  0.1-1.5 mcg/kg/hr IntraVENous Continuous Shay Hawkins MD 6.12 mL/hr at 06/02/22 1219 0.3 mcg/kg/hr at 06/02/22 1219    insulin lispro (HUMALOG) injection vial 0-18 Units  0-18 Units SubCUTAneous Lashanda Galdamez MD   3 Units at 06/02/22 1124    cloNIDine (CATAPRES) 0.2 MG/24HR 1 patch  1 patch TransDERmal Weekly Clair Gifford MD   1 patch at 05/30/22 1453    fat emulsion 20 % infusion 100 mL  100 mL IntraVENous Daily Carmen Nevarez MD   Paused at 06/02/22 0617    famotidine (PEPCID) 20 mg in sodium chloride (PF) 10 mL injection  20 mg IntraVENous BID Carmen Nevarez MD   20 mg at 06/02/22 0855    budesonide (PULMICORT) nebulizer suspension 500 mcg  0.5 mg Nebulization BID CHARLENE Gutierrez CNP   500 mcg at 06/02/22 0752    enoxaparin (LOVENOX) injection 40 mg  40 mg SubCUTAneous Daily CHARLENE Gutierrez CNP   40 mg at 06/02/22 0854    haloperidol lactate (HALDOL) injection 5 mg  5 mg IntraMUSCular Q6H PRN Shay Hawkins MD   5 mg at 06/01/22 1548    LORazepam (ATIVAN) tablet 1 mg  1 mg Oral Q1H PRN Shay Hawkins MD        Or    LORazepam (ATIVAN) injection 1 mg  1 mg IntraVENous Q1H PRN Shay Hawkins MD   1 mg at 06/01/22 1107    Or    LORazepam (ATIVAN) tablet 2 mg  2 mg Oral Q1H PRN Shay Hawkins MD        Or    LORazepam (ATIVAN) injection 2 mg  2 mg IntraVENous Q1H PRN Shay Hawkins MD   2 mg at 06/02/22 0855    Or    LORazepam (ATIVAN) tablet 3 mg  3 mg Oral Q1H PRN Shay Hawkins MD        Or    LORazepam (ATIVAN) injection 3 mg  3 mg IntraVENous Q1H PRN Shay Hawkins MD   3 mg at 06/02/22 0956    Or    LORazepam (ATIVAN) tablet 4 mg  4 mg Oral Q1H PRN Shay Hawkins MD        Or    LORazepam (ATIVAN) injection 4 mg  4 mg IntraVENous Q1H PRN Shay Hawkins MD   4 mg at 27/23/99 6363    folic acid 1 mg, thiamine (B-1) 100 mg in sodium chloride 0.9 % 50 mL IVPB   IntraVENous Daily Zunilda Atkinson MD   Stopped at 06/02/22 0946    lacosamide (VIMPAT) 200 mg in sodium chloride 0.9 % 70 mL IVPB  200 mg IntraVENous BID Edda Gavin MD   Stopped at 06/02/22 1029    albuterol (PROVENTIL) nebulizer solution 2.5 mg  2.5 mg Nebulization Q4H PRN Jesus Matamoros MD        hydrALAZINE (APRESOLINE) injection 10 mg  10 mg IntraVENous Q6H PRN Jesus Matamoros MD   10 mg at 06/02/22 0855    sodium chloride flush 0.9 % injection 10 mL  10 mL IntraVENous Once Shirleysakshi Gonzalez MD        sodium chloride flush 0.9 % injection 5-40 mL  5-40 mL IntraVENous 2 times per day Zunilda Atkinson MD   10 mL at 06/02/22 0948    sodium chloride flush 0.9 % injection 5-40 mL  5-40 mL IntraVENous PRN Zunilda Atkinson MD   10 mL at 06/01/22 0134    0.9 % sodium chloride infusion   IntraVENous PRN Zunilda Atkinson MD   Stopped at 05/26/22 2008    acetaminophen (TYLENOL) tablet 650 mg  650 mg Oral Q4H PRN Shay Hawkins MD        ondansetron (ZOFRAN-ODT) disintegrating tablet 4 mg  4 mg Oral Q8H PRN Shay Hawkins MD        Or    ondansetron (ZOFRAN) injection 4 mg  4 mg IntraVENous Q6H PRN Shay Hawkins MD        metoprolol (LOPRESSOR) injection 5 mg  5 mg IntraVENous Q6H PRN Shay Hawkins MD   5 mg at 06/02/22 0954    glucose chewable tablet 16 g  4 tablet Oral PRN Shay Hawkins MD        dextrose bolus 10% 125 mL  125 mL IntraVENous PRN Shay Hawkins MD        Or    dextrose bolus 10% 250 mL  250 mL IntraVENous PRN Shay Hawkins MD        glucagon (rDNA) injection 1 mg  1 mg IntraMUSCular PRN Shay Hawkins MD        dextrose 5 % solution  100 mL/hr IntraVENous PRN Shay Hawkins MD        aspirin chewable tablet 81 mg  81 mg Oral Daily Shay Hawkins MD        atorvastatin (LIPITOR) tablet 40 mg  40 mg Oral Nightly Shay Hawkins MD   40 mg at 05/27/22 2027    clopidogrel (PLAVIX) tablet 75 mg  75 mg Oral Daily MD Prabhjot Hodges metFORMIN (GLUCOPHAGE) tablet 500 mg  500 mg Oral BID  Shay Hawkins MD        PARoxetine (PAXIL) tablet 40 mg  40 mg Oral Daily Shay Hawkins MD        amLODIPine (NORVASC) tablet 5 mg  5 mg Oral Daily Shay Hawkins MD        atenolol (TENORMIN) tablet 50 mg  50 mg Oral Daily Shay Hawkins MD        losartan (COZAAR) tablet 100 mg  100 mg Oral Daily Shay Hawkins MD        niCARdipine (CARDENE) 50 mg in dextrose 5 % 250 mL infusion  2.5-15 mg/hr IntraVENous Continuous Shay Hawkins MD 12.5 mL/hr at 06/02/22 1240 2.5 mg/hr at 06/02/22 1240       No Known Allergies      Vitals:    06/02/22 1200   BP: (!) 176/138   Pulse: 100   Resp:    Temp:    SpO2:      Admission weight: 180 lb (81.6 kg)    General Exam:  Overweight body habitus, sleeping with noisy respirations    HEENT:  Normocephalic, atraumatic  Eyes: conjunctiva non-injected, sclera anicteric  Mucous membranes of normal color and hydration status  Dentition: poor  Neck supple. Neurologic exam:     Mental status: Sleeping, alerts to some painful stimulation  No gaze preference    Cranial nerves:  Pupils equal round and reactive to light, s  Extraocular movements: eyes conjugate  Face is symmetric      Arms in restraints, moves all limbs purposefully     Assessment : This is a 77 y/o with prior strokes and alcoholism who presented with a seizure which was perhaps due to alcohol withdrawal or could be new onset epilepsy. Initially treated with Keppra but this was switched to vimpat which is currently at a high dose. He is also on phenobarb as well. There have been continuing issues getting him off sedation. Plan:  1. Will decrease Vimpat in case this is contributing to his mental status      Joaquin Rodriguez. Mellisa Patrick M.D.   Clinical Neurophysiologist  Neuromuscular Medicine

## 2022-06-02 NOTE — PROGRESS NOTES
Patient restless and unable to be reoriented, speech difficult to understand. Patient remains hypertensive. Cardene infusing at 2.5 and precedex at 1.5. Will continue to monitor.

## 2022-06-02 NOTE — PROGRESS NOTES
[address] Hospitalist        6/2/2022   3:52 PM    Name:  Irene Jeff  MRN:    [id number]     Acct:     [de-identified]   Room:  [address] [place] [address] Day: 6     Admit Date: 5/25/2022 12:59 PM  PCP: DENNIS Mg    C/C:   Chief Complaint   Patient presents with    Seizures       Assessment:          · New onset seizure  · Altered mental status secondary to alcohol withdrawal delirium and postictal state following seizure  · Old infarct left frontal parietal lobes, new since 9/2021  · Old infarction left occipital lobe, increased since 9/2021  · 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 / Encephalomalacia   · Old lacunar infarcts left basal ganglia   · Mild brain parenchymal volume loss  · Alcohol dependence   · Reported right hemiparesis   · Essential hypertension  · Diabetes mellitus type 2  · Overweight   · Diverticulosis  · Agitation   · Delirium tremens     · Acute respiratory failure with hypoxia   · Oliguria  · Carotid artery disease         Plan:      · Patient currently in ICU  · O2 to maintain oxygen saturation greater than 92%  · BiPAP as needed  · Amiodarone  · DuoNebs  ·   ·    · Precedex gtt.   · Seizure precautions  · IV Solu-Medrol  · TPN  · CIWA protocol  · Critical care on board  ·    · Waxing and waning mental status   · phenobarbital and Vimpat, decreasing vimpat per neurology  · Neurology following  · Zyprexa  · As needed Haldol  ·    · Monitor blood pressure  · Cardene drip, weaning  · IV Lopressor as needed  · Continue clonidine patch  · Nephrology on board  ·    · Vascular evaluated the the patient,   ·    · DVT and GI prophylaxis  · Continue Med as below      Scheduled Meds:   methylPREDNISolone  40 mg IntraVENous Q8H    OLANZapine zydis  5 mg Oral Nightly    lacosamide (VIMPAT) IVPB  150 mg IntraVENous BID    PHENobarbital  65 mg IntraVENous BID    ipratropium-albuterol  1 ampule Inhalation TID    insulin lispro  0-18 Units SubCUTAneous Q6H    cloNIDine  1 patch TransDERmal Weekly    fat emulsion  100 mL IntraVENous Daily    famotidine (PEPCID) injection  20 mg IntraVENous BID    budesonide  0.5 mg Nebulization BID    enoxaparin  40 mg SubCUTAneous Daily    thiamine and folic acid IVPB   IntraVENous Daily    sodium chloride flush  10 mL IntraVENous Once    sodium chloride flush  5-40 mL IntraVENous 2 times per day    aspirin  81 mg Oral Daily    atorvastatin  40 mg Oral Nightly    clopidogrel  75 mg Oral Daily    metFORMIN  500 mg Oral BID WC    PARoxetine  40 mg Oral Daily    amLODIPine  5 mg Oral Daily    atenolol  50 mg Oral Daily    losartan  100 mg Oral Daily     Continuous Infusions:   PN-Adult 2-in-1 Central Line (Standard)      PN-Adult 2-in-1 [employer] (Standard) 70 mL/hr at 06/02/22 9130    dexmedetomidine (PRECEDEX) IV infusion Stopped (06/02/22 1352)    sodium chloride Stopped (05/26/22 2008)    dextrose      niCARdipene (CARDENE) infusion 10 mg/hr (06/02/22 1529)     PRN Meds:  potassium chloride, 20 mEq, PRN   Or  potassium chloride, 10 mEq, PRN  haloperidol lactate, 5 mg, Q6H PRN  LORazepam, 1 mg, Q1H PRN   Or  LORazepam, 1 mg, Q1H PRN   Or  LORazepam, 2 mg, Q1H PRN   Or  LORazepam, 2 mg, Q1H PRN   Or  LORazepam, 3 mg, Q1H PRN   Or  LORazepam, 3 mg, Q1H PRN   Or  LORazepam, 4 mg, Q1H PRN   Or  LORazepam, 4 mg, Q1H PRN  albuterol, 2.5 mg, Q4H PRN  hydrALAZINE, 10 mg, Q6H PRN  sodium chloride flush, 5-40 mL, PRN  sodium chloride, , PRN  acetaminophen, 650 mg, Q4H PRN  ondansetron, 4 mg, Q8H PRN   Or  ondansetron, 4 mg, Q6H PRN  metoprolol, 5 mg, Q6H PRN  glucose, 4 tablet, PRN  dextrose bolus, 125 mL, PRN   Or  dextrose bolus, 250 mL, PRN  glucagon (rDNA), 1 mg, PRN  dextrose, 100 mL/hr, PRN            Subjective:     Patient seen and examined at bedside. No overnight events. No acute complaints today. Afebrile  Patient is a still confused.   Currently on Precedex      ROS:  Unable to assess due to pt mental status        Physical Examination:      Vitals:  BP (!) 176/138   Pulse 99   Temp 97.2 °F (36.2 °C) (Temporal)   Resp 19   Ht 5' 6\" (1.676 m)   Wt 183 lb 0.7 oz (83 kg)   SpO2 96%   BMI 29.54 kg/m²   Temp (24hrs), Av.5 °F (36.4 °C), Min:97.2 °F (36.2 °C), Max:98.2 °F (36.8 °C)    Weight:   Wt Readings from Last 3 Encounters:   22 183 lb 0.7 oz (83 kg)   21 156 lb (70.8 kg)   21 174 lb 4.8 oz (79.1 kg)     I/O last 3 completed shifts:  I/O last 3 completed shifts: In: 4535.7 [I.V.:1573.2; IV Piggyback:205.9]  Out: 1715 [Urine:2790]     Recent Labs     22  1635 22  2321 22  0554 22  1123   POCGLU 300* 235* 176* 179*         General appearance - sleepy  Mental status - oriented to person, place, and time with normal affect  Head - normocephalic and atraumatic  Eyes - pupils equal and reactive, extraocular eye movements intact, conjunctiva clear  Ears - hearing appears to be intact  Nose - no drainage noted  Mouth - mucous membranes moist  Neck - supple, no carotid bruits, thyroid not palpable  Chest - clear to auscultation, normal effort  Heart - normal rate, regular rhythm, no murmur  Abdomen - soft, nontender, nondistended, bowel sounds present all four quadrants, no masses, hepatomegaly or splenomegaly  Neurological - normal speech, no focal findings or movement disorder noted, cranial nerves II through XII grossly intact  Extremities - peripheral pulses palpable, no pedal edema or calf pain with palpation  Skin - no gross lesions, rashes, or induration noted        Medications:      Allergies: No Known Allergies    Current Meds:   Current Facility-Administered Medications:     methylPREDNISolone sodium (SOLU-MEDROL) injection 40 mg, 40 mg, IntraVENous, Q8H, Clair Gifford MD, 40 mg at 22 1414    OLANZapine zydis (ZYPREXA) disintegrating tablet 5 mg, 5 mg, Oral, Nightly, Betty Metcalf MD    PN-Adult 2-in-1 [facility] [employer] (Custom), , IntraVENous, Continuous TPN, Dee Amin MD    lacosamide (VIMPAT) 150 mg in sodium chloride 0.9 % 65 mL IVPB, 150 mg, IntraVENous, BID, Salvador Maurice MD    [COMPLETED] PHENobarbital (LUMINAL) injection 130 mg, 130 mg, IntraVENous, Once, 130 mg at 05/27/22 1214 **FOLLOWED BY** PHENobarbital (LUMINAL) injection 65 mg, 65 mg, IntraVENous, BID, Salvador Maurice MD, 65 mg at 06/02/22 9336    PN-Adult 2-in-1 Central Line (Custom), , IntraVENous, Continuous TPN, Dee Amin MD, Last Rate: 70 mL/hr at 06/02/22 0621, Rate Verify at 06/02/22 0621    ipratropium-albuterol (DUONEB) nebulizer solution 1 ampule, 1 ampule, Inhalation, TID, Dee Amin MD, 1 ampule at 06/02/22 1412    potassium chloride 20 mEq/50 mL IVPB (Central Line), 20 mEq, IntraVENous, PRN, Stopped at 05/31/22 1445 **OR** potassium chloride 10 mEq/100 mL IVPB (Peripheral Line), 10 mEq, IntraVENous, PRN, Dee Amin MD    dexmedetomidine (PRECEDEX) 1,000 mcg in sodium chloride 0.9 % 250 mL infusion, 0.1-1.5 mcg/kg/hr, IntraVENous, Continuous, Shay Hawkins MD, Stopped at 06/02/22 1352    insulin lispro (HUMALOG) injection vial 0-18 Units, 0-18 Units, SubCUTAneous, Q6H, Shay Hawkins MD, 3 Units at 06/02/22 1124    cloNIDine (CATAPRES) 0.2 MG/24HR 1 patch, 1 patch, TransDERmal, Weekly, Clair Gifford MD, 1 patch at 05/30/22 1453    fat emulsion 20 % infusion 100 mL, 100 mL, IntraVENous, Daily, Dee Amin MD, Paused at 06/02/22 0617    famotidine (PEPCID) 20 mg in sodium chloride (PF) 10 mL injection, 20 mg, IntraVENous, BID, Nyasia Garcia MD, 20 mg at 06/02/22 0855    budesonide (PULMICORT) nebulizer suspension 500 mcg, 0.5 mg, Nebulization, BID, Samella Minor, APRN - CNP, 500 mcg at 06/02/22 0752    enoxaparin (LOVENOX) injection 40 mg, 40 mg, SubCUTAneous, Daily, Samella Ouachita, APRN - CNP, 40 mg at 06/02/22 0854    haloperidol lactate (HALDOL) injection 5 mg, 5 mg, IntraMUSCular, Q6H PRN, Shay Hawkins MD, 5 mg at 06/01/22 1548    LORazepam (ATIVAN) tablet 1 mg, 1 mg, Oral, Q1H PRN **OR** LORazepam (ATIVAN) injection 1 mg, 1 mg, IntraVENous, Q1H PRN, 1 mg at 06/01/22 1107 **OR** LORazepam (ATIVAN) tablet 2 mg, 2 mg, Oral, Q1H PRN **OR** LORazepam (ATIVAN) injection 2 mg, 2 mg, IntraVENous, Q1H PRN, 2 mg at 06/02/22 1526 **OR** LORazepam (ATIVAN) tablet 3 mg, 3 mg, Oral, Q1H PRN **OR** LORazepam (ATIVAN) injection 3 mg, 3 mg, IntraVENous, Q1H PRN, 3 mg at 06/02/22 0956 **OR** LORazepam (ATIVAN) tablet 4 mg, 4 mg, Oral, Q1H PRN **OR** LORazepam (ATIVAN) injection 4 mg, 4 mg, IntraVENous, Q1H PRN, Shay Hawkins MD, 4 mg at 12/48/19 3373    folic acid 1 mg, thiamine (B-1) 100 mg in sodium chloride 0.9 % 50 mL IVPB, , IntraVENous, Daily, Shay Hawkins MD, Stopped at 06/02/22 0946    albuterol (PROVENTIL) nebulizer solution 2.5 mg, 2.5 mg, Nebulization, Q4H PRN, Jesus Matamoros MD    hydrALAZINE (APRESOLINE) injection 10 mg, 10 mg, IntraVENous, Q6H PRN, Jesus Matamoros MD, 10 mg at 06/02/22 1507    sodium chloride flush 0.9 % injection 10 mL, 10 mL, IntraVENous, Once, Shirley Gonzalez MD    sodium chloride flush 0.9 % injection 5-40 mL, 5-40 mL, IntraVENous, 2 times per day, Zunilda Atkinson MD, 10 mL at 06/02/22 0948    sodium chloride flush 0.9 % injection 5-40 mL, 5-40 mL, IntraVENous, PRN, Shay Hawkins MD, 10 mL at 06/01/22 0134    0.9 % sodium chloride infusion, , IntraVENous, PRN, Shay Hawkins MD, Stopped at 05/26/22 2008    acetaminophen (TYLENOL) tablet 650 mg, 650 mg, Oral, Q4H PRN, Shay Hawkins MD    ondansetron (ZOFRAN-ODT) disintegrating tablet 4 mg, 4 mg, Oral, Q8H PRN **OR** ondansetron (ZOFRAN) injection 4 mg, 4 mg, IntraVENous, Q6H PRN, Shay Hawkins MD    metoprolol (LOPRESSOR) injection 5 mg, 5 mg, IntraVENous, Q6H PRN, Shay Hawkins MD, 5 mg at 06/02/22 0954    glucose chewable tablet 16 g, 4 tablet, Oral, PRN, Shay Hawkins MD    dextrose bolus 10% 125 mL, 125 mL, IntraVENous, PRN **OR** dextrose bolus 10% 250 mL, 250 mL, IntraVENous, PRN, Shay Hawkins MD    glucagon (rDNA) injection 1 mg, 1 mg, IntraMUSCular, PRN, Shay Hawkins MD    dextrose 5 % solution, 100 mL/hr, IntraVENous, PRN, Shay Hawkins MD    aspirin chewable tablet 81 mg, 81 mg, Oral, Daily, Shay Hawkins MD    atorvastatin (LIPITOR) tablet 40 mg, 40 mg, Oral, Nightly, Shay Hawkins MD, 40 mg at 05/27/22 2027    clopidogrel (PLAVIX) tablet 75 mg, 75 mg, Oral, Daily, Shay Hawkins MD    metFORMIN (GLUCOPHAGE) tablet 500 mg, 500 mg, Oral, BID WC, Shay Hawkins MD    PARoxetine (PAXIL) tablet 40 mg, 40 mg, Oral, Daily, Shay Hawkins MD    amLODIPine (NORVASC) tablet 5 mg, 5 mg, Oral, Daily, Shay Hawkins MD    atenolol (TENORMIN) tablet 50 mg, 50 mg, Oral, Daily, Shay Hawkins MD    losartan (COZAAR) tablet 100 mg, 100 mg, Oral, Daily, Shay Hawkins MD    niCARdipine (CARDENE) 50 mg in dextrose 5 % 250 mL infusion, 2.5-15 mg/hr, IntraVENous, Continuous, Shay Hawkins MD, Last Rate: 50 mL/hr at 06/02/22 1529, 10 mg/hr at 06/02/22 1529      I/O (24Hr):     Intake/Output Summary (Last 24 hours) at 6/2/2022 1552  Last data filed at 6/2/2022 1529  Gross per 24 hour   Intake 2193.97 ml   Output 2140 ml   Net 53.97 ml       Data:           Labs:    Hematology:  Recent Labs     05/31/22  0329 06/01/22  0345 06/02/22  0400   WBC 7.2 6.6 7.6   RBC 3.83* 3.54* 3.65*   HGB 12.2* 11.3* 11.7*   HCT 36.8* 34.6* 35.5*   MCV 96.1 97.7 97.3   MCH 31.9 31.9 32.1   MCHC 33.2 32.7 33.0   RDW 13.1 13.2 13.2    188 215   MPV 9.7 10.1 9.9     Chemistry:  Recent Labs     05/31/22  0329 06/01/22  0345 06/02/22  0400    141 142   K 3.3* 3.7 3.7    107 108*   CO2 25 25 25   GLUCOSE 252* 303* 196*   BUN 31* 36* 38*   CREATININE 1.19 1.39* 1.22*   MG 1.7 1.9 1.9   ANIONGAP 9 9 9   LABGLOM >60 51* 59*   GFRAA >60 >60 >60   CALCIUM 9.0 8.9 8.8   PHOS 2.5 3.1 4.6*     Recent Labs     06/01/22  0703 06/01/22  1059 06/01/22  1635 06/01/22  2321 06/02/22  0554 06/02/22  1123   POCGLU 305* 330* 300* 235* 176* 179*       Lab Results   Component Value Date/Time    SPECIAL NOT REPORTED 02/26/2021 09:30 AM     Lab Results   Component Value Date/Time    CULTURE NO GROWTH 3 DAYS 02/26/2021 09:30 AM       Lab Results   Component Value Date    POCPH 7.396 05/27/2022    POCPCO2 39.7 05/27/2022    POCPO2 87.5 05/27/2022    POCHCO3 24.4 05/27/2022    NBEA 1 05/26/2022    PBEA 0 05/27/2022    FVRE7JRW 97 05/27/2022    FIO2 30.0 05/27/2022       Radiology:    XR CHEST (SINGLE VIEW FRONTAL)    Result Date: 5/26/2022  Mild hypoventilatory changes noted both lung bases. CT HEAD WO CONTRAST    Result Date: 5/25/2022  No acute intracranial hemorrhage or abnormal extra-axial collection. Relative to 9 months prior there are increased areas of encephalomalacia in the left occipital lobe and new areas of encephalomalacia in the left parietal lobe. If there is persistent clinical concern for acute on chronic ischemia, MRI is more sensitive. XR CHEST PORTABLE    Result Date: 5/31/2022  Stable support line interval increase in now moderate left basilar opacity likely related to combined pleural-parenchymal process, pneumonia the likely etiology     XR CHEST PORTABLE    Result Date: 5/30/2022  Elevated left hemidiaphragm with apparent atelectasis left base. Slight improvement in right basilar opacity which may represent resolving atelectasis or improved minimal airspace disease. XR CHEST PORTABLE    Result Date: 5/29/2022  Interval developing right basilar opacity which may be related atelectasis or developing focal airspace disease. XR CHEST PORTABLE    Result Date: 5/28/2022  No significant finding in the chest.     CT CHEST ABDOMEN PELVIS W CONTRAST    Result Date: 5/25/2022  Small cysts noted in the liver. Diverticular disease of the colon without diverticulitis.   Significant thickening of the bladder wall, in keeping with muscular hypertrophy or cystitis. Clinical correlation suggested. Degenerative changes demonstrated in the the thoracic and lumbar spine. RECOMMENDATIONS:     MRI BRAIN W WO CONTRAST    Result Date: 5/25/2022  No acute intracranial abnormality. No mass effect or abnormal intracranial enhancement. Old infarctions in the left frontal parietal lobes, new since September 21, 2021. Old infarction in the left occipital lobe, likely increased in size since September 21, 2021. Laminar necrosis and gliosis associated with the old infarctions. Old lacunar infarcts in the left basal ganglia, likely increased. Mild parenchymal volume loss. All radiological studies reviewed  Code Status:  Full Code        Electronically signed by Jane Solis MD on 6/2/2022 at 3:52 PM    This note was created with the assistance of a speech-recognition program.  Although the intention is to generate a document that actually reflects the content of the visit, no guarantees can be provided that every mistake has been identified and corrected by editing. Note was updated later by me after  physical examination and  completion of the assessment.

## 2022-06-02 NOTE — PROGRESS NOTES
Patient resting comfortably and no distress or restlessness noted. Bipap remains in place. Cardene turned off. Precedex continues at 1.5.

## 2022-06-02 NOTE — PLAN OF CARE
Problem: Discharge Planning  Goal: Discharge to home or other facility with appropriate resources  Outcome: Progressing     Problem: Neurosensory - Adult  Goal: Achieves stable or improved neurological status  Outcome: Progressing  Goal: Absence of seizures  Outcome: Progressing  Goal: Remains free of injury related to seizures activity  Outcome: Progressing  Goal: Achieves maximal functionality and self care  Outcome: Progressing     Problem: Respiratory - Adult  Goal: Achieves optimal ventilation and oxygenation  6/2/2022 0037 by Judson Chandler RN  Outcome: Progressing  6/1/2022 1929 by Alyson Lee RN  Outcome: Progressing     Problem: Cardiovascular - Adult  Goal: Maintains optimal cardiac output and hemodynamic stability  Outcome: Progressing  Goal: Absence of cardiac dysrhythmias or at baseline  Outcome: Progressing     Problem: Skin/Tissue Integrity - Adult  Goal: Skin integrity remains intact  6/2/2022 0037 by Judson Chandler RN  Outcome: Progressing  6/1/2022 1929 by Alyson Lee RN  Outcome: Adequate for Discharge  Goal: Incisions, wounds, or drain sites healing without S/S of infection  6/2/2022 0037 by Judson Chandler RN  Outcome: Progressing  6/1/2022 1929 by Alyson Lee RN  Outcome: Adequate for Discharge  Goal: Oral mucous membranes remain intact  6/2/2022 0037 by Judson Chandler RN  Outcome: Progressing  6/1/2022 1929 by Alyson Lee RN  Outcome: Adequate for Discharge     Problem: Metabolic/Fluid and Electrolytes - Adult  Goal: Electrolytes maintained within normal limits  6/2/2022 0037 by Judson Chandler RN  Outcome: Progressing  6/1/2022 1929 by Alyson Lee RN  Outcome: Progressing  Goal: Hemodynamic stability and optimal renal function maintained  6/2/2022 0037 by Judson Chandler RN  Outcome: Progressing  6/1/2022 1929 by Alyson Lee RN  Outcome: Progressing  Goal: Glucose maintained within prescribed range  6/2/2022 0037 by Judson Chandler RN  Outcome: Progressing  6/1/2022 1929 by Alyson Lee RN  Outcome: Progressing     Problem: Anxiety  Goal: Will report anxiety at manageable levels  Description: INTERVENTIONS:  1. Administer medication as ordered  2. Teach and rehearse alternative coping skills  3. Provide emotional support with 1:1 interaction with staff  6/2/2022 0037 by Beth Mccoy RN  Outcome: Progressing  6/1/2022 1929 by Jann Perez RN  Outcome: Not Progressing     Problem: Confusion  Goal: Confusion, delirium, dementia, or psychosis is improved or at baseline  Description: INTERVENTIONS:  1. Assess for possible contributors to thought disturbance, including medications, impaired vision or hearing, underlying metabolic abnormalities, dehydration, psychiatric diagnoses, and notify attending LIP  2. [place] high risk fall precautions, as indicated  3. Provide frequent short contacts to provide reality reorientation, refocusing and direction  4. Decrease environmental stimuli, including noise as appropriate  5. Monitor and intervene to maintain adequate nutrition, hydration, elimination, sleep and activity  6. If unable to ensure safety without constant attention obtain sitter and review sitter guidelines with assigned personnel  7. Initiate Psychosocial CNS and Spiritual Care consult, as indicated  6/2/2022 0037 by Beth Mccoy RN  Outcome: Progressing  6/1/2022 1929 by Jann Perez RN  Outcome: Not Progressing     Problem: Behavior  Goal: Pt/Family maintain appropriate behavior and adhere to behavioral management agreement, if implemented  Description: INTERVENTIONS:  1. Assess patient/family's coping skills and  non-compliant behavior (including use of illegal substances)  2. Notify security of behavior or suspected illegal substances which indicate the need for search of the patient and/or belongings  3. Encourage verbalization of thoughts and concerns in a socially appropriate manner  4. Utilize positive, consistent limit setting strategies supporting safety of patient, staff and others  5.  Encourage participation in the decision making process about the behavioral management agreement  6. Implement a Health Care Agreement if patient meets criteria  7. If a patient's behavior jeopardizes the safety of the patient, staff, or others refer to organization policy. If a visitor's behavior poses a threat to safety call refer to organization policy. 8. Initiate consult with , Psychosocial CNS, Spiritual Care as appropriate  6/2/2022 0037 by Tarun Mcelroy RN  Outcome: Progressing  6/1/2022 1929 by Alexandra Metcalf RN  Outcome: Not Progressing     Problem: Chronic Conditions and Co-morbidities  Goal: Patient's chronic conditions and co-morbidity symptoms are monitored and maintained or improved  Outcome: Progressing     Problem: Pain  Goal: Verbalizes/displays adequate comfort level or baseline comfort level  Outcome: Progressing     Problem: ABCDS Injury Assessment  Goal: Absence of physical injury  Outcome: Progressing     Problem: Musculoskeletal - Adult  Goal: Return mobility to safest level of function  Outcome: Progressing     Problem: Genitourinary - Adult  Goal: Absence of urinary retention  Outcome: Progressing  Goal: Urinary catheter remains patent  Outcome: Progressing     Problem: Nutrition Deficit:  Goal: Optimize nutritional status  Outcome: Progressing  Flowsheets (Taken 6/1/2022 1248 by Judy Briones RD, LD)  Nutrient intake appropriate for improving, restoring, or maintaining nutritional needs: Recommend, monitor, and adjust tube feedings and TPN/PPN based on assessed needs     Problem: Safety - Medical Restraint  Goal: Remains free of injury from restraints (Restraint for Interference with Medical Device)  Description: INTERVENTIONS:  1. Determine that other, less restrictive measures have been tried or would not be effective before applying the restraint  2. Evaluate the patient's condition at the time of restraint application  3.  Inform patient/family regarding the reason for restraint  4.  Q2H: Monitor safety, psychosocial status, comfort, nutrition and hydration  6/2/2022 0037 by Katie Bernard RN  Outcome: Progressing  6/1/2022 1929 by Adalberto Alvarez RN  Outcome: Progressing  Flowsheets (Taken 6/1/2022 1855)  Remains free of injury from restraints (restraint for interference with medical device):   Evaluate the patient's condition at the time of restraint application   Inform patient/family regarding the reason for restraint   Every 2 hours: Monitor safety, psychosocial status, comfort, nutrition and hydration

## 2022-06-02 NOTE — PROGRESS NOTES
Pulmonary Critical Care Progress Note  Laura Collins MD     Patient seen for the follow up of hypoxia,  Seizure ([facility] [address]     Subjective:  Patient had no overnight events. He wore BIPAP throughout the night. He is currently sleeping with occasional snoring. He continues on oxygen via nasal cannula. He remains on precedex at 0.5mcg, TPN and is receiving ativan as needed per CIWA scale. Examination:  Vitals: BP (!) 159/79   Pulse 91   Temp 97.2 °F (36.2 °C) (Temporal)   Resp (!) 39   Ht 5' 6\" (1.676 m)   Wt 183 lb 0.7 oz (83 kg)   SpO2 94%   BMI 29.54 kg/m²   General appearance: Lethargic with periods of confusion  Neck: No JVD  Lungs: diminished air exchange, no crackles or wheezing  Heart: regular rate and rhythm, S1, S2 normal, no gallop  Abdomen: Soft, non tender, + BS  Extremities: no cyanosis or clubbing.  No significant edema    LABs:  CBC:   Recent Labs     06/01/22  0345 06/02/22  0400   WBC 6.6 7.6   HGB 11.3* 11.7*   HCT 34.6* 35.5*    215     BMP:   Recent Labs     06/01/22  0345 06/02/22  0400    142   K 3.7 3.7   CO2 25 25   BUN 36* 38*   CREATININE 1.39* 1.22*   LABGLOM 51* 59*   GLUCOSE 303* 196*     ABG:  Lab Results   Component Value Date    FIO2 30.0 05/27/2022       Lab Results   Component Value Date    POCPH 7.396 05/27/2022    POCPCO2 39.7 05/27/2022    POCPO2 87.5 05/27/2022    POCHCO3 24.4 05/27/2022    NBEA 1 05/26/2022    PBEA 0 05/27/2022    TLWP3KXT 97 05/27/2022    FIO2 30.0 05/27/2022     Radiology:  CXR 6/1/22      Impression:  · Acute respiratory insufficiency with hypoxia   · New onset seizure   · Alcohol abuse/ dependence/ withdrawal   · History of CVA   · Obstructive sleep apnea  · Active smoking/ likely COPD   · Severe right Carotid artery stenosis, status post left CEA 9/21  · HTN, DM    Recommendations:  · Continue BIPAP support as needed   · 2 liters/min via nasal cannula to keep spO2 greater than 92% as tolerated  · IV Solu-Medrol at 30 mg IV every 8 hours  · Ipratropium- Albuterol BID   · Budesonide via nebulizer BID   · Precedex drip, titrate to RAAS goal of 0 to -1, wean down  · Ativan per CIWA protocol   · Monitor off Cardene drip for now. · Vimpat 200mg BID, phenobarbital 65mg BID, neurology following   · Nephology following  · Continue TPN  · Seizure precautions  · Vascular surgery input noted  · Will monitor off of antibiotics at this time. Left basilar infiltrate with no fever or leukocytosis,? Atelectasis. We will do radiologic follow-up without starting antibiotics.     · Discussed with RN  · Labs: CBC and BMP in am  · DVT prophylaxis with low molecular weight heparin  · Will follow with you  Electronically signed by     Jaspreet Angel MD on 6/2/2022 at 11:41 AM  Pulmonary Critical Care and Sleep Medicine,  [facility]  Cell: [phone]  Office: [phone]

## 2022-06-02 NOTE — PROGRESS NOTES
End Of Shift Note  [facility]  Summary of shift: Cardene gtt maxed by end of shift, cardiology consulted. On and off bipap throughout shift.      Vitals:    Vitals:    06/02/22 1745 06/02/22 1800 06/02/22 1815 06/02/22 1830   BP: (!) 164/67 (!) 162/63 (!) 169/64 (!) 151/70   Pulse: (!) 113 (!) 111 (!) 116 (!) 115   Resp:   (!) 39 (!) 43   Temp:       TempSrc:       SpO2: 98% 95% 93% 93%   Weight:       Height:            I&O:     Intake/Output Summary (Last 24 hours) at 6/2/2022 1839  Last data filed at 6/2/2022 1833  Gross per 24 hour   Intake 2354.54 ml   Output 3290 ml   Net -935.46 ml       Ventilator Settings:     / / /FiO2 : 30 %    Critical Care IV infusions:   PN-Adult 2-in-1 Central Line (Standard) 70 mL/hr at 06/02/22 1757    dexmedetomidine (PRECEDEX) IV infusion Stopped (06/02/22 1352)    sodium chloride Stopped (05/26/22 2008)    dextrose      niCARdipene (CARDENE) infusion 15 mg/hr (06/02/22 1833)        LDA:   PICC Double Lumen 85/89/45 Right Basilic (Active)   Number of days: 7       Urinary Catheter Peterson (Active)   Number of days: 5       Incision 09/22/21 Neck Left (Active)   Number of days: 253

## 2022-06-02 NOTE — RT PROTOCOL NOTE
RT Inhaler-Nebulizer Bronchodilator Protocol Note    There is a bronchodilator order in the chart from a provider indicating to follow the RT Bronchodilator Protocol and there is an Initiate RT Inhaler-Nebulizer Bronchodilator Protocol order as well (see protocol at bottom of note). CXR Findings:  XR CHEST PORTABLE    Result Date: 6/1/2022  Stable support line Improvement in now mild mild left basilar atelectasis versus infiltrate with small left effusion       The findings from the last RT Protocol Assessment were as follows:   History Pulmonary Disease: Chronic pulmonary disease  Respiratory Pattern: Dyspnea on exertion or RR 21-25 bpm  Breath Sounds: Slightly diminished and/or crackles  Cough: Weak, productive  Indication for Bronchodilator Therapy: Decreased or absent breath sounds  Bronchodilator Assessment Score: 8    Aerosolized bronchodilator medication orders have been revised according to the RT Inhaler-Nebulizer Bronchodilator Protocol below. Respiratory Therapist to perform RT Therapy Protocol Assessment initially then follow the protocol. Repeat RT Therapy Protocol Assessment PRN for score 0-3 or on second treatment, BID, and PRN for scores above 3. No Indications - adjust the frequency to every 6 hours PRN wheezing or bronchospasm, if no treatments needed after 48 hours then discontinue using Per Protocol order mode. If indication present, adjust the RT bronchodilator orders based on the Bronchodilator Assessment Score as indicated below. Use Inhaler orders unless patient has one or more of the following: on home nebulizer, not able to hold breath for 10 seconds, is not alert and oriented, cannot activate and use MDI correctly, or respiratory rate 25 breaths per minute or more, then use the equivalent nebulizer order(s) with same Frequency and PRN reasons based on the score. If a patient is on this medication at home then do not decrease Frequency below that used at home.     0-3 - enter or revise RT bronchodilator order(s) to equivalent RT Bronchodilator order with Frequency of every 4 hours PRN for wheezing or increased work of breathing using Per Protocol order mode. 4-6 - enter or revise RT Bronchodilator order(s) to two equivalent RT bronchodilator orders with one order with BID Frequency and one order with Frequency of every 4 hours PRN wheezing or increased work of breathing using Per Protocol order mode. 7-10 - enter or revise RT Bronchodilator order(s) to two equivalent RT bronchodilator orders with one order with TID Frequency and one order with Frequency of every 4 hours PRN wheezing or increased work of breathing using Per Protocol order mode. 11-13 - enter or revise RT Bronchodilator order(s) to one equivalent RT bronchodilator order with QID Frequency and an Albuterol order with Frequency of every 4 hours PRN wheezing or increased work of breathing using Per Protocol order mode. Greater than 13 - enter or revise RT Bronchodilator order(s) to one equivalent RT bronchodilator order with every 4 hours Frequency and an Albuterol order with Frequency of every 2 hours PRN wheezing or increased work of breathing using Per Protocol order mode. RT to enter RT Home Evaluation for COPD & MDI Assessment order using Per Protocol order mode.     Electronically signed by Mattie Dejesus RCP on 6/2/2022 at 8:13 AM

## 2022-06-02 NOTE — PROGRESS NOTES
Occupational Therapy  [facility]  Occupational Therapy Not Seen Note    Patient not available for Occupational Therapy due to:    [] Testing:    [] Hemodialysis    [x] Cancelled by RN: Per RN Jus Jameson pt is not appropriate for therapy eval at this time. Will continued to follow.     [] Refusal by Patient:    [] Surgery:     [] Intubation:     [] Pain Medication:    [] Sedation:     [] Spine Precautions :    [] Medical Instability:    [] Other:     Aldair Chambers OT

## 2022-06-02 NOTE — PROGRESS NOTES
Patient placed on bipap. Resting comfortably, without distress at this time, will continue to monitor.

## 2022-06-03 ENCOUNTER — APPOINTMENT (OUTPATIENT)
Dept: GENERAL RADIOLOGY | Age: 67
DRG: 056 | End: 2022-06-03
Payer: MEDICARE

## 2022-06-03 LAB
-: ABNORMAL
ABSOLUTE EOS #: 0 K/UL (ref 0–0.4)
ABSOLUTE IMMATURE GRANULOCYTE: 0.09 K/UL (ref 0–0.3)
ABSOLUTE LYMPH #: 0.65 K/UL (ref 1–4.8)
ABSOLUTE MONO #: 0.93 K/UL (ref 0.2–0.8)
ANION GAP SERPL CALCULATED.3IONS-SCNC: 11 MMOL/L (ref 9–17)
BASOPHILS # BLD: 0 %
BASOPHILS ABSOLUTE: 0 K/UL (ref 0–0.2)
BILIRUBIN URINE: NEGATIVE
BUN BLDV-MCNC: 48 MG/DL (ref 8–23)
BUN/CREAT BLD: 31 (ref 9–20)
CALCIUM SERPL-MCNC: 8.7 MG/DL (ref 8.6–10.4)
CHLORIDE BLD-SCNC: 108 MMOL/L (ref 98–107)
CO2: 25 MMOL/L (ref 20–31)
COLOR: YELLOW
CREAT SERPL-MCNC: 1.57 MG/DL (ref 0.7–1.2)
CREATININE URINE: 79.6 MG/DL (ref 39–259)
EOSINOPHIL,URINE: NORMAL
EOSINOPHILS RELATIVE PERCENT: 0 % (ref 1–4)
EPITHELIAL CELLS UA: ABNORMAL /HPF (ref 0–5)
GFR AFRICAN AMERICAN: 54 ML/MIN
GFR NON-AFRICAN AMERICAN: 44 ML/MIN
GFR SERPL CREATININE-BSD FRML MDRD: ABNORMAL ML/MIN/{1.73_M2}
GLUCOSE BLD-MCNC: 185 MG/DL (ref 75–110)
GLUCOSE BLD-MCNC: 218 MG/DL (ref 75–110)
GLUCOSE BLD-MCNC: 277 MG/DL (ref 75–110)
GLUCOSE BLD-MCNC: 306 MG/DL (ref 75–110)
GLUCOSE BLD-MCNC: 321 MG/DL (ref 75–110)
GLUCOSE BLD-MCNC: 333 MG/DL (ref 70–99)
GLUCOSE URINE: ABNORMAL
HCT VFR BLD CALC: 36.4 % (ref 40.7–50.3)
HEMOGLOBIN: 11.8 G/DL (ref 13–17)
IMMATURE GRANULOCYTES: 1 %
KETONES, URINE: NEGATIVE
LEUKOCYTE ESTERASE, URINE: NEGATIVE
LYMPHOCYTES # BLD: 7 % (ref 24–44)
MAGNESIUM: 2.2 MG/DL (ref 1.6–2.6)
MCH RBC QN AUTO: 31.3 PG (ref 25.2–33.5)
MCHC RBC AUTO-ENTMCNC: 32.4 G/DL (ref 28–38)
MCV RBC AUTO: 96.6 FL (ref 82.6–102.9)
MICROALBUMIN/CREAT 24H UR: 364 MG/L
MICROALBUMIN/CREAT UR-RTO: 457 MCG/MG CREAT
MONOCYTES # BLD: 10 % (ref 1–7)
MUCUS: ABNORMAL
NITRITE, URINE: NEGATIVE
PDW BLD-RTO: 13.4 % (ref 11.8–14.4)
PH UA: 5.5 (ref 5–8)
PHOSPHORUS: 3.8 MG/DL (ref 2.5–4.5)
PLATELET # BLD: 242 K/UL (ref 138–453)
PMV BLD AUTO: 10.1 FL (ref 8.1–13.5)
POTASSIUM SERPL-SCNC: 3.5 MMOL/L (ref 3.7–5.3)
PROTEIN UA: ABNORMAL
RBC # BLD: 3.77 M/UL (ref 4.21–5.77)
RBC UA: ABNORMAL /HPF (ref 0–2)
SEG NEUTROPHILS: 82 % (ref 36–66)
SEGMENTED NEUTROPHILS ABSOLUTE COUNT: 7.63 K/UL (ref 1.8–7.7)
SODIUM BLD-SCNC: 144 MMOL/L (ref 135–144)
SODIUM,UR: 20 MMOL/L
SPECIFIC GRAVITY UA: 1.01 (ref 1–1.03)
TURBIDITY: ABNORMAL
URINE HGB: ABNORMAL
UROBILINOGEN, URINE: NORMAL
WBC # BLD: 9.3 K/UL (ref 3.5–11.3)
WBC UA: ABNORMAL /HPF (ref 0–5)

## 2022-06-03 PROCEDURE — 2580000003 HC RX 258: Performed by: FAMILY MEDICINE

## 2022-06-03 PROCEDURE — 82947 ASSAY GLUCOSE BLOOD QUANT: CPT

## 2022-06-03 PROCEDURE — 2580000003 HC RX 258: Performed by: INTERNAL MEDICINE

## 2022-06-03 PROCEDURE — 2000000000 HC ICU R&B

## 2022-06-03 PROCEDURE — 84100 ASSAY OF PHOSPHORUS: CPT

## 2022-06-03 PROCEDURE — 85025 COMPLETE CBC W/AUTO DIFF WBC: CPT

## 2022-06-03 PROCEDURE — 6370000000 HC RX 637 (ALT 250 FOR IP): Performed by: INTERNAL MEDICINE

## 2022-06-03 PROCEDURE — 6360000002 HC RX W HCPCS: Performed by: NURSE PRACTITIONER

## 2022-06-03 PROCEDURE — C9254 INJECTION, LACOSAMIDE: HCPCS | Performed by: PSYCHIATRY & NEUROLOGY

## 2022-06-03 PROCEDURE — 6360000002 HC RX W HCPCS: Performed by: PSYCHIATRY & NEUROLOGY

## 2022-06-03 PROCEDURE — 94660 CPAP INITIATION&MGMT: CPT

## 2022-06-03 PROCEDURE — 82043 UR ALBUMIN QUANTITATIVE: CPT

## 2022-06-03 PROCEDURE — 81001 URINALYSIS AUTO W/SCOPE: CPT

## 2022-06-03 PROCEDURE — 2700000000 HC OXYGEN THERAPY PER DAY

## 2022-06-03 PROCEDURE — 80048 BASIC METABOLIC PNL TOTAL CA: CPT

## 2022-06-03 PROCEDURE — 6360000002 HC RX W HCPCS: Performed by: FAMILY MEDICINE

## 2022-06-03 PROCEDURE — 36415 COLL VENOUS BLD VENIPUNCTURE: CPT

## 2022-06-03 PROCEDURE — 83735 ASSAY OF MAGNESIUM: CPT

## 2022-06-03 PROCEDURE — 74018 RADEX ABDOMEN 1 VIEW: CPT

## 2022-06-03 PROCEDURE — 94640 AIRWAY INHALATION TREATMENT: CPT

## 2022-06-03 PROCEDURE — 6360000002 HC RX W HCPCS: Performed by: INTERNAL MEDICINE

## 2022-06-03 PROCEDURE — 6370000000 HC RX 637 (ALT 250 FOR IP): Performed by: FAMILY MEDICINE

## 2022-06-03 PROCEDURE — 71045 X-RAY EXAM CHEST 1 VIEW: CPT

## 2022-06-03 PROCEDURE — 82570 ASSAY OF URINE CREATININE: CPT

## 2022-06-03 PROCEDURE — 2500000003 HC RX 250 WO HCPCS: Performed by: INTERNAL MEDICINE

## 2022-06-03 PROCEDURE — 2580000003 HC RX 258: Performed by: PSYCHIATRY & NEUROLOGY

## 2022-06-03 PROCEDURE — 2500000003 HC RX 250 WO HCPCS: Performed by: FAMILY MEDICINE

## 2022-06-03 PROCEDURE — 94761 N-INVAS EAR/PLS OXIMETRY MLT: CPT

## 2022-06-03 PROCEDURE — 84300 ASSAY OF URINE SODIUM: CPT

## 2022-06-03 PROCEDURE — 87205 SMEAR GRAM STAIN: CPT

## 2022-06-03 RX ORDER — SODIUM CHLORIDE 450 MG/100ML
INJECTION, SOLUTION INTRAVENOUS CONTINUOUS
Status: DISCONTINUED | OUTPATIENT
Start: 2022-06-03 | End: 2022-06-05

## 2022-06-03 RX ADMIN — LORAZEPAM 4 MG: 2 INJECTION INTRAMUSCULAR at 03:57

## 2022-06-03 RX ADMIN — FOLIC ACID: 5 INJECTION, SOLUTION INTRAMUSCULAR; INTRAVENOUS; SUBCUTANEOUS at 08:19

## 2022-06-03 RX ADMIN — LOSARTAN POTASSIUM 100 MG: 100 TABLET, FILM COATED ORAL at 10:34

## 2022-06-03 RX ADMIN — BUDESONIDE 500 MCG: 0.5 SUSPENSION RESPIRATORY (INHALATION) at 07:59

## 2022-06-03 RX ADMIN — CLOPIDOGREL BISULFATE 75 MG: 75 TABLET ORAL at 10:33

## 2022-06-03 RX ADMIN — ASPIRIN 81 MG CHEWABLE TABLET 81 MG: 81 TABLET CHEWABLE at 10:32

## 2022-06-03 RX ADMIN — SODIUM CHLORIDE, PRESERVATIVE FREE 10 ML: 5 INJECTION INTRAVENOUS at 08:19

## 2022-06-03 RX ADMIN — IPRATROPIUM BROMIDE AND ALBUTEROL SULFATE 1 AMPULE: 2.5; .5 SOLUTION RESPIRATORY (INHALATION) at 07:59

## 2022-06-03 RX ADMIN — LORAZEPAM 4 MG: 2 INJECTION INTRAMUSCULAR at 02:22

## 2022-06-03 RX ADMIN — PHENOBARBITAL SODIUM 65 MG: 65 INJECTION INTRAMUSCULAR; INTRAVENOUS at 20:13

## 2022-06-03 RX ADMIN — METHYLPREDNISOLONE SODIUM SUCCINATE 40 MG: 40 INJECTION, POWDER, FOR SOLUTION INTRAMUSCULAR; INTRAVENOUS at 13:42

## 2022-06-03 RX ADMIN — METOPROLOL TARTRATE 5 MG: 1 INJECTION, SOLUTION INTRAVENOUS at 00:22

## 2022-06-03 RX ADMIN — INSULIN LISPRO 9 UNITS: 100 INJECTION, SOLUTION INTRAVENOUS; SUBCUTANEOUS at 05:31

## 2022-06-03 RX ADMIN — BUDESONIDE 500 MCG: 0.5 SUSPENSION RESPIRATORY (INHALATION) at 19:15

## 2022-06-03 RX ADMIN — METHYLPREDNISOLONE SODIUM SUCCINATE 40 MG: 40 INJECTION, POWDER, FOR SOLUTION INTRAMUSCULAR; INTRAVENOUS at 21:33

## 2022-06-03 RX ADMIN — SODIUM CHLORIDE: 4.5 INJECTION, SOLUTION INTRAVENOUS at 18:12

## 2022-06-03 RX ADMIN — METHYLPREDNISOLONE SODIUM SUCCINATE 40 MG: 40 INJECTION, POWDER, FOR SOLUTION INTRAMUSCULAR; INTRAVENOUS at 05:22

## 2022-06-03 RX ADMIN — SODIUM CHLORIDE, PRESERVATIVE FREE 20 MG: 5 INJECTION INTRAVENOUS at 08:18

## 2022-06-03 RX ADMIN — PHENOBARBITAL SODIUM 65 MG: 65 INJECTION INTRAMUSCULAR; INTRAVENOUS at 08:18

## 2022-06-03 RX ADMIN — ENOXAPARIN SODIUM 40 MG: 100 INJECTION SUBCUTANEOUS at 08:18

## 2022-06-03 RX ADMIN — LORAZEPAM 4 MG: 2 INJECTION INTRAMUSCULAR at 00:22

## 2022-06-03 RX ADMIN — OLANZAPINE 5 MG: 5 TABLET, ORALLY DISINTEGRATING ORAL at 20:13

## 2022-06-03 RX ADMIN — INSULIN LISPRO 12 UNITS: 100 INJECTION, SOLUTION INTRAVENOUS; SUBCUTANEOUS at 12:09

## 2022-06-03 RX ADMIN — IPRATROPIUM BROMIDE AND ALBUTEROL SULFATE 1 AMPULE: 2.5; .5 SOLUTION RESPIRATORY (INHALATION) at 14:55

## 2022-06-03 RX ADMIN — AMLODIPINE BESYLATE 5 MG: 5 TABLET ORAL at 10:32

## 2022-06-03 RX ADMIN — ATENOLOL 50 MG: 50 TABLET ORAL at 10:32

## 2022-06-03 RX ADMIN — LACOSAMIDE 150 MG: 10 INJECTION, SOLUTION INTRAVENOUS at 21:24

## 2022-06-03 RX ADMIN — PAROXETINE HYDROCHLORIDE 40 MG: 20 TABLET, FILM COATED ORAL at 10:34

## 2022-06-03 RX ADMIN — INSULIN LISPRO 12 UNITS: 100 INJECTION, SOLUTION INTRAVENOUS; SUBCUTANEOUS at 17:50

## 2022-06-03 RX ADMIN — DEXTROSE MONOHYDRATE 10 MG/HR: 50 INJECTION, SOLUTION INTRAVENOUS at 09:16

## 2022-06-03 RX ADMIN — IPRATROPIUM BROMIDE AND ALBUTEROL SULFATE 1 AMPULE: 2.5; .5 SOLUTION RESPIRATORY (INHALATION) at 19:15

## 2022-06-03 RX ADMIN — ATORVASTATIN CALCIUM 40 MG: 40 TABLET, FILM COATED ORAL at 20:13

## 2022-06-03 RX ADMIN — INSULIN LISPRO 12 UNITS: 100 INJECTION, SOLUTION INTRAVENOUS; SUBCUTANEOUS at 00:22

## 2022-06-03 RX ADMIN — DEXTROSE MONOHYDRATE 10 MG/HR: 50 INJECTION, SOLUTION INTRAVENOUS at 03:55

## 2022-06-03 RX ADMIN — HYDRALAZINE HYDROCHLORIDE 10 MG: 20 INJECTION INTRAMUSCULAR; INTRAVENOUS at 05:21

## 2022-06-03 RX ADMIN — METOPROLOL TARTRATE 5 MG: 1 INJECTION, SOLUTION INTRAVENOUS at 08:18

## 2022-06-03 RX ADMIN — SODIUM CHLORIDE, PRESERVATIVE FREE 10 ML: 5 INJECTION INTRAVENOUS at 20:21

## 2022-06-03 RX ADMIN — LACOSAMIDE 150 MG: 10 INJECTION, SOLUTION INTRAVENOUS at 09:06

## 2022-06-03 NOTE — RT PROTOCOL NOTE
RT Inhaler-Nebulizer Bronchodilator Protocol Note    There is a bronchodilator order in the chart from a provider indicating to follow the RT Bronchodilator Protocol and there is an Initiate RT Inhaler-Nebulizer Bronchodilator Protocol order as well (see protocol at bottom of note). CXR Findings:  No results found. The findings from the last RT Protocol Assessment were as follows:   History Pulmonary Disease: Chronic pulmonary disease  Respiratory Pattern: Dyspnea on exertion or RR 21-25 bpm  Breath Sounds: Slightly diminished and/or crackles  Cough: Weak, productive  Indication for Bronchodilator Therapy: Decreased or absent breath sounds  Bronchodilator Assessment Score: 8    Aerosolized bronchodilator medication orders have been revised according to the RT Inhaler-Nebulizer Bronchodilator Protocol below. Respiratory Therapist to perform RT Therapy Protocol Assessment initially then follow the protocol. Repeat RT Therapy Protocol Assessment PRN for score 0-3 or on second treatment, BID, and PRN for scores above 3. No Indications - adjust the frequency to every 6 hours PRN wheezing or bronchospasm, if no treatments needed after 48 hours then discontinue using Per Protocol order mode. If indication present, adjust the RT bronchodilator orders based on the Bronchodilator Assessment Score as indicated below. Use Inhaler orders unless patient has one or more of the following: on home nebulizer, not able to hold breath for 10 seconds, is not alert and oriented, cannot activate and use MDI correctly, or respiratory rate 25 breaths per minute or more, then use the equivalent nebulizer order(s) with same Frequency and PRN reasons based on the score. If a patient is on this medication at home then do not decrease Frequency below that used at home.     0-3 - enter or revise RT bronchodilator order(s) to equivalent RT Bronchodilator order with Frequency of every 4 hours PRN for wheezing or increased work of breathing using Per Protocol order mode. 4-6 - enter or revise RT Bronchodilator order(s) to two equivalent RT bronchodilator orders with one order with BID Frequency and one order with Frequency of every 4 hours PRN wheezing or increased work of breathing using Per Protocol order mode. 7-10 - enter or revise RT Bronchodilator order(s) to two equivalent RT bronchodilator orders with one order with TID Frequency and one order with Frequency of every 4 hours PRN wheezing or increased work of breathing using Per Protocol order mode. 11-13 - enter or revise RT Bronchodilator order(s) to one equivalent RT bronchodilator order with QID Frequency and an Albuterol order with Frequency of every 4 hours PRN wheezing or increased work of breathing using Per Protocol order mode. Greater than 13 - enter or revise RT Bronchodilator order(s) to one equivalent RT bronchodilator order with every 4 hours Frequency and an Albuterol order with Frequency of every 2 hours PRN wheezing or increased work of breathing using Per Protocol order mode. RT to enter RT Home Evaluation for COPD & MDI Assessment order using Per Protocol order mode.     Electronically signed by Gricel Cary RCP on 6/3/2022 at 8:09 AM

## 2022-06-03 NOTE — PROGRESS NOTES
Reason for Follow up: ADRIANA    Subjective:  SBP trending 130-160s. Off cardene drip currently. On Bipap. Sedated. Na higher at 144. K ok. Glucose improving. Creatinine worse at 1.57  Urine output 1975 mL yesterday  Encephalopathic. Review Of Systems:   Unable to obtain because current mental status.     Scheduled Meds:   methylPREDNISolone  40 mg IntraVENous Q8H    OLANZapine zydis  5 mg Oral Nightly    lacosamide (VIMPAT) IVPB  150 mg IntraVENous BID    PHENobarbital  65 mg IntraVENous BID    ipratropium-albuterol  1 ampule Inhalation TID    insulin lispro  0-18 Units SubCUTAneous Q6H    cloNIDine  1 patch TransDERmal Weekly    fat emulsion  100 mL IntraVENous Daily    famotidine (PEPCID) injection  20 mg IntraVENous BID    budesonide  0.5 mg Nebulization BID    enoxaparin  40 mg SubCUTAneous Daily    thiamine and folic acid IVPB   IntraVENous Daily    sodium chloride flush  10 mL IntraVENous Once    sodium chloride flush  5-40 mL IntraVENous 2 times per day    aspirin  81 mg Oral Daily    atorvastatin  40 mg Oral Nightly    clopidogrel  75 mg Oral Daily    metFORMIN  500 mg Oral BID WC    PARoxetine  40 mg Oral Daily    amLODIPine  5 mg Oral Daily    atenolol  50 mg Oral Daily    losartan  100 mg Oral Daily   Continuous Infusions:   PN-Adult 2-in-1 Central Line (Standard) 74 mL/hr at 06/03/22 0300    dexmedetomidine (PRECEDEX) IV infusion Stopped (06/02/22 1352)    sodium chloride Stopped (05/26/22 2008)    dextrose      niCARdipene (CARDENE) infusion 10 mg/hr (06/03/22 0916)     PRN Meds:potassium chloride **OR** potassium chloride, haloperidol lactate, LORazepam **OR** LORazepam **OR** LORazepam **OR** LORazepam **OR** LORazepam **OR** LORazepam **OR** LORazepam **OR** LORazepam, albuterol, hydrALAZINE, sodium chloride flush, sodium chloride, acetaminophen, ondansetron **OR** ondansetron, metoprolol, glucose, dextrose bolus **OR** dextrose bolus, glucagon (rDNA), dextrose    No Known Allergies    Physical Exam:  Blood pressure (!) 160/55, pulse (!) 108, temperature 98.6 °F (37 °C), resp. rate (!) 40, height 5' 6\" (1.676 m), weight 183 lb 0.7 oz (83 kg), SpO2 93 %. In: 2774.1 [I.V.:1061.8]  Out: 1975   In: 2774.1   Out: 1975 [[facility]:0650]    General:  Not in distress. Appears to be stated age. On bipap. HEENT: Atraumatic, normocephalic. Anicteric sclera. Pink and moist oral mucosa. Chest: Bilateral air entry, coarse to auscultation. Cardiovascular: RRR, S1S2, no murmur, rub or gallop. Has no lower extremity edema. Abdomen: Soft, non tender to palpation. Active bowel sounds. Musculoskeletal: No cyanosis or clubbing. Integumentary: Pink, warm and dry. Free from rash or lesions. Skin turgor normal.  CNS: lethargic on precedex.     Data:  CBC:   Lab Results   Component Value Date    WBC 9.3 06/03/2022    HGB 11.8 (L) 06/03/2022    HCT 36.4 (L) 06/03/2022    MCV 96.6 06/03/2022     06/03/2022     BMP:    Lab Results   Component Value Date     06/03/2022    K 3.5 (L) 06/03/2022     (H) 06/03/2022    CO2 25 06/03/2022    BUN 48 (H) 06/03/2022    CREATININE 1.57 (H) 06/03/2022    GLUCOSE 333 (H) 06/03/2022     CMP:   Lab Results   Component Value Date     06/03/2022    K 3.5 (L) 06/03/2022     (H) 06/03/2022    CO2 25 06/03/2022    BUN 48 (H) 06/03/2022    CREATININE 1.57 (H) 06/03/2022    GLUCOSE 333 (H) 06/03/2022    CALCIUM 8.7 06/03/2022    PROT 6.6 05/26/2022    LABALBU 3.8 05/26/2022    BILITOT 0.79 05/26/2022    ALKPHOS 69 05/26/2022    AST 24 05/26/2022    ALT 12 05/26/2022      Hepatic:   Lab Results   Component Value Date    AST 24 05/26/2022    ALT 12 05/26/2022    BILITOT 0.79 05/26/2022    ALKPHOS 69 05/26/2022     BNP: No results found for: BNP  Lipids:   Lab Results   Component Value Date    CHOL 185 02/25/2021    HDL 87 02/25/2021     INR:   Lab Results   Component Value Date    INR 1.1 09/22/2021     PTH: No results found for: PTH  Phosphorus: Lab Results   Component Value Date    PHOS 3.8 06/03/2022     Ionized Calcium: No results found for: IONCA  Magnesium:   Lab Results   Component Value Date    MG 2.2 06/03/2022     Albumin:   Lab Results   Component Value Date    LABALBU 3.8 05/26/2022     Last 3 CK, CKMB, Troponin: @LABRCNT(CKTOTAL:3,CKMB:3,TROPONINI:3)       URINE:)No results found for: Paco Renner    Radiology:   Reviewed. Assessment:  Acute kidney injury, Cr is slightly higher. Urinary retention. Slight hypernatremia. Hypokalemia, improved. Hypertension with worsening blood pressure secondary to DT  EtOH withdrawal     Plan  Will be starting TF today. Add water 350 ml every 4 hours. If TPN is continued would suggest to decrease sodium content of TPN. Will check urine studies. Peterson for now. Monitor BP. Follow up chemistries daily in AM.    Electronically signed by Susannah Mayorga MD on 6/3/2022 at 9:54 AM  [facility].   Ph: [phone]

## 2022-06-03 NOTE — PROGRESS NOTES
Bipap removed due to patients increase in snoring and coughing. Extensive oral care done and oral suctioning. Large amount of tan/brown sputum removed from back of patients throat and small amount of yellow from right nostril. Formed solid brown \"chunks\" of secretions removed from mouth as well during oral care. Patient continues to be snoring and occasionally coughing but needs assistance in clearing airway. SPO2 maintaining in the mid 90's on room air. Writer will continue to monitor.

## 2022-06-03 NOTE — PROGRESS NOTES
Patient slightly restless on assessment, moving head side to side and mouth breathing. Oral care performed and patient repositioned for comfort. BP continues to be elevated, cardene infusing, plan to administer PRN medications when due.

## 2022-06-03 NOTE — PROGRESS NOTES
End Of Shift Note  [facility]  Summary of shift: Bipap 30% placed at 2020 and removed at 0520 due increase in secretions. Patient received 20 mg of IV ativan, patient appeared to rest well and respiratory status remained stable all night, SPO2 ranging in the mid to upper 90's. Patient non interactive and does not communicate with staff. Continuous snoring and head movements side to side when off bipap. Patient refuses to open eyes or follow commands this morning.      Vitals:    Vitals:    06/03/22 0515 06/03/22 0530 06/03/22 0545 06/03/22 0600   BP: (!) 156/63 (!) 153/59 (!) 159/67 (!) 142/65   Pulse: (!) 103 (!) 105 (!) 102 (!) 107   Resp:    (!) 36   Temp:       TempSrc:       SpO2:       Weight:       Height:            I&O:     Intake/Output Summary (Last 24 hours) at 6/3/2022 0701  Last data filed at 6/3/2022 0500  Gross per 24 hour   Intake 2562.03 ml   Output 1975 ml   Net 587.03 ml           Critical Care IV infusions:   PN-Adult 2-in-1 Central Line (Standard) 74 mL/hr at 06/03/22 0300    dexmedetomidine (PRECEDEX) IV infusion Stopped (06/02/22 1352)    sodium chloride Stopped (05/26/22 2008)    dextrose      niCARdipene (CARDENE) infusion 10 mg/hr (06/03/22 0355)        LDA:   PICC Double Lumen 06/38/82 Right Basilic (Active)   Number of days: 7       Urinary Catheter Peterson (Active)   Number of days: 6       Incision 09/22/21 Neck Left (Active)   Number of days: 254

## 2022-06-03 NOTE — PROGRESS NOTES
Patient continues to wear bipap without any difficulty, however at times patient appears to be restless, shaking his head side to side requiring ativan to given. For most of the night patient was able to rest with little to no agitation. BP improving as well, cardene gtt currently titrated down to 10mg.

## 2022-06-03 NOTE — PROGRESS NOTES
Patient placed on bipap. Patient appears to be more relaxed and less restless when mask applied. Will continue to monitor.

## 2022-06-03 NOTE — PROGRESS NOTES
End Of Shift Note  [facility]  Summary of shift: Patient very lethargic throughout shift. Writer able to stop cardene gtt at 1115. Placed NG (68 cm) verified with x-ray and able to administer oral meds. No prn ativan given. TF started. Patient remained on bipap all day. Vitals:    Vitals:    06/03/22 1545 06/03/22 1600 06/03/22 1615 06/03/22 1630   BP: 131/71 (!) 143/73 (!) 142/67 (!) 142/62   Pulse: 64 63 64 65   Resp:       Temp:       TempSrc:       SpO2: 99% 99% 100% 100%   Weight:       Height:            I&O:     Intake/Output Summary (Last 24 hours) at 6/3/2022 1648  Last data filed at 6/3/2022 1600  Gross per 24 hour   Intake 3606.55 ml   Output 1975 ml   Net 1631.55 ml       Resp Status: wore bipap all day, diminished lung sounds.      Ventilator Settings:     / / /FiO2 : 30 %    Critical Care IV infusions:   PN-Adult 2-in-1 Central Line (Standard) 74 mL/hr at 06/03/22 0300    dexmedetomidine (PRECEDEX) IV infusion Stopped (06/02/22 1352)    sodium chloride Stopped (05/26/22 2008)    dextrose      niCARdipene (CARDENE) infusion 5 mg/hr (06/03/22 1045)        LDA:   Extended Dwell Peripherial IV 06/03/22 Left Brachial (Active)   Number of days: 0       PICC Double Lumen 56/19/97 Right Basilic (Active)   Number of days: 7       NG/OG/NJ/NE Tube Nasogastric 14 fr Left nostril (Active)   Number of days: 0       Urinary Catheter Peterson (Active)   Number of days: 6       Incision 09/22/21 Neck Left (Active)   Number of days: 254

## 2022-06-03 NOTE — PROGRESS NOTES
Patient friend Karl Young called and was updated on patient condition. All questions/concerns answered and addressed at this time.

## 2022-06-03 NOTE — PROGRESS NOTES
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, new since 9/2021  · Old infarction left occipital lobe, increased since 9/2021  · 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 / Encephalomalacia   · Old lacunar infarcts left basal ganglia   · Mild brain parenchymal volume loss  · Alcohol dependence   · Reported right hemiparesis   · Essential hypertension  · Diabetes mellitus type 2  · Overweight   · Diverticulosis  · Agitation   · Delirium tremens     · Acute respiratory failure with hypoxia   · Oliguria  · Carotid artery disease         Plan:      · Patient currently in ICU  · O2 to maintain oxygen saturation greater than 92%  · BiPAP as needed  · Amiodarone  · DuoNebs  ·   ·    · Precedex gtt.   · Seizure precautions  · IV Solu-Medrol  · TPN  · Plan to start tube feeds today  · Add water 350 mL every 4 hour  · CIWA protocol  · Critical care on board  ·    · Waxing and waning mental status   · phenobarbital and Vimpat, decreasing vimpat per neurology  · Neurology following  · Zyprexa  · As needed Haldol  ·    · Monitor blood pressure  · Cardene drip, weaning  · IV Lopressor as needed  · Continue clonidine patch  · Nephrology on board  ·    · Vascular evaluated the the patient,   ·    · DVT and GI prophylaxis  · Continue Med as below      Scheduled Meds:   [START ON 6/4/2022] famotidine (PEPCID) injection  20 mg IntraVENous Daily    methylPREDNISolone  40 mg IntraVENous Q8H    OLANZapine zydis  5 mg Oral Nightly    lacosamide (VIMPAT) IVPB  150 mg IntraVENous BID    PHENobarbital  65 mg IntraVENous BID    ipratropium-albuterol  1 ampule Inhalation TID    insulin lispro  0-18 Units SubCUTAneous Q6H    cloNIDine  1 patch TransDERmal Weekly    fat emulsion  100 mL IntraVENous Daily    budesonide  0.5 mg Nebulization BID    enoxaparin  40 mg SubCUTAneous Daily    thiamine and folic acid IVPB   IntraVENous Daily    sodium chloride flush  10 mL IntraVENous Once    sodium chloride flush  5-40 mL IntraVENous 2 times per day    aspirin  81 mg Oral Daily    atorvastatin  40 mg Oral Nightly    clopidogrel  75 mg Oral Daily    PARoxetine  40 mg Oral Daily    amLODIPine  5 mg Oral Daily    atenolol  50 mg Oral Daily    losartan  100 mg Oral Daily     Continuous Infusions:   PN-Adult 2-in-1 Central Line (Standard) 74 mL/hr at 06/03/22 0300    dexmedetomidine (PRECEDEX) IV infusion Stopped (06/02/22 1352)    sodium chloride Stopped (05/26/22 2008)    dextrose      niCARdipene (CARDENE) infusion 5 mg/hr (06/03/22 1045)     PRN Meds:  potassium chloride, 20 mEq, PRN   Or  potassium chloride, 10 mEq, PRN  haloperidol lactate, 5 mg, Q6H PRN  LORazepam, 1 mg, Q1H PRN   Or  LORazepam, 1 mg, Q1H PRN   Or  LORazepam, 2 mg, Q1H PRN   Or  LORazepam, 2 mg, Q1H PRN   Or  LORazepam, 3 mg, Q1H PRN   Or  LORazepam, 3 mg, Q1H PRN   Or  LORazepam, 4 mg, Q1H PRN   Or  LORazepam, 4 mg, Q1H PRN  albuterol, 2.5 mg, Q4H PRN  hydrALAZINE, 10 mg, Q6H PRN  sodium chloride flush, 5-40 mL, PRN  sodium chloride, , PRN  acetaminophen, 650 mg, Q4H PRN  ondansetron, 4 mg, Q8H PRN   Or  ondansetron, 4 mg, Q6H PRN  metoprolol, 5 mg, Q6H PRN  glucose, 4 tablet, PRN  dextrose bolus, 125 mL, PRN   Or  dextrose bolus, 250 mL, PRN  glucagon (rDNA), 1 mg, PRN  dextrose, 100 mL/hr, PRN            Subjective:     Patient seen and examined at bedside. No overnight events. No acute complaints today. Afebrile  Patient is a still confused. Did not talk much today.   Sodium is elevated      ROS:  Unable to assess due to pt mental status        Physical Examination:      Vitals:  BP (!) 160/55   Pulse 65   Temp 98.6 °F (37 °C)   Resp 22   Ht 5' 6\" (1.676 m)   Wt 183 lb 0.7 oz (83 kg)   SpO2 98%   BMI 29.54 kg/m²   Temp (24hrs), Av °F (37.2 °C), Min:98.6 °F (37 °C), Max:99.3 °F (37.4 °C)    Weight:   Wt Readings from Last 3 Encounters:   22 183 lb 0.7 oz (83 kg)   21 156 lb (70.8 kg)   21 174 lb 4.8 oz (79.1 kg)     I/O last 3 completed shifts:  I/O last 3 completed shifts: In: 4094.1 [I.V.:1427.4; IV Piggyback:230.1]  Out: 1374 [Urine:2915]     Recent Labs     22  1749 22  0016 22  0528 22  1110   POCGLU 297* 321* 277* 306*         General appearance - sleepy  Mental status - oriented to person, place, and time with normal affect  Head - normocephalic and atraumatic  Eyes - pupils equal and reactive, extraocular eye movements intact, conjunctiva clear  Ears - hearing appears to be intact  Nose - no drainage noted  Mouth - mucous membranes moist  Neck - supple, no carotid bruits, thyroid not palpable  Chest - clear to auscultation, normal effort  Heart - normal rate, regular rhythm, no murmur  Abdomen - soft, nontender, nondistended, bowel sounds present all four quadrants, no masses, hepatomegaly or splenomegaly  Neurological - normal speech, no focal findings or movement disorder noted, cranial nerves II through XII grossly intact  Extremities - peripheral pulses palpable, no pedal edema or calf pain with palpation  Skin - no gross lesions, rashes, or induration noted        Medications:      Allergies: No Known Allergies    Current Meds:   Current Facility-Administered Medications:     [START ON 2022] famotidine (PEPCID) 20 mg in sodium chloride (PF) 10 mL injection, 20 mg, IntraVENous, Daily, Brittany Franks MD    methylPREDNISolone sodium (SOLU-MEDROL) injection 40 mg, 40 mg, IntraVENous, Q8H, Clair Gifford MD, 40 mg at 06/03/22 1342    OLANZapine zydis (ZYPREXA) disintegrating tablet 5 mg, 5 mg, Oral, Nightly, Clair Gifford MD, 5 mg at 06/02/22 2005    PN-Adult 2-in-1 [facility] VoxFeed (Custom), , IntraVENous, Continuous TPN, Brittany Franks MD, Last Rate: 74 mL/hr at 06/03/22 0300, Rate Verify at 06/03/22 0300    lacosamide (VIMPAT) 150 mg in sodium chloride 0.9 % 65 mL IVPB, 150 mg, IntraVENous, BID, Holly Rubin MD, Stopped at 06/03/22 0936    [COMPLETED] PHENobarbital (LUMINAL) injection 130 mg, 130 mg, IntraVENous, Once, 130 mg at 05/27/22 1214 **FOLLOWED BY** PHENobarbital (LUMINAL) injection 65 mg, 65 mg, IntraVENous, BID, Holly Rubin MD, 65 mg at 06/03/22 0818    ipratropium-albuterol (DUONEB) nebulizer solution 1 ampule, 1 ampule, Inhalation, TID, Brittany Franks MD, 1 ampule at 06/03/22 1455    potassium chloride 20 mEq/50 mL IVPB (Central Line), 20 mEq, IntraVENous, PRN, Stopped at 05/31/22 1445 **OR** potassium chloride 10 mEq/100 mL IVPB (Peripheral Line), 10 mEq, IntraVENous, PRN, Brittany Franks MD    dexmedetomidine (PRECEDEX) 1,000 mcg in sodium chloride 0.9 % 250 mL infusion, 0.1-1.5 mcg/kg/hr, IntraVENous, Continuous, Shay Hawkins MD, Stopped at 06/02/22 1352    insulin lispro (HUMALOG) injection vial 0-18 Units, 0-18 Units, SubCUTAneous, Q6H, Shay Hawkins MD, 12 Units at 06/03/22 1209    cloNIDine (CATAPRES) 0.2 MG/24HR 1 patch, 1 patch, TransDERmal, Weekly, Clair Gifford MD, 1 patch at 05/30/22 1453    fat emulsion 20 % infusion 100 mL, 100 mL, IntraVENous, Daily, Brittany Franks MD, Paused at 06/03/22 0527    budesonide (PULMICORT) nebulizer suspension 500 mcg, 0.5 mg, Nebulization, BID, CHARLENE Reynaga CNP, 500 mcg at 06/03/22 0759    enoxaparin (LOVENOX) injection 40 mg, 40 mg, SubCUTAneous, Daily, CHARLENE Reynaga CNP, 40 mg at 06/03/22 0837    haloperidol lactate (HALDOL) injection 5 mg, 5 mg, IntraMUSCular, Q6H PRN, Shay Hawkins MD, 5 mg at 06/02/22 1327   LORazepam (ATIVAN) tablet 1 mg, 1 mg, Oral, Q1H PRN **OR** LORazepam (ATIVAN) injection 1 mg, 1 mg, IntraVENous, Q1H PRN, 1 mg at 06/01/22 1107 **OR** LORazepam (ATIVAN) tablet 2 mg, 2 mg, Oral, Q1H PRN **OR** LORazepam (ATIVAN) injection 2 mg, 2 mg, IntraVENous, Q1H PRN, 2 mg at 06/02/22 2144 **OR** LORazepam (ATIVAN) tablet 3 mg, 3 mg, Oral, Q1H PRN **OR** LORazepam (ATIVAN) injection 3 mg, 3 mg, IntraVENous, Q1H PRN, 3 mg at 06/02/22 0956 **OR** LORazepam (ATIVAN) tablet 4 mg, 4 mg, Oral, Q1H PRN **OR** LORazepam (ATIVAN) injection 4 mg, 4 mg, IntraVENous, Q1H PRN, Shay Hawkins MD, 4 mg at 20/63/77 7847    folic acid 1 mg, thiamine (B-1) 100 mg in sodium chloride 0.9 % 50 mL IVPB, , IntraVENous, Daily, Shay Hawkins MD, Stopped at 06/03/22 0850    albuterol (PROVENTIL) nebulizer solution 2.5 mg, 2.5 mg, Nebulization, Q4H PRN, Kortney Leroy MD    hydrALAZINE (APRESOLINE) injection 10 mg, 10 mg, IntraVENous, Q6H PRN, Kortney Leroy MD, 10 mg at 06/03/22 0521    sodium chloride flush 0.9 % injection 10 mL, 10 mL, IntraVENous, Once, Romel Lopez MD    sodium chloride flush 0.9 % injection 5-40 mL, 5-40 mL, IntraVENous, 2 times per day, Ruth Kang MD, 10 mL at 06/03/22 0819    sodium chloride flush 0.9 % injection 5-40 mL, 5-40 mL, IntraVENous, PRN, Shay Hawkins MD, 10 mL at 06/01/22 0134    0.9 % sodium chloride infusion, , IntraVENous, PRN, Shay Hawkins MD, Stopped at 05/26/22 2008    acetaminophen (TYLENOL) tablet 650 mg, 650 mg, Oral, Q4H PRN, Shay Hawkins MD    ondansetron (ZOFRAN-ODT) disintegrating tablet 4 mg, 4 mg, Oral, Q8H PRN **OR** ondansetron (ZOFRAN) injection 4 mg, 4 mg, IntraVENous, Q6H PRN, Shay Hawkins MD    metoprolol (LOPRESSOR) injection 5 mg, 5 mg, IntraVENous, Q6H PRN, Shay Hawkins MD, 5 mg at 06/03/22 0818    glucose chewable tablet 16 g, 4 tablet, Oral, PRN, Shay Hawkins MD    dextrose bolus 10% 125 mL, 125 mL, IntraVENous, PRN **OR** dextrose bolus 10% 250 mL, 250 mL, IntraVENous, PRN, Shay Hawkins MD    glucagon (rDNA) injection 1 mg, 1 mg, IntraMUSCular, PRN, Shay Hawkins MD    dextrose 5 % solution, 100 mL/hr, IntraVENous, PRN, Shay Hawkins MD    aspirin chewable tablet 81 mg, 81 mg, Oral, Daily, Shay Hawkins MD, 81 mg at 06/03/22 1032    atorvastatin (LIPITOR) tablet 40 mg, 40 mg, Oral, Nightly, Shay Hawkins MD, 40 mg at 05/27/22 2027    clopidogrel (PLAVIX) tablet 75 mg, 75 mg, Oral, Daily, Shay Hawkins MD, 75 mg at 06/03/22 1033    PARoxetine (PAXIL) tablet 40 mg, 40 mg, Oral, Daily, Shay Hawkins MD, 40 mg at 06/03/22 1034    amLODIPine (NORVASC) tablet 5 mg, 5 mg, Oral, Daily, Flor Lynn MD, 5 mg at 06/03/22 1032    atenolol (TENORMIN) tablet 50 mg, 50 mg, Oral, Daily, Flor Lynn MD, 50 mg at 06/03/22 1032    losartan (COZAAR) tablet 100 mg, 100 mg, Oral, Daily, Flor Lynn MD, 100 mg at 06/03/22 1034    niCARdipine (CARDENE) 50 mg in dextrose 5 % 250 mL infusion, 2.5-15 mg/hr, IntraVENous, Continuous, Shay Hawkins MD, Last Rate: 25 mL/hr at 06/03/22 1045, 5 mg/hr at 06/03/22 1045      I/O (24Hr):     Intake/Output Summary (Last 24 hours) at 6/3/2022 1642  Last data filed at 6/3/2022 1600  Gross per 24 hour   Intake 3606.55 ml   Output 1975 ml   Net 1631.55 ml       Data:           Labs:    Hematology:  Recent Labs     06/01/22  0345 06/02/22  0400 06/03/22  0532   WBC 6.6 7.6 9.3   RBC 3.54* 3.65* 3.77*   HGB 11.3* 11.7* 11.8*   HCT 34.6* 35.5* 36.4*   MCV 97.7 97.3 96.6   MCH 31.9 32.1 31.3   MCHC 32.7 33.0 32.4   RDW 13.2 13.2 13.4    215 242   MPV 10.1 9.9 10.1     Chemistry:  Recent Labs     06/01/22  0345 06/02/22  0400 06/03/22  0532    142 144   K 3.7 3.7 3.5*    108* 108*   CO2 25 25 25   GLUCOSE 303* 196* 333*   BUN 36* 38* 48*   CREATININE 1.39* 1.22* 1.57*   MG 1.9 1.9 2.2   ANIONGAP 9 9 11   LABGLOM 51* 59* 44*   GFRAA >60 >60 54*   CALCIUM 8.9 8.8 8.7   PHOS 3.1 4.6* 3.8     Recent Labs 06/02/22  0554 06/02/22  1123 06/02/22  1749 06/03/22  0016 06/03/22  0528 06/03/22  1110   POCGLU 176* 179* 297* 321* 277* 306*       Lab Results   Component Value Date/Time    SPECIAL NOT REPORTED 02/26/2021 09:30 AM     Lab Results   Component Value Date/Time    CULTURE NO GROWTH 3 DAYS 02/26/2021 09:30 AM       Lab Results   Component Value Date    POCPH 7.396 05/27/2022    POCPCO2 39.7 05/27/2022    POCPO2 87.5 05/27/2022    POCHCO3 24.4 05/27/2022    NBEA 1 05/26/2022    PBEA 0 05/27/2022    QSUB9EJO 97 05/27/2022    FIO2 30.0 05/27/2022       Radiology:    XR CHEST (SINGLE VIEW FRONTAL)    Result Date: 5/26/2022  Mild hypoventilatory changes noted both lung bases. CT HEAD WO CONTRAST    Result Date: 5/25/2022  No acute intracranial hemorrhage or abnormal extra-axial collection. Relative to 9 months prior there are increased areas of encephalomalacia in the left occipital lobe and new areas of encephalomalacia in the left parietal lobe. If there is persistent clinical concern for acute on chronic ischemia, MRI is more sensitive. XR CHEST PORTABLE    Result Date: 5/31/2022  Stable support line interval increase in now moderate left basilar opacity likely related to combined pleural-parenchymal process, pneumonia the likely etiology     XR CHEST PORTABLE    Result Date: 5/30/2022  Elevated left hemidiaphragm with apparent atelectasis left base. Slight improvement in right basilar opacity which may represent resolving atelectasis or improved minimal airspace disease. XR CHEST PORTABLE    Result Date: 5/29/2022  Interval developing right basilar opacity which may be related atelectasis or developing focal airspace disease. XR CHEST PORTABLE    Result Date: 5/28/2022  No significant finding in the chest.     CT CHEST ABDOMEN PELVIS W CONTRAST    Result Date: 5/25/2022  Small cysts noted in the liver. Diverticular disease of the colon without diverticulitis.   Significant thickening of the bladder wall, in keeping with muscular hypertrophy or cystitis. Clinical correlation suggested. Degenerative changes demonstrated in the the thoracic and lumbar spine. RECOMMENDATIONS:     MRI BRAIN W WO CONTRAST    Result Date: 5/25/2022  No acute intracranial abnormality. No mass effect or abnormal intracranial enhancement. Old infarctions in the left frontal parietal lobes, new since September 21, 2021. Old infarction in the left occipital lobe, likely increased in size since September 21, 2021. Laminar necrosis and gliosis associated with the old infarctions. Old lacunar infarcts in the left basal ganglia, likely increased. Mild parenchymal volume loss. All radiological studies reviewed  Code Status:  Full Code        Electronically signed by Patys Hill MD on 6/3/2022 at 4:42 PM    This note was created with the assistance of a speech-recognition program.  Although the intention is to generate a document that actually reflects the content of the visit, no guarantees can be provided that every mistake has been identified and corrected by editing. Note was updated later by me after  physical examination and  completion of the assessment.

## 2022-06-03 NOTE — PROGRESS NOTES
Comprehensive Nutrition Assessment    Type and Reason for Visit:  Consult (Tube feedings ordering and management)    Nutrition Recommendations/Plan:   1. Continue NPO diet  2. Wean off TPN and lipids  3. Start Glucerna 1.5 Gabe goal rate 50 mL/hr (1200 mL total volume, 1800 kcal and 99 gm of protein)  4. Monitor tube feeding rate, tolerance, labs and diet advancement     Malnutrition Assessment:  Malnutrition Status: At risk for malnutrition (Comment) (05/29/22 7046)      Nutrition Assessment:    Patient remains sedated due to agitation. Nasogastric tube was able to be placed today and nutrition consult received for tube feeding ordering and managment. Start Glucerna 1.5 Gabe, goal rate 50 mL/hr (1200 mL total volume). TPN will be weaned off today. Will monitor tube feeding rate, tolerance, labs and diet advancement. Nutrition Related Findings:    Edema: +1 non-pitting BUE. Active bowel sounds. Alcohol abuse and withdrawal. Aggression, agitation, yelling and verbal abuse. Slurred speech. Precedex is off. NG tube feedings Wound Type: None       Current Nutrition Intake & Therapies:    Average Meal Intake: NPO  Average Supplements Intake: NPO  Diet NPO  PN-Adult 2-in-1 Central Line (Custom)  ADULT TUBE FEEDING; Nasogastric; Diabetic; Continuous; 15; Yes; 15; Q 4 hours; 50; 50; Q 4 hours  Current Tube Feeding (TF) Orders:  · Feeding Route: Nasogastric  · Formula: Diabetic  · Schedule: Continuous  · Feeding Regimen: Glucerna 1.5 Gabe goal rate 50 mL/hr (1200 mL total volume)  · Water Flushes: 50 mL flush every 4 hours   · Current TF & Flush Orders Provides: 1800 kcal and 99 gm of protein  · Goal TF & Flush Orders Provides: 1825 kcal and 84-90 gm of protein      Anthropometric Measures:  Height: 5' 6\" (167.6 cm)  Ideal Body Weight (IBW): 142 lbs (65 kg)    Admission Body Weight: 172 lb 6.4 oz (78.2 kg)  Current Body Weight: 183 lb (83 kg), 128.9 % IBW.  Weight Source: Bed Scale  Current BMI (kg/m2): 29.6        Weight Adjustment For: No Adjustment                 BMI Categories: Overweight (BMI 25.0-29. 9)    Estimated Daily Nutrient Needs:  Energy Requirements Based On: Formula  Weight Used for Energy Requirements: Other (Comment)  Energy (kcal/day): 1825 kcal (MSJ: 1 activity factor; 1.2 stess factor)  Weight Used for Protein Requirements: Ideal  Protein (g/day): 84-90 gm of protein (1.3-1.4 gm/kg)     Nutrition Diagnosis:   · Inadequate oral intake related to psychological cause or life stress,cognitive or neurological impairment as evidenced by NPO or clear liquid status due to medical condition,nutrition support - enteral nutrition      Nutrition Interventions:   Food and/or Nutrient Delivery: Continue NPO,Discontinue Parenteral Nutrition,Start Tube Feeding  Nutrition Education/Counseling: Education not indicated  Coordination of Nutrition Care: Continue to monitor while inpatient       Goals:  Previous Goal Met: Progressing toward Goal(s)  Goals: Tolerate nutrition support at goal rate       Nutrition Monitoring and Evaluation:   Behavioral-Environmental Outcomes: None Identified  Food/Nutrient Intake Outcomes: Enteral Nutrition Intake/Tolerance,Diet Advancement/Tolerance  Physical Signs/Symptoms Outcomes: Biochemical Data,Fluid Status or Edema,Skin,Weight,GI Status    Discharge Planning:     Too soon to determine           Ludlow Go  MFN, RDN, LDN  Lead Clinical Dietitian  RD Office Phone [phone]

## 2022-06-03 NOTE — CONSULTS
Reason for Consult:  Hypertensive Urgency  Requesting Physician: Paresh Tinsley MD    CHIEF COMPLAINT:  Seizures    History Obtained From:  Patient/Chart    HISTORY OF PRESENT ILLNESS:      The patient is a 78 y.o. male with significant past medical history of CVA, Carotid Disease, T2DM, HTN, Obesity, EtOH abuse    He presents with seizures. He had been sitting in his kennedy and started shaking. Never had this before. Pt drinks 4 beers daily. EtOH level on admission was 0. He was admitted 5/26 to the ICU and was agitated and treated for EtOH withdrawals/DT and HTN. Since admission, his Bps have been as high as 200/140s. He has been on ativan/precedex as well as a cardene drip and PRN IV pushes of hydralazine and metoprolol. He is NPO due to AMS. Currently BP is 160/55 with . ECHO showed EF 50-55% with G1DD. EKG showed NSR. Neurology workup has been negative. No further seizures in the hospital. He is on antiseizure medcations and remains on sedation. Currently, an NG tube was placed and he is going to get NG feeds from TPN. He also was restarted on his home meds inc. Atenolol 50mg, Clonidine patch 0.1, Losartan 100mg, Norvasc 5mg. He is off the drip and Bps are 140-150s. He is waking up from ativan last night and is nonverbal, on BiPAP and not responding to questions.        Past Medical History:    Past Medical History:   Diagnosis Date    CVA (cerebral vascular accident) ([facility] [address] 02/2021    With residual right sided weakness    Diabetes mellitus ([facility] [address]     Hypertension      Past Surgical History:    Past Surgical History:   Procedure Laterality Date    CAROTID ENDARTERECTOMY  09/22/2021    CAROTID ENDARTERECTOMY (N/A Neck    CAROTID ENDARTERECTOMY N/A 9/22/2021    CAROTID ENDARTERECTOMY performed by Bebe Alvarez MD at [facility] IR INS PICC VAD W SQ PORT GREATER THAN 5  5/26/2022    IR INS PICC VAD W SQ PORT GREATER THAN 5 5/26/2022 [address] Medications:  Prior to Admission medications    Medication Sig Start Date End Date Taking?  Authorizing Provider   clopidogrel (PLAVIX) 75 MG tablet Take 1 tablet by mouth daily for 21 days 9/23/21 10/14/21  Sally Antoine MD   aspirin 81 MG chewable tablet Take 1 tablet by mouth daily 2/26/21   Sally Antoine MD   metFORMIN (GLUCOPHAGE) 500 MG tablet Take 1 tablet by mouth 2 times daily (with meals) 2/26/21   Sally Antoine MD   atorvastatin (LIPITOR) 40 MG tablet Take 1 tablet by mouth nightly 2/26/21   Sally Antoine MD   amLODIPine (NORVASC) 5 MG tablet Take 1 tablet by mouth daily 2/27/21   Sally Antoine MD   PARoxetine (PAXIL) 40 MG tablet Take 40 mg by mouth daily    Historical Provider, MD   atenolol (TENORMIN) 50 MG tablet Take 50 mg by mouth daily    Historical Provider, MD   losartan (COZAAR) 100 MG tablet Take 100 mg by mouth daily    Historical Provider, MD     Current Medications:    Current Facility-Administered Medications   Medication Dose Route Frequency Provider Last Rate Last Admin    methylPREDNISolone sodium (SOLU-MEDROL) injection 40 mg  40 mg IntraVENous Q8H Clair Gifford MD   40 mg at 06/03/22 0522    OLANZapine zydis (ZYPREXA) disintegrating tablet 5 mg  5 mg Oral Nightly Efrain Chaudhary MD   5 mg at 06/02/22 2005    PN-Adult 2-in-1 Central Line (Custom)   IntraVENous Continuous TPN Osvaldo Travis MD 74 mL/hr at 06/03/22 0300 Rate Verify at 06/03/22 0300    lacosamide (VIMPAT) 150 mg in sodium chloride 0.9 % 65 mL IVPB  150 mg IntraVENous BID Briana Jon MD   Stopped at 06/02/22 2158    PHENobarbital (LUMINAL) injection 65 mg  65 mg IntraVENous BID Briana Jon MD   65 mg at 06/03/22 0818    ipratropium-albuterol (DUONEB) nebulizer solution 1 ampule  1 ampule Inhalation TID Osvaldo Travis MD   1 ampule at 06/03/22 0759    potassium chloride 20 mEq/50 mL IVPB (Central Line)  20 mEq IntraVENous PRN Osvaldo Travis MD   Stopped at 05/31/22 1445    Or    mg in sodium chloride 0.9 % 50 mL IVPB   IntraVENous Daily Shay Hawkins  mL/hr at 06/03/22 0819 New Bag at 06/03/22 0819    albuterol (PROVENTIL) nebulizer solution 2.5 mg  2.5 mg Nebulization Q4H PRN Gualberto Luo MD        hydrALAZINE (APRESOLINE) injection 10 mg  10 mg IntraVENous Q6H PRN Gualberto Luo MD   10 mg at 06/03/22 0521    sodium chloride flush 0.9 % injection 10 mL  10 mL IntraVENous Once Devora Koo MD        sodium chloride flush 0.9 % injection 5-40 mL  5-40 mL IntraVENous 2 times per day Marcelo Connelly MD   10 mL at 06/03/22 0819    sodium chloride flush 0.9 % injection 5-40 mL  5-40 mL IntraVENous PRN Marcelo Connelly MD   10 mL at 06/01/22 0134    0.9 % sodium chloride infusion   IntraVENous PRN Marcelo Connelly MD   Stopped at 05/26/22 2008    acetaminophen (TYLENOL) tablet 650 mg  650 mg Oral Q4H PRN Shay Hawkins MD        ondansetron (ZOFRAN-ODT) disintegrating tablet 4 mg  4 mg Oral Q8H PRN Shay Hawkins MD        Or    ondansetron (ZOFRAN) injection 4 mg  4 mg IntraVENous Q6H PRN Shay Hawkins MD        metoprolol (LOPRESSOR) injection 5 mg  5 mg IntraVENous Q6H PRN Shay Hawkins MD   5 mg at 06/03/22 0818    glucose chewable tablet 16 g  4 tablet Oral PRN Shay Hawkins MD        dextrose bolus 10% 125 mL  125 mL IntraVENous PRN Shay Hawkins MD        Or    dextrose bolus 10% 250 mL  250 mL IntraVENous PRN Shay Hawkins MD        glucagon (rDNA) injection 1 mg  1 mg IntraMUSCular PRN Shay Hawkins MD        dextrose 5 % solution  100 mL/hr IntraVENous PRN Shay Hawkins MD        aspirin chewable tablet 81 mg  81 mg Oral Daily Shay Hawkins MD        atorvastatin (LIPITOR) tablet 40 mg  40 mg Oral Nightly Shay Hawkins MD   40 mg at 05/27/22 2027    clopidogrel (PLAVIX) tablet 75 mg  75 mg Oral Daily Shay Hawkins MD        metFORMIN (GLUCOPHAGE) tablet 500 mg  500 mg Oral BID WC Shay Hawkins MD        PARoxetine (PAXIL) tablet 40 mg  40 mg Oral Daily Shay Hawkins MD        amLODIPine (NORVASC) tablet 5 mg  5 mg Oral Daily Shay Hawkins MD        atenolol (TENORMIN) tablet 50 mg  50 mg Oral Daily Shay Hawkins MD        losartan (COZAAR) tablet 100 mg  100 mg Oral Daily Shay Hawkins MD        niCARdipine (CARDENE) 50 mg in dextrose 5 % 250 mL infusion  2.5-15 mg/hr IntraVENous Continuous Shay Hawkins MD 50 mL/hr at 06/03/22 0700 10 mg/hr at 06/03/22 0700     Allergies:  Patient has no known allergies. Social History:    Social History     Socioeconomic History    Marital status:      Spouse name: Not on file    Number of children: Not on file    Years of education: Not on file    Highest education level: Not on file   Occupational History    Not on file   Tobacco Use    Smoking status: Current Every Day Smoker     Packs/day: 1.50     Start date: 6/1/1973    Smokeless tobacco: Never Used   Substance and Sexual Activity    Alcohol use: Yes     Alcohol/week: 4.0 standard drinks     Types: 4 Cans of beer per week     Comment: Occassional, four times weekly    Drug use: No    Sexual activity: Yes     Partners: Female   Other Topics Concern    Not on file   Social History Narrative    Not on file     Social Determinants of Health     Financial Resource Strain:     Difficulty of Paying Living Expenses: Not on file   Food Insecurity:     Worried About Running Out of Food in the Last Year: Not on file    Dylon of Food in the Last Year: Not on file   Transportation Needs:     Lack of Transportation (Medical): Not on file    Lack of Transportation (Non-Medical):  Not on file   Physical Activity:     Days of Exercise per Week: Not on file    Minutes of Exercise per Session: Not on file   Stress:     Feeling of Stress : Not on file   Social Connections:     Frequency of Communication with Friends and Family: Not on file    Frequency of Social Gatherings with Friends and Family: Not on file    Attends Oriental orthodox Services: Not on file   [address] [facility] [employer] or Organizations: Not on file    Attends Club or Organization Meetings: Not on file    Marital Status: Not on file   Intimate Partner Violence:     Fear of Current or Ex-Partner: Not on file    Emotionally Abused: Not on file    Physically Abused: Not on file    Sexually Abused: Not on file   Housing Stability:     Unable to Pay for Housing in the Last Year: Not on file    Number of [place] in the Last Year: Not on file    Unstable Housing in the Last Year: Not on file     Family History:   History reviewed. No pertinent family history. · REVIEW OF SYSTEMS   NOT ABLE TO PROVIDE    PHYSICAL EXAM:    Vitals:    VITALS:  BP (!) 160/55   Pulse (!) 108   Temp 98.6 °F (37 °C)   Resp (!) 40   Ht 5' 6\" (1.676 m)   Wt 183 lb 0.7 oz (83 kg)   SpO2 94%   BMI 29.54 kg/m²   24HR INTAKE/OUTPUT:      Intake/Output Summary (Last 24 hours) at 6/3/2022 0824  Last data filed at 6/3/2022 0700  Gross per 24 hour   Intake 2774.07 ml   Output 1975 ml   Net 799.07 ml       CONSTITUTIONAL:  awake, vacant stare, nonresponsive to verbal stimuli  EYES: Pupils equal  ENT:  normocepalic  NECK:  supple, symmetrical, trachea midline, no carotid bruit , No  JVD  LUNGS: Coarse anterior breath sounds with mild wheeze  CARDIOVASCULAR:  Normal apical impulse, regular rate and rhythm, normal S1 and S2, no S3 or S4, and no murmur noted, no rub. ABDOMEN:  Soft, non-tender, no masses  MUSCULOSKELETAL:  there is no redness, warmth, or swelling of the joints   No leg edema. NEUROLOGIC:  As above  SKIN:  no bruising    DATA:   ECG:  NSR      ECHO: 5/25/22  Global left ventricular systolic function is normal  Estimated ejection fraction is 50-55%  Hypokinesis of anterolateral wall noted  No significant valvular regurgitation or stenosis seen. No pericardial effusion seen.   Grade I (mild) left ventricular diastolic dysfunction.     Stress Test:  Not performed to date  Angiography:  Not performed to date    Cardiology Labs:No results for input(s): MYOGLOBIN, CKTOTAL, CKMB, CKMBINDEX, TROPHS, TROPONINT in the last 72 hours. Warfarin PT/INR:  Lab Results   Component Value Date    PROTIME 13.7 09/22/2021    INR 1.1 09/22/2021     CBC:  Lab Results   Component Value Date    WBC 9.3 06/03/2022    RBC 3.77 06/03/2022    HGB 11.8 06/03/2022    HCT 36.4 06/03/2022    MCV 96.6 06/03/2022    MCH 31.3 06/03/2022    MCHC 32.4 06/03/2022    RDW 13.4 06/03/2022     06/03/2022    MPV 10.1 06/03/2022     CMP:  Lab Results   Component Value Date     06/03/2022    K 3.5 06/03/2022     06/03/2022    CO2 25 06/03/2022    BUN 48 06/03/2022    CREATININE 1.57 06/03/2022    GFRAA 54 06/03/2022    LABGLOM 44 06/03/2022    GLUCOSE 333 06/03/2022    CALCIUM 8.7 06/03/2022     Magnesium:    Lab Results   Component Value Date    MG 2.2 06/03/2022     PTT:    Lab Results   Component Value Date    APTT 29.0 09/20/2021     TSH:  No results found for: TSH  BNP: No results for input(s): BNP, PROBNP in the last 72 hours. BMP:  Lab Results   Component Value Date     06/03/2022    K 3.5 06/03/2022     06/03/2022    CO2 25 06/03/2022    BUN 48 06/03/2022    LABALBU 3.8 05/26/2022    CREATININE 1.57 06/03/2022    CALCIUM 8.7 06/03/2022    GFRAA 54 06/03/2022    LABGLOM 44 06/03/2022    GLUCOSE 333 06/03/2022     LIVER PROFILE:No results for input(s): AST, ALT, LABALBU, ALKPHOS, BILITOT, BILIDIR, IBILI, PROT, GLOB, ALBUMIN in the last 72 hours. FLP:    Lab Results   Component Value Date    CHOL 185 02/25/2021    TRIG 146 05/30/2022    HDL 87 02/25/2021    LDLCHOLESTEROL 79 02/25/2021         IMPRESSION  · Hypertensive Urgency  · Hx of CVA  · New onset Seizures  · EtOH abuse with withdrawal/DTs  · Altered Mentation - managed by others  · Acute Kidney Injury  · Hypokalemia      RECOMMENDATIONS:     1. Cont current PO meds through NG tube  2. Can titrate to effect with goal BP <140/90 in acute phase.    3. Continue anti-agitation medications as needed as this will worsen BP  4. Pt remains altered. Continue workup of this and weaning of BiPAP  5. NG feeds will also help his BP  6.  We will continue to monitor to help titrate medications as tolerated    Discussed with nursing      Electronically signed by Nick Black MD on 6/3/2022 at 8:24 AM     CC: DENNIS Brennan

## 2022-06-03 NOTE — PROGRESS NOTES
Physical Therapy  DATE: 6/3/2022    NAME: Tracy East  MRN: [id number]   : 1955    Patient not seen this date for Physical Therapy due to:      [x] Cancel by RN or physician due to: unresponsive, RN asked for therapy to check back tomorrow. [] Hemodialysis    [] Critical Lab Value Level     [] Blood transfusion in progress    [] Acute or unstable cardiovascular status   _MAP < 55 or more than >115  _HR < 40 or > 130    [] Acute or unstable pulmonary status   -FiO2 > 60%   _RR < 5 or >40    _O2 sats < 85%    [] Strict Bedrest    [] Off Unit for surgery or procedure    [] Off Unit for testing       [] Pending imaging to R/O fracture    [] Refusal by Patient      [] Other      [] PT being discontinued at this time. Patient independent. No further needs. [] PT being discontinued at this time as the patient has been transferred to hospice care. No further needs.       Mercedes Moreau, PT

## 2022-06-03 NOTE — PLAN OF CARE

## 2022-06-03 NOTE — CARE COORDINATION
Discharge planning    Precedex is off. Maxed on cardene. NG in for oral medications. Tube feed will be started today. PT/OT to evaluate, when pt. Is appropriate. Face sheet faxed to [place].

## 2022-06-03 NOTE — PLAN OF CARE
Problem: Cardiovascular - Adult  Goal: Maintains optimal cardiac output and hemodynamic stability  Outcome: Progressing  Flowsheets (Taken 6/3/2022 5618)  Maintains optimal cardiac output and hemodynamic stability:   Monitor blood pressure and heart rate   Monitor urine output and notify Licensed Independent Practitioner for values outside of normal range   Assess for signs of decreased cardiac output   Administer fluid and/or volume expanders as ordered   Administer vasoactive medications as ordered   For PPHN infants, administer sedation as ordered and minimize all controllable stressors. Problem: Respiratory - Adult  Goal: Achieves optimal ventilation and oxygenation  Outcome: Progressing     Problem: Neurosensory - Adult  Goal: Achieves stable or improved neurological status  Outcome: Progressing  Goal: Absence of seizures  Outcome: Adequate for Discharge  Goal: Remains free of injury related to seizures activity  Outcome: Adequate for Discharge  Goal: Achieves maximal functionality and self care  Outcome: Not Progressing     Problem: Safety - Medical Restraint  Goal: Remains free of injury from restraints (Restraint for Interference with Medical Device)  Description: INTERVENTIONS:  1. Determine that other, less restrictive measures have been tried or would not be effective before applying the restraint  2. Evaluate the patient's condition at the time of restraint application  3. Inform patient/family regarding the reason for restraint  4.  Q2H: Monitor safety, psychosocial status, comfort, nutrition and hydration  Outcome: Adequate for Discharge     Problem: Discharge Planning  Goal: Discharge to home or other facility with appropriate resources  Outcome: Not Progressing

## 2022-06-03 NOTE — PROGRESS NOTES
Occupational Therapy    DATE: 6/3/2022    NAME: Lisa Mason  MRN: [id number]   : 1955    Patient not seen this date for Occupational Therapy due to:      [x] Cancel by RN or physician due to: VIPIN Reyna reports pt is unresponsive and not appropriate for OT this date. Will continue to follow.      [] Hemodialysis    [] Critical Lab Value Level     [] Blood transfusion in progress    [] Acute or unstable cardiovascular status   _MAP < 55 or more than >115  _HR < 40 or > 130    [] Acute or unstable pulmonary status   -FiO2 > 60%   _RR < 5 or >40    _O2 sats < 85%    [] Strict Bedrest    [] Off Unit for surgery or procedure    [] Off Unit for testing       [] Pending imaging to R/O fracture    [] Refusal by Patient      [] Other    Maria Eugenia Black OTR/L

## 2022-06-03 NOTE — PROGRESS NOTES
Pulmonary Critical Care Progress Note  Ena Councilman, MD     Patient seen for the follow up of hypoxia,  Seizure ([facility] [address]     Subjective:  Patient had no overnight events. He wore BIPAP throughout the night. He is currently sleeping with occasional snoring with BiPAP on. He has been weaned off of Precedex drip. He is on TPN and is receiving ativan as needed per CIWA scale. Examination:  Vitals: BP (!) 160/55   Pulse (!) 108   Temp 98.6 °F (37 °C)   Resp (!) 40   Ht 5' 6\" (1.676 m)   Wt 183 lb 0.7 oz (83 kg)   SpO2 93%   BMI 29.54 kg/m²   General appearance: Lethargic with periods of confusion  Neck: No JVD  Lungs: diminished air exchange, no crackles or wheezing  Heart: regular rate and rhythm, S1, S2 normal, no gallop  Abdomen: Soft, non tender, + BS  Extremities: no cyanosis or clubbing.  No significant edema    LABs:  CBC:   Recent Labs     06/02/22  0400 06/03/22  0532   WBC 7.6 9.3   HGB 11.7* 11.8*   HCT 35.5* 36.4*    242     BMP:   Recent Labs     06/02/22  0400 06/03/22  0532    144   K 3.7 3.5*   CO2 25 25   BUN 38* 48*   CREATININE 1.22* 1.57*   LABGLOM 59* 44*   GLUCOSE 196* 333*     ABG:  Lab Results   Component Value Date    FIO2 30.0 05/27/2022       Lab Results   Component Value Date    POCPH 7.396 05/27/2022    POCPCO2 39.7 05/27/2022    POCPO2 87.5 05/27/2022    POCHCO3 24.4 05/27/2022    NBEA 1 05/26/2022    PBEA 0 05/27/2022    DFSE8UMR 97 05/27/2022    FIO2 30.0 05/27/2022     Radiology:  CXR 6/3/22      Impression:  · Acute respiratory insufficiency with hypoxia   · New onset seizure   · Alcohol abuse/ dependence/ withdrawal   · History of CVA   · Obstructive sleep apnea  · Active smoking/ likely COPD   · Severe right Carotid artery stenosis, status post left CEA 9/21  · Diabetes mellitus  · Hypertensive urgency    Recommendations:  · Continue BIPAP support as needed   · 2 liters/min via nasal cannula to keep spO2 greater than 92% as tolerated  · IV Solu-Medrol at 30 mg IV every 8 hours  · Ipratropium- Albuterol BID   · Budesonide via nebulizer BID   · Watch off of Precedex drip, titrate to RAAS goal of 0 to -1, wean down  · Ativan per CIWA protocol   · Titrate Cardene drip for now. · Vimpat 200mg BID, phenobarbital 65mg BID, neurology following   · Nephology following  · Continue TPN  · Attempt NG tube again, if placed we will restart antihypertensive and tube feeds. · Seizure precautions  · Vascular surgery input noted  · Will monitor off of antibiotics at this time. Left basilar infiltrate with no fever or leukocytosis,? Atelectasis. We will do radiologic follow-up without starting antibiotics.     · Discussed with RN  · Labs: CBC and BMP in am  · DVT prophylaxis with low molecular weight heparin  · Will follow with you  Electronically signed by     Blanca Camara MD on 6/3/2022 at 8:47 AM  Pulmonary Critical Care and Sleep Medicine,  [facility]  Cell: [phone]  Office: [phone]  Cc: 35 minutes

## 2022-06-04 ENCOUNTER — APPOINTMENT (OUTPATIENT)
Dept: CT IMAGING | Age: 67
DRG: 056 | End: 2022-06-04
Payer: MEDICARE

## 2022-06-04 LAB
ABSOLUTE EOS #: 0 K/UL (ref 0–0.44)
ABSOLUTE IMMATURE GRANULOCYTE: 0.11 K/UL (ref 0–0.3)
ABSOLUTE LYMPH #: 0.34 K/UL (ref 1.1–3.7)
ABSOLUTE MONO #: 1.01 K/UL (ref 0.1–1.2)
ANION GAP SERPL CALCULATED.3IONS-SCNC: 10 MMOL/L (ref 9–17)
BASOPHILS # BLD: 0 % (ref 0–2)
BASOPHILS ABSOLUTE: 0 K/UL (ref 0–0.2)
BUN BLDV-MCNC: 57 MG/DL (ref 8–23)
BUN/CREAT BLD: 35 (ref 9–20)
CALCIUM SERPL-MCNC: 8.3 MG/DL (ref 8.6–10.4)
CHLORIDE BLD-SCNC: 107 MMOL/L (ref 98–107)
CO2: 24 MMOL/L (ref 20–31)
CREAT SERPL-MCNC: 1.61 MG/DL (ref 0.7–1.2)
EOSINOPHILS RELATIVE PERCENT: 0 % (ref 1–4)
FIO2: 30
GFR AFRICAN AMERICAN: 52 ML/MIN
GFR NON-AFRICAN AMERICAN: 43 ML/MIN
GFR SERPL CREATININE-BSD FRML MDRD: ABNORMAL ML/MIN/{1.73_M2}
GLUCOSE BLD-MCNC: 181 MG/DL (ref 75–110)
GLUCOSE BLD-MCNC: 207 MG/DL (ref 75–110)
GLUCOSE BLD-MCNC: 289 MG/DL (ref 70–99)
GLUCOSE BLD-MCNC: 291 MG/DL (ref 75–110)
GLUCOSE BLD-MCNC: 296 MG/DL (ref 75–110)
GLUCOSE BLD-MCNC: 316 MG/DL (ref 75–110)
HCT VFR BLD CALC: 35.3 % (ref 40.7–50.3)
HEMOGLOBIN: 11.5 G/DL (ref 13–17)
IMMATURE GRANULOCYTES: 1 %
LYMPHOCYTES # BLD: 3 % (ref 24–43)
MCH RBC QN AUTO: 33 PG (ref 25.2–33.5)
MCHC RBC AUTO-ENTMCNC: 32.6 G/DL (ref 28–38)
MCV RBC AUTO: 101.1 FL (ref 82.6–102.9)
MONOCYTES # BLD: 9 % (ref 3–12)
NEGATIVE BASE EXCESS, ART: 1 (ref 0–2)
PDW BLD-RTO: 13.3 % (ref 11.8–14.4)
PLATELET # BLD: 248 K/UL (ref 138–453)
PMV BLD AUTO: 10.5 FL (ref 8.1–13.5)
POC HCO3: 23.4 MMOL/L (ref 21–28)
POC O2 SATURATION: 96 % (ref 94–98)
POC PCO2: 35.2 MM HG (ref 35–48)
POC PH: 7.43 (ref 7.35–7.45)
POC PO2: 79.7 MM HG (ref 83–108)
POTASSIUM SERPL-SCNC: 4 MMOL/L (ref 3.7–5.3)
RBC # BLD: 3.49 M/UL (ref 4.21–5.77)
SEG NEUTROPHILS: 87 % (ref 36–65)
SEGMENTED NEUTROPHILS ABSOLUTE COUNT: 9.74 K/UL (ref 1.5–8.1)
SODIUM BLD-SCNC: 141 MMOL/L (ref 135–144)
WBC # BLD: 11.2 K/UL (ref 3.5–11.3)

## 2022-06-04 PROCEDURE — 2580000003 HC RX 258: Performed by: FAMILY MEDICINE

## 2022-06-04 PROCEDURE — C9254 INJECTION, LACOSAMIDE: HCPCS | Performed by: PSYCHIATRY & NEUROLOGY

## 2022-06-04 PROCEDURE — 2700000000 HC OXYGEN THERAPY PER DAY

## 2022-06-04 PROCEDURE — 70450 CT HEAD/BRAIN W/O DYE: CPT

## 2022-06-04 PROCEDURE — 82947 ASSAY GLUCOSE BLOOD QUANT: CPT

## 2022-06-04 PROCEDURE — 2580000003 HC RX 258: Performed by: INTERNAL MEDICINE

## 2022-06-04 PROCEDURE — 36415 COLL VENOUS BLD VENIPUNCTURE: CPT

## 2022-06-04 PROCEDURE — 6370000000 HC RX 637 (ALT 250 FOR IP): Performed by: INTERNAL MEDICINE

## 2022-06-04 PROCEDURE — 6360000002 HC RX W HCPCS: Performed by: PSYCHIATRY & NEUROLOGY

## 2022-06-04 PROCEDURE — 94640 AIRWAY INHALATION TREATMENT: CPT

## 2022-06-04 PROCEDURE — 6360000002 HC RX W HCPCS: Performed by: FAMILY MEDICINE

## 2022-06-04 PROCEDURE — 2500000003 HC RX 250 WO HCPCS: Performed by: INTERNAL MEDICINE

## 2022-06-04 PROCEDURE — 36600 WITHDRAWAL OF ARTERIAL BLOOD: CPT

## 2022-06-04 PROCEDURE — 80048 BASIC METABOLIC PNL TOTAL CA: CPT

## 2022-06-04 PROCEDURE — 2580000003 HC RX 258: Performed by: PSYCHIATRY & NEUROLOGY

## 2022-06-04 PROCEDURE — 6360000002 HC RX W HCPCS: Performed by: NURSE PRACTITIONER

## 2022-06-04 PROCEDURE — 94660 CPAP INITIATION&MGMT: CPT

## 2022-06-04 PROCEDURE — 6370000000 HC RX 637 (ALT 250 FOR IP): Performed by: FAMILY MEDICINE

## 2022-06-04 PROCEDURE — 94761 N-INVAS EAR/PLS OXIMETRY MLT: CPT

## 2022-06-04 PROCEDURE — 85025 COMPLETE CBC W/AUTO DIFF WBC: CPT

## 2022-06-04 PROCEDURE — 2000000000 HC ICU R&B

## 2022-06-04 PROCEDURE — 2500000003 HC RX 250 WO HCPCS: Performed by: FAMILY MEDICINE

## 2022-06-04 PROCEDURE — A4216 STERILE WATER/SALINE, 10 ML: HCPCS | Performed by: INTERNAL MEDICINE

## 2022-06-04 PROCEDURE — 82803 BLOOD GASES ANY COMBINATION: CPT

## 2022-06-04 RX ADMIN — BUDESONIDE 500 MCG: 0.5 SUSPENSION RESPIRATORY (INHALATION) at 08:01

## 2022-06-04 RX ADMIN — ENOXAPARIN SODIUM 40 MG: 100 INJECTION SUBCUTANEOUS at 09:27

## 2022-06-04 RX ADMIN — PHENOBARBITAL SODIUM 65 MG: 65 INJECTION INTRAMUSCULAR; INTRAVENOUS at 09:23

## 2022-06-04 RX ADMIN — SODIUM CHLORIDE: 4.5 INJECTION, SOLUTION INTRAVENOUS at 04:18

## 2022-06-04 RX ADMIN — SODIUM CHLORIDE: 4.5 INJECTION, SOLUTION INTRAVENOUS at 14:43

## 2022-06-04 RX ADMIN — ATORVASTATIN CALCIUM 40 MG: 40 TABLET, FILM COATED ORAL at 21:11

## 2022-06-04 RX ADMIN — METHYLPREDNISOLONE SODIUM SUCCINATE 40 MG: 40 INJECTION, POWDER, FOR SOLUTION INTRAMUSCULAR; INTRAVENOUS at 06:24

## 2022-06-04 RX ADMIN — INSULIN LISPRO 6 UNITS: 100 INJECTION, SOLUTION INTRAVENOUS; SUBCUTANEOUS at 17:44

## 2022-06-04 RX ADMIN — ASPIRIN 81 MG CHEWABLE TABLET 81 MG: 81 TABLET CHEWABLE at 09:13

## 2022-06-04 RX ADMIN — OLANZAPINE 5 MG: 5 TABLET, ORALLY DISINTEGRATING ORAL at 21:11

## 2022-06-04 RX ADMIN — LACOSAMIDE 150 MG: 10 INJECTION, SOLUTION INTRAVENOUS at 20:34

## 2022-06-04 RX ADMIN — METHYLPREDNISOLONE SODIUM SUCCINATE 40 MG: 40 INJECTION, POWDER, FOR SOLUTION INTRAMUSCULAR; INTRAVENOUS at 14:25

## 2022-06-04 RX ADMIN — SODIUM CHLORIDE, PRESERVATIVE FREE 10 ML: 5 INJECTION INTRAVENOUS at 09:45

## 2022-06-04 RX ADMIN — AMLODIPINE BESYLATE 5 MG: 5 TABLET ORAL at 09:12

## 2022-06-04 RX ADMIN — LOSARTAN POTASSIUM 100 MG: 100 TABLET, FILM COATED ORAL at 09:48

## 2022-06-04 RX ADMIN — ACETAMINOPHEN 650 MG: 325 TABLET ORAL at 00:41

## 2022-06-04 RX ADMIN — IPRATROPIUM BROMIDE AND ALBUTEROL SULFATE 1 AMPULE: 2.5; .5 SOLUTION RESPIRATORY (INHALATION) at 15:28

## 2022-06-04 RX ADMIN — FOLIC ACID: 5 INJECTION, SOLUTION INTRAMUSCULAR; INTRAVENOUS; SUBCUTANEOUS at 09:04

## 2022-06-04 RX ADMIN — ATENOLOL 50 MG: 50 TABLET ORAL at 09:23

## 2022-06-04 RX ADMIN — PAROXETINE HYDROCHLORIDE 40 MG: 20 TABLET, FILM COATED ORAL at 09:13

## 2022-06-04 RX ADMIN — INSULIN LISPRO 12 UNITS: 100 INJECTION, SOLUTION INTRAVENOUS; SUBCUTANEOUS at 11:57

## 2022-06-04 RX ADMIN — INSULIN LISPRO 9 UNITS: 100 INJECTION, SOLUTION INTRAVENOUS; SUBCUTANEOUS at 06:24

## 2022-06-04 RX ADMIN — INSULIN LISPRO 3 UNITS: 100 INJECTION, SOLUTION INTRAVENOUS; SUBCUTANEOUS at 23:52

## 2022-06-04 RX ADMIN — FAMOTIDINE 20 MG: 10 INJECTION, SOLUTION INTRAVENOUS at 09:25

## 2022-06-04 RX ADMIN — CLOPIDOGREL BISULFATE 75 MG: 75 TABLET ORAL at 09:13

## 2022-06-04 RX ADMIN — IPRATROPIUM BROMIDE AND ALBUTEROL SULFATE 1 AMPULE: 2.5; .5 SOLUTION RESPIRATORY (INHALATION) at 20:39

## 2022-06-04 RX ADMIN — BUDESONIDE 500 MCG: 0.5 SUSPENSION RESPIRATORY (INHALATION) at 20:39

## 2022-06-04 RX ADMIN — LACOSAMIDE 150 MG: 10 INJECTION, SOLUTION INTRAVENOUS at 09:43

## 2022-06-04 RX ADMIN — IPRATROPIUM BROMIDE AND ALBUTEROL SULFATE 1 AMPULE: 2.5; .5 SOLUTION RESPIRATORY (INHALATION) at 08:01

## 2022-06-04 RX ADMIN — METHYLPREDNISOLONE SODIUM SUCCINATE 40 MG: 40 INJECTION, POWDER, FOR SOLUTION INTRAMUSCULAR; INTRAVENOUS at 21:12

## 2022-06-04 RX ADMIN — INSULIN LISPRO 3 UNITS: 100 INJECTION, SOLUTION INTRAVENOUS; SUBCUTANEOUS at 00:20

## 2022-06-04 RX ADMIN — SODIUM CHLORIDE, PRESERVATIVE FREE 10 ML: 5 INJECTION INTRAVENOUS at 21:39

## 2022-06-04 ASSESSMENT — PAIN DESCRIPTION - LOCATION: LOCATION: GENERALIZED

## 2022-06-04 NOTE — RT PROTOCOL NOTE
RT Inhaler-Nebulizer Bronchodilator Protocol Note    There is a bronchodilator order in the chart from a provider indicating to follow the RT Bronchodilator Protocol and there is an Initiate RT Inhaler-Nebulizer Bronchodilator Protocol order as well (see protocol at bottom of note). CXR Findings:  XR CHEST PORTABLE    Result Date: 6/3/2022  Slight improved aeration of the left lung base, otherwise no significant change. The findings from the last RT Protocol Assessment were as follows:   History Pulmonary Disease: Chronic pulmonary disease  Respiratory Pattern: Dyspnea on exertion or RR 21-25 bpm  Breath Sounds: Slightly diminished and/or crackles  Cough: Weak, productive  Indication for Bronchodilator Therapy: Decreased or absent breath sounds  Bronchodilator Assessment Score: 8    Aerosolized bronchodilator medication orders have been revised according to the RT Inhaler-Nebulizer Bronchodilator Protocol below. Respiratory Therapist to perform RT Therapy Protocol Assessment initially then follow the protocol. Repeat RT Therapy Protocol Assessment PRN for score 0-3 or on second treatment, BID, and PRN for scores above 3. No Indications - adjust the frequency to every 6 hours PRN wheezing or bronchospasm, if no treatments needed after 48 hours then discontinue using Per Protocol order mode. If indication present, adjust the RT bronchodilator orders based on the Bronchodilator Assessment Score as indicated below. Use Inhaler orders unless patient has one or more of the following: on home nebulizer, not able to hold breath for 10 seconds, is not alert and oriented, cannot activate and use MDI correctly, or respiratory rate 25 breaths per minute or more, then use the equivalent nebulizer order(s) with same Frequency and PRN reasons based on the score. If a patient is on this medication at home then do not decrease Frequency below that used at home.     0-3 - enter or revise RT bronchodilator order(s) to equivalent RT Bronchodilator order with Frequency of every 4 hours PRN for wheezing or increased work of breathing using Per Protocol order mode. 4-6 - enter or revise RT Bronchodilator order(s) to two equivalent RT bronchodilator orders with one order with BID Frequency and one order with Frequency of every 4 hours PRN wheezing or increased work of breathing using Per Protocol order mode. 7-10 - enter or revise RT Bronchodilator order(s) to two equivalent RT bronchodilator orders with one order with TID Frequency and one order with Frequency of every 4 hours PRN wheezing or increased work of breathing using Per Protocol order mode. 11-13 - enter or revise RT Bronchodilator order(s) to one equivalent RT bronchodilator order with QID Frequency and an Albuterol order with Frequency of every 4 hours PRN wheezing or increased work of breathing using Per Protocol order mode. Greater than 13 - enter or revise RT Bronchodilator order(s) to one equivalent RT bronchodilator order with every 4 hours Frequency and an Albuterol order with Frequency of every 2 hours PRN wheezing or increased work of breathing using Per Protocol order mode. RT to enter RT Home Evaluation for COPD & MDI Assessment order using Per Protocol order mode.     Electronically signed by Keila Sanchez RCP on 6/4/2022 at 8:28 AM

## 2022-06-04 NOTE — PLAN OF CARE
Problem: Discharge Planning  Goal: Discharge to home or other facility with appropriate resources  6/3/2022 2300 by Millie Andrew RN  Outcome: Progressing  6/3/2022 1645 by Roxann Griffiths RN  Outcome: Not Progressing     Problem: Neurosensory - Adult  Goal: Achieves stable or improved neurological status  6/3/2022 2300 by Millie Andrew RN  Outcome: Progressing  6/3/2022 1645 by Roxann Griffiths RN  Outcome: Progressing  Goal: Absence of seizures  6/3/2022 2300 by Millie Andrew RN  Outcome: Progressing  6/3/2022 1645 by Roxann Griffiths RN  Outcome: Adequate for Discharge  Goal: Remains free of injury related to seizures activity  6/3/2022 2300 by Millie Andrew RN  Outcome: Progressing  6/3/2022 1645 by Roxann Griffiths RN  Outcome: Adequate for Discharge  Goal: Achieves maximal functionality and self care  6/3/2022 2300 by Millie Andrew RN  Outcome: Progressing  6/3/2022 1645 by Roxann Griffiths RN  Outcome: Not Progressing     Problem: Respiratory - Adult  Goal: Achieves optimal ventilation and oxygenation  6/3/2022 2300 by Millie Andrew RN  Outcome: Progressing  6/3/2022 [address] by Roxann Griffiths RN  Outcome: Progressing     Problem: Cardiovascular - Adult  Goal: Maintains optimal cardiac output and hemodynamic stability  6/3/2022 2300 by Millie Andrew RN  Outcome: Progressing  6/3/2022 [address] by Roxann Griffiths RN  Outcome: Progressing  Flowsheets (Taken 6/3/2022 1645)  Maintains optimal cardiac output and hemodynamic stability:   Monitor blood pressure and heart rate   Monitor urine output and notify Licensed Independent Practitioner for values outside of normal range   Assess for signs of decreased cardiac output   Administer fluid and/or volume expanders as ordered   Administer vasoactive medications as ordered   For PPHN infants, administer sedation as ordered and minimize all controllable stressors.   Goal: Absence of cardiac dysrhythmias or at baseline  Outcome: Progressing     Problem: Skin/Tissue Integrity - Adult  Goal: Skin integrity remains intact  Outcome: Progressing  Goal: Incisions, wounds, or drain sites healing without S/S of infection  Outcome: Progressing  Goal: Oral mucous membranes remain intact  Outcome: Progressing     Problem: Metabolic/Fluid and Electrolytes - Adult  Goal: Electrolytes maintained within normal limits  Outcome: Progressing  Goal: Hemodynamic stability and optimal renal function maintained  Outcome: Progressing  Goal: Glucose maintained within prescribed range  Outcome: Progressing     Problem: Anxiety  Goal: Will report anxiety at manageable levels  Description: INTERVENTIONS:  1. Administer medication as ordered  2. Teach and rehearse alternative coping skills  3. Provide emotional support with 1:1 interaction with staff  Outcome: Progressing     Problem: Confusion  Goal: Confusion, delirium, dementia, or psychosis is improved or at baseline  Description: INTERVENTIONS:  1. Assess for possible contributors to thought disturbance, including medications, impaired vision or hearing, underlying metabolic abnormalities, dehydration, psychiatric diagnoses, and notify attending LIP  2. [place] high risk fall precautions, as indicated  3. Provide frequent short contacts to provide reality reorientation, refocusing and direction  4. Decrease environmental stimuli, including noise as appropriate  5. Monitor and intervene to maintain adequate nutrition, hydration, elimination, sleep and activity  6. If unable to ensure safety without constant attention obtain sitter and review sitter guidelines with assigned personnel  7. Initiate Psychosocial CNS and Spiritual Care consult, as indicated  Outcome: Progressing     Problem: Behavior  Goal: Pt/Family maintain appropriate behavior and adhere to behavioral management agreement, if implemented  Description: INTERVENTIONS:  1. Assess patient/family's coping skills and  non-compliant behavior (including use of illegal substances)  2.  Notify security of behavior or suspected illegal substances which indicate the need for search of the patient and/or belongings  3. Encourage verbalization of thoughts and concerns in a socially appropriate manner  4. Utilize positive, consistent limit setting strategies supporting safety of patient, staff and others  5. Encourage participation in the decision making process about the behavioral management agreement  6. Implement a Health Care Agreement if patient meets criteria  7. If a patient's behavior jeopardizes the safety of the patient, staff, or others refer to organization policy. If a visitor's behavior poses a threat to safety call refer to organization policy. 8. Initiate consult with , Psychosocial CNS, Spiritual Care as appropriate  Outcome: Progressing     Problem: Chronic Conditions and Co-morbidities  Goal: Patient's chronic conditions and co-morbidity symptoms are monitored and maintained or improved  Outcome: Progressing     Problem: Pain  Goal: Verbalizes/displays adequate comfort level or baseline comfort level  Outcome: Progressing     Problem: ABCDS Injury Assessment  Goal: Absence of physical injury  Outcome: Progressing     Problem: Musculoskeletal - Adult  Goal: Return mobility to safest level of function  Outcome: Progressing     Problem: Genitourinary - Adult  Goal: Absence of urinary retention  Outcome: Progressing  Goal: Urinary catheter remains patent  Outcome: Progressing     Problem: Nutrition Deficit:  Goal: Optimize nutritional status  Outcome: Progressing     Problem: Safety - Medical Restraint  Goal: Remains free of injury from restraints (Restraint for Interference with Medical Device)  Description: INTERVENTIONS:  1. Determine that other, less restrictive measures have been tried or would not be effective before applying the restraint  2. Evaluate the patient's condition at the time of restraint application  3. Inform patient/family regarding the reason for restraint  4.  Q2H: Monitor safety, psychosocial status, comfort, nutrition and hydration  6/3/2022 2300 by Omaira Hart RN  Outcome: Progressing  6/3/2022 1645 by Gillian Saldivar RN  Outcome: Adequate for Discharge

## 2022-06-04 NOTE — PROGRESS NOTES
Pulmonary Critical Care Progress Note       Patient seen for the follow up of hypoxia,  Seizure ([address]     Subjective:  Patient  Was agitated 2 days ago  And received Ativan large doses per RN. He was lethargic although yesterday was on BiPAP. He was off BiPAP this morning on oxygen by nasal cannula. He received phenobarbital per NG became very lethargic. He is snoring difficult to arouse at this point. He has been tolerating tube feeds. He does not ambulate much. Has been off Precedex drip. Examination:  Vitals: /83   Pulse 70   Temp 98.7 °F (37.1 °C) (Axillary)   Resp 18   Ht 5' 6\" (1.676 m)   Wt 183 lb 0.7 oz (83 kg)   SpO2 95%   BMI 29.54 kg/m²   General appearance:   Obtunded not arousable snoring  Neck: No JVD  Lungs:  Decreased breath sound mild crackles  Heart: regular rate and rhythm, S1, S2 normal, no gallop  Abdomen: Soft, non tender, + BS  Extremities: no cyanosis or clubbing. No significant edema    LABs:  CBC:   Recent Labs     06/03/22  0532 06/04/22  0345   WBC 9.3 11.2   HGB 11.8* 11.5*   HCT 36.4* 35.3*    248     BMP:   Recent Labs     06/03/22  0532 06/04/22  0345    141   K 3.5* 4.0   CO2 25 24   BUN 48* 57*   CREATININE 1.57* 1.61*   LABGLOM 44* 43*   GLUCOSE 333* 289*     ABG:  Lab Results   Component Value Date    FIO2 30.0 06/04/2022       Lab Results   Component Value Date    POCPH 7.430 06/04/2022    POCPCO2 35.2 06/04/2022    POCPO2 79.7 06/04/2022    POCHCO3 23.4 06/04/2022    NBEA 1 06/04/2022    PBEA 0 05/27/2022    ARJA3CRA 96 06/04/2022    FIO2 30.0 06/04/2022     Radiology:  CXR 6/3/22    Slight improved aeration of the left lung base, otherwise no significant   change.              Impression:  · Acute respiratory insufficiency  Failure requiring BiPAP  ·  smoker/COPD exacerbation  ·  encephalopathyNew onset seizure   · Alcohol abuse/ dependence/ withdrawal   · History of CVA   · Obstructive sleep apnea  · Severe right Carotid artery stenosis, status post left CEA 9/21  · Diabetes mellitus  · Hypertensive urgency    Recommendations:  ·  restart BiPAP support /oxygen by nasal cannula when off BiPAP  ·  monitor mental status/CT scan brain this does not wake up  ·  readdress with Neurology medication regimen given oversedation  ·  Precedex in case of agitation/BiPAP tolerance /CIWA protocol  · Ipratropium- Albuterol BID   · Budesonide via nebulizer BID   ·  monitor blood pressure off Cardene  · Vimpat 200mg BID, phenobarbital 65mg BID, neurology following   · Nephology following  ·  off TPN /hold off NG feeds for now  · Seizure precautions  · Vascular surgery on consult    · Discussed with RN and RT  ·  peptic ulcer disease prophylaxis  · DVT prophylaxis with low molecular weight heparin        Electronically signed by     Troy Villavicencio MD on 6/4/2022 at 12:11 PM  Pulmonary Critical Care and Sleep Medicine,  [facility] AT [place]: [phone]  [email or web address]

## 2022-06-04 NOTE — PROGRESS NOTES
Reason for Follow up: ADRIANA    Subjective:  SBP trending 110-140s  Creatinine increasing to 1.61 from 1.57 yesterday. Na improving. Free water added yesterday. Glucose trending 100-300s  Creatinine worse at 1.57  Urine output 1350 mL yesterday. Tolerating TF. Encephalopathic.  UA showed glucose, MACR 457mg/g, 5-10 RBC, negative eosinophils, No WBC. Ur Na 20, FeNa 0.26%  Started 0.45NS yesterday. Review Of Systems:   Unable to obtain because current mental status.     Scheduled Meds:   famotidine (PEPCID) injection  20 mg IntraVENous Daily    methylPREDNISolone  40 mg IntraVENous Q8H    OLANZapine zydis  5 mg Oral Nightly    lacosamide (VIMPAT) IVPB  150 mg IntraVENous BID    PHENobarbital  65 mg IntraVENous BID    ipratropium-albuterol  1 ampule Inhalation TID    insulin lispro  0-18 Units SubCUTAneous Q6H    cloNIDine  1 patch TransDERmal Weekly    fat emulsion  100 mL IntraVENous Daily    budesonide  0.5 mg Nebulization BID    enoxaparin  40 mg SubCUTAneous Daily    thiamine and folic acid IVPB   IntraVENous Daily    sodium chloride flush  10 mL IntraVENous Once    sodium chloride flush  5-40 mL IntraVENous 2 times per day    aspirin  81 mg Oral Daily    atorvastatin  40 mg Oral Nightly    clopidogrel  75 mg Oral Daily    PARoxetine  40 mg Oral Daily    amLODIPine  5 mg Oral Daily    atenolol  50 mg Oral Daily    losartan  100 mg Oral Daily   Continuous Infusions:   sodium chloride 100 mL/hr at 06/04/22 0418    dexmedetomidine (PRECEDEX) IV infusion Stopped (06/02/22 1352)    sodium chloride Stopped (05/26/22 2008)    dextrose      niCARdipene (CARDENE) infusion Stopped (06/03/22 1115)     PRN Meds:potassium chloride **OR** potassium chloride, haloperidol lactate, LORazepam **OR** LORazepam **OR** LORazepam **OR** LORazepam **OR** LORazepam **OR** LORazepam **OR** LORazepam **OR** LORazepam, albuterol, hydrALAZINE, sodium chloride flush, sodium chloride, acetaminophen, ondansetron **OR** ondansetron, metoprolol, glucose, dextrose bolus **OR** dextrose bolus, glucagon (rDNA), dextrose    No Known Allergies    Physical Exam:  Blood pressure (!) 141/82, pulse 71, temperature 98.7 °F (37.1 °C), temperature source Axillary, resp. rate 29, height 5' 6\" (1.676 m), weight 183 lb 0.7 oz (83 kg), SpO2 95 %. In: 2100 [NG/GT:2100]  Out: 1350   In: 2100   Out: 1350 [Urine:1350]    General:  Not in distress. Appears to be stated age. HEENT: Atraumatic, normocephalic. Anicteric sclera. Pink and moist oral mucosa. Chest: Bilateral air entry, coarse to auscultation. Cardiovascular: RRR, S1S2, no murmur, rub or gallop. Has no lower extremity edema. Abdomen: Soft, non tender to palpation. Active bowel sounds. Musculoskeletal: No cyanosis or clubbing. Integumentary: Pink, warm and dry. Free from rash or lesions. Skin turgor normal.  CNS: lethargic.     Data:  CBC:   Lab Results   Component Value Date    WBC 11.2 06/04/2022    HGB 11.5 (L) 06/04/2022    HCT 35.3 (L) 06/04/2022    .1 06/04/2022     06/04/2022     BMP:    Lab Results   Component Value Date     06/04/2022    K 4.0 06/04/2022     06/04/2022    CO2 24 06/04/2022    BUN 57 (H) 06/04/2022    CREATININE 1.61 (H) 06/04/2022    GLUCOSE 289 (H) 06/04/2022     CMP:   Lab Results   Component Value Date     06/04/2022    K 4.0 06/04/2022     06/04/2022    CO2 24 06/04/2022    BUN 57 (H) 06/04/2022    CREATININE 1.61 (H) 06/04/2022    GLUCOSE 289 (H) 06/04/2022    CALCIUM 8.3 (L) 06/04/2022    PROT 6.6 05/26/2022    LABALBU 3.8 05/26/2022    BILITOT 0.79 05/26/2022    ALKPHOS 69 05/26/2022    AST 24 05/26/2022    ALT 12 05/26/2022      Hepatic:   Lab Results   Component Value Date    AST 24 05/26/2022    ALT 12 05/26/2022    BILITOT 0.79 05/26/2022    ALKPHOS 69 05/26/2022     BNP: No results found for: BNP  Lipids:   Lab Results   Component Value Date    CHOL 185 02/25/2021    HDL 87 02/25/2021     INR:   Lab Results   Component Value Date    INR 1.1 09/22/2021     PTH: No results found for: PTH  Phosphorus:    Lab Results   Component Value Date    PHOS 3.8 06/03/2022     Ionized Calcium: No results found for: IONCA  Magnesium:   Lab Results   Component Value Date    MG 2.2 06/03/2022     Albumin:   Lab Results   Component Value Date    LABALBU 3.8 05/26/2022     Last 3 CK, CKMB, Troponin: @LABRCNT(CKTOTAL:3,CKMB:3,TROPONINI:3)       URINE:)No results found for: Ros Morning    Radiology:   Reviewed. Assessment/Plan: To follow. Pt seen in collaboration with Dr. Arnel Amato. Electronically signed by CHARLENE Peña CNP on 6/4/2022 at 8:31 AM  [facility].   Ph: [phone]

## 2022-06-04 NOTE — PROGRESS NOTES
Pt has been sleeping all night. Pt did awaken for a short time and was responding to voice. VS have been stable and pt is tolerating bipap. Will cont to monitor.

## 2022-06-04 NOTE — PROGRESS NOTES
Section of Cardiology  Progress Note      Date:  6/4/2022  Patient: Mitzy Coffey  Admission:  5/25/2022 12:59 PM  Admit DX: Seizure ([address] [R56.9]  New onset seizure ([address] [R56.9]  Age:  78 y.o., 1955     LOS: 10 days     Reason for evaluation:   Hypertensive urgency      SUBJECTIVE:     The patient was seen and examined. Notes and labs reviewed. Patient was on BiPAP when I saw him about half an hour ago. He has not was pounding to verbal stimuli. He is on phenobarbital.  Precedex was stopped earlier. This morning had some response to the nurse. No reported arrhythmia. Blood pressure has been under control with current medications. Review of system was not possible except for what was mentioned above. OBJECTIVE:      EXAM:   Vitals:    VITALS:  /85   Pulse 63   Temp 98.7 °F (37.1 °C) (Axillary)   Resp 25   Ht 5' 6\" (1.676 m)   Wt 183 lb 0.7 oz (83 kg)   SpO2 95%   BMI 29.54 kg/m²   24HR INTAKE/OUTPUT:    Intake/Output Summary (Last 24 hours) at 6/4/2022 1515  Last data filed at 6/4/2022 1217  Gross per 24 hour   Intake 1500 ml   Output 2200 ml   Net -700 ml       CONSTITUTIONAL: He is on BiPAP does not respond to verbal stimuli, in no acute distress, and appears stated age. HEENT: Normal jugular venous pulsations, no carotid bruits. Head is atraumatic, normocephalic. Eyes and oral mucosa are normal.  LUNGS: Good respiratory effort On auscultation: Clear anteriorly CARDIOVASCULAR:  Normal apical impulse, regular rate and rhythm, normal S1 and S2, no S3 or S4, and no murmur or rub noted. dorsalis pedis, posterior tibial and bilateral palpable  SKIN: Warm and dry. EXTREMITIES:No lower extremity edema.    Current Inpatient Medications:   famotidine (PEPCID) injection  20 mg IntraVENous Daily    methylPREDNISolone  40 mg IntraVENous Q8H    OLANZapine zydis  5 mg Oral Nightly    lacosamide (VIMPAT) IVPB  150 mg IntraVENous BID    PHENobarbital  65 mg IntraVENous BID    ipratropium-albuterol  1 ampule Inhalation TID    insulin lispro  0-18 Units SubCUTAneous Q6H    cloNIDine  1 patch TransDERmal Weekly    fat emulsion  100 mL IntraVENous Daily    budesonide  0.5 mg Nebulization BID    enoxaparin  40 mg SubCUTAneous Daily    thiamine and folic acid IVPB   IntraVENous Daily    sodium chloride flush  10 mL IntraVENous Once    sodium chloride flush  5-40 mL IntraVENous 2 times per day    aspirin  81 mg Oral Daily    atorvastatin  40 mg Oral Nightly    clopidogrel  75 mg Oral Daily    PARoxetine  40 mg Oral Daily    amLODIPine  5 mg Oral Daily    atenolol  50 mg Oral Daily    losartan  100 mg Oral Daily       IV Infusions (if any):   sodium chloride 100 mL/hr at 06/04/22 1443    dexmedetomidine (PRECEDEX) IV infusion Stopped (06/02/22 1352)    sodium chloride Stopped (05/26/22 2008)    dextrose      niCARdipene (CARDENE) infusion Stopped (06/03/22 1115)       Diagnostics:   Telemetry: Sinus  Echocardiogram May 26, 2022  Summary  Global left ventricular systolic function is normal  Estimated ejection fraction is 50-55%  Hypokinesis of anterolateral wall noted  No significant valvular regurgitation or stenosis seen. No pericardial effusion seen.   Grade I (mild) left ventricular diastolic dysfunction.     Signature  ----------------------------------------------------------------------------   Electronically signed by Sydney Stahl on 05/26/2022   08:48 AM  ----------------------------------------------------------------------------     ----------------------------------------------------------------------------   Electronically signed by Ranjana Murray(Interpreting physician) on   05/26/2022 09:38 AM  Labs:   CBC:  Recent Labs     06/03/22  0532 06/04/22  0345   WBC 9.3 11.2   HGB 11.8* 11.5*   HCT 36.4* 35.3*    248     Magnesium:  Recent Labs     06/02/22  0400 06/03/22  0532   MG 1.9 2.2     BMP:  Recent Labs     06/03/22  0532 06/04/22  0345    141   K 3.5* 4.0   CALCIUM 8.7 8.3*   CO2 25 24   BUN 48* 57*   CREATININE 1.57* 1.61*   LABGLOM 44* 43*   GLUCOSE 333* 289*     BNP:No results for input(s): BNP, PROBNP in the last 72 hours. PT/INR:No results for input(s): PROTIME, INR in the last 72 hours. APTT:No results for input(s): APTT in the last 72 hours. CARDIAC ENZYMES:No results for input(s): MYOGLOBIN, CKTOTAL, CKMB, CKMBINDEX, TROPHS, TROPONINT in the last 72 hours. FASTING LIPID PANEL:  Lab Results   Component Value Date    HDL 87 02/25/2021    TRIG 146 05/30/2022     LIVER PROFILE:No results for input(s): AST, ALT, LABALBU, ALKPHOS, BILITOT, BILIDIR, IBILI, PROT, GLOB, ALBUMIN in the last 72 hours. ASSESSMENT:  · Hypertensive Urgency, blood pressure is much improved and now controlled on current therapy  · Low normal LV systolic function on echocardiogram done May 26, 2022  · Hx of CVA  · New onset Seizures, managed by others  · History of alcohol abuse   · altered Mentation - managed by others  · Acute Kidney Injury  · Hypokalemia, corrected       Patient Active Problem List   Diagnosis    [facility]ANA [facility]acute [facility]) [facility]    Acute ischemic left PCA stroke ([facility]    Right sided weakness    Seizure ([facility] [address] [facility]       PLAN:  1. Continue current medications. 2. Continue with antihypertensive therapy. Discussed with patient's nurse.     Patt Altamirano MD

## 2022-06-04 NOTE — PLAN OF CARE
Problem: Discharge Planning  Goal: Discharge to home or other facility with appropriate resources  Outcome: Progressing  Flowsheets (Taken 6/4/2022 0800)  Discharge to home or other facility with appropriate resources: Identify barriers to discharge with patient and caregiver     Problem: Safety - Medical Restraint  Goal: Remains free of injury from restraints (Restraint for Interference with Medical Device)  Description: INTERVENTIONS:  1. Determine that other, less restrictive measures have been tried or would not be effective before applying the restraint  2. Evaluate the patient's condition at the time of restraint application  3. Inform patient/family regarding the reason for restraint  4.  Q2H: Monitor safety, psychosocial status, comfort, nutrition and hydration  Outcome: Progressing     Problem: Neurosensory - Adult  Goal: Achieves stable or improved neurological status  Outcome: Progressing  Flowsheets (Taken 6/4/2022 0800)  Achieves stable or improved neurological status: Assess for and report changes in neurological status     Problem: Neurosensory - Adult  Goal: Absence of seizures  Outcome: Progressing  Flowsheets (Taken 6/4/2022 0800)  Absence of seizures: Support airway/breathing, administer oxygen as needed     Problem: Neurosensory - Adult  Goal: Remains free of injury related to seizures activity  Outcome: Progressing  Flowsheets (Taken 6/4/2022 0800)  Remains free of injury related to seizure activity: Maintain airway, patient safety  and administer oxygen as ordered     Problem: Neurosensory - Adult  Goal: Achieves maximal functionality and self care  Outcome: Progressing  Flowsheets (Taken 6/4/2022 0800)  Achieves maximal functionality and self care: Monitor swallowing and airway patency with patient fatigue and changes in neurological status     Problem: Respiratory - Adult  Goal: Achieves optimal ventilation and oxygenation  Outcome: Progressing  Flowsheets (Taken 6/4/2022 0800)  Achieves optimal ventilation and oxygenation:   Assess for changes in respiratory status   Assess for changes in mentation and behavior   Position to facilitate oxygenation and minimize respiratory effort     Problem: Cardiovascular - Adult  Goal: Maintains optimal cardiac output and hemodynamic stability  Outcome: Progressing  Flowsheets (Taken 6/4/2022 0800)  Maintains optimal cardiac output and hemodynamic stability:   Monitor blood pressure and heart rate   Monitor urine output and notify Licensed Independent Practitioner for values outside of normal range   Assess for signs of decreased cardiac output   Administer fluid and/or volume expanders as ordered     Problem: Cardiovascular - Adult  Goal: Absence of cardiac dysrhythmias or at baseline  Outcome: Progressing  Flowsheets (Taken 6/4/2022 0800)  Absence of cardiac dysrhythmias or at baseline:   Monitor cardiac rate and rhythm   Assess for signs of decreased cardiac output     Problem: Skin/Tissue Integrity - Adult  Goal: Skin integrity remains intact  Outcome: Progressing  Flowsheets (Taken 6/4/2022 0800)  Skin Integrity Remains Intact: Monitor for areas of redness and/or skin breakdown     Problem: Skin/Tissue Integrity - Adult  Goal: Incisions, wounds, or drain sites healing without S/S of infection  Outcome: Progressing  Flowsheets (Taken 6/4/2022 0800)  Incisions, Wounds, or Drain Sites Healing Without Sign and Symptoms of Infection: ADMISSION and DAILY: Assess and document risk factors for pressure ulcer development     Problem: Skin/Tissue Integrity - Adult  Goal: Oral mucous membranes remain intact  Outcome: Progressing  Flowsheets (Taken 6/4/2022 0800)  Oral Mucous Membranes Remain Intact:   Assess oral mucosa and hygiene practices   Implement preventative oral hygiene regimen     Problem: Metabolic/Fluid and Electrolytes - Adult  Goal: Electrolytes maintained within normal limits  Outcome: Progressing  Flowsheets (Taken 6/4/2022 0800)  Electrolytes maintained within normal limits: Monitor labs and assess patient for signs and symptoms of electrolyte imbalances     Problem: Metabolic/Fluid and Electrolytes - Adult  Goal: Hemodynamic stability and optimal renal function maintained  Outcome: Progressing  Flowsheets (Taken 6/4/2022 0800)  Hemodynamic stability and optimal renal function maintained: Monitor labs and assess for signs and symptoms of volume excess or deficit     Problem: Metabolic/Fluid and Electrolytes - Adult  Goal: Glucose maintained within prescribed range  Outcome: Progressing     Problem: Musculoskeletal - Adult  Goal: Return mobility to safest level of function  Outcome: Progressing  Flowsheets (Taken 6/4/2022 0800)  Return Mobility to Safest Level of Function: Assess patient stability and activity tolerance for standing, transferring and ambulating with or without assistive devices     Problem: Genitourinary - Adult  Goal: Absence of urinary retention  Outcome: Progressing  Flowsheets (Taken 6/4/2022 0800)  Absence of urinary retention: Assess patients ability to void and empty bladder     Problem: Genitourinary - Adult  Goal: Urinary catheter remains patent  Outcome: Progressing  Flowsheets (Taken 6/4/2022 0800)  Urinary catheter remains patent: Assess patency of urinary catheter     Problem: Anxiety  Goal: Will report anxiety at manageable levels  Description: INTERVENTIONS:  1. Administer medication as ordered  2. Teach and rehearse alternative coping skills  3. Provide emotional support with 1:1 interaction with staff  Outcome: Progressing  Flowsheets (Taken 6/4/2022 0800)  Will report anxiety at manageable levels: Administer medication as ordered     Problem: Confusion  Goal: Confusion, delirium, dementia, or psychosis is improved or at baseline  Description: INTERVENTIONS:  1.  Assess for possible contributors to thought disturbance, including medications, impaired vision or hearing, underlying metabolic abnormalities, dehydration, psychiatric diagnoses, and notify attending LIP  2. [place] high risk fall precautions, as indicated  3. Provide frequent short contacts to provide reality reorientation, refocusing and direction  4. Decrease environmental stimuli, including noise as appropriate  5. Monitor and intervene to maintain adequate nutrition, hydration, elimination, sleep and activity  6. If unable to ensure safety without constant attention obtain sitter and review sitter guidelines with assigned personnel  7. Initiate Psychosocial CNS and Spiritual Care consult, as indicated  Outcome: Progressing  Flowsheets (Taken 6/4/2022 0800)  Effect of thought disturbance (confusion, delirium, dementia, or psychosis) are managed with adequate functional status:   Assess for contributors to thought disturbance, including medications, impaired vision or hearing, underlying metabolic abnormalities, dehydration, psychiatric diagnoses, notify LIP   Decrease environmental stimuli, including noise as appropriate     Problem: Behavior  Goal: Pt/Family maintain appropriate behavior and adhere to behavioral management agreement, if implemented  Description: INTERVENTIONS:  1. Assess patient/family's coping skills and  non-compliant behavior (including use of illegal substances)  2. Notify security of behavior or suspected illegal substances which indicate the need for search of the patient and/or belongings  3. Encourage verbalization of thoughts and concerns in a socially appropriate manner  4. Utilize positive, consistent limit setting strategies supporting safety of patient, staff and others  5. Encourage participation in the decision making process about the behavioral management agreement  6. Implement a Health Care Agreement if patient meets criteria  7. If a patient's behavior jeopardizes the safety of the patient, staff, or others refer to organization policy. If a visitor's behavior poses a threat to safety call refer to organization policy.   8. Initiate consult with , Psychosocial CNS, Spiritual Care as appropriate  Outcome: Progressing  Flowsheets (Taken 6/4/2022 0800)  Patient/family maintains appropriate behavior and adheres to behavioral management agreement, if implemented: Assess patient/familys coping skills and  non-compliant behavior (including use of illegal substances)     Problem: Chronic Conditions and Co-morbidities  Goal: Patient's chronic conditions and co-morbidity symptoms are monitored and maintained or improved  Outcome: Progressing  Flowsheets (Taken 6/4/2022 0800)  Care Plan - Patient's Chronic Conditions and Co-Morbidity Symptoms are Monitored and Maintained or Improved: Monitor and assess patient's chronic conditions and comorbid symptoms for stability, deterioration, or improvement     Problem: Pain  Goal: Verbalizes/displays adequate comfort level or baseline comfort level  Outcome: Progressing     Problem: ABCDS Injury Assessment  Goal: Absence of physical injury  Outcome: Progressing     Problem: ABCDS Injury Assessment  Goal: Absence of physical injury  Outcome: Progressing     Problem: Nutrition Deficit:  Goal: Optimize nutritional status  Outcome: Progressing

## 2022-06-04 NOTE — PROGRESS NOTES
Occupational Therapy    DATE: 2022    NAME: Stephen Jimenez  MRN: [id number]   : 1955    Patient not seen this date for Physical Therapy due to:      [x] Cancel by RN: Per RN Cary Lindsay, pt is not medically appropriate for OT eval this date. Will continue to follow.      [] Hemodialysis    [] Critical Lab Value Level     [] Blood transfusion in progress    [] Acute or unstable cardiovascular status   _MAP < 55 or more than >115  _HR < 40 or > 130    [] Acute or unstable pulmonary status   -FiO2 > 60%   _RR < 5 or >40    _O2 sats < 85%    [] Strict Bedrest    [] Off Unit for surgery or procedure    [] Off Unit for testing       [] Pending imaging to R/O fracture    [] Refusal by Patient      [] Other      Maria Eugenia Black OTR/L

## 2022-06-04 NOTE — PROGRESS NOTES
End Of Shift Note  [address] ICU  Summary of shift: Pt off drips no Cardene or any sedatives. Pt more awake responded briefly. Pt tube feed@ goal of 50 no residuals noted.     Vitals:    Vitals:    06/04/22 0353 06/04/22 0400 06/04/22 0500 06/04/22 0600   BP:  (!) 141/51 138/72 (!) 141/82   Pulse: 67 67 69 71   Resp: 18 29     Temp:  97.7 °F (36.5 °C)     TempSrc:  Temporal     SpO2: 91% 90% 94% 92%   Weight:       Height:            I&O:     Intake/Output Summary (Last 24 hours) at 6/4/2022 0642  Last data filed at 6/4/2022 0400  Gross per 24 hour   Intake 2312.04 ml   Output 600 ml   Net 1712.04 ml       Resp Status: on Bipap 30% sats 90s    Ventilator Settings:     / / /FiO2 : 30 %    Critical Care IV infusions:   sodium chloride 100 mL/hr at 06/04/22 0418    dexmedetomidine (PRECEDEX) IV infusion Stopped (06/02/22 1352)    sodium chloride Stopped (05/26/22 2008)    dextrose      niCARdipene (CARDENE) infusion Stopped (06/03/22 1115)        LDA:   Extended Dwell Peripherial IV 06/03/22 Left Brachial (Active)   Number of days: 0       PICC Double Lumen 83/39/18 Right Basilic (Active)   Number of days: 8       NG/OG/NJ/NE Tube Nasogastric 14 fr Left nostril (Active)   Number of days: 0       Urinary Catheter [place] (Active)   Number of days: 7       Incision 09/22/21 Neck Left (Active)   Number of days: 255

## 2022-06-04 NOTE — PROGRESS NOTES
Physical Therapy  DATE: 2022    NAME: Destin Brito  MRN: [id number]   : 1955    Patient not seen this date for Physical Therapy due to:      [] Cancel by RN or physician due to:    [] Hemodialysis    [] Critical Lab Value Level     [] Blood transfusion in progress    [] Acute or unstable cardiovascular status   _MAP < 55 or more than >115  _HR < 40 or > 130    [] Acute or unstable pulmonary status   -FiO2 > 60%   _RR < 5 or >40    _O2 sats < 85%    [] Strict Bedrest    [] Off Unit for surgery or procedure    [] Off Unit for testing       [] Pending imaging to R/O fracture    [] Refusal by Patient      [x] Other  (Hold PT per Angelita Valero RN as pt not appropriate for PT at this time)      [] PT being discontinued at this time. Patient independent. No further needs. [] PT being discontinued at this time as the patient has been transferred to hospice care. No further needs.       Panfilo Elias, PT  14:14

## 2022-06-04 NOTE — PROGRESS NOTES
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, new since 9/2021  · Old infarction left occipital lobe, increased since 9/2021  · 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 / Encephalomalacia   · Old lacunar infarcts left basal ganglia   · Mild brain parenchymal volume loss  · Alcohol dependence   · Reported right hemiparesis   · Essential hypertension  · Diabetes mellitus type 2  · Overweight   · Diverticulosis  · Agitation   · Delirium tremens     · Acute respiratory failure with hypoxia   · Oliguria  · Carotid artery disease         Plan:      · Patient currently in ICU  · O2 to maintain oxygen saturation greater than 92%  · BiPAP as needed  · Amiodarone  · DuoNebs  ·   ·    · Precedex gtt.   · Seizure precautions  · IV Solu-Medrol  · TPN  · Plan to start tube feeds today  · Add water 350 mL every 4 hour  · CIWA protocol  · Critical care on board  ·    · Waxing and waning mental status   · phenobarbital and Vimpat, decreasing vimpat per neurology  · Neurology following  · Zyprexa  · As needed Haldol  ·    · Monitor blood pressure  · Cardene drip, weaning  · IV Lopressor as needed  · Continue clonidine patch  · Nephrology on board  ·    · Vascular evaluated the the patient,   ·    · DVT and GI prophylaxis  · Continue Med as below      Scheduled Meds:   famotidine (PEPCID) injection  20 mg IntraVENous Daily    methylPREDNISolone  40 mg IntraVENous Q8H    OLANZapine zydis  5 mg Oral Nightly    lacosamide (VIMPAT) IVPB  150 mg IntraVENous BID    PHENobarbital  65 mg IntraVENous BID    ipratropium-albuterol  1 ampule Inhalation TID    insulin lispro  0-18 Units SubCUTAneous Q6H    cloNIDine  1 patch TransDERmal Weekly    fat emulsion  100 mL IntraVENous Daily    budesonide  0.5 mg Nebulization BID    enoxaparin  40 mg SubCUTAneous Daily    thiamine and folic acid IVPB   IntraVENous Daily    sodium chloride flush  10 mL IntraVENous Once    sodium chloride flush  5-40 mL IntraVENous 2 times per day    aspirin  81 mg Oral Daily    atorvastatin  40 mg Oral Nightly    clopidogrel  75 mg Oral Daily    PARoxetine  40 mg Oral Daily    amLODIPine  5 mg Oral Daily    atenolol  50 mg Oral Daily    losartan  100 mg Oral Daily     Continuous Infusions:   sodium chloride 100 mL/hr at 06/04/22 0418    dexmedetomidine (PRECEDEX) IV infusion Stopped (06/02/22 1352)    sodium chloride Stopped (05/26/22 2008)    dextrose      niCARdipene (CARDENE) infusion Stopped (06/03/22 1115)     PRN Meds:  potassium chloride, 20 mEq, PRN   Or  potassium chloride, 10 mEq, PRN  haloperidol lactate, 5 mg, Q6H PRN  LORazepam, 1 mg, Q1H PRN   Or  LORazepam, 1 mg, Q1H PRN   Or  LORazepam, 2 mg, Q1H PRN   Or  LORazepam, 2 mg, Q1H PRN   Or  LORazepam, 3 mg, Q1H PRN   Or  LORazepam, 3 mg, Q1H PRN   Or  LORazepam, 4 mg, Q1H PRN   Or  LORazepam, 4 mg, Q1H PRN  albuterol, 2.5 mg, Q4H PRN  hydrALAZINE, 10 mg, Q6H PRN  sodium chloride flush, 5-40 mL, PRN  sodium chloride, , PRN  acetaminophen, 650 mg, Q4H PRN  ondansetron, 4 mg, Q8H PRN   Or  ondansetron, 4 mg, Q6H PRN  metoprolol, 5 mg, Q6H PRN  glucose, 4 tablet, PRN  dextrose bolus, 125 mL, PRN   Or  dextrose bolus, 250 mL, PRN  glucagon (rDNA), 1 mg, PRN  dextrose, 100 mL/hr, PRN            Subjective:     Patient seen and examined at bedside. Lethargic. Sleepy. Tolerating tube feeds. Off Precedex drip.   Using BiPAP on and off  Afebrile      ROS:  Unable to assess due to pt mental status        Physical Examination: Vitals:  /85   Pulse 63   Temp 98.7 °F (37.1 °C) (Axillary)   Resp 25   Ht 5' 6\" (1.676 m)   Wt 183 lb 0.7 oz (83 kg)   SpO2 95%   BMI 29.54 kg/m²   Temp (24hrs), Av.6 °F (36.4 °C), Min:97 °F (36.1 °C), Max:98.7 °F (37.1 °C)    Weight:   Wt Readings from Last 3 Encounters:   22 183 lb 0.7 oz (83 kg)   21 156 lb (70.8 kg)   21 174 lb 4.8 oz (79.1 kg)     I/O last 3 completed shifts:  I/O last 3 completed shifts: In: 8827 [I.V.:683.7; NG/GT:2100; IV Piggyback:160.7]  Out: 2175 [Urine:2175]     Recent Labs     22  2334 22  0549 22  1112 22  1351   POCGLU 185* 296* 316* 291*         General appearance - sleepy  Mental status - oriented to person, place, and time with normal affect  Head - normocephalic and atraumatic  Eyes - pupils equal and reactive, extraocular eye movements intact, conjunctiva clear  Ears - hearing appears to be intact  Nose - no drainage noted  Mouth - mucous membranes moist  Neck - supple, no carotid bruits, thyroid not palpable  Chest - clear to auscultation, normal effort  Heart - normal rate, regular rhythm, no murmur  Abdomen - soft, nontender, nondistended, bowel sounds present all four quadrants, no masses, hepatomegaly or splenomegaly  Neurological - normal speech, no focal findings or movement disorder noted, cranial nerves II through XII grossly intact  Extremities - peripheral pulses palpable, no pedal edema or calf pain with palpation  Skin - no gross lesions, rashes, or induration noted        Medications:      Allergies: No Known Allergies    Current Meds:   Current Facility-Administered Medications:     famotidine (PEPCID) 20 mg in sodium chloride (PF) 10 mL injection, 20 mg, IntraVENous, Daily, Merna Pereira MD, 20 mg at 22 0925    0.45 % sodium chloride infusion, , IntraVENous, Continuous, Vargas Stage, MD, Last Rate: 100 mL/hr at 22, New Bag at 22    methylPREDNISolone sodium (SOLU-MEDROL) injection 40 mg, 40 mg, IntraVENous, Q8H, Clair Gifford MD, 40 mg at 06/04/22 0624    OLANZapine zydis (ZYPREXA) disintegrating tablet 5 mg, 5 mg, Oral, Nightly, Clair Gifford MD, 5 mg at 06/03/22 2013    lacosamide (VIMPAT) 150 mg in sodium chloride 0.9 % 65 mL IVPB, 150 mg, IntraVENous, BID, Nyasia Bryant MD, Stopped at 06/04/22 1013    [COMPLETED] PHENobarbital (LUMINAL) injection 130 mg, 130 mg, IntraVENous, Once, 130 mg at 05/27/22 1214 **FOLLOWED BY** PHENobarbital (LUMINAL) injection 65 mg, 65 mg, IntraVENous, BID, Nyasia Bryant MD, 65 mg at 06/04/22 0923    ipratropium-albuterol (DUONEB) nebulizer solution 1 ampule, 1 ampule, Inhalation, TID, Micah Weber MD, 1 ampule at 06/04/22 0801    potassium chloride 20 mEq/50 mL IVPB (Central Line), 20 mEq, IntraVENous, PRN, Stopped at 05/31/22 1445 **OR** potassium chloride 10 mEq/100 mL IVPB (Peripheral Line), 10 mEq, IntraVENous, PRN, Micah Weber MD    dexmedetomidine (PRECEDEX) 1,000 mcg in sodium chloride 0.9 % 250 mL infusion, 0.1-1.5 mcg/kg/hr, IntraVENous, Continuous, Shay Hawkins MD, Stopped at 06/02/22 1352    insulin lispro (HUMALOG) injection vial 0-18 Units, 0-18 Units, SubCUTAneous, Q6H, Shay Hawkins MD, 12 Units at 06/04/22 1157    cloNIDine (CATAPRES) 0.2 MG/24HR 1 patch, 1 patch, TransDERmal, Weekly, Clair Gifford MD, 1 patch at 05/30/22 1453    fat emulsion 20 % infusion 100 mL, 100 mL, IntraVENous, Daily, Micah Weber MD, Paused at 06/03/22 0527    budesonide (PULMICORT) nebulizer suspension 500 mcg, 0.5 mg, Nebulization, BID, Isabel Fill, APRN - CNP, 500 mcg at 06/04/22 0801    enoxaparin (LOVENOX) injection 40 mg, 40 mg, SubCUTAneous, Daily, CHARLENE Benjamin - CNP, 40 mg at 06/04/22 2985    haloperidol lactate (HALDOL) injection 5 mg, 5 mg, IntraMUSCular, Q6H PRN, Shay Hwakins MD, 5 mg at 06/02/22 2237    LORazepam (ATIVAN) tablet 1 mg, 1 mg, Oral, Q1H PRN **OR** LORazepam (ATIVAN) injection 1 mg, 1 mg, IntraVENous, Q1H PRN, 1 mg at 06/01/22 1107 **OR** LORazepam (ATIVAN) tablet 2 mg, 2 mg, Oral, Q1H PRN **OR** LORazepam (ATIVAN) injection 2 mg, 2 mg, IntraVENous, Q1H PRN, 2 mg at 06/02/22 2144 **OR** LORazepam (ATIVAN) tablet 3 mg, 3 mg, Oral, Q1H PRN **OR** LORazepam (ATIVAN) injection 3 mg, 3 mg, IntraVENous, Q1H PRN, 3 mg at 06/02/22 0956 **OR** LORazepam (ATIVAN) tablet 4 mg, 4 mg, Oral, Q1H PRN **OR** LORazepam (ATIVAN) injection 4 mg, 4 mg, IntraVENous, Q1H PRN, Shay Hawkins MD, 4 mg at 38/64/31 0078    folic acid 1 mg, thiamine (B-1) 100 mg in sodium chloride 0.9 % 50 mL IVPB, , IntraVENous, Daily, Shay Hawkins MD, Stopped at 06/04/22 0934    albuterol (PROVENTIL) nebulizer solution 2.5 mg, 2.5 mg, Nebulization, Q4H PRN, Jesus Matamoros MD    hydrALAZINE (APRESOLINE) injection 10 mg, 10 mg, IntraVENous, Q6H PRN, Jesus Matamoros MD, 10 mg at 06/03/22 0521    sodium chloride flush 0.9 % injection 10 mL, 10 mL, IntraVENous, Once, Shirley Gonzalez MD    sodium chloride flush 0.9 % injection 5-40 mL, 5-40 mL, IntraVENous, 2 times per day, Zunilda Atkinson MD, 10 mL at 06/04/22 0945    sodium chloride flush 0.9 % injection 5-40 mL, 5-40 mL, IntraVENous, PRN, Shay Hawkins MD, 10 mL at 06/01/22 0134    0.9 % sodium chloride infusion, , IntraVENous, PRN, Zunilda Atkinson MD, Stopped at 05/26/22 2008    acetaminophen (TYLENOL) tablet 650 mg, 650 mg, Oral, Q4H PRN, Shay Hawkins MD, 650 mg at 06/04/22 0041    ondansetron (ZOFRAN-ODT) disintegrating tablet 4 mg, 4 mg, Oral, Q8H PRN **OR** ondansetron (ZOFRAN) injection 4 mg, 4 mg, IntraVENous, Q6H PRN, Shay Hawkins MD    metoprolol (LOPRESSOR) injection 5 mg, 5 mg, IntraVENous, Q6H PRN, Shay Hawkins MD, 5 mg at 06/03/22 0818    glucose chewable tablet 16 g, 4 tablet, Oral, PRN, Shay Hawkins MD    dextrose bolus 10% 125 mL, 125 mL, IntraVENous, PRN **OR** dextrose bolus 10% 250 mL, 250 mL, IntraVENous, PRN, Zunilda Atkinson MD    glucagon (rDNA) injection 1 mg, 1 mg, IntraMUSCular, PRN, Shay Hawkins MD    dextrose 5 % solution, 100 mL/hr, IntraVENous, PRN, Shay Hawkins MD    aspirin chewable tablet 81 mg, 81 mg, Oral, Daily, Shay Hawkins MD, 81 mg at 06/04/22 0913    atorvastatin (LIPITOR) tablet 40 mg, 40 mg, Oral, Nightly, Shay Hawkins MD, 40 mg at 06/03/22 2013    clopidogrel (PLAVIX) tablet 75 mg, 75 mg, Oral, Daily, Shay Hawkins MD, 75 mg at 06/04/22 0913    PARoxetine (PAXIL) tablet 40 mg, 40 mg, Oral, Daily, Shay Hawkins MD, 40 mg at 06/04/22 0913    amLODIPine (NORVASC) tablet 5 mg, 5 mg, Oral, Daily, Latesha Mancera MD, 5 mg at 06/04/22 0912    atenolol (TENORMIN) tablet 50 mg, 50 mg, Oral, Daily, Latesha Mancera MD, 50 mg at 06/04/22 0923    losartan (COZAAR) tablet 100 mg, 100 mg, Oral, Daily, Latesha Mancera MD, 100 mg at 06/04/22 0948    niCARdipine (CARDENE) 50 mg in dextrose 5 % 250 mL infusion, 2.5-15 mg/hr, IntraVENous, Continuous, Shay Hawkins MD, Stopped at 06/03/22 1115      I/O (24Hr):     Intake/Output Summary (Last 24 hours) at 6/4/2022 1404  Last data filed at 6/4/2022 1217  Gross per 24 hour   Intake 1500 ml   Output 1350 ml   Net 150 ml       Data:           Labs:    Hematology:  Recent Labs     06/02/22  0400 06/03/22  0532 06/04/22  0345   WBC 7.6 9.3 11.2   RBC 3.65* 3.77* 3.49*   HGB 11.7* 11.8* 11.5*   HCT 35.5* 36.4* 35.3*   MCV 97.3 96.6 101.1   MCH 32.1 31.3 33.0   MCHC 33.0 32.4 32.6   RDW 13.2 13.4 13.3    242 248   MPV 9.9 10.1 10.5     Chemistry:  Recent Labs     06/02/22  0400 06/03/22  0532 06/04/22  0345    144 141   K 3.7 3.5* 4.0   * 108* 107   CO2 25 25 24   GLUCOSE 196* 333* 289*   BUN 38* 48* 57*   CREATININE 1.22* 1.57* 1.61*   MG 1.9 2.2  --    ANIONGAP 9 11 10   LABGLOM 59* 44* 43*   GFRAA >60 54* 52*   CALCIUM 8.8 8.7 8.3*   PHOS 4.6* 3.8  --      Recent Labs     06/03/22  1110 06/03/22  1738 06/03/22  2334 06/04/22  0549 06/04/22  1112 06/04/22  1351   POCGLU 306* 218* 185* 296* 316* 291*       Lab Results   Component Value Date/Time    SPECIAL NOT REPORTED 02/26/2021 09:30 AM     Lab Results   Component Value Date/Time    CULTURE NO GROWTH 3 DAYS 02/26/2021 09:30 AM       Lab Results   Component Value Date    POCPH 7.430 06/04/2022    POCPCO2 35.2 06/04/2022    POCPO2 79.7 06/04/2022    POCHCO3 23.4 06/04/2022    NBEA 1 06/04/2022    PBEA 0 05/27/2022    BTWB8EKC 96 06/04/2022    FIO2 30.0 06/04/2022       Radiology:    XR CHEST (SINGLE VIEW FRONTAL)    Result Date: 5/26/2022  Mild hypoventilatory changes noted both lung bases. CT HEAD WO CONTRAST    Result Date: 5/25/2022  No acute intracranial hemorrhage or abnormal extra-axial collection. Relative to 9 months prior there are increased areas of encephalomalacia in the left occipital lobe and new areas of encephalomalacia in the left parietal lobe. If there is persistent clinical concern for acute on chronic ischemia, MRI is more sensitive. XR CHEST PORTABLE    Result Date: 5/31/2022  Stable support line interval increase in now moderate left basilar opacity likely related to combined pleural-parenchymal process, pneumonia the likely etiology     XR CHEST PORTABLE    Result Date: 5/30/2022  Elevated left hemidiaphragm with apparent atelectasis left base. Slight improvement in right basilar opacity which may represent resolving atelectasis or improved minimal airspace disease. XR CHEST PORTABLE    Result Date: 5/29/2022  Interval developing right basilar opacity which may be related atelectasis or developing focal airspace disease. XR CHEST PORTABLE    Result Date: 5/28/2022  No significant finding in the chest.     CT CHEST ABDOMEN PELVIS W CONTRAST    Result Date: 5/25/2022  Small cysts noted in the liver. Diverticular disease of the colon without diverticulitis. Significant thickening of the bladder wall, in keeping with muscular hypertrophy or cystitis. Clinical correlation suggested. Degenerative changes demonstrated in the the thoracic and lumbar spine. RECOMMENDATIONS:     MRI BRAIN W WO CONTRAST    Result Date: 5/25/2022  No acute intracranial abnormality. No mass effect or abnormal intracranial enhancement. Old infarctions in the left frontal parietal lobes, new since September 21, 2021. Old infarction in the left occipital lobe, likely increased in size since September 21, 2021. Laminar necrosis and gliosis associated with the old infarctions. Old lacunar infarcts in the left basal ganglia, likely increased. Mild parenchymal volume loss. All radiological studies reviewed  Code Status:  Full Code        Electronically signed by Lydia Rudd MD on 6/4/2022 at 2:04 PM    This note was created with the assistance of a speech-recognition program.  Although the intention is to generate a document that actually reflects the content of the visit, no guarantees can be provided that every mistake has been identified and corrected by editing. Note was updated later by me after  physical examination and  completion of the assessment.

## 2022-06-05 ENCOUNTER — APPOINTMENT (OUTPATIENT)
Dept: GENERAL RADIOLOGY | Age: 67
DRG: 056 | End: 2022-06-05
Payer: MEDICARE

## 2022-06-05 LAB
ABSOLUTE BANDS #: 1.1 K/UL
ABSOLUTE EOS #: 0 K/UL (ref 0–0.4)
ABSOLUTE IMMATURE GRANULOCYTE: 0 K/UL (ref 0–0.3)
ABSOLUTE LYMPH #: 0.31 K/UL (ref 1–4.8)
ABSOLUTE MONO #: 1.57 K/UL (ref 0.2–0.8)
ALBUMIN SERPL-MCNC: 2.5 G/DL (ref 3.5–5.2)
ALP BLD-CCNC: 60 U/L (ref 40–129)
ALT SERPL-CCNC: 54 U/L (ref 5–41)
AMMONIA: 26 UMOL/L (ref 16–60)
ANION GAP SERPL CALCULATED.3IONS-SCNC: 10 MMOL/L (ref 9–17)
AST SERPL-CCNC: 29 U/L
BANDS: 7 % (ref 1–15)
BASOPHILS # BLD: 0 %
BASOPHILS ABSOLUTE: 0 K/UL (ref 0–0.2)
BILIRUB SERPL-MCNC: 0.14 MG/DL (ref 0.3–1.2)
BILIRUBIN DIRECT: <0.08 MG/DL
BILIRUBIN, INDIRECT: ABNORMAL MG/DL (ref 0–1)
BUN BLDV-MCNC: 51 MG/DL (ref 8–23)
BUN/CREAT BLD: 38 (ref 9–20)
CALCIUM SERPL-MCNC: 7.8 MG/DL (ref 8.6–10.4)
CHLORIDE BLD-SCNC: 104 MMOL/L (ref 98–107)
CO2: 23 MMOL/L (ref 20–31)
CREAT SERPL-MCNC: 1.36 MG/DL (ref 0.7–1.2)
EOSINOPHILS RELATIVE PERCENT: 0 % (ref 1–4)
GFR AFRICAN AMERICAN: >60 ML/MIN
GFR NON-AFRICAN AMERICAN: 52 ML/MIN
GFR SERPL CREATININE-BSD FRML MDRD: ABNORMAL ML/MIN/{1.73_M2}
GLUCOSE BLD-MCNC: 229 MG/DL (ref 70–99)
GLUCOSE BLD-MCNC: 235 MG/DL (ref 75–110)
GLUCOSE BLD-MCNC: 259 MG/DL (ref 75–110)
GLUCOSE BLD-MCNC: 272 MG/DL (ref 75–110)
GLUCOSE BLD-MCNC: 294 MG/DL (ref 75–110)
HCT VFR BLD CALC: 36.2 % (ref 40.7–50.3)
HEMOGLOBIN: 11.9 G/DL (ref 13–17)
IMMATURE GRANULOCYTES: 0 %
LYMPHOCYTES # BLD: 2 % (ref 24–44)
MCH RBC QN AUTO: 31.5 PG (ref 25.2–33.5)
MCHC RBC AUTO-ENTMCNC: 32.9 G/DL (ref 28–38)
MCV RBC AUTO: 95.8 FL (ref 82.6–102.9)
MONOCYTES # BLD: 10 % (ref 1–7)
PDW BLD-RTO: 13 % (ref 11.8–14.4)
PLATELET # BLD: 232 K/UL (ref 138–453)
PMV BLD AUTO: 10.5 FL (ref 8.1–13.5)
POTASSIUM SERPL-SCNC: 4.2 MMOL/L (ref 3.7–5.3)
PRO-BNP: 2280 PG/ML
RBC # BLD: 3.78 M/UL (ref 4.21–5.77)
SEG NEUTROPHILS: 81 % (ref 36–66)
SEGMENTED NEUTROPHILS ABSOLUTE COUNT: 12.72 K/UL (ref 1.8–7.7)
SODIUM BLD-SCNC: 137 MMOL/L (ref 135–144)
TOTAL PROTEIN: 4.9 G/DL (ref 6.4–8.3)
WBC # BLD: 15.7 K/UL (ref 3.5–11.3)

## 2022-06-05 PROCEDURE — 6370000000 HC RX 637 (ALT 250 FOR IP): Performed by: INTERNAL MEDICINE

## 2022-06-05 PROCEDURE — A4216 STERILE WATER/SALINE, 10 ML: HCPCS | Performed by: INTERNAL MEDICINE

## 2022-06-05 PROCEDURE — 36600 WITHDRAWAL OF ARTERIAL BLOOD: CPT

## 2022-06-05 PROCEDURE — C9254 INJECTION, LACOSAMIDE: HCPCS | Performed by: PSYCHIATRY & NEUROLOGY

## 2022-06-05 PROCEDURE — 2580000003 HC RX 258: Performed by: FAMILY MEDICINE

## 2022-06-05 PROCEDURE — 82140 ASSAY OF AMMONIA: CPT

## 2022-06-05 PROCEDURE — 2700000000 HC OXYGEN THERAPY PER DAY

## 2022-06-05 PROCEDURE — 36415 COLL VENOUS BLD VENIPUNCTURE: CPT

## 2022-06-05 PROCEDURE — 94640 AIRWAY INHALATION TREATMENT: CPT

## 2022-06-05 PROCEDURE — 99232 SBSQ HOSP IP/OBS MODERATE 35: CPT | Performed by: PSYCHIATRY & NEUROLOGY

## 2022-06-05 PROCEDURE — 6360000002 HC RX W HCPCS: Performed by: PSYCHIATRY & NEUROLOGY

## 2022-06-05 PROCEDURE — 2000000000 HC ICU R&B

## 2022-06-05 PROCEDURE — 85025 COMPLETE CBC W/AUTO DIFF WBC: CPT

## 2022-06-05 PROCEDURE — 2580000003 HC RX 258: Performed by: INTERNAL MEDICINE

## 2022-06-05 PROCEDURE — 83880 ASSAY OF NATRIURETIC PEPTIDE: CPT

## 2022-06-05 PROCEDURE — 80076 HEPATIC FUNCTION PANEL: CPT

## 2022-06-05 PROCEDURE — 2500000003 HC RX 250 WO HCPCS: Performed by: FAMILY MEDICINE

## 2022-06-05 PROCEDURE — 94761 N-INVAS EAR/PLS OXIMETRY MLT: CPT

## 2022-06-05 PROCEDURE — 2580000003 HC RX 258: Performed by: PSYCHIATRY & NEUROLOGY

## 2022-06-05 PROCEDURE — 6360000002 HC RX W HCPCS: Performed by: FAMILY MEDICINE

## 2022-06-05 PROCEDURE — 80048 BASIC METABOLIC PNL TOTAL CA: CPT

## 2022-06-05 PROCEDURE — 6360000002 HC RX W HCPCS: Performed by: NURSE PRACTITIONER

## 2022-06-05 PROCEDURE — 2500000003 HC RX 250 WO HCPCS: Performed by: INTERNAL MEDICINE

## 2022-06-05 PROCEDURE — 94660 CPAP INITIATION&MGMT: CPT

## 2022-06-05 PROCEDURE — 82947 ASSAY GLUCOSE BLOOD QUANT: CPT

## 2022-06-05 PROCEDURE — 71045 X-RAY EXAM CHEST 1 VIEW: CPT

## 2022-06-05 PROCEDURE — 6370000000 HC RX 637 (ALT 250 FOR IP): Performed by: FAMILY MEDICINE

## 2022-06-05 RX ADMIN — ASPIRIN 81 MG CHEWABLE TABLET 81 MG: 81 TABLET CHEWABLE at 09:21

## 2022-06-05 RX ADMIN — INSULIN LISPRO 9 UNITS: 100 INJECTION, SOLUTION INTRAVENOUS; SUBCUTANEOUS at 12:50

## 2022-06-05 RX ADMIN — ATORVASTATIN CALCIUM 40 MG: 40 TABLET, FILM COATED ORAL at 20:21

## 2022-06-05 RX ADMIN — LACOSAMIDE 150 MG: 10 INJECTION, SOLUTION INTRAVENOUS at 09:19

## 2022-06-05 RX ADMIN — SODIUM CHLORIDE, PRESERVATIVE FREE 10 ML: 5 INJECTION INTRAVENOUS at 12:57

## 2022-06-05 RX ADMIN — METHYLPREDNISOLONE SODIUM SUCCINATE 40 MG: 40 INJECTION, POWDER, FOR SOLUTION INTRAMUSCULAR; INTRAVENOUS at 05:17

## 2022-06-05 RX ADMIN — METHYLPREDNISOLONE SODIUM SUCCINATE 40 MG: 40 INJECTION, POWDER, FOR SOLUTION INTRAMUSCULAR; INTRAVENOUS at 12:57

## 2022-06-05 RX ADMIN — SODIUM CHLORIDE, PRESERVATIVE FREE 10 ML: 5 INJECTION INTRAVENOUS at 09:33

## 2022-06-05 RX ADMIN — INSULIN LISPRO 6 UNITS: 100 INJECTION, SOLUTION INTRAVENOUS; SUBCUTANEOUS at 05:17

## 2022-06-05 RX ADMIN — FOLIC ACID: 5 INJECTION, SOLUTION INTRAMUSCULAR; INTRAVENOUS; SUBCUTANEOUS at 09:14

## 2022-06-05 RX ADMIN — IPRATROPIUM BROMIDE AND ALBUTEROL SULFATE 1 AMPULE: 2.5; .5 SOLUTION RESPIRATORY (INHALATION) at 14:49

## 2022-06-05 RX ADMIN — IPRATROPIUM BROMIDE AND ALBUTEROL SULFATE 1 AMPULE: 2.5; .5 SOLUTION RESPIRATORY (INHALATION) at 22:03

## 2022-06-05 RX ADMIN — SODIUM CHLORIDE: 4.5 INJECTION, SOLUTION INTRAVENOUS at 01:56

## 2022-06-05 RX ADMIN — METHYLPREDNISOLONE SODIUM SUCCINATE 40 MG: 40 INJECTION, POWDER, FOR SOLUTION INTRAMUSCULAR; INTRAVENOUS at 22:43

## 2022-06-05 RX ADMIN — SODIUM CHLORIDE, PRESERVATIVE FREE 10 ML: 5 INJECTION INTRAVENOUS at 20:20

## 2022-06-05 RX ADMIN — BUDESONIDE 500 MCG: 0.5 SUSPENSION RESPIRATORY (INHALATION) at 22:03

## 2022-06-05 RX ADMIN — FAMOTIDINE 20 MG: 10 INJECTION, SOLUTION INTRAVENOUS at 09:22

## 2022-06-05 RX ADMIN — AMLODIPINE BESYLATE 5 MG: 5 TABLET ORAL at 09:21

## 2022-06-05 RX ADMIN — ATENOLOL 50 MG: 50 TABLET ORAL at 09:20

## 2022-06-05 RX ADMIN — LOSARTAN POTASSIUM 100 MG: 100 TABLET, FILM COATED ORAL at 09:21

## 2022-06-05 RX ADMIN — IPRATROPIUM BROMIDE AND ALBUTEROL SULFATE 1 AMPULE: 2.5; .5 SOLUTION RESPIRATORY (INHALATION) at 08:26

## 2022-06-05 RX ADMIN — LACOSAMIDE 150 MG: 10 INJECTION, SOLUTION INTRAVENOUS at 20:12

## 2022-06-05 RX ADMIN — PAROXETINE HYDROCHLORIDE 40 MG: 20 TABLET, FILM COATED ORAL at 09:21

## 2022-06-05 RX ADMIN — ENOXAPARIN SODIUM 40 MG: 100 INJECTION SUBCUTANEOUS at 09:22

## 2022-06-05 RX ADMIN — INSULIN LISPRO 9 UNITS: 100 INJECTION, SOLUTION INTRAVENOUS; SUBCUTANEOUS at 18:00

## 2022-06-05 RX ADMIN — CLOPIDOGREL BISULFATE 75 MG: 75 TABLET ORAL at 09:21

## 2022-06-05 RX ADMIN — BUDESONIDE 500 MCG: 0.5 SUSPENSION RESPIRATORY (INHALATION) at 08:26

## 2022-06-05 ASSESSMENT — PAIN SCALES - GENERAL
PAINLEVEL_OUTOF10: 0
PAINLEVEL_OUTOF10: 0

## 2022-06-05 NOTE — PROGRESS NOTES
Section of Cardiology  Progress Note      Date:  6/5/2022  Patient: Ramirez Bethea  Admission:  5/25/2022 12:59 PM  Admit DX: Seizure ([facility] [facility] [R56.9]  New onset seizure ([facility] [address] [R56.9]  Age:  78 y.o., 1955     LOS: 11 days     Reason for evaluation:   Hypertensive urgency      SUBJECTIVE:     The patient was seen and examined. Notes and labs reviewed. Patient apparently was more responsive this morning and is believe that phenobarbital is the cause of his lack of responsiveness. No chest pain was reported. Remains in sinus rhythm. Blood pressure is under acceptable control. OBJECTIVE:      EXAM:   Vitals:    VITALS:  BP (!) 163/69   Pulse 65   Temp 97.1 °F (36.2 °C) (Temporal)   Resp 24   Ht 5' 6\" (1.676 m)   Wt 186 lb 5 oz (84.5 kg)   SpO2 100%   BMI 30.07 kg/m²   24HR INTAKE/OUTPUT:      Intake/Output Summary (Last 24 hours) at 6/5/2022 1852  Last data filed at 6/5/2022 1712  Gross per 24 hour   Intake 4120.15 ml   Output 1950 ml   Net 2170.15 ml       CONSTITUTIONAL: Patient again did not respond to me calling his name. Appears comfortable. On oxygen. HEENT: Normal jugular venous pulsations, no carotid bruits. LUNGS: Clear anteriorly   CARDIOVASCULAR:  Normal apical impulse, regular rate and rhythm, normal S1 and S2, no S3 or S4, and no murmur or rub noted. dorsalis pedis, posterior tibial and bilateral palpable  SKIN: Warm and dry.   EXTREMITIES: No leg edema  Current Inpatient Medications:   famotidine (PEPCID) injection  20 mg IntraVENous Daily    methylPREDNISolone  40 mg IntraVENous Q8H    lacosamide (VIMPAT) IVPB  150 mg IntraVENous BID    ipratropium-albuterol  1 ampule Inhalation TID    insulin lispro  0-18 Units SubCUTAneous Q6H    cloNIDine  1 patch TransDERmal Weekly    budesonide  0.5 mg Nebulization BID    enoxaparin  40 mg SubCUTAneous Daily    thiamine and folic acid IVPB   IntraVENous Daily    sodium chloride flush  10 mL IntraVENous Once    sodium chloride flush  5-40 mL IntraVENous 2 times per day    aspirin  81 mg Oral Daily    atorvastatin  40 mg Oral Nightly    clopidogrel  75 mg Oral Daily    amLODIPine  5 mg Oral Daily    atenolol  50 mg Oral Daily    losartan  100 mg Oral Daily       IV Infusions (if any):   dexmedetomidine (PRECEDEX) IV infusion Stopped (06/02/22 1352)    sodium chloride Stopped (05/26/22 2008)    dextrose      niCARdipene (CARDENE) infusion Stopped (06/03/22 1112)       Diagnostics:   Telemetry: Sinus rhythm  Echocardiogram May 26, 2022  Summary  Global left ventricular systolic function is normal  Estimated ejection fraction is 50-55%  Hypokinesis of anterolateral wall noted  No significant valvular regurgitation or stenosis seen. No pericardial effusion seen. Grade I (mild) left ventricular diastolic dysfunction.     Signature  ----------------------------------------------------------------------------   Electronically signed by Valentine Acosta) on 05/26/2022   08:48 AM  ----------------------------------------------------------------------------     ----------------------------------------------------------------------------   Electronically signed by Ranjana Murray(Interpreting physician) on   05/26/2022 09:38 AM  Labs:   CBC:  Recent Labs     06/04/22  0345 06/05/22  0415   WBC 11.2 15.7*   HGB 11.5* 11.9*   HCT 35.3* 36.2*    232     Magnesium:  Recent Labs     06/03/22  0532   MG 2.2     BMP:  Recent Labs     06/04/22  0345 06/05/22  0415    137   K 4.0 4.2   CALCIUM 8.3* 7.8*   CO2 24 23   BUN 57* 51*   CREATININE 1.61* 1.36*   LABGLOM 43* 52*   GLUCOSE 289* 229*     BNP:  Recent Labs     06/05/22  1713   PROBNP 2,280*     PT/INR:No results for input(s): PROTIME, INR in the last 72 hours. APTT:No results for input(s): APTT in the last 72 hours. CARDIAC ENZYMES:No results for input(s): MYOGLOBIN, CKTOTAL, CKMB, CKMBINDEX, TROPHS, TROPONINT in the last 72 hours.   FASTING LIPID PANEL:  Lab Results   Component Value Date    HDL 87 02/25/2021    TRIG 146 05/30/2022     LIVER PROFILE:  Recent Labs     06/05/22  1713   AST 29   ALT 54*   LABALBU 2.5*   ALKPHOS 60   BILITOT 0.14*   BILIDIR <0.08   IBILI Can not be calculated   PROT 4.9*        ASSESSMENT:  · Hypertensive Urgency, blood pressure is better on multiple medications   · low normal LV systolic function on echocardiogram done May 26, 2022  · Hx of CVA  · New onset Seizures, managed by others  · History of alcohol abuse   · altered Mentation - managed by others  · Acute Kidney Injury         Patient Active Problem List   Diagnosis    ADRIANA (acute kidney injury) ([facility] [address]    Acute ischemic left PCA stroke ([address]    Right sided weakness    Seizure ([address]       PLAN:  1. Continue current medications. Echocardiogram will be done tomorrow. Blood pressure is under adequate control. His blood pressure medications will be adjusted when oral intake is possible by mouth. Discussed with his nurse.     Olga Jameson MD

## 2022-06-05 NOTE — RT PROTOCOL NOTE
RT Inhaler-Nebulizer Bronchodilator Protocol Note    There is a bronchodilator order in the chart from a provider indicating to follow the RT Bronchodilator Protocol and there is an Initiate RT Inhaler-Nebulizer Bronchodilator Protocol order as well (see protocol at bottom of note). CXR Findings:  XR CHEST PORTABLE    Result Date: 6/3/2022  Slight improved aeration of the left lung base, otherwise no significant change. The findings from the last RT Protocol Assessment were as follows:   History Pulmonary Disease: Chronic pulmonary disease  Respiratory Pattern: Dyspnea on exertion or RR 21-25 bpm  Breath Sounds: Slightly diminished and/or crackles  Cough: Weak, productive  Indication for Bronchodilator Therapy: Decreased or absent breath sounds  Bronchodilator Assessment Score: 8    Aerosolized bronchodilator medication orders have been revised according to the RT Inhaler-Nebulizer Bronchodilator Protocol below. Respiratory Therapist to perform RT Therapy Protocol Assessment initially then follow the protocol. Repeat RT Therapy Protocol Assessment PRN for score 0-3 or on second treatment, BID, and PRN for scores above 3. No Indications - adjust the frequency to every 6 hours PRN wheezing or bronchospasm, if no treatments needed after 48 hours then discontinue using Per Protocol order mode. If indication present, adjust the RT bronchodilator orders based on the Bronchodilator Assessment Score as indicated below. Use Inhaler orders unless patient has one or more of the following: on home nebulizer, not able to hold breath for 10 seconds, is not alert and oriented, cannot activate and use MDI correctly, or respiratory rate 25 breaths per minute or more, then use the equivalent nebulizer order(s) with same Frequency and PRN reasons based on the score. If a patient is on this medication at home then do not decrease Frequency below that used at home.     0-3 - enter or revise RT bronchodilator order(s) to equivalent RT Bronchodilator order with Frequency of every 4 hours PRN for wheezing or increased work of breathing using Per Protocol order mode. 4-6 - enter or revise RT Bronchodilator order(s) to two equivalent RT bronchodilator orders with one order with BID Frequency and one order with Frequency of every 4 hours PRN wheezing or increased work of breathing using Per Protocol order mode. 7-10 - enter or revise RT Bronchodilator order(s) to two equivalent RT bronchodilator orders with one order with TID Frequency and one order with Frequency of every 4 hours PRN wheezing or increased work of breathing using Per Protocol order mode. 11-13 - enter or revise RT Bronchodilator order(s) to one equivalent RT bronchodilator order with QID Frequency and an Albuterol order with Frequency of every 4 hours PRN wheezing or increased work of breathing using Per Protocol order mode. Greater than 13 - enter or revise RT Bronchodilator order(s) to one equivalent RT bronchodilator order with every 4 hours Frequency and an Albuterol order with Frequency of every 2 hours PRN wheezing or increased work of breathing using Per Protocol order mode. RT to enter RT Home Evaluation for COPD & MDI Assessment order using Per Protocol order mode.     Electronically signed by Robin Romo RCP on 6/4/2022 at 8:46 PM

## 2022-06-05 NOTE — PLAN OF CARE
Problem: Discharge Planning  Goal: Discharge to home or other facility with appropriate resources  6/4/2022 1903 by Zeina Hernandez RN  Outcome: Progressing  Flowsheets (Taken 6/4/2022 0800)  Discharge to home or other facility with appropriate resources: Identify barriers to discharge with patient and caregiver     Problem: Neurosensory - Adult  Goal: Achieves stable or improved neurological status  6/4/2022 2206 by Rafy Bazan RN  Outcome: Progressing  6/4/2022 1903 by Zeina Hernandez RN  Outcome: Progressing  Flowsheets (Taken 6/4/2022 0800)  Achieves stable or improved neurological status: Assess for and report changes in neurological status  Goal: Absence of seizures  6/4/2022 2206 by Rafy Bazan RN  Outcome: Progressing  6/4/2022 1903 by Zeina Hernandez RN  Outcome: Progressing  Flowsheets (Taken 6/4/2022 0800)  Absence of seizures: Support airway/breathing, administer oxygen as needed  Goal: Remains free of injury related to seizures activity  6/4/2022 2206 by Rafy Bazan RN  Outcome: Progressing  6/4/2022 1903 by Zeina Hernandez RN  Outcome: Progressing  Flowsheets (Taken 6/4/2022 0800)  Remains free of injury related to seizure activity: Maintain airway, patient safety  and administer oxygen as ordered  Goal: Achieves maximal functionality and self care  6/4/2022 1903 by Zeina Hernandez RN  Outcome: Progressing  Flowsheets (Taken 6/4/2022 0800)  Achieves maximal functionality and self care: Monitor swallowing and airway patency with patient fatigue and changes in neurological status     Problem: Respiratory - Adult  Goal: Achieves optimal ventilation and oxygenation  6/4/2022 2206 by Rafy Bazan RN  Outcome: Progressing  6/4/2022 1903 by Zeina Hernandez RN  Outcome: Progressing  Flowsheets (Taken 6/4/2022 0800)  Achieves optimal ventilation and oxygenation:   Assess for changes in respiratory status   Assess for changes in mentation and behavior   Position to facilitate oxygenation and minimize respiratory effort     Problem: Cardiovascular - Adult  Goal: Maintains optimal cardiac output and hemodynamic stability  6/4/2022 2206 by Gordon Beltran RN  Outcome: Progressing  6/4/2022 1903 by Armin Jalloh RN  Outcome: Progressing  Flowsheets (Taken 6/4/2022 0800)  Maintains optimal cardiac output and hemodynamic stability:   Monitor blood pressure and heart rate   Monitor urine output and notify Licensed Independent Practitioner for values outside of normal range   Assess for signs of decreased cardiac output   Administer fluid and/or volume expanders as ordered  Goal: Absence of cardiac dysrhythmias or at baseline  6/4/2022 2206 by Gordon Beltran RN  Outcome: Progressing  6/4/2022 1903 by Amrin Jalloh RN  Outcome: Progressing  Flowsheets (Taken 6/4/2022 0800)  Absence of cardiac dysrhythmias or at baseline:   Monitor cardiac rate and rhythm   Assess for signs of decreased cardiac output     Problem: Skin/Tissue Integrity - Adult  Goal: Skin integrity remains intact  6/4/2022 2206 by Gordon Beltran RN  Outcome: Progressing  6/4/2022 1903 by Armin Jalloh RN  Outcome: Progressing  Flowsheets (Taken 6/4/2022 0800)  Skin Integrity Remains Intact: Monitor for areas of redness and/or skin breakdown  Goal: Incisions, wounds, or drain sites healing without S/S of infection  6/4/2022 2206 by Gordon Beltran RN  Outcome: Progressing  6/4/2022 1903 by Armin Jalloh RN  Outcome: Progressing  Flowsheets (Taken 6/4/2022 0800)  Incisions, Wounds, or Drain Sites Healing Without Sign and Symptoms of Infection: ADMISSION and DAILY: Assess and document risk factors for pressure ulcer development  Goal: Oral mucous membranes remain intact  6/4/2022 2206 by Gordon Beltran RN  Outcome: Progressing  6/4/2022 1903 by Armin Jalloh RN  Outcome: Progressing  Flowsheets (Taken 6/4/2022 0800)  Oral Mucous Membranes Remain Intact:   Assess oral mucosa and hygiene practices   Implement preventative oral hygiene ensure safety without constant attention obtain sitter and review sitter guidelines with assigned personnel  7. Initiate Psychosocial CNS and Spiritual Care consult, as indicated  6/4/2022 1903 by Junito Cooper RN  Outcome: Progressing  Flowsheets (Taken 6/4/2022 0800)  Effect of thought disturbance (confusion, delirium, dementia, or psychosis) are managed with adequate functional status:   Assess for contributors to thought disturbance, including medications, impaired vision or hearing, underlying metabolic abnormalities, dehydration, psychiatric diagnoses, notify LIP   Decrease environmental stimuli, including noise as appropriate     Problem: Behavior  Goal: Pt/Family maintain appropriate behavior and adhere to behavioral management agreement, if implemented  Description: INTERVENTIONS:  1. Assess patient/family's coping skills and  non-compliant behavior (including use of illegal substances)  2. Notify security of behavior or suspected illegal substances which indicate the need for search of the patient and/or belongings  3. Encourage verbalization of thoughts and concerns in a socially appropriate manner  4. Utilize positive, consistent limit setting strategies supporting safety of patient, staff and others  5. Encourage participation in the decision making process about the behavioral management agreement  6. Implement a Health Care Agreement if patient meets criteria  7. If a patient's behavior jeopardizes the safety of the patient, staff, or others refer to organization policy. If a visitor's behavior poses a threat to safety call refer to organization policy.   8. Initiate consult with , Psychosocial CNS, Spiritual Care as appropriate  6/4/2022 1903 by Junito Cooper RN  Outcome: Progressing  Flowsheets (Taken 6/4/2022 0800)  Patient/family maintains appropriate behavior and adheres to behavioral management agreement, if implemented: Assess patient/familys coping skills and  non-compliant behavior (including use of illegal substances)     Problem: Chronic Conditions and Co-morbidities  Goal: Patient's chronic conditions and co-morbidity symptoms are monitored and maintained or improved  6/4/2022 2206 by Bronwyn French RN  Outcome: Progressing  6/4/2022 1903 by Oscar Moyer RN  Outcome: Progressing  Flowsheets (Taken 6/4/2022 0800)  Care Plan - Patient's Chronic Conditions and Co-Morbidity Symptoms are Monitored and Maintained or Improved: Monitor and assess patient's chronic conditions and comorbid symptoms for stability, deterioration, or improvement     Problem: Pain  Goal: Verbalizes/displays adequate comfort level or baseline comfort level  6/4/2022 1903 by Oscar Moyer RN  Outcome: Progressing     Problem: ABCDS Injury Assessment  Goal: Absence of physical injury  6/4/2022 1903 by Oscar Moyer RN  Outcome: Progressing     Problem: Musculoskeletal - Adult  Goal: Return mobility to safest level of function  6/4/2022 1903 by Oscar Moyer RN  Outcome: Progressing  Flowsheets (Taken 6/4/2022 0800)  Return Mobility to Safest Level of Function: Assess patient stability and activity tolerance for standing, transferring and ambulating with or without assistive devices     Problem: Genitourinary - Adult  Goal: Absence of urinary retention  6/4/2022 2206 by Bronwyn French RN  Outcome: Progressing  6/4/2022 1903 by Oscar Moyer RN  Outcome: Progressing  Flowsheets (Taken 6/4/2022 0800)  Absence of urinary retention: Assess patients ability to void and empty bladder  Goal: Urinary catheter remains patent  6/4/2022 2206 by Bronwyn French RN  Outcome: Progressing  6/4/2022 1903 by Oscar Moyer RN  Outcome: Progressing  Flowsheets (Taken 6/4/2022 0800)  Urinary catheter remains patent: Assess patency of urinary catheter     Problem: Nutrition Deficit:  Goal: Optimize nutritional status  6/4/2022 2206 by Bronwyn French RN  Outcome: Progressing  6/4/2022 1903 by Oscar Moyer RN  Outcome: Progressing     Problem: Safety - Medical Restraint  Goal: Remains free of injury from restraints (Restraint for Interference with Medical Device)  Description: INTERVENTIONS:  1. Determine that other, less restrictive measures have been tried or would not be effective before applying the restraint  2. Evaluate the patient's condition at the time of restraint application  3. Inform patient/family regarding the reason for restraint  4.  Q2H: Monitor safety, psychosocial status, comfort, nutrition and hydration  6/4/2022 1903 by Vamshi Stringer RN  Outcome: Progressing

## 2022-06-05 NOTE — PROGRESS NOTES
HPI/Hospital course: This is a 77 y/o WM with a history of alcoholism, prior strokes and tobacco abuse who presented after a seizure and was then noted to be in alcohol withdrawal. His hospital course has been complicated by continued delirium and agitation and difficulties weaning from sedation. EEG negative for seizure activity. No seizures noted during hospitalization. Over the last couple of days he has again become increasingly somnolent. He did receive several doses of ativan 2 days ago and yesterday was very unresponsive. I discontinued his phenobarbital yesterday and nursing reports he is a little more arousable. Past Medical History:   Diagnosis Date    CVA (cerebral vascular accident) ([facility] [address] 02/2021    With residual right sided weakness    Diabetes mellitus ([facility] [address]     Hypertension        Past Surgical History:   Procedure Laterality Date    CAROTID ENDARTERECTOMY  09/22/2021    CAROTID ENDARTERECTOMY (N/A Neck    CAROTID ENDARTERECTOMY N/A 9/22/2021    CAROTID ENDARTERECTOMY performed by Hossein Allison MD at [address] [place] [address] IR INS PICC VAD W SQ PORT GREATER THAN 5  5/26/2022    IR INS PICC VAD W SQ PORT GREATER THAN 5 5/26/2022 [facility]    KNEE SURGERY         History reviewed. No pertinent family history. Social History     Socioeconomic History    Marital status:      Spouse name: None    Number of children: None    Years of education: None    Highest education level: None   Occupational History    None   Tobacco Use    Smoking status: Current Every Day Smoker     Packs/day: 1.50     Start date: 6/1/1973    Smokeless tobacco: Never Used   Substance and Sexual Activity    Alcohol use:  Yes     Alcohol/week: 4.0 standard drinks     Types: 4 Cans of beer per week     Comment: Occassional, four times weekly    Drug use: No    Sexual activity: Yes     Partners: Female   Other Topics Concern    None   Social History Narrative    None     Social Determinants of ampule Inhalation TID Nahun Willis MD   1 ampule at 06/05/22 0826    potassium chloride 20 mEq/50 mL IVPB (Central Line)  20 mEq IntraVENous PRN Nahun Willis MD   Stopped at 05/31/22 1445    Or    potassium chloride 10 mEq/100 mL IVPB (Peripheral Line)  10 mEq IntraVENous PRN Nahun Willis MD        dexmedetomidine (PRECEDEX) 1,000 mcg in sodium chloride 0.9 % 250 mL infusion  0.1-1.5 mcg/kg/hr IntraVENous Continuous Shay Hawkins MD   Stopped at 06/02/22 1352    insulin lispro (HUMALOG) injection vial 0-18 Units  0-18 Units SubCUTAneous Q6H Shay Hawkins MD   6 Units at 06/05/22 0517    cloNIDine (CATAPRES) 0.2 MG/24HR 1 patch  1 patch TransDERmal Weekly Clair Gifford MD   1 patch at 05/30/22 1453    fat emulsion 20 % infusion 100 mL  100 mL IntraVENous Daily Nahun Willis MD   Paused at 06/03/22 0527    budesonide (PULMICORT) nebulizer suspension 500 mcg  0.5 mg Nebulization BID CHARLENE Mora - CNP   500 mcg at 06/05/22 0826    enoxaparin (LOVENOX) injection 40 mg  40 mg SubCUTAneous Daily CHARLENE Mora - CNP   40 mg at 06/05/22 0331    haloperidol lactate (HALDOL) injection 5 mg  5 mg IntraMUSCular Q6H PRN Shay Hawkins MD   5 mg at 06/02/22 2237    LORazepam (ATIVAN) tablet 1 mg  1 mg Oral Q1H PRN Shay Hawkins MD        Or    LORazepam (ATIVAN) injection 1 mg  1 mg IntraVENous Q1H PRN Shay Hawkins MD   1 mg at 06/01/22 1107    Or    LORazepam (ATIVAN) tablet 2 mg  2 mg Oral Q1H PRN Shay Hawkins MD        Or    LORazepam (ATIVAN) injection 2 mg  2 mg IntraVENous Q1H PRN Shay Hawkins MD   2 mg at 06/02/22 2144    Or    LORazepam (ATIVAN) tablet 3 mg  3 mg Oral Q1H PRN Shay Hawkins MD        Or    LORazepam (ATIVAN) injection 3 mg  3 mg IntraVENous Q1H PRN Shay Hawkins MD   3 mg at 06/02/22 0956    Or    LORazepam (ATIVAN) tablet 4 mg  4 mg Oral Q1H PRN Shay Hawkins MD        Or    LORazepam (ATIVAN) injection 4 mg  4 mg IntraVENous Q1H PRN Shay Hawkins, MD   4 mg at 68/80/78 8809    folic acid 1 mg, thiamine (B-1) 100 mg in sodium chloride 0.9 % 50 mL IVPB   IntraVENous Daily Sammy Blum MD   Stopped at 06/05/22 0944    albuterol (PROVENTIL) nebulizer solution 2.5 mg  2.5 mg Nebulization Q4H PRN Jam Kahn MD        hydrALAZINE (APRESOLINE) injection 10 mg  10 mg IntraVENous Q6H PRN Jam Kahn MD   10 mg at 06/03/22 0521    sodium chloride flush 0.9 % injection 10 mL  10 mL IntraVENous Once Miko Molina MD        sodium chloride flush 0.9 % injection 5-40 mL  5-40 mL IntraVENous 2 times per day Sammy Blum MD   10 mL at 06/05/22 0933    sodium chloride flush 0.9 % injection 5-40 mL  5-40 mL IntraVENous PRN Sammy Blum MD   10 mL at 06/01/22 0134    0.9 % sodium chloride infusion   IntraVENous PRN Sammy Blum MD   Stopped at 05/26/22 2008    acetaminophen (TYLENOL) tablet 650 mg  650 mg Oral Q4H PRN Shay Hawkins MD   650 mg at 06/04/22 0041    ondansetron (ZOFRAN-ODT) disintegrating tablet 4 mg  4 mg Oral Q8H PRN Shay Hawkins MD        Or    ondansetron (ZOFRAN) injection 4 mg  4 mg IntraVENous Q6H PRN Shay Hawkins MD        metoprolol (LOPRESSOR) injection 5 mg  5 mg IntraVENous Q6H PRN Shay Hawkins MD   5 mg at 06/03/22 0818    glucose chewable tablet 16 g  4 tablet Oral PRN Shay Hawkins MD        dextrose bolus 10% 125 mL  125 mL IntraVENous PRN Shay Hawkins MD        Or    dextrose bolus 10% 250 mL  250 mL IntraVENous PRN Shay Hawkins MD        glucagon (rDNA) injection 1 mg  1 mg IntraMUSCular PRN Shay Hawkins MD        dextrose 5 % solution  100 mL/hr IntraVENous PRN Shay Hawkins MD        aspirin chewable tablet 81 mg  81 mg Oral Daily Shay Hawkins MD   81 mg at 06/05/22 0921    atorvastatin (LIPITOR) tablet 40 mg  40 mg Oral Nightly Shay Hawkins MD   40 mg at 06/04/22 2111    clopidogrel (PLAVIX) tablet 75 mg  75 mg Oral Daily Shay Hawkins MD   75 mg at 06/05/22 0921    PARoxetine (PAXIL) tablet 40 mg  40 mg Oral Daily Shay Hawkins MD   40 mg at 06/05/22 0921    amLODIPine (NORVASC) tablet 5 mg  5 mg Oral Daily Toribio Renee MD   5 mg at 06/05/22 1619    atenolol (TENORMIN) tablet 50 mg  50 mg Oral Daily Toribio Renee MD   50 mg at 06/05/22 0920    losartan (COZAAR) tablet 100 mg  100 mg Oral Daily Toribio Renee MD   100 mg at 06/05/22 0921    niCARdipine (CARDENE) 50 mg in dextrose 5 % 250 mL infusion  2.5-15 mg/hr IntraVENous Continuous Zeny Luong MD   Paused at 06/03/22 1112       No Known Allergies      Vitals:    06/05/22 0828   BP:    Pulse: 67   Resp:    Temp:    SpO2: 95%     Admission weight: 180 lb (81.6 kg)    General Exam:  Overweight body habitus, sleeping with noisy respirations    HEENT:  Normocephalic, atraumatic  Eyes: conjunctiva non-injected, sclera anicteric  Mucous membranes of normal color and hydration status  Neck supple. Neurologic exam:     Mental status: Sleeping, alerts to some painful stimulation, will grimace and shake head back and forth with supra-orbital pressure  No gaze preference    Cranial nerves:  Pupils equal round and reactive to light, s  Extraocular movements: eyes conjugate  Face is symmetric      Withdraws all limbs to painful stimuli     Assessment : This is a 79 y/o with prior strokes and alcoholism who presented with a seizure which was perhaps due to alcohol withdrawal or could be new onset epilepsy. Initially treated with Keppra but this was switched to vimpat which is currently at a moderate dose. He was also on phenobarb as well. There have been continuing issues getting him off sedation and resulting agitation. He should be beyond the window for ETOH withdrawal and phenobarb was d/c'd yesterday. This has a very long half life and will take a few days to get out of his system. His WBC has also been increasing and he is of course at risk for another occult infectious process. Plan:  1.  Phenobarb d/c'd  2, continue Vimpat as dosed  3. Continue eval for occult infectious processes  4, Be cautious with sedation      Devendra Castro M.D.   Clinical Neurophysiologist  Neuromuscular Medicine

## 2022-06-05 NOTE — PROGRESS NOTES
Pulmonary Critical Care Progress Note       Patient seen for the follow up of hypoxia,  Seizure ([facility] [address]     Subjective:  Patient had persistent obtundation yesterday ordered CT scan of the brain. He was taken off phenobarbital and was interactive this morning following commands but still confused per RN. He was on 4 L oxygen. He was obtunded when I saw him not following command not responsive to painful stimuli. I requested from RT to replace him on BiPAP. [place] He has been restarted on tube feeds. He has been off Precedex. Examination:  Vitals: BP (!) 129/54   Pulse 57   Temp 97.1 °F (36.2 °C) (Temporal)   Resp 20   Ht 5' 6\" (1.676 m)   Wt 186 lb 5 oz (84.5 kg)   SpO2 100%   BMI 30.07 kg/m²   General appearance:   Obtunded not arousable snoring  Neck: No JVD  Lungs:  Decreased breath sound mild crackles  Heart: regular rate and rhythm, S1, S2 normal, no gallop  Abdomen: Soft, non tender, + BS  Extremities: no cyanosis or clubbing. No significant edema    LABs:  CBC:   Recent Labs     06/04/22  0345 06/05/22  0415   WBC 11.2 15.7*   HGB 11.5* 11.9*   HCT 35.3* 36.2*    232     BMP:   Recent Labs     06/04/22  0345 06/05/22  0415    137   K 4.0 4.2   CO2 24 23   BUN 57* 51*   CREATININE 1.61* 1.36*   LABGLOM 43* 52*   GLUCOSE 289* 229*     ABG:  Lab Results   Component Value Date    FIO2 30.0 06/04/2022       Lab Results   Component Value Date    POCPH 7.430 06/04/2022    POCPCO2 35.2 06/04/2022    POCPO2 79.7 06/04/2022    POCHCO3 23.4 06/04/2022    NBEA 1 06/04/2022    PBEA 0 05/27/2022    XIIC8HKK 96 06/04/2022    FIO2 30.0 06/04/2022     Radiology:  CXR 6/3/22    Slight improved aeration of the left lung base, otherwise no significant   change. CT brain 6/4  1. No acute intracranial abnormality.    2. Stable chronic infarcts in the left basal ganglia and left parietal and   occipital lobes           Impression:  · Acute respiratory insufficiency  Failure requiring BiPAP  · smoker/COPD exacerbation  ·  encephalopathyNew onset seizure   · Alcohol abuse/ dependence/ withdrawal   · History of CVA   · Obstructive sleep apnea  · Severe right Carotid artery stenosis, status post left CEA 9/21  · Diabetes mellitus  · Hypertensive urgency    Recommendations:  ·  restart BiPAP support /oxygen by nasal cannula when off BiPAP  ·  monitor mental status/monitor for intubation  · Repeat ABG   ·  Precedex in case of agitation/BiPAP tolerance /CIWA protocol  · Ipratropium- Albuterol BID   · Budesonide via nebulizer BID   · Solu-Medrol 40 IV every 8 hour  ·  monitor blood pressure off Cardene  · Vimpat 200mg BID  · Neurology stopped phenobarbital 65mg BID for concern of oversedation  · Discontinue Zyprexa 5 mg and Paxil 40 mg monitor mental status/consider holding clonidine for the same reason  · Check ammonia level/ LFT   · Nephology following/  · Check procalcitonin repeat chest x-ray  · NG feed off TPN  · Seizure precautions  · Vascular surgery on consult    · Discussed with RN and RT  ·  peptic ulcer disease prophylaxis  · DVT prophylaxis with low molecular weight heparin        Electronically signed by     Khari Wiggins MD on 6/5/2022 at 4:53 PM  Pulmonary Critical Care and Sleep Medicine,  [facility] AT [place]: [phone]  cc35 min

## 2022-06-05 NOTE — PROGRESS NOTES
Reason for Follow up: ADRIANA    Subjective:  SBP trending 130-160s  Creatinine improving to 1.36. Na improving. On IVF. Urine output 1875 mL yesterday. Encephalopathic.    UA showed glucose, MACR 457mg/g, 5-10 RBC, negative eosinophils, No WBC. Ur Na 20, FeNa 0.26%      Review Of Systems:   Unable to obtain because current mental status.     Scheduled Meds:   famotidine (PEPCID) injection  20 mg IntraVENous Daily    methylPREDNISolone  40 mg IntraVENous Q8H    OLANZapine zydis  5 mg Oral Nightly    lacosamide (VIMPAT) IVPB  150 mg IntraVENous BID    ipratropium-albuterol  1 ampule Inhalation TID    insulin lispro  0-18 Units SubCUTAneous Q6H    cloNIDine  1 patch TransDERmal Weekly    fat emulsion  100 mL IntraVENous Daily    budesonide  0.5 mg Nebulization BID    enoxaparin  40 mg SubCUTAneous Daily    thiamine and folic acid IVPB   IntraVENous Daily    sodium chloride flush  10 mL IntraVENous Once    sodium chloride flush  5-40 mL IntraVENous 2 times per day    aspirin  81 mg Oral Daily    atorvastatin  40 mg Oral Nightly    clopidogrel  75 mg Oral Daily    PARoxetine  40 mg Oral Daily    amLODIPine  5 mg Oral Daily    atenolol  50 mg Oral Daily    losartan  100 mg Oral Daily   Continuous Infusions:   sodium chloride 100 mL/hr at 06/05/22 0525    dexmedetomidine (PRECEDEX) IV infusion Stopped (06/02/22 1352)    sodium chloride Stopped (05/26/22 2008)    dextrose      niCARdipene (CARDENE) infusion Stopped (06/03/22 1112)     PRN Meds:potassium chloride **OR** potassium chloride, haloperidol lactate, LORazepam **OR** LORazepam **OR** LORazepam **OR** LORazepam **OR** LORazepam **OR** LORazepam **OR** LORazepam **OR** LORazepam, albuterol, hydrALAZINE, sodium chloride flush, sodium chloride, acetaminophen, ondansetron **OR** ondansetron, metoprolol, glucose, dextrose bolus **OR** dextrose bolus, glucagon (rDNA), dextrose    No Known Allergies    Physical Exam:  Blood pressure 132/86, pulse 66, temperature 97.1 °F (36.2 °C), temperature source Temporal, resp. rate 19, height 5' 6\" (1.676 m), weight 186 lb 5 oz (84.5 kg), SpO2 96 %. In: 6120.1 [I.V.:3551; NG/GT:1467]  Out: 1875   In: 6120.1   Out: 1875 [Urine:1875]    General:  Not in distress. Appears to be stated age. HEENT: Atraumatic, normocephalic. Anicteric sclera. Pink and moist oral mucosa. Chest: Bilateral air entry, coarse to auscultation. Cardiovascular: RRR, S1S2, no murmur, rub or gallop. Has no lower extremity edema. Abdomen: Soft, non tender to palpation. Active bowel sounds. Musculoskeletal: No cyanosis or clubbing. Integumentary: Pink, warm and dry. Free from rash or lesions. Skin turgor normal.  CNS: lethargic.     Data:  CBC:   Lab Results   Component Value Date    WBC 15.7 (H) 06/05/2022    HGB 11.9 (L) 06/05/2022    HCT 36.2 (L) 06/05/2022    MCV 95.8 06/05/2022     06/05/2022     BMP:    Lab Results   Component Value Date     06/05/2022    K 4.2 06/05/2022     06/05/2022    CO2 23 06/05/2022    BUN 51 (H) 06/05/2022    CREATININE 1.36 (H) 06/05/2022    GLUCOSE 229 (H) 06/05/2022     CMP:   Lab Results   Component Value Date     06/05/2022    K 4.2 06/05/2022     06/05/2022    CO2 23 06/05/2022    BUN 51 (H) 06/05/2022    CREATININE 1.36 (H) 06/05/2022    GLUCOSE 229 (H) 06/05/2022    CALCIUM 7.8 (L) 06/05/2022    PROT 6.6 05/26/2022    LABALBU 3.8 05/26/2022    BILITOT 0.79 05/26/2022    ALKPHOS 69 05/26/2022    AST 24 05/26/2022    ALT 12 05/26/2022      Hepatic:   Lab Results   Component Value Date    AST 24 05/26/2022    ALT 12 05/26/2022    BILITOT 0.79 05/26/2022    ALKPHOS 69 05/26/2022     BNP: No results found for: BNP  Lipids:   Lab Results   Component Value Date    CHOL 185 02/25/2021    HDL 87 02/25/2021     INR:   Lab Results   Component Value Date    INR 1.1 09/22/2021     PTH: No results found for: PTH  Phosphorus:    Lab Results   Component Value Date    PHOS 3.8 06/03/2022 Ionized Calcium: No results found for: IONCA  Magnesium:   Lab Results   Component Value Date    MG 2.2 06/03/2022     Albumin:   Lab Results   Component Value Date    LABALBU 3.8 05/26/2022     Last 3 CK, CKMB, Troponin: @LABRCNT(CKTOTAL:3,CKMB:3,TROPONINI:3)       URINE:)No results found for: Dicky Kayser    Radiology:   Reviewed. Assessment/Plan: To follow. [address] IVF. Free water adjusted. Pt seen in collaboration with Dr. Laverne Garcia. Electronically signed by Duane Fick, APRN - CNP on 6/5/2022 at [facility].   Ph: [phone]

## 2022-06-05 NOTE — RT PROTOCOL NOTE
RT Inhaler-Nebulizer Bronchodilator Protocol Note    There is a bronchodilator order in the chart from a provider indicating to follow the RT Bronchodilator Protocol and there is an Initiate RT Inhaler-Nebulizer Bronchodilator Protocol order as well (see protocol at bottom of note). CXR Findings:  No results found. The findings from the last RT Protocol Assessment were as follows:   History Pulmonary Disease: Chronic pulmonary disease  Respiratory Pattern: Dyspnea on exertion or RR 21-25 bpm  Breath Sounds: Slightly diminished and/or crackles  Cough: Weak, productive  Indication for Bronchodilator Therapy: Decreased or absent breath sounds  Bronchodilator Assessment Score: 8    Aerosolized bronchodilator medication orders have been revised according to the RT Inhaler-Nebulizer Bronchodilator Protocol below. Respiratory Therapist to perform RT Therapy Protocol Assessment initially then follow the protocol. Repeat RT Therapy Protocol Assessment PRN for score 0-3 or on second treatment, BID, and PRN for scores above 3. No Indications - adjust the frequency to every 6 hours PRN wheezing or bronchospasm, if no treatments needed after 48 hours then discontinue using Per Protocol order mode. If indication present, adjust the RT bronchodilator orders based on the Bronchodilator Assessment Score as indicated below. Use Inhaler orders unless patient has one or more of the following: on home nebulizer, not able to hold breath for 10 seconds, is not alert and oriented, cannot activate and use MDI correctly, or respiratory rate 25 breaths per minute or more, then use the equivalent nebulizer order(s) with same Frequency and PRN reasons based on the score. If a patient is on this medication at home then do not decrease Frequency below that used at home.     0-3 - enter or revise RT bronchodilator order(s) to equivalent RT Bronchodilator order with Frequency of every 4 hours PRN for wheezing or increased work of breathing using Per Protocol order mode. 4-6 - enter or revise RT Bronchodilator order(s) to two equivalent RT bronchodilator orders with one order with BID Frequency and one order with Frequency of every 4 hours PRN wheezing or increased work of breathing using Per Protocol order mode. 7-10 - enter or revise RT Bronchodilator order(s) to two equivalent RT bronchodilator orders with one order with TID Frequency and one order with Frequency of every 4 hours PRN wheezing or increased work of breathing using Per Protocol order mode. 11-13 - enter or revise RT Bronchodilator order(s) to one equivalent RT bronchodilator order with QID Frequency and an Albuterol order with Frequency of every 4 hours PRN wheezing or increased work of breathing using Per Protocol order mode. Greater than 13 - enter or revise RT Bronchodilator order(s) to one equivalent RT bronchodilator order with every 4 hours Frequency and an Albuterol order with Frequency of every 2 hours PRN wheezing or increased work of breathing using Per Protocol order mode. RT to enter RT Home Evaluation for COPD & MDI Assessment order using Per Protocol order mode.     Electronically signed by Al Delacruz RCP on 6/5/2022 at 8:28 AM

## 2022-06-05 NOTE — PROGRESS NOTES
NG placement verified via air bolus. No residuals. Tube feed restarted at goal 50mL/hr. No residuals. 50 mL free water flush given. 30 mL with meds.

## 2022-06-05 NOTE — PROGRESS NOTES
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, new since 9/2021  · Old infarction left occipital lobe, increased since 9/2021  · 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 / Encephalomalacia   · Old lacunar infarcts left basal ganglia   · Mild brain parenchymal volume loss  · Alcohol dependence   · Reported right hemiparesis   · Essential hypertension  · Diabetes mellitus type 2  · Overweight   · Diverticulosis  · Agitation   · Delirium tremens     · Acute respiratory failure with hypoxia   · Oliguria  · Carotid artery disease         Plan:      · Patient currently in ICU  · O2 to maintain oxygen saturation greater than 92%  · BiPAP as needed  · Amiodarone  · DuoNebs  ·   ·    · Precedex gtt.   · Seizure precautions  · IV Solu-Medrol  · TPN  · Plan to start tube feeds today  · Add water 350 mL every 4 hour  · CIWA protocol  · Critical care on board  ·    · Waxing and waning mental status   · Neurology following, phenobarbital is discontinued  · Continue Vimpat  · Neurology following  · Zyprexa  · As needed Haldol  ·    · Monitor blood pressure  · Cardene drip, weaning  · IV Lopressor as needed  · Continue clonidine patch  · Nephrology on board  ·    · Vascular evaluated the the patient,   ·    · DVT and GI prophylaxis  · Continue Med as below      Scheduled Meds:   famotidine (PEPCID) injection  20 mg IntraVENous Daily    methylPREDNISolone  40 mg IntraVENous Q8H    OLANZapine zydis  5 mg Oral Nightly    lacosamide (VIMPAT) IVPB  150 mg IntraVENous BID    ipratropium-albuterol  1 ampule Inhalation TID    insulin lispro  0-18 Units SubCUTAneous Q6H    cloNIDine  1 patch TransDERmal Weekly    fat emulsion  100 mL IntraVENous Daily    budesonide  0.5 mg Nebulization BID    enoxaparin  40 mg SubCUTAneous Daily    thiamine and folic acid IVPB   IntraVENous Daily    sodium chloride flush  10 mL IntraVENous Once    sodium chloride flush  5-40 mL IntraVENous 2 times per day    aspirin  81 mg Oral Daily    atorvastatin  40 mg Oral Nightly    clopidogrel  75 mg Oral Daily    PARoxetine  40 mg Oral Daily    amLODIPine  5 mg Oral Daily    atenolol  50 mg Oral Daily    losartan  100 mg Oral Daily     Continuous Infusions:   dexmedetomidine (PRECEDEX) IV infusion Stopped (06/02/22 1352)    sodium chloride Stopped (05/26/22 2008)    dextrose      niCARdipene (CARDENE) infusion Stopped (06/03/22 1112)     PRN Meds:  potassium chloride, 20 mEq, PRN   Or  potassium chloride, 10 mEq, PRN  haloperidol lactate, 5 mg, Q6H PRN  LORazepam, 1 mg, Q1H PRN   Or  LORazepam, 1 mg, Q1H PRN   Or  LORazepam, 2 mg, Q1H PRN   Or  LORazepam, 2 mg, Q1H PRN   Or  LORazepam, 3 mg, Q1H PRN   Or  LORazepam, 3 mg, Q1H PRN   Or  LORazepam, 4 mg, Q1H PRN   Or  LORazepam, 4 mg, Q1H PRN  albuterol, 2.5 mg, Q4H PRN  hydrALAZINE, 10 mg, Q6H PRN  sodium chloride flush, 5-40 mL, PRN  sodium chloride, , PRN  acetaminophen, 650 mg, Q4H PRN  ondansetron, 4 mg, Q8H PRN   Or  ondansetron, 4 mg, Q6H PRN  metoprolol, 5 mg, Q6H PRN  glucose, 4 tablet, PRN  dextrose bolus, 125 mL, PRN   Or  dextrose bolus, 250 mL, PRN  glucagon (rDNA), 1 mg, PRN  dextrose, 100 mL/hr, PRN            Subjective:     Patient seen examined at bedside  Patient remains in medical ICU, patient remains unresponsive at times, patient according to the nurse does have some time, requiring BiPAP  Patient does require Ativan  Patient phenobarbital is discontinued        ROS:  Unable to assess due to pt mental zydis (ZYPREXA) disintegrating tablet 5 mg, 5 mg, Oral, Nightly, Clair Gifford MD, 5 mg at 06/04/22 2111    lacosamide (VIMPAT) 150 mg in sodium chloride 0.9 % 65 mL IVPB, 150 mg, IntraVENous, BID, Diaz Escobedo MD, Stopped at 06/05/22 0950    ipratropium-albuterol (DUONEB) nebulizer solution 1 ampule, 1 ampule, Inhalation, TID, Merna Pereira MD, 1 ampule at 06/05/22 0826    potassium chloride 20 mEq/50 mL IVPB (Central Line), 20 mEq, IntraVENous, PRN, Stopped at 05/31/22 1445 **OR** potassium chloride 10 mEq/100 mL IVPB (Peripheral Line), 10 mEq, IntraVENous, PRN, Merna Pereira MD    dexmedetomidine (PRECEDEX) 1,000 mcg in sodium chloride 0.9 % 250 mL infusion, 0.1-1.5 mcg/kg/hr, IntraVENous, Continuous, Shay Hawkins MD, Stopped at 06/02/22 1352    insulin lispro (HUMALOG) injection vial 0-18 Units, 0-18 Units, SubCUTAneous, Q6H, Shay Hawkins MD, 6 Units at 06/05/22 0517    cloNIDine (CATAPRES) 0.2 MG/24HR 1 patch, 1 patch, TransDERmal, Weekly, Hafsa Gifford MD, 1 patch at 05/30/22 1453    fat emulsion 20 % infusion 100 mL, 100 mL, IntraVENous, Daily, Merna Pereira MD, Paused at 06/03/22 0527    budesonide (PULMICORT) nebulizer suspension 500 mcg, 0.5 mg, Nebulization, BID, Luz Fendt, APRN - CNP, 500 mcg at 06/05/22 0826    enoxaparin (LOVENOX) injection 40 mg, 40 mg, SubCUTAneous, Daily, Luz Fendt, APRN - CNP, 40 mg at 06/05/22 3114    haloperidol lactate (HALDOL) injection 5 mg, 5 mg, IntraMUSCular, Q6H PRN, Shay Hawkins MD, 5 mg at 06/02/22 2237    LORazepam (ATIVAN) tablet 1 mg, 1 mg, Oral, Q1H PRN **OR** LORazepam (ATIVAN) injection 1 mg, 1 mg, IntraVENous, Q1H PRN, 1 mg at 06/01/22 1107 **OR** LORazepam (ATIVAN) tablet 2 mg, 2 mg, Oral, Q1H PRN **OR** LORazepam (ATIVAN) injection 2 mg, 2 mg, IntraVENous, Q1H PRN, 2 mg at 06/02/22 4272 **OR** LORazepam (ATIVAN) tablet 3 mg, 3 mg, Oral, Q1H PRN **OR** LORazepam (ATIVAN) injection 3 mg, 3 mg, IntraVENous, Q1H PRN, 3 mg at 06/02/22 0956 **OR** LORazepam (ATIVAN) tablet 4 mg, 4 mg, Oral, Q1H PRN **OR** LORazepam (ATIVAN) injection 4 mg, 4 mg, IntraVENous, Q1H PRN, Shay Hawkins MD, 4 mg at 52/84/17 7256    folic acid 1 mg, thiamine (B-1) 100 mg in sodium chloride 0.9 % 50 mL IVPB, , IntraVENous, Daily, Shay Hawkins MD, Stopped at 06/05/22 0944    albuterol (PROVENTIL) nebulizer solution 2.5 mg, 2.5 mg, Nebulization, Q4H PRN, Micah Weber MD    hydrALAZINE (APRESOLINE) injection 10 mg, 10 mg, IntraVENous, Q6H PRN, Micah Weber MD, 10 mg at 06/03/22 0521    sodium chloride flush 0.9 % injection 10 mL, 10 mL, IntraVENous, Once, Sri Rodriguez MD    sodium chloride flush 0.9 % injection 5-40 mL, 5-40 mL, IntraVENous, 2 times per day, Doris Loving MD, 10 mL at 06/05/22 0933    sodium chloride flush 0.9 % injection 5-40 mL, 5-40 mL, IntraVENous, PRN, Shay Hawkins MD, 10 mL at 06/01/22 0134    0.9 % sodium chloride infusion, , IntraVENous, PRN, Doris Loving MD, Stopped at 05/26/22 2008    acetaminophen (TYLENOL) tablet 650 mg, 650 mg, Oral, Q4H PRN, Shay Hawkins MD, 650 mg at 06/04/22 0041    ondansetron (ZOFRAN-ODT) disintegrating tablet 4 mg, 4 mg, Oral, Q8H PRN **OR** ondansetron (ZOFRAN) injection 4 mg, 4 mg, IntraVENous, Q6H PRN, Shay Hawkins MD    metoprolol (LOPRESSOR) injection 5 mg, 5 mg, IntraVENous, Q6H PRN, Shay Hawkins MD, 5 mg at 06/03/22 0818    glucose chewable tablet 16 g, 4 tablet, Oral, PRN, Shay Hawkins MD    dextrose bolus 10% 125 mL, 125 mL, IntraVENous, PRN **OR** dextrose bolus 10% 250 mL, 250 mL, IntraVENous, PRN, Shay Hawkins MD    glucagon (rDNA) injection 1 mg, 1 mg, IntraMUSCular, PRN, Shay Hawkins MD    dextrose 5 % solution, 100 mL/hr, IntraVENous, PRN, Shay Hawkins MD    aspirin chewable tablet 81 mg, 81 mg, Oral, Daily, Shay Hawkins MD, 81 mg at 06/05/22 0921    atorvastatin (LIPITOR) tablet 40 mg, 40 mg, Oral, Nightly, Shay Hawkins MD, 40 mg at 06/04/22 2111    clopidogrel (PLAVIX) tablet 75 mg, 75 mg, Oral, Daily, Shay Hawkins MD, 75 mg at 06/05/22 0921    PARoxetine (PAXIL) tablet 40 mg, 40 mg, Oral, Daily, Shay Hawkins MD, 40 mg at 06/05/22 0921    amLODIPine (NORVASC) tablet 5 mg, 5 mg, Oral, Daily, Edwyna Gosselin, MD, 5 mg at 06/05/22 0921    atenolol (TENORMIN) tablet 50 mg, 50 mg, Oral, Daily, Edwyna Gosselin, MD, 50 mg at 06/05/22 0920    losartan (COZAAR) tablet 100 mg, 100 mg, Oral, Daily, Edwyna Gosselin, MD, 100 mg at 06/05/22 0921    niCARdipine (CARDENE) 50 mg in dextrose 5 % 250 mL infusion, 2.5-15 mg/hr, IntraVENous, Continuous, Shay Hawkins MD, Paused at 06/03/22 1112      I/O (24Hr):     Intake/Output Summary (Last 24 hours) at 6/5/2022 1226  Last data filed at 6/5/2022 1100  Gross per 24 hour   Intake 6641.42 ml   Output 1025 ml   Net 5616.42 ml       Data:           Labs:    Hematology:  Recent Labs     06/03/22  0532 06/04/22  0345 06/05/22  0415   WBC 9.3 11.2 15.7*   RBC 3.77* 3.49* 3.78*   HGB 11.8* 11.5* 11.9*   HCT 36.4* 35.3* 36.2*   MCV 96.6 101.1 95.8   MCH 31.3 33.0 31.5   MCHC 32.4 32.6 32.9   RDW 13.4 13.3 13.0    248 232   MPV 10.1 10.5 10.5     Chemistry:  Recent Labs     06/03/22  0532 06/04/22  0345 06/05/22  0415    141 137   K 3.5* 4.0 4.2   * 107 104   CO2 25 24 23   GLUCOSE 333* 289* 229*   BUN 48* 57* 51*   CREATININE 1.57* 1.61* 1.36*   MG 2.2  --   --    ANIONGAP 11 10 10   LABGLOM 44* 43* 52*   GFRAA 54* 52* >60   CALCIUM 8.7 8.3* 7.8*   PHOS 3.8  --   --      Recent Labs     06/04/22  1112 06/04/22  1351 06/04/22  1631 06/04/22  2344 06/05/22  0513 06/05/22  1150   POCGLU 316* 291* 207* 181* 235* 294*       Lab Results   Component Value Date/Time    SPECIAL NOT REPORTED 02/26/2021 09:30 AM     Lab Results   Component Value Date/Time    CULTURE NO GROWTH 3 DAYS 02/26/2021 09:30 AM       Lab Results   Component Value Date    POCPH 7.430 06/04/2022    POCPCO2 35.2 06/04/2022    POCPO2 79.7 06/04/2022    POCHCO3 23.4 06/04/2022    NBEA 1 06/04/2022    PBEA 0 05/27/2022    GOJS4RWS 96 06/04/2022    FIO2 30.0 06/04/2022       Radiology:    XR CHEST (SINGLE VIEW FRONTAL)    Result Date: 5/26/2022  Mild hypoventilatory changes noted both lung bases. CT HEAD WO CONTRAST    Result Date: 5/25/2022  No acute intracranial hemorrhage or abnormal extra-axial collection. Relative to 9 months prior there are increased areas of encephalomalacia in the left occipital lobe and new areas of encephalomalacia in the left parietal lobe. If there is persistent clinical concern for acute on chronic ischemia, MRI is more sensitive. XR CHEST PORTABLE    Result Date: 5/31/2022  Stable support line interval increase in now moderate left basilar opacity likely related to combined pleural-parenchymal process, pneumonia the likely etiology     XR CHEST PORTABLE    Result Date: 5/30/2022  Elevated left hemidiaphragm with apparent atelectasis left base. Slight improvement in right basilar opacity which may represent resolving atelectasis or improved minimal airspace disease. XR CHEST PORTABLE    Result Date: 5/29/2022  Interval developing right basilar opacity which may be related atelectasis or developing focal airspace disease. XR CHEST PORTABLE    Result Date: 5/28/2022  No significant finding in the chest.     CT CHEST ABDOMEN PELVIS W CONTRAST    Result Date: 5/25/2022  Small cysts noted in the liver. Diverticular disease of the colon without diverticulitis. Significant thickening of the bladder wall, in keeping with muscular hypertrophy or cystitis. Clinical correlation suggested. Degenerative changes demonstrated in the the thoracic and lumbar spine. RECOMMENDATIONS:     MRI BRAIN W WO CONTRAST    Result Date: 5/25/2022  No acute intracranial abnormality. No mass effect or abnormal intracranial enhancement. Old infarctions in the left frontal parietal lobes, new since September 21, 2021.  Old infarction in the left occipital lobe, likely increased in size since September 21, 2021. Laminar necrosis and gliosis associated with the old infarctions. Old lacunar infarcts in the left basal ganglia, likely increased. Mild parenchymal volume loss. All radiological studies reviewed  Code Status:  Full Code        Electronically signed by Suleiman Ferro MD on 6/5/2022 at 12:26 PM    This note was created with the assistance of a speech-recognition program.  Although the intention is to generate a document that actually reflects the content of the visit, no guarantees can be provided that every mistake has been identified and corrected by editing. Note was updated later by me after  physical examination and  completion of the assessment.

## 2022-06-05 NOTE — PLAN OF CARE
Problem: Discharge Planning  Goal: Discharge to home or other facility with appropriate resources  Outcome: Progressing  Flowsheets (Taken 6/5/2022 0730)  Discharge to home or other facility with appropriate resources: Identify barriers to discharge with patient and caregiver     Problem: Safety - Medical Restraint  Goal: Remains free of injury from restraints (Restraint for Interference with Medical Device)  Description: INTERVENTIONS:  1. Determine that other, less restrictive measures have been tried or would not be effective before applying the restraint  2. Evaluate the patient's condition at the time of restraint application  3. Inform patient/family regarding the reason for restraint  4.  Q2H: Monitor safety, psychosocial status, comfort, nutrition and hydration  Outcome: Progressing     Problem: Neurosensory - Adult  Goal: Achieves stable or improved neurological status  Outcome: Progressing  Flowsheets (Taken 6/5/2022 0730)  Achieves stable or improved neurological status: Assess for and report changes in neurological status     Problem: Neurosensory - Adult  Goal: Absence of seizures  Outcome: Progressing  Flowsheets (Taken 6/5/2022 0730)  Absence of seizures: Support airway/breathing, administer oxygen as needed     Problem: Neurosensory - Adult  Goal: Remains free of injury related to seizures activity  Outcome: Progressing  Flowsheets (Taken 6/5/2022 0730)  Remains free of injury related to seizure activity: Maintain airway, patient safety  and administer oxygen as ordered     Problem: Neurosensory - Adult  Goal: Achieves maximal functionality and self care  Outcome: Progressing     Problem: Respiratory - Adult  Goal: Achieves optimal ventilation and oxygenation  Outcome: Progressing     Problem: Cardiovascular - Adult  Goal: Maintains optimal cardiac output and hemodynamic stability  Outcome: Progressing  Flowsheets (Taken 6/5/2022 0730)  Maintains optimal cardiac output and hemodynamic stability: Monitor blood pressure and heart rate     Problem: Cardiovascular - Adult  Goal: Absence of cardiac dysrhythmias or at baseline  Outcome: Progressing  Flowsheets (Taken 6/5/2022 0730)  Absence of cardiac dysrhythmias or at baseline: Monitor cardiac rate and rhythm     Problem: Skin/Tissue Integrity - Adult  Goal: Skin integrity remains intact  Outcome: Progressing  Flowsheets (Taken 6/5/2022 0730)  Skin Integrity Remains Intact: Monitor for areas of redness and/or skin breakdown     Problem: Skin/Tissue Integrity - Adult  Goal: Incisions, wounds, or drain sites healing without S/S of infection  Outcome: Progressing     Problem: Skin/Tissue Integrity - Adult  Goal: Oral mucous membranes remain intact  Outcome: Progressing  Flowsheets (Taken 6/5/2022 0730)  Oral Mucous Membranes Remain Intact: Assess oral mucosa and hygiene practices     Problem: Metabolic/Fluid and Electrolytes - Adult  Goal: Electrolytes maintained within normal limits  Outcome: Progressing  Flowsheets (Taken 6/5/2022 0730)  Electrolytes maintained within normal limits:   Monitor labs and assess patient for signs and symptoms of electrolyte imbalances   Administer electrolyte replacement as ordered     Problem: Metabolic/Fluid and Electrolytes - Adult  Goal: Hemodynamic stability and optimal renal function maintained  Outcome: Progressing  Flowsheets (Taken 6/5/2022 0730)  Hemodynamic stability and optimal renal function maintained: Monitor labs and assess for signs and symptoms of volume excess or deficit     Problem: Metabolic/Fluid and Electrolytes - Adult  Goal: Glucose maintained within prescribed range  Outcome: Progressing  Flowsheets (Taken 6/5/2022 0730)  Glucose maintained within prescribed range:   Monitor blood glucose as ordered   Administer ordered medications to maintain glucose within target range     Problem: Musculoskeletal - Adult  Goal: Return mobility to safest level of function  Outcome: Progressing     Problem: Genitourinary - Adult  Goal: Absence of urinary retention  Outcome: Progressing  Flowsheets (Taken 6/5/2022 0730)  Absence of urinary retention: Assess patients ability to void and empty bladder     Problem: Genitourinary - Adult  Goal: Urinary catheter remains patent  Outcome: Progressing  Flowsheets (Taken 6/5/2022 0730)  Urinary catheter remains patent: Assess patency of urinary catheter     Problem: Anxiety  Goal: Will report anxiety at manageable levels  Description: INTERVENTIONS:  1. Administer medication as ordered  2. Teach and rehearse alternative coping skills  3. Provide emotional support with 1:1 interaction with staff  Outcome: Progressing  Flowsheets (Taken 6/5/2022 0730)  Will report anxiety at manageable levels: Administer medication as ordered     Problem: Confusion  Goal: Confusion, delirium, dementia, or psychosis is improved or at baseline  Description: INTERVENTIONS:  1. Assess for possible contributors to thought disturbance, including medications, impaired vision or hearing, underlying metabolic abnormalities, dehydration, psychiatric diagnoses, and notify attending LIP  2. [place] high risk fall precautions, as indicated  3. Provide frequent short contacts to provide reality reorientation, refocusing and direction  4. Decrease environmental stimuli, including noise as appropriate  5. Monitor and intervene to maintain adequate nutrition, hydration, elimination, sleep and activity  6. If unable to ensure safety without constant attention obtain sitter and review sitter guidelines with assigned personnel  7.  Initiate Psychosocial CNS and Spiritual Care consult, as indicated  Outcome: Progressing  Flowsheets (Taken 6/5/2022 0730)  Effect of thought disturbance (confusion, delirium, dementia, or psychosis) are managed with adequate functional status:   Assess for contributors to thought disturbance, including medications, impaired vision or hearing, underlying metabolic abnormalities, dehydration, psychiatric Deficit:  Goal: Optimize nutritional status  Outcome: Progressing

## 2022-06-05 NOTE — PROGRESS NOTES
Pt taken off of BiPAP and placed on 3L nasal cannula per respiratory. Significant breakdown and redness on bridge of nose. Hydrocolloid band aid placed over redness.

## 2022-06-05 NOTE — PROGRESS NOTES
Occupational Therapy    DATE: 2022    NAME: Mario Yenug  MRN: [id number]   : 1955    Patient not seen this date for Occupational Therapy due to:      [x] Cancel by RN: Per RN Tevin Duran pt is not medically appropriate this date. Will continue to follow.      [] Hemodialysis    [] Critical Lab Value Level     [] Blood transfusion in progress    [] Acute or unstable cardiovascular status   _MAP < 55 or more than >115  _HR < 40 or > 130    [] Acute or unstable pulmonary status   -FiO2 > 60%   _RR < 5 or >40    _O2 sats < 85%    [] Strict Bedrest    [] Off Unit for surgery or procedure    [] Off Unit for testing       [] Pending imaging to R/O fracture    [] Refusal by Patient      [] Other    Maria Eugenia Black OTR/L

## 2022-06-06 ENCOUNTER — APPOINTMENT (OUTPATIENT)
Dept: GENERAL RADIOLOGY | Age: 67
DRG: 056 | End: 2022-06-06
Payer: MEDICARE

## 2022-06-06 LAB
ABSOLUTE EOS #: 0 K/UL (ref 0–0.4)
ABSOLUTE IMMATURE GRANULOCYTE: 0.17 K/UL (ref 0–0.3)
ABSOLUTE LYMPH #: 0.33 K/UL (ref 1–4.8)
ABSOLUTE MONO #: 1.16 K/UL (ref 0.2–0.8)
ANION GAP SERPL CALCULATED.3IONS-SCNC: 8 MMOL/L (ref 9–17)
BASOPHILS # BLD: 0 %
BASOPHILS ABSOLUTE: 0 K/UL (ref 0–0.2)
BUN BLDV-MCNC: 59 MG/DL (ref 8–23)
BUN/CREAT BLD: 41 (ref 9–20)
CALCIUM SERPL-MCNC: 8.1 MG/DL (ref 8.6–10.4)
CHLORIDE BLD-SCNC: 104 MMOL/L (ref 98–107)
CO2: 24 MMOL/L (ref 20–31)
CREAT SERPL-MCNC: 1.44 MG/DL (ref 0.7–1.2)
EOSINOPHILS RELATIVE PERCENT: 0 % (ref 1–4)
GFR AFRICAN AMERICAN: 59 ML/MIN
GFR NON-AFRICAN AMERICAN: 49 ML/MIN
GFR SERPL CREATININE-BSD FRML MDRD: ABNORMAL ML/MIN/{1.73_M2}
GLUCOSE BLD-MCNC: 212 MG/DL (ref 75–110)
GLUCOSE BLD-MCNC: 219 MG/DL (ref 75–110)
GLUCOSE BLD-MCNC: 252 MG/DL (ref 75–110)
GLUCOSE BLD-MCNC: 269 MG/DL (ref 75–110)
GLUCOSE BLD-MCNC: 273 MG/DL (ref 75–110)
GLUCOSE BLD-MCNC: 293 MG/DL (ref 70–99)
HCT VFR BLD CALC: 36.6 % (ref 40.7–50.3)
HEMOGLOBIN: 11.9 G/DL (ref 13–17)
IMMATURE GRANULOCYTES: 1 %
LYMPHOCYTES # BLD: 2 % (ref 24–44)
MCH RBC QN AUTO: 31.9 PG (ref 25.2–33.5)
MCHC RBC AUTO-ENTMCNC: 32.5 G/DL (ref 28–38)
MCV RBC AUTO: 98.1 FL (ref 82.6–102.9)
MONOCYTES # BLD: 7 % (ref 1–7)
PDW BLD-RTO: 13 % (ref 11.8–14.4)
PHOSPHORUS: 3.1 MG/DL (ref 2.5–4.5)
PLATELET # BLD: 208 K/UL (ref 138–453)
PMV BLD AUTO: 10.9 FL (ref 8.1–13.5)
POTASSIUM SERPL-SCNC: 4.5 MMOL/L (ref 3.7–5.3)
PROCALCITONIN: 0.1 NG/ML
RBC # BLD: 3.73 M/UL (ref 4.21–5.77)
SEG NEUTROPHILS: 90 % (ref 36–66)
SEGMENTED NEUTROPHILS ABSOLUTE COUNT: 14.84 K/UL (ref 1.8–7.7)
SODIUM BLD-SCNC: 136 MMOL/L (ref 135–144)
WBC # BLD: 16.5 K/UL (ref 3.5–11.3)

## 2022-06-06 PROCEDURE — 2580000003 HC RX 258: Performed by: FAMILY MEDICINE

## 2022-06-06 PROCEDURE — 84100 ASSAY OF PHOSPHORUS: CPT

## 2022-06-06 PROCEDURE — 94761 N-INVAS EAR/PLS OXIMETRY MLT: CPT

## 2022-06-06 PROCEDURE — 6370000000 HC RX 637 (ALT 250 FOR IP): Performed by: INTERNAL MEDICINE

## 2022-06-06 PROCEDURE — 6360000002 HC RX W HCPCS: Performed by: INTERNAL MEDICINE

## 2022-06-06 PROCEDURE — 6360000002 HC RX W HCPCS: Performed by: NURSE PRACTITIONER

## 2022-06-06 PROCEDURE — 36415 COLL VENOUS BLD VENIPUNCTURE: CPT

## 2022-06-06 PROCEDURE — 94640 AIRWAY INHALATION TREATMENT: CPT

## 2022-06-06 PROCEDURE — 85025 COMPLETE CBC W/AUTO DIFF WBC: CPT

## 2022-06-06 PROCEDURE — 2500000003 HC RX 250 WO HCPCS: Performed by: INTERNAL MEDICINE

## 2022-06-06 PROCEDURE — 74018 RADEX ABDOMEN 1 VIEW: CPT

## 2022-06-06 PROCEDURE — A4216 STERILE WATER/SALINE, 10 ML: HCPCS | Performed by: INTERNAL MEDICINE

## 2022-06-06 PROCEDURE — 6360000002 HC RX W HCPCS: Performed by: PSYCHIATRY & NEUROLOGY

## 2022-06-06 PROCEDURE — 84145 PROCALCITONIN (PCT): CPT

## 2022-06-06 PROCEDURE — 6360000002 HC RX W HCPCS: Performed by: FAMILY MEDICINE

## 2022-06-06 PROCEDURE — 2500000003 HC RX 250 WO HCPCS: Performed by: FAMILY MEDICINE

## 2022-06-06 PROCEDURE — 2580000003 HC RX 258: Performed by: INTERNAL MEDICINE

## 2022-06-06 PROCEDURE — 2000000000 HC ICU R&B

## 2022-06-06 PROCEDURE — 2580000003 HC RX 258: Performed by: PSYCHIATRY & NEUROLOGY

## 2022-06-06 PROCEDURE — 99233 SBSQ HOSP IP/OBS HIGH 50: CPT | Performed by: PSYCHIATRY & NEUROLOGY

## 2022-06-06 PROCEDURE — 6370000000 HC RX 637 (ALT 250 FOR IP): Performed by: FAMILY MEDICINE

## 2022-06-06 PROCEDURE — 2700000000 HC OXYGEN THERAPY PER DAY

## 2022-06-06 PROCEDURE — 94660 CPAP INITIATION&MGMT: CPT

## 2022-06-06 PROCEDURE — 80048 BASIC METABOLIC PNL TOTAL CA: CPT

## 2022-06-06 PROCEDURE — C9254 INJECTION, LACOSAMIDE: HCPCS | Performed by: PSYCHIATRY & NEUROLOGY

## 2022-06-06 RX ORDER — FUROSEMIDE 10 MG/ML
40 INJECTION INTRAMUSCULAR; INTRAVENOUS ONCE
Status: COMPLETED | OUTPATIENT
Start: 2022-06-06 | End: 2022-06-06

## 2022-06-06 RX ORDER — METHYLPREDNISOLONE SODIUM SUCCINATE 40 MG/ML
40 INJECTION, POWDER, LYOPHILIZED, FOR SOLUTION INTRAMUSCULAR; INTRAVENOUS EVERY 12 HOURS
Status: DISCONTINUED | OUTPATIENT
Start: 2022-06-07 | End: 2022-06-07

## 2022-06-06 RX ADMIN — SODIUM CHLORIDE, PRESERVATIVE FREE 20 ML: 5 INJECTION INTRAVENOUS at 08:50

## 2022-06-06 RX ADMIN — FAMOTIDINE 20 MG: 10 INJECTION, SOLUTION INTRAVENOUS at 08:50

## 2022-06-06 RX ADMIN — IPRATROPIUM BROMIDE AND ALBUTEROL SULFATE 1 AMPULE: 2.5; .5 SOLUTION RESPIRATORY (INHALATION) at 08:22

## 2022-06-06 RX ADMIN — LACOSAMIDE 150 MG: 10 INJECTION, SOLUTION INTRAVENOUS at 22:09

## 2022-06-06 RX ADMIN — NYSTATIN 500000 UNITS: 100000 SUSPENSION ORAL at 17:16

## 2022-06-06 RX ADMIN — FOLIC ACID: 5 INJECTION, SOLUTION INTRAMUSCULAR; INTRAVENOUS; SUBCUTANEOUS at 08:46

## 2022-06-06 RX ADMIN — HYDRALAZINE HYDROCHLORIDE 10 MG: 20 INJECTION INTRAMUSCULAR; INTRAVENOUS at 01:07

## 2022-06-06 RX ADMIN — LOSARTAN POTASSIUM 100 MG: 100 TABLET, FILM COATED ORAL at 08:54

## 2022-06-06 RX ADMIN — INSULIN LISPRO 9 UNITS: 100 INJECTION, SOLUTION INTRAVENOUS; SUBCUTANEOUS at 00:04

## 2022-06-06 RX ADMIN — FUROSEMIDE 40 MG: 10 INJECTION, SOLUTION INTRAMUSCULAR; INTRAVENOUS at 17:16

## 2022-06-06 RX ADMIN — CLOPIDOGREL BISULFATE 75 MG: 75 TABLET ORAL at 08:54

## 2022-06-06 RX ADMIN — ENOXAPARIN SODIUM 40 MG: 100 INJECTION SUBCUTANEOUS at 08:52

## 2022-06-06 RX ADMIN — INSULIN LISPRO 9 UNITS: 100 INJECTION, SOLUTION INTRAVENOUS; SUBCUTANEOUS at 17:16

## 2022-06-06 RX ADMIN — METHYLPREDNISOLONE SODIUM SUCCINATE 40 MG: 40 INJECTION, POWDER, FOR SOLUTION INTRAMUSCULAR; INTRAVENOUS at 06:09

## 2022-06-06 RX ADMIN — BUDESONIDE 500 MCG: 0.5 SUSPENSION RESPIRATORY (INHALATION) at 08:22

## 2022-06-06 RX ADMIN — IPRATROPIUM BROMIDE AND ALBUTEROL SULFATE 1 AMPULE: 2.5; .5 SOLUTION RESPIRATORY (INHALATION) at 14:11

## 2022-06-06 RX ADMIN — SODIUM CHLORIDE: 9 INJECTION, SOLUTION INTRAVENOUS at 08:32

## 2022-06-06 RX ADMIN — NYSTATIN 500000 UNITS: 100000 SUSPENSION ORAL at 21:41

## 2022-06-06 RX ADMIN — INSULIN LISPRO 9 UNITS: 100 INJECTION, SOLUTION INTRAVENOUS; SUBCUTANEOUS at 12:43

## 2022-06-06 RX ADMIN — IPRATROPIUM BROMIDE AND ALBUTEROL SULFATE 1 AMPULE: 2.5; .5 SOLUTION RESPIRATORY (INHALATION) at 18:09

## 2022-06-06 RX ADMIN — BUDESONIDE 500 MCG: 0.5 SUSPENSION RESPIRATORY (INHALATION) at 18:09

## 2022-06-06 RX ADMIN — LACOSAMIDE 150 MG: 10 INJECTION, SOLUTION INTRAVENOUS at 08:46

## 2022-06-06 RX ADMIN — ASPIRIN 81 MG CHEWABLE TABLET 81 MG: 81 TABLET CHEWABLE at 08:53

## 2022-06-06 RX ADMIN — AMLODIPINE BESYLATE 5 MG: 5 TABLET ORAL at 08:54

## 2022-06-06 RX ADMIN — HYDRALAZINE HYDROCHLORIDE 10 MG: 20 INJECTION INTRAMUSCULAR; INTRAVENOUS at 15:36

## 2022-06-06 RX ADMIN — INSULIN LISPRO 9 UNITS: 100 INJECTION, SOLUTION INTRAVENOUS; SUBCUTANEOUS at 06:09

## 2022-06-06 RX ADMIN — SODIUM CHLORIDE, PRESERVATIVE FREE 10 ML: 5 INJECTION INTRAVENOUS at 15:36

## 2022-06-06 RX ADMIN — SODIUM CHLORIDE, PRESERVATIVE FREE 10 ML: 5 INJECTION INTRAVENOUS at 22:19

## 2022-06-06 RX ADMIN — INSULIN LISPRO 6 UNITS: 100 INJECTION, SOLUTION INTRAVENOUS; SUBCUTANEOUS at 23:46

## 2022-06-06 RX ADMIN — METOPROLOL TARTRATE 5 MG: 1 INJECTION, SOLUTION INTRAVENOUS at 20:20

## 2022-06-06 RX ADMIN — HYDRALAZINE HYDROCHLORIDE 10 MG: 20 INJECTION INTRAMUSCULAR; INTRAVENOUS at 22:15

## 2022-06-06 RX ADMIN — METOPROLOL TARTRATE 5 MG: 1 INJECTION, SOLUTION INTRAVENOUS at 04:38

## 2022-06-06 RX ADMIN — ATENOLOL 50 MG: 50 TABLET ORAL at 08:54

## 2022-06-06 RX ADMIN — METHYLPREDNISOLONE SODIUM SUCCINATE 40 MG: 40 INJECTION, POWDER, FOR SOLUTION INTRAMUSCULAR; INTRAVENOUS at 14:08

## 2022-06-06 ASSESSMENT — PAIN DESCRIPTION - LOCATION: LOCATION: GENERALIZED

## 2022-06-06 ASSESSMENT — PAIN SCALES - GENERAL
PAINLEVEL_OUTOF10: 0
PAINLEVEL_OUTOF10: 0

## 2022-06-06 ASSESSMENT — PAIN SCALES - WONG BAKER: WONGBAKER_NUMERICALRESPONSE: 0

## 2022-06-06 NOTE — PLAN OF CARE
Problem: Neurosensory - Adult  Goal: Achieves stable or improved neurological status  6/5/2022 2323 by Binu Wilde RN  Outcome: Progressing  Achieves stable or improved neurological status: Assess for and report changes in neurological status     Problem: Neurosensory - Adult  Goal: Absence of seizures  6/5/2022 2323 by Binu Wilde RN  Outcome: Progressing     Problem: Neurosensory - Adult  Goal: Remains free of injury related to seizures activity  6/5/2022 2323 by Binu Wilde RN  Outcome: Progressing     Problem: Cardiovascular - Adult  Goal: Maintains optimal cardiac output and hemodynamic stability  6/5/2022 2323 by Binu Wilde RN  Outcome: Progressing  Problem: Cardiovascular - Adult  Goal: Absence of cardiac dysrhythmias or at baseline  6/5/2022 2323 by Binu Wilde RN  Outcome: Progressing     Problem: Genitourinary - Adult  Goal: Urinary catheter remains patent  6/5/2022 2323 by Binu Wilde RN  Urinary catheter remains patent: Assess patency of urinary catheter  Problem: Nutrition Deficit:  Goal: Optimize nutritional status  6/5/2022 2323 by Binu Wilde RN  Outcome: Progressing     Problem: Genitourinary - Adult  Goal: Absence of urinary retention  6/5/2022 2323 by Binu Wilde RN  Outcome: Progressing     Maintains optimal cardiac output and hemodynamic stability: Monitor blood pressure and heart rate

## 2022-06-06 NOTE — CARE COORDINATION
Discharge planning    Per martina from [facility]. Patient meets criteria. When medically appropriate, he can be discharged to [facility]. Continue to follow.

## 2022-06-06 NOTE — PROGRESS NOTES
[address] Hospitalist        6/6/2022   3:05 PM    Name:  Emery Lal  MRN:    [id number]     Acct:     [de-identified]   Room:  [address] [place] [address] Day: 15     Admit Date: 5/25/2022 12:59 PM  PCP: DENNIS Szymanski    C/C:   Chief Complaint   Patient presents with    Seizures       Assessment:          · New onset seizure  · Altered mental status secondary to alcohol withdrawal delirium and postictal state following seizure  · Old infarct left frontal parietal lobes, new since 9/2021  · Old infarction left occipital lobe, increased since 9/2021  · 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 / Encephalomalacia   · Old lacunar infarcts left basal ganglia   · Mild brain parenchymal volume loss  · Alcohol dependence   · Reported right hemiparesis   · Essential hypertension  · Diabetes mellitus type 2  · Overweight   · Diverticulosis  · Agitation   · Delirium tremens     · Acute respiratory failure with hypoxia   · Oliguria  · Carotid artery disease         Plan:      · Patient currently in ICU  · O2 to maintain oxygen saturation greater than 92%  · BiPAP as needed  · Amiodarone  · DuoNebs  · Precedex gtt.   · Seizure precautions  · IV Solu-Medrol  · TPN  · Plan to start tube feeds today  · Add water 350 mL every 4 hour  · CIWA protocol  · Critical care on board  ·    · Waxing and waning mental status   · Neurology following, phenobarbital is discontinued  · Continue Vimpat  · Neurology following  · Zyprexa  · As needed Haldol  ·    · Monitor blood pressure  · Cardene drip, weaning  · IV Lopressor as needed  · Continue clonidine patch  · Nephrology on board  ·    · Vascular evaluated the the patient,   · Start physical therapyDVT and GI prophylaxis  · Continue Med as below      Scheduled Meds:   famotidine (PEPCID) injection  20 mg IntraVENous Daily    methylPREDNISolone  40 mg IntraVENous Q8H    lacosamide (VIMPAT) IVPB  150 mg IntraVENous BID    ipratropium-albuterol 1 ampule Inhalation TID    insulin lispro  0-18 Units SubCUTAneous Q6H    budesonide  0.5 mg Nebulization BID    enoxaparin  40 mg SubCUTAneous Daily    thiamine and folic acid IVPB   IntraVENous Daily    sodium chloride flush  10 mL IntraVENous Once    sodium chloride flush  5-40 mL IntraVENous 2 times per day    aspirin  81 mg Oral Daily    atorvastatin  40 mg Oral Nightly    clopidogrel  75 mg Oral Daily    amLODIPine  5 mg Oral Daily    atenolol  50 mg Oral Daily    losartan  100 mg Oral Daily     Continuous Infusions:   dexmedetomidine (PRECEDEX) IV infusion Stopped (06/02/22 1352)    sodium chloride Stopped (06/06/22 1008)    dextrose      niCARdipene (CARDENE) infusion Stopped (06/03/22 1112)     PRN Meds:  potassium chloride, 20 mEq, PRN   Or  potassium chloride, 10 mEq, PRN  haloperidol lactate, 5 mg, Q6H PRN  LORazepam, 1 mg, Q1H PRN   Or  LORazepam, 1 mg, Q1H PRN   Or  LORazepam, 2 mg, Q1H PRN   Or  LORazepam, 2 mg, Q1H PRN   Or  LORazepam, 3 mg, Q1H PRN   Or  LORazepam, 3 mg, Q1H PRN   Or  LORazepam, 4 mg, Q1H PRN   Or  LORazepam, 4 mg, Q1H PRN  albuterol, 2.5 mg, Q4H PRN  hydrALAZINE, 10 mg, Q6H PRN  sodium chloride flush, 5-40 mL, PRN  sodium chloride, , PRN  acetaminophen, 650 mg, Q4H PRN  ondansetron, 4 mg, Q8H PRN   Or  ondansetron, 4 mg, Q6H PRN  metoprolol, 5 mg, Q6H PRN  glucose, 4 tablet, PRN  dextrose bolus, 125 mL, PRN   Or  dextrose bolus, 250 mL, PRN  glucagon (rDNA), 1 mg, PRN  dextrose, 100 mL/hr, PRN            Subjective:     Patient seen examined at bedside  Patient remains in medical ICU,   Patient was hypoxic intermittently  Has required BiPAP during the day and night  Patient has been much more alert today  Off phenobarbitone  Presently on Vimpat  Informed by family patient drinks 1 large bottle of whiskey daily  Per family had last drink day before admission  Urine tox screen did not show any alcohol  Patient does require Ativan, though none today  Patient able to communicate appropriately  Denies any chest pain, cough  Denies vomiting, abdominal pain or diarrhea  Denies headache, neck pain or photophobia        ROS:  Other review of system negative    History of Present Illness:      Sarita Naranjo is a 73 y.o.  male who presents with Seizures     Pt admitted through the ED after an episode of seizure at home. Apparently pt lives at home with his fibarbarae. According to her, he was sitting in his chair yesterday morning when he developed a seizure. It is difficult to determine what kind of seizure it was. His fiancee is not at bedside right now. However it was confirmed that the pt has never had a similar episode before in his life. It is also reported that he wanted to stop alcohol use however she reported him having a beer the evening before. Urine drug screen has been reported unremarkable and ETOH level was zero. Alcohol withdrawal seizure was suspected though seemed less likely if pt had continued to drink alcohol. Pt apparently drinks 4 beers daily     Pt was admitted to the ICU where he continued to remain agitated. Higher and frequent use of Ativan and later Haldol were ineffective. Pt required soft restraints since he was unsafe for himself trying to get out of bed and trying to pull out Iv     Pt was hypertensive with BP > 200/140 which was not improved w prn meds and was started on Cardene infusion. However pt continued to remain agitated and it was suspected he may be in DTs. Pt was placed on the CIWA protocol and was started on IV fluids and Thiamine, Folate     ROS is difficult to obtain presently     I have personally reviewed the past medical history, past surgical history, medications, social history, and family history, and summarized in the note.         Physical Examination:      Vitals:  BP (!) 156/61   Pulse 68   Temp 97.5 °F (36.4 °C) (Temporal)   Resp 27   Ht 5' 6\" (1.676 m)   Wt 186 lb 5 oz (84.5 kg)   SpO2 96%   BMI 30.07 kg/m²   Temp (24hrs), Av.4 °F (36.3 °C), Min:97.1 °F (36.2 °C), Max:98 °F (36.7 °C)    Weight:   Wt Readings from Last 3 Encounters:   22 186 lb 5 oz (84.5 kg)   21 156 lb (70.8 kg)   21 174 lb 4.8 oz (79.1 kg)     I/O last 3 completed shifts:  I/O last 3 completed shifts: In: 5247.2 [I.V.:1801.4; NG/GT:3317; IV Piggyback:128.7]  Out: 3100 [Urine:3100]     Recent Labs     22  1739 22  2352 22  0602 22  1108   POCGLU 259* 272* 273* 269*         General appearance -lethargic, improved since yesterday. BiPAP  Mental status - oriented to person, place, and time with normal affect  Head - normocephalic and atraumatic  Eyes - pupils equal and reactive, extraocular eye movements intact, conjunctiva clear  Ears - hearing appears to be intact  Nose - no drainage noted  Mouth - mucous membranes moist  Neck - supple, no carotid bruits, thyroid not palpable  Chest - clear to auscultation, normal effort  Heart - normal rate, regular rhythm, no murmur  Abdomen - soft, nontender, nondistended, bowel sounds present all four quadrants, no masses, hepatomegaly or splenomegaly  Neurological - normal speech, no focal findings or movement disorder noted, cranial nerves II through XII grossly intact  Extremities - peripheral pulses palpable, no pedal edema or calf pain with palpation. Edema bilateral hands  Skin - no gross lesions, rashes, or induration noted        Medications:      Allergies: No Known Allergies    Current Meds:   Current Facility-Administered Medications:     famotidine (PEPCID) 20 mg in sodium chloride (PF) 10 mL injection, 20 mg, IntraVENous, Daily, Blanca Camara MD, 20 mg at 22 0850    methylPREDNISolone sodium (SOLU-MEDROL) injection 40 mg, 40 mg, IntraVENous, Q8H, Clair Gifford MD, 40 mg at 22 1408    lacosamide (VIMPAT) 150 mg in sodium chloride 0.9 % 65 mL IVPB, 150 mg, IntraVENous, BID, Destiny Francois MD, Stopped at 22 0720    ipratropium-albuterol (DUONEB) nebulizer solution 1 ampule, 1 ampule, Inhalation, TID, Ken Salas MD, 1 ampule at 06/06/22 1411    potassium chloride 20 mEq/50 mL IVPB (Central Line), 20 mEq, IntraVENous, PRN, Stopped at 05/31/22 1445 **OR** potassium chloride 10 mEq/100 mL IVPB (Peripheral Line), 10 mEq, IntraVENous, PRN, Ken Salas MD    dexmedetomidine (PRECEDEX) 1,000 mcg in sodium chloride 0.9 % 250 mL infusion, 0.1-1.5 mcg/kg/hr, IntraVENous, Continuous, Shay Hawkins MD, Stopped at 06/02/22 1352    insulin lispro (HUMALOG) injection vial 0-18 Units, 0-18 Units, SubCUTAneous, Q6H, Shay Hawkins MD, 9 Units at 06/06/22 1243    budesonide (PULMICORT) nebulizer suspension 500 mcg, 0.5 mg, Nebulization, BID, [facility] APRN - CNP, 500 mcg at 06/06/22 0132    enoxaparin (LOVENOX) injection 40 mg, 40 mg, SubCUTAneous, Daily, Celestina [place], APRN - CNP, 40 mg at 06/06/22 8725    haloperidol lactate (HALDOL) injection 5 mg, 5 mg, IntraMUSCular, Q6H PRN, Shay Hawkins MD, 5 mg at 06/02/22 2237    LORazepam (ATIVAN) tablet 1 mg, 1 mg, Oral, Q1H PRN **OR** LORazepam (ATIVAN) injection 1 mg, 1 mg, IntraVENous, Q1H PRN, 1 mg at 06/01/22 1107 **OR** LORazepam (ATIVAN) tablet 2 mg, 2 mg, Oral, Q1H PRN **OR** LORazepam (ATIVAN) injection 2 mg, 2 mg, IntraVENous, Q1H PRN, 2 mg at 06/02/22 2144 **OR** LORazepam (ATIVAN) tablet 3 mg, 3 mg, Oral, Q1H PRN **OR** LORazepam (ATIVAN) injection 3 mg, 3 mg, IntraVENous, Q1H PRN, 3 mg at 06/02/22 0956 **OR** LORazepam (ATIVAN) tablet 4 mg, 4 mg, Oral, Q1H PRN **OR** LORazepam (ATIVAN) injection 4 mg, 4 mg, IntraVENous, Q1H PRN, Shay Hawkins MD, 4 mg at 95/85/00 3413    folic acid 1 mg, thiamine (B-1) 100 mg in sodium chloride 0.9 % 50 mL IVPB, , IntraVENous, Daily, Shay Hawkins MD, Stopped at 06/06/22 0916    albuterol (PROVENTIL) nebulizer solution 2.5 mg, 2.5 mg, Nebulization, Q4H PRN, Ken Salas MD    hydrALAZINE (APRESOLINE) injection 10 mg, 10 mg, IntraVENous, Q6H PRN, Juliana Paulson MD, 10 mg at 06/06/22 0107    sodium chloride flush 0.9 % injection 10 mL, 10 mL, IntraVENous, Once, Andre Billings MD    sodium chloride flush 0.9 % injection 5-40 mL, 5-40 mL, IntraVENous, 2 times per day, Jack Foote MD, 20 mL at 06/06/22 0850    sodium chloride flush 0.9 % injection 5-40 mL, 5-40 mL, IntraVENous, PRN, Jack Foote MD, 10 mL at 06/05/22 1257    0.9 % sodium chloride infusion, , IntraVENous, PRN, Jack Foote MD, Stopped at 06/06/22 1008    acetaminophen (TYLENOL) tablet 650 mg, 650 mg, Oral, Q4H PRN, Shay Hawkins MD, 650 mg at 06/04/22 0041    ondansetron (ZOFRAN-ODT) disintegrating tablet 4 mg, 4 mg, Oral, Q8H PRN **OR** ondansetron (ZOFRAN) injection 4 mg, 4 mg, IntraVENous, Q6H PRN, Shay Hawkins MD    metoprolol (LOPRESSOR) injection 5 mg, 5 mg, IntraVENous, Q6H PRN, Shay Hawkins MD, 5 mg at 06/06/22 0438    glucose chewable tablet 16 g, 4 tablet, Oral, PRN, Shay Hawkins MD    dextrose bolus 10% 125 mL, 125 mL, IntraVENous, PRN **OR** dextrose bolus 10% 250 mL, 250 mL, IntraVENous, PRN, Shay Hawkins MD    glucagon (rDNA) injection 1 mg, 1 mg, IntraMUSCular, PRN, Shay Hawkins MD    dextrose 5 % solution, 100 mL/hr, IntraVENous, PRN, Shay Hawkins MD    aspirin chewable tablet 81 mg, 81 mg, Oral, Daily, Shay Hawkins MD, 81 mg at 06/06/22 0853    atorvastatin (LIPITOR) tablet 40 mg, 40 mg, Oral, Nightly, Shay Hawkins MD, 40 mg at 06/05/22 2021    clopidogrel (PLAVIX) tablet 75 mg, 75 mg, Oral, Daily, Shay Hawkins MD, 75 mg at 06/06/22 0854    amLODIPine (NORVASC) tablet 5 mg, 5 mg, Oral, Daily, Matt Dhillon MD, 5 mg at 06/06/22 0854    atenolol (TENORMIN) tablet 50 mg, 50 mg, Oral, Daily, Matt Dhillon MD, 50 mg at 06/06/22 0854    losartan (COZAAR) tablet 100 mg, 100 mg, Oral, Daily, Matt Dhillon MD, 100 mg at 06/06/22 0854    niCARdipine (CARDENE) 50 mg in dextrose 5 % 250 mL infusion, 2.5-15 mg/hr, IntraVENous, Continuous, Abeba Lyons MD, Paused at 06/03/22 1112      I/O (24Hr):     Intake/Output Summary (Last 24 hours) at 6/6/2022 1505  Last data filed at 6/6/2022 1247  Gross per 24 hour   Intake 2254.35 ml   Output 1975 ml   Net 279.35 ml       Data:           Labs:    Hematology:  Recent Labs     06/04/22 0345 06/05/22 0415 06/06/22 0345   WBC 11.2 15.7* 16.5*   RBC 3.49* 3.78* 3.73*   HGB 11.5* 11.9* 11.9*   HCT 35.3* 36.2* 36.6*   .1 95.8 98.1   MCH 33.0 31.5 31.9   MCHC 32.6 32.9 32.5   RDW 13.3 13.0 13.0    232 208   MPV 10.5 10.5 10.9     Chemistry:  Recent Labs     06/04/22 0345 06/05/22 0415 06/05/22 1713 06/06/22 0345    137  --  136   K 4.0 4.2  --  4.5    104  --  104   CO2 24 23  --  24   GLUCOSE 289* 229*  --  293*   BUN 57* 51*  --  59*   CREATININE 1.61* 1.36*  --  1.44*   ANIONGAP 10 10  --  8*   LABGLOM 43* 52*  --  49*   GFRAA 52* >60  --  59*   CALCIUM 8.3* 7.8*  --  8.1*   PHOS  --   --   --  3.1   PROBNP  --   --  2,280*  --      Recent Labs     06/05/22  0513 06/05/22  1150 06/05/22  1713 06/05/22  1739 06/05/22  2352 06/06/22  0602 06/06/22  1108   PROT  --   --  4.9*  --   --   --   --    LABALBU  --   --  2.5*  --   --   --   --    AST  --   --  29  --   --   --   --    ALT  --   --  54*  --   --   --   --    ALKPHOS  --   --  60  --   --   --   --    BILITOT  --   --  0.14*  --   --   --   --    BILIDIR  --   --  <0.08  --   --   --   --    AMMONIA  --   --  26  --   --   --   --    POCGLU 235* 294*  --  259* 272* 273* 269*       Lab Results   Component Value Date/Time    SPECIAL NOT REPORTED 02/26/2021 09:30 AM     Lab Results   Component Value Date/Time    CULTURE NO GROWTH 3 DAYS 02/26/2021 09:30 AM       Lab Results   Component Value Date    POCPH 7.430 06/04/2022    POCPCO2 35.2 06/04/2022    POCPO2 79.7 06/04/2022    POCHCO3 23.4 06/04/2022    NBEA 1 06/04/2022    PBEA 0 05/27/2022    OVFL3YNJ 96 06/04/2022    FIO2 30.0 06/04/2022       Radiology:    XR CHEST (SINGLE VIEW FRONTAL)    Result Date: 5/26/2022  Mild hypoventilatory changes noted both lung bases. CT HEAD WO CONTRAST    Result Date: 5/25/2022  No acute intracranial hemorrhage or abnormal extra-axial collection. Relative to 9 months prior there are increased areas of encephalomalacia in the left occipital lobe and new areas of encephalomalacia in the left parietal lobe. If there is persistent clinical concern for acute on chronic ischemia, MRI is more sensitive. XR CHEST PORTABLE    Result Date: 5/31/2022  Stable support line interval increase in now moderate left basilar opacity likely related to combined pleural-parenchymal process, pneumonia the likely etiology     XR CHEST PORTABLE    Result Date: 5/30/2022  Elevated left hemidiaphragm with apparent atelectasis left base. Slight improvement in right basilar opacity which may represent resolving atelectasis or improved minimal airspace disease. XR CHEST PORTABLE    Result Date: 5/29/2022  Interval developing right basilar opacity which may be related atelectasis or developing focal airspace disease. XR CHEST PORTABLE    Result Date: 5/28/2022  No significant finding in the chest.     CT CHEST ABDOMEN PELVIS W CONTRAST    Result Date: 5/25/2022  Small cysts noted in the liver. Diverticular disease of the colon without diverticulitis. Significant thickening of the bladder wall, in keeping with muscular hypertrophy or cystitis. Clinical correlation suggested. Degenerative changes demonstrated in the the thoracic and lumbar spine. RECOMMENDATIONS:     MRI BRAIN W WO CONTRAST    Result Date: 5/25/2022  No acute intracranial abnormality. No mass effect or abnormal intracranial enhancement. Old infarctions in the left frontal parietal lobes, new since September 21, 2021. Old infarction in the left occipital lobe, likely increased in size since September 21, 2021. Laminar necrosis and gliosis associated with the old infarctions.  Old lacunar infarcts in the left basal ganglia, likely increased. Mild parenchymal volume loss. All radiological studies reviewed  Code Status:  Full Code        Electronically signed by Jack Foote MD on 6/6/2022 at 3:05 PM    This note was created with the assistance of a speech-recognition program.  Although the intention is to generate a document that actually reflects the content of the visit, no guarantees can be provided that every mistake has been identified and corrected by editing. Note was updated later by me after  physical examination and  completion of the assessment.

## 2022-06-06 NOTE — PROGRESS NOTES
NEUROLOGY INPATIENT PROGRESS NOTE    6/6/2022         Subjective: Neena Sommer is a  78 y.o. male admitted on 5/25/2022 with Seizure [facility]) [R56.9]  New onset seizure ([facility] [address] [R56.9]      Briefly, this is a  78 y.o. male with hx of HTN, DM and prior CVA with residual Rt sided weakness was admitted on 5/25/2022 with seizure activity likely alcohol withdrawal vs new onset seizure disorder. EEG did not reveal any epileptiform discharges and any electrographic seizure activity. No seizures noted during hospitalization. His hospitalization course was complicated by continued delirium and agitation and he difficulties weaning from sedation. But most recent EEG did not show any seizure activity. He has received Ativan under CIWA protocol. Phenobarbital 65 bid was discontinued for concern of oversedation. Patient was initially treated with Keppra but it was switched to Vimpat. 6/6/2022: Patient is alert and awake and following commands. Caregivers at bedside stated that he seems to be much more alert and awake today compared to yesterday. Patient has not had any witnessed seizure activity since admitted. No current facility-administered medications on file prior to encounter.      Current Outpatient Medications on File Prior to Encounter   Medication Sig Dispense Refill    clopidogrel (PLAVIX) 75 MG tablet Take 1 tablet by mouth daily for 21 days 21 tablet 0    aspirin 81 MG chewable tablet Take 1 tablet by mouth daily 30 tablet 3    metFORMIN (GLUCOPHAGE) 500 MG tablet Take 1 tablet by mouth 2 times daily (with meals) 60 tablet 3    atorvastatin (LIPITOR) 40 MG tablet Take 1 tablet by mouth nightly 30 tablet 3    amLODIPine (NORVASC) 5 MG tablet Take 1 tablet by mouth daily 30 tablet 3    PARoxetine (PAXIL) 40 MG tablet Take 40 mg by mouth daily      atenolol (TENORMIN) 50 MG tablet Take 50 mg by mouth daily      losartan (COZAAR) 100 MG tablet Take 100 mg by mouth daily         Allergies: Tarri Decant Systolic (07STA), IK , Min:129 , BSR:118   ; Diastolic (53TXN), VGY:87, Min:54, Max:146          NEUROLOGIC EXAMINATION  GENERAL  Appears comfortable and in no distress   HEENT  NC/ AT   cardiovascular  S1 and S2 heard; palpation of pulses: radial pulse    NECK  Supple and no bruits heard   MENTAL STATUS:  Alert, oriented x self and place and month; mumbling but following simple commands. Appears to be exhausted for thorough detailed cognitive examination but followed one step commands with delayed responses. CRANIAL NERVES: II     -      PERRLA; blinks to threat bilaterally. III,IV,VI -  EOMs full, no afferent defect, no ptosis  V     -     Normal facial sensation   VII    -     Normal facial symmetry  VIII   -     Intact hearing   IX,X -     Symmetrical palate  XI    -     Symmetrical shoulder shrug  XII   -     Midline tongue, no atrophy    MOTOR FUNCTION:  significant for weakness of grade 4/5 in right upper and right lower extremities and 4+/5 in left upper and left lower extremities with normal bulk, normal tone and no abnormal involuntary movements. SENSORY FUNCTION:  Intact to light touch and pinprick in all 4 extremities.      CEREBELLAR FUNCTION:  No ataxia noted   REFLEX FUNCTION:  Symmetric 1+ DTRs in UE and 3+ patellar reflexes and 1+ ankle jerks and bilateral equivocal plantars   STATION and GAIT  Not tested         Data:    Lab Results:   CBC:   Recent Labs     22  0345 22  0415 22   WBC 11.2 15.7* 16.5*   HGB 11.5* 11.9* 11.9*    232 208     BMP:    Recent Labs     22  0345 22  0415 22  0345    137 136   K 4.0 4.2 4.5    104 104   CO2 24 23 24   BUN 57* 51* 59*   CREATININE 1.61* 1.36* 1.44*   GLUCOSE 289* 229* 293*         Lab Results   Component Value Date    CHOL 185 2021    LDLCHOLESTEROL 79 2021    HDL 87 2021    TRIG 146 2022    ALT 54 (H) 2022    AST 29 2022    INR 1.1 2021 LABA1C 6.3 (H) 05/30/2022    LABMICR 457 (H) 06/03/2022     MRI brain (with/without) 5/25/2022: No acute intracranial abnormality. No mass-effect or abnormal enhancement. Old infarctions in left frontal parietal lobes &  left occipital lobe. EEG (5/31/22): abnormal EEG due to mild diffuse slowing. No evidence of epileptiform activity is noted. These findings are consistent with a mild diffuse encephalopathy. This is a non-specific finding and can be seen in toxic, metabolic and parainfectious processes. Impression and Plan: Mr. Seble Bauer is a 78 y.o. male with   Possible new onset seizure disorder in the setting of multiple prior ischemic strokes; seizure could also be related to alcohol withdrawal; most recent EEG did not reveal any ongoing seizure activity; continue Vimpat 150 mg bid; continue seizure precautions  Cerebrovascular disease with multiple prior ischemic strokes; right TOOTIE 50-69% stenosis; MRI with no new infarcts; hx of Lt CEA; vascular surgery consulted; on dual antiplatelet therapy with aspirin 81 qd and Plavix 75 qd and atorvastatin 40 mg nightly for secondary stroke prophylaxis. Will follow with you.           Jass Welsh MD 6/6/2022 12:00 PM

## 2022-06-06 NOTE — PROGRESS NOTES
Occupational Therapy  DATE: 2022    NAME: Sharmila Monroy  MRN: [id number]   : 1955    Patient not seen this date for Occupational Therapy due to:      [x] Cancel by RN or physician due to: (CX) per RN Lyn Fernando as pt is still not appropriate for therapy/still coming off increased sedation; continue to follow    [] Hemodialysis    [] Critical Lab Value Level     [] Blood transfusion in progress    [] Acute or unstable cardiovascular status   _MAP < 55 or more than >115  _HR < 40 or > 130    [] Acute or unstable pulmonary status   -FiO2 > 60%   _RR < 5 or >40    _O2 sats < 85%    [] Strict Bedrest    [] Off Unit for surgery or procedure    [] Off Unit for testing       [] Pending imaging to R/O fracture    [] Refusal by Patient      [] Other      [] PT being discontinued at this time. Patient independent. No further needs. [] PT being discontinued at this time as the patient has been transferred to hospice care. No further needs.       Melany Kaminski, OT

## 2022-06-06 NOTE — PROGRESS NOTES
Section of Cardiology  Progress Note      Date:  6/6/2022  Patient: Sarita Naranjo  Admission:  5/25/2022 12:59 PM  Admit DX: Seizure ([facility] [address] [R56.9]  New onset seizure ([facility] [address] [R56.9]  Age:  78 y.o., 1955     LOS: 12 days     Reason for evaluation:   Hypertensive crisis      SUBJECTIVE:     The patient was seen and examined. Notes and labs reviewed. There were not complications over night. Patient seen and examined in his room with his primary attending as well as his nurse at bedside. Remains confused but stable. No arrhythmia overnight. Blood pressure remained acceptable. He is able to receive oral medications. OBJECTIVE:    Telemetry: Sinus  BP (!) 149/62   Pulse 71   Temp 97.5 °F (36.4 °C) (Temporal)   Resp 27   Ht 5' 6\" (1.676 m)   Wt 186 lb 5 oz (84.5 kg)   SpO2 96%   BMI 30.07 kg/m²     Intake/Output Summary (Last 24 hours) at 6/6/2022 1634  Last data filed at 6/6/2022 1247  Gross per 24 hour   Intake 2254.35 ml   Output 1975 ml   Net 279.35 ml       EXAM:   CONSTITUTIONAL:  awake, alert, cooperative, no apparent distress, and appears stated age. HEENT: Normal jugular venous pulsations, no carotid bruits. Head is atraumatic, normocephalic. Eyes and oral mucosa are normal.  LUNGS: Good respiratory effort. No for increased work of breathing. On auscultation: clear to auscultation bilaterally  CARDIOVASCULAR:  Normal apical impulse, regular rate and rhythm, normal S1 and S2, no S3 or S4,   ABDOMEN: Soft, nontender, nondistended. Bowel sounds present. SKIN: Warm and dry. EXTREMITIES: No lower extremities edema. Motor movement grossly intact. No cyanosis or clubbing.     Current Inpatient Medications:   nystatin  5 mL Oral TID    famotidine (PEPCID) injection  20 mg IntraVENous Daily    methylPREDNISolone  40 mg IntraVENous Q8H    lacosamide (VIMPAT) IVPB  150 mg IntraVENous BID    ipratropium-albuterol  1 ampule Inhalation TID    insulin lispro  0-18 Units SubCUTAneous Q6H    budesonide  0.5 mg Nebulization BID    enoxaparin  40 mg SubCUTAneous Daily    thiamine and folic acid IVPB   IntraVENous Daily    sodium chloride flush  10 mL IntraVENous Once    sodium chloride flush  5-40 mL IntraVENous 2 times per day    aspirin  81 mg Oral Daily    atorvastatin  40 mg Oral Nightly    clopidogrel  75 mg Oral Daily    amLODIPine  5 mg Oral Daily    atenolol  50 mg Oral Daily    losartan  100 mg Oral Daily       IV Infusions (if any):   dexmedetomidine (PRECEDEX) IV infusion Stopped (06/02/22 1352)    sodium chloride Stopped (06/06/22 1008)    dextrose      niCARdipene (CARDENE) infusion Stopped (06/03/22 1112)       Diagnostics:   EKG: . ECHO: reviewed. From last month echo  Ejection fraction: %  Stress Test: not obtained. Cardiac Angiography: not obtained. Labs:   CBC:   Recent Labs     06/05/22 0415 06/06/22  0345   WBC 15.7* 16.5*   HGB 11.9* 11.9*   HCT 36.2* 36.6*    208     BMP:   Recent Labs     06/05/22 0415 06/06/22 0345    136   K 4.2 4.5   CO2 23 24   BUN 51* 59*   CREATININE 1.36* 1.44*   LABGLOM 52* 49*   GLUCOSE 229* 293*     No results found for: BNP  PT/INR: No results for input(s): PROTIME, INR in the last 72 hours. APTT:No results for input(s): APTT in the last 72 hours. CARDIAC ENZYMES:No results for input(s): CKTOTAL, CKMB, CKMBINDEX, TROPONINT in the last 72 hours.   FASTING LIPID PANEL:  Lab Results   Component Value Date    HDL 87 02/25/2021    TRIG 146 05/30/2022     LIVER PROFILE:  Recent Labs     06/05/22  1713   AST 29   ALT 54*   LABALBU 2.5*       ASSESSMENT:  · Hypertensive Urgency, blood pressure is better on multiple medications   · low normal LV systolic function on echocardiogram done May 26, 2022  · Hx of CVA  · New onset Seizures, managed by others  · History of alcohol abuse   · altered Mentation - managed by others  · Acute Kidney Injury    Patient Active Problem List   Diagnosis    ADRIANA (acute kidney injury) ([facility] [address]    Acute ischemic left PCA stroke (HCC)    Right sided weakness    Seizure (HCC)       PLAN:    1. Stable cardiac status. 2. No clinical CHF  3. Continue current management and will see on as-needed basis      Please see orders. Discussed with  and nursing.     Gualberto Spicer MD, MD

## 2022-06-06 NOTE — PLAN OF CARE
Problem: Discharge Planning  Goal: Discharge to home or other facility with appropriate resources  Outcome: Progressing       Problem: Safety - Medical Restraint  Goal: Remains free of injury from restraints (Restraint for Interference with Medical Device)  Description: INTERVENTIONS:  1. Determine that other, less restrictive measures have been tried or would not be effective before applying the restraint  2. Evaluate the patient's condition at the time of restraint application  3. Inform patient/family regarding the reason for restraint  4.  Q2H: Monitor safety, psychosocial status, comfort, nutrition and hydration  Outcome: Progressing     Problem: Neurosensory - Adult  Goal: Achieves stable or improved neurological status  6/6/2022 1311 by Karlo Herrera RN  Outcome: Progressing  Flowsheets (Taken 6/6/2022 0800)  Achieves stable or improved neurological status: Assess for and report changes in neurological status     Problem: Neurosensory - Adult  Goal: Absence of seizures  6/6/2022 1311 by Karlo Herrera RN  Outcome: Progressing  Flowsheets (Taken 6/6/2022 0800)  Absence of seizures: Support airway/breathing, administer oxygen as needed     Problem: Neurosensory - Adult  Goal: Remains free of injury related to seizures activity  6/6/2022 1311 by Karlo Herrera RN  Outcome: Progressing  Flowsheets (Taken 6/6/2022 0800)  Remains free of injury related to seizure activity: Maintain airway, patient safety  and administer oxygen as ordered     Problem: Neurosensory - Adult  Goal: Achieves maximal functionality and self care  Outcome: Progressing  Flowsheets (Taken 6/6/2022 0800)  Achieves maximal functionality and self care: Monitor swallowing and airway patency with patient fatigue and changes in neurological status     Problem: Respiratory - Adult  Goal: Achieves optimal ventilation and oxygenation  Outcome: Progressing     Problem: Cardiovascular - Adult  Goal: Maintains optimal cardiac output and hemodynamic stability  6/6/2022 1311 by Debra Benitez RN  Outcome: Progressing    Problem: Cardiovascular - Adult  Goal: Absence of cardiac dysrhythmias or at baseline  6/6/2022 1311 by Debra Benitez RN  Outcome: Progressing  Flowsheets (Taken 6/6/2022 0800)  Absence of cardiac dysrhythmias or at baseline: Monitor cardiac rate and rhythm     Problem: Skin/Tissue Integrity - Adult  Goal: Skin integrity remains intact  6/6/2022 1311 by Debra Benitez RN  Outcome: Progressing  Flowsheets (Taken 6/6/2022 0800)  Skin Integrity Remains Intact: Monitor for areas of redness and/or skin breakdown     Problem: Skin/Tissue Integrity - Adult  Goal: Incisions, wounds, or drain sites healing without S/S of infection  Outcome: Progressing     Problem: Skin/Tissue Integrity - Adult  Goal: Oral mucous membranes remain intact  Outcome: Progressing  Flowsheets (Taken 6/6/2022 0800)  Oral Mucous Membranes Remain Intact:   Assess oral mucosa and hygiene practices   Implement preventative oral hygiene regimen    Problem: Metabolic/Fluid and Electrolytes - Adult  Goal: Electrolytes maintained within normal limits  Outcome: Progressing  Goal: Hemodynamic stability and optimal renal function maintained  Outcome: Progressing  Goal: Glucose maintained within prescribed range  Outcome: Progressing     Problem: Musculoskeletal - Adult  Goal: Return mobility to safest level of function  Outcome: Progressing     Problem: Genitourinary - Adult  Goal: Absence of urinary retention  6/6/2022 1311 by Debra Benitez RN  Outcome: Progressing  Flowsheets (Taken 6/6/2022 0800)  Absence of urinary retention: Monitor intake/output and perform bladder scan as needed     Problem: Genitourinary - Adult  Goal: Urinary catheter remains patent  6/6/2022 1311 by Debra Benitez RN  Outcome: Progressing  Flowsheets (Taken 6/6/2022 0800)  Urinary catheter remains patent: Assess patency of urinary catheter     Problem: Anxiety  Goal: Will report anxiety at manageable levels  Description: INTERVENTIONS:  1. Administer medication as ordered  2. Teach and rehearse alternative coping skills  3. Provide emotional support with 1:1 interaction with staff  Outcome: Progressing  Flowsheets (Taken 6/6/2022 0800)  Will report anxiety at manageable levels: Administer medication as ordered     Problem: Confusion  Goal: Confusion, delirium, dementia, or psychosis is improved or at baseline  Description: INTERVENTIONS:  1. Assess for possible contributors to thought disturbance, including medications, impaired vision or hearing, underlying metabolic abnormalities, dehydration, psychiatric diagnoses, and notify attending LIP  2. [place] high risk fall precautions, as indicated  3. Provide frequent short contacts to provide reality reorientation, refocusing and direction  4. Decrease environmental stimuli, including noise as appropriate  5. Monitor and intervene to maintain adequate nutrition, hydration, elimination, sleep and activity  6. If unable to ensure safety without constant attention obtain sitter and review sitter guidelines with assigned personnel  7. Initiate Psychosocial CNS and Spiritual Care consult, as indicated  Outcome: Progressing  Flowsheets (Taken 6/6/2022 0800)  Effect of thought disturbance (confusion, delirium, dementia, or psychosis) are managed with adequate functional status:   Assess for contributors to thought disturbance, including medications, impaired vision or hearing, underlying metabolic abnormalities, dehydration, psychiatric diagnoses, notify LIP   Decrease environmental stimuli, including noise as appropriate   Monitor and intervene to maintain adequate nutrition, hydration, elimination, sleep and activity     Problem: Behavior  Goal: Pt/Family maintain appropriate behavior and adhere to behavioral management agreement, if implemented  Description: INTERVENTIONS:  1.  Assess patient/family's coping skills and  non-compliant behavior (including use of illegal substances)  2. Notify security of behavior or suspected illegal substances which indicate the need for search of the patient and/or belongings  3. Encourage verbalization of thoughts and concerns in a socially appropriate manner  4. Utilize positive, consistent limit setting strategies supporting safety of patient, staff and others  5. Encourage participation in the decision making process about the behavioral management agreement  6. Implement a Health Care Agreement if patient meets criteria  7. If a patient's behavior jeopardizes the safety of the patient, staff, or others refer to organization policy. If a visitor's behavior poses a threat to safety call refer to organization policy.   8. Initiate consult with , Psychosocial CNS, Spiritual Care as appropriate  Outcome: Progressing  Flowsheets (Taken 6/6/2022 0800)  Patient/family maintains appropriate behavior and adheres to behavioral management agreement, if implemented: Assess patient/familys coping skills and  non-compliant behavior (including use of illegal substances)     Problem: Chronic Conditions and Co-morbidities  Goal: Patient's chronic conditions and co-morbidity symptoms are monitored and maintained or improved  Outcome: Progressing     Problem: Pain  Goal: Verbalizes/displays adequate comfort level or baseline comfort level  6/6/2022 1311 by Reuben Lewis, RN  Outcome: Progressing     Problem: ABCDS Injury Assessment  Goal: Absence of physical injury  Outcome: Progressing     Problem: Nutrition Deficit:  Goal: Optimize nutritional status  6/6/2022 1311 by Reuben Lewis, RN  Outcome: Progressing

## 2022-06-06 NOTE — PROGRESS NOTES
Upon repositioning, pt is able to open eyes and follow commands. Pt stated he is in the hospital and stated year \"22\". Speech is still incomprehensible/slurred. Pt was educated to rest as much as possible.      Will continue to monitor for changes in neuro status and VS.

## 2022-06-06 NOTE — PROGRESS NOTES
Upon entering pts room at beginning of shift noticed pts ng had been partially pulled. Replaced to 70 where it had been placed originally. Chest xray ordered for confirmation of potison. Tube feed remains off.

## 2022-06-06 NOTE — PROGRESS NOTES
Physical Therapy  DATE: 2022    NAME: Chantel Metcalf  MRN: [id number]   : 1955    Patient not seen this date for Physical Therapy due to:      [x] Cancel by RN or physician due to: VIPIN Perez reporting pt currently not appropriate for therapy this date. Pt coming out of sedation & unable to follow commands at this time. RN anticipates pt may be more appropriate tomorrow. PT will continue to follow and ck back as able. [] Hemodialysis    [] Critical Lab Value Level     [] Blood transfusion in progress    [] Acute or unstable cardiovascular status   _MAP < 55 or more than >115  _HR < 40 or > 130    [] Acute or unstable pulmonary status   -FiO2 > 60%   _RR < 5 or >40    _O2 sats < 85%    [] Strict Bedrest    [] Off Unit for surgery or procedure    [] Off Unit for testing       [] Pending imaging to R/O fracture    [] Refusal by Patient      [] Other      [] PT being discontinued at this time. Patient independent. No further needs. [] PT being discontinued at this time as the patient has been transferred to hospice care. No further needs.       Luca Shen, PT

## 2022-06-06 NOTE — RT PROTOCOL NOTE
RT Inhaler-Nebulizer Bronchodilator Protocol Note    There is a bronchodilator order in the chart from a provider indicating to follow the RT Bronchodilator Protocol and there is an Initiate RT Inhaler-Nebulizer Bronchodilator Protocol order as well (see protocol at bottom of note). CXR Findings:  XR CHEST PORTABLE    Result Date: 6/5/2022  Increasing bibasilar pulmonary opacities, concerning for pneumonia. Small left pleural effusion. The findings from the last RT Protocol Assessment were as follows:   History Pulmonary Disease: Chronic pulmonary disease  Respiratory Pattern: Dyspnea on exertion or RR 21-25 bpm  Breath Sounds: Slightly diminished and/or crackles  Cough: Weak, productive  Indication for Bronchodilator Therapy: Decreased or absent breath sounds  Bronchodilator Assessment Score: 8    Aerosolized bronchodilator medication orders have been revised according to the RT Inhaler-Nebulizer Bronchodilator Protocol below. Respiratory Therapist to perform RT Therapy Protocol Assessment initially then follow the protocol. Repeat RT Therapy Protocol Assessment PRN for score 0-3 or on second treatment, BID, and PRN for scores above 3. No Indications - adjust the frequency to every 6 hours PRN wheezing or bronchospasm, if no treatments needed after 48 hours then discontinue using Per Protocol order mode. If indication present, adjust the RT bronchodilator orders based on the Bronchodilator Assessment Score as indicated below. Use Inhaler orders unless patient has one or more of the following: on home nebulizer, not able to hold breath for 10 seconds, is not alert and oriented, cannot activate and use MDI correctly, or respiratory rate 25 breaths per minute or more, then use the equivalent nebulizer order(s) with same Frequency and PRN reasons based on the score. If a patient is on this medication at home then do not decrease Frequency below that used at home.     0-3 - enter or revise RT bronchodilator order(s) to equivalent RT Bronchodilator order with Frequency of every 4 hours PRN for wheezing or increased work of breathing using Per Protocol order mode. 4-6 - enter or revise RT Bronchodilator order(s) to two equivalent RT bronchodilator orders with one order with BID Frequency and one order with Frequency of every 4 hours PRN wheezing or increased work of breathing using Per Protocol order mode. 7-10 - enter or revise RT Bronchodilator order(s) to two equivalent RT bronchodilator orders with one order with TID Frequency and one order with Frequency of every 4 hours PRN wheezing or increased work of breathing using Per Protocol order mode. 11-13 - enter or revise RT Bronchodilator order(s) to one equivalent RT bronchodilator order with QID Frequency and an Albuterol order with Frequency of every 4 hours PRN wheezing or increased work of breathing using Per Protocol order mode. Greater than 13 - enter or revise RT Bronchodilator order(s) to one equivalent RT bronchodilator order with every 4 hours Frequency and an Albuterol order with Frequency of every 2 hours PRN wheezing or increased work of breathing using Per Protocol order mode. RT to enter RT Home Evaluation for COPD & MDI Assessment order using Per Protocol order mode.     Electronically signed by Linnea Gandhi RCP on 6/5/2022 at 10:07 PM

## 2022-06-06 NOTE — PROGRESS NOTES
Pulmonary Critical Care Progress Note       Patient seen for the follow up of hypoxia,  Seizure ([address]     Subjective:  Patient is more interactive this morning following commands but still confused. He was more awake and alert. I stopped his clonidine. He was on 4 L oxygen. He w he has been tolerating BiPAP. Ilsaus Brody He has been restarted on tube feeds. He has been off Precedex. Examination:  Vitals: BP (!) 149/62   Pulse 72   Temp 97.5 °F (36.4 °C) (Temporal)   Resp 27   Ht 5' 6\" (1.676 m)   Wt 186 lb 5 oz (84.5 kg)   SpO2 96%   BMI 30.07 kg/m²   General appearance:   Sleepy arousable follows commands  Neck: No JVD  Lungs:  Decreased breath sound mild crackles  Heart: regular rate and rhythm, S1, S2 normal, no gallop  Abdomen: Soft, non tender, + BS  Extremities: no cyanosis or clubbing. No significant edema    LABs:  CBC:   Recent Labs     06/05/22  0415 06/06/22  0345   WBC 15.7* 16.5*   HGB 11.9* 11.9*   HCT 36.2* 36.6*    208     BMP:   Recent Labs     06/05/22  0415 06/06/22  0345    136   K 4.2 4.5   CO2 23 24   BUN 51* 59*   CREATININE 1.36* 1.44*   LABGLOM 52* 49*   GLUCOSE 229* 293*     ABG:  Lab Results   Component Value Date    FIO2 30.0 06/04/2022       Lab Results   Component Value Date    POCPH 7.430 06/04/2022    POCPCO2 35.2 06/04/2022    POCPO2 79.7 06/04/2022    POCHCO3 23.4 06/04/2022    NBEA 1 06/04/2022    PBEA 0 05/27/2022    WXBU8JAZ 96 06/04/2022    FIO2 30.0 06/04/2022   Results for Florecita Newman (MRN [id number]) as of 6/6/2022 16:52   Ref. Range 6/5/2022 17:13   Pro-BNP Latest Ref Range: <300 pg/mL 2,280 (H)     Radiology:  CXR 6/5/22    Increasing bibasilar pulmonary opacities, concerning for pneumonia.  Small   left pleural effusion         CT brain 6/4  1. No acute intracranial abnormality.    2. Stable chronic infarcts in the left basal ganglia and left parietal and   occipital lobes     5/25/22 echo  Global left ventricular systolic function is normal  Estimated ejection fraction is 50-55%  Hypokinesis of anterolateral wall noted  No significant valvular regurgitation or stenosis seen. No pericardial effusion seen. Grade I (mild) left ventricular diastolic dysfunction.     Impression:  · Acute respiratory insufficiency  Failure requiring BiPAP  ·  smoker/COPD exacerbation  · Pulmonary edema  ·  encephalopathyNew onset seizure   · Alcohol abuse/ dependence/ withdrawal   · History of CVA   · Obstructive sleep apnea  · Severe right Carotid artery stenosis, status post left CEA 9/21  · Diabetes mellitus  · Hypertensive urgency    Recommendations:  ·  BiPAP support /oxygen by nasal cannula when off BiPAP  ·  monitor mental status  ·  Precedex in case of agitation/BiPAP tolerance /CIWA protocol  · Ipratropium- Albuterol BID   · Budesonide via nebulizer BID   · Make Solu-Medrol 40 IV every 12 hour  · Lasix 40 IV x1  ·  monitor blood pressure off Cardene  · Vimpat 200mg BID  · Neurology stopped phenobarbital 65mg BID for concern of oversedation  · Discontinue Zyprexa 5 mg and Paxil 40 mg monitor mental status/discontinue clonidine   · Nephology following/  · NG feed off TPN  · Seizure precautions  · Vascular surgery on consult    · Discussed with RN and RT  ·  peptic ulcer disease prophylaxis  · DVT prophylaxis with low molecular weight heparin        Electronically signed by     Khari Wiggins MD on 6/6/2022 at 4:50 PM  Pulmonary Critical Care and Sleep Medicine,  [facility] AT [place]: [phone]

## 2022-06-06 NOTE — PROGRESS NOTES
Reason for Follow up: ADRIANA    Subjective:    Patient seen and examined at the bedside  Patient remains encephalopathic  Labs meds reviewed  Serum creatinine 1.44 with mild elevation  However overall patient's serum creatinine is stable between 1.3-1.6      Review Of Systems:   Unable to obtain because current mental status. Scheduled Meds:   nystatin  5 mL Oral TID    furosemide  40 mg IntraVENous Once    [START ON 6/7/2022] methylPREDNISolone  40 mg IntraVENous Q12H    famotidine (PEPCID) injection  20 mg IntraVENous Daily    lacosamide (VIMPAT) IVPB  150 mg IntraVENous BID    ipratropium-albuterol  1 ampule Inhalation TID    insulin lispro  0-18 Units SubCUTAneous Q6H    budesonide  0.5 mg Nebulization BID    enoxaparin  40 mg SubCUTAneous Daily    thiamine and folic acid IVPB   IntraVENous Daily    sodium chloride flush  10 mL IntraVENous Once    sodium chloride flush  5-40 mL IntraVENous 2 times per day    aspirin  81 mg Oral Daily    atorvastatin  40 mg Oral Nightly    clopidogrel  75 mg Oral Daily    amLODIPine  5 mg Oral Daily    atenolol  50 mg Oral Daily    losartan  100 mg Oral Daily   Continuous Infusions:   dexmedetomidine (PRECEDEX) IV infusion Stopped (06/02/22 1352)    sodium chloride Stopped (06/06/22 1008)    dextrose      niCARdipene (CARDENE) infusion Stopped (06/03/22 1112)     PRN Meds:potassium chloride **OR** potassium chloride, haloperidol lactate, LORazepam **OR** LORazepam **OR** LORazepam **OR** LORazepam **OR** LORazepam **OR** LORazepam **OR** LORazepam **OR** LORazepam, albuterol, hydrALAZINE, sodium chloride flush, sodium chloride, acetaminophen, ondansetron **OR** ondansetron, metoprolol, glucose, dextrose bolus **OR** dextrose bolus, glucagon (rDNA), dextrose    No Known Allergies    Physical Exam:  Blood pressure (!) 149/62, pulse 72, temperature 97.5 °F (36.4 °C), temperature source Temporal, resp.  rate 27, height 5' 6\" (1.676 m), weight 186 lb 5 oz (84.5 kg), SpO2 96 %. In: 2875.7 [I.V.:515.7; NG/GT:2230]  Out: 2625   In: 2875.7   Out: 2625 [Urine:2625]    General:  Not in distress. Appears to be stated age. HEENT: Atraumatic, normocephalic. Anicteric sclera. Pink and moist oral mucosa. Chest: Bilateral air entry, coarse to auscultation. Cardiovascular: RRR, S1S2, no murmur, rub or gallop. Has no lower extremity edema. Abdomen: Soft, non tender to palpation. Active bowel sounds. Musculoskeletal: No cyanosis or clubbing. Integumentary: Pink, warm and dry. Free from rash or lesions. Skin turgor normal.  CNS: lethargic.     Data:  CBC:   Lab Results   Component Value Date    WBC 16.5 (H) 06/06/2022    HGB 11.9 (L) 06/06/2022    HCT 36.6 (L) 06/06/2022    MCV 98.1 06/06/2022     06/06/2022     BMP:    Lab Results   Component Value Date     06/06/2022    K 4.5 06/06/2022     06/06/2022    CO2 24 06/06/2022    BUN 59 (H) 06/06/2022    CREATININE 1.44 (H) 06/06/2022    GLUCOSE 293 (H) 06/06/2022     CMP:   Lab Results   Component Value Date     06/06/2022    K 4.5 06/06/2022     06/06/2022    CO2 24 06/06/2022    BUN 59 (H) 06/06/2022    CREATININE 1.44 (H) 06/06/2022    GLUCOSE 293 (H) 06/06/2022    CALCIUM 8.1 (L) 06/06/2022    PROT 4.9 (L) 06/05/2022    LABALBU 2.5 (L) 06/05/2022    BILITOT 0.14 (L) 06/05/2022    ALKPHOS 60 06/05/2022    AST 29 06/05/2022    ALT 54 (H) 06/05/2022      Hepatic:   Lab Results   Component Value Date    AST 29 06/05/2022    ALT 54 (H) 06/05/2022    BILITOT 0.14 (L) 06/05/2022    ALKPHOS 60 06/05/2022     BNP: No results found for: BNP  Lipids:   Lab Results   Component Value Date    CHOL 185 02/25/2021    HDL 87 02/25/2021     INR:   Lab Results   Component Value Date    INR 1.1 09/22/2021     PTH: No results found for: PTH  Phosphorus:    Lab Results   Component Value Date    PHOS 3.1 06/06/2022     Ionized Calcium: No results found for: IONCA  Magnesium:   Lab Results   Component Value Date    MG 2.2 06/03/2022     Albumin:   Lab Results   Component Value Date    LABALBU 2.5 (L) 06/05/2022     Last 3 CK, CKMB, Troponin: @LABRCNT(CKTOTAL:3,CKMB:3,TROPONINI:3)       URINE:)No results found for: Warren Simeon    Radiology:   Reviewed. Assessment    Acute kidney injury, Cr is improved to 1.36 today  Urinary retention. Electrolyte abnormalities  Hypertension secondary to DT  EtOH withdrawal    Plan:    From a renal perspective patient serum creatinine is in an acceptable range of CKD stage III  Monitor volume status  Diuretics as needed  With losartan there will be hemodynamic change in serum creatinine as well  Follow-up labs    Electronically signed by Celio Hoyt MD on 6/6/2022 at 5:09 PM  [facility].   Ph: [phone]

## 2022-06-06 NOTE — PROGRESS NOTES
Comprehensive Nutrition Assessment    Type and Reason for Visit:  Reassess    Nutrition Recommendations/Plan:   1. Continue NPO diet  2. Continue Glucerna at 50 mL/hr (1200 mL total volume). Water flushes 250 mL every 6 hours. 3. Monitor tube feeding rate, GI function and labs     Malnutrition Assessment:  Malnutrition Status: At risk for malnutrition (Comment) (05/29/22 9787)      Nutrition Assessment:    Patient is tolerating tube feedings at goal rate. Patient is starting to be a little more alert now that sedations have decreased. Patient's bowels are moving a lot. RN reports ten bowel movements yesterday (6/5). Sodium levels are trending down. Nephrology has adjusted tube feeding water flushes to 250 ml every six hours. Continue current tube feedings. Monitor tube feeding rate, GI function and labs. Nutrition Related Findings:    Edema: +1 BLE. Active bowel sounds. Frequent bowel movements. Confusion. Alcohol withdrawal Wound Type: None       Current Nutrition Intake & Therapies:    Average Meal Intake: NPO  Average Supplements Intake: NPO  Diet NPO  ADULT TUBE FEEDING; Nasogastric; Diabetic; Continuous; 15; Yes; 15; Q 4 hours; 50; 250; Q 6 hours    Anthropometric Measures:  Height: 5' 6\" (167.6 cm)  Ideal Body Weight (IBW): 142 lbs (65 kg)    Admission Body Weight: 172 lb 6.4 oz (78.2 kg)  Current Body Weight: 186 lb (84.4 kg), 131 % IBW. Weight Source: Bed Scale  Current BMI (kg/m2): 30        Weight Adjustment For: No Adjustment                 BMI Categories: Overweight (BMI 25.0-29. 9)    Estimated Daily Nutrient Needs:  Energy Requirements Based On: Formula  Weight Used for Energy Requirements: Other (Comment)  Energy (kcal/day): 1825 kcal (MSJ: 1 activity factor; 1.2 stess factor)  Weight Used for Protein Requirements: Ideal  Protein (g/day): 84-90 gm of protein (1.3-1.4 gm/kg)       Nutrition Diagnosis:   · Inadequate oral intake related to psychological cause or life stress,cognitive or neurological impairment as evidenced by NPO or clear liquid status due to medical condition,nutrition support - enteral nutrition      Nutrition Interventions:   Food and/or Nutrient Delivery: Continue NPO,Continue Current Tube Feeding  Nutrition Education/Counseling: Education not indicated  Coordination of Nutrition Care: Continue to monitor while inpatient       Goals:  Previous Goal Met: Progressing toward Goal(s)  Goals: Tolerate nutrition support at goal rate       Nutrition Monitoring and Evaluation:   Behavioral-Environmental Outcomes: None Identified  Food/Nutrient Intake Outcomes: Enteral Nutrition Intake/Tolerance,Diet Advancement/Tolerance  Physical Signs/Symptoms Outcomes: Biochemical Data,Fluid Status or Edema,Skin,Weight,GI Status    Discharge Planning:     Too soon to determine         Zia Needs  MFN, RDN, LDN  Lead Clinical Dietitian  RD Office Phone [phone]

## 2022-06-07 ENCOUNTER — APPOINTMENT (OUTPATIENT)
Dept: GENERAL RADIOLOGY | Age: 67
DRG: 056 | End: 2022-06-07
Payer: MEDICARE

## 2022-06-07 LAB
ABSOLUTE EOS #: 0 K/UL (ref 0–0.4)
ABSOLUTE IMMATURE GRANULOCYTE: 0.2 K/UL (ref 0–0.3)
ABSOLUTE LYMPH #: 1.22 K/UL (ref 1–4.8)
ABSOLUTE MONO #: 2.03 K/UL (ref 0.2–0.8)
ANION GAP SERPL CALCULATED.3IONS-SCNC: 9 MMOL/L (ref 9–17)
BASOPHILS # BLD: 0 %
BASOPHILS ABSOLUTE: 0 K/UL (ref 0–0.2)
BUN BLDV-MCNC: 52 MG/DL (ref 8–23)
BUN/CREAT BLD: 35 (ref 9–20)
CALCIUM SERPL-MCNC: 8.2 MG/DL (ref 8.6–10.4)
CHLORIDE BLD-SCNC: 107 MMOL/L (ref 98–107)
CO2: 28 MMOL/L (ref 20–31)
CREAT SERPL-MCNC: 1.48 MG/DL (ref 0.7–1.2)
EOSINOPHILS RELATIVE PERCENT: 0 % (ref 1–4)
GFR AFRICAN AMERICAN: 58 ML/MIN
GFR NON-AFRICAN AMERICAN: 48 ML/MIN
GFR SERPL CREATININE-BSD FRML MDRD: ABNORMAL ML/MIN/{1.73_M2}
GLUCOSE BLD-MCNC: 191 MG/DL (ref 70–99)
GLUCOSE BLD-MCNC: 229 MG/DL (ref 75–110)
GLUCOSE BLD-MCNC: 235 MG/DL (ref 75–110)
GLUCOSE BLD-MCNC: 248 MG/DL (ref 75–110)
GLUCOSE BLD-MCNC: 330 MG/DL (ref 75–110)
HCT VFR BLD CALC: 37.6 % (ref 40.7–50.3)
HEMOGLOBIN: 12.3 G/DL (ref 13–17)
IMMATURE GRANULOCYTES: 1 %
LYMPHOCYTES # BLD: 6 % (ref 24–44)
MCH RBC QN AUTO: 31.3 PG (ref 25.2–33.5)
MCHC RBC AUTO-ENTMCNC: 32.7 G/DL (ref 28–38)
MCV RBC AUTO: 95.7 FL (ref 82.6–102.9)
MONOCYTES # BLD: 10 % (ref 1–7)
PDW BLD-RTO: 13 % (ref 11.8–14.4)
PLATELET # BLD: 252 K/UL (ref 138–453)
PMV BLD AUTO: 11.3 FL (ref 8.1–13.5)
POTASSIUM SERPL-SCNC: 4 MMOL/L (ref 3.7–5.3)
RBC # BLD: 3.93 M/UL (ref 4.21–5.77)
SEG NEUTROPHILS: 83 % (ref 36–66)
SEGMENTED NEUTROPHILS ABSOLUTE COUNT: 16.85 K/UL (ref 1.8–7.7)
SODIUM BLD-SCNC: 144 MMOL/L (ref 135–144)
WBC # BLD: 20.3 K/UL (ref 3.5–11.3)

## 2022-06-07 PROCEDURE — 2500000003 HC RX 250 WO HCPCS: Performed by: FAMILY MEDICINE

## 2022-06-07 PROCEDURE — 6370000000 HC RX 637 (ALT 250 FOR IP): Performed by: INTERNAL MEDICINE

## 2022-06-07 PROCEDURE — 2500000003 HC RX 250 WO HCPCS: Performed by: INTERNAL MEDICINE

## 2022-06-07 PROCEDURE — 6360000002 HC RX W HCPCS: Performed by: INTERNAL MEDICINE

## 2022-06-07 PROCEDURE — 94761 N-INVAS EAR/PLS OXIMETRY MLT: CPT

## 2022-06-07 PROCEDURE — 2000000000 HC ICU R&B

## 2022-06-07 PROCEDURE — 97163 PT EVAL HIGH COMPLEX 45 MIN: CPT

## 2022-06-07 PROCEDURE — 6360000002 HC RX W HCPCS: Performed by: PSYCHIATRY & NEUROLOGY

## 2022-06-07 PROCEDURE — 97530 THERAPEUTIC ACTIVITIES: CPT

## 2022-06-07 PROCEDURE — 6360000002 HC RX W HCPCS: Performed by: NURSE PRACTITIONER

## 2022-06-07 PROCEDURE — 82947 ASSAY GLUCOSE BLOOD QUANT: CPT

## 2022-06-07 PROCEDURE — 94640 AIRWAY INHALATION TREATMENT: CPT

## 2022-06-07 PROCEDURE — 97167 OT EVAL HIGH COMPLEX 60 MIN: CPT

## 2022-06-07 PROCEDURE — 36415 COLL VENOUS BLD VENIPUNCTURE: CPT

## 2022-06-07 PROCEDURE — A4216 STERILE WATER/SALINE, 10 ML: HCPCS | Performed by: INTERNAL MEDICINE

## 2022-06-07 PROCEDURE — 92611 MOTION FLUOROSCOPY/SWALLOW: CPT

## 2022-06-07 PROCEDURE — 6370000000 HC RX 637 (ALT 250 FOR IP): Performed by: SPECIALIST

## 2022-06-07 PROCEDURE — 2580000003 HC RX 258: Performed by: FAMILY MEDICINE

## 2022-06-07 PROCEDURE — 71045 X-RAY EXAM CHEST 1 VIEW: CPT

## 2022-06-07 PROCEDURE — 74230 X-RAY XM SWLNG FUNCJ C+: CPT

## 2022-06-07 PROCEDURE — 2580000003 HC RX 258: Performed by: PSYCHIATRY & NEUROLOGY

## 2022-06-07 PROCEDURE — 6360000002 HC RX W HCPCS: Performed by: FAMILY MEDICINE

## 2022-06-07 PROCEDURE — 2580000003 HC RX 258: Performed by: INTERNAL MEDICINE

## 2022-06-07 PROCEDURE — 2700000000 HC OXYGEN THERAPY PER DAY

## 2022-06-07 PROCEDURE — 80048 BASIC METABOLIC PNL TOTAL CA: CPT

## 2022-06-07 PROCEDURE — 6370000000 HC RX 637 (ALT 250 FOR IP): Performed by: FAMILY MEDICINE

## 2022-06-07 PROCEDURE — 85025 COMPLETE CBC W/AUTO DIFF WBC: CPT

## 2022-06-07 PROCEDURE — 99231 SBSQ HOSP IP/OBS SF/LOW 25: CPT | Performed by: PSYCHIATRY & NEUROLOGY

## 2022-06-07 PROCEDURE — C9254 INJECTION, LACOSAMIDE: HCPCS | Performed by: PSYCHIATRY & NEUROLOGY

## 2022-06-07 PROCEDURE — 97112 NEUROMUSCULAR REEDUCATION: CPT

## 2022-06-07 PROCEDURE — 97535 SELF CARE MNGMENT TRAINING: CPT

## 2022-06-07 PROCEDURE — 74018 RADEX ABDOMEN 1 VIEW: CPT

## 2022-06-07 RX ORDER — FAMOTIDINE 20 MG/1
20 TABLET, FILM COATED ORAL 2 TIMES DAILY
Status: DISCONTINUED | OUTPATIENT
Start: 2022-06-07 | End: 2022-06-09 | Stop reason: HOSPADM

## 2022-06-07 RX ORDER — FOLIC ACID 1 MG/1
1 TABLET ORAL DAILY
Status: DISCONTINUED | OUTPATIENT
Start: 2022-06-08 | End: 2022-06-09 | Stop reason: HOSPADM

## 2022-06-07 RX ORDER — AMLODIPINE BESYLATE 5 MG/1
5 TABLET ORAL ONCE
Status: COMPLETED | OUTPATIENT
Start: 2022-06-07 | End: 2022-06-07

## 2022-06-07 RX ORDER — PREDNISONE 20 MG/1
20 TABLET ORAL DAILY
Status: DISCONTINUED | OUTPATIENT
Start: 2022-06-08 | End: 2022-06-09 | Stop reason: HOSPADM

## 2022-06-07 RX ORDER — PAROXETINE HYDROCHLORIDE 20 MG/1
40 TABLET, FILM COATED ORAL DAILY
Status: DISCONTINUED | OUTPATIENT
Start: 2022-06-08 | End: 2022-06-09 | Stop reason: HOSPADM

## 2022-06-07 RX ORDER — AMLODIPINE BESYLATE 10 MG/1
10 TABLET ORAL DAILY
Status: DISCONTINUED | OUTPATIENT
Start: 2022-06-08 | End: 2022-06-09 | Stop reason: HOSPADM

## 2022-06-07 RX ORDER — GAUZE BANDAGE 2" X 2"
100 BANDAGE TOPICAL DAILY
Status: DISCONTINUED | OUTPATIENT
Start: 2022-06-08 | End: 2022-06-09 | Stop reason: HOSPADM

## 2022-06-07 RX ADMIN — INSULIN LISPRO 6 UNITS: 100 INJECTION, SOLUTION INTRAVENOUS; SUBCUTANEOUS at 05:48

## 2022-06-07 RX ADMIN — FOLIC ACID: 5 INJECTION, SOLUTION INTRAMUSCULAR; INTRAVENOUS; SUBCUTANEOUS at 08:31

## 2022-06-07 RX ADMIN — METHYLPREDNISOLONE SODIUM SUCCINATE 40 MG: 40 INJECTION, POWDER, FOR SOLUTION INTRAMUSCULAR; INTRAVENOUS at 02:15

## 2022-06-07 RX ADMIN — ASPIRIN 81 MG CHEWABLE TABLET 81 MG: 81 TABLET CHEWABLE at 08:34

## 2022-06-07 RX ADMIN — LOSARTAN POTASSIUM 100 MG: 100 TABLET, FILM COATED ORAL at 08:34

## 2022-06-07 RX ADMIN — FAMOTIDINE 20 MG: 20 TABLET, FILM COATED ORAL at 20:16

## 2022-06-07 RX ADMIN — SODIUM CHLORIDE, PRESERVATIVE FREE 10 ML: 5 INJECTION INTRAVENOUS at 18:09

## 2022-06-07 RX ADMIN — AMLODIPINE BESYLATE 5 MG: 5 TABLET ORAL at 13:56

## 2022-06-07 RX ADMIN — INSULIN LISPRO 6 UNITS: 100 INJECTION, SOLUTION INTRAVENOUS; SUBCUTANEOUS at 23:53

## 2022-06-07 RX ADMIN — METOPROLOL TARTRATE 5 MG: 1 INJECTION, SOLUTION INTRAVENOUS at 21:04

## 2022-06-07 RX ADMIN — DEXTROSE MONOHYDRATE 5 MG/HR: 50 INJECTION, SOLUTION INTRAVENOUS at 03:57

## 2022-06-07 RX ADMIN — IPRATROPIUM BROMIDE AND ALBUTEROL SULFATE 1 AMPULE: 2.5; .5 SOLUTION RESPIRATORY (INHALATION) at 14:09

## 2022-06-07 RX ADMIN — NYSTATIN 500000 UNITS: 100000 SUSPENSION ORAL at 14:06

## 2022-06-07 RX ADMIN — FAMOTIDINE 20 MG: 10 INJECTION, SOLUTION INTRAVENOUS at 08:05

## 2022-06-07 RX ADMIN — AMLODIPINE BESYLATE 5 MG: 5 TABLET ORAL at 08:34

## 2022-06-07 RX ADMIN — LACOSAMIDE 150 MG: 10 INJECTION, SOLUTION INTRAVENOUS at 21:43

## 2022-06-07 RX ADMIN — IPRATROPIUM BROMIDE AND ALBUTEROL SULFATE 1 AMPULE: 2.5; .5 SOLUTION RESPIRATORY (INHALATION) at 08:21

## 2022-06-07 RX ADMIN — BUDESONIDE 500 MCG: 0.5 SUSPENSION RESPIRATORY (INHALATION) at 08:21

## 2022-06-07 RX ADMIN — BUDESONIDE 500 MCG: 0.5 SUSPENSION RESPIRATORY (INHALATION) at 20:35

## 2022-06-07 RX ADMIN — CLOPIDOGREL BISULFATE 75 MG: 75 TABLET ORAL at 08:34

## 2022-06-07 RX ADMIN — INSULIN LISPRO 12 UNITS: 100 INJECTION, SOLUTION INTRAVENOUS; SUBCUTANEOUS at 11:55

## 2022-06-07 RX ADMIN — SODIUM CHLORIDE, PRESERVATIVE FREE 10 ML: 5 INJECTION INTRAVENOUS at 10:57

## 2022-06-07 RX ADMIN — INSULIN LISPRO 6 UNITS: 100 INJECTION, SOLUTION INTRAVENOUS; SUBCUTANEOUS at 18:29

## 2022-06-07 RX ADMIN — ATORVASTATIN CALCIUM 40 MG: 40 TABLET, FILM COATED ORAL at 20:15

## 2022-06-07 RX ADMIN — SODIUM CHLORIDE, PRESERVATIVE FREE 10 ML: 5 INJECTION INTRAVENOUS at 08:06

## 2022-06-07 RX ADMIN — SODIUM CHLORIDE, PRESERVATIVE FREE 10 ML: 5 INJECTION INTRAVENOUS at 21:49

## 2022-06-07 RX ADMIN — NYSTATIN 500000 UNITS: 100000 SUSPENSION ORAL at 08:45

## 2022-06-07 RX ADMIN — ENOXAPARIN SODIUM 40 MG: 100 INJECTION SUBCUTANEOUS at 08:42

## 2022-06-07 RX ADMIN — METOPROLOL TARTRATE 5 MG: 1 INJECTION, SOLUTION INTRAVENOUS at 02:25

## 2022-06-07 RX ADMIN — DEXTROSE MONOHYDRATE 5 MG/HR: 50 INJECTION, SOLUTION INTRAVENOUS at 11:15

## 2022-06-07 RX ADMIN — NYSTATIN 500000 UNITS: 100000 SUSPENSION ORAL at 20:16

## 2022-06-07 RX ADMIN — LACOSAMIDE 150 MG: 10 INJECTION, SOLUTION INTRAVENOUS at 09:31

## 2022-06-07 RX ADMIN — ATENOLOL 50 MG: 50 TABLET ORAL at 08:34

## 2022-06-07 RX ADMIN — HYDRALAZINE HYDROCHLORIDE 10 MG: 20 INJECTION INTRAMUSCULAR; INTRAVENOUS at 23:10

## 2022-06-07 RX ADMIN — IPRATROPIUM BROMIDE AND ALBUTEROL SULFATE 1 AMPULE: 2.5; .5 SOLUTION RESPIRATORY (INHALATION) at 20:35

## 2022-06-07 ASSESSMENT — PAIN DESCRIPTION - LOCATION
LOCATION: GENERALIZED
LOCATION: GENERALIZED

## 2022-06-07 ASSESSMENT — PAIN SCALES - GENERAL
PAINLEVEL_OUTOF10: 0

## 2022-06-07 ASSESSMENT — PAIN SCALES - WONG BAKER
WONGBAKER_NUMERICALRESPONSE: 0

## 2022-06-07 NOTE — PROGRESS NOTES
After getting xray for displacement, I was performing oral care, and noticed ng was coiled in throat. Removed after attempting repositioning was unsuccessful. Reinserted new tube without issue. Waiting on another xray confirmation of placement.

## 2022-06-07 NOTE — PROGRESS NOTES
End Of Shift Note  [address] ICU  Summary of shift: Pt weaned off cardene drip, extra norvasc given via NGT per Dr Yuliet Waterman. Pt failed video swallow study and will continue with NGT and tube feeding at this time. Await all docs to agree to dc to [facility]. NGT coiled in throat/neck area but per Dr Temo Whitfield in radiology keep where it is since xray report states tip in correct position. Will continue to monitor. Vitals:    Vitals:    06/07/22 1650 06/07/22 1700 06/07/22 1730 06/07/22 1800   BP:  138/60 (!) 145/58 (!) 147/53   Pulse:  68 71 68   Resp:  22  16   Temp: 97.8 °F (36.6 °C)      TempSrc: Infrared      SpO2:  96% 95% 93%   Weight:       Height:            I&O:     Intake/Output Summary (Last 24 hours) at 6/7/2022 1923  Last data filed at 6/7/2022 1806  Gross per 24 hour   Intake 2201.64 ml   Output 4325 ml   Net -2123.36 ml       Resp Status: Weaned down to 2L NC.       Critical Care IV infusions:   dexmedetomidine (PRECEDEX) IV infusion Stopped (06/02/22 1352)    sodium chloride Stopped (06/06/22 1008)    dextrose      niCARdipene (CARDENE) infusion Stopped (06/07/22 1806)        LDA:   Extended Dwell Peripherial IV 06/03/22 Left Brachial (Active)   Number of days: 4       PICC Double Lumen 56/08/84 Right Basilic (Active)   Number of days: 12       NG/OG/NJ/NE Tube Nasogastric 14 fr Left nostril (Active)   Number of days: 4       Urinary Catheter [place] (Active)   Number of days: 10       Incision 09/22/21 Neck Left (Active)   Number of days: 258

## 2022-06-07 NOTE — CARE COORDINATION
Discharge planning    [facility] has precert. Per physicians, they want a video swallow study etc... Possibly tomorrow?

## 2022-06-07 NOTE — PROGRESS NOTES
Per Dr Ronaldo Katz in radiology since NGT xray report states tip in correct location keep NGT where it is despite seeing it coiled via the video swallow study.

## 2022-06-07 NOTE — PROGRESS NOTES
Pt rests quietly in bed. Pt went for video swallow study which did not go well and he was seen aspirating per radiologist and speech therapist, pt returned to room 1114 without incident. Dr Slick Izaguirre messaged re: pt failing video swallow study and if 1415 dose of solumedrol should be given. Dr Slick Izaguirre states do not give the solumedrol dose.

## 2022-06-07 NOTE — PLAN OF CARE
Problem: Discharge Planning  Goal: Discharge to home or other facility with appropriate resources  6/7/2022 0054 by Roe Molina RN  Outcome: Progressing  Flowsheets  Taken 6/7/2022 0000  Discharge to home or other facility with appropriate resources: Identify barriers to discharge with patient and caregiver  Taken 6/6/2022 2000  Discharge to home or other facility with appropriate resources: Identify barriers to discharge with patient and caregiver  6/6/2022 1311 by Reuben Lewis RN  Outcome: Progressing     Problem: Safety - Medical Restraint  Goal: Remains free of injury from restraints (Restraint for Interference with Medical Device)  Description: INTERVENTIONS:  1. Determine that other, less restrictive measures have been tried or would not be effective before applying the restraint  2. Evaluate the patient's condition at the time of restraint application  3. Inform patient/family regarding the reason for restraint  4. Q2H: Monitor safety, psychosocial status, comfort, nutrition and hydration  Recent Flowsheet Documentation  Taken 6/7/2022 0000 by Roe Molina RN  Remains free of injury from restraints (restraint for interference with medical device): Evaluate the patient's condition at the time of restraint application     Problem: Safety - Medical Restraint  Goal: Remains free of injury from restraints (Restraint for Interference with Medical Device)  Description: INTERVENTIONS:  1. Determine that other, less restrictive measures have been tried or would not be effective before applying the restraint  2. Evaluate the patient's condition at the time of restraint application  3. Inform patient/family regarding the reason for restraint  4.  Q2H: Monitor safety, psychosocial status, comfort, nutrition and hydration  6/7/2022 0054 by Roe Molina RN  Outcome: Progressing  Flowsheets (Taken 6/7/2022 0000)  Remains free of injury from restraints (restraint for interference with medical device): Evaluate the and rhythm     Problem: Skin/Tissue Integrity - Adult  Goal: Skin integrity remains intact  6/7/2022 0054 by Lonnie Cannon RN  Outcome: Progressing  Flowsheets  Taken 6/7/2022 0000  Skin Integrity Remains Intact: Monitor for areas of redness and/or skin breakdown  Taken 6/6/2022 2000  Skin Integrity Remains Intact: Monitor for areas of redness and/or skin breakdown  6/6/2022 1311 by Oscar Moyer RN  Outcome: Progressing  Flowsheets (Taken 6/6/2022 0800)  Skin Integrity Remains Intact: Monitor for areas of redness and/or skin breakdown  Goal: Incisions, wounds, or drain sites healing without S/S of infection  6/7/2022 0054 by Lonnie Cannon RN  Outcome: Progressing  Flowsheets  Taken 6/7/2022 0000  Incisions, Wounds, or Drain Sites Healing Without Sign and Symptoms of Infection: ADMISSION and DAILY: Assess and document risk factors for pressure ulcer development  Taken 6/6/2022 2000  Incisions, Wounds, or Drain Sites Healing Without Sign and Symptoms of Infection: ADMISSION and DAILY: Assess and document risk factors for pressure ulcer development  6/6/2022 1311 by Oscar Moyer RN  Outcome: Progressing  Goal: Oral mucous membranes remain intact  6/7/2022 0054 by Lonnie Cannon RN  Outcome: Progressing  Flowsheets  Taken 6/7/2022 0000  Oral Mucous Membranes Remain Intact: Assess oral mucosa and hygiene practices  Taken 6/6/2022 2000  Oral Mucous Membranes Remain Intact: Assess oral mucosa and hygiene practices  6/6/2022 1311 by Oscar Moyer RN  Outcome: Progressing  Flowsheets (Taken 6/6/2022 0800)  Oral Mucous Membranes Remain Intact:   Assess oral mucosa and hygiene practices   Implement preventative oral hygiene regimen     Problem: Metabolic/Fluid and Electrolytes - Adult  Goal: Electrolytes maintained within normal limits  6/7/2022 0054 by Lonnie Cannon RN  Outcome: Progressing  Flowsheets  Taken 6/7/2022 0000  Electrolytes maintained within normal limits: Monitor labs and assess patient for signs and symptoms of electrolyte imbalances  Taken 6/6/2022 2000  Electrolytes maintained within normal limits: Monitor labs and assess patient for signs and symptoms of electrolyte imbalances  6/6/2022 1311 by Bertha Galvez RN  Outcome: Progressing  Goal: Hemodynamic stability and optimal renal function maintained  6/7/2022 0054 by Fabiana Flores RN  Outcome: Progressing  Flowsheets  Taken 6/7/2022 0000  Hemodynamic stability and optimal renal function maintained: Monitor labs and assess for signs and symptoms of volume excess or deficit  Taken 6/6/2022 2000  Hemodynamic stability and optimal renal function maintained: Monitor labs and assess for signs and symptoms of volume excess or deficit  6/6/2022 1311 by Bertha Galvez RN  Outcome: Progressing  Goal: Glucose maintained within prescribed range  6/7/2022 0054 by Fabiana Flores RN  Outcome: Progressing  Flowsheets  Taken 6/7/2022 0000  Glucose maintained within prescribed range: Monitor blood glucose as ordered  Taken 6/6/2022 2000  Glucose maintained within prescribed range: Monitor blood glucose as ordered  6/6/2022 1311 by Bertha Galvez RN  Outcome: Progressing     Problem: Musculoskeletal - Adult  Goal: Return mobility to safest level of function  6/7/2022 0054 by Fabiana Flores RN  Outcome: Progressing  Flowsheets  Taken 6/7/2022 0000  Return Mobility to Safest Level of Function: Assess patient stability and activity tolerance for standing, transferring and ambulating with or without assistive devices  Taken 6/6/2022 2000  Return Mobility to Safest Level of Function: Assess patient stability and activity tolerance for standing, transferring and ambulating with or without assistive devices  6/6/2022 1311 by Bertha Galvez RN  Outcome: Progressing     Problem: Genitourinary - Adult  Goal: Absence of urinary retention  6/7/2022 0054 by Fabiana Flores RN  Outcome: Progressing  Flowsheets  Taken 6/7/2022 0000  Absence of urinary retention: Monitor intake/output and perform bladder scan as needed  Taken 6/6/2022 2000  Absence of urinary retention: Monitor intake/output and perform bladder scan as needed  6/6/2022 1311 by Meche Reinoso RN  Outcome: Progressing  Flowsheets (Taken 6/6/2022 0800)  Absence of urinary retention: Monitor intake/output and perform bladder scan as needed  Goal: Urinary catheter remains patent  6/7/2022 0054 by Bettina Simmons RN  Outcome: Progressing  Flowsheets  Taken 6/7/2022 0000  Urinary catheter remains patent: Assess patency of urinary catheter  Taken 6/6/2022 2000  Urinary catheter remains patent: Assess patency of urinary catheter  6/6/2022 1311 by Meche Reinoso RN  Outcome: Progressing  Flowsheets (Taken 6/6/2022 0800)  Urinary catheter remains patent: Assess patency of urinary catheter     Problem: Anxiety  Goal: Will report anxiety at manageable levels  Description: INTERVENTIONS:  1. Administer medication as ordered  2. Teach and rehearse alternative coping skills  3. Provide emotional support with 1:1 interaction with staff  6/7/2022 0054 by Bettina Simmons RN  Outcome: Progressing  Flowsheets  Taken 6/7/2022 0000  Will report anxiety at manageable levels: Administer medication as ordered  Taken 6/6/2022 2000  Will report anxiety at manageable levels: Administer medication as ordered  6/6/2022 1311 by Meche Reinoso RN  Outcome: Progressing  Flowsheets (Taken 6/6/2022 0800)  Will report anxiety at manageable levels: Administer medication as ordered     Problem: Confusion  Goal: Confusion, delirium, dementia, or psychosis is improved or at baseline  Description: INTERVENTIONS:  1. Assess for possible contributors to thought disturbance, including medications, impaired vision or hearing, underlying metabolic abnormalities, dehydration, psychiatric diagnoses, and notify attending LIP  2. [place] high risk fall precautions, as indicated  3. Provide frequent short contacts to provide reality reorientation, refocusing and direction  4. Decrease environmental stimuli, including noise as appropriate  5. Monitor and intervene to maintain adequate nutrition, hydration, elimination, sleep and activity  6. If unable to ensure safety without constant attention obtain sitter and review sitter guidelines with assigned personnel  7. Initiate Psychosocial CNS and Spiritual Care consult, as indicated  6/7/2022 0054 by Dayana Falcon, RN  Outcome: Progressing  Flowsheets  Taken 6/7/2022 0000  Effect of thought disturbance (confusion, delirium, dementia, or psychosis) are managed with adequate functional status: Assess for contributors to thought disturbance, including medications, impaired vision or hearing, underlying metabolic abnormalities, dehydration, psychiatric diagnoses, notify LIP  Taken 6/6/2022 2000  Effect of thought disturbance (confusion, delirium, dementia, or psychosis) are managed with adequate functional status: Assess for contributors to thought disturbance, including medications, impaired vision or hearing, underlying metabolic abnormalities, dehydration, psychiatric diagnoses, notify LIP  6/6/2022 1311 by Brissa Mayer RN  Outcome: Progressing  Flowsheets (Taken 6/6/2022 0800)  Effect of thought disturbance (confusion, delirium, dementia, or psychosis) are managed with adequate functional status:   Assess for contributors to thought disturbance, including medications, impaired vision or hearing, underlying metabolic abnormalities, dehydration, psychiatric diagnoses, notify LIP   Decrease environmental stimuli, including noise as appropriate   Monitor and intervene to maintain adequate nutrition, hydration, elimination, sleep and activity     Problem: Behavior  Goal: Pt/Family maintain appropriate behavior and adhere to behavioral management agreement, if implemented  Description: INTERVENTIONS:  1. Assess patient/family's coping skills and  non-compliant behavior (including use of illegal substances)  2.  Notify security of behavior or suspected illegal substances which indicate the need for search of the patient and/or belongings  3. Encourage verbalization of thoughts and concerns in a socially appropriate manner  4. Utilize positive, consistent limit setting strategies supporting safety of patient, staff and others  5. Encourage participation in the decision making process about the behavioral management agreement  6. Implement a Health Care Agreement if patient meets criteria  7. If a patient's behavior jeopardizes the safety of the patient, staff, or others refer to organization policy. If a visitor's behavior poses a threat to safety call refer to organization policy.   8. Initiate consult with , Psychosocial CNS, Spiritual Care as appropriate  6/7/2022 0054 by Sadi Taylor RN  Outcome: Progressing  Flowsheets  Taken 6/7/2022 0000  Patient/family maintains appropriate behavior and adheres to behavioral management agreement, if implemented: Assess patient/familys coping skills and  non-compliant behavior (including use of illegal substances)  Taken 6/6/2022 2000  Patient/family maintains appropriate behavior and adheres to behavioral management agreement, if implemented: Assess patient/familys coping skills and  non-compliant behavior (including use of illegal substances)  6/6/2022 1311 by Martha Ghosh RN  Outcome: Progressing  Flowsheets (Taken 6/6/2022 0800)  Patient/family maintains appropriate behavior and adheres to behavioral management agreement, if implemented: Assess patient/familys coping skills and  non-compliant behavior (including use of illegal substances)     Problem: Chronic Conditions and Co-morbidities  Goal: Patient's chronic conditions and co-morbidity symptoms are monitored and maintained or improved  6/7/2022 0054 by Sadi Taylor RN  Outcome: Progressing  Flowsheets  Taken 6/7/2022 0000  Care Plan - Patient's Chronic Conditions and Co-Morbidity Symptoms are Monitored and Maintained or Improved: Monitor and assess patient's chronic conditions and comorbid symptoms for stability, deterioration, or improvement  Taken 6/6/2022 2000  Care Plan - Patient's Chronic Conditions and Co-Morbidity Symptoms are Monitored and Maintained or Improved: Monitor and assess patient's chronic conditions and comorbid symptoms for stability, deterioration, or improvement  6/6/2022 1311 by Brissa Mayer RN  Outcome: Progressing     Problem: Pain  Goal: Verbalizes/displays adequate comfort level or baseline comfort level  6/7/2022 0054 by Dayana Falcon RN  Outcome: Progressing  Flowsheets  Taken 6/7/2022 0000  Verbalizes/displays adequate comfort level or baseline comfort level: Encourage patient to monitor pain and request assistance  Taken 6/6/2022 2000  Verbalizes/displays adequate comfort level or baseline comfort level: Encourage patient to monitor pain and request assistance  6/6/2022 1311 by Brissa Mayer RN  Outcome: Progressing     Problem: ABCDS Injury Assessment  Goal: Absence of physical injury  6/7/2022 0054 by Dayana Falcon RN  Outcome: Progressing  6/6/2022 1311 by Brissa Mayer RN  Outcome: Progressing     Problem: Nutrition Deficit:  Goal: Optimize nutritional status  6/7/2022 0054 by Dayana Falcon RN  Outcome: Progressing  6/6/2022 1311 by Brissa Mayer RN  Outcome: Progressing

## 2022-06-07 NOTE — PROGRESS NOTES
Pt had wiggled himself down in the bed so much that his ankles were hanging off the end of the bed. Bath done linens changed and pt boosted up in bed and pillows / wedge pillows used for comfort. Pt then rests quietly in bed occasional talking nonsense to writer. Will monitor.

## 2022-06-07 NOTE — PROGRESS NOTES
Physical Therapy  Facility/Department: [facility] ICU  Physical Therapy Initial Assessment    Name: Meenu Cruz  : 1955  MRN: [id number]  Date of Service: 2022    Discharge Recommendations:  [address]          Patient Diagnosis(es): The encounter diagnosis was New onset seizure ([facility] [address] Past Medical History:  has a past medical history of CVA (cerebral vascular accident) ([facility] [address] Diabetes mellitus ([facility] [address] and Hypertension. Past Surgical History:  has a past surgical history that includes knee surgery; Carotid endarterectomy (2021); Carotid endarterectomy (N/A, 2021); and IR INSERT PICC VAD W SQ PORT >5 YEARS (2022). Assessment   Body Structures, Functions, Activity Limitations Requiring Skilled Therapeutic Intervention: Decreased functional mobility ; Decreased strength;Decreased endurance;Decreased balance;Decreased posture  Assessment: Pt limited by deconditioning from being long term bed bound  Therapy Prognosis: Good  Decision Making: High Complexity  Requires PT Follow-Up: Yes  Activity Tolerance  Activity Tolerance: Patient limited by endurance; Patient limited by fatigue;Treatment limited secondary to medical complications     Plan   Plan  Plan: 5-7 times per week  Current Treatment Recommendations: Strengthening,Balance training,Functional mobility training,Transfer training,Gait training,Endurance training,Stair training (Posture)  Plan Comment: Attempt standing/transfer to chair next treatment  Safety Devices  Type of Devices: Left in bed,Call light within reach,Nurse notified  Restraints  Restraints Initially in Place: Yes  Restraints: B wrists soft restraints     Restrictions  Restrictions/Precautions  Restrictions/Precautions: General Precautions,Up as Tolerated  Required Braces or Orthoses?: No     Subjective   General  Chart Reviewed: Yes  Patient assessed for rehabilitation services?: Yes  Response To Previous Treatment: Not applicable  Family / Caregiver Present: Yes (Sig. other)  Follows Commands: Within Functional Limits  General Comment  Comments: OK for PT per Angelica Shen RN         Social/Functional History  Social/Functional History  Lives With: Significant other  Type of Home: House  Home Layout: One level  Home Access: Stairs to enter with rails  Entrance Stairs - Number of Steps: 3  Entrance Stairs - Rails: Both  Bathroom Shower/Tub: Tub/Shower unit  Bathroom Toilet: Standard  Bathroom Accessibility: Accessible  Has the patient had two or more falls in the past year or any fall with injury in the past year?: No  ADL Assistance: [address]: Independent  Homemaking Responsibilities: Yes  Ambulation Assistance: Independent  Transfer Assistance: Independent  Active : No  Patient's  Info: Up to 6 months ago, pt did drive  Occupation: Retired  Type of Occupation: [employer]  Additional Comments: Pt was independent w/ home making and ADL's prior to this admission  Vision/Hearing  Vision  Vision: Impaired  Vision Exceptions: Wears glasses at all times  Hearing  Hearing: Within functional limits    Cognition   Orientation  Overall Orientation Status: Impaired  Orientation Level: Oriented to person;Disoriented to time;Disoriented to situation;Oriented to place (Pt knew he was in a [facility], not aware it was [facility]. Ani's)  Cognition  Overall Cognitive Status: WFL     Objective   Heart Rate: 72  Heart Rate Source: Monitor  BP: (!) 117/55  BP Location: Right lower arm  BP Method: Automatic  Patient Position: Left side  MAP (Calculated): 75.67  Resp: (!) 31  SpO2: 100 %  O2 Device: Nasal cannula     Observation/Palpation  Posture: Fair  Gross Assessment  AROM: Within functional limits  Strength:  Within functional limits  Coordination: Within functional limits  Tone: Normal  Sensation: Impaired (Pt c/o paresthesia in lumbar region)        Strength RLE  Strength RLE: WFL  Comment: R hip and knee 3/5 to 3+/5, ankle 4/5  Strength LLE  Strength LLE: WFL  Comment: L LE 4/5 to 4+/5  Strength RUE  Comment: See OT brandon for B UE MMT        Balance  Sitting: With support (Pt sitting EOB x 15 minutes w/ mod/max A)  Standing:  (NA)  Transfer Training  Transfer Training: No  Gait Training  Gait Training: No  Wheelchair Management  Wheelchair Management: No  Bed mobility  Rolling to Left: Moderate assistance  Rolling to Right: Moderate assistance  Supine to Sit: Maximum assistance;2 Person assistance  Sit to Supine: Maximum assistance;2 Person assistance  Scooting: Maximal assistance;2 Person assistance  Transfers  Comment: Not appropriate for standing today, will attempt 6-8-22  Ambulation  Comments: Pt not appropriate for standing at this time  More Ambulation?: No  Stairs/Curb  Stairs?: No     Balance  Posture: Fair  Sitting - Static: Poor;+  Sitting - Dynamic: Poor  Standing - Static:  (NA)  Standing - Dynamic:  (NA)           OutComes Score                                                  AM-PAC Score  AM-PAC Inpatient Mobility Raw Score : 8 (06/07/22 1152)  AM-PAC Inpatient T-Scale Score : 28.52 (06/07/22 1152)  Mobility Inpatient CMS 0-100% Score: 86.62 (06/07/22 1152)  Mobility Inpatient CMS G-Code Modifier : CM (06/07/22 1152)          Goals  Short Term Goals  Time Frame for Short term goals: 14 treatments  Short term goal 1: Independent bed mobility  Short term goal 2: Initiate standing/transfers, ultimately independent w/ transfers  Short term goal 3: Initiate ambulation when appropriate, ultimately ambulate w/ appropriate assistive device ' x 1/ Initiate steps when approrpiate  Short term goal 4: Good balance sitting and standing/ good posture  Short term goal 5: Tolerate 30 min ther act/  1/2 to 1 grade strngth increase B LE's  Patient Goals   Patient goals : Home       Education  Patient Education  Education Given To: Patient;Caregiver  Education Provided: Role of Therapy;Plan of Care  Education Provided Comments:  Ankle pumps handout, heel slides instruction  Education Method: Demonstration;Verbal;Printed Information/Hand-outs  Barriers to Learning: None  Education Outcome: Verbalized understanding;Demonstrated understanding      Therapy Time   Individual Concurrent Group Co-treatment   Time In [id number] (Treatment time 29 minutes)     0800   Time Out 0912         Minutes [address] Alonzo Williamson

## 2022-06-07 NOTE — PROGRESS NOTES
Physical Therapy  Facility/Department: [facility] ICU  Physical Therapy Initial Assessment    Name: Emery Lal  : 1955  MRN: [id number]  Date of Service: 2022    Discharge Recommendations:  [address]          Patient Diagnosis(es): The encounter diagnosis was New onset seizure ([facility] [address] Past Medical History:  has a past medical history of CVA (cerebral vascular accident) ([facility] [address] Diabetes mellitus ([facility] [address] and Hypertension. Past Surgical History:  has a past surgical history that includes knee surgery; Carotid endarterectomy (2021); Carotid endarterectomy (N/A, 2021); and IR INSERT PICC VAD W SQ PORT >5 YEARS (2022). Assessment   Body Structures, Functions, Activity Limitations Requiring Skilled Therapeutic Intervention: Decreased functional mobility ; Decreased strength;Decreased endurance;Decreased balance;Decreased posture  Assessment: Pt limited by deconditioning from being long term bed bound  Therapy Prognosis: Good  Decision Making: High Complexity  Requires PT Follow-Up: Yes  Activity Tolerance  Activity Tolerance: Patient limited by endurance; Patient limited by fatigue;Treatment limited secondary to medical complications     Plan   Plan  Plan: 5-7 times per week  Current Treatment Recommendations: Strengthening,Balance training,Functional mobility training,Transfer training,Gait training,Endurance training,Stair training (Posture)  Plan Comment: Attempt standing/transfer to chair next treatment  Safety Devices  Type of Devices: Left in bed,Call light within reach,Nurse notified  Restraints  Restraints Initially in Place: Yes  Restraints: B wrists soft restraints     Restrictions  Restrictions/Precautions  Restrictions/Precautions: General Precautions,Up as Tolerated  Required Braces or Orthoses?: No     Subjective   General  Chart Reviewed: Yes  Patient assessed for rehabilitation services?: Yes  Response To Previous Treatment: Not applicable  Family / Caregiver Present: Yes (Sig. other)  Follows Commands: Within Functional Limits  General Comment  Comments: OK for PT per Baldomero Boswell RN         Social/Functional History  Social/Functional History  Lives With: Significant other  Type of Home: House  Home Layout: One level  Home Access: Stairs to enter with rails  Entrance Stairs - Number of Steps: 3  Entrance Stairs - Rails: Both  Bathroom Shower/Tub: Tub/Shower unit  Bathroom Toilet: Standard  Bathroom Accessibility: Accessible  Has the patient had two or more falls in the past year or any fall with injury in the past year?: No  ADL Assistance: [address]: Independent  Homemaking Responsibilities: Yes  Ambulation Assistance: Independent  Transfer Assistance: Independent  Active : No  Patient's  Info: Up to 6 months ago, pt did drive  Occupation: Retired  Type of Occupation: [employer]  Additional Comments: Pt was independent w/ home making and ADL's prior to this admission  Vision/Hearing  Vision  Vision: Impaired  Vision Exceptions: Wears glasses at all times  Hearing  Hearing: Within functional limits    Cognition   Orientation  Overall Orientation Status: Impaired  Orientation Level: Oriented to person;Disoriented to time;Disoriented to situation;Oriented to place (Pt knew he was in a [facility], not aware it was [facility]. Ani's)  Cognition  Overall Cognitive Status: WFL     Objective   Heart Rate: 72  Heart Rate Source: Monitor  BP: (!) 117/55  BP Location: Right lower arm  BP Method: Automatic  Patient Position: Left side  MAP (Calculated): 75.67  Resp: (!) 31  SpO2: 100 %  O2 Device: Nasal cannula     Observation/Palpation  Posture: Fair  Gross Assessment  AROM: Within functional limits  Strength:  Within functional limits  Coordination: Within functional limits  Tone: Normal  Sensation: Impaired (Pt c/o paresthesia in lumbar region)        Strength RLE  Strength RLE: WFL  Comment: R hip and knee 3/5 to 3+/5, ankle 4/5  Strength LLE  Strength LLE: WFL  Comment: L LE 4/5 to 4+/5  Strength RUE  Comment: See OT brandon for B UE MMT        Balance  Sitting: With support (Pt sitting EOB x 15 minutes w/ mod/max A)  Standing:  (NA)  Transfer Training  Transfer Training: No  Gait Training  Gait Training: No  Wheelchair Management  Wheelchair Management: No  Bed mobility  Rolling to Left: Moderate assistance  Rolling to Right: Moderate assistance  Supine to Sit: Maximum assistance;2 Person assistance  Sit to Supine: Maximum assistance;2 Person assistance  Scooting: Maximal assistance;2 Person assistance  Transfers  Comment: Not appropriate for standing today, will attempt 6-8-22  Ambulation  Comments: Pt not appropriate for standing at this time  More Ambulation?: No  Stairs/Curb  Stairs?: No     Balance  Posture: Fair  Sitting - Static: Poor;+  Sitting - Dynamic: Poor  Standing - Static:  (NA)  Standing - Dynamic:  (NA)           OutComes Score                                                  AM-PAC Score  AM-PAC Inpatient Mobility Raw Score : 8 (06/07/22 1152)  AM-PAC Inpatient T-Scale Score : 28.52 (06/07/22 1152)  Mobility Inpatient CMS 0-100% Score: 86.62 (06/07/22 1152)  Mobility Inpatient CMS G-Code Modifier : CM (06/07/22 1152)          Goals  Short Term Goals  Time Frame for Short term goals: 14 treatments  Short term goal 1: Independent bed mobility  Short term goal 2: Initiate standing/transfers, ultimately independent w/ transfers  Short term goal 3: Initiate ambulation when appropriate, ultimately ambulate w/ appropriate assistive device ' x 1/ Initiate steps when approrpiate  Short term goal 4: Good balance sitting and standing/ good posture  Short term goal 5: Tolerate 30 min ther act/  1/2 to 1 grade strngth increase B LE's  Patient Goals   Patient goals : Home       Education  Patient Education  Education Given To: Patient;Caregiver  Education Provided: Role of Therapy;Plan of Care  Education Provided Comments:  Ankle pumps handout, heel slides instruction  Education Method: Demonstration;Verbal;Printed Information/Hand-outs  Barriers to Learning: None  Education Outcome: Verbalized understanding;Demonstrated understanding      Therapy Time   Individual Concurrent Group Co-treatment   Time In [phone] (Treatment time 29 minutes)        Time Out 0912         Minutes 36           Co-treatment with OT warranted secondary to decreased safety and independence requiring 2 skilled therapy professionals to address individual discipline's goals.          Yaima Arreguin, PT

## 2022-06-07 NOTE — PROGRESS NOTES
Occupational Therapy  Facility/Department: [facility] ICU  Occupational Therapy Initial Assessment    Name: Ramirez Bethea  : 1955  MRN: [id number]  Date of Service: 2022    Discharge Recommendations:  Patient would benefit from continued therapy after discharge     Pt is currently functional below baseline and would suggest intense therapy post acute care. Would expect patient to be able to tolerate 3 hours of therapy per day and able to tolerate at least one hour up in chair. Please refer to AM-PAC score for current mobility/adl level. RN reports patient is medically stable for therapy treatment this date. Chart reviewed prior to treatment and patient is agreeable for therapy. All lines intact and patient positioned comfortably at end of treatment. All patient needs addressed prior to ending therapy session. PER H&P: Ramirez Bethea is a 73 y.o.  male who presents with Seizures     Pt admitted through the ED after an episode of seizure at home. Apparently pt lives at home with his fiancee. According to her, he was sitting in his chair yesterday morning when he developed a seizure. It is difficult to determine what kind of seizure it was. His fiancee is not at bedside right now. However it was confirmed that the pt has never had a similar episode before in his life. It is also reported that he wanted to stop alcohol use however she reported him having a beer the evening before. Urine drug screen has been reported unremarkable and ETOH level was zero. Alcohol withdrawal seizure was suspected though seemed less likely if pt had continued to drink alcohol. Pt apparently drinks 4 beers daily     Pt was admitted to the ICU where he continued to remain agitated. Higher and frequent use of Ativan and later Haldol were ineffective.  Pt required soft restraints since he was unsafe for himself trying to get out of bed and trying to pull out Iv     Pt was hypertensive with BP > 200/140 which was not improved w prn meds and was started on Cardene infusion. However pt continued to remain agitated and it was suspected he may be in DTs. Pt was placed on the CIWA protocol and was started on IV fluids and Thiamine, Folate. OT Equipment Recommendations  Equipment Needed: Yes  Mobility Devices: ADL Assistive Devices  ADL Assistive Devices: Toileting - 3-in-1 Commode       Patient Diagnosis(es): The encounter diagnosis was New onset seizure ([facility] [address] Past Medical History:  has a past medical history of CVA (cerebral vascular accident) ([facility] [address] Diabetes mellitus ([facility] [address] and Hypertension. Past Surgical History:  has a past surgical history that includes knee surgery; Carotid endarterectomy (09/22/2021); Carotid endarterectomy (N/A, 9/22/2021); and IR INSERT PICC VAD W SQ PORT >5 YEARS (5/26/2022). Assessment    Pt with deficits of strength, bed mobility, transfers,  balance, safety awareness and endurance this session,  & required 2 assist for SAFE functional mobility, & transfers, not able to tolerate ambulation this session. With current deficits, Pt HIGH risk for falls & requires continued OT to maximize independence with functional mobility, balance, safety awareness & activity tolerance. Performance deficits / Impairments: Decreased functional mobility ; Decreased ADL status; Decreased ROM; Decreased strength;Decreased safe awareness;Decreased cognition;Decreased fine motor control;Decreased high-level IADLs;Decreased balance;Decreased sensation;Decreased endurance;Decreased coordination;Decreased posture  Prognosis: Good  Decision Making: Medium Complexity  REQUIRES OT FOLLOW-UP: Yes  Activity Tolerance  Activity Tolerance: Patient limited by fatigue;Patient Tolerated treatment well;Treatment limited secondary to decreased cognition  Activity Tolerance Comments: Pt tolerated 15 mins sitting EOB with MOD/MAX A and MAX VCs to correct posterior/R sided leaning and maintain sitting balance.         Plan   Plan  Times per Week: 4-5x a week, 1-2x a day  Current Treatment Recommendations: Strengthening,ROM,Functional mobility training,Balance training,Endurance training,Gait training,Neuromuscular re-education,Positioning,Equipment evaluation, education, & procurement,Patient/Caregiver education & training,Safety education & training,Self-Care / ADL,Coordination training     Restrictions  Restrictions/Precautions  Restrictions/Precautions: General Precautions,Up as Tolerated  Required Braces or Orthoses?: No    Subjective   General  Chart Reviewed: Yes  Patient assessed for rehabilitation services?: Yes  Family / Caregiver Present: Yes (Wife)     Social/Functional History  Social/Functional History  Lives With: Significant other  Type of Home: House  Home Layout: One level  Home Access: Stairs to enter with rails  Entrance Stairs - Number of Steps: 3  Entrance Stairs - Rails: Both  Bathroom Shower/Tub: Tub/Shower unit  Bathroom Toilet: Standard  Bathroom Accessibility: Accessible  Has the patient had two or more falls in the past year or any fall with injury in the past year?: No  ADL Assistance: Independent  Homemaking Assistance: Independent  Homemaking Responsibilities: Yes  Ambulation Assistance: Independent  Transfer Assistance: Independent  Active : No  Patient's  Info: Up to 6 months ago, pt did drive  Occupation: Retired  Type of Occupation: [employer]  Additional Comments: Pt was independent w/ home making and ADL's prior to this admission       Objective   Heart Rate: 67  Heart Rate Source: Monitor  BP: (!) 143/59  BP Location: Right lower arm  BP Method: Automatic  Patient Position: Left side  MAP (Calculated): 87  Resp: 22  SpO2: 96 %  O2 Device: Nasal cannula  Vision Exceptions: Wears glasses at all times  Hearing: Within functional limits       Observation/Palpation  Posture: Fair (Shoulders somewhat rounded)  Observation: NG tube; B arm restraints  Safety Devices  Type of Devices: Left in bed;Call light within reach;Nurse notified;Gait belt;Bed alarm in place; All fall risk precautions in place; Patient at risk for falls  Restraints  Restraints Initially in Place: Yes  Restraints: B wrists soft restraints  Bed Mobility Training  Bed Mobility Training: Yes  Balance  Sitting: With support (Pt sitting EOB 15 mins with mod/max A. Pt with significant posterior/ R sided lean, at times. Able to correct lean with MOD/MAX verbal/tactile cues.)  Standing:  (N/A)  Transfer Training  Transfer Training: No  Gait Training  Gait Training: No  Gait  Overall Level of Assistance:  (Pt not appropriate to trial standing this date.)  Wheelchair Management  Wheelchair Management: No     AROM: Grossly decreased, non-functional (R side ~50 degrees shoulder flexion, full elbow, minimal wrist movement.)  PROM: Within functional limits (RUE full prom)  Strength: Grossly decreased, non-functional (LUE 3-; RUE 2-)  Coordination: Grossly decreased, non-functional  Tone: Normal     ADL  Feeding: NPO  Grooming: Moderate assistance;Maximum assistance  UE Bathing: Maximum assistance  LE Bathing: Maximum assistance;Dependent/Total  UE Dressing: Maximum assistance  LE Dressing: Maximum assistance;Dependent/Total  Toileting: Maximum assistance;Dependent/Total  Additional Comments: Pt requires MAX A for all ADLs d/t decreased sitting balance, cognition, strength and ROM this date. RUE weakness profound. Activity Tolerance  Activity Tolerance: Patient limited by endurance; Patient limited by fatigue;Treatment limited secondary to medical complications  Bed mobility  Rolling to Right: Moderate assistance  Supine to Sit: Maximum assistance;2 Person assistance  Sit to Supine: Maximum assistance;2 Person assistance  Scooting: Maximal assistance;2 Person assistance; Moderate assistance  Bed Mobility Comments: MAX verbal/tactile for use of bed rails to assist UEs with rolling/sitting up, use of BUEs and B feet placement on floor to assist with full scoot towards EOB, pacing, and assist/mgmt of lines with fair return demo, all to increase safety in function. MAX verbal/tactile cues for use of BUEs and BLEs planted on floor to assist with maintaining sitting balance at EOB throughout with fair return demo. Transfers  Sit to stand: Unable to assess  Stand to sit: Unable to assess  Transfer Comments: Pt not safe to trial standing this date. Cognition  Overall Cognitive Status: Exceptions (Impaired). Pt very confused and needing MAX VCs for initiating, sequencing and attending to task with fair return demo. Perception  Overall Perceptual Status: Impaired (Pt appears to be neglecting R side)     Education Given To: Patient; Family  Education Provided: Role of Therapy;Plan of Care;Precautions  Education Provided Comments: Verbal edu given on proper log rolling for bed mob and proper weight bearing through BUEs and BLEs for maintain sitting balance at EOB. Education Method: Demonstration;Verbal  Barriers to Learning: Cognition  Education Outcome: Continued education needed     Pt is at risk for skin breakdown. Patient positioned appropriately after treatment with all high risk areas elevated or propped. Pt reports being comfortable at end of treatment. LUE AROM (degrees)  LUE AROM : WFL  RUE PROM (degrees)  RUE PROM: WFL  RUE AROM (degrees)  RUE AROM : Exceptions  R Shoulder Flexion 0-180: 50 degrees     AM-PAC Score        AM-[facility] Inpatient Daily Activity Raw Score: 7 (06/07/22 1212)  AM-PAC Inpatient ADL T-Scale Score : 20.13 (06/07/22 1212)  ADL Inpatient CMS 0-100% Score: 92.44 (06/07/22 1212)  ADL Inpatient CMS G-Code Modifier : CM (06/07/22 1212)    Goals  Short Term Goals  Time Frame for Short term goals: By discharge, pt to  Short Term Goal 1: tolerate reassess of ADL transfers and functional mob, as appropriate, with use of AD as needed. Short Term Goal 2: demo MOD x1 for bed mob tasks with MIN cues and use of bed rails as needed.   Short Term Goal 3: demo SBA with simple feeding and grooming tasks, at appropriate level, with use of AE as needed. Short Term Goal 4: Increase BUE strength by 1/2 grade to assist with functional tasks. Short Term Goal 5: Pt/caregiver to demo and verb good understanding of edu with ADL compensatory techs, BUE HEP, possible equip needs, RW safety/mgmt, fall prevention techs, positioning for skin integrity, EC/WS techs, breathing techs, and discharge recommendations. Long Term Goals  Long Term Goal 1: MOD x1 with toileting tasks with good safety, pacing and use of AD/DME as needed. Patient Goals   Patient goals : to get stronger       Therapy Time   Individual Concurrent Group Co-treatment   Time In 0827         Time Out 0911         Minutes 44          Treatment mins: 29      Co-treatment with PT warranted secondary to decreased safety and independence requiring 2 skilled therapy professionals to address individual discipline's goals. OT addressing preparation for ADL transfer, sitting balance for increased ADL performance, sitting/activity tolerance, functional reaching, environmental safety/scanning, fall prevention, functional mobility for ADL transfers, ability to sequence and follow directions and functional UE strength.     Doreen Marin, OT/S

## 2022-06-07 NOTE — PROGRESS NOTES
parietal and   occipital lobes     5/25/22 echo  Global left ventricular systolic function is normal  Estimated ejection fraction is 50-55%  Hypokinesis of anterolateral wall noted  No significant valvular regurgitation or stenosis seen. No pericardial effusion seen. Grade I (mild) left ventricular diastolic dysfunction.     Impression:  · Acute respiratory insufficiency  Failure requiring BiPAP  ·  smoker/COPD exacerbation  · Pulmonary edema  ·  encephalopathyNew onset seizure   · Alcohol abuse/ dependence/ withdrawal   · History of CVA   · Obstructive sleep apnea  · Severe right Carotid artery stenosis, status post left CEA 9/21  · Diabetes mellitus  · Hypertensive urgency    Recommendations:  ·  BiPAP support /oxygen by nasal cannula when off BiPAP  ·  monitor mental status  ·  Precedex in case of agitation/BiPAP tolerance /CIWA protocol  · Ipratropium- Albuterol BID   · Budesonide via nebulizer BID   · Discontinue Solu-Medrol  · Prednisone 20 mg daily  · Vimpat 200mg BID  · Neurology stopped phenobarbital 65mg BID for concern of oversedation  · Keep off Zyprexa 5 mg and resume Paxil 40 mg home dose   · Wean off Cardene/adjust BP meds per nephrology/Norvasc increased  · monitor mental status/off  clonidine   · Consider resuming Plavix home dose  · Discussed with Nephology  · NG feed off TPN  · Seizure precautions  · Vascular surgery on consult    · Discussed with RN   ·  peptic ulcer disease prophylaxis  · LTAC discharge planning  · DVT prophylaxis with low molecular weight heparin        Electronically signed by     Fausto Kimbrough MD on 6/7/2022 at 4:21 PM  Pulmonary Critical Care and Sleep Medicine,  [facility] AT [place]: [phone]

## 2022-06-07 NOTE — PROGRESS NOTES
After speaking with Dr Capps Neighbor radiologist, tube was advance to 80 cm from 76.  Another xray was requested for confirmation of placement

## 2022-06-07 NOTE — PROCEDURES
INSTRUMENTAL SWALLOW REPORT  MODIFIED BARIUM SWALLOW    NAME: Claudy Méndez   : 1955  MRN: [id number]       Date of Eval: 2022              Referring Diagnosis(es):      Past Medical History:  has a past medical history of CVA (cerebral vascular accident) ([facility] [address] Diabetes mellitus ([facility] [address] and Hypertension. Past Surgical History:  has a past surgical history that includes knee surgery; Carotid endarterectomy (2021); Carotid endarterectomy (N/A, 2021); and IR INSERT PICC VAD W SQ PORT >5 YEARS (2022). Type of Study: Initial MBS       Patient Complaints/Reason for Referral:  Claudy Méndez was referred for a MBS to assess the efficiency of his/her swallow function, assess for aspiration, and to make recommendations regarding safe dietary consistencies, effective compensatory strategies, and safe eating environment. Onset of problem: Claudy Méndez is a 73 y.o.  male who presents with Seizures     Pt admitted through the ED after an episode of seizure at home. Apparently pt lives at home with his fiancee. According to her, he was sitting in his chair yesterday morning when he developed a seizure. It is difficult to determine what kind of seizure it was. His fiancee is not at bedside right now. However it was confirmed that the pt has never had a similar episode before in his life. It is also reported that he wanted to stop alcohol use however she reported him having a beer the evening before. Urine drug screen has been reported unremarkable and ETOH level was zero. Alcohol withdrawal seizure was suspected though seemed less likely if pt had continued to drink alcohol. Pt apparently drinks 4 beers daily     Pt was admitted to the ICU where he continued to remain agitated. Higher and frequent use of Ativan and later Haldol were ineffective.  Pt required soft restraints since he was unsafe for himself trying to get out of bed and trying to pull out Iv     Pt was hypertensive with BP > 200/140 which was not improved w prn meds and was started on Cardene infusion. However pt continued to remain agitated and it was suspected he may be in DTs. Pt was placed on the CIWA protocol and was started on IV fluids and Thiamine, Folate               Behavior/Cognition/Vision/Hearing:  Behavior/Cognition: Cooperative; Alert  Hearing: Within functional limits    Impressions:  Recommend pt remain NPO with alternative means of nutrition. Pt with severe pharyngeal dysphagia characterized by +deep penetration, +aspiration with latent coughing (ineffective in clearing penetrated/aspirated bolus). +min-mod vallecula and pyriform residue, decreased pharyngeal wall movement, no epiglottic inversion, decreased laryngeal elevation. Note wet, gurgly vocal quality prior to and after study. Patient Position: Lateral     Consistencies Administered: Pureed            Dysphagia Outcome Severity Scale: Level 1: Severe dysphagia- NPO. Unable to tolerate any PO safely  Penetration-Aspiration Scale (PAS): 7 - Material enters the airway, passes below the vocal folds, and is not ejected from the trachea despite effort    Recommended Diet:  Solid consistency: NPO  Liquid consistency: NPO       Medication administration: Via NG/PEG        Recommendations/Treatment  Requires SLP Intervention: Yes        D/C Recommendations: Ongoing speech therapy is recommended during this hospitalization; Ongoing speech therapy is recommended at next level of care         Recommended Exercises:    Therapeutic Interventions: Bolus control exercises; Pharyngeal exercises;Diet tolerance monitoring; Therapeutic PO trials with SLP;Laryngeal exercises;Oral care;Oral motor exercises; Patient/Family education         Education: Images and recommendations were reviewed with pt, RN following this exam.   Patient Education: yes  Patient Education Response: Demonstrated understanding;Needs reinforcement    Safety Devices  Restraints Initially in Place: Yes  Restraints: B wrists soft restraints      Goals:    Long Term: To Maximize safety with intake, optimize nutrition/hydration and minimize risk for aspiration. Repeat MBSS Recommended in     5-7      Days. Short Term:     Goal 1: Pt will complete OMEX for dysphagia x10-20/session. Oral Preparation / Oral Phase   Prolonged A-P bolus transit with puree      Pharyngeal Phase   Puree: +deep penetration to cords, not cleared x1. +penetration, +aspiration x1. +min vallec and pyriform residue. +min-mod vallecula and pyriform residue, decreased pharyngeal wall movement/squeeze, no epiglottic inversion, decreased laryngeal elevation. Note NG tube coiled in/near vallecula.      Esophageal Phase  Esophageal Screen: WFL    Pain      Pain Level: 0  Pain Location: Generalized      Therapy Time:   Individual Concurrent Group Co-treatment   Time In  1440         Time Out  1452         Minutes  [address], 6/7/2022, 4:20 PM

## 2022-06-07 NOTE — PROGRESS NOTES
Attempted to contact Dr Mary Mata through [facility] on call page. Was told Dr Kike Tejeda was covering, no return call.  Contacted Dr Shani Myers through [employer] for order for pts elevated bp

## 2022-06-07 NOTE — PLAN OF CARE
Problem: Skin/Tissue Integrity - Adult  Goal: Skin integrity remains intact  Outcome: Progressing  Flowsheets (Taken 6/7/2022 0400 by Josh Fernández RN)  Skin Integrity Remains Intact: Monitor for areas of redness and/or skin breakdown     Problem: Pain  Goal: Verbalizes/displays adequate comfort level or baseline comfort level  Outcome: Progressing  Flowsheets (Taken 6/7/2022 0400 by Josh Fernández RN)  Verbalizes/displays adequate comfort level or baseline comfort level: Encourage patient to monitor pain and request assistance     Problem: Safety - Medical Restraint  Goal: Remains free of injury from restraints (Restraint for Interference with Medical Device)  Description: INTERVENTIONS:  1. Determine that other, less restrictive measures have been tried or would not be effective before applying the restraint  2. Evaluate the patient's condition at the time of restraint application  3. Inform patient/family regarding the reason for restraint  4. Q2H: Monitor safety, psychosocial status, comfort, nutrition and hydration  Recent Flowsheet Documentation  Taken 6/7/2022 0600 by Josh Fernández RN  Remains free of injury from restraints (restraint for interference with medical device): Evaluate the patient's condition at the time of restraint application  Taken 3/8/7457 0400 by Josh Fernández RN  Remains free of injury from restraints (restraint for interference with medical device): Evaluate the patient's condition at the time of restraint application  Taken 9/0/0893 0200 by Josh Fernández RN  Remains free of injury from restraints (restraint for interference with medical device): Evaluate the patient's condition at the time of restraint application   Continue to monitor skin integrity and IV sites. Patient will have progressive improvement with pain scale with interventions. Pt will remain safe while at [facility].

## 2022-06-07 NOTE — PROGRESS NOTES
NEUROLOGY INPATIENT PROGRESS NOTE    6/7/2022         Subjective: Irene Jeff is a  78 y.o. male admitted on 5/25/2022 with Seizure [facility]) [R56.9]  New onset seizure ([facility] [address] [R56.9]      Briefly, this is a  78 y.o. male with hx of HTN, DM and prior CVA with residual Rt sided weakness was admitted on 5/25/2022 with seizure activity likely alcohol withdrawal vs new onset seizure disorder. EEG did not reveal any epileptiform discharges and any electrographic seizure activity. No seizures noted during hospitalization. His hospitalization course was complicated by continued delirium and agitation and he difficulties weaning from sedation. But most recent EEG did not show any seizure activity. He has received Ativan under CIWA protocol. Phenobarbital 65 bid was discontinued for concern of oversedation. Patient was initially treated with Keppra but it was switched to Vimpat. 6/6/2022: Patient is alert and awake and following commands. Caregivers at bedside stated that he seems to be much more alert and awake today compared to yesterday. Patient has not had any witnessed seizure activity since admitted. 6/7/22: Patient is much more alert and awake and following commands. Caregivers also stated that he is \"almost back to his baseline\". He has not had any seizure activity since admitted. He is tolerating Vimpat well without any adverse effects. No current facility-administered medications on file prior to encounter.      Current Outpatient Medications on File Prior to Encounter   Medication Sig Dispense Refill    clopidogrel (PLAVIX) 75 MG tablet Take 1 tablet by mouth daily for 21 days 21 tablet 0    aspirin 81 MG chewable tablet Take 1 tablet by mouth daily 30 tablet 3    metFORMIN (GLUCOPHAGE) 500 MG tablet Take 1 tablet by mouth 2 times daily (with meals) 60 tablet 3    atorvastatin (LIPITOR) 40 MG tablet Take 1 tablet by mouth nightly 30 tablet 3    amLODIPine (NORVASC) 5 MG tablet Take 1 tablet by mouth daily 30 tablet 3    PARoxetine (PAXIL) 40 MG tablet Take 40 mg by mouth daily      atenolol (TENORMIN) 50 MG tablet Take 50 mg by mouth daily      losartan (COZAAR) 100 MG tablet Take 100 mg by mouth daily         Allergies: Sydni Patton has No Known Allergies.     Past Medical History:   Diagnosis Date    CVA (cerebral vascular accident) ([facility] [address] 02/2021    With residual right sided weakness    Diabetes mellitus ([facility] [address]     Hypertension        Past Surgical History:   Procedure Laterality Date    CAROTID ENDARTERECTOMY  09/22/2021    CAROTID ENDARTERECTOMY (N/A Neck    CAROTID ENDARTERECTOMY N/A 9/22/2021    CAROTID ENDARTERECTOMY performed by David Hanson MD at [address] [place] [address] IR INS PICC VAD W SQ PORT GREATER THAN 5  5/26/2022    IR INS PICC VAD W SQ PORT GREATER THAN 5 5/26/2022 [facility]    KNEE SURGERY             Medications:     nystatin  5 mL Oral TID    methylPREDNISolone  40 mg IntraVENous Q12H    famotidine (PEPCID) injection  20 mg IntraVENous Daily    lacosamide (VIMPAT) IVPB  150 mg IntraVENous BID    ipratropium-albuterol  1 ampule Inhalation TID    insulin lispro  0-18 Units SubCUTAneous Q6H    budesonide  0.5 mg Nebulization BID    enoxaparin  40 mg SubCUTAneous Daily    thiamine and folic acid IVPB   IntraVENous Daily    sodium chloride flush  10 mL IntraVENous Once    sodium chloride flush  5-40 mL IntraVENous 2 times per day    aspirin  81 mg Oral Daily    atorvastatin  40 mg Oral Nightly    clopidogrel  75 mg Oral Daily    amLODIPine  5 mg Oral Daily    atenolol  50 mg Oral Daily    losartan  100 mg Oral Daily     PRN Meds include: potassium chloride **OR** potassium chloride, haloperidol lactate, LORazepam **OR** LORazepam **OR** LORazepam **OR** LORazepam **OR** LORazepam **OR** LORazepam **OR** LORazepam **OR** LORazepam, albuterol, hydrALAZINE, sodium chloride flush, sodium chloride, acetaminophen, ondansetron **OR** ondansetron, metoprolol, glucose, dextrose bolus **OR** dextrose bolus, glucagon (rDNA), dextrose    Objective:   BP (!) 141/68   Pulse 76   Temp 97.7 °F (36.5 °C) (Temporal)   Resp (!) 40   Ht 5' 6\" (1.676 m)   Wt 186 lb 5 oz (84.5 kg)   SpO2 99%   BMI 30.07 kg/m²     Blood pressure range: Systolic (26FBR), BFL:908 , Min:136 , HJL:314   ; Diastolic (38CUU), FMB:42, Min:57, Max:89          NEUROLOGIC EXAMINATION  GENERAL  Appears comfortable and in no distress   HEENT  NC/ AT   cardiovascular  S1 and S2 heard; palpation of pulses: radial pulse    NECK  Supple and no bruits heard   MENTAL STATUS:  Alert, oriented x self and place and month; following simple commands. Able to name the objects and has difficulty repeating sentences. Also has difficulty with serial subtractions and able to recall only 1 out of 3 test items at 5 minutes and he has requested \"no more questions\" as he seems to be exhausted and irritated with detailed cognitive testing. CRANIAL NERVES: II     -      PERRLA; blinks to threat bilaterally. III,IV,VI -  EOMs full, no afferent defect, no ptosis  V     -     Normal facial sensation   VII    -     Normal facial symmetry  VIII   -     Intact hearing   IX,X -     Symmetrical palate  XI    -     Symmetrical shoulder shrug  XII   -     Midline tongue, no atrophy    MOTOR FUNCTION:  significant for weakness of grade 4/5 in right upper and right lower extremities and 4+/5 in left upper and left lower extremities with normal bulk, normal tone and no abnormal involuntary movements. SENSORY FUNCTION:  Intact to light touch and pinprick in all 4 extremities.      CEREBELLAR FUNCTION:  No ataxia noted   REFLEX FUNCTION:  Symmetric 1+ DTRs in UE and 3+ patellar reflexes and 1+ ankle jerks and bilateral equivocal plantars   STATION and GAIT  Not tested         Data:    Lab Results:   CBC:   Recent Labs     06/05/22  0415 06/06/22  0345 06/07/22  0329   WBC 15.7* 16.5* 20.3*   HGB 11.9* 11.9* 12.3*    208 252     BMP:    Recent Labs     06/05/22  0415 06/06/22  0345 06/07/22  0329    136 144   K 4.2 4.5 4.0    104 107   CO2 23 24 28   BUN 51* 59* 52*   CREATININE 1.36* 1.44* 1.48*   GLUCOSE 229* 293* 191*         Lab Results   Component Value Date    CHOL 185 02/25/2021    LDLCHOLESTEROL 79 02/25/2021    HDL 87 02/25/2021    TRIG 146 05/30/2022    ALT 54 (H) 06/05/2022    AST 29 06/05/2022    INR 1.1 09/22/2021    LABA1C 6.3 (H) 05/30/2022    LABMICR 457 (H) 06/03/2022     MRI brain (with/without) 5/25/2022: No acute intracranial abnormality. No mass-effect or abnormal enhancement. Old infarctions in left frontal parietal lobes &  left occipital lobe. EEG (5/31/22): abnormal EEG due to mild diffuse slowing. No evidence of epileptiform activity is noted. These findings are consistent with a mild diffuse encephalopathy. This is a non-specific finding and can be seen in toxic, metabolic and parainfectious processes. Impression and Plan: Mr. Sydni Patton is a 78 y.o. male with   1. Encephalopathy: Significantly improved; seems to be at baseline. 2. New onset seizure: None since admitted; tolerating antiseizure med,Vimpat well without any adverse effects. Possible new onset seizure disorder in the setting of multiple prior ischemic strokes; seizure could also be related to alcohol withdrawal; most recent EEG did not reveal any ongoing seizure activity; continue Vimpat 150 mg bid; continue seizure precautions  Cerebrovascular disease with multiple prior ischemic strokes; right TOOTIE 50-69% stenosis; MRI with no new infarcts; hx of Lt CEA; vascular surgery consulted; on dual antiplatelet therapy with aspirin 81 qd and Plavix 75 qd and atorvastatin 40 mg nightly for secondary stroke prophylaxis. Neurology service is signing off. Please call us again if there are any significant changes or questions.           Jakub Dhillon MD 6/7/2022 2:51 PM

## 2022-06-07 NOTE — PROGRESS NOTES
Upon checking placement again pt has pulled ng out. Order obtained for restraints. New ng placed. Waiting on xray for confirmation of placement.

## 2022-06-07 NOTE — PROGRESS NOTES
Reason for Follow up: ADRIANA    Subjective:    Patient seen and examined the bedside   Patient is awake and alert   He seems to be responding appropriately to questions   Blood pressure mildly high but overall controlled     Review Of Systems:   Patient still has NG tube in  Swallowing needs evaluation    Scheduled Meds:   famotidine  20 mg Oral BID    [START ON 9/9/5452] folic acid  1 mg Oral Daily    [START ON 6/8/2022] thiamine mononitrate  100 mg Oral Daily    [START ON 6/8/2022] amLODIPine  10 mg Oral Daily    nystatin  5 mL Oral TID    methylPREDNISolone  40 mg IntraVENous Q12H    lacosamide (VIMPAT) IVPB  150 mg IntraVENous BID    ipratropium-albuterol  1 ampule Inhalation TID    insulin lispro  0-18 Units SubCUTAneous Q6H    budesonide  0.5 mg Nebulization BID    enoxaparin  40 mg SubCUTAneous Daily    sodium chloride flush  10 mL IntraVENous Once    sodium chloride flush  5-40 mL IntraVENous 2 times per day    aspirin  81 mg Oral Daily    atorvastatin  40 mg Oral Nightly    clopidogrel  75 mg Oral Daily    atenolol  50 mg Oral Daily    losartan  100 mg Oral Daily   Continuous Infusions:   dexmedetomidine (PRECEDEX) IV infusion Stopped (06/02/22 1352)    sodium chloride Stopped (06/06/22 1008)    dextrose      niCARdipene (CARDENE) infusion 5 mg/hr (06/07/22 1115)     PRN Meds:potassium chloride **OR** potassium chloride, haloperidol lactate, LORazepam **OR** LORazepam **OR** LORazepam **OR** LORazepam **OR** LORazepam **OR** LORazepam **OR** LORazepam **OR** LORazepam, albuterol, hydrALAZINE, sodium chloride flush, sodium chloride, acetaminophen, ondansetron **OR** ondansetron, metoprolol, glucose, dextrose bolus **OR** dextrose bolus, glucagon (rDNA), dextrose    No Known Allergies    Physical Exam:  Blood pressure (!) 143/59, pulse 70, temperature 97.3 °F (36.3 °C), temperature source Temporal, resp. rate 22, height 5' 6\" (1.676 m), weight 186 lb 5 oz (84.5 kg), SpO2 100 %. In: 1988. 4 [I.V.:80.4; NG/GT:1778]  Out: 9265   In: 1988.4   Out: 4625 [Urine:4625]    General:  Not in distress. Appears to be stated age. HEENT: Atraumatic, normocephalic. Anicteric sclera. Pink and moist oral mucosa. Chest: Bilateral air entry, coarse to auscultation. Cardiovascular: RRR, S1S2, no murmur, rub or gallop. Has no lower extremity edema. Abdomen: Soft, non tender to palpation. Active bowel sounds. Musculoskeletal: No cyanosis or clubbing. Integumentary: Pink, warm and dry. Free from rash or lesions. Skin turgor normal.  CNS: Grossly unremarkable much more awake and alert.     Data:  CBC:   Lab Results   Component Value Date    WBC 20.3 (H) 06/07/2022    HGB 12.3 (L) 06/07/2022    HCT 37.6 (L) 06/07/2022    MCV 95.7 06/07/2022     06/07/2022     BMP:    Lab Results   Component Value Date     06/07/2022    K 4.0 06/07/2022     06/07/2022    CO2 28 06/07/2022    BUN 52 (H) 06/07/2022    CREATININE 1.48 (H) 06/07/2022    GLUCOSE 191 (H) 06/07/2022     CMP:   Lab Results   Component Value Date     06/07/2022    K 4.0 06/07/2022     06/07/2022    CO2 28 06/07/2022    BUN 52 (H) 06/07/2022    CREATININE 1.48 (H) 06/07/2022    GLUCOSE 191 (H) 06/07/2022    CALCIUM 8.2 (L) 06/07/2022    PROT 4.9 (L) 06/05/2022    LABALBU 2.5 (L) 06/05/2022    BILITOT 0.14 (L) 06/05/2022    ALKPHOS 60 06/05/2022    AST 29 06/05/2022    ALT 54 (H) 06/05/2022      Hepatic:   Lab Results   Component Value Date    AST 29 06/05/2022    ALT 54 (H) 06/05/2022    BILITOT 0.14 (L) 06/05/2022    ALKPHOS 60 06/05/2022     BNP: No results found for: BNP  Lipids:   Lab Results   Component Value Date    CHOL 185 02/25/2021    HDL 87 02/25/2021     INR:   Lab Results   Component Value Date    INR 1.1 09/22/2021     PTH: No results found for: PTH  Phosphorus:    Lab Results   Component Value Date    PHOS 3.1 06/06/2022     Ionized Calcium: No results found for: IONCA  Magnesium:   Lab Results   Component Value Date MG 2.2 06/03/2022     Albumin:   Lab Results   Component Value Date    LABALBU 2.5 (L) 06/05/2022     Last 3 CK, CKMB, Troponin: @LABRCNT(CKTOTAL:3,CKMB:3,TROPONINI:3)       URINE:)No results found for: Grey Cerrato    Radiology:   Reviewed. Assessment    Acute kidney injury. CKD 3  Urinary retention. Electrolyte abnormalities  Hypertension secondary to DT  EtOH withdrawal    Plan:    Avoid clonidine   Blood pressure better controlled   Increase Norvasc dose   Increase free water to 250 mL every 4 hours   Swallowing evaluation   LTAC transfer to be considered   Patient serum creatinine in the range of 1.3-1.6 will be acceptable due to hemodynamic factors and better blood pressure control    Electronically signed by Rolo Corona MD on 6/7/2022 at 2:13 PM  [facility].   Ph: [phone]

## 2022-06-08 ENCOUNTER — APPOINTMENT (OUTPATIENT)
Dept: GENERAL RADIOLOGY | Age: 67
DRG: 056 | End: 2022-06-08
Payer: MEDICARE

## 2022-06-08 LAB
ABSOLUTE EOS #: 0 K/UL (ref 0–0.44)
ABSOLUTE IMMATURE GRANULOCYTE: 0.19 K/UL (ref 0–0.3)
ABSOLUTE LYMPH #: 0.93 K/UL (ref 1.1–3.7)
ABSOLUTE MONO #: 1.67 K/UL (ref 0.1–1.2)
ANION GAP SERPL CALCULATED.3IONS-SCNC: 7 MMOL/L (ref 9–17)
BASOPHILS # BLD: 0 % (ref 0–2)
BASOPHILS ABSOLUTE: 0 K/UL (ref 0–0.2)
BUN BLDV-MCNC: 40 MG/DL (ref 8–23)
BUN/CREAT BLD: 28 (ref 9–20)
CALCIUM SERPL-MCNC: 8.1 MG/DL (ref 8.6–10.4)
CHLORIDE BLD-SCNC: 105 MMOL/L (ref 98–107)
CO2: 30 MMOL/L (ref 20–31)
CREAT SERPL-MCNC: 1.41 MG/DL (ref 0.7–1.2)
EOSINOPHILS RELATIVE PERCENT: 0 % (ref 1–4)
GFR AFRICAN AMERICAN: >60 ML/MIN
GFR NON-AFRICAN AMERICAN: 50 ML/MIN
GFR SERPL CREATININE-BSD FRML MDRD: ABNORMAL ML/MIN/{1.73_M2}
GLUCOSE BLD-MCNC: 211 MG/DL (ref 75–110)
GLUCOSE BLD-MCNC: 252 MG/DL (ref 75–110)
GLUCOSE BLD-MCNC: 256 MG/DL (ref 75–110)
GLUCOSE BLD-MCNC: 268 MG/DL (ref 70–99)
HCT VFR BLD CALC: 35.2 % (ref 40.7–50.3)
HEMOGLOBIN: 11.8 G/DL (ref 13–17)
IMMATURE GRANULOCYTES: 1 %
LYMPHOCYTES # BLD: 5 % (ref 24–43)
MCH RBC QN AUTO: 32.2 PG (ref 25.2–33.5)
MCHC RBC AUTO-ENTMCNC: 33.5 G/DL (ref 28–38)
MCV RBC AUTO: 95.9 FL (ref 82.6–102.9)
MONOCYTES # BLD: 9 % (ref 3–12)
PDW BLD-RTO: 12.9 % (ref 11.8–14.4)
PHOSPHORUS: 2.7 MG/DL (ref 2.5–4.5)
PLATELET # BLD: 217 K/UL (ref 138–453)
PMV BLD AUTO: 11.2 FL (ref 8.1–13.5)
POTASSIUM SERPL-SCNC: 4.2 MMOL/L (ref 3.7–5.3)
RBC # BLD: 3.67 M/UL (ref 4.21–5.77)
SEG NEUTROPHILS: 85 % (ref 36–65)
SEGMENTED NEUTROPHILS ABSOLUTE COUNT: 15.81 K/UL (ref 1.5–8.1)
SODIUM BLD-SCNC: 142 MMOL/L (ref 135–144)
WBC # BLD: 18.6 K/UL (ref 3.5–11.3)

## 2022-06-08 PROCEDURE — C9254 INJECTION, LACOSAMIDE: HCPCS | Performed by: PSYCHIATRY & NEUROLOGY

## 2022-06-08 PROCEDURE — 6370000000 HC RX 637 (ALT 250 FOR IP): Performed by: INTERNAL MEDICINE

## 2022-06-08 PROCEDURE — 71045 X-RAY EXAM CHEST 1 VIEW: CPT

## 2022-06-08 PROCEDURE — 97530 THERAPEUTIC ACTIVITIES: CPT

## 2022-06-08 PROCEDURE — 97112 NEUROMUSCULAR REEDUCATION: CPT

## 2022-06-08 PROCEDURE — 84100 ASSAY OF PHOSPHORUS: CPT

## 2022-06-08 PROCEDURE — 6360000002 HC RX W HCPCS: Performed by: NURSE PRACTITIONER

## 2022-06-08 PROCEDURE — 94640 AIRWAY INHALATION TREATMENT: CPT

## 2022-06-08 PROCEDURE — 6360000002 HC RX W HCPCS: Performed by: PSYCHIATRY & NEUROLOGY

## 2022-06-08 PROCEDURE — 2580000003 HC RX 258: Performed by: FAMILY MEDICINE

## 2022-06-08 PROCEDURE — 2000000000 HC ICU R&B

## 2022-06-08 PROCEDURE — 36415 COLL VENOUS BLD VENIPUNCTURE: CPT

## 2022-06-08 PROCEDURE — 97535 SELF CARE MNGMENT TRAINING: CPT

## 2022-06-08 PROCEDURE — 6370000000 HC RX 637 (ALT 250 FOR IP): Performed by: FAMILY MEDICINE

## 2022-06-08 PROCEDURE — 94761 N-INVAS EAR/PLS OXIMETRY MLT: CPT

## 2022-06-08 PROCEDURE — 6370000000 HC RX 637 (ALT 250 FOR IP): Performed by: SPECIALIST

## 2022-06-08 PROCEDURE — 97166 OT EVAL MOD COMPLEX 45 MIN: CPT

## 2022-06-08 PROCEDURE — 82947 ASSAY GLUCOSE BLOOD QUANT: CPT

## 2022-06-08 PROCEDURE — 2500000003 HC RX 250 WO HCPCS: Performed by: FAMILY MEDICINE

## 2022-06-08 PROCEDURE — 2580000003 HC RX 258: Performed by: PSYCHIATRY & NEUROLOGY

## 2022-06-08 PROCEDURE — 2700000000 HC OXYGEN THERAPY PER DAY

## 2022-06-08 PROCEDURE — 85025 COMPLETE CBC W/AUTO DIFF WBC: CPT

## 2022-06-08 PROCEDURE — 80048 BASIC METABOLIC PNL TOTAL CA: CPT

## 2022-06-08 RX ORDER — CARVEDILOL 6.25 MG/1
6.25 TABLET ORAL 2 TIMES DAILY WITH MEALS
Status: DISCONTINUED | OUTPATIENT
Start: 2022-06-08 | End: 2022-06-09

## 2022-06-08 RX ORDER — INSULIN GLARGINE 100 [IU]/ML
8 INJECTION, SOLUTION SUBCUTANEOUS NIGHTLY
Status: DISCONTINUED | OUTPATIENT
Start: 2022-06-08 | End: 2022-06-09 | Stop reason: HOSPADM

## 2022-06-08 RX ORDER — CARVEDILOL 6.25 MG/1
6.25 TABLET ORAL 2 TIMES DAILY WITH MEALS
Status: DISCONTINUED | OUTPATIENT
Start: 2022-06-09 | End: 2022-06-08

## 2022-06-08 RX ADMIN — PREDNISONE 20 MG: 20 TABLET ORAL at 10:01

## 2022-06-08 RX ADMIN — NYSTATIN 500000 UNITS: 100000 SUSPENSION ORAL at 13:18

## 2022-06-08 RX ADMIN — BUDESONIDE 500 MCG: 0.5 SUSPENSION RESPIRATORY (INHALATION) at 21:00

## 2022-06-08 RX ADMIN — SODIUM CHLORIDE, PRESERVATIVE FREE 10 ML: 5 INJECTION INTRAVENOUS at 21:54

## 2022-06-08 RX ADMIN — THIAMINE HCL TAB 100 MG 100 MG: 100 TAB at 10:01

## 2022-06-08 RX ADMIN — SODIUM CHLORIDE, PRESERVATIVE FREE 10 ML: 5 INJECTION INTRAVENOUS at 13:15

## 2022-06-08 RX ADMIN — FOLIC ACID 1 MG: 1 TABLET ORAL at 10:01

## 2022-06-08 RX ADMIN — BUDESONIDE 500 MCG: 0.5 SUSPENSION RESPIRATORY (INHALATION) at 08:25

## 2022-06-08 RX ADMIN — PAROXETINE HYDROCHLORIDE 40 MG: 20 TABLET, FILM COATED ORAL at 10:01

## 2022-06-08 RX ADMIN — LACOSAMIDE 150 MG: 10 INJECTION, SOLUTION INTRAVENOUS at 10:33

## 2022-06-08 RX ADMIN — METOPROLOL TARTRATE 5 MG: 1 INJECTION, SOLUTION INTRAVENOUS at 03:04

## 2022-06-08 RX ADMIN — DEXTROSE MONOHYDRATE 2.5 MG/HR: 50 INJECTION, SOLUTION INTRAVENOUS at 12:24

## 2022-06-08 RX ADMIN — SODIUM CHLORIDE, PRESERVATIVE FREE 10 ML: 5 INJECTION INTRAVENOUS at 07:56

## 2022-06-08 RX ADMIN — CARVEDILOL 6.25 MG: 6.25 TABLET, FILM COATED ORAL at 17:36

## 2022-06-08 RX ADMIN — INSULIN LISPRO 9 UNITS: 100 INJECTION, SOLUTION INTRAVENOUS; SUBCUTANEOUS at 05:41

## 2022-06-08 RX ADMIN — CLOPIDOGREL BISULFATE 75 MG: 75 TABLET ORAL at 10:01

## 2022-06-08 RX ADMIN — ENOXAPARIN SODIUM 40 MG: 100 INJECTION SUBCUTANEOUS at 08:00

## 2022-06-08 RX ADMIN — IPRATROPIUM BROMIDE AND ALBUTEROL SULFATE 1 AMPULE: 2.5; .5 SOLUTION RESPIRATORY (INHALATION) at 21:00

## 2022-06-08 RX ADMIN — INSULIN GLARGINE 8 UNITS: 100 INJECTION, SOLUTION SUBCUTANEOUS at 21:53

## 2022-06-08 RX ADMIN — ASPIRIN 81 MG CHEWABLE TABLET 81 MG: 81 TABLET CHEWABLE at 10:01

## 2022-06-08 RX ADMIN — SODIUM CHLORIDE, PRESERVATIVE FREE 10 ML: 5 INJECTION INTRAVENOUS at 12:28

## 2022-06-08 RX ADMIN — FAMOTIDINE 20 MG: 20 TABLET, FILM COATED ORAL at 21:53

## 2022-06-08 RX ADMIN — CARVEDILOL 6.25 MG: 6.25 TABLET, FILM COATED ORAL at 10:01

## 2022-06-08 RX ADMIN — LOSARTAN POTASSIUM 100 MG: 100 TABLET, FILM COATED ORAL at 10:01

## 2022-06-08 RX ADMIN — AMLODIPINE BESYLATE 10 MG: 10 TABLET ORAL at 10:01

## 2022-06-08 RX ADMIN — NYSTATIN 500000 UNITS: 100000 SUSPENSION ORAL at 08:01

## 2022-06-08 RX ADMIN — SODIUM CHLORIDE: 9 INJECTION, SOLUTION INTRAVENOUS at 21:57

## 2022-06-08 RX ADMIN — IPRATROPIUM BROMIDE AND ALBUTEROL SULFATE 1 AMPULE: 2.5; .5 SOLUTION RESPIRATORY (INHALATION) at 08:25

## 2022-06-08 RX ADMIN — LACOSAMIDE 150 MG: 10 INJECTION, SOLUTION INTRAVENOUS at 21:59

## 2022-06-08 RX ADMIN — ATORVASTATIN CALCIUM 40 MG: 40 TABLET, FILM COATED ORAL at 21:53

## 2022-06-08 RX ADMIN — INSULIN LISPRO 6 UNITS: 100 INJECTION, SOLUTION INTRAVENOUS; SUBCUTANEOUS at 12:45

## 2022-06-08 RX ADMIN — FAMOTIDINE 20 MG: 20 TABLET, FILM COATED ORAL at 10:01

## 2022-06-08 RX ADMIN — INSULIN LISPRO 9 UNITS: 100 INJECTION, SOLUTION INTRAVENOUS; SUBCUTANEOUS at 18:00

## 2022-06-08 RX ADMIN — SODIUM CHLORIDE, PRESERVATIVE FREE 10 ML: 5 INJECTION INTRAVENOUS at 07:57

## 2022-06-08 RX ADMIN — DEXTROSE MONOHYDRATE 4 MG/HR: 50 INJECTION, SOLUTION INTRAVENOUS at 06:57

## 2022-06-08 ASSESSMENT — PAIN SCALES - GENERAL
PAINLEVEL_OUTOF10: 0
PAINLEVEL_OUTOF10: 0

## 2022-06-08 ASSESSMENT — PAIN SCALES - WONG BAKER
WONGBAKER_NUMERICALRESPONSE: 0

## 2022-06-08 NOTE — PROGRESS NOTES
Pt pleasant, hallucinating and cooperative with staff than [place] visited and pt became aggressive, angry, agitated, and use profane language. After a few minutes [place] pulled aside and encouraged to make it a short visit this am as he was not acting like this prior the visit.

## 2022-06-08 NOTE — PROGRESS NOTES
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, new since 9/2021  · Old infarction left occipital lobe, increased since 9/2021  · 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 / Encephalomalacia   · Old lacunar infarcts left basal ganglia   · Mild brain parenchymal volume loss  · Alcohol dependence   · Reported right hemiparesis   · Essential hypertension  · Diabetes mellitus type 2  · Overweight   · Diverticulosis  · Agitation   · Delirium tremens     · Acute respiratory failure with hypoxia   · Oliguria  · Carotid artery disease   · Hypertensive urgency        Plan:      · Patient currently in ICU  · O2 to maintain oxygen saturation greater than 92%  · BiPAP as needed  · Amiodarone  · DuoNebs  · Precedex gtt.   · Seizure precautions  · IV Solu-Medrol  · TPN  · Plan to start tube feeds today  · Add water 350 mL every 4 hour  · CIWA protocol  · Critical care on board  ·    · Waxing and waning mental status   · Neurology following, phenobarbital is discontinued  · Continue Vimpat  · Neurology following  · Zyprexa  · As needed Haldol  ·    · Monitor blood pressure  · Cardene drip, weaning  · IV Lopressor as needed  · Continue clonidine patch  · Nephrology on board  ·    · Vascular evaluated the the patient,   · Soft restraints  · Keep room well lit  · Cardene infusion  · Start physical therapyDVT and GI prophylaxis  · Continue Med as below      Scheduled Meds:   famotidine  20 mg Oral BID    [START ON 4/7/4759] folic acid  1 mg Oral Daily    [START ON 6/8/2022] thiamine mononitrate  100 mg Oral Daily    [START ON 6/8/2022] amLODIPine  10 mg Oral Daily    [START ON 6/8/2022] predniSONE  20 mg Oral Daily    [START ON 6/8/2022] PARoxetine  40 mg Oral Daily    nystatin  5 mL Oral TID    lacosamide (VIMPAT) IVPB  150 mg IntraVENous BID    ipratropium-albuterol  1 ampule Inhalation TID    insulin lispro  0-18 Units SubCUTAneous Q6H    budesonide  0.5 mg Nebulization BID    enoxaparin  40 mg SubCUTAneous Daily    sodium chloride flush  10 mL IntraVENous Once    sodium chloride flush  5-40 mL IntraVENous 2 times per day    aspirin  81 mg Oral Daily    atorvastatin  40 mg Oral Nightly    clopidogrel  75 mg Oral Daily    atenolol  50 mg Oral Daily    losartan  100 mg Oral Daily     Continuous Infusions:   dexmedetomidine (PRECEDEX) IV infusion Stopped (06/02/22 1352)    sodium chloride Stopped (06/06/22 1008)    dextrose      niCARdipene (CARDENE) infusion Stopped (06/07/22 1806)     PRN Meds:  potassium chloride, 20 mEq, PRN   Or  potassium chloride, 10 mEq, PRN  haloperidol lactate, 5 mg, Q6H PRN  LORazepam, 1 mg, Q1H PRN   Or  LORazepam, 1 mg, Q1H PRN   Or  LORazepam, 2 mg, Q1H PRN   Or  LORazepam, 2 mg, Q1H PRN   Or  LORazepam, 3 mg, Q1H PRN   Or  LORazepam, 3 mg, Q1H PRN   Or  LORazepam, 4 mg, Q1H PRN   Or  LORazepam, 4 mg, Q1H PRN  albuterol, 2.5 mg, Q4H PRN  hydrALAZINE, 10 mg, Q6H PRN  sodium chloride flush, 5-40 mL, PRN  sodium chloride, , PRN  acetaminophen, 650 mg, Q4H PRN  ondansetron, 4 mg, Q8H PRN   Or  ondansetron, 4 mg, Q6H PRN  metoprolol, 5 mg, Q6H PRN  glucose, 4 tablet, PRN  dextrose bolus, 125 mL, PRN   Or  dextrose bolus, 250 mL, PRN  glucagon (rDNA), 1 mg, PRN  dextrose, 100 mL/hr, PRN            Subjective:     Patient seen examined at bedside  Patient remains in medical ICU,   Patient was hypoxic intermittently  Has required BiPAP during the day and night  Patient has been much more alert during the day  However became very agitated during the night  Was confused    Needed soft restraints   Blood pressure remained elevated even when patient calmed down   Cardene infusion initiated   Now much better   Responding to questions appropriately   denies any chest pain, cough  Denies vomiting, abdominal pain or diarrhea  Denies headache, neck pain or photophobia        ROS:  Other review of system negative    History of Present Illness:      Sharmila Monroy is a 73 y.o.  male who presents with Seizures     Pt admitted through the ED after an episode of seizure at home. Apparently pt lives at home with his fiancee. According to her, he was sitting in his chair yesterday morning when he developed a seizure. It is difficult to determine what kind of seizure it was. His fiancee is not at bedside right now. However it was confirmed that the pt has never had a similar episode before in his life. It is also reported that he wanted to stop alcohol use however she reported him having a beer the evening before. Urine drug screen has been reported unremarkable and ETOH level was zero. Alcohol withdrawal seizure was suspected though seemed less likely if pt had continued to drink alcohol. Pt apparently drinks 4 beers daily     Pt was admitted to the ICU where he continued to remain agitated. Higher and frequent use of Ativan and later Haldol were ineffective. Pt required soft restraints since he was unsafe for himself trying to get out of bed and trying to pull out Iv     Pt was hypertensive with BP > 200/140 which was not improved w prn meds and was started on Cardene infusion. However pt continued to remain agitated and it was suspected he may be in DTs. Pt was placed on the CIWA protocol and was started on IV fluids and Thiamine, Folate     ROS is difficult to obtain presently     I have personally reviewed the past medical history, past surgical history, medications, social history, and family history, and summarized in the note.         Physical Examination:      Vitals:  BP (!) 154/69   Pulse 76   Temp 97 °F (36.1 °C) (Temporal)   Resp 18   Ht 5' 6\" (1.676 m)   Wt 186 lb 5 oz (84.5 kg)   SpO2 97%   BMI 30.07 kg/m²   Temp (24hrs), Av.5 °F (36.4 °C), Min:97 °F (36.1 °C), Max:97.8 °F (36.6 °C)    Weight:   Wt Readings from Last 3 Encounters:   22 186 lb 5 oz (84.5 kg)   21 156 lb (70.8 kg)   21 174 lb 4.8 oz (79.1 kg)     I/O last 3 completed shifts:  I/O last 3 completed shifts: In: 0287 [I.V.:544; NG/GT:2532; IV Piggyback:254]  Out: 4701 [CNVIZ:6971]     Recent Labs     22  2337 22  0544 22  1134 22  1716   POCGLU 219* 229* 330* 248*         General appearance -lethargic, improved since yesterday. BiPAP  Mental status - oriented to person, place, and time with normal affect  Head - normocephalic and atraumatic  Eyes - pupils equal and reactive, extraocular eye movements intact, conjunctiva clear  Ears - hearing appears to be intact  Nose - no drainage noted  Mouth - mucous membranes moist  Neck - supple, no carotid bruits, thyroid not palpable  Chest - clear to auscultation, normal effort  Heart - normal rate, regular rhythm, no murmur  Abdomen - soft, nontender, nondistended, bowel sounds present all four quadrants, no masses, hepatomegaly or splenomegaly  Neurological - normal speech, no focal findings or movement disorder noted, cranial nerves II through XII grossly intact  Extremities - peripheral pulses palpable, no pedal edema or calf pain with palpation. Edema bilateral hands  Skin - no gross lesions, rashes, or induration noted        Medications:      Allergies: No Known Allergies    Current Meds:   Current Facility-Administered Medications:     famotidine (PEPCID) tablet 20 mg, 20 mg, Oral, BID, Shay Hawkins MD, 20 mg at 22 2016    [START ON 8/3/1765] folic acid (FOLVITE) tablet 1 mg, 1 mg, Oral, Daily, Shay Hawkins MD    [START ON 2022] thiamine mononitrate tablet 100 mg, 100 mg, Oral, Daily, Marino Bahena MD    [START ON 6/8/2022] amLODIPine (NORVASC) tablet 10 mg, 10 mg, Oral, Daily, Brigitte Martin MD  [facility] ON 6/8/2022] predniSONE (DELTASONE) tablet 20 mg, 20 mg, Oral, Daily, Stephanie Dumont MD  [facility] ON 6/8/2022] PARoxetine (PAXIL) tablet 40 mg, 40 mg, Oral, Daily, Stephanie Dumont MD    nystatin (MYCOSTATIN) 847291 UNIT/ML suspension 500,000 Units, 5 mL, Oral, TID, Stephanie Dumont MD, 500,000 Units at 06/07/22 2016    lacosamide (VIMPAT) 150 mg in sodium chloride 0.9 % 65 mL IVPB, 150 mg, IntraVENous, BID, Oswaldo Hernandez MD, Last Rate: 130 mL/hr at 06/07/22 2143, 150 mg at 06/07/22 2143    ipratropium-albuterol (DUONEB) nebulizer solution 1 ampule, 1 ampule, Inhalation, TID, Codie Nicholas MD, 1 ampule at 06/07/22 2035    potassium chloride 20 mEq/50 mL IVPB (Central Line), 20 mEq, IntraVENous, PRN, Stopped at 05/31/22 1445 **OR** potassium chloride 10 mEq/100 mL IVPB (Peripheral Line), 10 mEq, IntraVENous, PRN, Codie Nicholas MD    dexmedetomidine (PRECEDEX) 1,000 mcg in sodium chloride 0.9 % 250 mL infusion, 0.1-1.5 mcg/kg/hr, IntraVENous, Continuous, Shay Hawkins MD, Stopped at 06/02/22 1352    insulin lispro (HUMALOG) injection vial 0-18 Units, 0-18 Units, SubCUTAneous, Q6H, Shay Hawkins MD, 6 Units at 06/07/22 1829    budesonide (PULMICORT) nebulizer suspension 500 mcg, 0.5 mg, Nebulization, BID, Delilah Harada, APRN - CNP, 500 mcg at 06/07/22 2035    enoxaparin (LOVENOX) injection 40 mg, 40 mg, SubCUTAneous, Daily, Delilah Harada, APRN - CNP, 40 mg at 06/07/22 0842    haloperidol lactate (HALDOL) injection 5 mg, 5 mg, IntraMUSCular, Q6H PRN, Shay Hawkins MD, 5 mg at 06/02/22 2237    LORazepam (ATIVAN) tablet 1 mg, 1 mg, Oral, Q1H PRN **OR** LORazepam (ATIVAN) injection 1 mg, 1 mg, IntraVENous, Q1H PRN, 1 mg at 06/01/22 1107 **OR** LORazepam (ATIVAN) tablet 2 mg, 2 mg, Oral, Q1H PRN **OR** LORazepam (ATIVAN) injection 2 mg, 2 mg, IntraVENous, Q1H PRN, 2 mg at 06/02/22 2144 **OR** LORazepam (ATIVAN) tablet 3 mg, 3 mg, Oral, Q1H PRN **OR** LORazepam (ATIVAN) injection 3 mg, 3 mg, IntraVENous, Q1H PRN, 3 mg at 06/02/22 0956 **OR** LORazepam (ATIVAN) tablet 4 mg, 4 mg, Oral, Q1H PRN **OR** LORazepam (ATIVAN) injection 4 mg, 4 mg, IntraVENous, Q1H PRN, Shay Hawkins MD, 4 mg at 06/03/22 0357    albuterol (PROVENTIL) nebulizer solution 2.5 mg, 2.5 mg, Nebulization, Q4H PRN, Micah Weber MD    hydrALAZINE (APRESOLINE) injection 10 mg, 10 mg, IntraVENous, Q6H PRN, Micah Weber MD, 10 mg at 06/06/22 2215    sodium chloride flush 0.9 % injection 10 mL, 10 mL, IntraVENous, Once, Sri Rodriguez MD    sodium chloride flush 0.9 % injection 5-40 mL, 5-40 mL, IntraVENous, 2 times per day, Doris Loving MD, 10 mL at 06/07/22 2149    sodium chloride flush 0.9 % injection 5-40 mL, 5-40 mL, IntraVENous, PRN, Shay Hawkins MD, 10 mL at 06/07/22 1809    0.9 % sodium chloride infusion, , IntraVENous, PRN, Shay Hawkins MD, Stopped at 06/06/22 1008    acetaminophen (TYLENOL) tablet 650 mg, 650 mg, Oral, Q4H PRN, Shay Hawkins MD, 650 mg at 06/04/22 0041    ondansetron (ZOFRAN-ODT) disintegrating tablet 4 mg, 4 mg, Oral, Q8H PRN **OR** ondansetron (ZOFRAN) injection 4 mg, 4 mg, IntraVENous, Q6H PRN, Shay Hawkins MD    metoprolol (LOPRESSOR) injection 5 mg, 5 mg, IntraVENous, Q6H PRN, Shay Hawkins MD, 5 mg at 06/07/22 2104    glucose chewable tablet 16 g, 4 tablet, Oral, PRN, Shay Hawkins MD    dextrose bolus 10% 125 mL, 125 mL, IntraVENous, PRN **OR** dextrose bolus 10% 250 mL, 250 mL, IntraVENous, PRN, Shay Hawkins MD    glucagon (rDNA) injection 1 mg, 1 mg, IntraMUSCular, PRN, Shay Hawkins MD    dextrose 5 % solution, 100 mL/hr, IntraVENous, PRN, Shay Hawkins MD    aspirin chewable tablet 81 mg, 81 mg, Oral, Daily, Shay Hawkins MD, 81 mg at 06/07/22 0834    atorvastatin (LIPITOR) tablet 40 mg, 40 mg, Oral, Nightly, Shay Hawkins MD, 40 mg at 06/07/22 2015    clopidogrel (PLAVIX) tablet 75 mg, 75 mg, Oral, Daily, Shay Hawkins MD, 75 mg at 06/07/22 0834    atenolol (TENORMIN) tablet 50 mg, 50 mg, Oral, Daily, Paulette Kaye MD, 50 mg at 06/07/22 0834    losartan (COZAAR) tablet 100 mg, 100 mg, Oral, Daily, Paulette Kaye MD, 100 mg at 06/07/22 0834    niCARdipine (CARDENE) 50 mg in dextrose 5 % 250 mL infusion, 2.5-15 mg/hr, IntraVENous, Continuous, Shay Hawkins MD, Stopped at 06/07/22 1806      I/O (24Hr):     Intake/Output Summary (Last 24 hours) at 6/7/2022 2226  Last data filed at 6/7/2022 2000  Gross per 24 hour   Intake 2451.64 ml   Output 3125 ml   Net -673.36 ml       Data:           Labs:    Hematology:  Recent Labs     06/05/22  0415 06/06/22  0345 06/07/22  0329   WBC 15.7* 16.5* 20.3*   RBC 3.78* 3.73* 3.93*   HGB 11.9* 11.9* 12.3*   HCT 36.2* 36.6* 37.6*   MCV 95.8 98.1 95.7   MCH 31.5 31.9 31.3   MCHC 32.9 32.5 32.7   RDW 13.0 13.0 13.0    208 252   MPV 10.5 10.9 11.3     Chemistry:  Recent Labs     06/05/22  0415 06/05/22  1713 06/06/22  0345 06/07/22  0329     --  136 144   K 4.2  --  4.5 4.0     --  104 107   CO2 23  --  24 28   GLUCOSE 229*  --  293* 191*   BUN 51*  --  59* 52*   CREATININE 1.36*  --  1.44* 1.48*   ANIONGAP 10  --  8* 9   LABGLOM 52*  --  49* 48*   GFRAA >60  --  59* 58*   CALCIUM 7.8*  --  8.1* 8.2*   PHOS  --   --  3.1  --    PROBNP  --  2,280*  --   --      Recent Labs     06/05/22  1713 06/05/22  1739 06/06/22  1108 06/06/22  1708 06/06/22  2337 06/07/22  0544 06/07/22  1134 06/07/22  1716   PROT 4.9*  --   --   --   --   --   --   --    LABALBU 2.5*  --   --   --   --   --   --   --    AST 29  --   --   --   --   --   --   --    ALT 54*  --   --   --   --   --   --   --    ALKPHOS 60  --   --   --   --   --   --   --    BILITOT 0.14*  --   --   --   --   --   --   --    BILIDIR <0.08  --   --   --   --   --   --   --    AMMONIA 26  --   --   --   --   --   --   --    POCGLU  --    < > 269* 252* 219* 229* 330* 248*    < > = values in this interval not displayed. Lab Results   Component Value Date/Time    SPECIAL NOT REPORTED 02/26/2021 09:30 AM     Lab Results   Component Value Date/Time    CULTURE NO GROWTH 3 DAYS 02/26/2021 09:30 AM       Lab Results   Component Value Date    POCPH 7.430 06/04/2022    POCPCO2 35.2 06/04/2022    POCPO2 79.7 06/04/2022    POCHCO3 23.4 06/04/2022    NBEA 1 06/04/2022    PBEA 0 05/27/2022    DOLS2YCW 96 06/04/2022    FIO2 30.0 06/04/2022       Radiology:    XR CHEST (SINGLE VIEW FRONTAL)    Result Date: 5/26/2022  Mild hypoventilatory changes noted both lung bases. CT HEAD WO CONTRAST    Result Date: 5/25/2022  No acute intracranial hemorrhage or abnormal extra-axial collection. Relative to 9 months prior there are increased areas of encephalomalacia in the left occipital lobe and new areas of encephalomalacia in the left parietal lobe. If there is persistent clinical concern for acute on chronic ischemia, MRI is more sensitive. XR CHEST PORTABLE    Result Date: 5/31/2022  Stable support line interval increase in now moderate left basilar opacity likely related to combined pleural-parenchymal process, pneumonia the likely etiology     XR CHEST PORTABLE    Result Date: 5/30/2022  Elevated left hemidiaphragm with apparent atelectasis left base. Slight improvement in right basilar opacity which may represent resolving atelectasis or improved minimal airspace disease. XR CHEST PORTABLE    Result Date: 5/29/2022  Interval developing right basilar opacity which may be related atelectasis or developing focal airspace disease. XR CHEST PORTABLE    Result Date: 5/28/2022  No significant finding in the chest.     CT CHEST ABDOMEN PELVIS W CONTRAST    Result Date: 5/25/2022  Small cysts noted in the liver. Diverticular disease of the colon without diverticulitis.   Significant thickening of the bladder wall, in keeping with muscular hypertrophy or cystitis. Clinical correlation suggested. Degenerative changes demonstrated in the the thoracic and lumbar spine. RECOMMENDATIONS:     MRI BRAIN W WO CONTRAST    Result Date: 5/25/2022  No acute intracranial abnormality. No mass effect or abnormal intracranial enhancement. Old infarctions in the left frontal parietal lobes, new since September 21, 2021. Old infarction in the left occipital lobe, likely increased in size since September 21, 2021. Laminar necrosis and gliosis associated with the old infarctions. Old lacunar infarcts in the left basal ganglia, likely increased. Mild parenchymal volume loss. All radiological studies reviewed  Code Status:  Full Code        Electronically signed by Dottie Almodovar MD on 6/7/2022 at 10:26 PM    This note was created with the assistance of a speech-recognition program.  Although the intention is to generate a document that actually reflects the content of the visit, no guarantees can be provided that every mistake has been identified and corrected by editing. Note was updated later by me after  physical examination and  completion of the assessment.

## 2022-06-08 NOTE — PROGRESS NOTES
End Of Shift Note  [facility]  Summary of shift: Pt restless at times and slides down in bed and was caught with NGT in hand once. Staff able to get tube out of hand without NGT becoming dislodged. Pt continues with NGT for meds and tube feeding - tolerating well at goal with 2 BMs today. Cardene drip weaned off and Dr Lona Mclean states if pt can stay off cardene tonight pt may be able to be transferred to [facility] tomorrow. Will continue to monitor.     Vitals:    Vitals:    06/08/22 1700 06/08/22 1730 06/08/22 1800 06/08/22 1830   BP: (!) 148/61 (!) 151/77 (!) 161/64 (!) 145/55   Pulse: 76 75 79 73   Resp:   20    Temp:       TempSrc:       SpO2: 94% 93% 93% 94%   Weight:       Height:            I&O:     Intake/Output Summary (Last 24 hours) at 6/8/2022 1837  Last data filed at 6/8/2022 1800  Gross per 24 hour   Intake 2559.14 ml   Output 2525 ml   Net 34.14 ml       Resp Status: 2L NC    Critical Care IV infusions:   dexmedetomidine (PRECEDEX) IV infusion Stopped (06/02/22 1352)    sodium chloride Stopped (06/06/22 1008)    dextrose      niCARdipene (CARDENE) infusion Stopped (06/08/22 1313)        LDA:   Extended Dwell Peripherial IV 06/03/22 Left Brachial (Active)   Number of days: 5       PICC Double Lumen 01/06/41 Right Basilic (Active)   Number of days: 13       NG/OG/NJ/NE Tube Nasogastric 14 fr Left nostril (Active)   Number of days: 5       Urinary Catheter Peterson (Active)   Number of days: 11       Incision 09/22/21 Neck Left (Active)   Number of days: 259

## 2022-06-08 NOTE — PROGRESS NOTES
Pt was repeatedly sliding down in bed, unable to keep head elevated. Pt began sounding as if tube feed was backing up in throat. He has 120 ml of residual tube feed was shut off. Dr Kelli Brito was perfect served. He also began running high bp's, lopressor was ineffective and had nothing else to administer.

## 2022-06-08 NOTE — PLAN OF CARE
Problem: Skin/Tissue Integrity - Adult  Goal: Skin integrity remains intact  Outcome: Progressing  Flowsheets  Taken 6/8/2022 0808 by Devi Rhodes RN  Skin Integrity Remains Intact: Monitor for areas of redness and/or skin breakdown  Taken 6/8/2022 0400 by Dania Ferro RN  Skin Integrity Remains Intact: Monitor for areas of redness and/or skin breakdown     Problem: Pain  Goal: Verbalizes/displays adequate comfort level or baseline comfort level  Outcome: Progressing  Flowsheets (Taken 6/8/2022 0400 by Dania Ferro RN)  Verbalizes/displays adequate comfort level or baseline comfort level: Encourage patient to monitor pain and request assistance     Problem: Safety - Medical Restraint  Goal: Remains free of injury from restraints (Restraint for Interference with Medical Device)  Description: INTERVENTIONS:  1. Determine that other, less restrictive measures have been tried or would not be effective before applying the restraint  2. Evaluate the patient's condition at the time of restraint application  3. Inform patient/family regarding the reason for restraint  4. Q2H: Monitor safety, psychosocial status, comfort, nutrition and hydration  Recent Flowsheet Documentation  Taken 6/8/2022 0800 by Devi Rhodes RN  Remains free of injury from restraints (restraint for interference with medical device): Inform patient/family regarding the reason for restraint   Every 2 hours: Monitor safety, psychosocial status, comfort, nutrition and hydration  Taken 6/8/2022 0600 by Dania Ferro RN  Remains free of injury from restraints (restraint for interference with medical device): Inform patient/family regarding the reason for restraint  Taken 6/8/2022 0400 by Dania Ferro RN  Remains free of injury from restraints (restraint for interference with medical device):  Inform patient/family regarding the reason for restraint  Taken 6/8/2022 0200 by Dania Ferro RN  Remains free of injury from restraints (restraint for interference with medical device): Inform patient/family regarding the reason for restraint   Continue to monitor skin integrity and IV sites. Patient will have progressive improvement with pain scale with interventions. Pt will remain safe while at [address] No seizures noted this shift, will continue to monitor.

## 2022-06-08 NOTE — PROGRESS NOTES
Occupational Therapy  Facility/Department: [facility] ICU  Daily Treatment Note  NAME: Sydni Patton  : 1955  MRN: [id number]    Date of Service: 2022    Discharge Recommendations:  Patient would benefit from continued therapy after discharge     Pt is currently functional below baseline and recommend comprehensive and intensive skilled therapy by a multidisciplinary team. Would expect patient to be able to tolerate 3 hours of therapy per day and able to tolerate at least one hour up in chair. Please refer to AM-PAC score for current mobility/adl level. RN reports patient is medically stable for therapy treatment this date. Chart reviewed prior to treatment and patient is agreeable for therapy. All lines intact and patient positioned comfortably at end of treatment. All patient needs addressed prior to ending therapy session. PER H&P: Sydni Patton is a 73 y.o.  male who presents with Seizures     Pt admitted through the ED after an episode of seizure at home. Apparently pt lives at home with his fiancee. According to her, he was sitting in his chair yesterday morning when he developed a seizure. It is difficult to determine what kind of seizure it was. His fiancee is not at bedside right now. However it was confirmed that the pt has never had a similar episode before in his life. It is also reported that he wanted to stop alcohol use however she reported him having a beer the evening before. Urine drug screen has been reported unremarkable and ETOH level was zero. Alcohol withdrawal seizure was suspected though seemed less likely if pt had continued to drink alcohol. Pt apparently drinks 4 beers daily     Pt was admitted to the ICU where he continued to remain agitated. Higher and frequent use of Ativan and later Haldol were ineffective.  Pt required soft restraints since he was unsafe for himself trying to get out of bed and trying to pull out Iv     Pt was hypertensive with BP > 200/140 which was not improved w prn meds and was started on Cardene infusion. However pt continued to remain agitated and it was suspected he may be in DTs. Pt was placed on the CIWA protocol and was started on IV fluids and Thiamine, Folate. OT Equipment Recommendations  Equipment Needed: Yes  ADL Assistive Devices: Toileting - 3-in-1 Commode    Patient Diagnosis(es): The encounter diagnosis was New onset seizure ([facility] [address] Assessment     Pt with deficits of strength, bed mobility, transfers,  balance, safety awareness and endurance this session,  & required 2 assist for SAFE functional mobility, & transfers, not able to tolerate ambulation this session. With current deficits, Pt HIGH risk for falls & requires continued OT to maximize independence with functional mobility, balance, safety awareness & activity tolerance. Activity Tolerance: Treatment limited secondary to medical complications;Treatment limited secondary to decreased cognition;Patient limited by fatigue;Patient limited by endurance  Discharge Recommendations: Patient would benefit from continued therapy after discharge  Equipment Needed: Yes      Plan   Plan  Times per Week: 4-5x a week, 1-2x a day  Current Treatment Recommendations: Strengthening;ROM; Functional mobility training;Balance training; Endurance training;Gait training;Neuromuscular re-education;Positioning;Equipment evaluation, education, & procurement;Patient/Caregiver education & training; Safety education & training;Self-Care / ADL; Coordination training     Subjective   Subjective  Pain: Denies pain  Orientation  Overall Orientation Status: Impaired  Orientation Level: Disoriented to place; Disoriented to time;Disoriented to situation;Oriented to person  Cognition  Overall Cognitive Status: Exceptions  Following Commands: Follows one step commands with repetition; Follows one step commands with increased time  Attention Span: Difficulty attending to directions; Difficulty dividing assistance;Dependent/Total  Additional Comments: Pt requires MAX A for all ADLs d/t decreased sitting balance, cognition, strength and ROM this date. Safety Devices  Type of Devices: Left in bed;Call light within reach;Nurse notified;Gait belt;Bed alarm in place; All fall risk precautions in place; Patient at risk for falls  Restraints  Restraints Initially in Place: Yes  Restraints: B wrists soft restraints     Patient Education  Education Given To: Patient; Family  Education Provided: Role of Therapy;Plan of Care;Precautions  Education Provided Comments: Verbal edu given on proper log rolling for bed mob and proper weight bearing through BUEs to maintain sitting balance at EOB. Education Method: Demonstration;Verbal  Barriers to Learning: Cognition  Education Outcome: Continued education needed     Pt is at risk for skin breakdown. Patient positioned appropriately after treatment with all high risk areas elevated or propped. Pt reports being comfortable at end of treatment. Goals  Short Term Goals  Time Frame for Short term goals: By discharge, pt to  Short Term Goal 1: tolerate reassess of ADL transfers and functional mob, as appropriate, with use of AD as needed. Short Term Goal 2: demo MOD x1 for bed mob tasks with MIN cues and use of bed rails as needed. Short Term Goal 3: demo SBA with simple feeding and grooming tasks, at appropriate level, with use of AE as needed. Short Term Goal 4: Increase BUE strength by 1/2 grade to assist with functional tasks. Short Term Goal 5: Pt/caregiver to demo and verb good understanding of edu with ADL compensatory techs, BUE HEP, possible equip needs, RW safety/mgmt, fall prevention techs, positioning for skin integrity, EC/WS techs, breathing techs, and discharge recommendations. Long Term Goals  Long Term Goal 1: MOD x1 with toileting tasks with good safety, pacing and use of AD/DME as needed.   Patient Goals   Patient goals : to get stronger       Therapy Time   Individual Concurrent Group Co-treatment   Time In 2513         Time Out 1201         Minutes 32          Co-treatment with PT warranted secondary to decreased safety and independence requiring 2 skilled therapy professionals to address individual discipline's goals. OT addressing preparation for ADL transfer, sitting balance for increased ADL performance, sitting/activity tolerance, functional reaching, environmental safety/scanning, fall prevention, functional mobility for ADL transfers, ability to sequence and follow directions and functional UE strength.        Freida Mcnair, OT/S

## 2022-06-08 NOTE — CARE COORDINATION
called patients emergency contact, Curtis Emmanuel, to discuss IMM letter.  explained appeal process and confirmed patient address to mail the letter.

## 2022-06-08 NOTE — PROGRESS NOTES
Pulmonary Critical Care Progress Note  Garrick oNvak MD     Patient seen for the follow up of hypoxia,  Seizure ([address]     Subjective:  Patient had no significant overnight events. He did not use BiPAP last night. He is on oxygen by nasal cannula at 2 L/min. He is tolerating tube feeds. He denies any chest pain. He remains on Cardene drip. He is in soft restraints. He is responding to question but still remains pleasantly confused. Examination:  Vitals: /73   Pulse 73   Temp 99.5 °F (37.5 °C) (Oral)   Resp 27   Ht 5' 6\" (1.676 m)   Wt 186 lb 5 oz (84.5 kg)   SpO2 92%   BMI 30.07 kg/m²   General appearance: Awake and alert with periods of confusion  Neck: No JVD  Lungs: diminished air exchange, no crackles or wheezing  Heart: regular rate and rhythm, S1, S2 normal, no gallop  Abdomen: Soft, non tender, + BS  Extremities: no cyanosis or clubbing.  No significant edema    LABs:  CBC:   Recent Labs     06/07/22  0329 06/08/22  0458   WBC 20.3* 18.6*   HGB 12.3* 11.8*   HCT 37.6* 35.2*    217     BMP:   Recent Labs     06/07/22  0329 06/08/22  0458    142   K 4.0 4.2   CO2 28 30   BUN 52* 40*   CREATININE 1.48* 1.41*   LABGLOM 48* 50*   GLUCOSE 191* 268*     ABG:  Lab Results   Component Value Date    FIO2 30.0 06/04/2022       Lab Results   Component Value Date    POCPH 7.430 06/04/2022    POCPCO2 35.2 06/04/2022    POCPO2 79.7 06/04/2022    POCHCO3 23.4 06/04/2022    NBEA 1 06/04/2022    PBEA 0 05/27/2022    [id number] 96 06/04/2022    FIO2 30.0 06/04/2022     Radiology:  CXR 6/8/22      Impression:  · Acute respiratory insufficiency with hypoxia   · New onset seizure   · Alcohol abuse/ dependence/ withdrawal   · History of CVA   · Obstructive sleep apnea  · Active smoking/ likely COPD   · Severe right Carotid artery stenosis, status post left CEA 9/21  · Diabetes mellitus  · Hypertensive urgency    Recommendations:  · Continue BIPAP support as needed   · 2 liters/min via nasal cannula to keep spO2 greater than 92% as tolerated  · Prednisone 20 mg daily  · Ipratropium- Albuterol BID   · Budesonide via nebulizer BID   · Watch off of Precedex drip, titrate to RAAS goal of 0 to -1, wean down  · Ativan per CIWA protocol   · Titrate Cardene drip for now. · Vimpat 200mg BID, phenobarbital 65mg BID, neurology following   · Seizure precautionsNephology following  · Tube feeds  · Will monitor off of antibiotics at this time. Left basilar infiltrate with no fever or leukocytosis,? Atelectasis. We will do radiologic follow-up without starting antibiotics.     · Discussed with RN  · Labs: CBC and BMP in am  · DVT prophylaxis with low molecular weight heparin  · Discharge planning for a.m. if blood pressure remains okay off Cardene drip in the next 24 hours  · Will follow with you  Electronically signed by     Laura Davis MD on 6/8/2022 at 1:10 PM  Pulmonary Critical Care and Sleep Medicine,  [facility]  Cell: [phone]  Office: [phone]

## 2022-06-08 NOTE — PROGRESS NOTES
Reason for Follow up: ADRIANA    Subjective:    Patient seen and examined the bedside   Patient is awake and alert   Asking if he can drink water, NG in place, on TFs  Cr 1.4, Na normal    Review Of Systems:   Patient still has NG tube in  Tolerating TFs    Scheduled Meds:   insulin glargine  8 Units SubCUTAneous Nightly    carvedilol  6.25 mg Oral BID WC    famotidine  20 mg Oral BID    folic acid  1 mg Oral Daily    thiamine mononitrate  100 mg Oral Daily    amLODIPine  10 mg Oral Daily    predniSONE  20 mg Oral Daily    PARoxetine  40 mg Oral Daily    nystatin  5 mL Oral TID    lacosamide (VIMPAT) IVPB  150 mg IntraVENous BID    ipratropium-albuterol  1 ampule Inhalation TID    insulin lispro  0-18 Units SubCUTAneous Q6H    budesonide  0.5 mg Nebulization BID    enoxaparin  40 mg SubCUTAneous Daily    sodium chloride flush  10 mL IntraVENous Once    sodium chloride flush  5-40 mL IntraVENous 2 times per day    aspirin  81 mg Oral Daily    atorvastatin  40 mg Oral Nightly    clopidogrel  75 mg Oral Daily    losartan  100 mg Oral Daily   Continuous Infusions:   dexmedetomidine (PRECEDEX) IV infusion Stopped (06/02/22 1352)    sodium chloride Stopped (06/06/22 1008)    dextrose      niCARdipene (CARDENE) infusion 2.5 mg/hr (06/08/22 1224)     PRN Meds:potassium chloride **OR** potassium chloride, haloperidol lactate, LORazepam **OR** LORazepam **OR** LORazepam **OR** LORazepam **OR** LORazepam **OR** LORazepam **OR** LORazepam **OR** LORazepam, albuterol, hydrALAZINE, sodium chloride flush, sodium chloride, acetaminophen, ondansetron **OR** ondansetron, metoprolol, glucose, dextrose bolus **OR** dextrose bolus, glucagon (rDNA), dextrose    No Known Allergies    Physical Exam:  Blood pressure 102/73, pulse 73, temperature 99.5 °F (37.5 °C), temperature source Oral, resp. rate 27, height 5' 6\" (1.676 m), weight 186 lb 5 oz (84.5 kg), SpO2 92 %.   In: 3056.6 [I.V.:463.6; NG/GT:2404]  Out: 2550   In: 3056.6   Out: 2550 [Urine:2550]    General:  Not in distress. Appears to be stated age. HEENT: Atraumatic, normocephalic. Anicteric sclera. Pink and moist oral mucosa. Chest: Bilateral air entry, coarse to auscultation. Cardiovascular: RRR, S1S2, no murmur, rub or gallop. Has no lower extremity edema. Abdomen: Soft, non tender to palpation. Active bowel sounds. Musculoskeletal: No cyanosis or clubbing. Integumentary: Pink, warm and dry. Free from rash or lesions. Skin turgor normal.  CNS: Grossly unremarkable much more awake and alert.     Data:  CBC:   Lab Results   Component Value Date    WBC 18.6 (H) 06/08/2022    HGB 11.8 (L) 06/08/2022    HCT 35.2 (L) 06/08/2022    MCV 95.9 06/08/2022     06/08/2022     BMP:    Lab Results   Component Value Date     06/08/2022    K 4.2 06/08/2022     06/08/2022    CO2 30 06/08/2022    BUN 40 (H) 06/08/2022    CREATININE 1.41 (H) 06/08/2022    GLUCOSE 268 (H) 06/08/2022     CMP:   Lab Results   Component Value Date     06/08/2022    K 4.2 06/08/2022     06/08/2022    CO2 30 06/08/2022    BUN 40 (H) 06/08/2022    CREATININE 1.41 (H) 06/08/2022    GLUCOSE 268 (H) 06/08/2022    CALCIUM 8.1 (L) 06/08/2022    PROT 4.9 (L) 06/05/2022    LABALBU 2.5 (L) 06/05/2022    BILITOT 0.14 (L) 06/05/2022    ALKPHOS 60 06/05/2022    AST 29 06/05/2022    ALT 54 (H) 06/05/2022      Hepatic:   Lab Results   Component Value Date    AST 29 06/05/2022    ALT 54 (H) 06/05/2022    BILITOT 0.14 (L) 06/05/2022    ALKPHOS 60 06/05/2022     BNP: No results found for: BNP  Lipids:   Lab Results   Component Value Date    CHOL 185 02/25/2021    HDL 87 02/25/2021     INR:   Lab Results   Component Value Date    INR 1.1 09/22/2021     PTH: No results found for: PTH  Phosphorus:    Lab Results   Component Value Date    PHOS 2.7 06/08/2022     Ionized Calcium: No results found for: IONCA  Magnesium:   Lab Results   Component Value Date    MG 2.2 06/03/2022     Albumin:   Lab Results   Component Value Date    LABALBU 2.5 (L) 06/05/2022     Last 3 CK, CKMB, Troponin: @LABRCNT(CKTOTAL:3,CKMB:3,TROPONINI:3)       URINE:)No results found for: [facility]    Radiology:   Reviewed. Assessment    Acute kidney injury. CKD 3  Urinary retention. Electrolyte abnormalities  Hypertension secondary to DT  EtOH withdrawal    Plan:    Avoid clonidine   Blood pressure better controlled today  Continue free water  250 mL every 4 hours   LTAC transfer to be considered, OK from nephrology perspective  Patient serum creatinine in the range of 1.3-1.6 will be acceptable due to hemodynamic factors and better blood pressure control  Labs in the AM while here    Electronically signed by Matt Daniels MD on 6/8/2022 at 12:52 PM  [facility].   Ph: [phone]

## 2022-06-08 NOTE — PLAN OF CARE
Problem: Discharge Planning  Goal: Discharge to home or other facility with appropriate resources  6/7/2022 2056 by Karl Peters RN  Outcome: Progressing  Flowsheets (Taken 6/7/2022 2000)  Discharge to home or other facility with appropriate resources: Identify barriers to discharge with patient and caregiver  6/7/2022 1556 by Donte Walden RN  Outcome: Progressing  Flowsheets (Taken 6/7/2022 0400 by Karl Peters RN)  Discharge to home or other facility with appropriate resources: Identify barriers to discharge with patient and caregiver     Problem: Safety - Medical Restraint  Goal: Remains free of injury from restraints (Restraint for Interference with Medical Device)  Description: INTERVENTIONS:  1. Determine that other, less restrictive measures have been tried or would not be effective before applying the restraint  2. Evaluate the patient's condition at the time of restraint application  3. Inform patient/family regarding the reason for restraint  4. Q2H: Monitor safety, psychosocial status, comfort, nutrition and hydration  Recent Flowsheet Documentation  Taken 6/7/2022 2000 by Karl Peters RN  Remains free of injury from restraints (restraint for interference with medical device): Inform patient/family regarding the reason for restraint  Taken 6/7/2022 0800 by Donte Walden RN  Remains free of injury from restraints (restraint for interference with medical device): Inform patient/family regarding the reason for restraint   Every 2 hours: Monitor safety, psychosocial status, comfort, nutrition and hydration     Problem: Safety - Medical Restraint  Goal: Remains free of injury from restraints (Restraint for Interference with Medical Device)  Description: INTERVENTIONS:  1. Determine that other, less restrictive measures have been tried or would not be effective before applying the restraint  2. Evaluate the patient's condition at the time of restraint application  3.  Inform patient/family regarding the reason for restraint  4. Q2H: Monitor safety, psychosocial status, comfort, nutrition and hydration  6/7/2022 2056 by Ron Sr RN  Outcome: Progressing  Flowsheets (Taken 6/7/2022 2000)  Remains free of injury from restraints (restraint for interference with medical device): Inform patient/family regarding the reason for restraint  6/7/2022 1556 by Alex Salazar RN  Outcome: Progressing  Flowsheets  Taken 6/7/2022 0800 by Alex Salazar RN  Remains free of injury from restraints (restraint for interference with medical device):    Inform patient/family regarding the reason for restraint   Every 2 hours: Monitor safety, psychosocial status, comfort, nutrition and hydration  Taken 6/7/2022 0600 by Ron Sr RN  Remains free of injury from restraints (restraint for interference with medical device): Evaluate the patient's condition at the time of restraint application  Taken 5/6/0099 0400 by Ron Sr RN  Remains free of injury from restraints (restraint for interference with medical device): Evaluate the patient's condition at the time of restraint application  Taken 7/7/6789 0200 by Ron Sr RN  Remains free of injury from restraints (restraint for interference with medical device): Evaluate the patient's condition at the time of restraint application     Problem: Neurosensory - Adult  Goal: Achieves stable or improved neurological status  6/7/2022 2056 by Ron Sr RN  Outcome: Progressing  Flowsheets (Taken 6/7/2022 2000)  Achieves stable or improved neurological status: Assess for and report changes in neurological status  6/7/2022 1556 by Alex Salazar RN  Outcome: Progressing  Flowsheets (Taken 6/7/2022 0400 by Ron Sr RN)  Achieves stable or improved neurological status: Assess for and report changes in neurological status  Goal: Absence of seizures  6/7/2022 2056 by Ron Sr RN  Outcome: Progressing  Flowsheets (Taken 6/7/2022 2000)  Absence of seizures: Support airway/breathing, administer oxygen as needed  6/7/2022 1556 by Hillary Brice RN  Outcome: Progressing  Flowsheets (Taken 6/7/2022 0400 by Edger Ormond, RN)  Absence of seizures: Support airway/breathing, administer oxygen as needed  Goal: Remains free of injury related to seizures activity  6/7/2022 2056 by Edger Ormond, RN  Outcome: Progressing  Flowsheets (Taken 6/7/2022 2000)  Remains free of injury related to seizure activity: Maintain airway, patient safety  and administer oxygen as ordered  6/7/2022 1556 by Hillary Brice RN  Outcome: Progressing  Flowsheets (Taken 6/7/2022 0400 by Edger Ormond, RN)  Remains free of injury related to seizure activity: Maintain airway, patient safety  and administer oxygen as ordered  Goal: Achieves maximal functionality and self care  6/7/2022 2056 by Edger Ormond, RN  Outcome: Progressing  Flowsheets (Taken 6/7/2022 2000)  Achieves maximal functionality and self care: Monitor swallowing and airway patency with patient fatigue and changes in neurological status  6/7/2022 1556 by Hillary Brice RN  Outcome: Progressing  Flowsheets (Taken 6/7/2022 0400 by Edger Ormond, RN)  Achieves maximal functionality and self care: Monitor swallowing and airway patency with patient fatigue and changes in neurological status     Problem: Respiratory - Adult  Goal: Achieves optimal ventilation and oxygenation  6/7/2022 2056 by Edger Ormond, RN  Outcome: Progressing  Flowsheets (Taken 6/7/2022 2000)  Achieves optimal ventilation and oxygenation: Assess for changes in respiratory status  6/7/2022 1556 by Hillary Brice RN  Outcome: Progressing  Flowsheets (Taken 6/7/2022 0400 by Edger Ormond, RN)  Achieves optimal ventilation and oxygenation:   Assess for changes in respiratory status   Assess for changes in mentation and behavior   Position to facilitate oxygenation and minimize respiratory effort     Problem: Cardiovascular - Adult  Goal: Maintains optimal cardiac output and hemodynamic stability  6/7/2022 2056 by Jana Cohen RN  Outcome: Progressing  Flowsheets (Taken 6/7/2022 2000)  Maintains optimal cardiac output and hemodynamic stability: Monitor blood pressure and heart rate  6/7/2022 1556 by Melissa Feliz RN  Outcome: Progressing  Flowsheets (Taken 6/7/2022 0400 by Jana Cohen RN)  Maintains optimal cardiac output and hemodynamic stability: Monitor blood pressure and heart rate  Goal: Absence of cardiac dysrhythmias or at baseline  6/7/2022 2056 by Jana Cohen RN  Outcome: Progressing  Flowsheets (Taken 6/7/2022 2000)  Absence of cardiac dysrhythmias or at baseline: Monitor cardiac rate and rhythm  6/7/2022 1556 by Melissa Feliz RN  Outcome: Progressing  Flowsheets (Taken 6/7/2022 0400 by Jana Cohen RN)  Absence of cardiac dysrhythmias or at baseline: Monitor cardiac rate and rhythm     Problem: Skin/Tissue Integrity - Adult  Goal: Skin integrity remains intact  6/7/2022 2056 by Jana Cohen RN  Outcome: Progressing  Flowsheets (Taken 6/7/2022 2000)  Skin Integrity Remains Intact: Monitor for areas of redness and/or skin breakdown  6/7/2022 1556 by Melissa Feliz RN  Outcome: Progressing  Flowsheets  Taken 6/7/2022 1145 by Melissa Feliz RN  Skin Integrity Remains Intact:   Monitor for areas of redness and/or skin breakdown   Every 4-6 hours minimum: Change oxygen saturation probe site  Taken 6/7/2022 0400 by Jana Cohen RN  Skin Integrity Remains Intact: Monitor for areas of redness and/or skin breakdown  Goal: Incisions, wounds, or drain sites healing without S/S of infection  6/7/2022 2056 by Jana Cohen RN  Outcome: Progressing  Flowsheets (Taken 6/7/2022 2000)  Incisions, Wounds, or Drain Sites Healing Without Sign and Symptoms of Infection: ADMISSION and DAILY: Assess and document risk factors for pressure ulcer development  6/7/2022 1556 by Melissa Feliz RN  Outcome: Progressing  Flowsheets  Taken 6/7/2022 1145 by Ambar Raya RN  Incisions, Wounds, or Drain Sites Healing Without Sign and Symptoms of Infection:   ADMISSION and DAILY: Assess and document risk factors for pressure ulcer development   TWICE DAILY: Assess and document skin integrity   TWICE DAILY: Assess and document dressing/incision, wound bed, drain sites and surrounding tissue   Implement wound care per orders   Initiate pressure ulcer prevention bundle as indicated  Taken 6/7/2022 0400 by Jesus Galdamez RN  Incisions, Wounds, or Drain Sites Healing Without Sign and Symptoms of Infection: ADMISSION and DAILY: Assess and document risk factors for pressure ulcer development  Goal: Oral mucous membranes remain intact  6/7/2022 2056 by Jesus Galdamez RN  Outcome: Progressing  Flowsheets (Taken 6/7/2022 2000)  Oral Mucous Membranes Remain Intact: Assess oral mucosa and hygiene practices  6/7/2022 1556 by Ambar Raya RN  Outcome: Progressing  Flowsheets  Taken 6/7/2022 1145 by Ambar Raya RN  Oral Mucous Membranes Remain Intact:   Assess oral mucosa and hygiene practices   Implement preventative oral hygiene regimen   Implement oral medicated treatments as ordered  Taken 6/7/2022 0400 by Jesus Galdamez RN  Oral Mucous Membranes Remain Intact: Assess oral mucosa and hygiene practices     Problem: Metabolic/Fluid and Electrolytes - Adult  Goal: Electrolytes maintained within normal limits  6/7/2022 2056 by Jesus Galdamez RN  Outcome: Progressing  Flowsheets (Taken 6/7/2022 2000)  Electrolytes maintained within normal limits: Monitor labs and assess patient for signs and symptoms of electrolyte imbalances  6/7/2022 1556 by Ambar Raya RN  Outcome: Progressing  Flowsheets (Taken 6/7/2022 0400 by Jesus Galdamez RN)  Electrolytes maintained within normal limits: Monitor labs and assess patient for signs and symptoms of electrolyte imbalances  Goal: Hemodynamic stability and optimal renal function maintained  6/7/2022 2056 by Jesus Galdamez RN  Outcome: Progressing  Flowsheets (Taken 6/7/2022 2000)  Hemodynamic stability and optimal renal function maintained: Monitor labs and assess for signs and symptoms of volume excess or deficit  6/7/2022 1556 by Martha Conklin RN  Outcome: Progressing  Flowsheets (Taken 6/7/2022 0400 by Roseann Braga RN)  Hemodynamic stability and optimal renal function maintained: Monitor labs and assess for signs and symptoms of volume excess or deficit  Goal: Glucose maintained within prescribed range  6/7/2022 2056 by Roseann Braga RN  Outcome: Progressing  Flowsheets (Taken 6/7/2022 2000)  Glucose maintained within prescribed range: Monitor blood glucose as ordered  6/7/2022 1556 by Martha Conklin RN  Outcome: Progressing  Flowsheets (Taken 6/7/2022 0400 by Roseann Braga RN)  Glucose maintained within prescribed range: Monitor blood glucose as ordered     Problem: Musculoskeletal - Adult  Goal: Return mobility to safest level of function  6/7/2022 2056 by Roseann Braga RN  Outcome: Progressing  Flowsheets (Taken 6/7/2022 2000)  Return Mobility to Safest Level of Function: Assess patient stability and activity tolerance for standing, transferring and ambulating with or without assistive devices  6/7/2022 1556 by Martha Conklin RN  Outcome: Progressing  Flowsheets (Taken 6/7/2022 0400 by Roseann Braga RN)  Return Mobility to Safest Level of Function: Assess patient stability and activity tolerance for standing, transferring and ambulating with or without assistive devices     Problem: Genitourinary - Adult  Goal: Absence of urinary retention  6/7/2022 2056 by Roseann Braga RN  Outcome: Progressing  Flowsheets (Taken 6/7/2022 2000)  Absence of urinary retention: Monitor intake/output and perform bladder scan as needed  6/7/2022 1556 by Martha Conklin RN  Outcome: Progressing  Flowsheets (Taken 6/7/2022 0400 by Roseann Braga RN)  Absence of urinary retention: Monitor intake/output and perform bladder scan as needed  Goal: Urinary catheter remains patent  6/7/2022 2056 by Roseann Braga RN  Outcome: Progressing  Flowsheets (Taken 6/7/2022 2000)  Urinary catheter remains patent: Assess patency of urinary catheter  6/7/2022 1556 by Martha Conklin RN  Outcome: Progressing  Flowsheets (Taken 6/7/2022 0400 by Roseann Braga RN)  Urinary catheter remains patent: Assess patency of urinary catheter     Problem: Anxiety  Goal: Will report anxiety at manageable levels  Description: INTERVENTIONS:  1. Administer medication as ordered  2. Teach and rehearse alternative coping skills  3. Provide emotional support with 1:1 interaction with staff  6/7/2022 2056 by Roseann Braga RN  Outcome: Progressing  Flowsheets (Taken 6/7/2022 2000)  Will report anxiety at manageable levels: Administer medication as ordered  6/7/2022 1556 by Martha Conklin RN  Outcome: Progressing  Flowsheets (Taken 6/7/2022 0400 by Roseann Braga RN)  Will report anxiety at manageable levels: Administer medication as ordered     Problem: Confusion  Goal: Confusion, delirium, dementia, or psychosis is improved or at baseline  Description: INTERVENTIONS:  1. Assess for possible contributors to thought disturbance, including medications, impaired vision or hearing, underlying metabolic abnormalities, dehydration, psychiatric diagnoses, and notify attending LIP  2. [place] high risk fall precautions, as indicated  3. Provide frequent short contacts to provide reality reorientation, refocusing and direction  4. Decrease environmental stimuli, including noise as appropriate  5. Monitor and intervene to maintain adequate nutrition, hydration, elimination, sleep and activity  6. If unable to ensure safety without constant attention obtain sitter and review sitter guidelines with assigned personnel  7.  Initiate Psychosocial CNS and Spiritual Care consult, as indicated  6/7/2022 2056 by Roseann Braga RN  Outcome: Progressing  Flowsheets (Taken 6/7/2022 2000)  Effect of thought disturbance (confusion, delirium, dementia, or psychosis) are managed with adequate functional status: Assess for contributors to thought disturbance, including medications, impaired vision or hearing, underlying metabolic abnormalities, dehydration, psychiatric diagnoses, notify LIP  6/7/2022 1556 by Gail Hill RN  Outcome: Progressing  Flowsheets (Taken 6/7/2022 0400 by Julieth Jimenez RN)  Effect of thought disturbance (confusion, delirium, dementia, or psychosis) are managed with adequate functional status: Assess for contributors to thought disturbance, including medications, impaired vision or hearing, underlying metabolic abnormalities, dehydration, psychiatric diagnoses, notify LIP     Problem: Behavior  Goal: Pt/Family maintain appropriate behavior and adhere to behavioral management agreement, if implemented  Description: INTERVENTIONS:  1. Assess patient/family's coping skills and  non-compliant behavior (including use of illegal substances)  2. Notify security of behavior or suspected illegal substances which indicate the need for search of the patient and/or belongings  3. Encourage verbalization of thoughts and concerns in a socially appropriate manner  4. Utilize positive, consistent limit setting strategies supporting safety of patient, staff and others  5. Encourage participation in the decision making process about the behavioral management agreement  6. Implement a Health Care Agreement if patient meets criteria  7. If a patient's behavior jeopardizes the safety of the patient, staff, or others refer to organization policy. If a visitor's behavior poses a threat to safety call refer to organization policy.   8. Initiate consult with , Psychosocial CNS, Spiritual Care as appropriate  6/7/2022 2056 by Julieth Jimenez RN  Outcome: Progressing  Flowsheets (Taken 6/7/2022 2000)  Patient/family maintains appropriate behavior and adheres to behavioral management agreement, if implemented: Assess patient/familys coping skills and  non-compliant behavior (including use of illegal substances)  6/7/2022 1556 by Alex Salazar RN  Outcome: Progressing  Flowsheets (Taken 6/7/2022 0400 by Ron Sr RN)  Patient/family maintains appropriate behavior and adheres to behavioral management agreement, if implemented: Assess patient/familys coping skills and  non-compliant behavior (including use of illegal substances)     Problem: Chronic Conditions and Co-morbidities  Goal: Patient's chronic conditions and co-morbidity symptoms are monitored and maintained or improved  6/7/2022 2056 by Ron Sr RN  Outcome: Progressing  Flowsheets (Taken 6/7/2022 2000)  Care Plan - Patient's Chronic Conditions and Co-Morbidity Symptoms are Monitored and Maintained or Improved: Monitor and assess patient's chronic conditions and comorbid symptoms for stability, deterioration, or improvement  6/7/2022 1556 by Alex Salazar RN  Outcome: Progressing  Flowsheets (Taken 6/7/2022 0400 by Ron Sr RN)  Care Plan - Patient's Chronic Conditions and Co-Morbidity Symptoms are Monitored and Maintained or Improved: Monitor and assess patient's chronic conditions and comorbid symptoms for stability, deterioration, or improvement     Problem: Pain  Goal: Verbalizes/displays adequate comfort level or baseline comfort level  6/7/2022 2056 by Ron Sr RN  Outcome: Progressing  6/7/2022 1556 by Alex Salazar RN  Outcome: Progressing  Flowsheets (Taken 6/7/2022 0400 by Ron Sr RN)  Verbalizes/displays adequate comfort level or baseline comfort level: Encourage patient to monitor pain and request assistance     Problem: ABCDS Injury Assessment  Goal: Absence of physical injury  6/7/2022 2056 by Ron Sr RN  Outcome: Progressing  6/7/2022 1556 by Alex Salazar RN  Outcome: Progressing     Problem: Nutrition Deficit:  Goal: Optimize nutritional status  6/7/2022 2056 by Ron Sr RN  Outcome: Progressing  6/7/2022 1556 by Darlean Cockayne, RN  Outcome: Progressing

## 2022-06-09 ENCOUNTER — HOSPITAL ENCOUNTER (OUTPATIENT)
Dept: MEDSURG UNIT | Age: 67
Discharge: NURSING, REHAB OR LONG-TERM CARE | End: 2022-06-09
Attending: INTERNAL MEDICINE | Admitting: INTERNAL MEDICINE

## 2022-06-09 VITALS
SYSTOLIC BLOOD PRESSURE: 159 MMHG | RESPIRATION RATE: 31 BRPM | HEIGHT: 66 IN | OXYGEN SATURATION: 94 % | HEART RATE: 89 BPM | TEMPERATURE: 98.4 F | BODY MASS INDEX: 29.94 KG/M2 | WEIGHT: 186.31 LBS | DIASTOLIC BLOOD PRESSURE: 76 MMHG

## 2022-06-09 LAB
ABSOLUTE EOS #: 0.04 K/UL (ref 0–0.44)
ABSOLUTE IMMATURE GRANULOCYTE: 0.19 K/UL (ref 0–0.3)
ABSOLUTE LYMPH #: 0.86 K/UL (ref 1.1–3.7)
ABSOLUTE MONO #: 1.38 K/UL (ref 0.1–1.2)
ANION GAP SERPL CALCULATED.3IONS-SCNC: 9 MMOL/L (ref 9–17)
BASOPHILS # BLD: 0 % (ref 0–2)
BASOPHILS ABSOLUTE: <0.03 K/UL (ref 0–0.2)
BUN BLDV-MCNC: 32 MG/DL (ref 8–23)
BUN/CREAT BLD: 26 (ref 9–20)
CALCIUM SERPL-MCNC: 7.8 MG/DL (ref 8.6–10.4)
CHLORIDE BLD-SCNC: 108 MMOL/L (ref 98–107)
CO2: 27 MMOL/L (ref 20–31)
CREAT SERPL-MCNC: 1.21 MG/DL (ref 0.7–1.2)
EOSINOPHILS RELATIVE PERCENT: 0 % (ref 1–4)
GFR AFRICAN AMERICAN: >60 ML/MIN
GFR NON-AFRICAN AMERICAN: >60 ML/MIN
GFR SERPL CREATININE-BSD FRML MDRD: ABNORMAL ML/MIN/{1.73_M2}
GLUCOSE BLD-MCNC: 190 MG/DL (ref 70–99)
GLUCOSE BLD-MCNC: 204 MG/DL (ref 75–110)
GLUCOSE BLD-MCNC: 214 MG/DL (ref 75–110)
GLUCOSE BLD-MCNC: 264 MG/DL (ref 75–110)
HCT VFR BLD CALC: 31.3 % (ref 40.7–50.3)
HEMOGLOBIN: 10.6 G/DL (ref 13–17)
IMMATURE GRANULOCYTES: 1 %
LYMPHOCYTES # BLD: 5 % (ref 24–43)
MCH RBC QN AUTO: 32.4 PG (ref 25.2–33.5)
MCHC RBC AUTO-ENTMCNC: 33.9 G/DL (ref 28–38)
MCV RBC AUTO: 95.7 FL (ref 82.6–102.9)
MONOCYTES # BLD: 8 % (ref 3–12)
PDW BLD-RTO: 12.9 % (ref 11.8–14.4)
PLATELET # BLD: 217 K/UL (ref 138–453)
PMV BLD AUTO: 11.2 FL (ref 8.1–13.5)
POTASSIUM SERPL-SCNC: 4.2 MMOL/L (ref 3.7–5.3)
RBC # BLD: 3.27 M/UL (ref 4.21–5.77)
SEG NEUTROPHILS: 86 % (ref 36–65)
SEGMENTED NEUTROPHILS ABSOLUTE COUNT: 15.09 K/UL (ref 1.5–8.1)
SODIUM BLD-SCNC: 144 MMOL/L (ref 135–144)
WBC # BLD: 17.6 K/UL (ref 3.5–11.3)

## 2022-06-09 PROCEDURE — 6360000002 HC RX W HCPCS: Performed by: PSYCHIATRY & NEUROLOGY

## 2022-06-09 PROCEDURE — 2580000003 HC RX 258: Performed by: PSYCHIATRY & NEUROLOGY

## 2022-06-09 PROCEDURE — 2500000003 HC RX 250 WO HCPCS: Performed by: FAMILY MEDICINE

## 2022-06-09 PROCEDURE — 97168 OT RE-EVAL EST PLAN CARE: CPT

## 2022-06-09 PROCEDURE — 97164 PT RE-EVAL EST PLAN CARE: CPT

## 2022-06-09 PROCEDURE — 97166 OT EVAL MOD COMPLEX 45 MIN: CPT

## 2022-06-09 PROCEDURE — 6360000002 HC RX W HCPCS: Performed by: INTERNAL MEDICINE

## 2022-06-09 PROCEDURE — 85025 COMPLETE CBC W/AUTO DIFF WBC: CPT

## 2022-06-09 PROCEDURE — 94761 N-INVAS EAR/PLS OXIMETRY MLT: CPT

## 2022-06-09 PROCEDURE — 6370000000 HC RX 637 (ALT 250 FOR IP): Performed by: SPECIALIST

## 2022-06-09 PROCEDURE — 94640 AIRWAY INHALATION TREATMENT: CPT

## 2022-06-09 PROCEDURE — 6370000000 HC RX 637 (ALT 250 FOR IP): Performed by: FAMILY MEDICINE

## 2022-06-09 PROCEDURE — 97530 THERAPEUTIC ACTIVITIES: CPT

## 2022-06-09 PROCEDURE — 2700000000 HC OXYGEN THERAPY PER DAY

## 2022-06-09 PROCEDURE — 6370000000 HC RX 637 (ALT 250 FOR IP): Performed by: INTERNAL MEDICINE

## 2022-06-09 PROCEDURE — 82947 ASSAY GLUCOSE BLOOD QUANT: CPT

## 2022-06-09 PROCEDURE — 2580000003 HC RX 258: Performed by: FAMILY MEDICINE

## 2022-06-09 PROCEDURE — 80048 BASIC METABOLIC PNL TOTAL CA: CPT

## 2022-06-09 PROCEDURE — 97535 SELF CARE MNGMENT TRAINING: CPT

## 2022-06-09 PROCEDURE — 6360000002 HC RX W HCPCS: Performed by: NURSE PRACTITIONER

## 2022-06-09 PROCEDURE — 36415 COLL VENOUS BLD VENIPUNCTURE: CPT

## 2022-06-09 PROCEDURE — 97112 NEUROMUSCULAR REEDUCATION: CPT

## 2022-06-09 PROCEDURE — 92526 ORAL FUNCTION THERAPY: CPT

## 2022-06-09 PROCEDURE — C9254 INJECTION, LACOSAMIDE: HCPCS | Performed by: PSYCHIATRY & NEUROLOGY

## 2022-06-09 RX ORDER — INSULIN GLARGINE 100 [IU]/ML
8 INJECTION, SOLUTION SUBCUTANEOUS NIGHTLY
Qty: 10 ML | Refills: 3 | Status: SHIPPED | OUTPATIENT
Start: 2022-06-09

## 2022-06-09 RX ORDER — ALBUTEROL SULFATE 2.5 MG/3ML
2.5 SOLUTION RESPIRATORY (INHALATION) EVERY 4 HOURS PRN
Qty: 120 EACH | Refills: 3 | Status: SHIPPED | OUTPATIENT
Start: 2022-06-09

## 2022-06-09 RX ORDER — THIAMINE MONONITRATE (VIT B1) 100 MG
100 TABLET ORAL DAILY
Qty: 30 TABLET | Refills: 0 | Status: SHIPPED | OUTPATIENT
Start: 2022-06-10

## 2022-06-09 RX ORDER — FAMOTIDINE 20 MG/1
20 TABLET, FILM COATED ORAL 2 TIMES DAILY
Qty: 60 TABLET | Refills: 3 | Status: SHIPPED | OUTPATIENT
Start: 2022-06-09

## 2022-06-09 RX ORDER — CARVEDILOL 12.5 MG/1
25 TABLET ORAL 2 TIMES DAILY WITH MEALS
Qty: 60 TABLET | Refills: 3 | Status: SHIPPED | OUTPATIENT
Start: 2022-06-09

## 2022-06-09 RX ORDER — CARVEDILOL 12.5 MG/1
12.5 TABLET ORAL 2 TIMES DAILY WITH MEALS
Status: DISCONTINUED | OUTPATIENT
Start: 2022-06-09 | End: 2022-06-09 | Stop reason: HOSPADM

## 2022-06-09 RX ORDER — BUDESONIDE 0.5 MG/2ML
0.5 INHALANT ORAL 2 TIMES DAILY
Qty: 60 EACH | Refills: 3 | Status: SHIPPED | OUTPATIENT
Start: 2022-06-09

## 2022-06-09 RX ORDER — PREDNISONE 20 MG/1
20 TABLET ORAL DAILY
Qty: 4 TABLET | Refills: 0 | Status: SHIPPED | OUTPATIENT
Start: 2022-06-10 | End: 2022-06-14

## 2022-06-09 RX ORDER — FOLIC ACID 1 MG/1
1 TABLET ORAL DAILY
Qty: 30 TABLET | Refills: 3 | Status: SHIPPED | OUTPATIENT
Start: 2022-06-10

## 2022-06-09 RX ORDER — IPRATROPIUM BROMIDE AND ALBUTEROL SULFATE 2.5; .5 MG/3ML; MG/3ML
3 SOLUTION RESPIRATORY (INHALATION) 3 TIMES DAILY
Qty: 360 ML | Refills: 0 | Status: SHIPPED | OUTPATIENT
Start: 2022-06-09

## 2022-06-09 RX ORDER — LACOSAMIDE 150 MG/1
150 TABLET ORAL 2 TIMES DAILY
Qty: 60 TABLET | Refills: 0 | Status: SHIPPED | OUTPATIENT
Start: 2022-06-09 | End: 2022-06-09 | Stop reason: HOSPADM

## 2022-06-09 RX ORDER — LACOSAMIDE 10 MG/ML
INJECTION, SOLUTION INTRAVENOUS
Qty: 600 ML | Refills: 0 | Status: SHIPPED | OUTPATIENT
Start: 2022-06-09 | End: 2022-06-25

## 2022-06-09 RX ADMIN — FOLIC ACID 1 MG: 1 TABLET ORAL at 08:53

## 2022-06-09 RX ADMIN — CLOPIDOGREL BISULFATE 75 MG: 75 TABLET ORAL at 08:54

## 2022-06-09 RX ADMIN — CARVEDILOL 12.5 MG: 12.5 TABLET, FILM COATED ORAL at 16:49

## 2022-06-09 RX ADMIN — CARVEDILOL 6.25 MG: 6.25 TABLET, FILM COATED ORAL at 08:54

## 2022-06-09 RX ADMIN — HYDRALAZINE HYDROCHLORIDE 10 MG: 20 INJECTION INTRAMUSCULAR; INTRAVENOUS at 00:01

## 2022-06-09 RX ADMIN — ACETAMINOPHEN 650 MG: 325 TABLET ORAL at 02:23

## 2022-06-09 RX ADMIN — AMLODIPINE BESYLATE 10 MG: 10 TABLET ORAL at 08:53

## 2022-06-09 RX ADMIN — BUDESONIDE 500 MCG: 0.5 SUSPENSION RESPIRATORY (INHALATION) at 09:04

## 2022-06-09 RX ADMIN — HYDRALAZINE HYDROCHLORIDE 10 MG: 20 INJECTION INTRAMUSCULAR; INTRAVENOUS at 06:14

## 2022-06-09 RX ADMIN — INSULIN LISPRO 9 UNITS: 100 INJECTION, SOLUTION INTRAVENOUS; SUBCUTANEOUS at 13:45

## 2022-06-09 RX ADMIN — PAROXETINE HYDROCHLORIDE 40 MG: 20 TABLET, FILM COATED ORAL at 08:54

## 2022-06-09 RX ADMIN — IPRATROPIUM BROMIDE AND ALBUTEROL SULFATE 1 AMPULE: 2.5; .5 SOLUTION RESPIRATORY (INHALATION) at 09:04

## 2022-06-09 RX ADMIN — LACOSAMIDE 150 MG: 10 INJECTION, SOLUTION INTRAVENOUS at 09:20

## 2022-06-09 RX ADMIN — NYSTATIN 500000 UNITS: 100000 SUSPENSION ORAL at 00:01

## 2022-06-09 RX ADMIN — FAMOTIDINE 20 MG: 20 TABLET, FILM COATED ORAL at 08:53

## 2022-06-09 RX ADMIN — INSULIN LISPRO 6 UNITS: 100 INJECTION, SOLUTION INTRAVENOUS; SUBCUTANEOUS at 06:12

## 2022-06-09 RX ADMIN — METOPROLOL TARTRATE 5 MG: 1 INJECTION, SOLUTION INTRAVENOUS at 02:10

## 2022-06-09 RX ADMIN — NYSTATIN 500000 UNITS: 100000 SUSPENSION ORAL at 15:27

## 2022-06-09 RX ADMIN — SODIUM CHLORIDE, PRESERVATIVE FREE 10 ML: 5 INJECTION INTRAVENOUS at 09:28

## 2022-06-09 RX ADMIN — SODIUM CHLORIDE, PRESERVATIVE FREE 10 ML: 5 INJECTION INTRAVENOUS at 09:52

## 2022-06-09 RX ADMIN — SODIUM CHLORIDE, PRESERVATIVE FREE 10 ML: 5 INJECTION INTRAVENOUS at 09:26

## 2022-06-09 RX ADMIN — ASPIRIN 81 MG CHEWABLE TABLET 81 MG: 81 TABLET CHEWABLE at 08:53

## 2022-06-09 RX ADMIN — SODIUM CHLORIDE, PRESERVATIVE FREE 10 ML: 5 INJECTION INTRAVENOUS at 09:17

## 2022-06-09 RX ADMIN — LOSARTAN POTASSIUM 100 MG: 100 TABLET, FILM COATED ORAL at 08:53

## 2022-06-09 RX ADMIN — INSULIN LISPRO 6 UNITS: 100 INJECTION, SOLUTION INTRAVENOUS; SUBCUTANEOUS at 00:23

## 2022-06-09 RX ADMIN — SODIUM CHLORIDE, PRESERVATIVE FREE 10 ML: 5 INJECTION INTRAVENOUS at 13:50

## 2022-06-09 RX ADMIN — ENOXAPARIN SODIUM 40 MG: 100 INJECTION SUBCUTANEOUS at 09:03

## 2022-06-09 RX ADMIN — IPRATROPIUM BROMIDE AND ALBUTEROL SULFATE 1 AMPULE: 2.5; .5 SOLUTION RESPIRATORY (INHALATION) at 14:15

## 2022-06-09 RX ADMIN — HYDRALAZINE HYDROCHLORIDE 10 MG: 20 INJECTION INTRAMUSCULAR; INTRAVENOUS at 13:49

## 2022-06-09 RX ADMIN — NYSTATIN 500000 UNITS: 100000 SUSPENSION ORAL at 09:20

## 2022-06-09 RX ADMIN — THIAMINE HCL TAB 100 MG 100 MG: 100 TAB at 08:53

## 2022-06-09 RX ADMIN — PREDNISONE 20 MG: 20 TABLET ORAL at 08:53

## 2022-06-09 ASSESSMENT — PAIN SCALES - GENERAL
PAINLEVEL_OUTOF10: 0
PAINLEVEL_OUTOF10: 0

## 2022-06-09 ASSESSMENT — PAIN DESCRIPTION - LOCATION: LOCATION: HEAD

## 2022-06-09 NOTE — DISCHARGE INSTR - DIET
Pt is NPO on diabetic glucerna 1.5cal tube feeding at 50ml/hr with 350ml water flushes every 4 hours via NGT.

## 2022-06-09 NOTE — PROGRESS NOTES
Occupational Therapy  Facility/Department: [facility] ICU  Occupational Therapy Reassessment    Name: Daiana Gray  : 1955  MRN: [id number]  Date of Service: 2022    Discharge Recommendations:  Patient would benefit from continued therapy after discharge     Pt currently functioning below baseline. Recommend daily inpatient skilled therapy at time of discharge to maximize long term outcomes and prevent re-admission. Please refer to AM-PAC score for current level of function. RN reports patient is medically stable for therapy treatment this date. Chart reviewed prior to treatment and patient is agreeable for therapy. All lines intact and patient positioned comfortably at end of treatment. All patient needs addressed prior to ending therapy session. PER H&P: Daiana Gray is a 73 y.o.  male who presents with Seizures     Pt admitted through the ED after an episode of seizure at home. Apparently pt lives at home with his fiancee. According to her, he was sitting in his chair yesterday morning when he developed a seizure. It is difficult to determine what kind of seizure it was. His fiancee is not at bedside right now. However it was confirmed that the pt has never had a similar episode before in his life. It is also reported that he wanted to stop alcohol use however she reported him having a beer the evening before. Urine drug screen has been reported unremarkable and ETOH level was zero. Alcohol withdrawal seizure was suspected though seemed less likely if pt had continued to drink alcohol. Pt apparently drinks 4 beers daily     Pt was admitted to the ICU where he continued to remain agitated. Higher and frequent use of Ativan and later Haldol were ineffective. Pt required soft restraints since he was unsafe for himself trying to get out of bed and trying to pull out Iv.    OT Equipment Recommendations  ADL Assistive Devices:  Toileting - 3-in-1 Commode     Patient Diagnosis(es): The encounter diagnosis was New onset seizure ([facility] [address] Past Medical History:  has a past medical history of CVA (cerebral vascular accident) ([facility] [address] Diabetes mellitus ([facility] [address] and Hypertension. Past Surgical History:  has a past surgical history that includes knee surgery; Carotid endarterectomy (09/22/2021); Carotid endarterectomy (N/A, 9/22/2021); and IR INSERT PICC VAD W SQ PORT >5 YEARS (5/26/2022). Assessment    Pt with deficits of strength, bed mobility, transfers,  balance, safety awareness and endurance this session,  & required 2 assist for SAFE functional mobility, & transfers, not able to tolerate ambulation this session. With current deficits, Pt HIGH risk for falls & requires continued OT to maximize independence with functional mobility, balance, safety awareness & activity tolerance. Performance deficits / Impairments: Decreased functional mobility ; Decreased ADL status; Decreased ROM; Decreased strength;Decreased safe awareness;Decreased cognition;Decreased fine motor control;Decreased high-level IADLs;Decreased balance;Decreased sensation;Decreased endurance;Decreased coordination;Decreased posture  Prognosis: Good  Decision Making: Medium Complexity  REQUIRES OT FOLLOW-UP: Yes  Activity Tolerance  Activity Tolerance: Patient Tolerated treatment well;Treatment limited secondary to decreased cognition  Activity Tolerance Comments: Pt tolerated ~5 mins of standing with Rosalva Corrales this date.         Plan   Plan  Times per Week: 4-5x a week, 1-2x a day  Current Treatment Recommendations: Strengthening,ROM,Functional mobility training,Balance training,Endurance training,Gait training,Neuromuscular re-education,Positioning,Equipment evaluation, education, & procurement,Patient/Caregiver education & training,Safety education & training,Self-Care / ADL,Coordination training     Restrictions  Restrictions/Precautions  Restrictions/Precautions: General Precautions,Up as Tolerated  Required Braces or Orthoses?: No  Position Activity Restriction  Other position/activity restrictions: NPO, up with assist, Etoh withdrawl, NG tube with tube feed running, telemetry    Subjective   General  Chart Reviewed: Yes  Patient assessed for rehabilitation services?: Yes  Family / Caregiver Present: Yes     Social/Functional History  Social/Functional History  Lives With: Significant other  Type of Home: House  Home Layout: One level  Home Access: Stairs to enter with rails  Entrance Stairs - Number of Steps: 3  Entrance Stairs - Rails: Both  Bathroom Shower/Tub: Tub/Shower unit  Bathroom Toilet: Standard  Bathroom Accessibility: Accessible  Has the patient had two or more falls in the past year or any fall with injury in the past year?: No  ADL Assistance: [facility]: Independent  Homemaking Responsibilities: Yes  Ambulation Assistance: Independent  Transfer Assistance: Independent  Active : No  Patient's  Info: Up to 6 months ago, pt did drive  Occupation: Retired  Type of Occupation: [employer]  Additional Comments: Pt was independent w/ home making and ADL's prior to this admission       Objective   Heart Rate: 76  Heart Rate Source: Monitor  BP: (!) 159/59  BP Location: Right lower arm  BP Method: Automatic  Patient Position: Supine  MAP (Calculated): 92.33  Resp: 22  SpO2: 96 %  O2 Device: None (Room air)  Vision Exceptions: Wears glasses at all times  Hearing: Within functional limits       Observation/Palpation  Posture: Fair (Shoulders somewhat rounded)  Observation: NG tube; B arm restraints  Safety Devices  Type of Devices: Left in bed;Call light within reach;Nurse notified;Gait belt;Bed alarm in place; All fall risk precautions in place; Patient at risk for falls (Simultaneous filing. User may not have seen previous data.)  Restraints  Restraints Initially in Place: Yes (Simultaneous filing. User may not have seen previous data.)  Restraints: B wrists soft restraints (Simultaneous filing.  User may not have seen previous data.)  Bed Mobility Training  Bed Mobility Training: Yes (MAX verbal and tactile cues for hand placement on bedrail to assist with rolling/sitting up, moving BLEs off bed, and assist/mgmt of lines with poor return demo.)  Overall Level of Assistance: Maximum assistance;Assist X2  Supine to Sit: Maximum assistance;Assist X2  Sit to Supine: Maximum assistance;Assist X2  Scooting: Maximum assistance;Assist X2  Balance  Sitting: With support (Pt sat EOB for 10 mins with MOD/MAX A for maintaining sitting balance and MAX verbal/tactile cues for use of BUEs to assist with correcting significant posterior/right sided lean with poor return demo.)  Standing:  (N/A)  Transfer Training  Transfer Training: No  Gait  Overall Level of Assistance:  (Pt not appropriate to stand this date d/t cognition and decreased sitting balance.)  Wheelchair Management  Wheelchair Management: No     AROM: Grossly decreased, non-functional (R side ~50 degrees shoulder flexion, full elbow, minimal wrist movement.)  PROM: Within functional limits (RUE full prom)  Strength: Grossly decreased, non-functional (LUE 3-; RUE 2-)  Coordination: Grossly decreased, non-functional  Tone: Normal     ADL  Feeding: NPO  Grooming: Moderate assistance;Maximum assistance  UE Bathing: Maximum assistance  LE Bathing: Maximum assistance;Dependent/Total  UE Dressing: Maximum assistance  LE Dressing: Maximum assistance;Dependent/Total  Toileting: Maximum assistance;Dependent/Total  Additional Comments: Pt requires MAX A for all ADLs d/t decreased sitting balance, cognition, strength and ROM this date. Activity Tolerance  Activity Tolerance: Treatment limited secondary to decreased cognition;Patient limited by endurance  Bed mobility  Rolling to Right: Moderate assistance;2 Person assistance  Supine to Sit: 2 Person assistance; Moderate assistance  Sit to Supine: 2 Person assistance; Moderate assistance  Scootin Person assistance; Moderate assistance  Bed Mobility Comments: MAX verbal/tactile for use of bed rails to assist UEs with rolling/sitting up, use of BUEs and B feet placement on floor to assist with full scoot towards EOB, pacing, and assist/mgmt of lines with fair return demo, all to increase safety in function. MAX verbal/tactile cues for use of BUEs placed on bed and BLEs planted on floor to assist with maintaining sitting balance at EOB throughout with fair return demo. Transfers  Sit to stand: 2 Person assistance;Maximum assistance  Stand to sit: 2 Person assistance;Maximum assistance  Transfer Comments: MAX verbal/tactile cues for B hand placement on Payal Caras bar to pull up to stand, slow and controlled sitting, tucking bottom in standing, upright posture, looking up/scanning room, pacing, pursed lip breathing, and assist/mgmt of lines with fair return demo, all to increase safety in function. Cognition  Overall Cognitive Status: Exceptions  Arousal/Alertness: Appropriate responses to stimuli  Following Commands: Follows one step commands with repetition; Follows one step commands with increased time  Attention Span: Difficulty attending to directions; Difficulty dividing attention; Attends with cues to redirect  Memory: Decreased short term memory;Decreased recall of biographical Information;Decreased recall of recent events  Safety Judgement: Decreased awareness of need for assistance;Decreased awareness of need for safety  Problem Solving: Assistance required to generate solutions;Assistance required to implement solutions;Assistance required to identify errors made;Assistance required to correct errors made;Decreased awareness of errors  Insights: Not aware of deficits  Initiation: Requires cues for all  Sequencing: Requires cues for all  Perception  Overall Perceptual Status: Impaired (Pt appears to be neglecting R side at times)        Education Given To: Patient; Family  Education Provided: Role of Therapy;Plan of Care;Precautions  Education Time In 0824         Time Out 0927         Minutes 63          Treatment mins: 48      Co-treatment with PT warranted secondary to decreased safety and independence requiring 2 skilled therapy professionals to address individual discipline's goals. OT addressing preparation for ADL transfer, sitting balance for increased ADL performance, sitting/activity tolerance, functional reaching, environmental safety/scanning, fall prevention, functional mobility for ADL transfers, ability to sequence and follow directions and functional UE strength.     Gus Celis, OT/S

## 2022-06-09 NOTE — DISCHARGE INSTR - COC
Continuity of Care Form    Patient Name: Sharmila Monroy   :  1955  MRN:  [id number]    Admit date:  2022  Discharge date:  2022    Code Status Order: Full Code   Advance Directives:      Admitting Physician:  Syd Roberto MD  PCP: DENNIS Reyes    Discharging Nurse: Juan Barker RN  [address] Unit/Room#: 0406/8055-54  Discharging Unit Phone Number: [phone]    Emergency Contact:   Extended Emergency Contact Information  Primary Emergency Contact: Osiel Malcolm  Mobile Phone: [phone]  Relation: Other   needed? No  Secondary Emergency Contact: [facility] Phone: [phone]  Mobile Phone: [phone]  Relation: Other   needed?  No    Past Surgical History:  Past Surgical History:   Procedure Laterality Date    CAROTID ENDARTERECTOMY  2021    CAROTID ENDARTERECTOMY (N/A Neck    CAROTID ENDARTERECTOMY N/A 2021    CAROTID ENDARTERECTOMY performed by Madonna Austin MD at [facility] INS PICC VAD W SQ PORT GREATER THAN 5  2022    IR INS PICC VAD W SQ PORT GREATER THAN 5 2022 [facility]    KNEE SURGERY         Immunization History:   Immunization History   Administered Date(s) Administered    COVID-19, Taisha Billingsley, Primary or Immunocompromised, PF, 100mcg/0.5mL 2021, 2021       Active Problems:  Patient Active Problem List   Diagnosis Code    ADRIANA (acute kidney injury) ([facility] [address] N17.9    Acute ischemic left PCA stroke ([facility] [address] I63.532    Right sided weakness R53.1    Seizure ([facility] [address] R56.9       Isolation/Infection:   Isolation            No Isolation          Patient Infection Status       Infection Onset Added Last Indicated Last Indicated By Review Planned Expiration Resolved Resolved By    None active    Resolved    COVID-19 (Rule Out) 22 COVID-19, Rapid (Ordered)   22 Rule-Out Test Resulted            Nurse Assessment:  Last Vital Signs: BP (!) 167/79   Pulse 86   Temp 98.4 °F (36.9 °C) (Infrared)   Resp 24   Ht 5' 6\" (1.676 m)   Wt 186 lb 5 oz (84.5 kg)   SpO2 95%   BMI 30.07 kg/m²     Last documented pain score (0-10 scale): Pain Level: 0  Last Weight:   Wt Readings from Last 1 Encounters:   06/04/22 186 lb 5 oz (84.5 kg)     Mental Status:  disoriented and alert    IV Access:  - PICC - site  R Basilic, insertion date: 5/26/2022  - extended dwell L brachial insertion date 6/3/2022    Nursing Mobility/ADLs: Pt has been working with therapy and assisted to sitting on edge of bed, no walking yet. Walking   Dependent  Transfer  Dependent  Bathing  Dependent  Dressing  Dependent  Toileting  Dependent  Feeding  Dependent  Med Admin  Dependent  Med Delivery   crushed and given via NGT or IV. Wound Care Documentation and Therapy:  Incision 09/22/21 Neck Left (Active)   Number of days: 260        Elimination:  Continence: Bowel: No  Bladder: No  Urinary Catheter: Insertion Date: 5/28/2022 and Indication for Use of Catheter: Acute urinary retention/obstruction   Colostomy/Ileostomy/Ileal Conduit: No       Date of Last BM: 6/8/2022    Intake/Output Summary (Last 24 hours) at 6/9/2022 1312  Last data filed at 6/9/2022 1230  Gross per 24 hour   Intake 2804.91 ml   Output 2900 ml   Net -95.09 ml     I/O last 3 completed shifts: In: 3604.1 [I.V.:365.1; NG/GT:3109; IV Piggyback:130]  Out: 4926 [Urine:4575]    Safety Concerns:     History of Falls (last 30 days), At Risk for Falls, History of Seizures, and Aspiration Risk    Impairments/Disabilities:      Speech, Vision, and cognitive. Nutrition Therapy:  Current Nutrition Therapy:   - Tube Feedings:  Diabetic    Routes of Feeding: Nasogastric  Liquids: No Liquids  Daily Fluid Restriction: no  Last Modified Barium Swallow with Video (Video Swallowing Test): done on 6/7/2022/     Treatments at the Time of Hospital Discharge:   Respiratory Treatments: Per RT see MAR  Oxygen Therapy:  is on oxygen at 2 L/min per nasal cannula. Or pt is on room air.  Ventilator:    - No ventilator support    Rehab Therapies: Physical Therapy, Occupational Therapy, and Speech/Language Therapy  Weight Bearing Status/Restrictions: No weight bearing restrictions  Other Medical Equipment (for information only, NOT a DME order):  wheelchair, bedside commode, and hospital bed  Other Treatments: Restraints used so pt does not pull out NGT, again. Patient's personal belongings (please select all that are sent with patient):  Glasses    RN SIGNATURE:  Electronically signed by Serafin Givens RN on 6/9/22 at 3:09 PM EDT    CASE MANAGEMENT/SOCIAL WORK SECTION    Inpatient Status Date: 5/25/2022    Readmission Risk Assessment Score:  Readmission Risk              Risk of Unplanned Readmission:  35           Discharging to Facility/ Agency   Name: [facility]  Address: [place] [address]  Phone:  Fax:    Dialysis Facility (if applicable)   Name:  Address:  Dialysis Schedule:  Phone:  Fax:    / signature: {Esignature:[id number]}    PHYSICIAN SECTION    Prognosis: Fair    Condition at Discharge: Stable    Rehab Potential (if transferring to Rehab): Fair    Recommended Labs or Other Treatments After Discharge:     Physician Certification: I certify the above information and transfer of Judy Gannon  is necessary for the continuing treatment of the diagnosis listed and that he requires LTAC for less 30 days.      Update Admission H&P: No change in H&P    PHYSICIAN SIGNATURE:  Electronically signed by Klaudia Dominguez MD on 6/9/22 at 1:12 PM EDT

## 2022-06-09 NOTE — RT PROTOCOL NOTE
RT Inhaler-Nebulizer Bronchodilator Protocol Note    There is a bronchodilator order in the chart from a provider indicating to follow the RT Bronchodilator Protocol and there is an Initiate RT Inhaler-Nebulizer Bronchodilator Protocol order as well (see protocol at bottom of note). CXR Findings:  XR CHEST PORTABLE    Result Date: 6/8/2022  No significant interval change. The findings from the last RT Protocol Assessment were as follows:   History Pulmonary Disease: Chronic pulmonary disease  Respiratory Pattern: Dyspnea on exertion or RR 21-25 bpm  Breath Sounds: Slightly diminished and/or crackles  Cough: Weak, productive  Indication for Bronchodilator Therapy: Decreased or absent breath sounds  Bronchodilator Assessment Score: 8    Aerosolized bronchodilator medication orders have been revised according to the RT Inhaler-Nebulizer Bronchodilator Protocol below. Respiratory Therapist to perform RT Therapy Protocol Assessment initially then follow the protocol. Repeat RT Therapy Protocol Assessment PRN for score 0-3 or on second treatment, BID, and PRN for scores above 3. No Indications - adjust the frequency to every 6 hours PRN wheezing or bronchospasm, if no treatments needed after 48 hours then discontinue using Per Protocol order mode. If indication present, adjust the RT bronchodilator orders based on the Bronchodilator Assessment Score as indicated below. Use Inhaler orders unless patient has one or more of the following: on home nebulizer, not able to hold breath for 10 seconds, is not alert and oriented, cannot activate and use MDI correctly, or respiratory rate 25 breaths per minute or more, then use the equivalent nebulizer order(s) with same Frequency and PRN reasons based on the score. If a patient is on this medication at home then do not decrease Frequency below that used at home.     0-3 - enter or revise RT bronchodilator order(s) to equivalent RT Bronchodilator order with

## 2022-06-09 NOTE — PROGRESS NOTES
Reason for Follow up: ADRIANA    Subjective:  Patient seen and examined the bedside   Patient is awake and alert but confused  NG in place, on TFs  Cr 1.21, Na 144    Review Of Systems:   Patient still has NG tube in  Tolerating TFs    Scheduled Meds:   insulin glargine  8 Units SubCUTAneous Nightly    carvedilol  6.25 mg Oral BID WC    famotidine  20 mg Oral BID    folic acid  1 mg Oral Daily    thiamine mononitrate  100 mg Oral Daily    amLODIPine  10 mg Oral Daily    predniSONE  20 mg Oral Daily    PARoxetine  40 mg Oral Daily    nystatin  5 mL Oral TID    lacosamide (VIMPAT) IVPB  150 mg IntraVENous BID    ipratropium-albuterol  1 ampule Inhalation TID    insulin lispro  0-18 Units SubCUTAneous Q6H    budesonide  0.5 mg Nebulization BID    enoxaparin  40 mg SubCUTAneous Daily    sodium chloride flush  10 mL IntraVENous Once    sodium chloride flush  5-40 mL IntraVENous 2 times per day    aspirin  81 mg Oral Daily    atorvastatin  40 mg Oral Nightly    clopidogrel  75 mg Oral Daily    losartan  100 mg Oral Daily   Continuous Infusions:   dexmedetomidine (PRECEDEX) IV infusion Stopped (06/02/22 1352)    sodium chloride Stopped (06/08/22 2238)    dextrose      niCARdipene (CARDENE) infusion Stopped (06/08/22 1313)     PRN Meds:potassium chloride **OR** potassium chloride, haloperidol lactate, LORazepam **OR** LORazepam **OR** LORazepam **OR** LORazepam **OR** LORazepam **OR** LORazepam **OR** LORazepam **OR** LORazepam, albuterol, hydrALAZINE, sodium chloride flush, sodium chloride, acetaminophen, ondansetron **OR** ondansetron, metoprolol, glucose, dextrose bolus **OR** dextrose bolus, glucagon (rDNA), dextrose    No Known Allergies    Physical Exam:  Blood pressure (!) 159/59, pulse 76, temperature 98.4 °F (36.9 °C), temperature source Temporal, resp. rate 22, height 5' 6\" (1.676 m), weight 186 lb 5 oz (84.5 kg), SpO2 96 %.   In: 2704.9 [I.V.:365.9; NG/GT:2209]  Out: 5684   In: 2704.9   Out: Results   Component Value Date    LABALBU 2.5 (L) 06/05/2022     Last 3 CK, CKMB, Troponin: @LABRCNT(CKTOTAL:3,CKMB:3,TROPONINI:3)       URINE:)No results found for: Jameyjonnathan Riveraludy    Radiology:   Reviewed. Assessment  Acute kidney injury. 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 Electrolyte abnormalties  Hypertension secondary to DT  EtOH withdrawal     Plan:  Avoid clonidine. Will increase Coreg 12/5 mg BID. Monitor blood pressure. Will increase free water to 350 mL every 4 hours   LTAC transfer plan noted, OK from nephrology perspective  Patient serum creatinine in the range of 1.3-1.6 will be acceptable due to hemodynamic factors and better blood pressure control  Labs in the AM while here    Electronically signed by Sri Maldonado MD on 6/9/2022 at 10:37 AM  [facility].   Ph: [phone]

## 2022-06-09 NOTE — PROGRESS NOTES
Pt discharged to [facility]. Pt going to [facility] with CUONG morris PICC and L arm extended dwell catheter, monitor removed. Pt denies any questions or concerns. Pt assisted to door per [facility] on stretcher with all belongings.  No further questions or concerns from the transport team.

## 2022-06-09 NOTE — PROGRESS NOTES
Pulmonary Critical Care Progress Note  Khloe Du MD     Patient seen for the follow up of hypoxia,  Seizure ([address]     Subjective:  Patient had no significant overnight events. He did not use BiPAP last night. He is on oxygen by nasal cannula at 2 L/min. He is tolerating tube feeds. He denies any chest pain. He has been weaned off of Cardene drip since yesterday and doing well. He is in soft restraints. He is responding to question but still remains pleasantly confused but better than yesterday. Examination:  Vitals: BP (!) 159/59   Pulse 76   Temp 98.4 °F (36.9 °C) (Temporal)   Resp 22   Ht 5' 6\" (1.676 m)   Wt 186 lb 5 oz (84.5 kg)   SpO2 96%   BMI 30.07 kg/m²   General appearance: Awake and alert with periods of confusion  Neck: No JVD  Lungs: diminished air exchange, no crackles or wheezing  Heart: regular rate and rhythm, S1, S2 normal, no gallop  Abdomen: Soft, non tender, + BS  Extremities: no cyanosis or clubbing.  No significant edema    LABs:  CBC:   Recent Labs     06/08/22  0458 06/09/22  0326   WBC 18.6* 17.6*   HGB 11.8* 10.6*   HCT 35.2* 31.3*    217     BMP:   Recent Labs     06/08/22  0458 06/09/22  0326    144   K 4.2 4.2   CO2 30 27   BUN 40* 32*   CREATININE 1.41* 1.21*   LABGLOM 50* >60   GLUCOSE 268* 190*     ABG:  Lab Results   Component Value Date    FIO2 30.0 06/04/2022       Lab Results   Component Value Date    POCPH 7.430 06/04/2022    POCPCO2 35.2 06/04/2022    POCPO2 79.7 06/04/2022    POCHCO3 23.4 06/04/2022    NBEA 1 06/04/2022    PBEA 0 05/27/2022    KQTW2QTL 96 06/04/2022    FIO2 30.0 06/04/2022     Radiology:  CXR 6/8/22      Impression:  · Acute respiratory insufficiency with hypoxia   · New onset seizure   · Alcohol abuse/ dependence/ withdrawal   · History of CVA   · Obstructive sleep apnea  · Active smoking/ likely COPD   · Severe right Carotid artery stenosis, status post left CEA 9/21  · Diabetes mellitus  · Hypertensive

## 2022-06-09 NOTE — PROGRESS NOTES
Physical Therapy  Facility/Department: [facility] ICU  Physical Therapy Initial Assessment    Name: Rupal Crowley  : 1955  MRN: [id number]  Date of Service: 2022    Discharge Recommendations:  [address]          Patient Diagnosis(es): The encounter diagnosis was New onset seizure ([facility] [address] Past Medical History:  has a past medical history of CVA (cerebral vascular accident) ([facility] [address] Diabetes mellitus ([facility] [address] and Hypertension. Past Surgical History:  has a past surgical history that includes knee surgery; Carotid endarterectomy (2021); Carotid endarterectomy (N/A, 2021); and IR INSERT PICC VAD W SQ PORT >5 YEARS (2022). Assessment   Body Structures, Functions, Activity Limitations Requiring Skilled Therapeutic Intervention: Decreased functional mobility ; Decreased strength;Decreased endurance;Decreased balance;Decreased posture;Decreased safe awareness;Decreased cognition;Decreased coordination  Assessment: Patient able to stand with vandana stedy and max x 2 today, continues to not be appropriate to stand. Patient demo decreased cognition and safety awareness. Patient will benefit from skilled PT to improve gait, transfers, balance, and strength.   Therapy Prognosis: Good  Decision Making: High Complexity  Requires PT Follow-Up: Yes  Activity Tolerance  Activity Tolerance: Treatment limited secondary to decreased cognition;Patient limited by endurance     Plan   Plan  Plan: 5-7 times per week  Current Treatment Recommendations: Strengthening,Balance training,Functional mobility training,Transfer training,Endurance training,Safety education & training,Patient/Caregiver education & training,Therapeutic activities,Cognitive reorientation,Neuromuscular re-education  Plan Comment: Attempt standing/transfer to chair next treatment  Safety Devices  Type of Devices: Left in bed,Call light within reach,Nurse notified,Gait belt,Bed alarm in place,All fall risk precautions in place,Patient at risk for falls (Simultaneous filing. User may not have seen previous data.)  Restraints  Restraints Initially in Place: Yes (Simultaneous filing. User may not have seen previous data.)  Restraints: B wrists soft restraints (Simultaneous filing. User may not have seen previous data.)     Restrictions  Restrictions/Precautions  Restrictions/Precautions: General Precautions,Up as Tolerated  Required Braces or Orthoses?: No  Position Activity Restriction  Other position/activity restrictions: NPO, up with assist, Etoh withdrawl, NG tube with tube feed running, telemetry     Subjective   General  Chart Reviewed: Yes  Patient assessed for rehabilitation services?: Yes  Response To Previous Treatment: Patient with no complaints from previous session. Family / Caregiver Present: Yes (S.O. and her brother present at beginning of treatment. S.O. had a can of pop and patient was asking for a drink. S.O. was educated that patient cannot have anyhting to eat or drink so it would be best if they did not bring food or drink into the room.)  Diagnosis: Seizures, Etoh abuse and withdrawl, ADRIANA  Follows Commands: Impaired  Other (Comment): Slowed direction following, requires manual cues for direction following. General Comment  Comments: Per Trinity Chang patient medically appropriate for PT. Patient had nasal cannula off when patient entered room, SP02 97%, RN notified and reported OK to leave 02 off. Subjective  Subjective: Patient pleasant and agreeable to PT, still demos confusion, impulsivity, and difficulty staying on task.          Social/Functional History  Social/Functional History  Lives With: Significant other  Type of Home: House  Home Layout: One level  Home Access: Stairs to enter with rails  Entrance Stairs - Number of Steps: 3  Entrance Stairs - Rails: Both  Bathroom Shower/Tub: Tub/Shower unit  Bathroom Toilet: Standard  Bathroom Accessibility: Accessible  Has the patient had two or more falls in the past year or any fall with injury in the past year?: No  ADL Assistance: Independent  Homemaking Assistance: Independent  Homemaking Responsibilities: Yes  Ambulation Assistance: Independent  Transfer Assistance: Independent  Active : No  Patient's  Info: Up to 6 months ago, pt did drive  Occupation: Retired  Type of Occupation: [employer]  Additional Comments: Pt was independent w/ home making and ADL's prior to this admission  Vision/Hearing       Cognition   Orientation  Overall Orientation Status: Impaired  Orientation Level: Oriented to person;Disoriented to time;Disoriented to situation;Disoriented to place  Cognition  Overall Cognitive Status: Exceptions  Arousal/Alertness: Appropriate responses to stimuli  Following Commands: Follows one step commands with repetition; Follows one step commands with increased time  Attention Span: Difficulty attending to directions; Difficulty dividing attention; Attends with cues to redirect  Memory: Decreased short term memory;Decreased recall of biographical Information;Decreased recall of recent events  Safety Judgement: Decreased awareness of need for assistance;Decreased awareness of need for safety  Problem Solving: Assistance required to generate solutions;Assistance required to implement solutions;Assistance required to identify errors made;Assistance required to correct errors made;Decreased awareness of errors  Insights: Not aware of deficits  Initiation: Requires cues for all  Sequencing: Requires cues for all     Objective      Observation/Palpation  Posture: Fair (Rounded shoulders)  Observation: NG tube; B arm restraints        AROM RLE (degrees)  RLE AROM: WFL  AROM LLE (degrees)  LLE AROM : WFL  Strength RLE  Comment: R hip and knee 3/5 to 3+/5, ankle 4-/5  Strength LLE  Comment: L LE 4-/5        Bed Mobility Training  Bed Mobility Training: Yes (MAX verbal and tactile cues for hand placement on bedrail to assist with rolling/sitting up, moving BLEs off bed, and assist/mgmt of lines with poor return demo.)  Overall Level of Assistance: Maximum assistance;Assist X2  Supine to Sit: Maximum assistance;Assist X2  Sit to Supine: Maximum assistance;Assist X2  Scooting: Maximum assistance;Assist X2  Balance  Sitting: With support (Pt sat EOB for 10 mins with MOD/MAX A for maintaining sitting balance and MAX verbal/tactile cues for use of BUEs to assist with correcting significant posterior/right sided lean with poor return demo.)  Standing:  (N/A)  Transfer Training  Transfer Training: No  Gait  Overall Level of Assistance:  (Pt not appropriate to stand this date d/t cognition and decreased sitting balance.)  Wheelchair Management  Wheelchair Management: No  Bed mobility  Rolling to Left: Moderate assistance  Rolling to Right: Moderate assistance  Supine to Sit: 2 Person assistance; Moderate assistance  Sit to Supine: 2 Person assistance; Moderate assistance  Scootin Person assistance; Moderate assistance  Bed Mobility Comments: Max verbal and manual cues to initiate and maintain activity. Patient demo poor problem solving skills and poor safety awareness. Poor awareness of lines and cords. Transfers  Sit to Stand: Maximum Assistance;2 Person Assistance (Evone Luis)  Stand to sit: Maximum Assistance;2 Person Assistance Evone Luis)  Comment: Patient requires max verbal and manual cues for sit <-> stand with vandana jordan. Patient demo posterior lean in standing. Patient demo difficulty initiating sit to stand. Balance  Sitting - Static: Fair;-  Sitting - Dynamic: Poor;+  Standing - Static: Poor  Comments: Posterior lean in sitting and standing, able to correct with max manual and verbal cues.            OutComes Score    AM-PAC Score  AM-PAC Inpatient Mobility Raw Score : 9 (22)  AM-PAC Inpatient T-Scale Score : 30.55 (22)  Mobility Inpatient CMS 0-100% Score: 81.38 (22)  Mobility Inpatient CMS G-Code Modifier : CM (22) Goals  Short Term Goals  Time Frame for Short term goals: 14 treatments  Short term goal 1: Independent bed mobility  Short term goal 2: Patient will be mod assist with transfers. Short term goal 3: Will assess ambulation as appropriate  Short term goal 4: Good balance sitting and standing/ good posture  Short term goal 5: Tolerate 30 min ther act/  1/2 to 1 grade strngth increase B LE's  Patient Goals   Patient goals : Get stronger       Education  Patient Education  Education Given To: Patient  Education Provided: Orientation;Transfer Training  Education Method: Demonstration;Verbal  Barriers to Learning: Cognition  Education Outcome: Unable to demonstrate understanding;Continued education needed    Co-treatment with OT warranted secondary to decreased safety and independence requiring 2 skilled therapy professionals to address individual discipline's goals. PT addressing weight shifting prior to transfers, transfer training and postural control in sitting.   Therapy Time   Individual Concurrent Group Co-treatment   Time In 0820         Time Out 0858         Minutes 38         Timed Code Treatment Minutes: [facility] [address], PT

## 2022-06-09 NOTE — PLAN OF CARE
Plan for pt to go to [facility] today with a  time of 1700. No seizures noted this shift. Continue to monitor skin integrity and IV sites. Patient will have progressive improvement with pain scale with interventions. Pt will remain safe while in the hospital.  Problem: Discharge Planning  Goal: Discharge to home or other facility with appropriate resources  6/9/2022 1351 by Mirtha Beal RN  Outcome: Progressing  6/9/2022 0151 by Emily Doyle RN  Outcome: Progressing     Problem: Neurosensory - Adult  Goal: Absence of seizures  6/9/2022 1351 by Mirtha Beal RN  Outcome: Progressing  6/9/2022 0151 by Emily Doyle RN  Outcome: Progressing     Problem: Skin/Tissue Integrity - Adult  Goal: Skin integrity remains intact  6/9/2022 1351 by Mirtha Beal RN  Outcome: Progressing  Flowsheets (Taken 6/9/2022 0930)  Skin Integrity Remains Intact: Monitor for areas of redness and/or skin breakdown  6/9/2022 0151 by Emily Doyle RN  Outcome: Progressing     Problem: Pain  Goal: Verbalizes/displays adequate comfort level or baseline comfort level  6/9/2022 1351 by Mirtha Beal RN  Outcome: Progressing  6/9/2022 0151 by Emily Doyle RN  Outcome: Progressing     Problem: Safety - Medical Restraint  Goal: Remains free of injury from restraints (Restraint for Interference with Medical Device)  Description: INTERVENTIONS:  1. Determine that other, less restrictive measures have been tried or would not be effective before applying the restraint  2. Evaluate the patient's condition at the time of restraint application  3. Inform patient/family regarding the reason for restraint  4.  Q2H: Monitor safety, psychosocial status, comfort, nutrition and hydration  6/9/2022 1351 by Mirtha Beal RN  Outcome: Progressing  6/9/2022 0151 by Emily Doyle RN  Outcome: Progressing  Flowsheets (Taken 6/8/2022 2000)  Remains free of injury from restraints (restraint for interference with medical device):    Inform patient/family regarding the reason for restraint   Every 2 hours: Monitor safety, psychosocial status, comfort, nutrition and hydration   Determine that other, less restrictive measures have been tried or would not be effective before applying the restraint   Evaluate the patient's condition at the time of restraint application

## 2022-06-09 NOTE — PROGRESS NOTES
Pt pressures were elevating so gave 0.5 ml of hydralazine and 5ml of lopressor. Pts pressures lowering. Pt also complained of a HA so gave tylenol. Will continue to monitor.

## 2022-06-09 NOTE — PROGRESS NOTES
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, new since 9/2021  · Old infarction left occipital lobe, increased since 9/2021  · 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 / Encephalomalacia   · Old lacunar infarcts left basal ganglia   · Mild brain parenchymal volume loss  · Alcohol dependence   · Reported right hemiparesis   · Essential hypertension  · Diabetes mellitus type 2  · Overweight   · Diverticulosis  · Agitation   · Delirium tremens     · Acute respiratory failure with hypoxia   · Oliguria  · Carotid artery disease   · Hypertensive urgency        Plan:      · Patient currently in ICU  · O2 to maintain oxygen saturation greater than 92%  · BiPAP as needed  · Amiodarone  · DuoNebs  · Precedex gtt.   · Seizure precautions  · IV Solu-Medrol  · TPN  · Plan to start tube feeds today  · Add water 350 mL every 4 hour  · CIWA protocol  · Critical care on board  ·    · Waxing and waning mental status   · Neurology following, phenobarbital is discontinued  · Continue Vimpat  · Neurology following  · Zyprexa  · As needed Haldol  ·    · Monitor blood pressure  · Cardene drip, weaning  · IV Lopressor as needed  · Continue clonidine patch  · Nephrology on board  ·    · Vascular evaluated the the patient,   · Soft restraints  · Keep room well lit  · Cardene infusion  · Start physical therapyDVT and GI prophylaxis  · DC planning LTAC  · Continue Med as below      Scheduled Meds:   carvedilol  12.5 mg Oral BID WC    insulin glargine  8 Units SubCUTAneous Nightly    famotidine  20 mg Oral BID    folic acid  1 mg Oral Daily    thiamine mononitrate  100 mg Oral Daily    amLODIPine  10 mg Oral Daily    predniSONE  20 mg Oral Daily    PARoxetine  40 mg Oral Daily    nystatin  5 mL Oral TID    lacosamide (VIMPAT) IVPB  150 mg IntraVENous BID    ipratropium-albuterol  1 ampule Inhalation TID    insulin lispro  0-18 Units SubCUTAneous Q6H    budesonide  0.5 mg Nebulization BID    enoxaparin  40 mg SubCUTAneous Daily    sodium chloride flush  10 mL IntraVENous Once    sodium chloride flush  5-40 mL IntraVENous 2 times per day    aspirin  81 mg Oral Daily    atorvastatin  40 mg Oral Nightly    clopidogrel  75 mg Oral Daily    losartan  100 mg Oral Daily     Continuous Infusions:   dexmedetomidine (PRECEDEX) IV infusion Stopped (06/02/22 1352)    sodium chloride Stopped (06/08/22 2238)    dextrose      niCARdipene (CARDENE) infusion Stopped (06/08/22 1313)     PRN Meds:  potassium chloride, 20 mEq, PRN   Or  potassium chloride, 10 mEq, PRN  haloperidol lactate, 5 mg, Q6H PRN  LORazepam, 1 mg, Q1H PRN   Or  LORazepam, 1 mg, Q1H PRN   Or  LORazepam, 2 mg, Q1H PRN   Or  LORazepam, 2 mg, Q1H PRN   Or  LORazepam, 3 mg, Q1H PRN   Or  LORazepam, 3 mg, Q1H PRN   Or  LORazepam, 4 mg, Q1H PRN   Or  LORazepam, 4 mg, Q1H PRN  albuterol, 2.5 mg, Q4H PRN  hydrALAZINE, 10 mg, Q6H PRN  sodium chloride flush, 5-40 mL, PRN  sodium chloride, , PRN  acetaminophen, 650 mg, Q4H PRN  ondansetron, 4 mg, Q8H PRN   Or  ondansetron, 4 mg, Q6H PRN  metoprolol, 5 mg, Q6H PRN  glucose, 4 tablet, PRN  dextrose bolus, 125 mL, PRN   Or  dextrose bolus, 250 mL, PRN  glucagon (rDNA), 1 mg, PRN  dextrose, 100 mL/hr, PRN            Subjective:     Patient seen examined at bedside  Patient remains in medical ICU,   Patient was hypoxic intermittently  Has required BiPAP during the day and night  Mentation better now    GF at bedside  Pt off all IVs now  Mentation far better    Needed soft restraints during night  NG in place  occ congested cough   Responding to questions appropriately   Though lethargic  denies any chest pain  Denies vomiting, abdominal pain or diarrhea  Denies headache, neck pain or photophobia        ROS:  Other review of system negative    History of Present Illness:      Tracy East is a 73 y.o.  male who presents with Seizures     Pt admitted through the ED after an episode of seizure at home. Apparently pt lives at home with his fiancee. According to her, he was sitting in his chair yesterday morning when he developed a seizure. It is difficult to determine what kind of seizure it was. His fiancee is not at bedside right now. However it was confirmed that the pt has never had a similar episode before in his life. It is also reported that he wanted to stop alcohol use however she reported him having a beer the evening before. Urine drug screen has been reported unremarkable and ETOH level was zero. Alcohol withdrawal seizure was suspected though seemed less likely if pt had continued to drink alcohol. Pt apparently drinks 4 beers daily     Pt was admitted to the ICU where he continued to remain agitated. Higher and frequent use of Ativan and later Haldol were ineffective. Pt required soft restraints since he was unsafe for himself trying to get out of bed and trying to pull out Iv     Pt was hypertensive with BP > 200/140 which was not improved w prn meds and was started on Cardene infusion. However pt continued to remain agitated and it was suspected he may be in DTs. Pt was placed on the CIWA protocol and was started on IV fluids and Thiamine, Folate     ROS is difficult to obtain presently     I have personally reviewed the past medical history, past surgical history, medications, social history, and family history, and summarized in the note.         Physical Examination:      Vitals:  BP (!) 159/59   Pulse 76   Temp 98.4 °F (36.9 °C) (Temporal)   Resp 22   Ht 5' 6\" (1.676 m)   Wt 186 lb 5 oz (84.5 kg)   SpO2 96%   BMI 30.07 kg/m²   Temp (24hrs), Av.3 °F (36.8 °C), Min:96.8 °F (36 °C), Max:99.2 °F (37.3 °C)    Weight:   Wt Readings from Last 3 Encounters:   22 186 lb 5 oz (84.5 kg)   21 156 lb (70.8 kg)   21 174 lb 4.8 oz (79.1 kg)     I/O last 3 completed shifts:  I/O last 3 completed shifts: In: 3604.1 [I.V.:365.1; NG/GT:3109; IV Piggyback:130]  Out: 5551 [Urine:4575]     Recent Labs     22  1103 22  1708 22  0001 22  0529   POCGLU 211* 256* 214* 204*         General appearance -lethargic, improved since yesterday. BiPAP  Mental status - oriented to person, place, and time with normal affect  Head - normocephalic and atraumatic  Eyes - pupils equal and reactive, extraocular eye movements intact, conjunctiva clear  Ears - hearing appears to be intact  Nose - no drainage noted  Mouth - mucous membranes moist  Neck - supple, no carotid bruits, thyroid not palpable  Chest - clear to auscultation, normal effort  Heart - normal rate, regular rhythm, no murmur  Abdomen - soft, nontender, nondistended, bowel sounds present all four quadrants, no masses, hepatomegaly or splenomegaly  Neurological - normal speech, no focal findings or movement disorder noted, cranial nerves II through XII grossly intact  Extremities - peripheral pulses palpable, no pedal edema or calf pain with palpation. Edema bilateral hands  Skin - no gross lesions, rashes, or induration noted        Medications:      Allergies: No Known Allergies    Current Meds:   Current Facility-Administered Medications:     carvedilol (COREG) tablet 12.5 mg, 12.5 mg, Oral, BID , Aaron Schuster MD    insulin glargine (LANTUS) injection vial 8 Units, 8 Units, SubCUTAneous, Nightly, Shay Hawkins MD, 8 Units at 223    famotidine (PEPCID) tablet 20 mg, 20 mg, Oral, BID, Shay Hawkins MD, 20 mg at  365    folic acid (FOLVITE) tablet 1 mg, 1 mg, Oral, Daily, Shya Hawkins MD, 1 mg at 06/09/22 0853    thiamine mononitrate tablet 100 mg, 100 mg, Oral, Daily, Shay Hawkins MD, 100 mg at 06/09/22 0853    amLODIPine (NORVASC) tablet 10 mg, 10 mg, Oral, Daily, Marion Davis MD, 10 mg at 06/09/22 0853    predniSONE (DELTASONE) tablet 20 mg, 20 mg, Oral, Daily, Meme Trinh MD, 20 mg at 06/09/22 0853    PARoxetine (PAXIL) tablet 40 mg, 40 mg, Oral, Daily, Meme Trinh MD, 40 mg at 06/09/22 0854    nystatin (MYCOSTATIN) 870174 UNIT/ML suspension 500,000 Units, 5 mL, Oral, TID, Meme Trinh MD, 500,000 Units at 06/09/22 0920    lacosamide (VIMPAT) 150 mg in sodium chloride 0.9 % 65 mL IVPB, 150 mg, IntraVENous, BID, Rafy Rasheed MD, Paused at 06/09/22 0947    ipratropium-albuterol (DUONEB) nebulizer solution 1 ampule, 1 ampule, Inhalation, TID, Stephanie Lomeli MD, 1 ampule at 06/09/22 0904    potassium chloride 20 mEq/50 mL IVPB (Central Line), 20 mEq, IntraVENous, PRN, Stopped at 05/31/22 1445 **OR** potassium chloride 10 mEq/100 mL IVPB (Peripheral Line), 10 mEq, IntraVENous, PRN, Stephanie Lomeli MD    dexmedetomidine (PRECEDEX) 1,000 mcg in sodium chloride 0.9 % 250 mL infusion, 0.1-1.5 mcg/kg/hr, IntraVENous, Continuous, Shay Hawkins MD, Stopped at 06/02/22 1352    insulin lispro (HUMALOG) injection vial 0-18 Units, 0-18 Units, SubCUTAneous, Q6H, Shay Hawkins MD, 6 Units at 06/09/22 0612    budesonide (PULMICORT) nebulizer suspension 500 mcg, 0.5 mg, Nebulization, BID, CHARLENE Paniagua - CNP, 500 mcg at 06/09/22 0904    enoxaparin (LOVENOX) injection 40 mg, 40 mg, SubCUTAneous, Daily, CHARLENE Paniagua - CNP, 40 mg at 06/09/22 0903    haloperidol lactate (HALDOL) injection 5 mg, 5 mg, IntraMUSCular, Q6H PRN, Shay Hawkins MD, 5 mg at 06/02/22 2237    LORazepam (ATIVAN) tablet 1 mg, 1 mg, Oral, Q1H PRN **OR** LORazepam (ATIVAN) injection 1 mg, 1 mg, IntraVENous, Q1H PRN, 1 mg at 06/01/22 1107 **OR** LORazepam (ATIVAN) tablet 2 mg, 2 mg, Oral, Q1H PRN **OR** LORazepam (ATIVAN) injection 2 mg, 2 mg, IntraVENous, Q1H PRN, 2 mg at 06/02/22 2144 **OR** LORazepam (ATIVAN) tablet 3 mg, 3 mg, Oral, Q1H PRN **OR** LORazepam (ATIVAN) injection 3 mg, 3 mg, IntraVENous, Q1H PRN, 3 mg at 06/02/22 0956 **OR** LORazepam (ATIVAN) tablet 4 mg, 4 mg, Oral, Q1H PRN **OR** LORazepam (ATIVAN) injection 4 mg, 4 mg, IntraVENous, Q1H PRN, Shay Hawkins MD, 4 mg at 06/03/22 0357    albuterol (PROVENTIL) nebulizer solution 2.5 mg, 2.5 mg, Nebulization, Q4H PRN, Colt Ochoa MD    hydrALAZINE (APRESOLINE) injection 10 mg, 10 mg, IntraVENous, Q6H PRN, Colt Ochoa MD, 10 mg at 06/09/22 2249    sodium chloride flush 0.9 % injection 10 mL, 10 mL, IntraVENous, Once, Jalil Donnelly MD    sodium chloride flush 0.9 % injection 5-40 mL, 5-40 mL, IntraVENous, 2 times per day, Shay Cooley MD, 10 mL at 06/09/22 0917    sodium chloride flush 0.9 % injection 5-40 mL, 5-40 mL, IntraVENous, PRN, Shay Hawkins MD, 10 mL at 06/09/22 0952    0.9 % sodium chloride infusion, , IntraVENous, PRN, Shay Cooley MD, Stopped at 06/08/22 2238    acetaminophen (TYLENOL) tablet 650 mg, 650 mg, Oral, Q4H PRN, Shay Hawkins MD, 650 mg at 06/09/22 0223    ondansetron (ZOFRAN-ODT) disintegrating tablet 4 mg, 4 mg, Oral, Q8H PRN **OR** ondansetron (ZOFRAN) injection 4 mg, 4 mg, IntraVENous, Q6H PRN, Shay Hawkins MD    metoprolol (LOPRESSOR) injection 5 mg, 5 mg, IntraVENous, Q6H PRN, Shay Hawkins MD, 5 mg at 06/09/22 0210    glucose chewable tablet 16 g, 4 tablet, Oral, PRN, Shay Hawkins MD    dextrose bolus 10% 125 mL, 125 mL, IntraVENous, PRN **OR** dextrose bolus 10% 250 mL, 250 mL, IntraVENous, PRN, Shay Hawkins MD    glucagon (rDNA) injection 1 mg, 1 mg, IntraMUSCular, PRN, Shay Hawkins MD    dextrose 5 % solution, 100 mL/hr, IntraVENous, PRN, Shay Hawkins MD    aspirin chewable tablet 81 mg, 81 mg, Oral, Daily, Shay Cooley MD, 81 mg at 06/09/22 0853    atorvastatin (LIPITOR) tablet 40 mg, 40 mg, Oral, Nightly, Shay Hawkins MD, 40 mg at 06/08/22 2153    clopidogrel (PLAVIX) tablet 75 mg, 75 mg, Oral, Daily, Shay Hawkins MD, 75 mg at 06/09/22 0854    losartan (COZAAR) tablet 100 mg, 100 mg, Oral, Daily, Brooke Hernández MD, 100 mg at 06/09/22 0853    niCARdipine (CARDENE) 50 mg in dextrose 5 % 250 mL infusion, 2.5-15 mg/hr, IntraVENous, Continuous, Shay Hawkins MD, Stopped at 06/08/22 1313      I/O (24Hr):     Intake/Output Summary (Last 24 hours) at 6/9/2022 1128  Last data filed at 6/9/2022 0952  Gross per 24 hour   Intake 2454.91 ml   Output 3575 ml   Net -1120.09 ml       Data:           Labs:    Hematology:  Recent Labs     06/07/22  0329 06/08/22  0458 06/09/22  0326   WBC 20.3* 18.6* 17.6*   RBC 3.93* 3.67* 3.27*   HGB 12.3* 11.8* 10.6*   HCT 37.6* 35.2* 31.3*   MCV 95.7 95.9 95.7   MCH 31.3 32.2 32.4   MCHC 32.7 33.5 33.9   RDW 13.0 12.9 12.9    217 217   MPV 11.3 11.2 11.2     Chemistry:  Recent Labs     06/07/22  0329 06/08/22  0458 06/09/22  0326    142 144   K 4.0 4.2 4.2    105 108*   CO2 28 30 27   GLUCOSE 191* 268* 190*   BUN 52* 40* 32*   CREATININE 1.48* 1.41* 1.21*   ANIONGAP 9 7* 9   LABGLOM 48* 50* >60   GFRAA 58* >60 >60   CALCIUM 8.2* 8.1* 7.8*   PHOS  --  2.7  --      Recent Labs     06/07/22  2350 06/08/22  0538 06/08/22  1103 06/08/22  1708 06/09/22  0001 06/09/22  0529   POCGLU 235* 252* 211* 256* 214* 204*       Lab Results   Component Value Date/Time    SPECIAL NOT REPORTED 02/26/2021 09:30 AM     Lab Results   Component Value Date/Time    CULTURE NO GROWTH 3 DAYS 02/26/2021 09:30 AM       Lab Results   Component Value Date    POCPH 7.430 06/04/2022    POCPCO2 35.2 06/04/2022    POCPO2 79.7 06/04/2022    POCHCO3 23.4 06/04/2022    NBEA 1 06/04/2022    PBEA 0 05/27/2022    BVLG2QXG 96 06/04/2022    FIO2 30.0 06/04/2022       Radiology:    XR CHEST (SINGLE VIEW FRONTAL)    Result Date: 5/26/2022  Mild hypoventilatory changes noted both lung bases. CT HEAD WO CONTRAST    Result Date: 5/25/2022  No acute intracranial hemorrhage or abnormal extra-axial collection. Relative to 9 months prior there are increased areas of encephalomalacia in the left occipital lobe and new areas of encephalomalacia in the left parietal lobe. If there is persistent clinical concern for acute on chronic ischemia, MRI is more sensitive. XR CHEST PORTABLE    Result Date: 5/31/2022  Stable support line interval increase in now moderate left basilar opacity likely related to combined pleural-parenchymal process, pneumonia the likely etiology     XR CHEST PORTABLE    Result Date: 5/30/2022  Elevated left hemidiaphragm with apparent atelectasis left base. Slight improvement in right basilar opacity which may represent resolving atelectasis or improved minimal airspace disease. XR CHEST PORTABLE    Result Date: 5/29/2022  Interval developing right basilar opacity which may be related atelectasis or developing focal airspace disease. XR CHEST PORTABLE    Result Date: 5/28/2022  No significant finding in the chest.     CT CHEST ABDOMEN PELVIS W CONTRAST    Result Date: 5/25/2022  Small cysts noted in the liver. Diverticular disease of the colon without diverticulitis. Significant thickening of the bladder wall, in keeping with muscular hypertrophy or cystitis. Clinical correlation suggested. Degenerative changes demonstrated in the the thoracic and lumbar spine. RECOMMENDATIONS:     MRI BRAIN W WO CONTRAST    Result Date: 5/25/2022  No acute intracranial abnormality. No mass effect or abnormal intracranial enhancement. Old infarctions in the left frontal parietal lobes, new since September 21, 2021. Old infarction in the left occipital lobe, likely increased in size since September 21, 2021. Laminar necrosis and gliosis associated with the old infarctions.  Old lacunar infarcts in the left basal ganglia, likely increased. Mild parenchymal volume loss. All radiological studies reviewed  Code Status:  Full Code        Electronically signed by Doris Loving MD on 6/9/2022 at 11:28 AM    This note was created with the assistance of a speech-recognition program.  Although the intention is to generate a document that actually reflects the content of the visit, no guarantees can be provided that every mistake has been identified and corrected by editing. Note was updated later by me after  physical examination and  completion of the assessment.

## 2022-06-09 NOTE — PROGRESS NOTES
Monitoring and Evaluation:   Behavioral-Environmental Outcomes: None Identified  Food/Nutrient Intake Outcomes: Enteral Nutrition Intake/Tolerance,Diet Advancement/Tolerance  Physical Signs/Symptoms Outcomes: Biochemical Data,Fluid Status or Edema,Skin,Weight,GI Status    Discharge Planning:     Too soon to determine         Sharlene FRANCIS, RDN, LDN  Lead Clinical Dietitian  RD Office Phone [phone]

## 2022-06-09 NOTE — PROGRESS NOTES
Speech Language Pathology  Speech Language Pathology  [facility]    Dysphagia Treatment Note    Date: 6/9/2022  Patients Name: Jabier Peres  MRN: [id number]  Diagnosis:   Patient Active Problem List   Diagnosis Code    [facility] N17.9    Acute ischemic left PCA stroke ([facility] I63.532    Right sided weakness R53.1    Seizure ([facility] R56.9       Pain: 0/10    Dysphagia Treatment  Treatment time: 6430-0238    Subjective: [x] Alert [x] Cooperative     [] Confused     [] Agitated    [] Lethargic    Objective/Assessment:    Pt. Seen for O/M treatment program.  Pt. Completed O/M exercises X 8- 10 X 1 sets with mod-max cues. Pt. Completed tomy maneuver X 4 reps with mod cues. Pt requires frequent verbal cues for redirection. Education provided and exercises left at bedside. Plan:  [x] Continue ST services    [] Discharge from ST:        Discharge recommendations: []  Further therapy recommended at discharge. The patient should be able to tolerate at least 3 hours of therapy per day over 5 days or 15 hours over 7 days. [x] Further therapy recommended at discharge. [] No therapy recommended at discharge.          Treatment completed by: Lashonda Swain, SLP, M.S. [address] [facility]

## 2022-06-09 NOTE — CARE COORDINATION
Discharge planning    Spoke with martina at [facility]. They will have a bed available later today or tomorrow. She will keep writer informed. Packet started. Waiting on med rec and discharge order. [address] with [facility]. They can take the patient today. Writer will set up transport. Informed the nurse. [facility] will pick patient up at 1700, to transport to [facility].

## 2022-06-09 NOTE — PROGRESS NOTES
Pt restless in bed occasionally and frequently sliding down in bed. Dr Petr Rashid states pt ok to [facility], Dr Fragoso Furnace on above and need dc order and meds reconciled. Dr Willett Argue asking if ok to transfer to [facility]. Vikki Ortez with case management aware.

## 2022-06-09 NOTE — PROGRESS NOTES
End Of Shift Note  [facility]  Summary of shift:  Pt had an uneventful night overall. Pressures have been elevated but stabilized with PRN antihypertensives. Pt is periodically confused but overall very alert.      Vitals:    Vitals:    06/09/22 0400 06/09/22 0500 06/09/22 0600 06/09/22 0614   BP: (!) 152/49 (!) 156/52 (!) 173/73 (!) 168/61   Pulse: 64 73 80    Resp: (!) 42  (!) 32    Temp: 96.8 °F (36 °C)      TempSrc: Temporal      SpO2: 94% 96% 94%    Weight:       Height:            I&O:     Intake/Output Summary (Last 24 hours) at 6/9/2022 0643  Last data filed at 6/9/2022 0600  Gross per 24 hour   Intake 2389.14 ml   Output 3275 ml   Net -885.86 ml       Resp Status: Sats in the 90s on [facility]    Ventilator Settings:     / / /FiO2 : 30 %    Critical Care IV infusions:   dexmedetomidine (PRECEDEX) IV infusion Stopped (06/02/22 1352)    sodium chloride 5 mL/hr at 06/08/22 2157    dextrose      niCARdipene (CARDENE) infusion Stopped (06/08/22 1313)        LDA:   Extended Dwell Peripherial IV 06/03/22 Left Brachial (Active)   Number of days: 5       PICC Double Lumen 01/11/25 Right Basilic (Active)   Number of days: 13       NG/OG/NJ/NE Tube Nasogastric 14 fr Left nostril (Active)   Number of days: 5       Urinary Catheter Peterson (Active)   Number of days: 12       Incision 09/22/21 Neck Left (Active)   Number of days: 260

## 2022-06-09 NOTE — PROGRESS NOTES
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   · Seizure precautions  · IV Solu-Medrol  · TPN  · Plan to start tube feeds today  · Add water 350 mL every 4 hour  · CIWA protocol  · Critical care on board  ·    · Waxing and waning mental status   · Neurology following, phenobarbital is discontinued  · Continue Vimpat  · Neurology following  · Zyprexa  · As needed Haldol  ·    · Monitor blood pressure  · Cardene drip, weaning  · IV Lopressor as needed  · Continue clonidine patch  · Nephrology on board  ·    · Vascular evaluated the the patient,   · Soft restraints  · Keep room well lit  · Cardene infusion  · Start physical therapyDVT and GI prophylaxis  · DC planning  · Continue Med as below      Scheduled Meds:   carvedilol  12.5 mg Oral BID WC    insulin glargine  8 Units SubCUTAneous Nightly    famotidine  20 mg Oral BID    folic acid  1 mg Oral Daily    thiamine mononitrate  100 mg Oral Daily    amLODIPine  10 mg Oral Daily    predniSONE  20 mg Oral Daily    PARoxetine  40 mg Oral Daily    nystatin  5 mL Oral TID    lacosamide (VIMPAT) IVPB  150 mg IntraVENous BID    ipratropium-albuterol  1 ampule Inhalation TID    insulin lispro  0-18 Units SubCUTAneous Q6H    budesonide  0.5 mg Nebulization BID    enoxaparin  40 mg SubCUTAneous Daily    sodium chloride flush  10 mL IntraVENous Once    sodium chloride flush  5-40 mL IntraVENous 2 times per day    aspirin  81 mg Oral Daily    atorvastatin  40 mg Oral Nightly    clopidogrel  75 mg Oral Daily    losartan  100 mg Oral Daily     Continuous Infusions:   dexmedetomidine (PRECEDEX) IV infusion Stopped (06/02/22 1352)    sodium chloride Stopped (06/08/22 2238)    dextrose      niCARdipene (CARDENE) infusion Stopped (06/08/22 1313)     PRN Meds:  potassium chloride, 20 mEq, PRN   Or  potassium chloride, 10 mEq, PRN  haloperidol lactate, 5 mg, Q6H PRN  LORazepam, 1 mg, Q1H PRN   Or  LORazepam, 1 mg, Q1H PRN   Or  LORazepam, 2 mg, Q1H PRN   Or  LORazepam, 2 mg, Q1H PRN   Or  LORazepam, 3 mg, Q1H PRN   Or  LORazepam, 3 mg, Q1H PRN   Or  LORazepam, 4 mg, Q1H PRN   Or  LORazepam, 4 mg, Q1H PRN  albuterol, 2.5 mg, Q4H PRN  hydrALAZINE, 10 mg, Q6H PRN  sodium chloride flush, 5-40 mL, PRN  sodium chloride, , PRN  acetaminophen, 650 mg, Q4H PRN  ondansetron, 4 mg, Q8H PRN   Or  ondansetron, 4 mg, Q6H PRN  metoprolol, 5 mg, Q6H PRN  glucose, 4 tablet, PRN  dextrose bolus, 125 mL, PRN   Or  dextrose bolus, 250 mL, PRN  glucagon (rDNA), 1 mg, PRN  dextrose, 100 mL/hr, PRN            Subjective:     Patient seen examined at bedside  Patient remains in medical ICU,   Patient was hypoxic intermittently  Has required BiPAP during the day and night  Mentation better now    Needed soft restraints   Responding to questions appropriately   Though lethargic  denies any chest pain, cough  Denies vomiting, abdominal pain or diarrhea  Denies headache, neck pain or photophobia        ROS:  Other review of system negative    History of Present Illness:      Arthur Monday is a 73 y.o.  male who presents with Seizures     Pt admitted through the ED after an episode of seizure at home. Apparently pt lives at home with his fibarbarae. According to her, he was sitting in his chair yesterday morning when he developed a seizure. It is difficult to determine what kind of seizure it was. His fiancee is not at bedside right now. However it was confirmed that the pt has never had a similar episode before in his life. It is also reported that he wanted to stop alcohol use however she reported him having a beer the evening before. Urine drug screen has been reported unremarkable and ETOH level was zero. Alcohol withdrawal seizure was suspected though seemed less likely if pt had continued to drink alcohol. Pt apparently drinks 4 beers daily     Pt was admitted to the ICU where he continued to remain agitated. Higher and frequent use of Ativan and later Haldol were ineffective. Pt required soft restraints since he was unsafe for himself trying to get out of bed and trying to pull out Iv     Pt was hypertensive with BP > 200/140 which was not improved w prn meds and was started on Cardene infusion. However pt continued to remain agitated and it was suspected he may be in DTs. Pt was placed on the CIWA protocol and was started on IV fluids and Thiamine, Folate     ROS is difficult to obtain presently     I have personally reviewed the past medical history, past surgical history, medications, social history, and family history, and summarized in the note.         Physical Examination:      Vitals:  BP (!) 159/59   Pulse 76   Temp 98.4 °F (36.9 °C) (Temporal)   Resp 22   Ht 5' 6\" (1.676 m)   Wt 186 lb 5 oz (84.5 kg)   SpO2 96%   BMI 30.07 kg/m²   Temp (24hrs), Av.3 °F (36.8 °C), Min:96.8 °F (36 °C), Max:99.2 °F (37.3 °C)    Weight:   Wt Readings from Last 3 Encounters:   06/04/22 186 lb 5 oz (84.5 kg)   09/23/21 156 lb (70.8 kg)   02/26/21 174 lb 4.8 oz (79.1 kg)     I/O last 3 completed shifts:  I/O last 3 completed shifts: In: 3604.1 [I.V.:365.1; NG/GT:3109; IV Piggyback:130]  Out: 2789 [Urine:4575]     Recent Labs     06/08/22  1103 06/08/22  1708 06/09/22  0001 06/09/22  0529   POCGLU 211* 256* 214* 204*         General appearance -lethargic, improved since yesterday. BiPAP  Mental status - oriented to person, place, and time with normal affect  Head - normocephalic and atraumatic  Eyes - pupils equal and reactive, extraocular eye movements intact, conjunctiva clear  Ears - hearing appears to be intact  Nose - no drainage noted  Mouth - mucous membranes moist  Neck - supple, no carotid bruits, thyroid not palpable  Chest - clear to auscultation, normal effort  Heart - normal rate, regular rhythm, no murmur  Abdomen - soft, nontender, nondistended, bowel sounds present all four quadrants, no masses, hepatomegaly or splenomegaly  Neurological - normal speech, no focal findings or movement disorder noted, cranial nerves II through XII grossly intact  Extremities - peripheral pulses palpable, no pedal edema or calf pain with palpation. Edema bilateral hands  Skin - no gross lesions, rashes, or induration noted        Medications:      Allergies: No Known Allergies    Current Meds:   Current Facility-Administered Medications:     carvedilol (COREG) tablet 12.5 mg, 12.5 mg, Oral, BID , Aaron Scott MD    insulin glargine (LANTUS) injection vial 8 Units, 8 Units, SubCUTAneous, Nightly, Shay Hawkins MD, 8 Units at 06/08/22 3611    famotidine (PEPCID) tablet 20 mg, 20 mg, Oral, BID, Shay Hawkins MD, 20 mg at 33/71/15 6050    folic acid (FOLVITE) tablet 1 mg, 1 mg, Oral, Daily, Shay Hawkins MD, 1 mg at 06/09/22 0853    thiamine mononitrate tablet 100 mg, 100 mg, Oral, Daily, Shay Hawkins MD, 100 mg at 06/09/22 0853    amLODIPine (NORVASC) tablet 10 mg, 10 mg, Oral, Daily, Bronwyn Handy MD, 10 mg at 06/09/22 0853    predniSONE (DELTASONE) tablet 20 mg, 20 mg, Oral, Daily, Adrianna Conway MD, 20 mg at 06/09/22 0853    PARoxetine (PAXIL) tablet 40 mg, 40 mg, Oral, Daily, Adrianna Conway MD, 40 mg at 06/09/22 0854    nystatin (MYCOSTATIN) 208035 UNIT/ML suspension 500,000 Units, 5 mL, Oral, TID, Adrianna Conway MD, 500,000 Units at 06/09/22 0920    lacosamide (VIMPAT) 150 mg in sodium chloride 0.9 % 65 mL IVPB, 150 mg, IntraVENous, BID, Yissel Park MD, Paused at 06/09/22 0947    ipratropium-albuterol (DUONEB) nebulizer solution 1 ampule, 1 ampule, Inhalation, TID, Olivia Madsen MD, 1 ampule at 06/09/22 0904    potassium chloride 20 mEq/50 mL IVPB (Central Line), 20 mEq, IntraVENous, PRN, Stopped at 05/31/22 1445 **OR** potassium chloride 10 mEq/100 mL IVPB (Peripheral Line), 10 mEq, IntraVENous, PRN, Olivia Madsen MD    dexmedetomidine (PRECEDEX) 1,000 mcg in sodium chloride 0.9 % 250 mL infusion, 0.1-1.5 mcg/kg/hr, IntraVENous, Continuous, Shay Hawkins MD, Stopped at 06/02/22 1352    insulin lispro (HUMALOG) injection vial 0-18 Units, 0-18 Units, SubCUTAneous, Q6H, Shay Hawkins MD, 6 Units at 06/09/22 0612    budesonide (PULMICORT) nebulizer suspension 500 mcg, 0.5 mg, Nebulization, BID, Chinyere Dross, APRN - CNP, 500 mcg at 06/09/22 0904    enoxaparin (LOVENOX) injection 40 mg, 40 mg, SubCUTAneous, Daily, Chinyere Dross, APRN - CNP, 40 mg at 06/09/22 1891    haloperidol lactate (HALDOL) injection 5 mg, 5 mg, IntraMUSCular, Q6H PRN, Orpha Maureen Hawkins MD, 5 mg at 06/02/22 2237    LORazepam (ATIVAN) tablet 1 mg, 1 mg, Oral, Q1H PRN **OR** LORazepam (ATIVAN) injection 1 mg, 1 mg, IntraVENous, Q1H PRN, 1 mg at 06/01/22 1107 **OR** LORazepam (ATIVAN) tablet 2 mg, 2 mg, Oral, Q1H PRN **OR** LORazepam (ATIVAN) injection 2 mg, 2 mg, IntraVENous, Q1H PRN, 2 mg at 06/02/22 2144 **OR** LORazepam (ATIVAN) tablet 3 mg, 3 mg, Oral, Q1H PRN **OR** LORazepam (ATIVAN) injection 3 mg, 3 mg, IntraVENous, Q1H PRN, 3 mg at 06/02/22 0956 **OR** LORazepam (ATIVAN) tablet 4 mg, 4 mg, Oral, Q1H PRN **OR** LORazepam (ATIVAN) injection 4 mg, 4 mg, IntraVENous, Q1H PRN, Shay Hawkins MD, 4 mg at 06/03/22 0357    albuterol (PROVENTIL) nebulizer solution 2.5 mg, 2.5 mg, Nebulization, Q4H PRN, Jacob Hernandez MD    hydrALAZINE (APRESOLINE) injection 10 mg, 10 mg, IntraVENous, Q6H PRN, Jacob Hernandez MD, 10 mg at 06/09/22 7322    sodium chloride flush 0.9 % injection 10 mL, 10 mL, IntraVENous, Once, Pallavi Doe MD    sodium chloride flush 0.9 % injection 5-40 mL, 5-40 mL, IntraVENous, 2 times per day, Evangelista Arguello MD, 10 mL at 06/09/22 0917    sodium chloride flush 0.9 % injection 5-40 mL, 5-40 mL, IntraVENous, PRN, Shay Hawkins MD, 10 mL at 06/09/22 0952    0.9 % sodium chloride infusion, , IntraVENous, PRN, Evangelista Arguello MD, Stopped at 06/08/22 2238    acetaminophen (TYLENOL) tablet 650 mg, 650 mg, Oral, Q4H PRN, Shay Hawkins MD, 650 mg at 06/09/22 0223    ondansetron (ZOFRAN-ODT) disintegrating tablet 4 mg, 4 mg, Oral, Q8H PRN **OR** ondansetron (ZOFRAN) injection 4 mg, 4 mg, IntraVENous, Q6H PRN, Shay Hawkins MD    metoprolol (LOPRESSOR) injection 5 mg, 5 mg, IntraVENous, Q6H PRN, Shay Hawkins MD, 5 mg at 06/09/22 0210    glucose chewable tablet 16 g, 4 tablet, Oral, PRN, Shay Hawkins MD    dextrose bolus 10% 125 mL, 125 mL, IntraVENous, PRN **OR** dextrose bolus 10% 250 mL, 250 mL, IntraVENous, PRN, Shay Hawkins MD    glucagon (rDNA) injection 1 mg, 1 mg, IntraMUSCular, PRN, Shay Hawkins MD    dextrose 5 % solution, 100 mL/hr, IntraVENous, PRN, Shay Hawkins MD    aspirin chewable tablet 81 mg, 81 mg, Oral, Daily, Shay Hawkins MD, 81 mg at 06/09/22 0853    atorvastatin (LIPITOR) tablet 40 mg, 40 mg, Oral, Nightly, Shay Hawkins MD, 40 mg at 06/08/22 2153    clopidogrel (PLAVIX) tablet 75 mg, 75 mg, Oral, Daily, Shay Hawkins MD, 75 mg at 06/09/22 0854    losartan (COZAAR) tablet 100 mg, 100 mg, Oral, Daily, Leanne Sloan MD, 100 mg at 06/09/22 0853    niCARdipine (CARDENE) 50 mg in dextrose 5 % 250 mL infusion, 2.5-15 mg/hr, IntraVENous, Continuous, Shay Hawkins MD, Stopped at 06/08/22 1313      I/O (24Hr): Intake/Output Summary (Last 24 hours) at 6/9/2022 1125  Last data filed at 6/9/2022 0952  Gross per 24 hour   Intake 2454.91 ml   Output 3575 ml   Net -1120.09 ml       Data:           Labs:    Hematology:  Recent Labs     06/07/22  0329 06/08/22  0458 06/09/22  0326   WBC 20.3* 18.6* 17.6*   RBC 3.93* 3.67* 3.27*   HGB 12.3* 11.8* 10.6*   HCT 37.6* 35.2* 31.3*   MCV 95.7 95.9 95.7   MCH 31.3 32.2 32.4   MCHC 32.7 33.5 33.9   RDW 13.0 12.9 12.9    217 217   MPV 11.3 11.2 11.2     Chemistry:  Recent Labs     06/07/22  0329 06/08/22  0458 06/09/22  0326    142 144   K 4.0 4.2 4.2    105 108*   CO2 28 30 27   GLUCOSE 191* 268* 190*   BUN 52* 40* 32*   CREATININE 1.48* 1.41* 1.21*   ANIONGAP 9 7* 9   LABGLOM 48* 50* >60   GFRAA 58* >60 >60   CALCIUM 8.2* 8.1* 7.8*   PHOS  --  2.7  --      Recent Labs     06/07/22  2350 06/08/22  0538 06/08/22  1103 06/08/22  1708 06/09/22  0001 06/09/22  0529   POCGLU 235* 252* 211* 256* 214* 204*       Lab Results   Component Value Date/Time    SPECIAL NOT REPORTED 02/26/2021 09:30 AM     Lab Results   Component Value Date/Time    CULTURE NO GROWTH 3 DAYS 02/26/2021 09:30 AM       Lab Results   Component Value Date    POCPH 7.430 06/04/2022    POCPCO2 35.2 06/04/2022    POCPO2 79.7 06/04/2022    POCHCO3 23.4 06/04/2022    NBEA 1 06/04/2022    PBEA 0 05/27/2022    SVRL0WBK 96 06/04/2022    FIO2 30.0 06/04/2022       Radiology:    XR CHEST (SINGLE VIEW FRONTAL)    Result Date: 5/26/2022  Mild hypoventilatory changes noted both lung bases.      CT HEAD WO CONTRAST    Result Date: 5/25/2022  No acute intracranial hemorrhage or abnormal extra-axial collection. Relative to 9 months prior there are increased areas of encephalomalacia in the left occipital lobe and new areas of encephalomalacia in the left parietal lobe. If there is persistent clinical concern for acute on chronic ischemia, MRI is more sensitive. XR CHEST PORTABLE    Result Date: 5/31/2022  Stable support line interval increase in now moderate left basilar opacity likely related to combined pleural-parenchymal process, pneumonia the likely etiology     XR CHEST PORTABLE    Result Date: 5/30/2022  Elevated left hemidiaphragm with apparent atelectasis left base. Slight improvement in right basilar opacity which may represent resolving atelectasis or improved minimal airspace disease. XR CHEST PORTABLE    Result Date: 5/29/2022  Interval developing right basilar opacity which may be related atelectasis or developing focal airspace disease. XR CHEST PORTABLE    Result Date: 5/28/2022  No significant finding in the chest.     CT CHEST ABDOMEN PELVIS W CONTRAST    Result Date: 5/25/2022  Small cysts noted in the liver. Diverticular disease of the colon without diverticulitis. Significant thickening of the bladder wall, in keeping with muscular hypertrophy or cystitis. Clinical correlation suggested. Degenerative changes demonstrated in the the thoracic and lumbar spine. RECOMMENDATIONS:     MRI BRAIN W WO CONTRAST    Result Date: 5/25/2022  No acute intracranial abnormality. No mass effect or abnormal intracranial enhancement. Old infarctions in the left frontal parietal lobes, new since September 21, 2021. Old infarction in the left occipital lobe, likely increased in size since September 21, 2021. Laminar necrosis and gliosis associated with the old infarctions. Old lacunar infarcts in the left basal ganglia, likely increased. Mild parenchymal volume loss.          All radiological studies reviewed  Code Status:  Full Code        Electronically signed by Marino Bahena MD on 6/9/2022 at 11:25 AM    This note was created with the assistance of a speech-recognition program.  Although the intention is to generate a document that actually reflects the content of the visit, no guarantees can be provided that every mistake has been identified and corrected by editing. Note was updated later by me after  physical examination and  completion of the assessment.

## 2022-06-09 NOTE — PLAN OF CARE
Problem: Discharge Planning  Goal: Discharge to home or other facility with appropriate resources  6/9/2022 0151 by Laila Ford RN  Outcome: Progressing  6/8/2022 1500 by Deepti Loja RN  Outcome: Progressing  Flowsheets (Taken 6/8/2022 0400 by Fabiana Flores RN)  Discharge to home or other facility with appropriate resources: Identify barriers to discharge with patient and caregiver     Problem: Neurosensory - Adult  Goal: Achieves stable or improved neurological status  6/9/2022 0151 by Laila Ford RN  Outcome: Progressing  6/8/2022 1500 by Deepti Loja RN  Outcome: Progressing  Flowsheets (Taken 6/8/2022 0400 by Fabiana Flores RN)  Achieves stable or improved neurological status: Assess for and report changes in neurological status  Goal: Absence of seizures  6/9/2022 0151 by Laila Ford RN  Outcome: Progressing  6/8/2022 1500 by Deepti Loja RN  Outcome: Progressing  Flowsheets (Taken 6/8/2022 0400 by Fabiana Flores RN)  Absence of seizures: Support airway/breathing, administer oxygen as needed  Goal: Remains free of injury related to seizures activity  6/9/2022 0151 by Laila Ford RN  Outcome: Progressing  6/8/2022 1500 by Deepti Loja RN  Outcome: Progressing  Flowsheets (Taken 6/8/2022 0400 by Fabiana Flores RN)  Remains free of injury related to seizure activity: Maintain airway, patient safety  and administer oxygen as ordered  Goal: Achieves maximal functionality and self care  6/9/2022 0151 by Laila Ford RN  Outcome: Progressing  6/8/2022 1500 by Deepti Loja RN  Outcome: Progressing  Flowsheets (Taken 6/8/2022 0400 by Fabiana Flores RN)  Achieves maximal functionality and self care: Monitor swallowing and airway patency with patient fatigue and changes in neurological status     Problem: Respiratory - Adult  Goal: Achieves optimal ventilation and oxygenation  6/9/2022 0151 by Laila Ford RN  Outcome: Progressing  6/8/2022 1500 by Fish Puga VIPIN Haile  Outcome: Progressing  Flowsheets (Taken 6/8/2022 0400 by Roe Molina RN)  Achieves optimal ventilation and oxygenation: Assess for changes in respiratory status     Problem: Cardiovascular - Adult  Goal: Maintains optimal cardiac output and hemodynamic stability  6/9/2022 0151 by Tim Núñez RN  Outcome: Progressing  6/8/2022 1500 by Lian Romero RN  Outcome: Progressing  Flowsheets (Taken 6/8/2022 0400 by Roe Molina RN)  Maintains optimal cardiac output and hemodynamic stability: Monitor blood pressure and heart rate  Goal: Absence of cardiac dysrhythmias or at baseline  6/9/2022 0151 by Tim Núñez RN  Outcome: Progressing  6/8/2022 1500 by Lian Romero RN  Outcome: Progressing  Flowsheets (Taken 6/8/2022 0400 by Roe Molina RN)  Absence of cardiac dysrhythmias or at baseline: Monitor cardiac rate and rhythm     Problem: Skin/Tissue Integrity - Adult  Goal: Skin integrity remains intact  6/9/2022 0151 by Tim Núñez RN  Outcome: Progressing  6/8/2022 1500 by Lian Romero RN  Outcome: Progressing  Flowsheets  Taken 6/8/2022 0808 by Lian Romero RN  Skin Integrity Remains Intact: Monitor for areas of redness and/or skin breakdown  Taken 6/8/2022 0400 by Roe Molina RN  Skin Integrity Remains Intact: Monitor for areas of redness and/or skin breakdown  Goal: Incisions, wounds, or drain sites healing without S/S of infection  6/9/2022 0151 by Tim Núñez RN  Outcome: Progressing  6/8/2022 1500 by Lian Romero RN  Outcome: Progressing  Flowsheets  Taken 6/8/2022 0808 by Lian Romero RN  Incisions, Wounds, or Drain Sites Healing Without Sign and Symptoms of Infection:   ADMISSION and DAILY: Assess and document risk factors for pressure ulcer development   TWICE DAILY: Assess and document skin integrity  Taken 6/8/2022 0400 by Roe Molina RN  Incisions, Wounds, or Drain Sites Healing Without Sign and Symptoms of Infection: ADMISSION and DAILY: Assess and document risk factors for pressure ulcer development  Goal: Oral mucous membranes remain intact  6/9/2022 0151 by Sharon Mix RN  Outcome: Progressing  6/8/2022 1500 by Alina Stern RN  Outcome: Progressing  Flowsheets  Taken 6/8/2022 0808 by Alina Stern RN  Oral Mucous Membranes Remain Intact: Assess oral mucosa and hygiene practices  Taken 6/8/2022 0400 by Bhavin Nielsen RN  Oral Mucous Membranes Remain Intact: Assess oral mucosa and hygiene practices     Problem: Metabolic/Fluid and Electrolytes - Adult  Goal: Electrolytes maintained within normal limits  6/9/2022 0151 by Sharon Mix RN  Outcome: Progressing  6/8/2022 1500 by Alina Stern RN  Outcome: Progressing  Flowsheets (Taken 6/8/2022 0400 by Bhavin Nielsen RN)  Electrolytes maintained within normal limits: Monitor labs and assess patient for signs and symptoms of electrolyte imbalances  Goal: Hemodynamic stability and optimal renal function maintained  6/9/2022 0151 by Sharon Mix RN  Outcome: Progressing  6/8/2022 1500 by Alina Stern RN  Outcome: Progressing  Flowsheets (Taken 6/8/2022 0400 by Bhavin Nielsen RN)  Hemodynamic stability and optimal renal function maintained: Monitor labs and assess for signs and symptoms of volume excess or deficit  Goal: Glucose maintained within prescribed range  6/9/2022 0151 by Sharon Mix RN  Outcome: Progressing  6/8/2022 1500 by Alina Stern RN  Outcome: Progressing  Flowsheets (Taken 6/8/2022 0400 by Bhavin Nielsen RN)  Glucose maintained within prescribed range: Monitor blood glucose as ordered     Problem: Anxiety  Goal: Will report anxiety at manageable levels  Description: INTERVENTIONS:  1. Administer medication as ordered  2. Teach and rehearse alternative coping skills  3.  Provide emotional support with 1:1 interaction with staff  6/9/2022 0151 by Sharon Mix RN  Outcome: Progressing  6/8/2022 1500 by Alina Stern RN  Outcome: Progressing  Flowsheets (Taken 6/8/2022 0400 by Dayana Falcon, RN)  Will report anxiety at manageable levels: Administer medication as ordered     Problem: Confusion  Goal: Confusion, delirium, dementia, or psychosis is improved or at baseline  Description: INTERVENTIONS:  1. Assess for possible contributors to thought disturbance, including medications, impaired vision or hearing, underlying metabolic abnormalities, dehydration, psychiatric diagnoses, and notify attending LIP  2. [place] high risk fall precautions, as indicated  3. Provide frequent short contacts to provide reality reorientation, refocusing and direction  4. Decrease environmental stimuli, including noise as appropriate  5. Monitor and intervene to maintain adequate nutrition, hydration, elimination, sleep and activity  6. If unable to ensure safety without constant attention obtain sitter and review sitter guidelines with assigned personnel  7. Initiate Psychosocial CNS and Spiritual Care consult, as indicated  6/9/2022 0151 by Melchor Schofield RN  Outcome: Progressing  6/8/2022 1500 by Mandy Olson RN  Outcome: Progressing  Flowsheets (Taken 6/8/2022 0400 by Dayana Falcon RN)  Effect of thought disturbance (confusion, delirium, dementia, or psychosis) are managed with adequate functional status: Assess for contributors to thought disturbance, including medications, impaired vision or hearing, underlying metabolic abnormalities, dehydration, psychiatric diagnoses, notify LIP     Problem: Behavior  Goal: Pt/Family maintain appropriate behavior and adhere to behavioral management agreement, if implemented  Description: INTERVENTIONS:  1. Assess patient/family's coping skills and  non-compliant behavior (including use of illegal substances)  2. Notify security of behavior or suspected illegal substances which indicate the need for search of the patient and/or belongings  3.  Encourage verbalization of thoughts and concerns in a socially appropriate manner  4. Utilize positive, consistent limit setting strategies supporting safety of patient, staff and others  5. Encourage participation in the decision making process about the behavioral management agreement  6. Implement a Health Care Agreement if patient meets criteria  7. If a patient's behavior jeopardizes the safety of the patient, staff, or others refer to organization policy. If a visitor's behavior poses a threat to safety call refer to organization policy.   8. Initiate consult with , Psychosocial CNS, Spiritual Care as appropriate  6/9/2022 0151 by Edis Mcadams RN  Outcome: Progressing  6/8/2022 1500 by Hetal Mackay RN  Outcome: Progressing  Flowsheets (Taken 6/8/2022 0400 by Rubie Aase, RN)  Patient/family maintains appropriate behavior and adheres to behavioral management agreement, if implemented: Assess patient/familys coping skills and  non-compliant behavior (including use of illegal substances)     Problem: Chronic Conditions and Co-morbidities  Goal: Patient's chronic conditions and co-morbidity symptoms are monitored and maintained or improved  6/9/2022 0151 by Edis Mcadams RN  Outcome: Progressing  6/8/2022 1500 by Hetal Mackay RN  Outcome: Progressing  Flowsheets (Taken 6/8/2022 0400 by Rubie Aase, RN)  Care Plan - Patient's Chronic Conditions and Co-Morbidity Symptoms are Monitored and Maintained or Improved: Monitor and assess patient's chronic conditions and comorbid symptoms for stability, deterioration, or improvement     Problem: Pain  Goal: Verbalizes/displays adequate comfort level or baseline comfort level  6/9/2022 0151 by Edis Mcadams RN  Outcome: Progressing  6/8/2022 1500 by Hetal Mackay RN  Outcome: Progressing  Flowsheets (Taken 6/8/2022 0400 by Rubie Aase, RN)  Verbalizes/displays adequate comfort level or baseline comfort level: Encourage patient to monitor pain and request assistance     Problem: ABCDS Injury Assessment  Goal: Absence of physical injury  6/9/2022 0151 by Shane Mccain RN  Outcome: Progressing  6/8/2022 1500 by Martha Conklin RN  Outcome: Progressing  Flowsheets (Taken 6/8/2022 0806)  Absence of Physical Injury: Implement safety measures based on patient assessment     Problem: Musculoskeletal - Adult  Goal: Return mobility to safest level of function  6/9/2022 0151 by Shane Mccain RN  Outcome: Progressing  6/8/2022 1500 by Martha Conklin RN  Outcome: Progressing     Problem: Genitourinary - Adult  Goal: Absence of urinary retention  6/9/2022 0151 by Shane Mccain RN  Outcome: Progressing  6/8/2022 1500 by Martha Conklin RN  Outcome: Progressing  Flowsheets (Taken 6/8/2022 0400 by Roseann Braga RN)  Absence of urinary retention: Monitor intake/output and perform bladder scan as needed  Goal: Urinary catheter remains patent  6/9/2022 0151 by Shane Mccain RN  Outcome: Progressing  6/8/2022 1500 by Martha Conklin RN  Outcome: Progressing  Flowsheets (Taken 6/8/2022 0400 by Roseann Braga RN)  Urinary catheter remains patent: Assess patency of urinary catheter     Problem: Nutrition Deficit:  Goal: Optimize nutritional status  6/9/2022 0151 by Shane Mccain RN  Outcome: Progressing  6/8/2022 1500 by Martha Conklin RN  Outcome: Progressing     Problem: Safety - Medical Restraint  Goal: Remains free of injury from restraints (Restraint for Interference with Medical Device)  Description: INTERVENTIONS:  1. Determine that other, less restrictive measures have been tried or would not be effective before applying the restraint  2. Evaluate the patient's condition at the time of restraint application  3. Inform patient/family regarding the reason for restraint  4.  Q2H: Monitor safety, psychosocial status, comfort, nutrition and hydration  6/9/2022 0151 by Shane Mccain RN  Outcome: Progressing  Flowsheets (Taken 6/8/2022 2000)  Remains free of injury from restraints (restraint for interference with medical device): Inform patient/family regarding the reason for restraint   Every 2 hours: Monitor safety, psychosocial status, comfort, nutrition and hydration   Determine that other, less restrictive measures have been tried or would not be effective before applying the restraint   Evaluate the patient's condition at the time of restraint application  8/[id number]/[id number] by David Pearson RN  Outcome: Progressing  Flowsheets  Taken 6/8/2022 0800 by David Pearson RN  Remains free of injury from restraints (restraint for interference with medical device): Inform patient/family regarding the reason for restraint   Every 2 hours: Monitor safety, psychosocial status, comfort, nutrition and hydration  Taken 6/8/2022 0600 by Carol Rush RN  Remains free of injury from restraints (restraint for interference with medical device): Inform patient/family regarding the reason for restraint  Taken 6/8/2022 0400 by Carlo Rush RN  Remains free of injury from restraints (restraint for interference with medical device): Inform patient/family regarding the reason for restraint  Taken 6/8/2022 0200 by Carol Rush RN  Remains free of injury from restraints (restraint for interference with medical device):  Inform patient/family regarding the reason for restraint

## 2022-06-09 NOTE — PROGRESS NOTES
Report called to Lora Ambrose at [phone] at [facility] after calling [facility]. All questions answered and informed of pt in wrist restraints so pt does not pull NGT out which is used for meds and tube feeding. Pt also needs to have Dr Mayda White follow pt there per Dr Lyn Urbina. Informed pt also to have vimpat IV per neurology. Pt had a seizure at home and no witnessed seizures here at [facility] to writers knowledge.

## 2022-06-10 LAB
ESTIMATED AVERAGE GLUCOSE: 174 MG/DL
HBA1C MFR BLD: 7.7 % (ref 4–6)
TSH SERPL DL<=0.05 MIU/L-ACNC: 1.99 UIU/ML (ref 0.3–5)
VITAMIN D 25-HYDROXY: <6 NG/ML

## 2022-06-10 PROCEDURE — 84443 ASSAY THYROID STIM HORMONE: CPT

## 2022-06-10 PROCEDURE — 83036 HEMOGLOBIN GLYCOSYLATED A1C: CPT

## 2022-06-10 PROCEDURE — 82306 VITAMIN D 25 HYDROXY: CPT

## 2022-06-11 LAB
TSH SERPL DL<=0.05 MIU/L-ACNC: 2.31 UIU/ML (ref 0.3–5)
VITAMIN D 25-HYDROXY: <6 NG/ML

## 2022-06-11 PROCEDURE — 84443 ASSAY THYROID STIM HORMONE: CPT

## 2022-06-11 PROCEDURE — 83036 HEMOGLOBIN GLYCOSYLATED A1C: CPT

## 2022-06-11 PROCEDURE — 82306 VITAMIN D 25 HYDROXY: CPT

## 2022-06-12 LAB
ESTIMATED AVERAGE GLUCOSE: 169 MG/DL
HBA1C MFR BLD: 7.5 % (ref 4–6)

## 2022-06-13 LAB
ALBUMIN SERPL-MCNC: 2.5 G/DL (ref 3.5–5.2)
ALP BLD-CCNC: 77 U/L (ref 40–129)
ALT SERPL-CCNC: 138 U/L (ref 5–41)
ANION GAP SERPL CALCULATED.3IONS-SCNC: 11 MMOL/L (ref 9–17)
AST SERPL-CCNC: 90 U/L
BILIRUB SERPL-MCNC: 0.28 MG/DL (ref 0.3–1.2)
BUN BLDV-MCNC: 24 MG/DL (ref 8–23)
BUN/CREAT BLD: 19 (ref 9–20)
CALCIUM SERPL-MCNC: 8.1 MG/DL (ref 8.6–10.4)
CHLORIDE BLD-SCNC: 105 MMOL/L (ref 98–107)
CO2: 25 MMOL/L (ref 20–31)
CREAT SERPL-MCNC: 1.24 MG/DL (ref 0.7–1.2)
GFR AFRICAN AMERICAN: >60 ML/MIN
GFR NON-AFRICAN AMERICAN: 58 ML/MIN
GFR SERPL CREATININE-BSD FRML MDRD: ABNORMAL ML/MIN/{1.73_M2}
GLUCOSE BLD-MCNC: 282 MG/DL (ref 70–99)
MAGNESIUM: 2 MG/DL (ref 1.6–2.6)
PHOSPHORUS: 3.5 MG/DL (ref 2.5–4.5)
POTASSIUM SERPL-SCNC: 4.1 MMOL/L (ref 3.7–5.3)
SODIUM BLD-SCNC: 141 MMOL/L (ref 135–144)
TOTAL PROTEIN: 5.5 G/DL (ref 6.4–8.3)

## 2022-06-13 PROCEDURE — 84100 ASSAY OF PHOSPHORUS: CPT

## 2022-06-13 PROCEDURE — 80053 COMPREHEN METABOLIC PANEL: CPT

## 2022-06-13 PROCEDURE — 83735 ASSAY OF MAGNESIUM: CPT

## 2022-06-19 NOTE — DISCHARGE SUMMARY
[address] 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, new since 9/2021  · Old infarction left occipital lobe, increased since 9/2021  · 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 / Encephalomalacia   · Old lacunar infarcts left basal ganglia   · Mild brain parenchymal volume loss  · Alcohol dependence   · Reported right hemiparesis   · Essential hypertension  · Diabetes mellitus type 2  · Overweight   · Diverticulosis  · Agitation   · Delirium tremens     · Acute respiratory failure with hypoxia   · Oliguria  · Carotid artery disease   · Hypertensive urgency        Primary Problem  Seizure [facility])    Hospital Course: Pt admitted with a new onset seizure and altered mental status secondary to alcohol withdrawal delirium and post ictal state following the seizure. Pt loaded with Keppra and later continued for maintenance. Neurology consulted. Pt was also found to have old cerebral infarcts, an old basal ganglia infarct and encephalomalacia. Pt has had some right hemiparesis in addition. Pt had h/o alcohol dependence and recently stopped drinking alcohol. He developed severe DTs requiring Precedex titration and soft restraints on multiple occasions. Pt would also develop respiratory failure with hypoxia and required BiPAP frequently.  Pt had frequent episodes of hypertensive urgency requiring Cardene infusion for family history, and summarized in the note. Hospital Data:    Labs:    Hematology:No results for input(s): WBC, RBC, HGB, HCT, MCV, MCH, MCHC, RDW, PLT, MPV, SEDRATE, CRP, INR, DDIMER, SD1VQVWB, LABABSO in the last 72 hours. Invalid input(s): PT  Chemistry:No results for input(s): NA, K, CL, CO2, GLUCOSE, BUN, CREATININE, MG, ANIONGAP, LABGLOM, GFRAA, CALCIUM, CAION, PHOS, PSA, PROBNP, TROPHS, CKTOTAL, CKMB, CKMBINDEX, MYOGLOBIN, DIGOXIN, LACTACIDWB in the last 72 hours. No results for input(s): PROT, LABALBU, LABA1C, P2QINAE, S3DOQWS, FT4, TSH, AST, ALT, LDH, GGT, ALKPHOS, LABGGT, BILITOT, BILIDIR, AMMONIA, AMYLASE, LIPASE, LACTATE, CHOL, HDL, LDLCHOLESTEROL, CHOLHDLRATIO, TRIG, VLDL, HEK46YI, PHENYTOIN, PHENYF, URICACID, POCGLU in the last 72 hours. Lab Results   Component Value Date    INR 1.1 09/22/2021    INR 1.0 09/20/2021    INR 1.0 02/26/2021    PROTIME 13.7 09/22/2021    PROTIME 12.7 09/20/2021    PROTIME 12.8 02/26/2021     Lab Results   Component Value Date/Time    SPECIAL NOT REPORTED 02/26/2021 09:30 AM     Lab Results   Component Value Date/Time    CULTURE NO GROWTH 3 DAYS 02/26/2021 09:30 AM       Lab Results   Component Value Date    POCPH 7.430 06/04/2022    POCPCO2 35.2 06/04/2022    POCPO2 79.7 06/04/2022    POCHCO3 23.4 06/04/2022    NBEA 1 06/04/2022    PBEA 0 05/27/2022    CHBB1OUL 96 06/04/2022    FIO2 30.0 06/04/2022       Radiology:    No results found. All radiological studies reviewed      Reviews of Symptoms:    A 10 point system is reviewed and  negative except described in hospital course    Physical Exam:    Vitals:  BP (!) 159/76   Pulse 89   Temp 98.4 °F (36.9 °C) (Infrared)   Resp (!) 31   Ht 5' 6\" (1.676 m)   Wt 186 lb 5 oz (84.5 kg)   SpO2 94%   BMI 30.07 kg/m²   No data recorded. General appearance -lethargic, improved since yesterday.   BiPAP  Mental status - oriented to person, place, and time with normal affect  Head - normocephalic and atraumatic  Eyes - pupils equal and reactive, extraocular eye movements intact, conjunctiva clear  Ears - hearing appears to be intact  Nose - no drainage noted  Mouth - mucous membranes moist  Neck - supple, no carotid bruits, thyroid not palpable  Chest - clear to auscultation, normal effort  Heart - normal rate, regular rhythm, no murmur  Abdomen - soft, nontender, nondistended, bowel sounds present all four quadrants, no masses, hepatomegaly or splenomegaly  Neurological - normal speech, no focal findings or movement disorder noted, cranial nerves II through XII grossly intact  Extremities - peripheral pulses palpable, no pedal edema or calf pain with palpation. Edema bilateral hands  Skin - no gross lesions, rashes, or induration noted        Consults:  IP CONSULT TO INTERNAL MEDICINE  IP CONSULT TO NEUROLOGY  IP CONSULT TO SOCIAL WORK  IP CONSULT TO CRITICAL CARE  IP CONSULT TO NEPHROLOGY  IP CONSULT TO DIETITIAN  IP CONSULT TO VASCULAR SURGERY  IP CONSULT TO DIETITIAN  IP CONSULT TO CARDIOLOGY  IP CONSULT TO DIETITIAN    Disposition: Long-Term Acute Care    Discharged Condition: Stable    Follow Up: No follow-up provider specified.     Lab Frequency Next Occurrence         Diet: No diet orders on file    Discharge Medications:      Medication List      START taking these medications    albuterol (2.5 MG/3ML) 0.083% nebulizer solution  Commonly known as: PROVENTIL  Take 3 mLs by nebulization every 4 hours as needed for Wheezing     budesonide 0.5 MG/2ML nebulizer suspension  Commonly known as: PULMICORT  Take 2 mLs by nebulization 2 times daily     carvedilol 12.5 MG tablet  Commonly known as: COREG  Take 2 tablets by mouth 2 times daily (with meals)     famotidine 20 MG tablet  Commonly known as: PEPCID  Take 1 tablet by mouth 2 times daily     folic acid 1 MG tablet  Commonly known as: FOLVITE  Take 1 tablet by mouth daily     insulin glargine 100 UNIT/ML injection vial  Commonly known as: LANTUS  Inject 8 Units into the skin nightly     ipratropium-albuterol 0.5-2.5 (3) MG/3ML Soln nebulizer solution  Commonly known as: DUONEB  Inhale 3 mLs into the lungs three times daily     lacosamide 200 MG/20ML Soln  Commonly known as: Vimpat  150mL in 65mL NS BID     nystatin 779278 UNIT/ML suspension  Commonly known as: MYCOSTATIN  Take 5 mLs by mouth three times daily     vitamin B-1 100 MG tablet  Commonly known as: THIAMINE  Take 1 tablet by mouth daily        CONTINUE taking these medications    amLODIPine 5 MG tablet  Commonly known as: NORVASC  Take 1 tablet by mouth daily     aspirin 81 MG chewable tablet  Take 1 tablet by mouth daily     atorvastatin 40 MG tablet  Commonly known as: LIPITOR  Take 1 tablet by mouth nightly     clopidogrel 75 MG tablet  Commonly known as: PLAVIX  Take 1 tablet by mouth daily for 21 days     losartan 100 MG tablet  Commonly known as: COZAAR     metFORMIN 500 MG tablet  Commonly known as: GLUCOPHAGE  Take 1 tablet by mouth 2 times daily (with meals)     PARoxetine 40 MG tablet  Commonly known as: PAXIL        STOP taking these medications    atenolol 50 MG tablet  Commonly known as: TENORMIN        ASK your doctor about these medications    predniSONE 20 MG tablet  Commonly known as: DELTASONE  Take 1 tablet by mouth daily for 4 days  Ask about: Should I take this medication?            Where to Get Your Medications      You can get these medications from any pharmacy    Bring a paper prescription for each of these medications  · lacosamide 200 MG/20ML Soln     Information about where to get these medications is not yet available    Ask your nurse or doctor about these medications  · albuterol (2.5 MG/3ML) 0.083% nebulizer solution  · budesonide 0.5 MG/2ML nebulizer suspension  · carvedilol 12.5 MG tablet  · famotidine 20 MG tablet  · folic acid 1 MG tablet  · insulin glargine 100 UNIT/ML injection vial  · ipratropium-albuterol 0.5-2.5 (3) MG/3ML Soln nebulizer solution  · nystatin 278098 UNIT/ML suspension  · predniSONE 20 MG tablet  · vitamin B-1 100 MG tablet         Code Status:  Prior    Time Spent on discharge is  35 mins in patient examination, evaluation, counseling as well as medication reconciliation, prescriptions for required medications, discharge plan and follow up. Electronically signed by Ruth Kang MD on 6/19/2022 at 10:57 AM     Thank you DENNIS Warner for the opportunity to be involved in this patient's care. This note was created with the assistance of a speech-recognition program.  Although the intention is to generate a document that actually reflects the content of the visit, no guarantees can be provided that every mistake has been identified and corrected by editing. Note was updated later by me after  physical examination and  completion of the assessment.

## 2022-06-20 LAB
ABSOLUTE EOS #: 0.18 K/UL (ref 0–0.44)
ABSOLUTE IMMATURE GRANULOCYTE: 0.26 K/UL (ref 0–0.3)
ABSOLUTE LYMPH #: 0.91 K/UL (ref 1.1–3.7)
ABSOLUTE MONO #: 1.23 K/UL (ref 0.1–1.2)
BASOPHILS # BLD: 0 % (ref 0–2)
BASOPHILS ABSOLUTE: 0.04 K/UL (ref 0–0.2)
EOSINOPHILS RELATIVE PERCENT: 1 % (ref 1–4)
HCT VFR BLD CALC: 26.2 % (ref 40.7–50.3)
HEMOGLOBIN: 7.6 G/DL (ref 13–17)
IMMATURE GRANULOCYTES: 2 %
LYMPHOCYTES # BLD: 7 % (ref 24–43)
MCH RBC QN AUTO: 31.5 PG (ref 25.2–33.5)
MCHC RBC AUTO-ENTMCNC: 29 G/DL (ref 28.4–34.8)
MCV RBC AUTO: 108.7 FL (ref 82.6–102.9)
MONOCYTES # BLD: 9 % (ref 3–12)
NRBC AUTOMATED: 0.1 PER 100 WBC
PDW BLD-RTO: 17.3 % (ref 11.8–14.4)
PLATELET # BLD: 352 K/UL (ref 138–453)
PMV BLD AUTO: 10 FL (ref 8.1–13.5)
RBC # BLD: 2.41 M/UL (ref 4.21–5.77)
RBC # BLD: ABNORMAL 10*6/UL
SEG NEUTROPHILS: 81 % (ref 36–65)
SEGMENTED NEUTROPHILS ABSOLUTE COUNT: 11.31 K/UL (ref 1.5–8.1)
WBC # BLD: 13.9 K/UL (ref 3.5–11.3)

## 2022-06-20 PROCEDURE — 85025 COMPLETE CBC W/AUTO DIFF WBC: CPT

## 2022-06-20 NOTE — PROGRESS NOTES
Physician Progress Note      PATIENT:               Eric Gilliam  CSN #:                  [id number]  :                       1955  ADMIT DATE:       2022 12:59 PM  [address] [place] DATE:        2022 5:25 PM  RESPONDING  PROVIDER #:        Connor Mcdermott MD          QUERY TEXT:    Pt admitted with seizure. Per neurology consult on :  \"it is likely his   seizures were combination from alcohol withdrawal and from his history of   cortical ischemic stroke. \"  If possible, please document in progress notes and   discharge summary the likely cause of patient's seizure:[[Defer to neurology   consultant regarding seizure etiology::I defer to neurology consultant   regarding seizure etiology. 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 \"  \". .. it is likely his seizures were   combination from alcohol withdrawal and from his history of cortical ischemic   stroke. \"   Neurology progress note:  \"Possible new onset seizure disorder   in the setting of multiple prior ischemic strokes; seizure could also be   related to alcohol withdrawal.\"   MRI brain:  old infarcts in left frontal   parietal lobes (new since 2021)   Treatment: Seizure precautions, neuro consulted, [address] then changed to Vimpat  Options provided:  -- Seizure likely a sequela of prior ischemic CVA  -- Seizure likely due to alcohol withdrawal  -- Seizure likely associated with prior ischemic CVA and alcohol withdrawal  -- Other - I will add my own diagnosis  -- Disagree - Not applicable / Not valid  -- Disagree - Clinically unable to determine / Unknown  -- Refer to Clinical Documentation Reviewer    PROVIDER RESPONSE TEXT:    Seizure likely associated with prior ischemic CVA and alcohol withdrawal.    Query created by: Yuan Vasquez on 6/14/2022 7:50 AM      Electronically signed by:  Ke Sanford MD 6/20/2022 4:08 PM

## 2022-06-21 LAB
ABSOLUTE EOS #: 0.86 K/UL (ref 0–0.44)
ABSOLUTE IMMATURE GRANULOCYTE: 0.06 K/UL (ref 0–0.3)
ABSOLUTE LYMPH #: 1.93 K/UL (ref 1.1–3.7)
ABSOLUTE MONO #: 1.35 K/UL (ref 0.1–1.2)
BASOPHILS # BLD: 1 % (ref 0–2)
BASOPHILS ABSOLUTE: 0.07 K/UL (ref 0–0.2)
EOSINOPHILS RELATIVE PERCENT: 7 % (ref 1–4)
HCT VFR BLD CALC: 26.2 % (ref 40.7–50.3)
HEMOGLOBIN: 8.1 G/DL (ref 13–17)
IMMATURE GRANULOCYTES: 1 %
LYMPHOCYTES # BLD: 16 % (ref 24–43)
MCH RBC QN AUTO: 25.9 PG (ref 25.2–33.5)
MCHC RBC AUTO-ENTMCNC: 30.9 G/DL (ref 28.4–34.8)
MCV RBC AUTO: 83.7 FL (ref 82.6–102.9)
MONOCYTES # BLD: 11 % (ref 3–12)
NRBC AUTOMATED: 0 PER 100 WBC
PDW BLD-RTO: 18.6 % (ref 11.8–14.4)
PLATELET # BLD: 411 K/UL (ref 138–453)
PMV BLD AUTO: 10.5 FL (ref 8.1–13.5)
RBC # BLD: 3.13 M/UL (ref 4.21–5.77)
RBC # BLD: ABNORMAL 10*6/UL
SEG NEUTROPHILS: 64 % (ref 36–65)
SEGMENTED NEUTROPHILS ABSOLUTE COUNT: 7.73 K/UL (ref 1.5–8.1)
WBC # BLD: 12 K/UL (ref 3.5–11.3)

## 2022-06-21 PROCEDURE — 85025 COMPLETE CBC W/AUTO DIFF WBC: CPT

## 2022-06-22 LAB
FERRITIN: 752 NG/ML (ref 30–400)
IRON SATURATION: 23 % (ref 20–55)
IRON: 39 UG/DL (ref 59–158)
TOTAL IRON BINDING CAPACITY: 169 UG/DL (ref 250–450)
UNSATURATED IRON BINDING CAPACITY: 130 UG/DL (ref 112–347)

## 2022-06-22 PROCEDURE — 83550 IRON BINDING TEST: CPT

## 2022-06-22 PROCEDURE — 82728 ASSAY OF FERRITIN: CPT

## 2022-06-22 PROCEDURE — 82746 ASSAY OF FOLIC ACID SERUM: CPT

## 2022-06-22 PROCEDURE — 82607 VITAMIN B-12: CPT

## 2022-06-22 PROCEDURE — 83540 ASSAY OF IRON: CPT

## 2022-06-24 LAB
FOLATE: 13.6 NG/ML
VITAMIN B-12: 716 PG/ML (ref 232–1245)

## 2022-06-26 PROCEDURE — U0005 INFEC AGEN DETEC AMPLI PROBE: HCPCS

## 2022-06-26 PROCEDURE — U0003 INFECTIOUS AGENT DETECTION BY NUCLEIC ACID (DNA OR RNA); SEVERE ACUTE RESPIRATORY SYNDROME CORONAVIRUS 2 (SARS-COV-2) (CORONAVIRUS DISEASE [COVID-19]), AMPLIFIED PROBE TECHNIQUE, MAKING USE OF HIGH THROUGHPUT TECHNOLOGIES AS DESCRIBED BY CMS-2020-01-R: HCPCS

## 2022-06-27 LAB
SARS-COV-2: NORMAL
SARS-COV-2: NOT DETECTED
SOURCE: NORMAL

## 2023-08-22 ENCOUNTER — HOSPITAL ENCOUNTER (EMERGENCY)
Facility: CLINIC | Age: 68
Discharge: HOME | End: 2023-08-22
Attending: EMERGENCY MEDICINE
Payer: MEDICARE

## 2023-08-22 VITALS
RESPIRATION RATE: 18 BRPM | DIASTOLIC BLOOD PRESSURE: 67 MMHG | OXYGEN SATURATION: 100 % | HEART RATE: 66 BPM | WEIGHT: 170 LBS | BODY MASS INDEX: 27.32 KG/M2 | TEMPERATURE: 97.7 F | SYSTOLIC BLOOD PRESSURE: 120 MMHG | HEIGHT: 66 IN

## 2023-08-22 DIAGNOSIS — R11.2 NAUSEA VOMITING AND DIARRHEA: Primary | ICD-10-CM

## 2023-08-22 DIAGNOSIS — E87.6 HYPOKALEMIA: ICD-10-CM

## 2023-08-22 DIAGNOSIS — R19.7 NAUSEA VOMITING AND DIARRHEA: Primary | ICD-10-CM

## 2023-08-22 LAB
ALBUMIN SERPL-MCNC: 3.6 G/DL (ref 3.5–5.2)
ALBUMIN/GLOB SERPL: 1.2 {RATIO} (ref 1–2.5)
ALP SERPL-CCNC: 75 U/L (ref 40–129)
ALT SERPL-CCNC: 13 U/L (ref 5–41)
ANION GAP SERPL CALCULATED.3IONS-SCNC: 13 MMOL/L (ref 9–17)
AST SERPL-CCNC: 20 U/L
BASOPHILS # BLD: 0 K/UL (ref 0–0.2)
BASOPHILS NFR BLD: 0 % (ref 0–2)
BILIRUB DIRECT SERPL-MCNC: 0.2 MG/DL
BILIRUB INDIRECT SERPL-MCNC: 0.4 MG/DL (ref 0–1)
BILIRUB SERPL-MCNC: 0.6 MG/DL (ref 0.3–1.2)
BUN SERPL-MCNC: 10 MG/DL (ref 8–23)
CALCIUM SERPL-MCNC: 7.6 MG/DL (ref 8.6–10.4)
CHLORIDE SERPL-SCNC: 102 MMOL/L (ref 98–107)
CO2 SERPL-SCNC: 21 MMOL/L (ref 20–31)
CREAT SERPL-MCNC: 1.5 MG/DL (ref 0.7–1.2)
EOSINOPHIL # BLD: 0.3 K/UL (ref 0–0.4)
EOSINOPHILS RELATIVE PERCENT: 3 % (ref 1–4)
ERYTHROCYTE [DISTWIDTH] IN BLOOD BY AUTOMATED COUNT: 16.3 % (ref 12.5–15.4)
GFR SERPL CREATININE-BSD FRML MDRD: 51 ML/MIN/1.73M2
GLUCOSE SERPL-MCNC: 222 MG/DL (ref 70–99)
HCT VFR BLD AUTO: 36.4 % (ref 41–53)
HGB BLD-MCNC: 12.5 G/DL (ref 13.5–17.5)
LACTATE BLDV-SCNC: 3.1 MMOL/L (ref 0.5–2.2)
LIPASE SERPL-CCNC: 29 U/L (ref 13–60)
LYMPHOCYTES NFR BLD: 1.9 K/UL (ref 1–4.8)
LYMPHOCYTES RELATIVE PERCENT: 19 % (ref 24–44)
MCH RBC QN AUTO: 34.8 PG (ref 26–34)
MCHC RBC AUTO-ENTMCNC: 34.4 G/DL (ref 31–37)
MCV RBC AUTO: 101.1 FL (ref 80–100)
MONOCYTES NFR BLD: 0.6 K/UL (ref 0.1–1.2)
MONOCYTES NFR BLD: 6 % (ref 2–11)
NEUTROPHILS NFR BLD: 72 % (ref 36–66)
NEUTS SEG NFR BLD: 7.2 K/UL (ref 1.8–7.7)
PLATELET # BLD AUTO: 192 K/UL (ref 140–450)
PMV BLD AUTO: 7.7 FL (ref 6–12)
POTASSIUM SERPL-SCNC: 3.1 MMOL/L (ref 3.7–5.3)
PROT SERPL-MCNC: 6.7 G/DL (ref 6.4–8.3)
RBC # BLD AUTO: 3.6 M/UL (ref 4.5–5.9)
SODIUM SERPL-SCNC: 136 MMOL/L (ref 135–144)
WBC OTHER # BLD: 10 K/UL (ref 3.5–11)

## 2023-08-22 PROCEDURE — 85025 COMPLETE CBC W/AUTO DIFF WBC: CPT

## 2023-08-22 PROCEDURE — 6370000000 HC RX 637 (ALT 250 FOR IP): Performed by: NURSE PRACTITIONER

## 2023-08-22 PROCEDURE — 36415 COLL VENOUS BLD VENIPUNCTURE: CPT

## 2023-08-22 PROCEDURE — 80076 HEPATIC FUNCTION PANEL: CPT

## 2023-08-22 PROCEDURE — 99284 EMERGENCY DEPT VISIT MOD MDM: CPT

## 2023-08-22 PROCEDURE — 96360 HYDRATION IV INFUSION INIT: CPT

## 2023-08-22 PROCEDURE — 83690 ASSAY OF LIPASE: CPT

## 2023-08-22 PROCEDURE — 80048 BASIC METABOLIC PNL TOTAL CA: CPT

## 2023-08-22 PROCEDURE — 83605 ASSAY OF LACTIC ACID: CPT

## 2023-08-22 PROCEDURE — 2580000003 HC RX 258: Performed by: NURSE PRACTITIONER

## 2023-08-22 RX ORDER — ONDANSETRON 4 MG/1
4 TABLET, ORALLY DISINTEGRATING ORAL 3 TIMES DAILY PRN
Qty: 21 TABLET | Refills: 0 | Status: SHIPPED | OUTPATIENT
Start: 2023-08-22

## 2023-08-22 RX ORDER — POTASSIUM CHLORIDE 20 MEQ/1
40 TABLET, EXTENDED RELEASE ORAL ONCE
Status: COMPLETED | OUTPATIENT
Start: 2023-08-22 | End: 2023-08-22

## 2023-08-22 RX ORDER — 0.9 % SODIUM CHLORIDE 0.9 %
1000 INTRAVENOUS SOLUTION INTRAVENOUS ONCE
Status: COMPLETED | OUTPATIENT
Start: 2023-08-22 | End: 2023-08-22

## 2023-08-22 RX ADMIN — SODIUM CHLORIDE 1000 ML: 9 INJECTION, SOLUTION INTRAVENOUS at 17:10

## 2023-08-22 RX ADMIN — POTASSIUM CHLORIDE 40 MEQ: 1500 TABLET, EXTENDED RELEASE ORAL at 18:08

## 2023-08-22 ASSESSMENT — ENCOUNTER SYMPTOMS
SHORTNESS OF BREATH: 0
WHEEZING: 0
ALLERGIC/IMMUNOLOGIC NEGATIVE: 1
RESPIRATORY NEGATIVE: 1
VOMITING: 1
SORE THROAT: 0
COUGH: 0
NAUSEA: 1
BLOOD IN STOOL: 0
ABDOMINAL DISTENTION: 0
EYES NEGATIVE: 1
TROUBLE SWALLOWING: 0
DIARRHEA: 1
ABDOMINAL PAIN: 0
RHINORRHEA: 1

## 2023-08-22 ASSESSMENT — PAIN - FUNCTIONAL ASSESSMENT: PAIN_FUNCTIONAL_ASSESSMENT: NONE - DENIES PAIN

## 2023-08-22 NOTE — ED NOTES
Pt presents to ED c/o nasal congestion and diarrhea for approximately 1 week. Pt also states he has been throwing up nonstop but \"hasn't thrown up in a couple days\". Pt had episode of diarrhea upon arrival. Pt denies trying any anti-diarrheals at home. Pt arrives A/Ox4, PWD, PMS intact. Pt resting on stretcher with call light in reach.      Christopher Cruz RN  08/22/23 3184

## 2023-08-22 NOTE — DISCHARGE INSTRUCTIONS
Drink plenty of fluids to maintain hydration    Follow-up with your family doctor due to symptoms for reevaluation and close monitoring    Monitor blood sugar closely    Talk to family doctor about Metformin use.      Take Zofran as directed as needed for nausea    Eat food high in potassium     If symptoms worsen or any new or concerning symptoms arise return to the emergency department immediately

## 2023-10-02 ENCOUNTER — HOSPITAL ENCOUNTER (EMERGENCY)
Age: 68
Discharge: HOME | End: 2023-10-02
Attending: EMERGENCY MEDICINE
Payer: MEDICARE

## 2023-10-02 VITALS
OXYGEN SATURATION: 96 % | HEIGHT: 67 IN | RESPIRATION RATE: 15 BRPM | SYSTOLIC BLOOD PRESSURE: 115 MMHG | WEIGHT: 147 LBS | HEART RATE: 87 BPM | TEMPERATURE: 97.9 F | BODY MASS INDEX: 23.07 KG/M2 | DIASTOLIC BLOOD PRESSURE: 56 MMHG

## 2023-10-02 DIAGNOSIS — N28.9 RENAL INSUFFICIENCY: ICD-10-CM

## 2023-10-02 DIAGNOSIS — E86.0 DEHYDRATION: Primary | ICD-10-CM

## 2023-10-02 LAB
ALBUMIN SERPL-MCNC: 3.4 G/DL (ref 3.5–5.2)
ALP SERPL-CCNC: 90 U/L (ref 40–129)
ALT SERPL-CCNC: <5 U/L (ref 5–41)
ANION GAP SERPL CALCULATED.3IONS-SCNC: 19 MMOL/L (ref 9–17)
AST SERPL-CCNC: 9 U/L
BASOPHILS # BLD: 0 K/UL (ref 0–0.2)
BASOPHILS NFR BLD: 0 %
BILIRUB SERPL-MCNC: 0.7 MG/DL (ref 0.3–1.2)
BUN SERPL-MCNC: 16 MG/DL (ref 8–23)
BUN/CREAT SERPL: 9 (ref 9–20)
CALCIUM SERPL-MCNC: 8 MG/DL (ref 8.6–10.4)
CHLORIDE SERPL-SCNC: 95 MMOL/L (ref 98–107)
CO2 SERPL-SCNC: 16 MMOL/L (ref 20–31)
CREAT SERPL-MCNC: 1.8 MG/DL (ref 0.7–1.2)
EOSINOPHIL # BLD: 0 K/UL (ref 0–0.4)
EOSINOPHILS RELATIVE PERCENT: 0 % (ref 1–4)
ERYTHROCYTE [DISTWIDTH] IN BLOOD BY AUTOMATED COUNT: 14.6 % (ref 11.8–14.4)
GFR SERPL CREATININE-BSD FRML MDRD: 41 ML/MIN/1.73M2
GLUCOSE SERPL-MCNC: 395 MG/DL (ref 70–99)
HCT VFR BLD AUTO: 29 % (ref 40.7–50.3)
HGB BLD-MCNC: 10.2 G/DL (ref 13–17)
IMM GRANULOCYTES # BLD AUTO: 0 K/UL (ref 0–0.3)
IMM GRANULOCYTES NFR BLD: 0 %
LIPASE SERPL-CCNC: 12 U/L (ref 13–60)
LYMPHOCYTES NFR BLD: 0.78 K/UL (ref 1–4.8)
LYMPHOCYTES RELATIVE PERCENT: 6 % (ref 24–44)
MAGNESIUM SERPL-MCNC: 1.9 MG/DL (ref 1.6–2.6)
MCH RBC QN AUTO: 35.7 PG (ref 25.2–33.5)
MCHC RBC AUTO-ENTMCNC: 35.2 G/DL (ref 28.4–34.8)
MCV RBC AUTO: 101.4 FL (ref 82.6–102.9)
MONOCYTES NFR BLD: 0.52 K/UL (ref 0.2–0.8)
MONOCYTES NFR BLD: 4 % (ref 1–7)
MORPHOLOGY: ABNORMAL
MORPHOLOGY: ABNORMAL
MYOGLOBIN SERPL-MCNC: 121 NG/ML (ref 28–72)
MYOGLOBIN SERPL-MCNC: 98 NG/ML (ref 28–72)
NEUTROPHILS NFR BLD: 90 % (ref 36–66)
NEUTS SEG NFR BLD: 11.7 K/UL (ref 1.8–7.7)
NRBC BLD-RTO: 0.2 PER 100 WBC
PLATELET # BLD AUTO: 250 K/UL (ref 138–453)
PMV BLD AUTO: 9.3 FL (ref 8.1–13.5)
POTASSIUM SERPL-SCNC: 3.5 MMOL/L (ref 3.7–5.3)
PROT SERPL-MCNC: 6.2 G/DL (ref 6.4–8.3)
RBC # BLD AUTO: 2.86 M/UL (ref 4.21–5.77)
SODIUM SERPL-SCNC: 130 MMOL/L (ref 135–144)
TROPONIN I SERPL HS-MCNC: 22 NG/L (ref 0–22)
TROPONIN I SERPL HS-MCNC: 24 NG/L (ref 0–22)
WBC OTHER # BLD: 13 K/UL (ref 3.5–11.3)

## 2023-10-02 PROCEDURE — 83690 ASSAY OF LIPASE: CPT

## 2023-10-02 PROCEDURE — 83874 ASSAY OF MYOGLOBIN: CPT

## 2023-10-02 PROCEDURE — 83735 ASSAY OF MAGNESIUM: CPT

## 2023-10-02 PROCEDURE — 96374 THER/PROPH/DIAG INJ IV PUSH: CPT

## 2023-10-02 PROCEDURE — 2580000003 HC RX 258: Performed by: PHYSICIAN ASSISTANT

## 2023-10-02 PROCEDURE — 85025 COMPLETE CBC W/AUTO DIFF WBC: CPT

## 2023-10-02 PROCEDURE — 80143 DRUG ASSAY ACETAMINOPHEN: CPT

## 2023-10-02 PROCEDURE — 80053 COMPREHEN METABOLIC PANEL: CPT

## 2023-10-02 PROCEDURE — G0480 DRUG TEST DEF 1-7 CLASSES: HCPCS

## 2023-10-02 PROCEDURE — 80179 DRUG ASSAY SALICYLATE: CPT

## 2023-10-02 PROCEDURE — 6360000002 HC RX W HCPCS: Performed by: PHYSICIAN ASSISTANT

## 2023-10-02 PROCEDURE — 99284 EMERGENCY DEPT VISIT MOD MDM: CPT

## 2023-10-02 PROCEDURE — 80307 DRUG TEST PRSMV CHEM ANLYZR: CPT

## 2023-10-02 PROCEDURE — 96361 HYDRATE IV INFUSION ADD-ON: CPT

## 2023-10-02 PROCEDURE — 93005 ELECTROCARDIOGRAM TRACING: CPT | Performed by: PHYSICIAN ASSISTANT

## 2023-10-02 PROCEDURE — 84484 ASSAY OF TROPONIN QUANT: CPT

## 2023-10-02 RX ORDER — 0.9 % SODIUM CHLORIDE 0.9 %
1000 INTRAVENOUS SOLUTION INTRAVENOUS ONCE
Status: COMPLETED | OUTPATIENT
Start: 2023-10-02 | End: 2023-10-02

## 2023-10-02 RX ORDER — ONDANSETRON 2 MG/ML
4 INJECTION INTRAMUSCULAR; INTRAVENOUS ONCE
Status: COMPLETED | OUTPATIENT
Start: 2023-10-02 | End: 2023-10-02

## 2023-10-02 RX ORDER — ONDANSETRON 4 MG/1
4 TABLET, ORALLY DISINTEGRATING ORAL EVERY 8 HOURS PRN
Qty: 21 TABLET | Refills: 0 | Status: SHIPPED | OUTPATIENT
Start: 2023-10-02 | End: 2023-10-09

## 2023-10-02 RX ADMIN — SODIUM CHLORIDE 1000 ML: 9 INJECTION, SOLUTION INTRAVENOUS at 19:07

## 2023-10-02 RX ADMIN — ONDANSETRON 4 MG: 2 INJECTION INTRAMUSCULAR; INTRAVENOUS at 19:08

## 2023-10-02 RX ADMIN — SODIUM CHLORIDE 1000 ML: 9 INJECTION, SOLUTION INTRAVENOUS at 21:33

## 2023-10-02 ASSESSMENT — PAIN - FUNCTIONAL ASSESSMENT: PAIN_FUNCTIONAL_ASSESSMENT: NONE - DENIES PAIN

## 2023-10-02 NOTE — ED TRIAGE NOTES
Pt to ED via [address] [place] [address] Rd, stretcher to rm 7, bed per draw. Pt c/o diarrhea and fatigue today. Pt AOx4. Seen at [facility] yesterday for n/v. Pt not actively nauseous or vomiting. Patent airway, no dyspnea or cyanosis noted. Skin warm, appropriate to hue and dry. Multiple scabs over upper extremities. Bed to lowest position, side rails up x2, call light within reach.

## 2023-10-03 LAB
APAP SERPL-MCNC: <10 UG/ML (ref 10–30)
EKG ATRIAL RATE: 77 BPM
EKG P AXIS: 78 DEGREES
EKG P-R INTERVAL: 146 MS
EKG Q-T INTERVAL: 426 MS
EKG QRS DURATION: 92 MS
EKG QTC CALCULATION (BAZETT): 482 MS
EKG R AXIS: 56 DEGREES
EKG T AXIS: -147 DEGREES
EKG VENTRICULAR RATE: 77 BPM
ETHANOL PERCENT: 0.01 %
ETHANOLAMINE SERPL-MCNC: 12 MG/DL
SALICYLATES SERPL-MCNC: <1 MG/DL (ref 3–10)
TOXIC TRICYCLIC SC,BLOOD: NEGATIVE

## 2023-10-03 PROCEDURE — 93010 ELECTROCARDIOGRAM REPORT: CPT | Performed by: INTERNAL MEDICINE

## 2023-10-03 NOTE — ED PROVIDER NOTES
eMERGENCY dEPARTMENT eNCOUnter   Independent Attestation     Pt Name: Fani Loyd  MRN: [id number]  [address] [place] 1955  Date of evaluation: 10/2/23     Fani Loyd is a 80 y.o. male with CC: Fatigue (Feeling weak, n/v yesterday, seen at [facility]) and Diarrhea (Starting today)      This visit was performed by both a physician and an APC. I performed all aspects of the MDM as documented. Based on the medical record the care appears appropriate. I was personally available for consultation in the Emergency Department.     The care is provided during an unprecedented national emergency due to the novel coronavirus, Sher Ludwig MD  Attending Emergency Physician                  Cira iWck MD  10/02/23 2036

## 2023-10-05 ASSESSMENT — ENCOUNTER SYMPTOMS
GASTROINTESTINAL NEGATIVE: 1
RESPIRATORY NEGATIVE: 1
ALLERGIC/IMMUNOLOGIC NEGATIVE: 1
EYES NEGATIVE: 1

## 2023-10-05 NOTE — ED PROVIDER NOTES
[facility]  eMERGENCY dEPARTMENTeNCOUnter      Pt Name: Justino Granda  MRN: [id number]  [address] [facility] [place] 1955  Date ofevaluation: 10/2/2023  Provider: Alec Soriano PA-C    CHIEF COMPLAINT       Chief Complaint   Patient presents with    Fatigue     Feeling weak, n/v yesterday, seen at [facility]    Diarrhea     Starting today         HISTORY OF PRESENT ILLNESS  (Location/Symptom, Timing/Onset, Context/Setting, Quality, Duration, Modifying Factors, Severity.)   Justino Granda is a 80 y.o. male who presents to the emergency department with fatigue weak nausea vomiting. Seen at the hospital for the same yesterday. Patient lives in a group home. Patient lives in a group home. Reports diarrhea today. Due to weakness patient was directed to the ER. Denies any chest pain shortness of breath. Nursing Notes were reviewed. ALLERGIES     Patient has no known allergies.     CURRENT MEDICATIONS       Discharge Medication List as of 10/2/2023 10:27 PM        CONTINUE these medications which have NOT CHANGED    Details   albuterol (PROVENTIL) (2.5 MG/3ML) 0.083% nebulizer solution Take 3 mLs by nebulization every 4 hours as needed for Wheezing, Disp-120 each, R-3Normal      budesonide (PULMICORT) 0.5 MG/2ML nebulizer suspension Take 2 mLs by nebulization 2 times daily, Disp-60 each, R-3Normal      ipratropium-albuterol (DUONEB) 0.5-2.5 (3) MG/3ML SOLN nebulizer solution Inhale 3 mLs into the lungs three times daily, Disp-360 mL, R-0Normal      insulin glargine (LANTUS) 100 UNIT/ML injection vial Inject 8 Units into the skin nightly, Disp-10 mL, R-3Normal      carvedilol (COREG) 12.5 MG tablet Take 2 tablets by mouth 2 times daily (with meals), Disp-60 tablet, [id number]      folic acid (FOLVITE) 1 MG tablet Take 1 tablet by mouth daily, Disp-30 tablet, R-3Normal      nystatin (MYCOSTATIN) 964169 UNIT/ML suspension Take 5 mLs by mouth three times daily, Oral, 3 TIMES DAILY Starting Thu 6/9/2022,

## 2023-12-22 ENCOUNTER — APPOINTMENT (OUTPATIENT)
Dept: ULTRASOUND IMAGING | Age: 68
DRG: 690 | End: 2023-12-22
Payer: MEDICARE

## 2023-12-22 ENCOUNTER — HOSPITAL ENCOUNTER (INPATIENT)
Age: 68
LOS: 4 days | Discharge: HOME HEALTH | DRG: 690 | End: 2023-12-26
Attending: EMERGENCY MEDICINE | Admitting: FAMILY MEDICINE
Payer: MEDICARE

## 2023-12-22 DIAGNOSIS — N30.00 ACUTE CYSTITIS WITHOUT HEMATURIA: Primary | ICD-10-CM

## 2023-12-22 DIAGNOSIS — R53.1 GENERALIZED WEAKNESS: ICD-10-CM

## 2023-12-22 PROBLEM — N39.0 URINARY TRACT INFECTION: Status: ACTIVE | Noted: 2023-12-22

## 2023-12-22 LAB
ANION GAP SERPL CALCULATED.3IONS-SCNC: 12 MMOL/L (ref 9–17)
ANION GAP SERPL CALCULATED.3IONS-SCNC: 13 MMOL/L (ref 9–17)
BACTERIA URNS QL MICRO: ABNORMAL
BASOPHILS # BLD: 0.04 K/UL (ref 0–0.2)
BASOPHILS NFR BLD: 0 % (ref 0–2)
BILIRUB UR QL STRIP: NEGATIVE
BUN SERPL-MCNC: 24 MG/DL (ref 8–23)
BUN SERPL-MCNC: 25 MG/DL (ref 8–23)
BUN/CREAT SERPL: 10 (ref 9–20)
BUN/CREAT SERPL: 11 (ref 9–20)
CA-I BLD-SCNC: 1.09 MMOL/L (ref 1.15–1.33)
CALCIUM SERPL-MCNC: 8.4 MG/DL (ref 8.6–10.4)
CALCIUM SERPL-MCNC: 8.5 MG/DL (ref 8.6–10.4)
CHLORIDE SERPL-SCNC: 96 MMOL/L (ref 98–107)
CHLORIDE SERPL-SCNC: 96 MMOL/L (ref 98–107)
CLARITY UR: ABNORMAL
CO2 SERPL-SCNC: 26 MMOL/L (ref 20–31)
CO2 SERPL-SCNC: 27 MMOL/L (ref 20–31)
COLOR UR: YELLOW
CREAT SERPL-MCNC: 2.3 MG/DL (ref 0.7–1.2)
CREAT SERPL-MCNC: 2.4 MG/DL (ref 0.7–1.2)
EOSINOPHIL # BLD: 0.12 K/UL (ref 0–0.44)
EOSINOPHILS RELATIVE PERCENT: 1 % (ref 1–4)
EPI CELLS #/AREA URNS HPF: ABNORMAL /HPF (ref 0–5)
ERYTHROCYTE [DISTWIDTH] IN BLOOD BY AUTOMATED COUNT: 16.3 % (ref 11.8–14.4)
EST. AVERAGE GLUCOSE BLD GHB EST-MCNC: 151 MG/DL
FLUAV RNA RESP QL NAA+PROBE: NOT DETECTED
FLUBV RNA RESP QL NAA+PROBE: NOT DETECTED
GFR SERPL CREATININE-BSD FRML MDRD: 29 ML/MIN/1.73M2
GFR SERPL CREATININE-BSD FRML MDRD: 30 ML/MIN/1.73M2
GLUCOSE BLD-MCNC: 117 MG/DL (ref 75–110)
GLUCOSE BLD-MCNC: 157 MG/DL (ref 75–110)
GLUCOSE BLD-MCNC: 166 MG/DL (ref 75–110)
GLUCOSE BLD-MCNC: 225 MG/DL (ref 75–110)
GLUCOSE SERPL-MCNC: 143 MG/DL (ref 70–99)
GLUCOSE SERPL-MCNC: 174 MG/DL (ref 70–99)
GLUCOSE UR STRIP-MCNC: NEGATIVE MG/DL
HBA1C MFR BLD: 6.9 % (ref 4–6)
HCT VFR BLD AUTO: 38.7 % (ref 40.7–50.3)
HGB BLD-MCNC: 13.2 G/DL (ref 13–17)
HGB UR QL STRIP.AUTO: ABNORMAL
IMM GRANULOCYTES # BLD AUTO: 0.08 K/UL (ref 0–0.3)
IMM GRANULOCYTES NFR BLD: 1 %
KETONES UR STRIP-MCNC: NEGATIVE MG/DL
LEUKOCYTE ESTERASE UR QL STRIP: ABNORMAL
LYMPHOCYTES NFR BLD: 1.45 K/UL (ref 1.1–3.7)
LYMPHOCYTES RELATIVE PERCENT: 16 % (ref 24–43)
MAGNESIUM SERPL-MCNC: 1.6 MG/DL (ref 1.6–2.6)
MCH RBC QN AUTO: 35.1 PG (ref 25.2–33.5)
MCHC RBC AUTO-ENTMCNC: 34.1 G/DL (ref 28.4–34.8)
MCV RBC AUTO: 102.9 FL (ref 82.6–102.9)
MONOCYTES NFR BLD: 0.7 K/UL (ref 0.1–1.2)
MONOCYTES NFR BLD: 8 % (ref 3–12)
NEUTROPHILS NFR BLD: 74 % (ref 36–65)
NEUTS SEG NFR BLD: 6.79 K/UL (ref 1.5–8.1)
NITRITE UR QL STRIP: NEGATIVE
NRBC BLD-RTO: 0 PER 100 WBC
PH UR STRIP: 6 [PH] (ref 5–8)
PLATELET # BLD AUTO: 262 K/UL (ref 138–453)
PMV BLD AUTO: 9.5 FL (ref 8.1–13.5)
POTASSIUM SERPL-SCNC: 3.5 MMOL/L (ref 3.7–5.3)
POTASSIUM SERPL-SCNC: 3.7 MMOL/L (ref 3.7–5.3)
PROT UR STRIP-MCNC: ABNORMAL MG/DL
RBC # BLD AUTO: 3.76 M/UL (ref 4.21–5.77)
RBC # BLD: ABNORMAL 10*6/UL
RBC #/AREA URNS HPF: ABNORMAL /HPF (ref 0–2)
SARS-COV-2 RNA RESP QL NAA+PROBE: NOT DETECTED
SODIUM SERPL-SCNC: 135 MMOL/L (ref 135–144)
SODIUM SERPL-SCNC: 135 MMOL/L (ref 135–144)
SODIUM UR-SCNC: 54 MMOL/L
SOURCE: NORMAL
SP GR UR STRIP: 1.02 (ref 1–1.03)
SPECIMEN DESCRIPTION: NORMAL
TOTAL PROTEIN, URINE: 157 MG/DL
UROBILINOGEN UR STRIP-ACNC: NORMAL EU/DL (ref 0–1)
WBC #/AREA URNS HPF: ABNORMAL /HPF (ref 0–5)
WBC OTHER # BLD: 9.2 K/UL (ref 3.5–11.3)

## 2023-12-22 PROCEDURE — 6370000000 HC RX 637 (ALT 250 FOR IP): Performed by: FAMILY MEDICINE

## 2023-12-22 PROCEDURE — 6360000002 HC RX W HCPCS: Performed by: NURSE PRACTITIONER

## 2023-12-22 PROCEDURE — 97535 SELF CARE MNGMENT TRAINING: CPT

## 2023-12-22 PROCEDURE — 36415 COLL VENOUS BLD VENIPUNCTURE: CPT

## 2023-12-22 PROCEDURE — 96365 THER/PROPH/DIAG IV INF INIT: CPT

## 2023-12-22 PROCEDURE — 83735 ASSAY OF MAGNESIUM: CPT

## 2023-12-22 PROCEDURE — 97166 OT EVAL MOD COMPLEX 45 MIN: CPT

## 2023-12-22 PROCEDURE — 97162 PT EVAL MOD COMPLEX 30 MIN: CPT

## 2023-12-22 PROCEDURE — 1200000000 HC SEMI PRIVATE

## 2023-12-22 PROCEDURE — 80048 BASIC METABOLIC PNL TOTAL CA: CPT

## 2023-12-22 PROCEDURE — 6370000000 HC RX 637 (ALT 250 FOR IP): Performed by: INTERNAL MEDICINE

## 2023-12-22 PROCEDURE — 87088 URINE BACTERIA CULTURE: CPT

## 2023-12-22 PROCEDURE — 85025 COMPLETE CBC W/AUTO DIFF WBC: CPT

## 2023-12-22 PROCEDURE — 99285 EMERGENCY DEPT VISIT HI MDM: CPT

## 2023-12-22 PROCEDURE — 83036 HEMOGLOBIN GLYCOSYLATED A1C: CPT

## 2023-12-22 PROCEDURE — 84300 ASSAY OF URINE SODIUM: CPT

## 2023-12-22 PROCEDURE — 2580000003 HC RX 258: Performed by: NURSE PRACTITIONER

## 2023-12-22 PROCEDURE — 84156 ASSAY OF PROTEIN URINE: CPT

## 2023-12-22 PROCEDURE — 87077 CULTURE AEROBIC IDENTIFY: CPT

## 2023-12-22 PROCEDURE — 97116 GAIT TRAINING THERAPY: CPT

## 2023-12-22 PROCEDURE — 6370000000 HC RX 637 (ALT 250 FOR IP): Performed by: NURSE PRACTITIONER

## 2023-12-22 PROCEDURE — 6360000002 HC RX W HCPCS: Performed by: EMERGENCY MEDICINE

## 2023-12-22 PROCEDURE — 76770 US EXAM ABDO BACK WALL COMP: CPT

## 2023-12-22 PROCEDURE — 81001 URINALYSIS AUTO W/SCOPE: CPT

## 2023-12-22 PROCEDURE — 94761 N-INVAS EAR/PLS OXIMETRY MLT: CPT

## 2023-12-22 PROCEDURE — 6360000002 HC RX W HCPCS: Performed by: INTERNAL MEDICINE

## 2023-12-22 PROCEDURE — 87636 SARSCOV2 & INF A&B AMP PRB: CPT

## 2023-12-22 PROCEDURE — 2580000003 HC RX 258: Performed by: EMERGENCY MEDICINE

## 2023-12-22 PROCEDURE — 87186 SC STD MICRODIL/AGAR DIL: CPT

## 2023-12-22 PROCEDURE — 82330 ASSAY OF CALCIUM: CPT

## 2023-12-22 PROCEDURE — 87086 URINE CULTURE/COLONY COUNT: CPT

## 2023-12-22 PROCEDURE — 82947 ASSAY GLUCOSE BLOOD QUANT: CPT

## 2023-12-22 RX ORDER — FAMOTIDINE 20 MG/1
20 TABLET, FILM COATED ORAL DAILY
Status: DISCONTINUED | OUTPATIENT
Start: 2023-12-22 | End: 2023-12-26 | Stop reason: HOSPADM

## 2023-12-22 RX ORDER — INSULIN LISPRO 100 [IU]/ML
0-8 INJECTION, SOLUTION INTRAVENOUS; SUBCUTANEOUS
Status: DISCONTINUED | OUTPATIENT
Start: 2023-12-22 | End: 2023-12-26 | Stop reason: HOSPADM

## 2023-12-22 RX ORDER — MAGNESIUM SULFATE 1 G/100ML
1000 INJECTION INTRAVENOUS
Status: DISPENSED | OUTPATIENT
Start: 2023-12-22 | End: 2023-12-22

## 2023-12-22 RX ORDER — SODIUM CHLORIDE 0.9 % (FLUSH) 0.9 %
5-40 SYRINGE (ML) INJECTION PRN
Status: DISCONTINUED | OUTPATIENT
Start: 2023-12-22 | End: 2023-12-26 | Stop reason: HOSPADM

## 2023-12-22 RX ORDER — LORAZEPAM 2 MG/ML
2 INJECTION INTRAMUSCULAR
Status: DISCONTINUED | OUTPATIENT
Start: 2023-12-22 | End: 2023-12-26 | Stop reason: HOSPADM

## 2023-12-22 RX ORDER — LORAZEPAM 1 MG/1
3 TABLET ORAL
Status: DISCONTINUED | OUTPATIENT
Start: 2023-12-22 | End: 2023-12-26 | Stop reason: HOSPADM

## 2023-12-22 RX ORDER — HYDRALAZINE HYDROCHLORIDE 20 MG/ML
10 INJECTION INTRAMUSCULAR; INTRAVENOUS EVERY 6 HOURS PRN
Status: DISCONTINUED | OUTPATIENT
Start: 2023-12-22 | End: 2023-12-26 | Stop reason: HOSPADM

## 2023-12-22 RX ORDER — LORAZEPAM 1 MG/1
2 TABLET ORAL
Status: DISCONTINUED | OUTPATIENT
Start: 2023-12-22 | End: 2023-12-26 | Stop reason: HOSPADM

## 2023-12-22 RX ORDER — SODIUM CHLORIDE 9 MG/ML
INJECTION, SOLUTION INTRAVENOUS CONTINUOUS
Status: DISCONTINUED | OUTPATIENT
Start: 2023-12-22 | End: 2023-12-24

## 2023-12-22 RX ORDER — LORAZEPAM 1 MG/1
4 TABLET ORAL
Status: DISCONTINUED | OUTPATIENT
Start: 2023-12-22 | End: 2023-12-26 | Stop reason: HOSPADM

## 2023-12-22 RX ORDER — CALCIUM GLUCONATE 20 MG/ML
2000 INJECTION, SOLUTION INTRAVENOUS ONCE
Status: COMPLETED | OUTPATIENT
Start: 2023-12-22 | End: 2023-12-22

## 2023-12-22 RX ORDER — POTASSIUM CHLORIDE 7.45 MG/ML
10 INJECTION INTRAVENOUS
Status: ACTIVE | OUTPATIENT
Start: 2023-12-22 | End: 2023-12-22

## 2023-12-22 RX ORDER — ONDANSETRON 2 MG/ML
4 INJECTION INTRAMUSCULAR; INTRAVENOUS EVERY 6 HOURS PRN
Status: DISCONTINUED | OUTPATIENT
Start: 2023-12-22 | End: 2023-12-26 | Stop reason: HOSPADM

## 2023-12-22 RX ORDER — LORAZEPAM 2 MG/ML
1 INJECTION INTRAMUSCULAR
Status: DISCONTINUED | OUTPATIENT
Start: 2023-12-22 | End: 2023-12-26 | Stop reason: HOSPADM

## 2023-12-22 RX ORDER — ENOXAPARIN SODIUM 100 MG/ML
30 INJECTION SUBCUTANEOUS DAILY
Status: DISCONTINUED | OUTPATIENT
Start: 2023-12-22 | End: 2023-12-23

## 2023-12-22 RX ORDER — SODIUM CHLORIDE 9 MG/ML
INJECTION, SOLUTION INTRAVENOUS PRN
Status: DISCONTINUED | OUTPATIENT
Start: 2023-12-22 | End: 2023-12-26 | Stop reason: HOSPADM

## 2023-12-22 RX ORDER — AMLODIPINE BESYLATE 5 MG/1
5 TABLET ORAL DAILY
Status: DISCONTINUED | OUTPATIENT
Start: 2023-12-22 | End: 2023-12-25

## 2023-12-22 RX ORDER — INSULIN ASPART 100 [IU]/ML
INJECTION, SOLUTION INTRAVENOUS; SUBCUTANEOUS
COMMUNITY

## 2023-12-22 RX ORDER — SODIUM CHLORIDE 0.9 % (FLUSH) 0.9 %
5-40 SYRINGE (ML) INJECTION EVERY 12 HOURS SCHEDULED
Status: DISCONTINUED | OUTPATIENT
Start: 2023-12-22 | End: 2023-12-26 | Stop reason: HOSPADM

## 2023-12-22 RX ORDER — SODIUM CHLORIDE 0.9 % (FLUSH) 0.9 %
10 SYRINGE (ML) INJECTION PRN
Status: DISCONTINUED | OUTPATIENT
Start: 2023-12-22 | End: 2023-12-26 | Stop reason: HOSPADM

## 2023-12-22 RX ORDER — LORAZEPAM 1 MG/1
1 TABLET ORAL
Status: DISCONTINUED | OUTPATIENT
Start: 2023-12-22 | End: 2023-12-26 | Stop reason: HOSPADM

## 2023-12-22 RX ORDER — CARVEDILOL 25 MG/1
25 TABLET ORAL 2 TIMES DAILY WITH MEALS
Status: DISCONTINUED | OUTPATIENT
Start: 2023-12-22 | End: 2023-12-26 | Stop reason: HOSPADM

## 2023-12-22 RX ORDER — ONDANSETRON 4 MG/1
4 TABLET, ORALLY DISINTEGRATING ORAL EVERY 8 HOURS PRN
Status: DISCONTINUED | OUTPATIENT
Start: 2023-12-22 | End: 2023-12-26 | Stop reason: HOSPADM

## 2023-12-22 RX ORDER — INSULIN LISPRO 100 [IU]/ML
0-4 INJECTION, SOLUTION INTRAVENOUS; SUBCUTANEOUS NIGHTLY
Status: DISCONTINUED | OUTPATIENT
Start: 2023-12-22 | End: 2023-12-26 | Stop reason: HOSPADM

## 2023-12-22 RX ORDER — LORAZEPAM 2 MG/ML
4 INJECTION INTRAMUSCULAR
Status: DISCONTINUED | OUTPATIENT
Start: 2023-12-22 | End: 2023-12-26 | Stop reason: HOSPADM

## 2023-12-22 RX ORDER — GLUCAGON 1 MG/ML
1 KIT INJECTION PRN
Status: DISCONTINUED | OUTPATIENT
Start: 2023-12-22 | End: 2023-12-26 | Stop reason: HOSPADM

## 2023-12-22 RX ORDER — POTASSIUM CHLORIDE 20 MEQ/1
40 TABLET, EXTENDED RELEASE ORAL ONCE
Status: COMPLETED | OUTPATIENT
Start: 2023-12-22 | End: 2023-12-22

## 2023-12-22 RX ORDER — SODIUM CHLORIDE 0.9 % (FLUSH) 0.9 %
10 SYRINGE (ML) INJECTION EVERY 12 HOURS SCHEDULED
Status: DISCONTINUED | OUTPATIENT
Start: 2023-12-22 | End: 2023-12-26 | Stop reason: HOSPADM

## 2023-12-22 RX ORDER — LORAZEPAM 2 MG/ML
3 INJECTION INTRAMUSCULAR
Status: DISCONTINUED | OUTPATIENT
Start: 2023-12-22 | End: 2023-12-26 | Stop reason: HOSPADM

## 2023-12-22 RX ORDER — DEXTROSE MONOHYDRATE 100 MG/ML
INJECTION, SOLUTION INTRAVENOUS CONTINUOUS PRN
Status: DISCONTINUED | OUTPATIENT
Start: 2023-12-22 | End: 2023-12-26 | Stop reason: HOSPADM

## 2023-12-22 RX ORDER — ACETAMINOPHEN 650 MG/1
650 SUPPOSITORY RECTAL EVERY 6 HOURS PRN
Status: DISCONTINUED | OUTPATIENT
Start: 2023-12-22 | End: 2023-12-26 | Stop reason: HOSPADM

## 2023-12-22 RX ORDER — SODIUM CHLORIDE 9 MG/ML
INJECTION, SOLUTION INTRAVENOUS PRN
Status: DISCONTINUED | OUTPATIENT
Start: 2023-12-22 | End: 2023-12-22 | Stop reason: SDUPTHER

## 2023-12-22 RX ORDER — ACETAMINOPHEN 325 MG/1
650 TABLET ORAL EVERY 6 HOURS PRN
Status: DISCONTINUED | OUTPATIENT
Start: 2023-12-22 | End: 2023-12-26 | Stop reason: HOSPADM

## 2023-12-22 RX ORDER — MAGNESIUM SULFATE 1 G/100ML
1000 INJECTION INTRAVENOUS
Status: COMPLETED | OUTPATIENT
Start: 2023-12-22 | End: 2023-12-22

## 2023-12-22 RX ORDER — GAUZE BANDAGE 2" X 2"
100 BANDAGE TOPICAL DAILY
Status: DISCONTINUED | OUTPATIENT
Start: 2023-12-22 | End: 2023-12-26 | Stop reason: HOSPADM

## 2023-12-22 RX ADMIN — MAGNESIUM SULFATE HEPTAHYDRATE 1000 MG: 1 INJECTION, SOLUTION INTRAVENOUS at 15:13

## 2023-12-22 RX ADMIN — Medication 100 MG: at 08:54

## 2023-12-22 RX ADMIN — SODIUM CHLORIDE: 9 INJECTION, SOLUTION INTRAVENOUS at 11:10

## 2023-12-22 RX ADMIN — MAGNESIUM SULFATE HEPTAHYDRATE 1000 MG: 1 INJECTION, SOLUTION INTRAVENOUS at 12:21

## 2023-12-22 RX ADMIN — SODIUM CHLORIDE: 9 INJECTION, SOLUTION INTRAVENOUS at 08:02

## 2023-12-22 RX ADMIN — CALCIUM GLUCONATE 2000 MG: 20 INJECTION, SOLUTION INTRAVENOUS at 16:54

## 2023-12-22 RX ADMIN — CARVEDILOL 25 MG: 25 TABLET, FILM COATED ORAL at 21:07

## 2023-12-22 RX ADMIN — CEFTRIAXONE SODIUM 1000 MG: 1 INJECTION, POWDER, FOR SOLUTION INTRAMUSCULAR; INTRAVENOUS at 04:25

## 2023-12-22 RX ADMIN — INSULIN LISPRO 2 UNITS: 100 INJECTION, SOLUTION INTRAVENOUS; SUBCUTANEOUS at 12:21

## 2023-12-22 RX ADMIN — POTASSIUM CHLORIDE 40 MEQ: 1500 TABLET, EXTENDED RELEASE ORAL at 10:25

## 2023-12-22 RX ADMIN — ENOXAPARIN SODIUM 30 MG: 100 INJECTION SUBCUTANEOUS at 08:54

## 2023-12-22 RX ADMIN — MAGNESIUM SULFATE HEPTAHYDRATE 1000 MG: 1 INJECTION, SOLUTION INTRAVENOUS at 13:51

## 2023-12-22 RX ADMIN — MAGNESIUM SULFATE HEPTAHYDRATE 1000 MG: 1 INJECTION, SOLUTION INTRAVENOUS at 11:14

## 2023-12-22 RX ADMIN — SODIUM CHLORIDE, PRESERVATIVE FREE 10 ML: 5 INJECTION INTRAVENOUS at 08:58

## 2023-12-22 RX ADMIN — AMLODIPINE BESYLATE 5 MG: 5 TABLET ORAL at 16:55

## 2023-12-22 RX ADMIN — FAMOTIDINE 20 MG: 20 TABLET ORAL at 08:54

## 2023-12-22 ASSESSMENT — PAIN - FUNCTIONAL ASSESSMENT: PAIN_FUNCTIONAL_ASSESSMENT: NONE - DENIES PAIN

## 2023-12-22 NOTE — PROGRESS NOTES
(Safety, pacing.)  Wheelchair Management  Wheelchair Management: No     AROM: Within functional limits  Strength: Generally decreased, functional (4/5 in all planes.)  Coordination: Within functional limits  Tone: Normal  Sensation: Intact        Vision  Vision: Impaired  Vision Exceptions: Wears glasses at all times (\"Most of the time\")  Hearing  Hearing: Within functional limits    Cognition  Overall Cognitive Status: Exceptions  Arousal/Alertness: Appropriate responses to stimuli  Following Commands: Follows one step commands with repetition  Attention Span: Attends with cues to redirect  Memory: Decreased recall of recent events;Decreased short term memory  Safety Judgement: Decreased awareness of need for assistance;Decreased awareness of need for safety  Problem Solving: Decreased awareness of errors;Assistance required to identify errors made;Assistance required to correct errors made  Insights: Decreased awareness of deficits  Initiation: Requires cues for some  Sequencing: Requires cues for some  Cognition Comment: Pt disoriented and unable to orient d/t agitation. Orientation  Overall Orientation Status: Impaired  Orientation Level: Oriented to person    Education Given To: Patient  Education Provided: Role of Therapy;Plan of Care;Transfer Training;Energy Conservation; Fall Prevention Strategies; ADL Adaptive Strategies  Education Provided Comments: OT role, POC, safety, ECT, DME/AE, importance of asking for help, mobility.   Education Method: Verbal  Barriers to Learning: Cognition  Education Outcome: Continued education needed    AM-PAC - ADL  AM-PAC Daily Activity - Inpatient   How much help is needed for putting on and taking off regular lower body clothing?: A Lot  How much help is needed for bathing (which includes washing, rinsing, drying)?: A Lot  How much help is needed for toileting (which includes using toilet, bedpan, or urinal)?: A Little  How much help is needed for putting on and taking off

## 2023-12-22 NOTE — PLAN OF CARE
Problem: Discharge Planning  Goal: Discharge to home or other facility with appropriate resources  Outcome: Progressing  Flowsheets (Taken 12/22/2023 0800)  Discharge to home or other facility with appropriate resources:   Identify barriers to discharge with patient and caregiver   Arrange for needed discharge resources and transportation as appropriate     Problem: Chronic Conditions and Co-morbidities  Goal: Patient's chronic conditions and co-morbidity symptoms are monitored and maintained or improved  Outcome: Progressing  Flowsheets (Taken 12/22/2023 0800)  Care Plan - Patient's Chronic Conditions and Co-Morbidity Symptoms are Monitored and Maintained or Improved: Monitor and assess patient's chronic conditions and comorbid symptoms for stability, deterioration, or improvement     Problem: Skin/Tissue Integrity  Goal: Absence of new skin breakdown  Description: 1. Monitor for areas of redness and/or skin breakdown  2. Assess vascular access sites hourly  3. Every 4-6 hours minimum:  Change oxygen saturation probe site  4. Every 4-6 hours:  If on nasal continuous positive airway pressure, respiratory therapy assess nares and determine need for appliance change or resting period.   Outcome: Progressing     Problem: Safety - Adult  Goal: Free from fall injury  Outcome: Progressing     Problem: ABCDS Injury Assessment  Goal: Absence of physical injury  Outcome: Progressing

## 2023-12-22 NOTE — H&P
History & Physical  [facility].,    Adult Hospitalist      Name: Idalmis Sanchez  MRN: [id number]     Acct: [de-identified]  Room: [address] [place] [address]    Admit Date: 12/22/2023  1:58 AM  PCP: DENNIS Tom    Primary Problem  Principal Problem:    Urinary tract infection  Resolved Problems:    * No resolved hospital problems. *        Assesment:     Acute kidney injury  IV fluids normal saline at 50 mill per hour  Consult nephrology    Acute cystitis without hematuria  IV antibiotic-Rocephin IV    Alcohol dependence  [facility] protocol  Counseled on cessation  Folic acid, thiamine  [facility] protocol  Consult social work    Diabetes mellitus type 2  Accu-Cheks before meals and at bedtime  Insulin sliding scale  Hypoglycemia protocol  Hold metformin  Lantus, reduced dose    Essential hypertension  Coreg, Norvasc, Cozaar    Right hemiparesis sec to CVA   Plavix  PT/OT    Gastroesophageal reflux disease  Pepcid    Seizure disorder  Vimpat    Major depressive disorder  Paxil    Moderate persistent asthma  Pulmicort, Proventil as needed      CBC, BMP  Incentive spirometry  DVT and GI prophylaxis. Chief Complaint:     Chief Complaint   Patient presents with    Fatigue     Four days    Urinary Tract Infection     Possible UTI/ dark urine/ denies pain with urination         History of Present Illness:      Idalmis Sanchez is a 80 y.o.  male who presents with Fatigue (Four days) and Urinary Tract Infection (Possible UTI/ dark urine/ denies pain with urination)    Patient admitted through the emergency room where he presented with generalized weakness for the last several days. Patient says he has been having dysuria and increased frequency of urination. Patient says he feels myalgias all over his body now. When EMS were called yesterday she noted patient was incontinent of stool. Patient admitted to drinking alcohol every day and mostly 1/5 of whiskey.   Patient is irritable and is wondering why we are asking him   Component Value Date/Time    SPECIAL NOT REPORTED 02/26/2021 09:30 AM     Lab Results   Component Value Date/Time    CULTURE NO GROWTH 3 DAYS 02/26/2021 09:30 AM       Lab Results   Component Value Date/Time    POCPH 7.430 06/04/2022 11:00 AM    POCPCO2 35.2 06/04/2022 11:00 AM    POCPO2 79.7 06/04/2022 11:00 AM    POCHCO3 23.4 06/04/2022 11:00 AM    NBEA 1 06/04/2022 11:00 AM    PBEA 0 05/27/2022 12:45 PM    UKUH0LFF 96 06/04/2022 11:00 AM    FIO2 30.0 06/04/2022 11:00 AM       Radiology:    No results found.      All radiological studies reviewed                Code Status:  Full Code    Electronically signed by Shay Hawkins MD on 12/22/2023 at 1:47 PM     Copy sent to César Carvalho PA    This note was created with the assistance of a speech-recognition program.  Although the intention is to generate a document that actually reflects the content of the visit, no guarantees can be provided that every mistake has been identified and corrected by editing.     Note was updated later by me after  physical examination and  completion of the assessment.

## 2023-12-22 NOTE — PROGRESS NOTES
Physical Therapy  Facility/Department: [facility] ICU  Physical Therapy Initial Assessment    Name: Reynaldo Seaman  : 1955  MRN: [id number]  Date of Service: 2023    Discharge Recommendations:  Patient would benefit from continued therapy after discharge, Continue to assess pending progress    Pt currently functioning below baseline.  Recommend daily inpatient skilled therapy at time of discharge to maximize long term outcomes and prevent re-admission. Please refer to AM-PAC score for current level of function.    PER ER:Pt presents to the er per ems for c/o generalized weakness for the past four days pt states that he thinks he has a uti per ems the patient told them that he drinks a fifth of whiskey daily pt has stool on his paints and under his fingernails                Patient Diagnosis(es): The primary encounter diagnosis was Acute cystitis without hematuria. A diagnosis of Generalized weakness was also pertinent to this visit.  Past Medical History:  has a past medical history of CVA (cerebral vascular accident) (HCC), Diabetes mellitus (HCC), and Hypertension.  Past Surgical History:  has a past surgical history that includes knee surgery; Carotid endarterectomy (2021); Carotid endarterectomy (N/A, 2021); and IR INSERT PICC VAD W SQ PORT >5 YEARS (2022).    Assessment   Body Structures, Functions, Activity Limitations Requiring Skilled Therapeutic Intervention: Decreased functional mobility ;Decreased safe awareness;Decreased cognition;Decreased endurance;Decreased balance  Assessment: Skilled therapy needed to maximize independence with functional mobility as well as improve endurance, balance, and overall safety. Patient currently needs Min A with ambulation wtih roll walker due to impulsive and decreased safety awareness. Recommend continued therapy following discharge.  Therapy Prognosis: Good  Decision Making: Medium Complexity  Exam: AROM, strength, endurance, balance, mobility,  Inpatient CMS G-Code Modifier : CK         Goals  Short Term Goals  Time Frame for Short Term Goals: 12 visits  Short Term Goal 1: Independent bed mobility  Short Term Goal 2:  Independent sit<>stand  Short Term Goal 3: Patient to ambulate 100 feet with roll walker SBA  Short Term Goal 4: Patient to perform 25 minutes ther act to facilitate improving endurance, balance  Patient Goals   Patient Goals : to go home       Education  Patient Education  Education Given To: Patient  Education Provided: Role of Therapy;Plan of Care;Transfer Training;Precautions  Education Method: Demonstration;Verbal  Barriers to Learning: Cognition  Education Outcome: Continued education needed      Therapy Time   Individual Concurrent Group Co-treatment   Time In 0903         Time Out 0922 (additional 10 minutes for chart review)         Minutes 19+10=29          Treatment time: 14 minutes       Charu Streeter, PT

## 2023-12-22 NOTE — CONSULTS
Reason for Consult:  Acute kidney injury.    Requesting Physician:  Shay Hawkins MD    HISTORY OF PRESENT ILLNESS:    The patient is a 68 y.o. male who presents with weakness and found to have urinary tract infection, he does have history of chronic kidney disease with baseline creatinine ranging between 1.2-1.6, his workup was remarkable for a creatinine level of 2.4.  He also admitted that he has been taking ibuprofen 3 times a day for the last 3 months  Has history of hypertension and diabetes, takes metformin      Review Of Systems:   Constitutional: No fever, chills, lethargy, has weakness   HEENT:  No headache, nasal discharge or sore throat.  Cardiac:  No chest pain, dyspnea, orthopnea or PND.  Chest:              No cough, phlegm or wheezing.  Abdomen:  No abdominal pain, nausea, vomiting or diarrhea.  Neuro:             No gross focal weakness, numbness.                         No abnormal movements or seizure like activity.  Skin:   No rashes or itching.  :   No hematuria, pyuria, dysuria or flank pain.  Extremities:  No swelling or joint pains.          Past Medical History:   Diagnosis Date    CVA (cerebral vascular accident) (HCC) 02/2021    With residual right sided weakness    Diabetes mellitus (HCC)     Hypertension        Past Surgical History:   Procedure Laterality Date    CAROTID ENDARTERECTOMY  09/22/2021    CAROTID ENDARTERECTOMY (N/A Neck    CAROTID ENDARTERECTOMY N/A 9/22/2021    CAROTID ENDARTERECTOMY performed by Suman Fowler MD at [facility] OR    IR INS PICC VAD W SQ PORT GREATER THAN 5  5/26/2022    IR INS PICC VAD W SQ PORT GREATER THAN 5 5/26/2022 [facility] SPECIAL PROCEDURES    KNEE SURGERY         Prior to Admission medications    Medication Sig Start Date End Date Taking? Authorizing Provider   albuterol (PROVENTIL) (2.5 MG/3ML) 0.083% nebulizer solution Take 3 mLs by nebulization every 4 hours as needed for Wheezing  Patient not taking: Reported on 12/22/2023 6/9/22   Shay Hawkins  Take 100 mg by mouth daily  Patient not taking: Reported on 12/22/2023    Provider, MD Nicolle       Scheduled Meds:   [START ON 12/23/2023] cefTRIAXone (ROCEPHIN) IV  1,000 mg IntraVENous Q24H    sodium chloride flush  10 mL IntraVENous 2 times per day    enoxaparin  30 mg SubCUTAneous Daily    famotidine  20 mg Oral Daily    insulin lispro  0-8 Units SubCUTAneous TID WC    insulin lispro  0-4 Units SubCUTAneous Nightly    sodium chloride flush  5-40 mL IntraVENous 2 times per day    thiamine  100 mg Oral Daily    magnesium sulfate  1,000 mg IntraVENous Q1H     Continuous Infusions:   dextrose      sodium chloride      sodium chloride Stopped (12/22/23 1221)     PRN Meds:glucose, dextrose bolus **OR** dextrose bolus, glucagon (rDNA), dextrose, sodium chloride flush, sodium chloride, ondansetron **OR** ondansetron, magnesium hydroxide, acetaminophen **OR** acetaminophen, sodium chloride flush, LORazepam **OR** LORazepam **OR** LORazepam **OR** LORazepam **OR** LORazepam **OR** LORazepam **OR** LORazepam **OR** LORazepam    No Known Allergies    Social History     Socioeconomic History    Marital status:      Spouse name: Not on file    Number of children: Not on file    Years of education: Not on file    Highest education level: Not on file   Occupational History    Not on file   Tobacco Use    Smoking status: Every Day     Current packs/day: 1.50     Average packs/day: 1.5 packs/day for 50.6 years (75.8 ttl pk-yrs)     Types: Cigarettes     Start date: 6/1/1973    Smokeless tobacco: Never   Substance and Sexual Activity    Alcohol use:  Yes     Alcohol/week: 4.0 standard drinks of alcohol     Types: 4 Cans of beer per week     Comment: pint of whiskey    Drug use: No    Sexual activity: Yes     Partners: Female   Other Topics Concern    Not on file   Social History Narrative    Not on file     Social Determinants of Health     Financial Resource Strain: Not on file   Food Insecurity: No Food Insecurity

## 2023-12-22 NOTE — PROGRESS NOTES
The pt was transferred to room 2001 from ICU in stable condition. Oriented to room, call light and bed mechanics. Side rails up x2. Call light within reach.

## 2023-12-22 NOTE — PROGRESS NOTES
The patient is oriented/disoriented at times. At times he knows exactly what is going on and at times he is confused about where he is and the time of day. He has become more pleasant throughout the day. He has times of aggression/ being upset if he is asked to do anything or he feels it is too many questions being asked. Medications/procedures are explained to him thoroughly to where he understands but he still forgets afterwards. Writer continues to ask him to bathe but he refuses. Care will continue.

## 2023-12-22 NOTE — CARE COORDINATION
Case Management Assessment  Initial Evaluation    Date/Time of Evaluation: 12/22/2023 1:05 PM  Assessment Completed by: BRANDON Lewis    If patient is discharged prior to next notation, then this note serves as note for discharge by case management. Patient Name: Nolan Weber                   YOB: 1955  Diagnosis: Urinary tract infection [N39.0]  Generalized weakness [R53.1]  Acute cystitis without hematuria [N30.00]                   Date / Time: 12/22/2023  1:58 AM    Patient Admission Status: Inpatient   Readmission Risk (Low < 19, Mod (19-27), High > 27): Readmission Risk Score: 16.7    Current PCP: DENNIS Amezcua  PCP verified by CM? Yes    Chart Reviewed: Yes      History Provided by: Patient  Patient Orientation: Alert and Oriented    Patient Cognition: Alert    Hospitalization in the last 30 days (Readmission):  No    If yes, Readmission Assessment in CM Navigator will be completed. Advance Directives:      Code Status: Full Code   Patient's Primary Decision Maker is: Legal Next of Kin      Discharge Planning:    Patient lives with: Friends Type of Home: Apartment  Primary Care Giver: Self  Patient Support Systems include: Spouse/Significant Other, Children   Current Financial resources: Medicare  Current community resources: None  Current services prior to admission: Home Care            Current DME:              Type of Home Care services:  PT, OT, Skilled Therapy, Nursing Services    ADLS  Prior functional level: Assistance with the following:, Cooking, Housework, Shopping  Current functional level: Assistance with the following:, Cooking, Housework, Shopping    PT AM-PAC: 18 /24  OT AM-PAC: 17 /24    Family can provide assistance at DC: Yes  Would you like Case Management to discuss the discharge plan with any other family members/significant others, and if so, who?  No  Plans to Return to Present Housing: Yes  Other Identified Issues/Barriers to RETURNING to

## 2023-12-22 NOTE — PROGRESS NOTES
Patient arrived onto unit via wheelchair. Vital signs taken. Patient resting comfortably in bed. Bed in lowest position, call light placed within reach.

## 2023-12-22 NOTE — ED NOTES
Pt presents to the er per ems for c/o generalized weakness for the past four days pt states that he thinks he has a uti per ems the patient told them that he drinks a fifth of whiskey daily pt has stool on his paints and under his fingernails

## 2023-12-22 NOTE — PROGRESS NOTES
Message sent to Dr. Dinora Day to see if he could give the patient PO K+ because we lost IV access and the poatients K+ was 3.7 at this mornings draw. Awaiting response. --3110 DR. Francis stated give the patient 40MeQ K+ PO. Medication ordered and given.

## 2023-12-23 LAB
25(OH)D3 SERPL-MCNC: 25.8 NG/ML
ANION GAP SERPL CALCULATED.3IONS-SCNC: 16 MMOL/L (ref 9–17)
BASOPHILS # BLD: 0.04 K/UL (ref 0–0.2)
BASOPHILS NFR BLD: 1 % (ref 0–2)
BUN SERPL-MCNC: 21 MG/DL (ref 8–23)
BUN/CREAT SERPL: 14 (ref 9–20)
CALCIUM SERPL-MCNC: 8.3 MG/DL (ref 8.6–10.4)
CHLORIDE SERPL-SCNC: 100 MMOL/L (ref 98–107)
CO2 SERPL-SCNC: 20 MMOL/L (ref 20–31)
CREAT SERPL-MCNC: 1.5 MG/DL (ref 0.7–1.2)
EOSINOPHIL # BLD: 0.09 K/UL (ref 0–0.44)
EOSINOPHILS RELATIVE PERCENT: 1 % (ref 1–4)
ERYTHROCYTE [DISTWIDTH] IN BLOOD BY AUTOMATED COUNT: 15.9 % (ref 11.8–14.4)
GFR SERPL CREATININE-BSD FRML MDRD: 51 ML/MIN/1.73M2
GLUCOSE BLD-MCNC: 144 MG/DL (ref 75–110)
GLUCOSE BLD-MCNC: 147 MG/DL (ref 75–110)
GLUCOSE BLD-MCNC: 147 MG/DL (ref 75–110)
GLUCOSE BLD-MCNC: 159 MG/DL (ref 75–110)
GLUCOSE SERPL-MCNC: 144 MG/DL (ref 70–99)
HCT VFR BLD AUTO: 36 % (ref 40.7–50.3)
HGB BLD-MCNC: 12.5 G/DL (ref 13–17)
IMM GRANULOCYTES # BLD AUTO: 0.04 K/UL (ref 0–0.3)
IMM GRANULOCYTES NFR BLD: 1 %
LYMPHOCYTES NFR BLD: 0.82 K/UL (ref 1.1–3.7)
LYMPHOCYTES RELATIVE PERCENT: 10 % (ref 24–43)
MAGNESIUM SERPL-MCNC: 2 MG/DL (ref 1.6–2.6)
MCH RBC QN AUTO: 34.9 PG (ref 25.2–33.5)
MCHC RBC AUTO-ENTMCNC: 34.7 G/DL (ref 28.4–34.8)
MCV RBC AUTO: 100.6 FL (ref 82.6–102.9)
MONOCYTES NFR BLD: 0.5 K/UL (ref 0.1–1.2)
MONOCYTES NFR BLD: 6 % (ref 3–12)
NEUTROPHILS NFR BLD: 81 % (ref 36–65)
NEUTS SEG NFR BLD: 6.4 K/UL (ref 1.5–8.1)
NRBC BLD-RTO: 0 PER 100 WBC
PLATELET # BLD AUTO: 299 K/UL (ref 138–453)
PMV BLD AUTO: 9.6 FL (ref 8.1–13.5)
POTASSIUM SERPL-SCNC: 3.4 MMOL/L (ref 3.7–5.3)
RBC # BLD AUTO: 3.58 M/UL (ref 4.21–5.77)
RBC # BLD: ABNORMAL 10*6/UL
SODIUM SERPL-SCNC: 136 MMOL/L (ref 135–144)
WBC OTHER # BLD: 7.9 K/UL (ref 3.5–11.3)

## 2023-12-23 PROCEDURE — 36415 COLL VENOUS BLD VENIPUNCTURE: CPT

## 2023-12-23 PROCEDURE — 80048 BASIC METABOLIC PNL TOTAL CA: CPT

## 2023-12-23 PROCEDURE — 6370000000 HC RX 637 (ALT 250 FOR IP): Performed by: FAMILY MEDICINE

## 2023-12-23 PROCEDURE — 6370000000 HC RX 637 (ALT 250 FOR IP): Performed by: INTERNAL MEDICINE

## 2023-12-23 PROCEDURE — 83735 ASSAY OF MAGNESIUM: CPT

## 2023-12-23 PROCEDURE — 1200000000 HC SEMI PRIVATE

## 2023-12-23 PROCEDURE — 82306 VITAMIN D 25 HYDROXY: CPT

## 2023-12-23 PROCEDURE — 6360000002 HC RX W HCPCS: Performed by: NURSE PRACTITIONER

## 2023-12-23 PROCEDURE — 82947 ASSAY GLUCOSE BLOOD QUANT: CPT

## 2023-12-23 PROCEDURE — 6370000000 HC RX 637 (ALT 250 FOR IP): Performed by: NURSE PRACTITIONER

## 2023-12-23 PROCEDURE — 2580000003 HC RX 258: Performed by: NURSE PRACTITIONER

## 2023-12-23 PROCEDURE — 85025 COMPLETE CBC W/AUTO DIFF WBC: CPT

## 2023-12-23 RX ORDER — POTASSIUM CHLORIDE 20 MEQ/1
20 TABLET, EXTENDED RELEASE ORAL ONCE
Status: COMPLETED | OUTPATIENT
Start: 2023-12-23 | End: 2023-12-23

## 2023-12-23 RX ORDER — ENOXAPARIN SODIUM 100 MG/ML
40 INJECTION SUBCUTANEOUS DAILY
Status: DISCONTINUED | OUTPATIENT
Start: 2023-12-24 | End: 2023-12-26 | Stop reason: HOSPADM

## 2023-12-23 RX ADMIN — HYDRALAZINE HYDROCHLORIDE 10 MG: 20 INJECTION, SOLUTION INTRAMUSCULAR; INTRAVENOUS at 11:20

## 2023-12-23 RX ADMIN — HYDRALAZINE HYDROCHLORIDE 10 MG: 20 INJECTION, SOLUTION INTRAMUSCULAR; INTRAVENOUS at 04:48

## 2023-12-23 RX ADMIN — AMLODIPINE BESYLATE 5 MG: 5 TABLET ORAL at 08:41

## 2023-12-23 RX ADMIN — CARVEDILOL 25 MG: 25 TABLET, FILM COATED ORAL at 17:56

## 2023-12-23 RX ADMIN — FAMOTIDINE 20 MG: 20 TABLET ORAL at 08:41

## 2023-12-23 RX ADMIN — HYDRALAZINE HYDROCHLORIDE 10 MG: 20 INJECTION, SOLUTION INTRAMUSCULAR; INTRAVENOUS at 00:23

## 2023-12-23 RX ADMIN — Medication 100 MG: at 08:45

## 2023-12-23 RX ADMIN — POTASSIUM CHLORIDE 20 MEQ: 1500 TABLET, EXTENDED RELEASE ORAL at 22:00

## 2023-12-23 RX ADMIN — SODIUM CHLORIDE: 9 INJECTION, SOLUTION INTRAVENOUS at 23:50

## 2023-12-23 RX ADMIN — CARVEDILOL 25 MG: 25 TABLET, FILM COATED ORAL at 08:41

## 2023-12-23 RX ADMIN — LORAZEPAM 1 MG: 1 TABLET ORAL at 08:41

## 2023-12-23 RX ADMIN — LORAZEPAM 1 MG: 1 TABLET ORAL at 17:56

## 2023-12-23 RX ADMIN — ENOXAPARIN SODIUM 30 MG: 100 INJECTION SUBCUTANEOUS at 08:41

## 2023-12-23 RX ADMIN — SODIUM CHLORIDE: 9 INJECTION, SOLUTION INTRAVENOUS at 05:33

## 2023-12-23 RX ADMIN — CEFTRIAXONE SODIUM 1000 MG: 1 INJECTION, POWDER, FOR SOLUTION INTRAMUSCULAR; INTRAVENOUS at 05:37

## 2023-12-23 NOTE — PLAN OF CARE
Problem: Skin/Tissue Integrity  Goal: Absence of new skin breakdown  Description: 1. Monitor for areas of redness and/or skin breakdown  2. Assess vascular access sites hourly  3. Every 4-6 hours minimum:  Change oxygen saturation probe site  4. Every 4-6 hours:  If on nasal continuous positive airway pressure, respiratory therapy assess nares and determine need for appliance change or resting period.   12/23/2023 0242 by Stephanie Mason RN  Outcome: Progressing  12/22/2023 1251 by Lor Sanderson RN  Outcome: Progressing     Problem: Safety - Adult  Goal: Free from fall injury  12/23/2023 0242 by Stephanie Mason RN  Outcome: Progressing  Flowsheets (Taken 12/23/2023 0242)  Free From Fall Injury: Instruct family/caregiver on patient safety  12/22/2023 1251 by Lor Sanderson RN  Outcome: Progressing     Problem: ABCDS Injury Assessment  Goal: Absence of physical injury  12/23/2023 0242 by Stephanie Mason RN  Outcome: Progressing  Flowsheets (Taken 12/23/2023 0242)  Absence of Physical Injury: Implement safety measures based on patient assessment  12/22/2023 1251 by Lor Sanderson RN  Outcome: Progressing

## 2023-12-23 NOTE — PROGRESS NOTES
[address] NOTE    Room # 2001/2001-02   Name: Vandana Delcid              Reason for visit: Routine    I visited the patient. Admit Date & Time: 12/22/2023  1:58 AM    Assessment:  Vandana Delcid is a 80 y.o. male. Upon entering the room patient states well, states no major needs or prayers. Patient kindly declines Spiritual Care visit.  leaves prayer card for patient for possible follow up as needed. Intervention:   provided a ministry presence. Outcome:  Patient grateful for the visit. Plan:  Chaplains will remain available to offer spiritual and emotional support as needed. Electronically signed by Chaplain Teresa, on 12/23/2023 at 12:54 PM.  [address]      12/23/23 1252   Encounter Summary   Encounter Overview/Reason  Initial Encounter   Service Provided For: Patient   Referral/Consult From: Rounding   Last Encounter  12/23/23   Complexity of Encounter Low   Begin Time 1135   End Time  1136   Total Time Calculated 1 min   Spiritual/Emotional needs   Type Spiritual Support   Assessment/Intervention/Outcome   Assessment Passive   Intervention Sustaining Presence/Ministry of presence   Outcome Refused/Declined

## 2023-12-23 NOTE — PROGRESS NOTES
Zack Headley, NP notified of patient's continued high /64 despite patient receiving prn hydralazine. NP informed RN to give another dose of hydralazine at this time even though medication is every 6 hours as needed.  IV hydralazine given per instruction from NP.

## 2023-12-23 NOTE — PLAN OF CARE
Problem: Discharge Planning  Goal: Discharge to home or other facility with appropriate resources  Outcome: Progressing     Problem: Chronic Conditions and Co-morbidities  Goal: Patient's chronic conditions and co-morbidity symptoms are monitored and maintained or improved  Outcome: Progressing     Problem: Skin/Tissue Integrity  Goal: Absence of new skin breakdown  Description: 1. Monitor for areas of redness and/or skin breakdown  2. Assess vascular access sites hourly  3. Every 4-6 hours minimum:  Change oxygen saturation probe site  4. Every 4-6 hours:  If on nasal continuous positive airway pressure, respiratory therapy assess nares and determine need for appliance change or resting period.   12/23/2023 1221 by Lesly Natarajan RN  Outcome: Progressing     Problem: Safety - Adult  Goal: Free from fall injury  12/23/2023 1221 by Lesly Natarajan RN  Outcome: Progressing     Problem: ABCDS Injury Assessment  Goal: Absence of physical injury  12/23/2023 1221 by Lesly Natarajan RN  Outcome: Progressing

## 2023-12-23 NOTE — PROGRESS NOTES
[facility].,    Adult Hospitalist      Name: Keny Montano  MRN: [id number]     Acct: [de-identified]  Room: 2001/2001-02    Admit Date: 12/22/2023  1:58 AM  PCP: DENNIS Stephenson    Primary Problem  Principal Problem:    Urinary tract infection  Resolved Problems:    * No resolved hospital problems. *        Assesment:     Acute kidney injury secondary to hemodynamic compromise, UTI, NSAID use  IV fluids normal saline at 50 mill per hour  Hold metformin, losartan  Consult nephrology  Avoid hypotension, nephrotoxic drugs, Fleet enema, IV contrast    Acute cystitis without hematuria  IV antibiotic-Rocephin IV    Alcohol dependence  CIWA protocol  Counseled on cessation  Folic acid, thiamine  CIWA protocol  Consult social work    Diabetes mellitus type 2  Accu-Cheks before meals and at bedtime  Insulin sliding scale  Hypoglycemia protocol  Hold metformin  Lantus, reduced dose    Essential hypertension  Coreg, Norvasc,     Right hemiparesis sec to CVA   Plavix  PT/OT    Gastroesophageal reflux disease  Pepcid    Seizure disorder  Vimpat    Major depressive disorder  Paxil    Moderate persistent asthma  Pulmicort, Proventil as needed      CBC, BMP  Incentive spirometry  DVT and GI prophylaxis.         Chief Complaint:     Chief Complaint   Patient presents with    Fatigue     Four days    Urinary Tract Infection     Possible UTI/ dark urine/ denies pain with urination         History of Present Illness:        Patient seen and examined at bedside  Last 24-hour events reviewed with nursing  Patient feels better today  Says myalgias have improved  Dysuria better  Denies increased frequency of urination now  Back pain improved    Denies chest pain, dyspnea orthopnea  Denies nausea, vomiting or abdominal pain  Denies diarrhea or constipation      Initial HPI  Keny Montano is a 80 y.o.  male who presents with Fatigue (Four days) and Urinary Tract Infection (Possible UTI/ dark urine/ denies pain with

## 2023-12-24 PROBLEM — E44.1 MILD MALNUTRITION (HCC): Status: ACTIVE | Noted: 2023-12-24

## 2023-12-24 LAB
ANION GAP SERPL CALCULATED.3IONS-SCNC: 10 MMOL/L (ref 9–17)
BASOPHILS # BLD: 0.03 K/UL (ref 0–0.2)
BASOPHILS NFR BLD: 1 % (ref 0–2)
BUN SERPL-MCNC: 15 MG/DL (ref 8–23)
BUN/CREAT SERPL: 14 (ref 9–20)
CALCIUM SERPL-MCNC: 8.3 MG/DL (ref 8.6–10.4)
CHLORIDE SERPL-SCNC: 106 MMOL/L (ref 98–107)
CO2 SERPL-SCNC: 22 MMOL/L (ref 20–31)
CREAT SERPL-MCNC: 1.1 MG/DL (ref 0.7–1.2)
EOSINOPHIL # BLD: 0.11 K/UL (ref 0–0.44)
EOSINOPHILS RELATIVE PERCENT: 2 % (ref 1–4)
ERYTHROCYTE [DISTWIDTH] IN BLOOD BY AUTOMATED COUNT: 16.4 % (ref 11.8–14.4)
GFR SERPL CREATININE-BSD FRML MDRD: >60 ML/MIN/1.73M2
GLUCOSE BLD-MCNC: 116 MG/DL (ref 75–110)
GLUCOSE BLD-MCNC: 129 MG/DL (ref 75–110)
GLUCOSE BLD-MCNC: 135 MG/DL (ref 75–110)
GLUCOSE BLD-MCNC: 141 MG/DL (ref 75–110)
GLUCOSE SERPL-MCNC: 133 MG/DL (ref 70–99)
HCT VFR BLD AUTO: 34.4 % (ref 40.7–50.3)
HGB BLD-MCNC: 12 G/DL (ref 13–17)
IMM GRANULOCYTES # BLD AUTO: 0.04 K/UL (ref 0–0.3)
IMM GRANULOCYTES NFR BLD: 1 %
LYMPHOCYTES NFR BLD: 1.09 K/UL (ref 1.1–3.7)
LYMPHOCYTES RELATIVE PERCENT: 17 % (ref 24–43)
MAGNESIUM SERPL-MCNC: 1.6 MG/DL (ref 1.6–2.6)
MCH RBC QN AUTO: 35.6 PG (ref 25.2–33.5)
MCHC RBC AUTO-ENTMCNC: 34.9 G/DL (ref 28.4–34.8)
MCV RBC AUTO: 102.1 FL (ref 82.6–102.9)
MONOCYTES NFR BLD: 0.54 K/UL (ref 0.1–1.2)
MONOCYTES NFR BLD: 9 % (ref 3–12)
NEUTROPHILS NFR BLD: 70 % (ref 36–65)
NEUTS SEG NFR BLD: 4.51 K/UL (ref 1.5–8.1)
NRBC BLD-RTO: 0 PER 100 WBC
PLATELET # BLD AUTO: 312 K/UL (ref 138–453)
PMV BLD AUTO: 9.6 FL (ref 8.1–13.5)
POTASSIUM SERPL-SCNC: 3.4 MMOL/L (ref 3.7–5.3)
RBC # BLD AUTO: 3.37 M/UL (ref 4.21–5.77)
RBC # BLD: ABNORMAL 10*6/UL
SODIUM SERPL-SCNC: 138 MMOL/L (ref 135–144)
WBC OTHER # BLD: 6.3 K/UL (ref 3.5–11.3)

## 2023-12-24 PROCEDURE — 80048 BASIC METABOLIC PNL TOTAL CA: CPT

## 2023-12-24 PROCEDURE — 6370000000 HC RX 637 (ALT 250 FOR IP): Performed by: FAMILY MEDICINE

## 2023-12-24 PROCEDURE — 6360000002 HC RX W HCPCS: Performed by: NURSE PRACTITIONER

## 2023-12-24 PROCEDURE — 6370000000 HC RX 637 (ALT 250 FOR IP): Performed by: INTERNAL MEDICINE

## 2023-12-24 PROCEDURE — 6370000000 HC RX 637 (ALT 250 FOR IP): Performed by: NURSE PRACTITIONER

## 2023-12-24 PROCEDURE — 1200000000 HC SEMI PRIVATE

## 2023-12-24 PROCEDURE — 36415 COLL VENOUS BLD VENIPUNCTURE: CPT

## 2023-12-24 PROCEDURE — 85025 COMPLETE CBC W/AUTO DIFF WBC: CPT

## 2023-12-24 PROCEDURE — 82947 ASSAY GLUCOSE BLOOD QUANT: CPT

## 2023-12-24 PROCEDURE — 83735 ASSAY OF MAGNESIUM: CPT

## 2023-12-24 PROCEDURE — 2580000003 HC RX 258: Performed by: NURSE PRACTITIONER

## 2023-12-24 RX ORDER — POTASSIUM CHLORIDE 20 MEQ/1
20 TABLET, EXTENDED RELEASE ORAL ONCE
Status: COMPLETED | OUTPATIENT
Start: 2023-12-24 | End: 2023-12-24

## 2023-12-24 RX ORDER — VITAMIN B COMPLEX
500 TABLET ORAL DAILY
Status: DISCONTINUED | OUTPATIENT
Start: 2023-12-24 | End: 2023-12-26 | Stop reason: HOSPADM

## 2023-12-24 RX ADMIN — HYDRALAZINE HYDROCHLORIDE 10 MG: 20 INJECTION, SOLUTION INTRAMUSCULAR; INTRAVENOUS at 05:08

## 2023-12-24 RX ADMIN — FAMOTIDINE 20 MG: 20 TABLET ORAL at 08:07

## 2023-12-24 RX ADMIN — Medication 500 UNITS: at 19:52

## 2023-12-24 RX ADMIN — ENOXAPARIN SODIUM 40 MG: 100 INJECTION SUBCUTANEOUS at 08:10

## 2023-12-24 RX ADMIN — CEFTRIAXONE SODIUM 1000 MG: 1 INJECTION, POWDER, FOR SOLUTION INTRAMUSCULAR; INTRAVENOUS at 05:40

## 2023-12-24 RX ADMIN — Medication 100 MG: at 08:07

## 2023-12-24 RX ADMIN — POTASSIUM CHLORIDE 20 MEQ: 1500 TABLET, EXTENDED RELEASE ORAL at 19:52

## 2023-12-24 RX ADMIN — HYDRALAZINE HYDROCHLORIDE 10 MG: 20 INJECTION, SOLUTION INTRAMUSCULAR; INTRAVENOUS at 19:52

## 2023-12-24 RX ADMIN — CARVEDILOL 25 MG: 25 TABLET, FILM COATED ORAL at 08:06

## 2023-12-24 RX ADMIN — LORAZEPAM 1 MG: 1 TABLET ORAL at 08:10

## 2023-12-24 RX ADMIN — AMLODIPINE BESYLATE 5 MG: 5 TABLET ORAL at 08:07

## 2023-12-24 NOTE — PLAN OF CARE
Problem: Discharge Planning  Goal: Discharge to home or other facility with appropriate resources  12/24/2023 1109 by Pollo Henning RN  Outcome: Progressing     Problem: Chronic Conditions and Co-morbidities  Goal: Patient's chronic conditions and co-morbidity symptoms are monitored and maintained or improved  12/24/2023 1109 by Pollo Henning RN  Outcome: Progressing     Problem: Skin/Tissue Integrity  Goal: Absence of new skin breakdown  Description: 1. Monitor for areas of redness and/or skin breakdown  2. Assess vascular access sites hourly  3. Every 4-6 hours minimum:  Change oxygen saturation probe site  4. Every 4-6 hours:  If on nasal continuous positive airway pressure, respiratory therapy assess nares and determine need for appliance change or resting period.   12/24/2023 1109 by Pollo Henning RN  Outcome: Progressing     Problem: Safety - Adult  Goal: Free from fall injury  12/24/2023 1109 by Pollo Henning RN  Outcome: Progressing     Problem: ABCDS Injury Assessment  Goal: Absence of physical injury  12/24/2023 1109 by Pollo Henning RN  Outcome: Progressing     Problem: Neurosensory - Adult  Goal: Achieves stable or improved neurological status  12/24/2023 1109 by Pollo Henning RN  Outcome: Progressing     Problem: Musculoskeletal - Adult  Goal: Return mobility to safest level of function  12/24/2023 1109 by Pollo Henning RN  Outcome: Progressing

## 2023-12-24 NOTE — PROGRESS NOTES
Comprehensive Nutrition Assessment    Type and Reason for Visit:  Initial, Positive Nutrition Screen    Nutrition Recommendations/Plan:   Provide diet rx as ordered   Provide glucerna three times a day as ordered   Monitor labs as they become available   Monitor skin integrity/weights     Malnutrition Assessment:  Malnutrition Status:  Mild malnutrition (12/24/23 0940)    Context:  Acute Illness     Findings of the 6 clinical characteristics of malnutrition:  Energy Intake:  50% or less of estimated energy requirements for 5 or more days  Weight Loss:  No significant weight loss     Body Fat Loss:  No significant body fat loss     Muscle Mass Loss:  No significant muscle mass loss    Fluid Accumulation:  No significant fluid accumulation     Strength:  Not Performed    Nutrition Assessment:    emergency room where he presented with generalized weakness for the last several days. Patient says he has been having dysuria and increased frequency of urination. Apparently he is a heavy drinker. Seen by this writer for positive nutrition screen of weight loss and poor appetite. Is currently on glucerna three times daily providing 600 kcals and 30 grams of protein if all are 100% consumed. weight on 12/24 was 147#, weight history 12/23 151#, 10/2 was 147# stated, 6/4/22 was 186# had a 39#/20.9% loss x 1.5 years. patient was in the hosptial on 6/4/22 noted to have previous tube feeding at that time. downtrend in weight noted however weight appears stable at this time. Discharge planning is home with home health care. Nutrition Related Findings:    BM 12/18 no edema noted, discharge planning is home with home health care Wound Type: None       Current Nutrition Intake & Therapies:    Average Meal Intake: 51-75%  Average Supplements Intake: 51-75%  ADULT DIET;  Regular; 4 carb choices (60 gm/meal)  ADULT ORAL NUTRITION SUPPLEMENT; Breakfast, Lunch, Dinner; Diabetic Oral Supplement    Anthropometric Measures:  Height:

## 2023-12-24 NOTE — PROGRESS NOTES
[facility].,    Adult Hospitalist      Name: Nolan Weber  MRN: [id number]     Acct: [de-identified]  Room: 2001/2001-02    Admit Date: 12/22/2023  1:58 AM  PCP: DENNIS Amezcua    Primary Problem  Principal Problem:    Urinary tract infection  Active Problems:    Mild malnutrition ([address])  Resolved Problems:    * No resolved hospital problems. *        Assesment:     Acute kidney injury secondary to hemodynamic compromise, UTI, NSAID use  IV fluids normal saline at 50 mill per hour  Hold metformin, losartan  Consult nephrology  Avoid hypotension, nephrotoxic drugs, Fleet enema, IV contrast    Acute cystitis without hematuria  IV antibiotic-Rocephin IV    Alcohol dependence  CIWA protocol  Counseled on cessation  Folic acid, thiamine  CIWA protocol  Consult social work    Diabetes mellitus type 2  Accu-Cheks before meals and at bedtime  Insulin sliding scale  Hypoglycemia protocol  Hold metformin  Lantus, reduced dose    Essential hypertension  Coreg, Norvasc,     Right hemiparesis sec to CVA   Plavix  PT/OT    Gastroesophageal reflux disease  Pepcid    Seizure disorder  Vimpat    Major depressive disorder  Paxil    Moderate persistent asthma  Pulmicort, Proventil as needed    DC planning   CBC, BMP  Incentive spirometry  DVT and GI prophylaxis.         Chief Complaint:     Chief Complaint   Patient presents with    Fatigue     Four days    Urinary Tract Infection     Possible UTI/ dark urine/ denies pain with urination         History of Present Illness:        Patient seen and examined at bedside  Last 24-hour events reviewed with nursing  Patient feels better   myalgias have improved  Dysuria better  Denies increased frequency of urination now  Back pain improved    Denies chest pain, dyspnea orthopnea  Denies nausea, vomiting or abdominal pain  Denies diarrhea or constipation      Initial HPI  Nolan Weber is a 80 y.o.  male who presents with Fatigue (Four days) and Urinary Tract Infection     Note was updated later by me after  physical examination and  completion of the assessment.

## 2023-12-24 NOTE — PLAN OF CARE
Problem: Discharge Planning  Goal: Discharge to home or other facility with appropriate resources  12/23/2023 2312 by Denia Brennan RN  Outcome: Progressing  [address] (Taken 12/23/2023 2109)  Discharge to home or other facility with appropriate resources:   Identify barriers to discharge with patient and caregiver   Arrange for needed discharge resources and transportation as appropriate   Identify discharge learning needs (meds, wound care, etc)   Refer to discharge planning if patient needs post-hospital services based on physician order or complex needs related to functional status, cognitive ability or social support system     Problem: Chronic Conditions and Co-morbidities  Goal: Patient's chronic conditions and co-morbidity symptoms are monitored and maintained or improved  12/23/2023 2312 by Denia Brennan RN  Outcome: Progressing  Flowsheets (Taken 12/23/2023 2109)  Care Plan - Patient's Chronic Conditions and Co-Morbidity Symptoms are Monitored and Maintained or Improved:   Monitor and assess patient's chronic conditions and comorbid symptoms for stability, deterioration, or improvement   Collaborate with multidisciplinary team to address chronic and comorbid conditions and prevent exacerbation or deterioration   Update acute care plan with appropriate goals if chronic or comorbid symptoms are exacerbated and prevent overall improvement and discharge     Problem: Skin/Tissue Integrity  Goal: Absence of new skin breakdown  Description: 1. Monitor for areas of redness and/or skin breakdown  2. Assess vascular access sites hourly  3. Every 4-6 hours minimum:  Change oxygen saturation probe site  4. Every 4-6 hours:  If on nasal continuous positive airway pressure, respiratory therapy assess nares and determine need for appliance change or resting period.   12/23/2023 2312 by Denia Brennan RN  Outcome: Progressing     Problem: Safety - Adult  Goal: Free from fall injury  12/23/2023

## 2023-12-25 LAB
ANION GAP SERPL CALCULATED.3IONS-SCNC: 14 MMOL/L (ref 9–17)
BASOPHILS # BLD: 0.03 K/UL (ref 0–0.2)
BASOPHILS NFR BLD: 0 % (ref 0–2)
BUN SERPL-MCNC: 12 MG/DL (ref 8–23)
BUN/CREAT SERPL: 12 (ref 9–20)
CALCIUM SERPL-MCNC: 8.5 MG/DL (ref 8.6–10.4)
CHLORIDE SERPL-SCNC: 107 MMOL/L (ref 98–107)
CO2 SERPL-SCNC: 20 MMOL/L (ref 20–31)
CREAT SERPL-MCNC: 1 MG/DL (ref 0.7–1.2)
EOSINOPHIL # BLD: 0.11 K/UL (ref 0–0.44)
EOSINOPHILS RELATIVE PERCENT: 2 % (ref 1–4)
ERYTHROCYTE [DISTWIDTH] IN BLOOD BY AUTOMATED COUNT: 16.6 % (ref 11.8–14.4)
GFR SERPL CREATININE-BSD FRML MDRD: >60 ML/MIN/1.73M2
GLUCOSE BLD-MCNC: 130 MG/DL (ref 75–110)
GLUCOSE BLD-MCNC: 142 MG/DL (ref 75–110)
GLUCOSE BLD-MCNC: 150 MG/DL (ref 75–110)
GLUCOSE BLD-MCNC: 212 MG/DL (ref 75–110)
GLUCOSE SERPL-MCNC: 115 MG/DL (ref 70–99)
HCT VFR BLD AUTO: 36.3 % (ref 40.7–50.3)
HGB BLD-MCNC: 12.4 G/DL (ref 13–17)
IMM GRANULOCYTES # BLD AUTO: 0.06 K/UL (ref 0–0.3)
IMM GRANULOCYTES NFR BLD: 1 %
LYMPHOCYTES NFR BLD: 1.23 K/UL (ref 1.1–3.7)
LYMPHOCYTES RELATIVE PERCENT: 18 % (ref 24–43)
MCH RBC QN AUTO: 35.4 PG (ref 25.2–33.5)
MCHC RBC AUTO-ENTMCNC: 34.2 G/DL (ref 28.4–34.8)
MCV RBC AUTO: 103.7 FL (ref 82.6–102.9)
MICROORGANISM SPEC CULT: ABNORMAL
MONOCYTES NFR BLD: 0.48 K/UL (ref 0.1–1.2)
MONOCYTES NFR BLD: 7 % (ref 3–12)
NEUTROPHILS NFR BLD: 72 % (ref 36–65)
NEUTS SEG NFR BLD: 4.76 K/UL (ref 1.5–8.1)
NRBC BLD-RTO: 0 PER 100 WBC
PLATELET # BLD AUTO: 382 K/UL (ref 138–453)
PMV BLD AUTO: 9.5 FL (ref 8.1–13.5)
POTASSIUM SERPL-SCNC: 3.8 MMOL/L (ref 3.7–5.3)
RBC # BLD AUTO: 3.5 M/UL (ref 4.21–5.77)
RBC # BLD: ABNORMAL 10*6/UL
RBC # BLD: ABNORMAL 10*6/UL
SODIUM SERPL-SCNC: 141 MMOL/L (ref 135–144)
SPECIMEN DESCRIPTION: ABNORMAL
WBC OTHER # BLD: 6.7 K/UL (ref 3.5–11.3)

## 2023-12-25 PROCEDURE — 80048 BASIC METABOLIC PNL TOTAL CA: CPT

## 2023-12-25 PROCEDURE — 82947 ASSAY GLUCOSE BLOOD QUANT: CPT

## 2023-12-25 PROCEDURE — 6370000000 HC RX 637 (ALT 250 FOR IP): Performed by: FAMILY MEDICINE

## 2023-12-25 PROCEDURE — 6360000002 HC RX W HCPCS: Performed by: NURSE PRACTITIONER

## 2023-12-25 PROCEDURE — 85025 COMPLETE CBC W/AUTO DIFF WBC: CPT

## 2023-12-25 PROCEDURE — 2580000003 HC RX 258: Performed by: NURSE PRACTITIONER

## 2023-12-25 PROCEDURE — 6370000000 HC RX 637 (ALT 250 FOR IP): Performed by: INTERNAL MEDICINE

## 2023-12-25 PROCEDURE — 36415 COLL VENOUS BLD VENIPUNCTURE: CPT

## 2023-12-25 PROCEDURE — 1200000000 HC SEMI PRIVATE

## 2023-12-25 PROCEDURE — 6370000000 HC RX 637 (ALT 250 FOR IP): Performed by: NURSE PRACTITIONER

## 2023-12-25 RX ORDER — AMLODIPINE BESYLATE 5 MG/1
5 TABLET ORAL 2 TIMES DAILY
Status: DISCONTINUED | OUTPATIENT
Start: 2023-12-25 | End: 2023-12-26 | Stop reason: HOSPADM

## 2023-12-25 RX ADMIN — AMLODIPINE BESYLATE 5 MG: 5 TABLET ORAL at 21:23

## 2023-12-25 RX ADMIN — CEFTRIAXONE SODIUM 1000 MG: 1 INJECTION, POWDER, FOR SOLUTION INTRAMUSCULAR; INTRAVENOUS at 06:45

## 2023-12-25 RX ADMIN — CARVEDILOL 25 MG: 25 TABLET, FILM COATED ORAL at 17:17

## 2023-12-25 RX ADMIN — AMLODIPINE BESYLATE 5 MG: 5 TABLET ORAL at 09:36

## 2023-12-25 RX ADMIN — FAMOTIDINE 20 MG: 20 TABLET ORAL at 09:37

## 2023-12-25 RX ADMIN — CARVEDILOL 25 MG: 25 TABLET, FILM COATED ORAL at 09:36

## 2023-12-25 RX ADMIN — Medication 100 MG: at 09:36

## 2023-12-25 RX ADMIN — ENOXAPARIN SODIUM 40 MG: 100 INJECTION SUBCUTANEOUS at 09:37

## 2023-12-25 NOTE — PLAN OF CARE
Pt had uneventful day. Alert but disoriented to situation. Receiving IV rocephin for UTI pending culture. Daily drinker CIWA in place, did not require anything this shift  Problem: Discharge Planning  Goal: Discharge to home or other facility with appropriate resources  12/25/2023 1748 by Abigail King RN  Outcome: Progressing     Problem: Chronic Conditions and Co-morbidities  Goal: Patient's chronic conditions and co-morbidity symptoms are monitored and maintained or improved  12/25/2023 1748 by Abigail King RN  Outcome: Progressing     Problem: Skin/Tissue Integrity  Goal: Absence of new skin breakdown  Description: 1. Monitor for areas of redness and/or skin breakdown  2. Assess vascular access sites hourly  3. Every 4-6 hours minimum:  Change oxygen saturation probe site  4. Every 4-6 hours:  If on nasal continuous positive airway pressure, respiratory therapy assess nares and determine need for appliance change or resting period.   12/25/2023 1748 by Abigail King RN  Outcome: Progressing     Problem: Safety - Adult  Goal: Free from fall injury  12/25/2023 1748 by Abigail King RN  Outcome: Progressing     Problem: ABCDS Injury Assessment  Goal: Absence of physical injury  12/25/2023 1748 by Abigail King RN  Outcome: Progressing     Problem: Neurosensory - Adult  Goal: Achieves stable or improved neurological status  12/25/2023 1748 by Abigail King RN  Outcome: Progressing     Problem: Musculoskeletal - Adult  Goal: Return mobility to safest level of function  12/25/2023 1748 by Abigail King RN  Outcome: Progressing

## 2023-12-25 NOTE — PLAN OF CARE
Problem: Skin/Tissue Integrity  Goal: Absence of new skin breakdown  Description: 1. Monitor for areas of redness and/or skin breakdown  2. Assess vascular access sites hourly  3. Every 4-6 hours minimum:  Change oxygen saturation probe site  4. Every 4-6 hours:  If on nasal continuous positive airway pressure, respiratory therapy assess nares and determine need for appliance change or resting period.   Outcome: Progressing     Problem: Safety - Adult  Goal: Free from fall injury  Outcome: Progressing     Problem: ABCDS Injury Assessment  Goal: Absence of physical injury  Outcome: Progressing     Problem: Musculoskeletal - Adult  Goal: Return mobility to safest level of function  Outcome: Progressing

## 2023-12-25 NOTE — PROGRESS NOTES
[facility].,    Adult Hospitalist      Name: Tessy Medina  MRN: [id number]     Acct: [de-identified]  Room: 2001/2001-02    Admit Date: 12/22/2023  1:58 AM  PCP: DENNIS Sanders    Primary Problem  Principal Problem:    Urinary tract infection  Active Problems:    Mild malnutrition ([address])  Resolved Problems:    * No resolved hospital problems. *        Assesment:     Acute kidney injury secondary to hemodynamic compromise, UTI, NSAID use  IV fluids normal saline at 50 mill per hour  Hold metformin, losartan  Increased norvasc  Consult nephrology  Avoid hypotension, nephrotoxic drugs, Fleet enema, IV contrast    Acute cystitis without hematuria  IV antibiotic-Rocephin IV  Urine c/s: E Coli    Alcohol dependence  CIWA protocol  Counseled on cessation  Folic acid, thiamine  CIWA protocol  Consult social work    Diabetes mellitus type 2  Accu-Cheks before meals and at bedtime  Insulin sliding scale  Hypoglycemia protocol  Hold metformin  Lantus, reduced dose    Essential hypertension  Coreg, Norvasc,     Right hemiparesis sec to CVA   Plavix  PT/OT    Gastroesophageal reflux disease  Pepcid    Seizure disorder  Vimpat    Major depressive disorder  Paxil    Moderate persistent asthma  Pulmicort, Proventil as needed    DC planning   CBC, BMP  Incentive spirometry  DVT and GI prophylaxis. Chief Complaint:     Chief Complaint   Patient presents with    Fatigue     Four days    Urinary Tract Infection     Possible UTI/ dark urine/ denies pain with urination         History of Present Illness:        Patient seen and examined at bedside. No overnight events. No acute complaints today.    afebrile   Denies any chest pain, shortness of breath, palpitation, headache, dizziness, cough, cold, changes in urination or bowel habits          Initial HPI  Tessy Medina is a 80 y.o.  male who presents with Fatigue (Four days) and Urinary Tract Infection (Possible UTI/ dark urine/ denies pain with

## 2023-12-26 VITALS
DIASTOLIC BLOOD PRESSURE: 73 MMHG | WEIGHT: 154.76 LBS | HEART RATE: 75 BPM | HEIGHT: 67 IN | RESPIRATION RATE: 17 BRPM | BODY MASS INDEX: 24.29 KG/M2 | OXYGEN SATURATION: 97 % | TEMPERATURE: 98.1 F | SYSTOLIC BLOOD PRESSURE: 153 MMHG

## 2023-12-26 LAB
ANION GAP SERPL CALCULATED.3IONS-SCNC: 12 MMOL/L (ref 9–17)
BASOPHILS # BLD: 0.03 K/UL (ref 0–0.2)
BASOPHILS NFR BLD: 1 % (ref 0–2)
BUN SERPL-MCNC: 9 MG/DL (ref 8–23)
BUN/CREAT SERPL: 8 (ref 9–20)
CALCIUM SERPL-MCNC: 8.7 MG/DL (ref 8.6–10.4)
CHLORIDE SERPL-SCNC: 102 MMOL/L (ref 98–107)
CO2 SERPL-SCNC: 23 MMOL/L (ref 20–31)
CREAT SERPL-MCNC: 1.1 MG/DL (ref 0.7–1.2)
EOSINOPHIL # BLD: 0.11 K/UL (ref 0–0.44)
EOSINOPHILS RELATIVE PERCENT: 2 % (ref 1–4)
ERYTHROCYTE [DISTWIDTH] IN BLOOD BY AUTOMATED COUNT: 16.2 % (ref 11.8–14.4)
GFR SERPL CREATININE-BSD FRML MDRD: >60 ML/MIN/1.73M2
GLUCOSE BLD-MCNC: 164 MG/DL (ref 75–110)
GLUCOSE BLD-MCNC: 171 MG/DL (ref 75–110)
GLUCOSE SERPL-MCNC: 169 MG/DL (ref 70–99)
HCT VFR BLD AUTO: 36.3 % (ref 40.7–50.3)
HGB BLD-MCNC: 12.4 G/DL (ref 13–17)
IMM GRANULOCYTES # BLD AUTO: 0.04 K/UL (ref 0–0.3)
IMM GRANULOCYTES NFR BLD: 1 %
LYMPHOCYTES NFR BLD: 1.29 K/UL (ref 1.1–3.7)
LYMPHOCYTES RELATIVE PERCENT: 22 % (ref 24–43)
MCH RBC QN AUTO: 34.4 PG (ref 25.2–33.5)
MCHC RBC AUTO-ENTMCNC: 34.2 G/DL (ref 28.4–34.8)
MCV RBC AUTO: 100.8 FL (ref 82.6–102.9)
MONOCYTES NFR BLD: 0.44 K/UL (ref 0.1–1.2)
MONOCYTES NFR BLD: 8 % (ref 3–12)
NEUTROPHILS NFR BLD: 66 % (ref 36–65)
NEUTS SEG NFR BLD: 3.9 K/UL (ref 1.5–8.1)
NRBC BLD-RTO: 0 PER 100 WBC
PLATELET # BLD AUTO: 397 K/UL (ref 138–453)
PMV BLD AUTO: 9.4 FL (ref 8.1–13.5)
POTASSIUM SERPL-SCNC: 3.6 MMOL/L (ref 3.7–5.3)
RBC # BLD AUTO: 3.6 M/UL (ref 4.21–5.77)
RBC # BLD: ABNORMAL 10*6/UL
SODIUM SERPL-SCNC: 137 MMOL/L (ref 135–144)
WBC OTHER # BLD: 5.8 K/UL (ref 3.5–11.3)

## 2023-12-26 PROCEDURE — 2580000003 HC RX 258: Performed by: NURSE PRACTITIONER

## 2023-12-26 PROCEDURE — 6360000002 HC RX W HCPCS: Performed by: NURSE PRACTITIONER

## 2023-12-26 PROCEDURE — 97530 THERAPEUTIC ACTIVITIES: CPT

## 2023-12-26 PROCEDURE — 6370000000 HC RX 637 (ALT 250 FOR IP): Performed by: NURSE PRACTITIONER

## 2023-12-26 PROCEDURE — 6370000000 HC RX 637 (ALT 250 FOR IP): Performed by: INTERNAL MEDICINE

## 2023-12-26 PROCEDURE — 80048 BASIC METABOLIC PNL TOTAL CA: CPT

## 2023-12-26 PROCEDURE — 97110 THERAPEUTIC EXERCISES: CPT

## 2023-12-26 PROCEDURE — 85025 COMPLETE CBC W/AUTO DIFF WBC: CPT

## 2023-12-26 PROCEDURE — 36415 COLL VENOUS BLD VENIPUNCTURE: CPT

## 2023-12-26 PROCEDURE — 6370000000 HC RX 637 (ALT 250 FOR IP): Performed by: FAMILY MEDICINE

## 2023-12-26 PROCEDURE — 97116 GAIT TRAINING THERAPY: CPT

## 2023-12-26 PROCEDURE — 82947 ASSAY GLUCOSE BLOOD QUANT: CPT

## 2023-12-26 RX ORDER — CIPROFLOXACIN 500 MG/1
500 TABLET, FILM COATED ORAL 2 TIMES DAILY
Qty: 20 TABLET | Refills: 0 | Status: SHIPPED | OUTPATIENT
Start: 2023-12-26 | End: 2024-01-05

## 2023-12-26 RX ORDER — AMLODIPINE BESYLATE 5 MG/1
5 TABLET ORAL 2 TIMES DAILY
Qty: 30 TABLET | Refills: 3 | Status: SHIPPED | OUTPATIENT
Start: 2023-12-26

## 2023-12-26 RX ORDER — CHOLECALCIFEROL (VITAMIN D3) 25 MCG
500 TABLET ORAL DAILY
Qty: 60 TABLET | Refills: 0 | Status: SHIPPED | OUTPATIENT
Start: 2023-12-27

## 2023-12-26 RX ORDER — POTASSIUM CHLORIDE 20 MEQ/1
20 TABLET, EXTENDED RELEASE ORAL ONCE
Status: DISCONTINUED | OUTPATIENT
Start: 2023-12-26 | End: 2023-12-26 | Stop reason: HOSPADM

## 2023-12-26 RX ADMIN — HYDRALAZINE HYDROCHLORIDE 10 MG: 20 INJECTION, SOLUTION INTRAMUSCULAR; INTRAVENOUS at 00:29

## 2023-12-26 RX ADMIN — SODIUM CHLORIDE, PRESERVATIVE FREE 10 ML: 5 INJECTION INTRAVENOUS at 08:11

## 2023-12-26 RX ADMIN — CARVEDILOL 25 MG: 25 TABLET, FILM COATED ORAL at 08:09

## 2023-12-26 RX ADMIN — Medication 500 UNITS: at 08:08

## 2023-12-26 RX ADMIN — AMLODIPINE BESYLATE 5 MG: 5 TABLET ORAL at 08:11

## 2023-12-26 RX ADMIN — SODIUM CHLORIDE, PRESERVATIVE FREE 10 ML: 5 INJECTION INTRAVENOUS at 00:29

## 2023-12-26 RX ADMIN — ENOXAPARIN SODIUM 40 MG: 100 INJECTION SUBCUTANEOUS at 08:09

## 2023-12-26 RX ADMIN — FAMOTIDINE 20 MG: 20 TABLET ORAL at 08:09

## 2023-12-26 RX ADMIN — Medication 100 MG: at 08:09

## 2023-12-26 RX ADMIN — CEFTRIAXONE SODIUM 1000 MG: 1 INJECTION, POWDER, FOR SOLUTION INTRAMUSCULAR; INTRAVENOUS at 05:30

## 2023-12-26 NOTE — PROGRESS NOTES
Physical Therapy  Facility/Department: [facility] MED [facility]  Rehabilitation Physical Therapy Treatment Note    NAME: Maggie Garibay  : 1955 (80 y.o.)  MRN: [id number]  CODE STATUS: Full Code    Date of Service: 23     Due to recent hospitalization and medical condition, pt would benefit from additional intermittent skilled therapy at time of discharge. Please refer to the AM-PAC score for current functional status. Restrictions:  Restrictions/Precautions: Fall Risk, Up as Tolerated, Seizure  Position Activity Restriction  Other position/activity restrictions: LUE IV, telemetry, continuous pulse ox, telemetry. SUBJECTIVE  Subjective  Subjective: Patient up in bed upon therapists arrival. Patient agreeeable to PT treatment. RN states patient is appropriate for PT treatment. OBJECTIVE  Cognition  Overall Cognitive Status: Exceptions  Arousal/Alertness: Appropriate responses to stimuli  Following Commands: Follows one step commands with repetition  Attention Span: Attends with cues to redirect  Memory: Appears intact  Safety Judgement: Decreased awareness of need for assistance  Problem Solving: Decreased awareness of errors;Assistance required to identify errors made;Assistance required to correct errors made  Insights: Decreased awareness of deficits  Initiation: Requires cues for some  Sequencing: Requires cues for some  Cognition Comment: Patient with good orientation to self and place. Aware of reason for hospitalization this treatment.   Orientation  Overall Orientation Status: Within Functional Limits  Orientation Level: Oriented to person;Oriented to situation;Oriented to place;Oriented to time    Functional Mobility  Bed Mobility  Overall Assistance Level: Modified Independent  Additional Factors: Verbal cues  Roll Left  Assistance Level: Modified independent  Roll Right  Assistance Level: Modified independent  Sit to Supine  Assistance Level: Modified independent  Skilled Clinical Factors: 7 x weekly - 3 sets - 10 reps  - Standing Hip Extension with Counter Support  - 1 x daily - 7 x weekly - 3 sets - 10 reps  - Heel Raises with Counter Support  - 1 x daily - 7 x weekly - 3 sets - 10 reps  - Toe Raises with Counter Support  - 1 x daily - 7 x weekly - 3 sets - 10 reps  - Toe Raises with Counter Support  - 1 x daily - 7 x weekly - 3 sets - 10 reps    ASSESSMENT/PROGRESS TOWARDS GOALS     AM-[facility] Inpatient Mobility Raw Score 22   AM-[facility] Inpatient T-Scale Score 53.28   Mobility Inpatient CMS 0-100% Score 20.91   Mobility Inpatient CMS G-Code Modifier CJ       Assessment  Assessment: Patient with progression in ambulation and tolerance to activity.  Patient with improved safety awareness however would benefit from continued skilled PT treatment to maximize return to PLOF  Activity Tolerance: Treatment limited secondary to decreased cognition  Discharge Recommendations: Patient would benefit from continued therapy after discharge;Continue to assess pending progress    Goals  Patient Goals   Patient Goals : to go home  Short Term Goals  Time Frame for Short Term Goals: 12 visits  Short Term Goal 1: Independent bed mobility  Short Term Goal 2: Independent sit<>stand  Short Term Goal 3: Patient to ambulate 100 feet with roll walker SBA  Short Term Goal 4: Patient to perform 25 minutes ther act to facilitate improving endurance, balance    PLAN OF CARE/SAFETY  Physical Therapy Plan  General Plan: 5-7 times per week  Current Treatment Recommendations: Strengthening;Balance training;Transfer training;Gait training;Positioning;Patient/Caregiver education & training;Safety education & training  Safety Devices  Type of Devices: Gait belt;Nurse notified;Bed alarm in place;Patient at risk for falls;Call light within reach;Left in bed    EDUCATION  Education  Education Given To: Patient  Education Provided: Transfer Training;Energy Conservation;Mobility Training  Education Method: Printed Information/Hand-outs;Teach

## 2023-12-26 NOTE — DISCHARGE SUMMARY
[address] [place] [address],    Adult Hospitalist      Patient ID: Anne Marie Avendaño  MRN: [id number]     Acct:  [de-identified]       Patient's PCP: Mignon Dale    Admit Date: 12/22/2023     Discharge Date:   ***    Admitting Physician: Connie Martinez MD    Discharge Physician: Magnolia Huitron MD     CONSULTANTS: Patient Care Team:  DENNIS Dlae as PCP - General (Physician Assistant)    PROCEDURES PERFORMED: ***        Primary Problem  Urinary tract infection      HPI: ***              Active Discharge Diagnoses with hospital course: At the time of discharge patient was hemodynamically stable and asymptomatic. The plan was discussed in detail with patient who agreed with the plan and verbalized understanding . The patient was seen and examined on day of discharge and this discharge summary is in conjunction with any daily progress note from day of discharge.     Hospital Data:    Labs:    Hematology:  Recent Labs     12/24/23  0521 12/25/23  0534 12/26/23  0526   WBC 6.3 6.7 5.8   RBC 3.37* 3.50* 3.60*   HGB 12.0* 12.4* 12.4*   HCT 34.4* 36.3* 36.3*   .1 103.7* 100.8   MCH 35.6* 35.4* 34.4*   MCHC 34.9* 34.2 34.2   RDW 16.4* 16.6* 16.2*    382 397   MPV 9.6 9.5 9.4     Chemistry:  Recent Labs     12/24/23  0521 12/25/23  0534 12/26/23  0526    141 137   K 3.4* 3.8 3.6*    107 102   CO2 22 20 23   GLUCOSE 133* 115* 169*   BUN 15 12 9   CREATININE 1.1 1.0 1.1   MG 1.6  --   --    ANIONGAP 10 14 12   LABGLOM >60 >60 >60   CALCIUM 8.3* 8.5* 8.7     Recent Labs     12/24/23  1942 12/25/23  0622 12/25/23  1117 12/25/23  1618 12/25/23  1935 12/26/23  0607   POCGLU 129* 130* 212* 142* 150* 171*     Lab Results   Component Value Date    INR 1.1 09/22/2021    INR 1.0 09/20/2021    INR 1.0 02/26/2021    PROTIME 13.7 09/22/2021    PROTIME 12.7 09/20/2021    PROTIME 12.8 02/26/2021     Lab Results   Component Value Date/Time    SPECIAL NOT REPORTED 06/04/2022 11:00 AM       Radiology:    US RETROPERITONEAL COMPLETE    Result Date: 12/22/2023  1. 2.1 cm simple cyst the upper pole the left kidney.  The kidneys appear otherwise sonographically normal, but a portion of the lower pole the right kidney is obscured by bowel gas.  No follow-up imaging for this cyst is recommended. 2. No hydronephrosis.         All radiological studies reviewed      Reviews of Symptoms:    A 10 point system is reviewed and  negative except described in hospital course    Physical Exam:    Vitals:  BP (!) 153/73   Pulse 75   Temp 98.1 °F (36.7 °C)   Resp 17   Ht 1.702 m (5' 7\")   Wt 70.2 kg (154 lb 12.2 oz)   SpO2 97%   BMI 24.24 kg/m²   No data recorded.      General appearance - alert, well appearing, and in no acute distress  Mental status - oriented to person, place, and time with normal affect  Head - normocephalic and atraumatic  Eyes - pupils equal and reactive, extraocular eye movements intact, conjunctiva clear  Ears - hearing appears to be intact  Nose - no drainage noted  Mouth - mucous membranes moist  Neck - supple, no carotid bruits, thyroid not palpable  Chest - clear to auscultation, normal effort  Heart - normal rate, regular rhythm, no murmur  Abdomen - soft, nontender, nondistended, bowel sounds present all four quadrants, no masses, hepatomegaly or splenomegaly  Neurological - normal speech, no focal findings or movement disorder noted, cranial nerves II through XII grossly intact  Extremities - peripheral pulses palpable, no pedal edema or calf pain with palpation  Skin - no gross lesions, rashes, or induration noted      Consults:  IP CONSULT TO HOSPITALIST  IP CONSULT TO NEPHROLOGY  IP CONSULT TO SOCIAL WORK    Disposition:  home care    Discharged Condition: Stable    Follow Up: César Varner, [place]  [address]  [facility] [address]  [facility] [address] [phone]    Schedule an appointment as soon as possible for a visit in 1 day(s)      [facility]

## 2023-12-26 NOTE — CARE COORDINATION
CM spoke with patient to discuss transitional planning. Patient declines SNF placement but is agreeable to [facility] services. Patient patient accepted by [facility]. [facility] and [place] order needed at discharge. PARAMJIT spoke with [facility] from [place] and she confirmed that they are able to accept patient at discharge. Patient states that he will have transportation home and he verbalizes having no additional transitional needs.     Discharge [address] Case Management Department  Written by: Alan Butt RN    Patient Name: Ana Callejas  Attending Provider: Darius Hunter MD  Admit Date: 2023  1:58 AM  MRN: [id number]  Account: [de-identified]                     : 1955  Discharge Date: 23      Disposition: home w/ [facility]    Alan Butt RN

## 2023-12-26 NOTE — PLAN OF CARE
Patient can be impulsive at times and try to get up. He has an External Catheter on. Patient was able to ambulate to the bathroom with 2 assist and did well. He did have a small bowel movement. Patient's Blood Pressure has been elevated. Hydralazine was given one time. Patient has no other complaints at this time. Plan of care ongoing. Checked for incontinence every 2 hours and prn. Pericare as needed. Assisted to reposition off back frequently. On waffle mattress. Heels off bed with pillows. Problem: Skin/Tissue Integrity  Goal: Absence of new skin breakdown  Description: 1. Monitor for areas of redness and/or skin breakdown  2. Assess vascular access sites hourly  3. Every 4-6 hours minimum:  Change oxygen saturation probe site  4. Every 4-6 hours:  If on nasal continuous positive airway pressure, respiratory therapy assess nares and determine need for appliance change or resting period. 12/26/2023 0350 by Tamra Arce RN  Outcome: Progressing  12/25/2023 1748 by Hany Lara RN  Outcome: Progressing     Siderails up x 2  Hourly rounding  Call light in reach  Instructed to call for assist before attempting out of bed.   Remains free from falls and accidental injury at this time   Floor free from obstacles  Bed is locked and in lowest position  Adequate lighting provided  Bed alarm on, Red Falling star and Stay with Me signs posted      Problem: Safety - Adult  Goal: Free from fall injury  12/26/2023 0350 by Tamra Arce RN  Outcome: Progressing  12/25/2023 1748 by Hany Lara RN  Outcome: Progressing     Problem: ABCDS Injury Assessment  Goal: Absence of physical injury  12/26/2023 0350 by Tamra Arce RN  Outcome: Progressing  12/25/2023 1748 by Hnay Lara RN  Outcome: Progressing     Problem: Musculoskeletal - Adult  Goal: Return mobility to safest level of function  12/26/2023 0350 by Tamra Arce RN  Outcome: Progressing  Flowsheets (Taken

## 2023-12-26 NOTE — PROGRESS NOTES
Patient discharged off unit with all belongings. Discharge paperwork in hand. Patient home via [employer]. PAtient ambulated safely into cab. Voucher given to . Patient prescriptions sent to home pharmacy.

## 2023-12-26 NOTE — ED PROVIDER NOTES
EMERGENCY DEPARTMENT ENCOUNTER    Pt Name: Tessy Medina  MRN: [id number]  [address] [facility], [place] 1955  Date of evaluation: 12/26/23  CHIEF COMPLAINT       Chief Complaint   Patient presents with    Fatigue     Four days    Urinary Tract Infection     Possible UTI/ dark urine/ denies pain with urination     HISTORY OF PRESENT ILLNESS   This is a 41-year-old male that presents with complaints of generalized weakness. Patient has a history of alcohol abuse, over the past 4 days has had increasing weakness, and concerns for urinary tract infection. REVIEW OF SYSTEMS     Review of Systems   Constitutional:  Positive for fatigue. Negative for chills and fever. HENT:  Negative for rhinorrhea and sore throat. Eyes:  Negative for discharge, redness and visual disturbance. Respiratory:  Negative for cough and shortness of breath. Cardiovascular:  Negative for chest pain, palpitations and leg swelling. Gastrointestinal:  Negative for diarrhea, nausea and vomiting. Musculoskeletal:  Negative for arthralgias, myalgias and neck pain. Skin:  Negative for color change and rash. Neurological:  Positive for weakness. Negative for seizures and headaches. Psychiatric/Behavioral:  Negative for hallucinations, self-injury and suicidal ideas.       PASTMEDICAL HISTORY     Past Medical History:   Diagnosis Date    CVA (cerebral vascular accident) ([address]) 02/2021    With residual right sided weakness    Diabetes mellitus ([address])     Hypertension      Past Problem List  Patient Active Problem List   Diagnosis Code    ADRIANA (acute kidney injury) ([address]) N17.9    Acute ischemic left PCA stroke ([address]) I63.532    Right sided weakness R53.1    Seizure ([address]) R56.9    Urinary tract infection N39.0    Mild malnutrition ([address]) E44.1     SURGICAL HISTORY       Past Surgical History:   Procedure Laterality Date    CAROTID ENDARTERECTOMY  09/22/2021    CAROTID ENDARTERECTOMY (N/A Neck    CAROTID ENDARTERECTOMY N/A 9/22/2021    CAROTID

## 2023-12-26 NOTE — PROGRESS NOTES
Attempted to call patient's significant other (per patient request), Martin Vegas, on her cell phone at [phone] . No answer and voicemail was full. Was trying to update potential discharge later this afternoon and patient needing some belongings from home.

## 2023-12-26 NOTE — PLAN OF CARE
Problem: Discharge Planning  Goal: Discharge to home or other facility with appropriate resources  12/26/2023 1542 by Elijah Abdullahi RN  Outcome: Adequate for Discharge     Problem: Chronic Conditions and Co-morbidities  Goal: Patient's chronic conditions and co-morbidity symptoms are monitored and maintained or improved  12/26/2023 1542 by Elijah Abdullahi RN  Outcome: Adequate for Discharge     Problem: Skin/Tissue Integrity  Goal: Absence of new skin breakdown  Description: 1. Monitor for areas of redness and/or skin breakdown  2. Assess vascular access sites hourly  3. Every 4-6 hours minimum:  Change oxygen saturation probe site  4. Every 4-6 hours:  If on nasal continuous positive airway pressure, respiratory therapy assess nares and determine need for appliance change or resting period.   12/26/2023 1542 by Elijah Abdullahi RN  Outcome: Adequate for Discharge     Problem: Safety - Adult  Goal: Free from fall injury  12/26/2023 1542 by Elijah Abdullahi RN  Outcome: Adequate for Discharge     Problem: ABCDS Injury Assessment  Goal: Absence of physical injury  12/26/2023 1542 by Elijah Abdullahi RN  Outcome: Adequate for Discharge     Problem: Neurosensory - Adult  Goal: Achieves stable or improved neurological status  12/26/2023 1542 by Elijah Abdullahi RN  Outcome: Adequate for Discharge     Problem: Musculoskeletal - Adult  Goal: Return mobility to safest level of function  12/26/2023 1542 by Elijah Abdullahi RN  Outcome: Adequate for Discharge

## 2024-01-19 ENCOUNTER — HOSPITAL ENCOUNTER (INPATIENT)
Age: 69
LOS: 5 days | Discharge: NURSING, REHAB OR LONG-TERM CARE | DRG: 696 | End: 2024-01-25
Attending: STUDENT IN AN ORGANIZED HEALTH CARE EDUCATION/TRAINING PROGRAM | Admitting: FAMILY MEDICINE
Payer: MEDICARE

## 2024-01-19 DIAGNOSIS — R33.9 URINARY RETENTION: ICD-10-CM

## 2024-01-19 DIAGNOSIS — F41.9 ANXIETY: ICD-10-CM

## 2024-01-19 DIAGNOSIS — N30.00 ACUTE CYSTITIS WITHOUT HEMATURIA: Primary | ICD-10-CM

## 2024-01-19 DIAGNOSIS — R78.81 BACTEREMIA: ICD-10-CM

## 2024-01-19 PROCEDURE — 80053 COMPREHEN METABOLIC PANEL: CPT

## 2024-01-19 PROCEDURE — 87086 URINE CULTURE/COLONY COUNT: CPT

## 2024-01-19 PROCEDURE — 96375 TX/PRO/DX INJ NEW DRUG ADDON: CPT

## 2024-01-19 PROCEDURE — 84145 PROCALCITONIN (PCT): CPT

## 2024-01-19 PROCEDURE — 81001 URINALYSIS AUTO W/SCOPE: CPT

## 2024-01-19 PROCEDURE — 51798 US URINE CAPACITY MEASURE: CPT

## 2024-01-19 PROCEDURE — 85730 THROMBOPLASTIN TIME PARTIAL: CPT

## 2024-01-19 PROCEDURE — 99285 EMERGENCY DEPT VISIT HI MDM: CPT

## 2024-01-19 PROCEDURE — 96374 THER/PROPH/DIAG INJ IV PUSH: CPT

## 2024-01-19 PROCEDURE — 84484 ASSAY OF TROPONIN QUANT: CPT

## 2024-01-19 PROCEDURE — 85610 PROTHROMBIN TIME: CPT

## 2024-01-19 PROCEDURE — 85025 COMPLETE CBC W/AUTO DIFF WBC: CPT

## 2024-01-19 PROCEDURE — 83605 ASSAY OF LACTIC ACID: CPT

## 2024-01-19 RX ORDER — 0.9 % SODIUM CHLORIDE 0.9 %
1000 INTRAVENOUS SOLUTION INTRAVENOUS ONCE
Status: COMPLETED | OUTPATIENT
Start: 2024-01-19 | End: 2024-01-20

## 2024-01-19 ASSESSMENT — PAIN - FUNCTIONAL ASSESSMENT: PAIN_FUNCTIONAL_ASSESSMENT: 0-10

## 2024-01-19 ASSESSMENT — PAIN SCALES - GENERAL: PAINLEVEL_OUTOF10: 9

## 2024-01-20 ENCOUNTER — APPOINTMENT (OUTPATIENT)
Dept: CT IMAGING | Age: 69
DRG: 696 | End: 2024-01-20
Payer: MEDICARE

## 2024-01-20 PROBLEM — R33.9 URINARY RETENTION: Status: ACTIVE | Noted: 2024-01-20

## 2024-01-20 LAB
ALBUMIN SERPL-MCNC: 3.3 G/DL (ref 3.5–5.2)
ALP SERPL-CCNC: 89 U/L (ref 40–129)
ALT SERPL-CCNC: 8 U/L (ref 5–41)
ANION GAP SERPL CALCULATED.3IONS-SCNC: 11 MMOL/L (ref 9–17)
ANION GAP SERPL CALCULATED.3IONS-SCNC: 18 MMOL/L (ref 9–17)
AST SERPL-CCNC: 15 U/L
BACTERIA URNS QL MICRO: ABNORMAL
BASOPHILS # BLD: 0.03 K/UL (ref 0–0.2)
BASOPHILS # BLD: 0.04 K/UL (ref 0–0.2)
BASOPHILS NFR BLD: 1 % (ref 0–2)
BASOPHILS NFR BLD: 1 % (ref 0–2)
BILIRUB SERPL-MCNC: 0.4 MG/DL (ref 0.3–1.2)
BILIRUB UR QL STRIP: NEGATIVE
BUN SERPL-MCNC: 6 MG/DL (ref 8–23)
BUN SERPL-MCNC: 8 MG/DL (ref 8–23)
BUN/CREAT SERPL: 6 (ref 9–20)
BUN/CREAT SERPL: 7 (ref 9–20)
CALCIUM SERPL-MCNC: 7.4 MG/DL (ref 8.6–10.4)
CALCIUM SERPL-MCNC: 8.5 MG/DL (ref 8.6–10.4)
CHLORIDE SERPL-SCNC: 100 MMOL/L (ref 98–107)
CHLORIDE SERPL-SCNC: 90 MMOL/L (ref 98–107)
CLARITY UR: CLEAR
CO2 SERPL-SCNC: 21 MMOL/L (ref 20–31)
CO2 SERPL-SCNC: 25 MMOL/L (ref 20–31)
COLOR UR: YELLOW
CREAT SERPL-MCNC: 1 MG/DL (ref 0.7–1.2)
CREAT SERPL-MCNC: 1.2 MG/DL (ref 0.7–1.2)
EOSINOPHIL # BLD: 0.16 K/UL (ref 0–0.44)
EOSINOPHIL # BLD: 0.18 K/UL (ref 0–0.44)
EOSINOPHILS RELATIVE PERCENT: 3 % (ref 1–4)
EOSINOPHILS RELATIVE PERCENT: 3 % (ref 1–4)
EPI CELLS #/AREA URNS HPF: ABNORMAL /HPF (ref 0–5)
ERYTHROCYTE [DISTWIDTH] IN BLOOD BY AUTOMATED COUNT: 15.9 % (ref 11.8–14.4)
ERYTHROCYTE [DISTWIDTH] IN BLOOD BY AUTOMATED COUNT: 15.9 % (ref 11.8–14.4)
ETHANOL PERCENT: 0.04 %
ETHANOLAMINE SERPL-MCNC: 41 MG/DL
FLUAV RNA RESP QL NAA+PROBE: NOT DETECTED
FLUBV RNA RESP QL NAA+PROBE: NOT DETECTED
GFR SERPL CREATININE-BSD FRML MDRD: >60 ML/MIN/1.73M2
GFR SERPL CREATININE-BSD FRML MDRD: >60 ML/MIN/1.73M2
GLUCOSE SERPL-MCNC: 161 MG/DL (ref 70–99)
GLUCOSE SERPL-MCNC: 161 MG/DL (ref 70–99)
GLUCOSE UR STRIP-MCNC: NEGATIVE MG/DL
HCT VFR BLD AUTO: 37.7 % (ref 40.7–50.3)
HCT VFR BLD AUTO: 40 % (ref 40.7–50.3)
HGB BLD-MCNC: 12.9 G/DL (ref 13–17)
HGB BLD-MCNC: 14.1 G/DL (ref 13–17)
HGB UR QL STRIP.AUTO: NEGATIVE
IMM GRANULOCYTES # BLD AUTO: 0.02 K/UL (ref 0–0.3)
IMM GRANULOCYTES # BLD AUTO: 0.06 K/UL (ref 0–0.3)
IMM GRANULOCYTES NFR BLD: 0 %
IMM GRANULOCYTES NFR BLD: 1 %
INR PPP: 1.1
INR PPP: 1.2
KETONES UR STRIP-MCNC: NEGATIVE MG/DL
LACTATE BLDV-SCNC: 4.4 MMOL/L (ref 0.5–1.9)
LACTATE BLDV-SCNC: 6.1 MMOL/L (ref 0.5–1.9)
LEUKOCYTE ESTERASE UR QL STRIP: ABNORMAL
LYMPHOCYTES NFR BLD: 1.34 K/UL (ref 1.1–3.7)
LYMPHOCYTES NFR BLD: 1.83 K/UL (ref 1.1–3.7)
LYMPHOCYTES RELATIVE PERCENT: 23 % (ref 24–43)
LYMPHOCYTES RELATIVE PERCENT: 26 % (ref 24–43)
MAGNESIUM SERPL-MCNC: 1 MG/DL (ref 1.6–2.6)
MCH RBC QN AUTO: 33.1 PG (ref 25.2–33.5)
MCH RBC QN AUTO: 33.8 PG (ref 25.2–33.5)
MCHC RBC AUTO-ENTMCNC: 34.2 G/DL (ref 28.4–34.8)
MCHC RBC AUTO-ENTMCNC: 35.3 G/DL (ref 28.4–34.8)
MCV RBC AUTO: 95.9 FL (ref 82.6–102.9)
MCV RBC AUTO: 96.7 FL (ref 82.6–102.9)
MONOCYTES NFR BLD: 0.45 K/UL (ref 0.1–1.2)
MONOCYTES NFR BLD: 0.5 K/UL (ref 0.1–1.2)
MONOCYTES NFR BLD: 7 % (ref 3–12)
MONOCYTES NFR BLD: 8 % (ref 3–12)
NEUTROPHILS NFR BLD: 62 % (ref 36–65)
NEUTROPHILS NFR BLD: 65 % (ref 36–65)
NEUTS SEG NFR BLD: 3.9 K/UL (ref 1.5–8.1)
NEUTS SEG NFR BLD: 4.43 K/UL (ref 1.5–8.1)
NITRITE UR QL STRIP: NEGATIVE
NRBC BLD-RTO: 0 PER 100 WBC
NRBC BLD-RTO: 0 PER 100 WBC
PARTIAL THROMBOPLASTIN TIME: 31.6 SEC (ref 23.9–33.8)
PH UR STRIP: 5.5 [PH] (ref 5–8)
PLATELET # BLD AUTO: 235 K/UL (ref 138–453)
PLATELET # BLD AUTO: 253 K/UL (ref 138–453)
PMV BLD AUTO: 9.4 FL (ref 8.1–13.5)
PMV BLD AUTO: 9.6 FL (ref 8.1–13.5)
POTASSIUM SERPL-SCNC: 3.1 MMOL/L (ref 3.7–5.3)
POTASSIUM SERPL-SCNC: 3.4 MMOL/L (ref 3.7–5.3)
PROCALCITONIN SERPL-MCNC: 0.06 NG/ML
PROT SERPL-MCNC: 7.1 G/DL (ref 6.4–8.3)
PROT UR STRIP-MCNC: NEGATIVE MG/DL
PROTHROMBIN TIME: 13.7 SEC (ref 11.5–14.2)
PROTHROMBIN TIME: 14.8 SEC (ref 11.5–14.2)
RBC # BLD AUTO: 3.9 M/UL (ref 4.21–5.77)
RBC # BLD AUTO: 4.17 M/UL (ref 4.21–5.77)
RBC # BLD: ABNORMAL 10*6/UL
RBC # BLD: ABNORMAL 10*6/UL
RBC #/AREA URNS HPF: ABNORMAL /HPF (ref 0–2)
SARS-COV-2 RNA RESP QL NAA+PROBE: NOT DETECTED
SODIUM SERPL-SCNC: 129 MMOL/L (ref 135–144)
SODIUM SERPL-SCNC: 136 MMOL/L (ref 135–144)
SOURCE: NORMAL
SP GR UR STRIP: 1 (ref 1–1.03)
SPECIMEN DESCRIPTION: NORMAL
TROPONIN I SERPL HS-MCNC: 35 NG/L (ref 0–22)
TROPONIN I SERPL HS-MCNC: 38 NG/L (ref 0–22)
UROBILINOGEN UR STRIP-ACNC: NORMAL EU/DL (ref 0–1)
WBC #/AREA URNS HPF: ABNORMAL /HPF (ref 0–5)
WBC OTHER # BLD: 5.9 K/UL (ref 3.5–11.3)
WBC OTHER # BLD: 7 K/UL (ref 3.5–11.3)

## 2024-01-20 PROCEDURE — 87636 SARSCOV2 & INF A&B AMP PRB: CPT

## 2024-01-20 PROCEDURE — 2580000003 HC RX 258: Performed by: NURSE PRACTITIONER

## 2024-01-20 PROCEDURE — 6360000002 HC RX W HCPCS: Performed by: STUDENT IN AN ORGANIZED HEALTH CARE EDUCATION/TRAINING PROGRAM

## 2024-01-20 PROCEDURE — 36415 COLL VENOUS BLD VENIPUNCTURE: CPT

## 2024-01-20 PROCEDURE — 6370000000 HC RX 637 (ALT 250 FOR IP): Performed by: FAMILY MEDICINE

## 2024-01-20 PROCEDURE — 2060000000 HC ICU INTERMEDIATE R&B

## 2024-01-20 PROCEDURE — 87154 CUL TYP ID BLD PTHGN 6+ TRGT: CPT

## 2024-01-20 PROCEDURE — 2580000003 HC RX 258: Performed by: STUDENT IN AN ORGANIZED HEALTH CARE EDUCATION/TRAINING PROGRAM

## 2024-01-20 PROCEDURE — 87186 SC STD MICRODIL/AGAR DIL: CPT

## 2024-01-20 PROCEDURE — 6360000002 HC RX W HCPCS: Performed by: NURSE PRACTITIONER

## 2024-01-20 PROCEDURE — 6360000004 HC RX CONTRAST MEDICATION: Performed by: STUDENT IN AN ORGANIZED HEALTH CARE EDUCATION/TRAINING PROGRAM

## 2024-01-20 PROCEDURE — 6360000002 HC RX W HCPCS: Performed by: FAMILY MEDICINE

## 2024-01-20 PROCEDURE — 87040 BLOOD CULTURE FOR BACTERIA: CPT

## 2024-01-20 PROCEDURE — 80048 BASIC METABOLIC PNL TOTAL CA: CPT

## 2024-01-20 PROCEDURE — G0480 DRUG TEST DEF 1-7 CLASSES: HCPCS

## 2024-01-20 PROCEDURE — 6370000000 HC RX 637 (ALT 250 FOR IP): Performed by: NURSE PRACTITIONER

## 2024-01-20 PROCEDURE — 83735 ASSAY OF MAGNESIUM: CPT

## 2024-01-20 PROCEDURE — 6370000000 HC RX 637 (ALT 250 FOR IP): Performed by: UROLOGY

## 2024-01-20 PROCEDURE — 93005 ELECTROCARDIOGRAM TRACING: CPT | Performed by: STUDENT IN AN ORGANIZED HEALTH CARE EDUCATION/TRAINING PROGRAM

## 2024-01-20 PROCEDURE — 74177 CT ABD & PELVIS W/CONTRAST: CPT

## 2024-01-20 PROCEDURE — 87205 SMEAR GRAM STAIN: CPT

## 2024-01-20 RX ORDER — LORAZEPAM 1 MG/1
1 TABLET ORAL
Status: DISCONTINUED | OUTPATIENT
Start: 2024-01-20 | End: 2024-01-25 | Stop reason: HOSPADM

## 2024-01-20 RX ORDER — LORAZEPAM 2 MG/ML
3 INJECTION INTRAMUSCULAR
Status: DISCONTINUED | OUTPATIENT
Start: 2024-01-20 | End: 2024-01-25 | Stop reason: HOSPADM

## 2024-01-20 RX ORDER — ONDANSETRON 4 MG/1
4 TABLET, ORALLY DISINTEGRATING ORAL EVERY 8 HOURS PRN
Status: DISCONTINUED | OUTPATIENT
Start: 2024-01-20 | End: 2024-01-25 | Stop reason: HOSPADM

## 2024-01-20 RX ORDER — ONDANSETRON 2 MG/ML
4 INJECTION INTRAMUSCULAR; INTRAVENOUS EVERY 6 HOURS PRN
Status: DISCONTINUED | OUTPATIENT
Start: 2024-01-20 | End: 2024-01-25 | Stop reason: HOSPADM

## 2024-01-20 RX ORDER — ACETAMINOPHEN 325 MG/1
650 TABLET ORAL EVERY 6 HOURS PRN
Status: DISCONTINUED | OUTPATIENT
Start: 2024-01-20 | End: 2024-01-25 | Stop reason: HOSPADM

## 2024-01-20 RX ORDER — 0.9 % SODIUM CHLORIDE 0.9 %
1000 INTRAVENOUS SOLUTION INTRAVENOUS ONCE
Status: COMPLETED | OUTPATIENT
Start: 2024-01-20 | End: 2024-01-20

## 2024-01-20 RX ORDER — SODIUM CHLORIDE 0.9 % (FLUSH) 0.9 %
10 SYRINGE (ML) INJECTION PRN
Status: DISCONTINUED | OUTPATIENT
Start: 2024-01-20 | End: 2024-01-20

## 2024-01-20 RX ORDER — POTASSIUM CHLORIDE 7.45 MG/ML
10 INJECTION INTRAVENOUS PRN
Status: DISCONTINUED | OUTPATIENT
Start: 2024-01-20 | End: 2024-01-25 | Stop reason: HOSPADM

## 2024-01-20 RX ORDER — ATENOLOL 25 MG/1
25 TABLET ORAL DAILY
Status: DISCONTINUED | OUTPATIENT
Start: 2024-01-20 | End: 2024-01-25 | Stop reason: HOSPADM

## 2024-01-20 RX ORDER — SODIUM CHLORIDE 0.9 % (FLUSH) 0.9 %
5-40 SYRINGE (ML) INJECTION PRN
Status: DISCONTINUED | OUTPATIENT
Start: 2024-01-20 | End: 2024-01-25 | Stop reason: HOSPADM

## 2024-01-20 RX ORDER — MAGNESIUM SULFATE 1 G/100ML
1000 INJECTION INTRAVENOUS PRN
Status: DISCONTINUED | OUTPATIENT
Start: 2024-01-20 | End: 2024-01-25 | Stop reason: HOSPADM

## 2024-01-20 RX ORDER — TAMSULOSIN HYDROCHLORIDE 0.4 MG/1
0.4 CAPSULE ORAL DAILY
Status: DISCONTINUED | OUTPATIENT
Start: 2024-01-20 | End: 2024-01-25 | Stop reason: HOSPADM

## 2024-01-20 RX ORDER — LORAZEPAM 1 MG/1
3 TABLET ORAL
Status: DISCONTINUED | OUTPATIENT
Start: 2024-01-20 | End: 2024-01-25 | Stop reason: HOSPADM

## 2024-01-20 RX ORDER — PAROXETINE HYDROCHLORIDE 20 MG/1
20 TABLET, FILM COATED ORAL EVERY MORNING
Status: DISCONTINUED | OUTPATIENT
Start: 2024-01-21 | End: 2024-01-25 | Stop reason: HOSPADM

## 2024-01-20 RX ORDER — ACETAMINOPHEN 650 MG/1
650 SUPPOSITORY RECTAL EVERY 6 HOURS PRN
Status: DISCONTINUED | OUTPATIENT
Start: 2024-01-20 | End: 2024-01-25 | Stop reason: HOSPADM

## 2024-01-20 RX ORDER — SODIUM CHLORIDE 0.9 % (FLUSH) 0.9 %
5-40 SYRINGE (ML) INJECTION EVERY 12 HOURS SCHEDULED
Status: DISCONTINUED | OUTPATIENT
Start: 2024-01-20 | End: 2024-01-25 | Stop reason: HOSPADM

## 2024-01-20 RX ORDER — ONDANSETRON 2 MG/ML
4 INJECTION INTRAMUSCULAR; INTRAVENOUS ONCE
Status: COMPLETED | OUTPATIENT
Start: 2024-01-20 | End: 2024-01-20

## 2024-01-20 RX ORDER — 0.9 % SODIUM CHLORIDE 0.9 %
500 INTRAVENOUS SOLUTION INTRAVENOUS ONCE
Status: DISCONTINUED | OUTPATIENT
Start: 2024-01-20 | End: 2024-01-25 | Stop reason: HOSPADM

## 2024-01-20 RX ORDER — GAUZE BANDAGE 2" X 2"
100 BANDAGE TOPICAL DAILY
Status: DISCONTINUED | OUTPATIENT
Start: 2024-01-20 | End: 2024-01-25 | Stop reason: HOSPADM

## 2024-01-20 RX ORDER — SODIUM CHLORIDE 9 MG/ML
INJECTION, SOLUTION INTRAVENOUS PRN
Status: DISCONTINUED | OUTPATIENT
Start: 2024-01-20 | End: 2024-01-20 | Stop reason: SDUPTHER

## 2024-01-20 RX ORDER — ATENOLOL 25 MG/1
25 TABLET ORAL DAILY
COMMUNITY

## 2024-01-20 RX ORDER — LORAZEPAM 1 MG/1
4 TABLET ORAL
Status: DISCONTINUED | OUTPATIENT
Start: 2024-01-20 | End: 2024-01-25 | Stop reason: HOSPADM

## 2024-01-20 RX ORDER — ENOXAPARIN SODIUM 100 MG/ML
40 INJECTION SUBCUTANEOUS DAILY
Status: DISCONTINUED | OUTPATIENT
Start: 2024-01-20 | End: 2024-01-25 | Stop reason: HOSPADM

## 2024-01-20 RX ORDER — LORAZEPAM 2 MG/ML
1 INJECTION INTRAMUSCULAR
Status: DISCONTINUED | OUTPATIENT
Start: 2024-01-20 | End: 2024-01-25 | Stop reason: HOSPADM

## 2024-01-20 RX ORDER — LORAZEPAM 2 MG/ML
2 INJECTION INTRAMUSCULAR
Status: DISCONTINUED | OUTPATIENT
Start: 2024-01-20 | End: 2024-01-25 | Stop reason: HOSPADM

## 2024-01-20 RX ORDER — LORAZEPAM 1 MG/1
2 TABLET ORAL
Status: DISCONTINUED | OUTPATIENT
Start: 2024-01-20 | End: 2024-01-25 | Stop reason: HOSPADM

## 2024-01-20 RX ORDER — POTASSIUM CHLORIDE 20 MEQ/1
40 TABLET, EXTENDED RELEASE ORAL PRN
Status: DISCONTINUED | OUTPATIENT
Start: 2024-01-20 | End: 2024-01-25 | Stop reason: HOSPADM

## 2024-01-20 RX ORDER — 0.9 % SODIUM CHLORIDE 0.9 %
80 INTRAVENOUS SOLUTION INTRAVENOUS ONCE
Status: COMPLETED | OUTPATIENT
Start: 2024-01-20 | End: 2024-01-20

## 2024-01-20 RX ORDER — SODIUM CHLORIDE 9 MG/ML
INJECTION, SOLUTION INTRAVENOUS PRN
Status: DISCONTINUED | OUTPATIENT
Start: 2024-01-20 | End: 2024-01-25 | Stop reason: HOSPADM

## 2024-01-20 RX ORDER — POLYETHYLENE GLYCOL 3350 17 G/17G
17 POWDER, FOR SOLUTION ORAL DAILY PRN
Status: DISCONTINUED | OUTPATIENT
Start: 2024-01-20 | End: 2024-01-25 | Stop reason: HOSPADM

## 2024-01-20 RX ORDER — SODIUM CHLORIDE 0.9 % (FLUSH) 0.9 %
10 SYRINGE (ML) INJECTION PRN
Status: DISCONTINUED | OUTPATIENT
Start: 2024-01-20 | End: 2024-01-20 | Stop reason: SDUPTHER

## 2024-01-20 RX ORDER — LORAZEPAM 2 MG/ML
4 INJECTION INTRAMUSCULAR
Status: DISCONTINUED | OUTPATIENT
Start: 2024-01-20 | End: 2024-01-25 | Stop reason: HOSPADM

## 2024-01-20 RX ORDER — AMLODIPINE BESYLATE 5 MG/1
5 TABLET ORAL 2 TIMES DAILY
Status: DISCONTINUED | OUTPATIENT
Start: 2024-01-20 | End: 2024-01-25 | Stop reason: HOSPADM

## 2024-01-20 RX ORDER — PAROXETINE HYDROCHLORIDE 20 MG/1
20 TABLET, FILM COATED ORAL EVERY MORNING
COMMUNITY

## 2024-01-20 RX ORDER — HYDRALAZINE HYDROCHLORIDE 20 MG/ML
10 INJECTION INTRAMUSCULAR; INTRAVENOUS EVERY 6 HOURS PRN
Status: DISCONTINUED | OUTPATIENT
Start: 2024-01-20 | End: 2024-01-25 | Stop reason: HOSPADM

## 2024-01-20 RX ORDER — IBUPROFEN 200 MG
400-800 TABLET ORAL DAILY PRN
Status: ON HOLD | COMMUNITY
End: 2024-01-21 | Stop reason: HOSPADM

## 2024-01-20 RX ORDER — SODIUM CHLORIDE 0.9 % (FLUSH) 0.9 %
5-40 SYRINGE (ML) INJECTION EVERY 12 HOURS SCHEDULED
Status: DISCONTINUED | OUTPATIENT
Start: 2024-01-20 | End: 2024-01-20 | Stop reason: SDUPTHER

## 2024-01-20 RX ADMIN — ATENOLOL 25 MG: 25 TABLET ORAL at 21:19

## 2024-01-20 RX ADMIN — IOPAMIDOL 75 ML: 755 INJECTION, SOLUTION INTRAVENOUS at 00:57

## 2024-01-20 RX ADMIN — Medication 100 MG: at 08:02

## 2024-01-20 RX ADMIN — SODIUM CHLORIDE, PRESERVATIVE FREE 10 ML: 5 INJECTION INTRAVENOUS at 08:03

## 2024-01-20 RX ADMIN — PIPERACILLIN AND TAZOBACTAM 4500 MG: 4; .5 INJECTION, POWDER, FOR SOLUTION INTRAVENOUS at 02:30

## 2024-01-20 RX ADMIN — SODIUM CHLORIDE, PRESERVATIVE FREE 10 ML: 5 INJECTION INTRAVENOUS at 21:19

## 2024-01-20 RX ADMIN — MAGNESIUM SULFATE HEPTAHYDRATE 1000 MG: 1 INJECTION, SOLUTION INTRAVENOUS at 09:27

## 2024-01-20 RX ADMIN — SODIUM CHLORIDE 1000 ML: 9 INJECTION, SOLUTION INTRAVENOUS at 04:54

## 2024-01-20 RX ADMIN — TAMSULOSIN HYDROCHLORIDE 0.4 MG: 0.4 CAPSULE ORAL at 21:19

## 2024-01-20 RX ADMIN — MAGNESIUM SULFATE HEPTAHYDRATE 1000 MG: 1 INJECTION, SOLUTION INTRAVENOUS at 08:10

## 2024-01-20 RX ADMIN — MAGNESIUM SULFATE HEPTAHYDRATE 1000 MG: 1 INJECTION, SOLUTION INTRAVENOUS at 10:36

## 2024-01-20 RX ADMIN — SODIUM CHLORIDE 1000 ML: 9 INJECTION, SOLUTION INTRAVENOUS at 00:19

## 2024-01-20 RX ADMIN — ENOXAPARIN SODIUM 40 MG: 100 INJECTION SUBCUTANEOUS at 08:02

## 2024-01-20 RX ADMIN — SODIUM CHLORIDE 1000 ML: 9 INJECTION, SOLUTION INTRAVENOUS at 02:20

## 2024-01-20 RX ADMIN — HYDRALAZINE HYDROCHLORIDE 10 MG: 20 INJECTION, SOLUTION INTRAMUSCULAR; INTRAVENOUS at 16:24

## 2024-01-20 RX ADMIN — ONDANSETRON 4 MG: 2 INJECTION INTRAMUSCULAR; INTRAVENOUS at 00:20

## 2024-01-20 RX ADMIN — MAGNESIUM SULFATE HEPTAHYDRATE 1000 MG: 1 INJECTION, SOLUTION INTRAVENOUS at 12:04

## 2024-01-20 RX ADMIN — POTASSIUM CHLORIDE 40 MEQ: 1500 TABLET, EXTENDED RELEASE ORAL at 05:53

## 2024-01-20 RX ADMIN — AMLODIPINE BESYLATE 5 MG: 5 TABLET ORAL at 21:19

## 2024-01-20 RX ADMIN — SODIUM CHLORIDE 80 ML: 9 INJECTION, SOLUTION INTRAVENOUS at 00:57

## 2024-01-20 RX ADMIN — SODIUM CHLORIDE, PRESERVATIVE FREE 10 ML: 5 INJECTION INTRAVENOUS at 00:57

## 2024-01-20 NOTE — PLAN OF CARE
Pts potassium was 3.1, 40mEq administered. Urine output in morris was 250mL., Nephro was consulted for retention. Vitals were stable. CIWA score of 0 at admission. Last drink was 1/19/2024 at 1630, pt was unsure of amount of whiskey he drank.     Problem: Discharge Planning  Goal: Discharge to home or other facility with appropriate resources  Outcome: Progressing  Discharge to home or other facility with appropriate resources:   Identify barriers to discharge with patient and caregiver   Arrange for needed discharge resources and transportation as appropriate   Identify discharge learning needs (meds, wound care, etc)      Problem: Pain  Goal: Verbalizes/displays adequate comfort level or baseline comfort level  Outcome: Progressing  Verbalizes/displays adequate comfort level or baseline comfort level:   Encourage patient to monitor pain and request assistance   Assess pain using appropriate pain scale   Administer analgesics based on type and severity of pain and evaluate response      Problem: ABCDS Injury Assessment  Goal: Absence of physical injury  Outcome: Progressing         Problem: Safety - Adult  Goal: Free from fall injury  Outcome: Progressing  Free From Fall Injury:   Instruct family/caregiver on patient safety   Based on caregiver fall risk screen, instruct family/caregiver to ask for assistance with transferring infant if caregiver noted to have fall risk factors

## 2024-01-20 NOTE — ED PROVIDER NOTES
[facility] EMERGENCY DEPARTMENT ENCOUNTER      Pt Name: Reynaldo Seaman  MRN: [id number]  Birthdate 1955  Date of evaluation: 1/20/24    CHIEF COMPLAINT       Chief Complaint   Patient presents with    Dysuria     Finished ATBs for UTI a few days ago     Abdominal Pain     Lower abdominal cramping       HISTORY OF PRESENT ILLNESS   Reynaldo Seaman is a 68 y.o. male who presents with dysuria, frequency dribbling, lower abdominal cramping.  Has had previous history of UTI but a month ago discharged on Cipro but states he finished antibiotics few days ago.  States his symptoms improved but never completely resolved and now have returned.  Endorsing chills, body aches, low back pain.    PASTMEDICAL HISTORY     Past Medical History:   Diagnosis Date    CVA (cerebral vascular accident) ([facility]) 02/2021    With residual right sided weakness    Diabetes mellitus ([facility])     Hypertension      Past Problem List  Patient Active Problem List   Diagnosis Code    ADRIANA (acute kidney injury) ([facility]) N17.9    Acute ischemic left PCA stroke ([facility]) I63.532    Right sided weakness R53.1    Seizure ([facility]) R56.9    Urinary tract infection N39.0    Mild malnutrition ([facility]) E44.1    Urinary retention R33.9       SURGICAL HISTORY       Past Surgical History:   Procedure Laterality Date    CAROTID ENDARTERECTOMY  09/22/2021    CAROTID ENDARTERECTOMY (N/A Neck    CAROTID ENDARTERECTOMY N/A 9/22/2021    CAROTID ENDARTERECTOMY performed by Suman Fowler MD at [facility] OR    IR INS PICC VAD W SQ PORT GREATER THAN 5  5/26/2022    IR INS PICC VAD W SQ PORT GREATER THAN 5 5/26/2022 [facility] SPECIAL PROCEDURES    KNEE SURGERY         CURRENT MEDICATIONS       Previous Medications    AMLODIPINE (NORVASC) 5 MG TABLET    Take 1 tablet by mouth in the morning and at bedtime    CARVEDILOL (COREG) 12.5 MG TABLET    Take 2 tablets by mouth 2 times daily (with meals)    CHOLECALCIFEROL (VITAMIN D) 25 MCG TABS    Take 0.5 tablets by mouth daily    INSULIN ASPART  retention        Differential Diagnosis: Urinary retention versus BPH versus UTI versus pyelonephritis    Diagnoses Considered but Do Not Suspect: Septic stone    Pertinent Comorbid Conditions:    Past Medical History:   Diagnosis Date    CVA (cerebral vascular accident) ([facility]) 02/2021    With residual right sided weakness    Diabetes mellitus ([facility])     Hypertension        2)  Data Reviewed    External Documents Reviewed: Previous ER visits reviewed by myself  Rhythm: normal sinus   Rate: normal  Axis: normal  Conduction: normal  ST Segments: no acute change  T Waves: no acute change  Q Waves: no acute change    Clinical Impression: no acute change, but this is a nonspecific EKG.    3)  Treatment and Disposition  [Patient repeat assessment, Disposition discussion with patient/family, Case discussed with consulting clinician, MIPS, Social determinants of health impacting treatment or disposition, Shared Decision Making, Code Status Discussion:]    Patient with recent UTI with unclear antibiotic completion.  Does appear to have some alcohol abuse history.  Has some urinary retention as well Peterson was placed per nursing reported significant urine output.  Urine with 5-10 whites trace leuk esterase but apparently recently finished Cipro.  CT imaging showing diffuse wall wall thickening consistent with cystitis.  Started on Zosyn for broad coverage slight hyponatremia but significant lactic acidosis, suspect some starvation ketosis or alcoholic ketosis.  Discussed with [facility] agreeable to admission.    DATA FOR LAB AND RADIOLOGY TESTS ORDERED BELOW ARE REVIEWED BY THE ED CLINICIAN:    RADIOLOGY: All x-rays, CT, MRI, and formal ultrasound images (except ED bedside ultrasound) are read by the radiologist, see reports below, unless otherwise noted in MDM or here.  Reports below are reviewed by myself.  CT ABDOMEN PELVIS W IV CONTRAST Additional Contrast? None   Final Result   1. No acute intra-abdominal or intrapelvic

## 2024-01-20 NOTE — ED NOTES
Pt presents to the er c/o dysuria pt states that he has urinated once today and states that it was a small amount

## 2024-01-20 NOTE — PROGRESS NOTES
Transitions of Care Pharmacy Service   Medication Review    The patient's list of current home medications has been reviewed.     Source(s) of information: Epic, [employer] ([phone]), Patient    Based on information provided by the above source(s), I have updated the patient's home med list as described below.     Please review the ACTION REQUESTED section of this note for any discrepancies on current hospital orders.      I changed or updated the following medications on the patient's home medication list:  Discontinued Carvedilol 12.5 mg - alternate therapy   Vitamin D3 - Patient does not want to mess with cutting tablets in half   Novolog - not a current med     Added Paxil 20 mg   Atenolol 25 mg   Advil - OTC 2 to 4 tablets QD      Adjusted   None     Other Notes None          PROVIDER ACTION REQUESTED  Medications that need to be addressed by a physician/nurse practitioner:    Medication Action Requested   Add    Paxil 20 mg   Atenolol 25 mg Please review and reorder as appropriate        Please feel free to call me with any questions about this encounter. Thank you.    This note will be reviewed and co-signed by the Transitions of Care Pharmacist.    Inocencia SanchezD student  Transitions of Care Pharmacy Service  Phone:  [phone]  Fax: [phone]      Electronically signed by Roxanne Oconnor on 1/20/2024 at 1:38 PM     Prior to Admission medications    Medication Sig Start Date End Date Taking? Authorizing Provider   PARoxetine (PAXIL) 20 MG tablet Take 1 tablet by mouth every morning   Yes ProviderNicolle MD   ibuprofen (ADVIL;MOTRIN) 200 MG tablet Take 2-4 tablets by mouth daily as needed for Pain   Yes ProviderNicolle MD   atenolol (TENORMIN) 25 MG tablet Take 1 tablet by mouth daily   Yes ProviderNicolle MD   amLODIPine (NORVASC) 5 MG tablet Take 1 tablet by mouth in the morning and at bedtime 12/26/23   Valentino Lockhart MD

## 2024-01-20 NOTE — DISCHARGE INSTR - COC
Continuity of Care Form    Patient Name: Reynaldo Seaman   :  1955  MRN:  [id number]    Admit date:  2024  Discharge date:  ***    Code Status Order: Full Code   Advance Directives:     Admitting Physician:  Shay Hawkins MD  PCP: Imani Bolivar APRN - CNP    Discharging Nurse: ***  Discharging Hospital Unit/Room#: 1020/1020-01  Discharging Unit Phone Number: ***    Emergency Contact:   Extended Emergency Contact Information  Primary Emergency Contact: Eleonora Lindsay  Home Phone: [phone]  Mobile Phone: [phone]  Relation: Girlfriend    Past Surgical History:  Past Surgical History:   Procedure Laterality Date    CAROTID ENDARTERECTOMY  2021    CAROTID ENDARTERECTOMY (N/A Neck    CAROTID ENDARTERECTOMY N/A 2021    CAROTID ENDARTERECTOMY performed by Suman Fowler MD at [facility] OR    IR INS PICC VAD W SQ PORT GREATER THAN 5  2022    IR INS PICC VAD W SQ PORT GREATER THAN 5 2022 [facility] SPECIAL PROCEDURES    KNEE SURGERY         Immunization History:   Immunization History   Administered Date(s) Administered    COVID-19, MODERNA BLUE border, Primary or Immunocompromised, (age 12y+), IM, 100 mcg/0.5mL 2021, 2021       Active Problems:  Patient Active Problem List   Diagnosis Code    ADRIANA (acute kidney injury) ([facility]) N17.9    Acute ischemic left PCA stroke ([facility]) I63.532    Right sided weakness R53.1    Seizure ([facility]) R56.9    Urinary tract infection N39.0    Mild malnutrition ([facility]) E44.1    Urinary retention R33.9       Isolation/Infection:   Isolation            No Isolation          Patient Infection Status       None to display                     Nurse Assessment:  Last Vital Signs: /71   Pulse 91   Temp 98.6 °F (37 °C) (Oral)   Resp 16   Ht 1.702 m (5' 7.01\")   Wt 68 kg (150 lb)   SpO2 95%   BMI 23.49 kg/m²     Last documented pain score (0-10 scale): Pain Level: 9  Last Weight:   Wt Readings from Last 1 Encounters:   24 68 kg (150 lb)     Mental  Physical Therapy Evaluation    Visit Type: Initial Evaluation  Visit: 1  Referring Provider: Andi Silver MD  Medical Diagnosis (from order): Diagnosis Information    Diagnosis  M54.50 (ICD-10-CM) - Low back pain, unspecified back pain laterality, unspecified chronicity, unspecified whether sciatica present       Treatment Diagnosis: lumbar, left hip - increased pain/symptoms, impaired range of motion, impaired activity tolerance, impaired gait and impaired strength.  Chart reviewed at time of initial evaluation (relevant co-morbidities, allergies, tests and medications listed):   Past Medical History:  No date: Abnormal Pap smear of cervix  No date: Anxiety and depression  No date: Arthritis      Comment:  shoulders  No date: Basal cell carcinoma  No date: Chronic anxiety  No date: Chronic Blepharitis  No date: Chronic pain      Comment:  lower back  No date: Clot      Comment:  possible clot with birth control years ago  No date: Failed moderate sedation during procedure      Comment:  needs eye lubricant with surgery   No date: Genital warts  No date: Hashimoto's disease  No date: History of chlamydia  No date: History of genital warts  No date: HLD (hyperlipidemia)  No date: Insomnia  No date: Malabsorption  No date: Malignant neoplasm (CMD)      Comment:  basal cell CA below nose  1996 11/4/1987: Migraine  No date: Outlet dysfunction constipation  No date: Prolapse of female pelvic organs  No date: Rosacea       SUBJECTIVE                                                                                                               -Fall in spring of year with exacerbation of left hip pain and central LBP.    -Hx left hip arthroscopy with Dr Beck.   Very slow recovery after surgery, but was able to get back to actiivty very slowly.    -Has seen him since fall and has been cleared of hip injury.   -Hx chronic LBP/SI pain.  Had prolo injections in the past.  Recent RFA L4/5/S1 bilat.    -Right knee OA that  has been chronic.  Had joint injection with Dr Beck.     Pain / Symptoms  - Pain rating (out of 10): Current: 2 ; Worst: 8  - Location: Central LBP  Left ant/lateral hip.   - Quality / Description: ache, sharp  - Alleviating Factors: avoiding movement in involved area    Function:   Limitations / Exacerbation Factors:   - 1.  LBP with lifting especially of young grandchild.    2.  Hx of sensitivity to lumbar extn activities.   3.  Hip pain with wbing / loading.   Prior Level of Function: declining function, therefore referred to therapy,    Patient Goals: decreased pain, increased strength and return to sport/leisure activities.    Prior treatment  - RFA      OBJECTIVE                                                                                                                    Observation     Non antalgic gait       Range of Motion (ROM)   (degrees unless noted; active unless noted; norms in ( ); negative=lacking to 0, positive=beyond 0)  Lumbar:    - Flexion (60-80):  75%     - Extension (25):  75%     - Rotation (30-45):        • Left:  WNL         • Right:  WNL   Comments:   -Generally left hip scan unremarkable with ROM and scouring.     -spinal extn is generally painful.      Strength  (out of 5 unless noted, standard test position unless noted)   Comments / Details:   All myotomes L2-S2 5/5 without fatiguing weakness.   Able to control prone hip extn without instability.  Good multifidus muscle bulk.   Deep ab control in supine         Palpation    geeneral hypertonicity paraspinals L2-5  (+) trigger point left glute max/med/TFL  (-) tenderness to palpation around left hip and hip flexor tendons  Joint Play     +/- linear stress tests LS junction     Special Tests    (-) hip scouring  (-) SI kinetic testing               Treatment    Dry Needling:  - Patient has read the handout and has provided verbal / signed agreement to proceed with the intervention.    Education about indications, contraindications  and potential side effects completed with patient.  Needles:   30/50 left TFL 1:1;  30/75 left glute med/min 4:4         ASSESSMENT                                                                                                          Treatment Diagnosis:   - Involved: lumbar, left hip.  - Symptoms/impairments: increased pain/symptoms, impaired range of motion, impaired activity tolerance, impaired gait and impaired strength.    Pt with chronic LBP that has been generally under control with careful activity modifications up until recent fall related injury.  Known instability of both right SI joint and LS junction.  Recent RFA with some improvement.  Overall demonstrates good neutral control with mat based exs and likely needs to progress into more aggressive neutral core work.  Will discuss whole body exs instruction with neutral hip hinge deadlift, pull ups, db work.    Left hip pain and patient had a difficult recovery after hip arthroscopy 4 years ago.  Recommend DrN and MFR to reduce hypertonicity and then allow more progression with closed chain strength.     Prognosis: Patient will benefit from skilled therapy.  Rehabilitative potential is: good.  Predicted patient presentation: Low (stable) - Patient comorbidities and complexities, as defined above, will have little effect on progress for prescribed plan of care.  Education:   - Present and ready to learn: patient    PLAN                                                                                                                         The following skilled interventions to be implemented to achieve goals listed below:  Neuromuscular Re-Education (99691)  Therapeutic Activity (99739)  Therapeutic Exercise (29339)  Manual Therapy (20365)    Frequency / Duration  2 times per week tapering as patient progresses for an estimated total of 8 visits    Patient involved in and agreed to plan of care and goals.  Patient given attendance policy at time of  initial evaluation.      Goals  1.  Reduce hip hypertonicity to allow painfree hip with ambulation long distances.   2.  Pt to improve core strength and post chain strength to allow progression of exs program to deadlift maneuvers.  This strength will carry over into ability to perform recreational pursuits.    3.  ind with HEP.    The above improvements in impairments to assist in obtaining goals listed below      Therapy procedure time and total treatment time can be found documented on the Time Entry flowsheet

## 2024-01-20 NOTE — H&P
History & Physical  [facility].,    Adult Hospitalist      Name: Reynaldo Seaman  MRN: [id number]     Acct: [id number]  Room: [address] [place] [address]    Admit Date: 1/19/2024 11:04 PM  PCP: César Varner PA    Primary Problem  Principal Problem:    Urinary retention  Resolved Problems:    * No resolved hospital problems. *        Assesment:     Urinary retention  Peterson  Consult urology    Acute cystitis without hematuria  Await C&S results  Rocephin IV    Lactic acidosis  IV fluids    Hypertensive renal disease  Norvasc, atenolol with parameters  Hydralazine as needed for elevated blood pressure    Mild diverticulosis cecum and ascending colon on CT    Major depressive disorder  Paxil      CBC, BMP  Incentive spirometry  DVT and GI prophylaxis.        Chief Complaint:     Chief Complaint   Patient presents with    Dysuria     Finished ATBs for UTI a few days ago     Abdominal Pain     Lower abdominal cramping         History of Present Illness:      Reynaldo Seaman is a 68 y.o.  male who presents with Dysuria (Finished ATBs for UTI a few days ago ) and Abdominal Pain (Lower abdominal cramping)    Patient admitted to the emergency room where he presented with worsening dysuria, increased frequency of urination, urinary dribbling and lower abdominal cramping for several days.  Patient says he had a urinary tract infection about a month ago and was discharged on antibiotics, Cipro at that time.  Patient says he feels his symptoms did not resolve completely and have been worsening again over the last several days.  Patient also complains of feeling febrile and having myalgias.  Complains of nausea and low back pain    Denies chest pain, dyspnea or orthopnea.  Denies headache, vision change.  Denies rash or joint swelling.    I have personally reviewed the past medical history, past surgical history, medications, social history, and family history, and summarized in the note.    Review of Systems:     All 10 point

## 2024-01-20 NOTE — CONSULTS
90* 100   CO2 21 25   BUN 8 6*   CREATININE 1.2 1.0       Recent Labs     01/19/24  2302   COLORU Yellow   PHUR 5.5   WBCUA 5 TO 10   RBCUA 0 TO 2   BACTERIA RARE*   SPECGRAV 1.005   LEUKOCYTESUR TRACE*   UROBILINOGEN Normal   BILIRUBINUR NEGATIVE       Additional Lab/culture results:    Physical Exam:  Constitutional: Patient in no acute distress;   Neuro: alert and oriented to person place and time.    Psych: Mood and affect normal.  Skin: Normal  Lungs: Respiratory effort normal  Cardiovascular:  Normal peripheral pulses  Abdomen: Soft, non-tender, non-distended with no CVA, flank pain, hepatosplenomegaly or hernia.  Kidneys normal.      Interval Imaging Findings:   CT ABDOMEN PELVIS W IV CONTRAST Additional Contrast? None    Result Date: 1/20/2024  EXAMINATION: CT OF THE ABDOMEN AND PELVIS WITH CONTRAST 1/20/2024 12:40 am TECHNIQUE: CT of the abdomen and pelvis was performed with the administration of intravenous contrast. Multiplanar reformatted images are provided for review. Automated exposure control, iterative reconstruction, and/or weight based adjustment of the mA/kV was utilized to reduce the radiation dose to as low as reasonably achievable. COMPARISON: CT chest/abdomen/pelvis with IV contrast dated 05/25/2022. HISTORY: ORDERING SYSTEM PROVIDED HISTORY: Lactic 6, lower abdominal pain TECHNOLOGIST PROVIDED HISTORY: Lactic 6, lower abdominal pain Decision Support Exception - unselect if not a suspected or confirmed emergency medical condition->Emergency Medical Condition (MA) Reason for Exam: Worsening UTI x 3 weeks, pelvic pain. Isovue 370 - 75ml FINDINGS: Lower Chest: A focal irregular area of opacity at the left posterior costophrenic sulcus has the appearance of atelectasis or possibly tiny focal infiltrate best visualized on the sagittal images.  The visualized lower chest is otherwise unremarkable. Organs: Small hypodensities at the medial aspect of the right lobe are consistent with cysts or  hemangiomas.  No concerning hepatic abnormality is evident.  The gallbladder, pancreas, spleen, adrenal glands, kidneys and ureters are noted for a stable simple cyst at the upper pole of the left kidney not requiring imaging follow-up. GI/Bowel: Stomach and duodenal sweep demonstrate no acute abnormality. There is no evidence of bowel obstruction.  No evidence of abnormal bowel wall thickening or distension. The appendix is visualized and is unremarkable.  No evidence of acute appendicitis.  There is mild diverticulosis of the cecum and ascending colon. Pelvis: But the bladder contains a Morris catheter and is empty but shows significantly thickened wall diffusely.  No surrounding inflammatory change. Reproductive organs are unremarkable. Peritoneum/Retroperitoneum: No evidence of ascites or free air.  No evidence of lymphadenopathy.  Aorta is normal in caliber. Bones/Soft Tissues: No acute bone or soft tissue abnormality evident.     1. No acute intra-abdominal or intrapelvic process is identified. 2. There is a Morris catheter in the urinary bladder which is empty but shows significantly thickened wall diffusely, likely representing cystitis. 3. Mild diverticulosis of the cecum and ascending colon without evidence of diverticulitis. 4. Normal appendix. 5. Focal irregular opacity at the left posterior costophrenic sulcus has the appearance of atelectasis or possibly tiny focal infiltrate.  Follow-up noncontrast chest CT recommended in 3 months.       Impression:    Patient Active Problem List   Diagnosis    ADRIANA (acute kidney injury) (HCC)    Acute ischemic left PCA stroke (HCC)    Right sided weakness    Seizure (HCC)    Urinary tract infection    Mild malnutrition (HCC)    Urinary retention       Plan: urine cx   Primary team to treat uti  Flomax  Leave morris in place until outpt cysto      Electronically signed by Ravindra Tracy MD on 1/20/2024 at 6:52 PM

## 2024-01-20 NOTE — PLAN OF CARE
Pt had no complaints of pain wcm. VSS. Pt safety maintained, bed in lowest position, call light within reach. Peterson draining, output was 600. Pt ciwa scale is 0. Bed in lowest position, all questions asked and answered wcm. Pt had mag replaced this morning for mag of 1.0. recheck in AM. Pts blood pressure was elevated to 170s. Attending notified and hydralazine prn ordered q6 and given once. Pt home bloop pressure meds restarted. Pt blood pressure on recheck was 156. wcm  Problem: Discharge Planning  Goal: Discharge to home or other facility with appropriate resources  1/20/2024 1808 by Dc Mackenzie RN  Outcome: Progressing     Problem: Pain  Goal: Verbalizes/displays adequate comfort level or baseline comfort level  1/20/2024 1808 by Dc Mackeznie RN  Outcome: Progressing     Problem: Skin/Tissue Integrity  Goal: Absence of new skin breakdown  Description: 1.  Monitor for areas of redness and/or skin breakdown  2.  Assess vascular access sites hourly  3.  Every 4-6 hours minimum:  Change oxygen saturation probe site  4.  Every 4-6 hours:  If on nasal continuous positive airway pressure, respiratory therapy assess nares and determine need for appliance change or resting period.  Outcome: Progressing     Problem: ABCDS Injury Assessment  Goal: Absence of physical injury  1/20/2024 1808 by Dc Mackenzie RN  Outcome: Progressing     Problem: Safety - Adult  Goal: Free from fall injury  1/20/2024 1808 by Dc Mackenzie RN  Outcome: Progressing     Problem: Chronic Conditions and Co-morbidities  Goal: Patient's chronic conditions and co-morbidity symptoms are monitored and maintained or improved  Outcome: Progressing

## 2024-01-20 NOTE — CARE COORDINATION
01/20/24 1125   Readmission Assessment   Number of Days since last admission? 8-30 days   Previous Disposition Home with Home Health   Who is being Interviewed Patient   What was the patient's/caregiver's perception as to why they think they needed to return back to the hospital? Other (Comment)  (could not urinate)   Did you visit your Primary Care Physician after you left the hospital, before you returned this time? No   Why weren't you able to visit your PCP? Did not have an appointment   Did you see a specialist, such as Cardiac, Pulmonary, Orthopedic Physician, etc. after you left the hospital? No   Who advised the patient to return to the hospital? Self-referral   Does the patient report anything that got in the way of taking their medications? No   In our efforts to provide the best possible care to you and others like you, can you think of anything that we could have done to help you after you left the hospital the first time, so that you might not have needed to return so soon? Other (Comment)  (nothing)       Patient discharged on atb for UTI and did complete it. Readmitted when had urinary retention.

## 2024-01-20 NOTE — CARE COORDINATION
Case Management Assessment  Initial Evaluation    Date/Time of Evaluation: 1/20/2024 2:44 PM  Assessment Completed by: ENRIQUE VICENTE RN    If patient is discharged prior to next notation, then this note serves as note for discharge by case management.    Patient Name: Reynaldo Seaman                   YOB: 1955  Diagnosis: Urinary retention [R33.9]  Acute cystitis without hematuria [N30.00]                   Date / Time: 1/19/2024 11:04 PM    Patient Admission Status: Inpatient   Readmission Risk (Low < 19, Mod (19-27), High > 27): Readmission Risk Score: 16.2    Current PCP: César Varner PA  PCP verified by CM? Yes    Chart Reviewed: Yes      History Provided by: Patient  Patient Orientation: Alert and Oriented    Patient Cognition: Alert    Hospitalization in the last 30 days (Readmission):  Yes    If yes, Readmission Assessment in CM Navigator will be completed.    Advance Directives:      Code Status: Full Code   Patient's Primary Decision Maker is: Legal Next of Kin      Discharge Planning:    Patient lives with: Alone Type of Home: Other (Comment) ([address] of the [facility])  Primary Care Giver: Self  Patient Support Systems include: Children, Spouse/Significant Other   Current Financial resources: Medicare  Current community resources: ECF/Home Care  Current services prior to admission: Home Care, Durable Medical Equipment            Current DME: Walker            Type of Home Care services:  PT, OT, Nursing Services    ADLS  Prior functional level: Assistance with the following:, Shopping, Housework  Current functional level: Assistance with the following:, Shopping, Housework    PT AM-PAC:   /24  OT AM-PAC:   /24    Family can provide assistance at DC: No  Would you like Case Management to discuss the discharge plan with any other family members/significant others, and if so, who? No  Plans to Return to Present Housing: Unknown at present  Other Identified Issues/Barriers to  Calm

## 2024-01-20 NOTE — PROGRESS NOTES
Pt admitted to room 1020 Admission documentation complete, vitals taken and telemetry placed. Pt oriented to room and unit routine, including hourly rounding. Call light in reach and safety maintained. Will continue to provide support and education.

## 2024-01-20 NOTE — PROGRESS NOTES
[x] There are one or more home medications that need clarification before Medication Reconciliation can be completed. The Med List Status has been marked as In Progress.     To assist with Home Medication Reconciliation the following actions have been taken:    [x] Pharmacy medication reconciliation service requested. (Note: This can be done by sending a Perfect Serve message to [facility] Rec Pharmacist or by phoning [phone].)  [] Family requested to bring medications into the hospital  [] Family requested to call hospital with medication list  [] Message left with physician office  [] Request for medical records made to   [] Other

## 2024-01-20 NOTE — ED NOTES
ED to inpatient nurses report     Chief Complaint   Patient presents with    Dysuria     Finished ATBs for UTI a few days ago     Abdominal Pain     Lower abdominal cramping      Present to ED from home  LOC: alert and oriented x 4  Vital signs   Vitals:    01/20/24 0256 01/20/24 0306 01/20/24 0316 01/20/24 0326   BP: (!) 181/86  (!) 161/78    Pulse: 94 96 99 96   Resp: 10 19 18 12   Temp:       TempSrc:       SpO2: 97% 98% 96% 97%   Weight:          Oxygen Baseline room air    Current needs required see admission orders   SEPSIS:  yes[] Lactate X 2 ordered (Yes or No)  [yes] Antibiotics given (Yes or No)  [yes] IV Fluids ordered (Yes or No)             [yes] 2nd IV completed (Yes or No)  [yes] Hourly Vital Signs (Validated)  [] Outstanding Orders: see admission orders    LDAs:   Peripheral IV 01/19/24 Right Antecubital (Active)   Line Status Blood return noted;Capped 01/19/24 2346   Alcohol Cap Used Yes 01/19/24 2346   Dressing Status New dressing applied;Clean, dry & intact 01/19/24 2346   Dressing Type Transparent;Securing device 01/19/24 2346       Peripheral IV 01/20/24 Distal;Posterior;Right Forearm (Active)   Site Assessment Clean, dry & intact 01/20/24 0345   Line Status Blood return noted;Normal saline locked 01/20/24 0345   Dressing Status New dressing applied 01/20/24 0345   Dressing Type Transparent 01/20/24 0345   Dressing Intervention New 01/20/24 0345     Mobility: up with walker  Fall Risk:  yes  Pending ED orders: see admission orders  Present condition: stable  Code Status: full  Consults: IP CONSULT TO HOSPITALIST  IP CONSULT TO UROLOGY  IP CONSULT TO SOCIAL WORK  [x]  Hospitalist  Completed  [x] yes [] no Who:   []  Medicine  Completed  [] yes [] No Who:   []  Cardiology  Completed  [] yes [] No Who:   []  GI   Completed  [] yes [] No Who:   []  Neurology  Completed  [] yes [] No Who:   []  Nephrology Completed  [] yes [] No Who:    []  Vascular  Completed  [] yes [] No Who:   []   Ortho  Completed  [] yes [] No Who:     []  Surgery  Completed  [] yes [] No Who:    []  Urology  Completed  [] yes [] No Who:    []  CT Surgery Completed  [] yes [] No Who:   []  Podiatry  Completed  [] yes [] No Who:    []  Other    Completed  [] yes [] No Who:  Interventions: labs ct scan iv fluids iv antibiotics morris catheter  Important Events: pt was discharged three weeks ago for a uti and had not yet completed his antibiotics at home pt presented to the er today for c/o dysuria and lower abdominal pain pt lactic acid and trops were high er doctor thinks pt still has a uti and was retaining fluid morris was placed ct scan shows diverticulosis        Electronically signed by ALEXANDRA PAULINO RN on 1/20/2024 at 3:54 AM

## 2024-01-21 PROBLEM — N39.0 URINARY TRACT INFECTION: Status: RESOLVED | Noted: 2023-12-22 | Resolved: 2024-01-21

## 2024-01-21 LAB
ANION GAP SERPL CALCULATED.3IONS-SCNC: 9 MMOL/L (ref 9–17)
BASOPHILS # BLD: <0.03 K/UL (ref 0–0.2)
BASOPHILS NFR BLD: 0 % (ref 0–2)
BUN SERPL-MCNC: 6 MG/DL (ref 8–23)
BUN/CREAT SERPL: 6 (ref 9–20)
CALCIUM SERPL-MCNC: 7.8 MG/DL (ref 8.6–10.4)
CHLORIDE SERPL-SCNC: 98 MMOL/L (ref 98–107)
CK SERPL-CCNC: 38 U/L (ref 39–308)
CO2 SERPL-SCNC: 26 MMOL/L (ref 20–31)
CREAT SERPL-MCNC: 1 MG/DL (ref 0.7–1.2)
EOSINOPHIL # BLD: 0.11 K/UL (ref 0–0.44)
EOSINOPHILS RELATIVE PERCENT: 2 % (ref 1–4)
ERYTHROCYTE [DISTWIDTH] IN BLOOD BY AUTOMATED COUNT: 16 % (ref 11.8–14.4)
GFR SERPL CREATININE-BSD FRML MDRD: >60 ML/MIN/1.73M2
GLUCOSE SERPL-MCNC: 162 MG/DL (ref 70–99)
HCT VFR BLD AUTO: 36.9 % (ref 40.7–50.3)
HGB BLD-MCNC: 12.9 G/DL (ref 13–17)
IMM GRANULOCYTES # BLD AUTO: 0.02 K/UL (ref 0–0.3)
IMM GRANULOCYTES NFR BLD: 0 %
INR PPP: 1.2
LACTATE BLDV-SCNC: 2.5 MMOL/L (ref 0.5–2.2)
LYMPHOCYTES NFR BLD: 1.24 K/UL (ref 1.1–3.7)
LYMPHOCYTES RELATIVE PERCENT: 20 % (ref 24–43)
MAGNESIUM SERPL-MCNC: 1.7 MG/DL (ref 1.6–2.6)
MCH RBC QN AUTO: 33.9 PG (ref 25.2–33.5)
MCHC RBC AUTO-ENTMCNC: 35 G/DL (ref 28.4–34.8)
MCV RBC AUTO: 96.9 FL (ref 82.6–102.9)
MICROORGANISM SPEC CULT: NO GROWTH
MONOCYTES NFR BLD: 0.4 K/UL (ref 0.1–1.2)
MONOCYTES NFR BLD: 7 % (ref 3–12)
NEUTROPHILS NFR BLD: 71 % (ref 36–65)
NEUTS SEG NFR BLD: 4.36 K/UL (ref 1.5–8.1)
NRBC BLD-RTO: 0 PER 100 WBC
PLATELET # BLD AUTO: 225 K/UL (ref 138–453)
PMV BLD AUTO: 9.2 FL (ref 8.1–13.5)
POTASSIUM SERPL-SCNC: 3.5 MMOL/L (ref 3.7–5.3)
PROTHROMBIN TIME: 14.5 SEC (ref 11.5–14.2)
RBC # BLD AUTO: 3.81 M/UL (ref 4.21–5.77)
RBC # BLD: ABNORMAL 10*6/UL
SODIUM SERPL-SCNC: 133 MMOL/L (ref 135–144)
SPECIMEN DESCRIPTION: NORMAL
WBC OTHER # BLD: 6.2 K/UL (ref 3.5–11.3)

## 2024-01-21 PROCEDURE — 87040 BLOOD CULTURE FOR BACTERIA: CPT

## 2024-01-21 PROCEDURE — 85610 PROTHROMBIN TIME: CPT

## 2024-01-21 PROCEDURE — 85025 COMPLETE CBC W/AUTO DIFF WBC: CPT

## 2024-01-21 PROCEDURE — 6360000002 HC RX W HCPCS: Performed by: NURSE PRACTITIONER

## 2024-01-21 PROCEDURE — 80048 BASIC METABOLIC PNL TOTAL CA: CPT

## 2024-01-21 PROCEDURE — 83735 ASSAY OF MAGNESIUM: CPT

## 2024-01-21 PROCEDURE — 82550 ASSAY OF CK (CPK): CPT

## 2024-01-21 PROCEDURE — 83605 ASSAY OF LACTIC ACID: CPT

## 2024-01-21 PROCEDURE — 36415 COLL VENOUS BLD VENIPUNCTURE: CPT

## 2024-01-21 PROCEDURE — 6370000000 HC RX 637 (ALT 250 FOR IP): Performed by: NURSE PRACTITIONER

## 2024-01-21 PROCEDURE — 2060000000 HC ICU INTERMEDIATE R&B

## 2024-01-21 PROCEDURE — 6370000000 HC RX 637 (ALT 250 FOR IP): Performed by: FAMILY MEDICINE

## 2024-01-21 PROCEDURE — 2580000003 HC RX 258: Performed by: NURSE PRACTITIONER

## 2024-01-21 PROCEDURE — 99222 1ST HOSP IP/OBS MODERATE 55: CPT | Performed by: INTERNAL MEDICINE

## 2024-01-21 PROCEDURE — 6370000000 HC RX 637 (ALT 250 FOR IP): Performed by: UROLOGY

## 2024-01-21 RX ORDER — CHLORDIAZEPOXIDE HYDROCHLORIDE 10 MG/1
10 CAPSULE, GELATIN COATED ORAL 2 TIMES DAILY PRN
Qty: 10 CAPSULE | Refills: 0 | Status: SHIPPED | OUTPATIENT
Start: 2024-01-21 | End: 2024-01-21 | Stop reason: HOSPADM

## 2024-01-21 RX ORDER — NICOTINE 21 MG/24HR
1 PATCH, TRANSDERMAL 24 HOURS TRANSDERMAL DAILY
Status: DISCONTINUED | OUTPATIENT
Start: 2024-01-21 | End: 2024-01-25 | Stop reason: HOSPADM

## 2024-01-21 RX ORDER — THIAMINE MONONITRATE (VIT B1) 100 MG
100 TABLET ORAL DAILY
Qty: 30 TABLET | Refills: 0 | Status: SHIPPED | OUTPATIENT
Start: 2024-01-22 | End: 2024-02-21

## 2024-01-21 RX ORDER — POTASSIUM CHLORIDE 20 MEQ/1
40 TABLET, EXTENDED RELEASE ORAL ONCE
Status: DISCONTINUED | OUTPATIENT
Start: 2024-01-21 | End: 2024-01-25 | Stop reason: HOSPADM

## 2024-01-21 RX ADMIN — TAMSULOSIN HYDROCHLORIDE 0.4 MG: 0.4 CAPSULE ORAL at 08:11

## 2024-01-21 RX ADMIN — PAROXETINE HYDROCHLORIDE 20 MG: 20 TABLET, FILM COATED ORAL at 08:11

## 2024-01-21 RX ADMIN — LORAZEPAM 4 MG: 2 INJECTION INTRAMUSCULAR; INTRAVENOUS at 16:54

## 2024-01-21 RX ADMIN — AMLODIPINE BESYLATE 5 MG: 5 TABLET ORAL at 21:47

## 2024-01-21 RX ADMIN — LORAZEPAM 2 MG: 2 INJECTION INTRAMUSCULAR; INTRAVENOUS at 15:49

## 2024-01-21 RX ADMIN — AMPICILLIN SODIUM 2000 MG: 2 INJECTION, POWDER, FOR SOLUTION INTRAVENOUS at 06:40

## 2024-01-21 RX ADMIN — ENOXAPARIN SODIUM 40 MG: 100 INJECTION SUBCUTANEOUS at 08:11

## 2024-01-21 RX ADMIN — Medication 100 MG: at 08:11

## 2024-01-21 RX ADMIN — POTASSIUM BICARBONATE 40 MEQ: 782 TABLET, EFFERVESCENT ORAL at 06:07

## 2024-01-21 RX ADMIN — SODIUM CHLORIDE, PRESERVATIVE FREE 10 ML: 5 INJECTION INTRAVENOUS at 21:48

## 2024-01-21 RX ADMIN — ATENOLOL 25 MG: 25 TABLET ORAL at 08:11

## 2024-01-21 RX ADMIN — AMLODIPINE BESYLATE 5 MG: 5 TABLET ORAL at 08:11

## 2024-01-21 RX ADMIN — LORAZEPAM 2 MG: 1 TABLET ORAL at 13:54

## 2024-01-21 RX ADMIN — DAPTOMYCIN 700 MG: 500 INJECTION, POWDER, LYOPHILIZED, FOR SOLUTION INTRAVENOUS at 06:37

## 2024-01-21 NOTE — PROGRESS NOTES
At 1350 patient called out asking if he received his Paxil this morning, RN informed him that he did receive Paxil around 0800 this morning. Patient informed RN that he is feeling very anxious - due to patient's high anxiety level and other measurements on CIWA scale - 2mg PO ativan given at 1354. Patient fully A&O x 4 at this time.    At 1530 patient attempted to get out of bed with morris and IV tubing pulling from opposite side, was talking about a bike and other words that did not make sense. Patient disoriented. Based on CIWA scale and AMS patient received 2mg IV ativen at 1549. Will continue to monitor - bed alarm remains in place. Dr. Hawkins aware of this episode as he was rounding at this time.

## 2024-01-21 NOTE — CONSULTS
[facility] -   Infectious diseases evaluation  admission date 1/19/2024    reason for consultation:   UTI    Impression :   Current:  UTI  Enterococcus faecium vancomycin-resistant bacteremia secondary to above  Urinary retention/Peterson catheter was placed.  History of CVA with residual right-sided weakness  Diabetes mellitus  Hypertension      Recommendations   Discontinue ampicillin  IV daptomycin  CK and liver enzymes  Follow sensitivity on positive blood culture, adjust antibiotics as needed  Follow lactic acid  Follow repeat blood culture  Echocardiogram left    Infection Control Recommendations   [place] Precautions  Contact Isolation       Antimicrobial Stewardship Recommendations   Simplification of therapy  Targeted therapy      History of Present Illness:   Initial history:  Reynaldo Seaman is a 68 y.o.-year-old male presented to the hospital with lower abdominal cramping associated with dysuria, generalized fatigue for 4 days prior to admission.  Symptoms moderate to severe, no alleviating or aggravating factors.  The patient was found to have urinary retention, Peterson catheter was placed.  1/19/2024 urinalysis showed 5-10 WBC, trace leukocyte esterase.,  No growth on urine culture  Blood cultures 1/20/ 2024   1 of 2 grew vancomycin-resistant Enterococcus faecium.  US RETROPERITONEAL COMPLETE   Final Result   1. 2.1 cm simple cyst the upper pole the left kidney.  The kidneys appear   otherwise sonographically normal, but a portion of the lower pole the right   kidney is obscured by bowel gas.  No follow-up imaging for this cyst is   recommended.   2. No hydronephrosis.     The patient was recently hospitalized with UTI, urine culture on 12/22/2023 grew E. coli, was discharged on oral Cipro at that time.  S 20 cm clinical 20  Interval changes  1/21/2024   Lactic acid 2.5.  Patient Vitals for the past 8 hrs:   BP Temp Temp src Pulse Resp SpO2   01/21/24 1500  No   Physical Activity: Not on file   Stress: Not on file   Social Connections: Not on file   Intimate Partner Violence: Not on file   Housing Stability: Low Risk  (1/20/2024)    Housing Stability Vital Sign     Unable to Pay for Housing in the Last Year: No     Number of Places Lived in the Last Year: 1     Unstable Housing in the Last Year: No       Family History:   History reviewed. No pertinent family history.   Medical Decision Making:   I have independently reviewed/ordered the following labs:    CBC with Differential:   Recent Labs     01/20/24  0517 01/21/24  0453   WBC 5.9 6.2   HGB 12.9* 12.9*   HCT 37.7* 36.9*    225   LYMPHOPCT 23* 20*   MONOPCT 8 7     BMP:  Recent Labs     01/20/24  0517 01/21/24  0453    133*   K 3.1* 3.5*    98   CO2 25 26   BUN 6* 6*   CREATININE 1.0 1.0   MG 1.0* 1.7     Hepatic Function Panel:   Recent Labs     01/19/24  2355   PROT 7.1   LABALBU 3.3*   BILITOT 0.4   ALKPHOS 89   ALT 8   AST 15     No results for input(s): \"RPR\" in the last 72 hours.  No results for input(s): \"HIV\" in the last 72 hours.  No results for input(s): \"BC\" in the last 72 hours.  Lab Results   Component Value Date/Time    CREATININE 1.0 01/21/2024 04:53 AM    GLUCOSE 162 01/21/2024 04:53 AM       Detailed results:        Thank you for allowing us to participate in the care of this patient.Please call with questions.    This note is created with the assistance of a speech recognition program.  While intending to generate adocument that actually reflects the content of the visit, the document can still have some errors including those of syntax and sound a like substitutions which may escape proof reading.  It such instances, actual meaningcan be extrapolated by contextual diversion.    Arielle Barkley MD  Office: [phone]  Perfect serve / office [phone]

## 2024-01-21 NOTE — PLAN OF CARE
Problem: Discharge Planning  Goal: Discharge to home or other facility with appropriate resources  Outcome: Progressing  Flowsheets (Taken 1/21/2024 0800 by Liyah Li, RN)  Discharge to home or other facility with appropriate resources: Identify barriers to discharge with patient and caregiver     Problem: Pain  Goal: Verbalizes/displays adequate comfort level or baseline comfort level  Outcome: Progressing     Problem: Skin/Tissue Integrity  Goal: Absence of new skin breakdown  Description: 1.  Monitor for areas of redness and/or skin breakdown  2.  Assess vascular access sites hourly  3.  Every 4-6 hours minimum:  Change oxygen saturation probe site  4.  Every 4-6 hours:  If on nasal continuous positive airway pressure, respiratory therapy assess nares and determine need for appliance change or resting period.  Outcome: Progressing     Problem: ABCDS Injury Assessment  Goal: Absence of physical injury  Outcome: Progressing     Problem: Safety - Adult  Goal: Free from fall injury  Outcome: Progressing  Note: Patient hallucinating and attempting to get out of bed, telesitter in place for safety. No falls during this shift. Will continue to monitor patient closely and consider bedside sitter if patient continues to try to get out of bed. Bed alarm remains in place, patient close to nurses' station.     Problem: Chronic Conditions and Co-morbidities  Goal: Patient's chronic conditions and co-morbidity symptoms are monitored and maintained or improved  Outcome: Progressing  Flowsheets (Taken 1/21/2024 0800 by Liyah Li, RN)  Care Plan - Patient's Chronic Conditions and Co-Morbidity Symptoms are Monitored and Maintained or Improved: Monitor and assess patient's chronic conditions and comorbid symptoms for stability, deterioration, or improvement

## 2024-01-21 NOTE — PROGRESS NOTES
Pt had a restful night with no falls or injuries. He remains alert and oriented X4. CIWA scale for shift was 0, vitals were stable. Output in morris was 400mL. He remained bed rested due to weakness, still need PT/OT eval. Potassium lab was 3.5, 40mEq of Effer K administered.     Problem: Discharge Planning  Goal: Discharge to home or other facility with appropriate resources  Outcome: Progressing  Discharge to home or other facility with appropriate resources:   Identify barriers to discharge with patient and caregiver   Arrange for needed discharge resources and transportation as appropriate   Identify discharge learning needs (meds, wound care, etc)        Problem: Pain  Goal: Verbalizes/displays adequate comfort level or baseline comfort level  Outcome: Progressing  Verbalizes/displays adequate comfort level or baseline comfort level:   Encourage patient to monitor pain and request assistance   Assess pain using appropriate pain scale   Administer analgesics based on type and severity of pain and evaluate response        Problem: ABCDS Injury Assessment  Goal: Absence of physical injury  Outcome: Progressing

## 2024-01-21 NOTE — PROGRESS NOTES
RN found patient to be lying sideways in bed, pulling at morris catheter. Telesitter put in place for safety. RN paged Dr. Hawkins via perfect serve to inform him that patient's hallucinations have become more severe since Dr. Hawkins rounded. RN requested that bedside sitter be ordered for patient, that a nicotine patch be ordered (as patient states he smokes and is hallucinating about smoking), and informed Dr. Hawkins that patient will not keep heart monitor on. RN informed Dr. Hawkins that RN does not feel patient is appropriate for discharge, even if patient's girlfriend were able to pick patient up for discharge - patient is impulsive and unable to follow direction. 4mg Ativan given per CIWA scale. Will continue to monitor.

## 2024-01-21 NOTE — PROGRESS NOTES
canceled      Denies CP, dyspnea, cough, wheezing  Denies nausea, vomiting, abd pain  Denies rash, joint swelling      Initial HPI  Reynaldo Seaman is a 68 y.o.  male who presents with Dysuria (Finished ATBs for UTI a few days ago ) and Abdominal Pain (Lower abdominal cramping)    Patient admitted to the emergency room where he presented with worsening dysuria, increased frequency of urination, urinary dribbling and lower abdominal cramping for several days.  Patient says he had a urinary tract infection about a month ago and was discharged on antibiotics, Cipro at that time.  Patient says he feels his symptoms did not resolve completely and have been worsening again over the last several days.  Patient also complains of feeling febrile and having myalgias.  Complains of nausea and low back pain    Denies chest pain, dyspnea or orthopnea.  Denies headache, vision change.  Denies rash or joint swelling.    I have personally reviewed the past medical history, past surgical history, medications, social history, and family history, and summarized in the note.    Review of Systems:     All 10 point system is reviewed and negative otherwise mentioned in HPI.      Past Medical History:     Past Medical History:   Diagnosis Date    CVA (cerebral vascular accident) (HCC) 02/2021    With residual right sided weakness    Diabetes mellitus (HCC)     Hypertension         Past Surgical History:     Past Surgical History:   Procedure Laterality Date    CAROTID ENDARTERECTOMY  09/22/2021    CAROTID ENDARTERECTOMY (N/A Neck    CAROTID ENDARTERECTOMY N/A 9/22/2021    CAROTID ENDARTERECTOMY performed by Suman Fowler MD at [facility] OR     INS PICC VAD W SQ PORT GREATER THAN 5  5/26/2022    IR INS PICC VAD W SQ PORT GREATER THAN 5 5/26/2022 [facility] SPECIAL PROCEDURES    KNEE SURGERY          Medications Prior to Admission:       Prior to Admission medications    Medication Sig Start Date End Date Taking? Authorizing Provider  shows significantly thickened wall diffusely, likely representing cystitis. 3. Mild diverticulosis of the cecum and ascending colon without evidence of diverticulitis. 4. Normal appendix. 5. Focal irregular opacity at the left posterior costophrenic sulcus has the appearance of atelectasis or possibly tiny focal infiltrate.  Follow-up noncontrast chest CT recommended in 3 months.         All radiological studies reviewed                Code Status:  Full Code    Electronically signed by Shay Hawkins MD on 1/21/2024 at 3:31 PM     Copy sent to Imani Clements, APRN - CNP    This note was created with the assistance of a speech-recognition program.  Although the intention is to generate a document that actually reflects the content of the visit, no guarantees can be provided that every mistake has been identified and corrected by editing.     Note was updated later by me after  physical examination and  completion of the assessment.

## 2024-01-21 NOTE — PROGRESS NOTES
DAPTOMYCIN (Cubicin) DOSING    For adult patients only, the pharmacist adjusts daptomycin doses per protocol using following information below. Doses are rounded to the nearest 50 mg increment.    Creatine Kinase (CK/CPK) must be monitored at baseline and at least weekly.    Daptomycin should be discontinued if CK /CPK is 5 times the upper limit of normal (ULN) and patient is symptomatic, or asymptomatic patients with CPK that is ten (10) times ULN, another antimicrobial agent should be chosen.  Please contact pharmacy with any questions  [phone].     *Pharmacists to keep ivent open to ensure at least weekly monitoring of CPK*    Dosing Weight  BMI < 30 kg/m2  Dose Based on Actual Body Weight   BMI ? 30 kg/m2 AdjBW (kg) = [(ABW-IBW) x 0.4] + IBW   IBW: ideal body weight,  ABW: actual body weight.  AdjBW: adjusted body weight  IBW (kg)men = [2.3 x (height in inches - 60)] + 50  IBW (kg)women = [2.3 x (height in inches - 60)] + 45.5    Maximum Dose: doses ? 1500 mg/day should not be ordered without consultation with a clinical pharmacist or infectious disease specialist, associated with an increased incidence of myopathy and rhabdomyolysis    Recommended Dose: Round dose to nearest 50 mg   Indication CrCl ?30 mL/min CrCl <30 mL/min   Skin and Skin Structure Infections 4 - 6 mg/kg once daily  4 - 6 mg/kg q48h   Septic Arthritis  6 mg/kg once daily  6 mg/kg q48h   Diabetic Foot Infections 6 mg/kg once daily  6 mg/kg q48h   Osteomyelitis 6 - 10 mg/kg once daily  6 - 10 mg/kg q48h   Prosthetic Joint Infections 6 - 10 mg/kg once daily  6 - 10 mg/kg q48h   MRSA/MRSE bacteremia  8 - 10 mg/kg once daily  8 - 10 mg/kg q48h   MRSA endocarditis  8 - 10 mg/kg once daily 8 - 10 mg/kg q48h   VRE bacteremia 10 - 12 mg/kg once daily  10 - 12 mg/kg q48h   VRE endocarditis 10 - 12 mg/kg once daily  10 - 12 mg/kg q48h   CNS Infections 6 - 10 mg/kg once daily  6 - 10 mg/kg q48h    MRSA: methicillin-resistant Staphylococcus aureus,

## 2024-01-21 NOTE — CARE COORDINATION
Discharge planning    Per urology morris to stay in place until outpatient cysto.     Current with rigoberto

## 2024-01-22 ENCOUNTER — APPOINTMENT (OUTPATIENT)
Age: 69
DRG: 696 | End: 2024-01-22
Attending: INTERNAL MEDICINE
Payer: MEDICARE

## 2024-01-22 LAB
ANION GAP SERPL CALCULATED.3IONS-SCNC: 9 MMOL/L (ref 9–17)
BASOPHILS # BLD: 0.03 K/UL (ref 0–0.2)
BASOPHILS NFR BLD: 0 % (ref 0–2)
BUN SERPL-MCNC: 7 MG/DL (ref 8–23)
BUN/CREAT SERPL: 8 (ref 9–20)
CALCIUM SERPL-MCNC: 7.7 MG/DL (ref 8.6–10.4)
CHLORIDE SERPL-SCNC: 99 MMOL/L (ref 98–107)
CO2 SERPL-SCNC: 25 MMOL/L (ref 20–31)
CREAT SERPL-MCNC: 0.9 MG/DL (ref 0.7–1.2)
EKG ATRIAL RATE: 90 BPM
EKG P AXIS: 64 DEGREES
EKG P-R INTERVAL: 148 MS
EKG Q-T INTERVAL: 398 MS
EKG QRS DURATION: 86 MS
EKG QTC CALCULATION (BAZETT): 486 MS
EKG R AXIS: 36 DEGREES
EKG T AXIS: 73 DEGREES
EKG VENTRICULAR RATE: 90 BPM
EOSINOPHIL # BLD: 0.05 K/UL (ref 0–0.44)
EOSINOPHILS RELATIVE PERCENT: 1 % (ref 1–4)
ERYTHROCYTE [DISTWIDTH] IN BLOOD BY AUTOMATED COUNT: 15.9 % (ref 11.8–14.4)
GFR SERPL CREATININE-BSD FRML MDRD: >60 ML/MIN/1.73M2
GLUCOSE SERPL-MCNC: 155 MG/DL (ref 70–99)
HCT VFR BLD AUTO: 37.6 % (ref 40.7–50.3)
HGB BLD-MCNC: 12.7 G/DL (ref 13–17)
IMM GRANULOCYTES # BLD AUTO: 0.04 K/UL (ref 0–0.3)
IMM GRANULOCYTES NFR BLD: 1 %
INR PPP: 1.2
LACTATE BLDV-SCNC: 1.1 MMOL/L (ref 0.5–2.2)
LYMPHOCYTES NFR BLD: 1.25 K/UL (ref 1.1–3.7)
LYMPHOCYTES RELATIVE PERCENT: 14 % (ref 24–43)
MCH RBC QN AUTO: 33.4 PG (ref 25.2–33.5)
MCHC RBC AUTO-ENTMCNC: 33.8 G/DL (ref 28.4–34.8)
MCV RBC AUTO: 98.9 FL (ref 82.6–102.9)
MICROORGANISM SPEC CULT: ABNORMAL
MONOCYTES NFR BLD: 0.67 K/UL (ref 0.1–1.2)
MONOCYTES NFR BLD: 8 % (ref 3–12)
NEUTROPHILS NFR BLD: 76 % (ref 36–65)
NEUTS SEG NFR BLD: 6.71 K/UL (ref 1.5–8.1)
NRBC BLD-RTO: 0 PER 100 WBC
PLATELET # BLD AUTO: 226 K/UL (ref 138–453)
PMV BLD AUTO: 9.9 FL (ref 8.1–13.5)
POTASSIUM SERPL-SCNC: 4.3 MMOL/L (ref 3.7–5.3)
PROTHROMBIN TIME: 15.1 SEC (ref 11.5–14.2)
RBC # BLD AUTO: 3.8 M/UL (ref 4.21–5.77)
RBC # BLD: ABNORMAL 10*6/UL
SERVICE CMNT-IMP: ABNORMAL
SODIUM SERPL-SCNC: 133 MMOL/L (ref 135–144)
SPECIMEN DESCRIPTION: ABNORMAL
WBC OTHER # BLD: 8.8 K/UL (ref 3.5–11.3)

## 2024-01-22 PROCEDURE — 2580000003 HC RX 258: Performed by: NURSE PRACTITIONER

## 2024-01-22 PROCEDURE — 6370000000 HC RX 637 (ALT 250 FOR IP): Performed by: FAMILY MEDICINE

## 2024-01-22 PROCEDURE — 80048 BASIC METABOLIC PNL TOTAL CA: CPT

## 2024-01-22 PROCEDURE — 99232 SBSQ HOSP IP/OBS MODERATE 35: CPT | Performed by: INTERNAL MEDICINE

## 2024-01-22 PROCEDURE — 6360000002 HC RX W HCPCS: Performed by: NURSE PRACTITIONER

## 2024-01-22 PROCEDURE — 97530 THERAPEUTIC ACTIVITIES: CPT

## 2024-01-22 PROCEDURE — 85025 COMPLETE CBC W/AUTO DIFF WBC: CPT

## 2024-01-22 PROCEDURE — 6370000000 HC RX 637 (ALT 250 FOR IP): Performed by: NURSE PRACTITIONER

## 2024-01-22 PROCEDURE — 93306 TTE W/DOPPLER COMPLETE: CPT

## 2024-01-22 PROCEDURE — 6370000000 HC RX 637 (ALT 250 FOR IP): Performed by: UROLOGY

## 2024-01-22 PROCEDURE — 97166 OT EVAL MOD COMPLEX 45 MIN: CPT

## 2024-01-22 PROCEDURE — 85610 PROTHROMBIN TIME: CPT

## 2024-01-22 PROCEDURE — 83605 ASSAY OF LACTIC ACID: CPT

## 2024-01-22 PROCEDURE — 2060000000 HC ICU INTERMEDIATE R&B

## 2024-01-22 PROCEDURE — 36415 COLL VENOUS BLD VENIPUNCTURE: CPT

## 2024-01-22 PROCEDURE — 97162 PT EVAL MOD COMPLEX 30 MIN: CPT

## 2024-01-22 RX ADMIN — AMLODIPINE BESYLATE 5 MG: 5 TABLET ORAL at 20:20

## 2024-01-22 RX ADMIN — TAMSULOSIN HYDROCHLORIDE 0.4 MG: 0.4 CAPSULE ORAL at 10:01

## 2024-01-22 RX ADMIN — LORAZEPAM 2 MG: 1 TABLET ORAL at 21:54

## 2024-01-22 RX ADMIN — AMLODIPINE BESYLATE 5 MG: 5 TABLET ORAL at 10:01

## 2024-01-22 RX ADMIN — ENOXAPARIN SODIUM 40 MG: 100 INJECTION SUBCUTANEOUS at 10:01

## 2024-01-22 RX ADMIN — SODIUM CHLORIDE, PRESERVATIVE FREE 10 ML: 5 INJECTION INTRAVENOUS at 20:20

## 2024-01-22 RX ADMIN — DAPTOMYCIN 700 MG: 500 INJECTION, POWDER, LYOPHILIZED, FOR SOLUTION INTRAVENOUS at 06:16

## 2024-01-22 RX ADMIN — PAROXETINE HYDROCHLORIDE 20 MG: 20 TABLET, FILM COATED ORAL at 10:01

## 2024-01-22 RX ADMIN — Medication 100 MG: at 10:01

## 2024-01-22 RX ADMIN — ATENOLOL 25 MG: 25 TABLET ORAL at 10:01

## 2024-01-22 RX ADMIN — SODIUM CHLORIDE, PRESERVATIVE FREE 10 ML: 5 INJECTION INTRAVENOUS at 10:02

## 2024-01-22 RX ADMIN — ACETAMINOPHEN 650 MG: 325 TABLET ORAL at 20:22

## 2024-01-22 ASSESSMENT — PAIN DESCRIPTION - ORIENTATION: ORIENTATION: RIGHT;LEFT

## 2024-01-22 ASSESSMENT — PAIN DESCRIPTION - LOCATION: LOCATION: LEG

## 2024-01-22 ASSESSMENT — PAIN SCALES - GENERAL
PAINLEVEL_OUTOF10: 5
PAINLEVEL_OUTOF10: 0
PAINLEVEL_OUTOF10: 0

## 2024-01-22 ASSESSMENT — PAIN SCALES - WONG BAKER: WONGBAKER_NUMERICALRESPONSE: 0

## 2024-01-22 ASSESSMENT — PAIN DESCRIPTION - DESCRIPTORS: DESCRIPTORS: THROBBING

## 2024-01-22 NOTE — PLAN OF CARE
Problem: Discharge Planning  Goal: Discharge to home or other facility with appropriate resources  1/21/2024 2244 by Chiki Cramer, RN  Outcome: Progressing  Note: Discharge teaching and instructions for diagnosis/procedure explained with patient using teachback method.  Patient voiced understanding regarding prescriptions, follow up appointments, and care of self at home.   1/21/2024 1711 by Marianna Zimmer RN  Outcome: Progressing  Flowsheets (Taken 1/21/2024 0800 by Liyah Li, RN)  Discharge to home or other facility with appropriate resources: Identify barriers to discharge with patient and caregiver     Problem: Pain  Goal: Verbalizes/displays adequate comfort level or baseline comfort level  1/21/2024 2244 by Chiki Cramer RN  Outcome: Progressing  Note: Monitoring pain with each assessment and prn. MONIK 0-10 pain scale utilized. Non-pharmacological measures to be encouraged prior to pharmacological measures.   1/21/2024 1711 by Marianna Zimmer RN  Outcome: Progressing     Problem: Skin/Tissue Integrity  Goal: Absence of new skin breakdown  Description: 1.  Monitor for areas of redness and/or skin breakdown  2.  Assess vascular access sites hourly  3.  Every 4-6 hours minimum:  Change oxygen saturation probe site  4.  Every 4-6 hours:  If on nasal continuous positive airway pressure, respiratory therapy assess nares and determine need for appliance change or resting period.  1/21/2024 2244 by Chiki Cramer, RN  Outcome: Progressing  Note: Continuing to monitor for skin integrity risks. Patient independent with turning/repositioning. Turning/repositioning encouraged at least once every 2 hrs, and prn basis. Hygiene care being completed independently per patient; assistance provided when deemed necessary.  1/21/2024 1711 by Marianna Zimmer, RN  Outcome: Progressing     Problem: ABCDS Injury Assessment  Goal: Absence of physical injury  1/21/2024 2244 by Chiki Cramer

## 2024-01-22 NOTE — PROGRESS NOTES
Occupational Therapy  Facility/Department: [facility] PROGRESSIVE CARE  Occupational Therapy Initial Assessment    Name: Reynaldo Seaman  : 1955  MRN: [id number]  Date of Service: 2024    RN reports patient is medically stable for therapy treatment this date.    Chart reviewed prior to treatment and patient is agreeable for therapy.  All lines intact and patient positioned comfortably at end of treatment.  All patient needs addressed prior to ending therapy session.      Discharge Recommendations:  Patient would benefit from continued therapy after discharge  Pt currently functioning below baseline.  Recommend daily inpatient skilled therapy at time of discharge to maximize long term outcomes and prevent re-admission. Please refer to AM-PAC score for current level of function.  OT Equipment Recommendations  Equipment Needed:  (CTA)    PER HPI: 68 y.o.  male who presents with Dysuria (Finished ATBs for UTI a few days ago ) and Abdominal Pain (Lower abdominal cramping)  Patient admitted to the emergency room where he presented with worsening dysuria, increased frequency of urination, urinary dribbling and lower abdominal cramping for several days.  Patient says he had a urinary tract infection about a month ago and was discharged on antibiotics, Cipro at that time.  Patient says he feels his symptoms did not resolve completely and have been worsening again over the last several days.  Patient also complains of feeling febrile and having myalgias.  Complains of nausea and low back pain  Denies chest pain, dyspnea or orthopnea.  Denies headache, vision change.  Denies rash or joint swelling.     Patient Diagnosis(es): The primary encounter diagnosis was Acute cystitis without hematuria. Diagnoses of Urinary retention, Anxiety, and Bacteremia were also pertinent to this visit.  Past Medical History:  has a past medical history of CVA (cerebral vascular accident) (HCC), Diabetes mellitus (HCC), and  Term Goal 4: Pt will tolerate reassessment of functional mobility to update POC.  Short Term Goal 5: Pt will demo LB dressing with AE PRN and good safety with CGA to increase indep with ADLs.  Patient Goals   Patient goals : No goals stated.       Therapy Time   Individual Concurrent Group Co-treatment   Time In 0900         Time Out 0920 (+10 min chart review, RN communication)         Minutes 20+10=30          Tx time: 15 minutes     Co-treatment with PT warranted secondary to decreased safety and independence requiring 2 skilled therapy professionals to address individual discipline's goals. OT addressing preparation for ADL transfer, sitting balance for increased ADL performance, sitting/activity tolerance, functional reaching, environmental safety/scanning, fall prevention, functional mobility for ADL transfers, ability to sequence and follow directions, bed mobility tech, and functional UE strength.       UNRULY Medrano, OTR/L

## 2024-01-22 NOTE — PROGRESS NOTES
Patient 1:1 sitter discontinued at this time. Patient is not trying to get out of bed or pull at telemetry or lines at this time. Patient sleeping most of time. Patient alert but confused at times. Holding imaginary food or cups. Alert to questions about himself and where he is at. Confused to year and current president. Bed alarm on, patient close to nursing station, and hourly rounding to continue.

## 2024-01-22 NOTE — CARE COORDINATION
Discharge planning    Message back from Rachel at [facility]. Patient Dapto would cost 338 per week for medication and supplies. He has no prescription coverage and this is self pay.     Therapy recommending snf and re evaluate when more awake.     Will need to discuss with patient and updated RN and SS>

## 2024-01-22 NOTE — PROGRESS NOTES
Patient slept for most evening, but able to take scheduled PO Norvasc last evening, but then falls back to sleep.  No Ativan given per CIWA scale.  Virtual  discontinued, but still has one-to-one sitter at bedside, due to patient pulling off heart monitor leads.  Patient on IVPB Daptomycin & TTE today.  Indwelling Peterson catheter remains in place, which patient to be discharged with Peterson for OP cysto.  Side rails x2 raised & bed alarm activated for patient safety.  Will monitor.

## 2024-01-22 NOTE — PROGRESS NOTES
PRN, Lump, Monica A, APRN - CNP    acetaminophen (TYLENOL) tablet 650 mg, 650 mg, Oral, Q6H PRN **OR** acetaminophen (TYLENOL) suppository 650 mg, 650 mg, Rectal, Q6H PRN, Lump, Monica A, APRN - CNP    sodium chloride flush 0.9 % injection 5-40 mL, 5-40 mL, IntraVENous, 2 times per day, Lump, Monica A, APRN - CNP, 10 mL at 01/22/24 1002    sodium chloride flush 0.9 % injection 5-40 mL, 5-40 mL, IntraVENous, PRN, Lump, Monica A, APRN - CNP    thiamine mononitrate tablet 100 mg, 100 mg, Oral, Daily, Lump, Monica A, APRN - CNP, 100 mg at 01/22/24 1001    LORazepam (ATIVAN) tablet 1 mg, 1 mg, Oral, Q1H PRN **OR** LORazepam (ATIVAN) injection 1 mg, 1 mg, IntraVENous, Q1H PRN **OR** LORazepam (ATIVAN) tablet 2 mg, 2 mg, Oral, Q1H PRN, 2 mg at 01/21/24 1354 **OR** LORazepam (ATIVAN) injection 2 mg, 2 mg, IntraVENous, Q1H PRN, 2 mg at 01/21/24 1549 **OR** LORazepam (ATIVAN) tablet 3 mg, 3 mg, Oral, Q1H PRN **OR** LORazepam (ATIVAN) injection 3 mg, 3 mg, IntraVENous, Q1H PRN **OR** LORazepam (ATIVAN) tablet 4 mg, 4 mg, Oral, Q1H PRN **OR** LORazepam (ATIVAN) injection 4 mg, 4 mg, IntraVENous, Q1H PRN, Lump, Monica A, APRN - CNP, 4 mg at 01/21/24 1654    hydrALAZINE (APRESOLINE) injection 10 mg, 10 mg, IntraVENous, Q6H PRN, Shay Hawkins MD, 10 mg at 01/20/24 1624    amLODIPine (NORVASC) tablet 5 mg, 5 mg, Oral, BID, Shay Hawkins MD, 5 mg at 01/22/24 1001    atenolol (TENORMIN) tablet 25 mg, 25 mg, Oral, Daily, Shay Hawkins MD, 25 mg at 01/22/24 1001    PARoxetine (PAXIL) tablet 20 mg, 20 mg, Oral, Shruthi DREW Jahangir, MD, 20 mg at 01/22/24 1001    tamsulosin (FLOMAX) capsule 0.4 mg, 0.4 mg, Oral, Daily, Ravindra Tracy MD, 0.4 mg at 01/22/24 1001      I/O (24Hr):    Intake/Output Summary (Last 24 hours) at 1/22/2024 1657  Last data filed at 1/22/2024 1200  Gross per 24 hour   Intake 360 ml   Output 900 ml   Net -540 ml       Data:           Labs:    Hematology:  Recent Labs     01/20/24  0517 01/21/24  0453 01/22/24  0528   WBC          All radiological studies reviewed  Code Status:  Full Code        Electronically signed by LILIAM MURILLO MD on 1/22/2024 at 4:57 PM    This note was created with the assistance of a speech-recognition program.  Although the intention is to generate a document that actually reflects the content of the visit, no guarantees can be provided that every mistake has been identified and corrected by editing.     Note was updated later by me after  physical examination and  completion of the assessment.

## 2024-01-22 NOTE — PROGRESS NOTES
Physical Therapy  Facility/Department: [facility]  Physical Therapy Initial Assessment    Name: Reynaldo Seaman  : 1955  MRN: [id number]  Date of Service: 2024    PerH&P:68 y.o.  male who presents with Dysuria (Finished ATBs for UTI a few days ago ) and Abdominal Pain (Lower abdominal cramping)  Patient admitted to the emergency room where he presented with worsening dysuria, increased frequency of urination, urinary dribbling and lower abdominal cramping for several days.  Patient says he had a urinary tract infection about a month ago and was discharged on antibiotics, Cipro at that time.  Patient says he feels his symptoms did not resolve completely and have been worsening again over the last several days.  Patient also complains of feeling febrile and having myalgias.  Complains of nausea and low back pain  Denies chest pain, dyspnea or orthopnea.  Denies headache, vision change.  Denies rash or joint swelling.           VIPIN Bradshaw reports patient is medically stable for therapy treatment this date.    Chart reviewed prior to treatment and patient is agreeable for therapy.  All lines intact and patient positioned comfortably at end of treatment.  All patient needs addressed prior to ending therapy session.       Discharge Recommendations:  Patient would benefit from continued therapy after discharge   Pt currently functioning below baseline.  Recommend daily inpatient skilled therapy at time of discharge to maximize long term outcomes and prevent re-admission. Please refer to AM-PAC score for current level of function.   PT Equipment Recommendations  Equipment Needed: No      Patient Diagnosis(es): The primary encounter diagnosis was Acute cystitis without hematuria. Diagnoses of Urinary retention, Anxiety, and Bacteremia were also pertinent to this visit.  Past Medical History:  has a past medical history of CVA (cerebral vascular accident) (HCC), Diabetes mellitus (HCC), and  Term Goal 4: Patient will be indep with HEP for B LE following handout to prepare for transfers/gait.  Patient Goals   Patient Goals : to return to PLOF       Education  Patient Education  Education Given To: Patient  Education Provided: Role of Therapy;Plan of Care;Fall Prevention Strategies  Pt is at risk for skin breakdown.  Pt educated on pressure relief measures. Pt reports being comfortable at end of treatment.        Verbal edu provided regarding fall prevention in the areas of community safety, transportation, proper footwear and clothing, reducing risk of falls, environmental modifications, importance of exercise, consequences of falling, plan if a fall occurs (\"rest and wait\" vs \"up and about\").   Education Method: Demonstration;Verbal  Barriers to Learning: Cognition  Education Outcome: Continued education needed      Therapy Time   Individual Concurrent Group Co-treatment   Time In 0903         Time Out 0920         Minutes 17 + 10 = 27 minutes         Additional 10 minutes for chart review.   Treatment Time: 12 minutes       Co-treatment with OT warranted secondary to decreased safety and independence requiring 2 skilled therapy professionals to address individual discipline's goals. PT addressing core control in transitions with bed mobility & transfers, seated/standing posture, pressure relief, seated & standing postural alignment and breathing techniques, safety/scanning, & fall prevention in ambulation techniques.     Roseline Alvares, PT

## 2024-01-22 NOTE — PLAN OF CARE
Patient alert to self, situation and place. Disoriented to time. Patient very drowsy throughout shift. 1;1 discontinued as patient not attempting to get up or pull out lines. Patient still confused and often holding imaginary items. Patient unsteady with utensils and requiring to be fed today. Will continue hourly rounding.  Problem: Discharge Planning  Goal: Discharge to home or other facility with appropriate resources  Outcome: Progressing  Flowsheets (Taken 1/21/2024 0800 by Liyah Li, RN)  Discharge to home or other facility with appropriate resources: Identify barriers to discharge with patient and caregiver  Note: PT/OT recommending SNF. Patient agreeable at this time.      Problem: Pain  Goal: Verbalizes/displays adequate comfort level or baseline comfort level  Outcome: Progressing  Flowsheets (Taken 1/20/2024 0523 by Kristi Curry, RN)  Verbalizes/displays adequate comfort level or baseline comfort level:   Encourage patient to monitor pain and request assistance   Assess pain using appropriate pain scale   Administer analgesics based on type and severity of pain and evaluate response     Problem: Skin/Tissue Integrity  Goal: Absence of new skin breakdown  Description: 1.  Monitor for areas of redness and/or skin breakdown  2.  Assess vascular access sites hourly  3.  Every 4-6 hours minimum:  Change oxygen saturation probe site  4.  Every 4-6 hours:  If on nasal continuous positive airway pressure, respiratory therapy assess nares and determine need for appliance change or resting period.  Outcome: Progressing  Note: Waffle mattress on bed and properly inflated   Q2 turn  Barrier cream to excoriation in peritoneal area  Frequent Brief check and change PRN    Peterson in place   Moisture wicking pad  Only necessary bedding under patient      Problem: Safety - Adult  Goal: Free from fall injury  Outcome: Progressing  Flowsheets (Taken 1/20/2024 0523 by Kristi Curry, RN)  Free From Fall  Injury:   Instruct family/caregiver on patient safety   Based on caregiver fall risk screen, instruct family/caregiver to ask for assistance with transferring infant if caregiver noted to have fall risk factors  Note: Siderails up x 2  Hourly rounding.  Call light in reach.  Instructed to call for assist before attempting out of bed.  Remains free from falls and accidental injury at this time.   Floor free from obstacles, and bed is locked and in lowest position. Adequate lighting provided.  Bed alarm on. Fall sticker on wristband. Fall Sign posted in doorway

## 2024-01-22 NOTE — PROGRESS NOTES
[facility]  Progress Note    Reynaldo Seaman  MRN: [id number]  Date: 1/22/2024  LOS: 2     Reason for F/U :   VRE bacteremia    Impression :   Vancomycin-resistant Enterococcus faecium bacteremia   Cystitis likely because of above   Urinary retention/Peterson catheter was placed.  EtOH abuse with concern for withdrawal  History of CVA with residual right-sided weakness  Diabetes mellitus  Hypertension    Recommendations:   The patient continues on intravenous antimicrobial therapy with daptomycin  He does have some episodes of confusion felt related to DTs and he did receive some sedation for this  The plan will likely be to continue the antimicrobial therapy to complete a 14-day course of therapy  The patient will likely need discharge to a skilled nursing facility    Infection Control Recommendations:   Contact precautions    Discharge Planning:   Estimated Length of IV antimicrobials: 14 days  Patient will need Midline Catheter Insertion  Patient will need: Home IV , SNF  Patient will need outpatient wound care: No    Medical Decision making / Summary of Stay:   Reynaldo Seaman is a 68 y.o.-year-old male presented to the hospital with lower abdominal cramping associated with dysuria, generalized fatigue for 4 days prior to admission.  Symptoms moderate to severe, no alleviating or aggravating factors.  The patient was found to have urinary retention, Peterson catheter was placed.  1/19/2024 urinalysis showed 5-10 WBC, trace leukocyte esterase.,  No growth on urine culture  Blood cultures 1/20/ 2024   1 of 2 grew vancomycin-resistant Enterococcus faecium.  US RETROPERITONEAL COMPLETE   Final Result   1. 2.1 cm simple cyst the upper pole the left kidney.  The kidneys appear   otherwise sonographically normal, but a portion of the lower pole the right   kidney is obscured by bowel gas.  No follow-up imaging for this cyst is   recommended.   2. No hydronephrosis.      The patient was recently  & Influenza Combo [[id number]] Collected: 01/20/24 0016   Order Status: Completed Specimen: Nasopharyngeal Swab Updated: 01/20/24 0058    Specimen Description .NASOPHARYNGEAL SWAB    Source .NASOPHARYNGEAL SWAB    SARS-CoV-2 RNA, RT PCR Not Detected    Comment:       Testing was performed using FORREST Yuli SARS-CoV-2 and Influenza A/B nucleic acid assay.  This test is a multiplex Real-Time Reverse Transcriptase Polymerase Chain Reaction  (RT-PCR)-based in vitro diagnostic test intended for the qualitative detection of nucleic  acids from SARS-CoV-2,  influenza A, and influenza B in nasopharyngeal and nasal swab specimens for use under the  FDA's Emergency Use Authorization (EUA) only.       Not Detected results do not preclude SARS-CoV-2 infection and should not be used as the sole   basis for patient management decisions.  Negative results must be combined with clinical observations, patient history, and  epidemiological information.       Fact sheet for Patients: https://www.fda.gov/media/444640/download  Fact sheet for Healthcare Providers: https://www.fda.gov/media/080429/download       Results reported to the appropriate Health Department       INFLUENZA A Not Detected    INFLUENZA B Not Detected       Medications:      DAPTOmycin (CUBICIN) 700 mg in sodium chloride 0.9 % 50 mL IVPB  10 mg/kg (Order-Specific) IntraVENous Q24H    potassium chloride  40 mEq Oral Once    nicotine  1 patch TransDERmal Daily    sodium chloride  500 mL IntraVENous Once    enoxaparin  40 mg SubCUTAneous Daily    sodium chloride flush  5-40 mL IntraVENous 2 times per day    thiamine  100 mg Oral Daily    amLODIPine  5 mg Oral BID    atenolol  25 mg Oral Daily    PARoxetine  20 mg Oral QAM    tamsulosin  0.4 mg Oral Daily       Electronically signed by Danette Jo MD on 1/22/2024 at 12:29 PM      [facility]  Danette Jo MD  [employer] messaging  OFFICE: [phone]    Thank you for

## 2024-01-22 NOTE — CARE COORDINATION
Discharge planning    Chart reviewed. Patient  living at the [facility] for last 6 years. He wishes to return there on discharge if able. States he moved there after he lost his wife and his home. He has signficant other Eleonora Lindsay but lives alone. He has son Reynaldo Yoon     He is admitted with urinary retention and has morris. Urology notes to maintain morris and plans outpatient cysto. ID following and on IV dapto.     He is current with [facility]. Sent referral to [facility]. Therapy is pending. IF needs IV atb may need short term snf vs HC. [facility]     He is medicare and would require a qualifying 3 night stay. Inpatient order singed per NP on 1/20.

## 2024-01-23 LAB
BASOPHILS # BLD: <0.03 K/UL (ref 0–0.2)
BASOPHILS NFR BLD: 0 % (ref 0–2)
ECHO AO ROOT DIAM: 3.1 CM
ECHO AO ROOT INDEX: 1.73 CM/M2
ECHO AV MEAN GRADIENT: 2 MMHG
ECHO AV MEAN VELOCITY: 0.6 M/S
ECHO AV PEAK GRADIENT: 4 MMHG
ECHO AV PEAK VELOCITY: 1 M/S
ECHO AV VELOCITY RATIO: 1
ECHO AV VTI: 22 CM
ECHO BSA: 1.8 M2
ECHO LA DIAMETER INDEX: 1.68 CM/M2
ECHO LA DIAMETER: 3 CM
ECHO LA TO AORTIC ROOT RATIO: 0.97
ECHO LV E' LATERAL VELOCITY: 7 CM/S
ECHO LV E' SEPTAL VELOCITY: 6 CM/S
ECHO LV FRACTIONAL SHORTENING: 29 % (ref 28–44)
ECHO LV INTERNAL DIMENSION DIASTOLE INDEX: 2.35 CM/M2
ECHO LV INTERNAL DIMENSION DIASTOLIC: 4.2 CM (ref 4.2–5.9)
ECHO LV INTERNAL DIMENSION SYSTOLIC INDEX: 1.68 CM/M2
ECHO LV INTERNAL DIMENSION SYSTOLIC: 3 CM
ECHO LV IVSD: 1 CM (ref 0.6–1)
ECHO LV MASS 2D: 167.4 G (ref 88–224)
ECHO LV MASS INDEX 2D: 93.5 G/M2 (ref 49–115)
ECHO LV POSTERIOR WALL DIASTOLIC: 1.3 CM (ref 0.6–1)
ECHO LV RELATIVE WALL THICKNESS RATIO: 0.62
ECHO LVOT AV VTI INDEX: 0.84
ECHO LVOT MEAN GRADIENT: 2 MMHG
ECHO LVOT PEAK GRADIENT: 4 MMHG
ECHO LVOT PEAK VELOCITY: 1 M/S
ECHO LVOT VTI: 18.5 CM
ECHO MV A VELOCITY: 0.98 M/S
ECHO MV E DECELERATION TIME (DT): 254 MS
ECHO MV E VELOCITY: 0.63 M/S
ECHO MV E/A RATIO: 0.64
ECHO MV E/E' LATERAL: 9
ECHO MV E/E' RATIO (AVERAGED): 9.75
EOSINOPHIL # BLD: <0.03 K/UL (ref 0–0.44)
EOSINOPHILS RELATIVE PERCENT: 0 % (ref 1–4)
ERYTHROCYTE [DISTWIDTH] IN BLOOD BY AUTOMATED COUNT: 16.1 % (ref 11.8–14.4)
HCT VFR BLD AUTO: 36.4 % (ref 40.7–50.3)
HGB BLD-MCNC: 12.4 G/DL (ref 13–17)
IMM GRANULOCYTES # BLD AUTO: 0.14 K/UL (ref 0–0.3)
IMM GRANULOCYTES NFR BLD: 1 %
INR PPP: 1.4
LYMPHOCYTES NFR BLD: 1.33 K/UL (ref 1.1–3.7)
LYMPHOCYTES RELATIVE PERCENT: 10 % (ref 24–43)
MCH RBC QN AUTO: 33.1 PG (ref 25.2–33.5)
MCHC RBC AUTO-ENTMCNC: 34.1 G/DL (ref 28.4–34.8)
MCV RBC AUTO: 97.1 FL (ref 82.6–102.9)
MONOCYTES NFR BLD: 0.97 K/UL (ref 0.1–1.2)
MONOCYTES NFR BLD: 7 % (ref 3–12)
NEUTROPHILS NFR BLD: 82 % (ref 36–65)
NEUTS SEG NFR BLD: 11.21 K/UL (ref 1.5–8.1)
NRBC BLD-RTO: 0 PER 100 WBC
PLATELET # BLD AUTO: 279 K/UL (ref 138–453)
PMV BLD AUTO: 9.9 FL (ref 8.1–13.5)
PROTHROMBIN TIME: 16.8 SEC (ref 11.5–14.2)
RBC # BLD AUTO: 3.75 M/UL (ref 4.21–5.77)
RBC # BLD: ABNORMAL 10*6/UL
WBC OTHER # BLD: 13.7 K/UL (ref 3.5–11.3)

## 2024-01-23 PROCEDURE — 85025 COMPLETE CBC W/AUTO DIFF WBC: CPT

## 2024-01-23 PROCEDURE — 97535 SELF CARE MNGMENT TRAINING: CPT

## 2024-01-23 PROCEDURE — 6370000000 HC RX 637 (ALT 250 FOR IP): Performed by: NURSE PRACTITIONER

## 2024-01-23 PROCEDURE — 97530 THERAPEUTIC ACTIVITIES: CPT

## 2024-01-23 PROCEDURE — 85610 PROTHROMBIN TIME: CPT

## 2024-01-23 PROCEDURE — 6370000000 HC RX 637 (ALT 250 FOR IP): Performed by: UROLOGY

## 2024-01-23 PROCEDURE — 2580000003 HC RX 258: Performed by: NURSE PRACTITIONER

## 2024-01-23 PROCEDURE — 6370000000 HC RX 637 (ALT 250 FOR IP): Performed by: FAMILY MEDICINE

## 2024-01-23 PROCEDURE — 99232 SBSQ HOSP IP/OBS MODERATE 35: CPT | Performed by: INTERNAL MEDICINE

## 2024-01-23 PROCEDURE — 94761 N-INVAS EAR/PLS OXIMETRY MLT: CPT

## 2024-01-23 PROCEDURE — 6360000002 HC RX W HCPCS: Performed by: NURSE PRACTITIONER

## 2024-01-23 PROCEDURE — 36415 COLL VENOUS BLD VENIPUNCTURE: CPT

## 2024-01-23 PROCEDURE — 2060000000 HC ICU INTERMEDIATE R&B

## 2024-01-23 RX ADMIN — ENOXAPARIN SODIUM 40 MG: 100 INJECTION SUBCUTANEOUS at 09:48

## 2024-01-23 RX ADMIN — AMLODIPINE BESYLATE 5 MG: 5 TABLET ORAL at 09:48

## 2024-01-23 RX ADMIN — TAMSULOSIN HYDROCHLORIDE 0.4 MG: 0.4 CAPSULE ORAL at 09:48

## 2024-01-23 RX ADMIN — ATENOLOL 25 MG: 25 TABLET ORAL at 09:48

## 2024-01-23 RX ADMIN — AMLODIPINE BESYLATE 5 MG: 5 TABLET ORAL at 20:16

## 2024-01-23 RX ADMIN — SODIUM CHLORIDE, PRESERVATIVE FREE 5 ML: 5 INJECTION INTRAVENOUS at 21:42

## 2024-01-23 RX ADMIN — SODIUM CHLORIDE, PRESERVATIVE FREE 10 ML: 5 INJECTION INTRAVENOUS at 09:49

## 2024-01-23 RX ADMIN — PAROXETINE HYDROCHLORIDE 20 MG: 20 TABLET, FILM COATED ORAL at 09:48

## 2024-01-23 RX ADMIN — DAPTOMYCIN 700 MG: 500 INJECTION, POWDER, LYOPHILIZED, FOR SOLUTION INTRAVENOUS at 04:45

## 2024-01-23 RX ADMIN — Medication 100 MG: at 09:48

## 2024-01-23 ASSESSMENT — ENCOUNTER SYMPTOMS
RESPIRATORY NEGATIVE: 1
GASTROINTESTINAL NEGATIVE: 1

## 2024-01-23 NOTE — PROGRESS NOTES
irregular opacity at the left posterior costophrenic sulcus has the appearance of atelectasis or possibly tiny focal infiltrate.  Follow-up noncontrast chest CT recommended in 3 months.         All radiological studies reviewed  Code Status:  Full Code        Electronically signed by LILIAM MURILLO MD on 1/23/2024 at 4:30 PM    This note was created with the assistance of a speech-recognition program.  Although the intention is to generate a document that actually reflects the content of the visit, no guarantees can be provided that every mistake has been identified and corrected by editing.     Note was updated later by me after  physical examination and  completion of the assessment.

## 2024-01-23 NOTE — PLAN OF CARE
Pt alert and oriented to self and place; disoriented to time and situation. VSS. NSR on tele. SpO2 mid 90s on RA. Afebrile. Leg pain tx with prn tylenol. Ativan given x1 per CIWA protocol. Pt receiving IV abx. D/c planning in progress; HHC vs. SNF. Awaiting TTE results. Will d/c with morris and f/u outpt. Bed alarm on, seizure precautions in place, call light within reach. Will continue to monitor.       Problem: Discharge Planning  Goal: Discharge to home or other facility with appropriate resources  1/23/2024 0638 by Veda Rodriguez RN  Outcome: Progressing  1/22/2024 1828 by Shelley Morgan RN  Outcome: Progressing  Flowsheets (Taken 1/21/2024 0800 by Liyah Li, RN)  Discharge to home or other facility with appropriate resources: Identify barriers to discharge with patient and caregiver  Note: PT/OT recommending SNF. Patient agreeable at this time.      Problem: Pain  Goal: Verbalizes/displays adequate comfort level or baseline comfort level  1/23/2024 0638 by Veda Rodriguez RN  Outcome: Progressing  1/22/2024 1828 by Shelley Morgan RN  Outcome: Progressing  Flowsheets (Taken 1/20/2024 0523 by Kristi Curry, RN)  Verbalizes/displays adequate comfort level or baseline comfort level:   Encourage patient to monitor pain and request assistance   Assess pain using appropriate pain scale   Administer analgesics based on type and severity of pain and evaluate response     Problem: Skin/Tissue Integrity  Goal: Absence of new skin breakdown  Description: 1.  Monitor for areas of redness and/or skin breakdown  2.  Assess vascular access sites hourly  3.  Every 4-6 hours minimum:  Change oxygen saturation probe site  4.  Every 4-6 hours:  If on nasal continuous positive airway pressure, respiratory therapy assess nares and determine need for appliance change or resting period.  1/23/2024 0638 by Veda Rodriguez RN  Outcome: Progressing  1/22/2024 1828 by Shelley Morgan RN  Outcome: Progressing  Note:  Waffle mattress on bed and properly inflated   Q2 turn  Barrier cream to excoriation in peritoneal area  Frequent Brief check and change PRN    Peterson in place   Moisture wicking pad  Only necessary bedding under patient      Problem: ABCDS Injury Assessment  Goal: Absence of physical injury  Outcome: Progressing     Problem: Safety - Adult  Goal: Free from fall injury  1/23/2024 0638 by Veda Rodriguez, RN  Outcome: Progressing  1/22/2024 1828 by Shelley Morgan, RN  Outcome: Progressing  Flowsheets (Taken 1/20/2024 0523 by Kristi Curry, RN)  Free From Fall Injury:   Instruct family/caregiver on patient safety   Based on caregiver fall risk screen, instruct family/caregiver to ask for assistance with transferring infant if caregiver noted to have fall risk factors  Note: Siderails up x 2  Hourly rounding.  Call light in reach.  Instructed to call for assist before attempting out of bed.  Remains free from falls and accidental injury at this time.   Floor free from obstacles, and bed is locked and in lowest position. Adequate lighting provided.  Bed alarm on. Fall sticker on wristband. Fall Sign posted in doorway     Problem: Chronic Conditions and Co-morbidities  Goal: Patient's chronic conditions and co-morbidity symptoms are monitored and maintained or improved  Outcome: Progressing

## 2024-01-23 NOTE — PROGRESS NOTES
SPIRITUAL CARE DEPARTMENT [facility]  PROGRESS NOTE    Room # 1020/1020-01   Name: Reynaldo Seaman            Restorationist: Unknown     Reason for visit: Rounding    I visited the patient.    Admit Date & Time: 1/19/2024 11:04 PM    Assessment:  Reynaldo Seaman is a 68 y.o. male in the hospital because Urinary retention. Upon entering the room the patient was laying in the bed resting, but awake. The TV was on.        Intervention:  I introduced myself and my title as  I offered space for the patient  to express feelings, needs, and concerns and provided a ministry presence. The Chidi had seen the patient before, and was just checking on the patients spiritual needs. Immediately the patient asked for a Vernors soda pop. The Chidi asked the nurse if the pop was approved on his dietary plan, and she said that she would check. The Aguilar went into the room, and chatted with the patient briefly, prayed over the patients health; and left a card. Encouraging words were stated by the Chidi concerning the patients marvin, and purpose; before the Aguilar left out of the room.     Outcome:  The patient was glad the Aguilar stopped by, and went back to resting, and watching TV.     Plan:   will follow up by by stopping through again.    Electronically signed by Chaplain Jelani, on 1/23/2024 at 6:08 PM.  Spiritual Care Department  [facility]

## 2024-01-23 NOTE — PROGRESS NOTES
Physical Therapy  Facility/Department: [facility]  Rehabilitation Physical Therapy Treatment Note    NAME: Reynaldo Seaman  : 1955 (68 y.o.)  MRN: [id number]  CODE STATUS: Full Code    Date of Service: 24       Restrictions:  Restrictions/Precautions: Bed Alarm, Fall Risk, General Precautions, Other (comment), Contact Precautions  Position Activity Restriction  Other position/activity restrictions: R UE IV; morris catheter; telemetry     SUBJECTIVE  Subjective  Subjective: pt in bed upon arrival very drowsy               OBJECTIVE  Cognition  Overall Cognitive Status: Exceptions  Arousal/Alertness: Delayed responses to stimuli  Following Commands: Inconsistently follows commands  Attention Span: Difficulty attending to directions;Attends with cues to redirect  Memory: Decreased recall of precautions  Safety Judgement: Decreased awareness of need for assistance;Decreased awareness of need for safety  Problem Solving: Assistance required to implement solutions;Assistance required to generate solutions  Insights: Not aware of deficits  Initiation: Requires cues for all  Sequencing: Requires cues for all  Cognition Comment: Eyes closed majority of session; pt not responding to visual or auditory stimuli.  Orientation  Overall Orientation Status: Impaired  Orientation Level: Oriented to person    Functional Mobility  Bed Mobility  Overall Assistance Level: Maximum Assistance;Requires x 2 Assistance  Roll Left  Assistance Level: Maximum assistance;Requires x 2 assistance  Roll Right  Assistance Level: Maximum assistance;Requires x 2 assistance  Sit to Supine  Assistance Level: Maximum assistance;Requires x 2 assistance  Supine to Sit  Assistance Level: Maximum assistance;Requires x 2 assistance  Scooting  Assistance Level: Maximum assistance;Requires x 2 assistance  Transfers  Surface: From bed;To bed  Additional Factors: Verbal cues  Pt required MOD A x 2 to maintain seated balance at edge of bed for  Cognition  Education Outcome: Verbalized understanding    AM-PAC Score    AM-PAC Inpatient Mobility Raw Score : 10  AM-PAC Inpatient T-Scale Score : 32.29  Mobility Inpatient CMS 0-100% Score: 76.75  Mobility Inpatient CMS G-Code Modifier:CL        Therapy Time   cotx Concurrent Group Co-treatment   Time In 1005         Time Out 1028         Minutes 23                 Co-treatment with OT warranted secondary to decreased safety and independence requiring 2 skilled therapy professionals to address individual discipline's goals. PT addressing preparation for  Transfers, gait and pre gait activities,  sitting balance for increased  sitting/activity tolerance, functional mobility, environmental safety/scanning, fall prevention, functional mobility  transfers and functional LE strength.  Upon writer exit, call light within reach, pt retired to bed. All lines intact and patient positioned comfortably. All patient needs addressed prior to ending therapy session. Chart reviewed prior to treatment and patient is agreeable for therapy.  RN reports patient is medically stable for therapy treatment this date.       BECKY SORTO, PTA, 01/23/24 at 12:33 PM

## 2024-01-23 NOTE — PLAN OF CARE
Patient is A&Ox2. Patient worked with PT/OT, did okay. Patient refused to get out of bed today when offered. Had a very uneventful day. Slept a majority of the day. Plan is for patient to be discharged to a SNF for IV antibiotics and therapy. Patient is a fall risk, bed alarm is on, bed in lowest position, call device within reach.   Problem: Discharge Planning  Goal: Discharge to home or other facility with appropriate resources  1/23/2024 1240 by Zuri Fajardo RN  Outcome: Progressing     Problem: Pain  Goal: Verbalizes/displays adequate comfort level or baseline comfort level  1/23/2024 1240 by Zuri Fajardo RN  Outcome: Progressing     Problem: Skin/Tissue Integrity  Goal: Absence of new skin breakdown  Description: 1.  Monitor for areas of redness and/or skin breakdown  2.  Assess vascular access sites hourly  3.  Every 4-6 hours minimum:  Change oxygen saturation probe site  4.  Every 4-6 hours:  If on nasal continuous positive airway pressure, respiratory therapy assess nares and determine need for appliance change or resting period.  1/23/2024 1240 by Zuri Fajardo RN  Outcome: Progressing     Problem: Safety - Adult  Goal: Free from fall injury  1/23/2024 1240 by Zuri Fajardo, RN  Outcome: Progressing     Problem: Chronic Conditions and Co-morbidities  Goal: Patient's chronic conditions and co-morbidity symptoms are monitored and maintained or improved  1/23/2024 1240 by Zuri Fajardo, RN  Outcome: Progressing

## 2024-01-23 NOTE — CARE COORDINATION
DC Planning    Case discussed with LSW. Spoke with pt at bedside, given cost of Dapto and high risk readmission discussed rehab. Pt does agree to short term rehab. Pt prefers [facility]-close to home. Updated LSW. Echo pending.

## 2024-01-23 NOTE — CARE COORDINATION
Social Work-Case manger met with patient . He would like a referral to [facility]. Sent referral. [facility] approved patient for admission and can admit at [facility]. Roge

## 2024-01-23 NOTE — PROGRESS NOTES
Occupational Therapy  Facility/Department: [facility] PROGRESSIVE CARE  Rehabilitation Occupational Therapy Daily Treatment Note    Date: 24  Patient Name: Ryenaldo Seaman       Room: 1020/1020-01  MRN: [id number]  Account: [id number]   : 1955  (68 y.o.) Gender: male     Past Medical History:  has a past medical history of CVA (cerebral vascular accident) (HCC), Diabetes mellitus (HCC), and Hypertension.  Past Surgical History:   has a past surgical history that includes knee surgery; Carotid endarterectomy (2021); Carotid endarterectomy (N/A, 2021); and IR INSERT PICC VAD W SQ PORT >5 YEARS (2022).    Restrictions  Restrictions/Precautions: Bed Alarm, Fall Risk, General Precautions, Other (comment), Contact Precautions  Other position/activity restrictions: R UE IV; morris catheter; telemetry  Required Braces or Orthoses?: No    Subjective  Subjective: Pt. supine in bed and agreeable for treatment.  Restrictions/Precautions: Bed Alarm;Fall Risk;General Precautions;Other (comment);Contact Precautions  Objective     Cognition  Overall Cognitive Status: Exceptions  Arousal/Alertness: Delayed responses to stimuli  Following Commands: Inconsistently follows commands  Attention Span: Difficulty attending to directions;Attends with cues to redirect  Memory: Decreased recall of precautions  Safety Judgement: Decreased awareness of need for assistance;Decreased awareness of need for safety  Problem Solving: Assistance required to implement solutions;Assistance required to generate solutions  Insights: Not aware of deficits  Initiation: Requires cues for all  Sequencing: Requires cues for all  Cognition Comment: Eyes closed majority of session; pt not responding to visual or auditory stimuli.  Orientation  Overall Orientation Status: Impaired  Orientation Level: Oriented to person         ADL    Functional Mobility  Activity:  (Sitting EOB)  Assistance Level: Maximum assistance;Requires x 2  assistance  Skilled Clinical Factors: Max assist x2 for sitting EOB  Bed Mobility  Overall Assistance Level: Maximum Assistance;Requires x 2 Assistance  Additional Factors: Verbal cues;Increased time to complete;With handrails  Roll Right  Assistance Level: Maximum assistance;Requires x 2 assistance  Skilled Clinical Factors: Max assist x2 for rolling to right to move to sitting up  Sit to Supine  Assistance Level: Maximum assistance;Requires x 2 assistance  Skilled Clinical Factors: Max assist to move to supine to center of bed  Supine to Sit  Assistance Level: Maximum assistance;Requires x 2 assistance  Skilled Clinical Factors: Max assist to push up from bed to sit EOB. Pt. very lethargic and required cues to keep eyes open  Scooting  Assistance Level: Maximum assistance  Skilled Clinical Factors: Max assist x1 to scoot to EOB  Transfers  Surface:  (Not able to stand today.)     Assessment  Assessment  Assessment: Pt. required max assist x2 for bed mobility to move from supine to sit EOB. Pt. displayed increased fatigue and required verbal cues to participate in treatment. Skilled OT warranted to promote I/safety to return pt to prior living arrangement with assist as needed.  Activity Tolerance: Patient limited by fatigue;Treatment limited secondary to decreased cognition  Discharge Recommendations: Patient would benefit from continued therapy after discharge  Safety Devices  Safety Devices in place: Yes  Type of devices: All fall risk precautions in place;Bed alarm in place;Call light within reach;Nurse notified;Left in bed  Restraints  Initially in place: No    Patient Education  Education  Education Given To: Patient  Education Provided: Role of Therapy;Plan of Care;Safety;Mobility Training  Education Provided Comments: Pt. educated on the importance of mobility and safety awareness with bed mobility. Pt. ambivalent with education.  Education Method: Demonstration;Verbal  Barriers to Learning:

## 2024-01-24 LAB
BASOPHILS # BLD: <0.03 K/UL (ref 0–0.2)
BASOPHILS NFR BLD: 0 % (ref 0–2)
EOSINOPHIL # BLD: <0.03 K/UL (ref 0–0.44)
EOSINOPHILS RELATIVE PERCENT: 0 % (ref 1–4)
ERYTHROCYTE [DISTWIDTH] IN BLOOD BY AUTOMATED COUNT: 16.2 % (ref 11.8–14.4)
HCT VFR BLD AUTO: 33.2 % (ref 40.7–50.3)
HGB BLD-MCNC: 11.3 G/DL (ref 13–17)
IMM GRANULOCYTES # BLD AUTO: 0.07 K/UL (ref 0–0.3)
IMM GRANULOCYTES NFR BLD: 1 %
INR PPP: 1.5
LYMPHOCYTES NFR BLD: 1.58 K/UL (ref 1.1–3.7)
LYMPHOCYTES RELATIVE PERCENT: 13 % (ref 24–43)
MCH RBC QN AUTO: 32.8 PG (ref 25.2–33.5)
MCHC RBC AUTO-ENTMCNC: 34 G/DL (ref 28.4–34.8)
MCV RBC AUTO: 96.2 FL (ref 82.6–102.9)
MONOCYTES NFR BLD: 0.75 K/UL (ref 0.1–1.2)
MONOCYTES NFR BLD: 6 % (ref 3–12)
NEUTROPHILS NFR BLD: 80 % (ref 36–65)
NEUTS SEG NFR BLD: 9.81 K/UL (ref 1.5–8.1)
NRBC BLD-RTO: 0 PER 100 WBC
PLATELET # BLD AUTO: 290 K/UL (ref 138–453)
PMV BLD AUTO: 9.7 FL (ref 8.1–13.5)
PROTHROMBIN TIME: 17.4 SEC (ref 11.5–14.2)
RBC # BLD AUTO: 3.45 M/UL (ref 4.21–5.77)
RBC # BLD: ABNORMAL 10*6/UL
WBC OTHER # BLD: 12.3 K/UL (ref 3.5–11.3)

## 2024-01-24 PROCEDURE — 97535 SELF CARE MNGMENT TRAINING: CPT

## 2024-01-24 PROCEDURE — 6360000002 HC RX W HCPCS: Performed by: NURSE PRACTITIONER

## 2024-01-24 PROCEDURE — 85610 PROTHROMBIN TIME: CPT

## 2024-01-24 PROCEDURE — 2580000003 HC RX 258: Performed by: NURSE PRACTITIONER

## 2024-01-24 PROCEDURE — 97530 THERAPEUTIC ACTIVITIES: CPT

## 2024-01-24 PROCEDURE — 05HB33Z INSERTION OF INFUSION DEVICE INTO RIGHT BASILIC VEIN, PERCUTANEOUS APPROACH: ICD-10-PCS | Performed by: FAMILY MEDICINE

## 2024-01-24 PROCEDURE — 6370000000 HC RX 637 (ALT 250 FOR IP): Performed by: FAMILY MEDICINE

## 2024-01-24 PROCEDURE — 99232 SBSQ HOSP IP/OBS MODERATE 35: CPT | Performed by: NURSE PRACTITIONER

## 2024-01-24 PROCEDURE — 2060000000 HC ICU INTERMEDIATE R&B

## 2024-01-24 PROCEDURE — 6370000000 HC RX 637 (ALT 250 FOR IP): Performed by: UROLOGY

## 2024-01-24 PROCEDURE — 85025 COMPLETE CBC W/AUTO DIFF WBC: CPT

## 2024-01-24 PROCEDURE — 36415 COLL VENOUS BLD VENIPUNCTURE: CPT

## 2024-01-24 PROCEDURE — 6370000000 HC RX 637 (ALT 250 FOR IP): Performed by: NURSE PRACTITIONER

## 2024-01-24 RX ADMIN — ATENOLOL 25 MG: 25 TABLET ORAL at 09:47

## 2024-01-24 RX ADMIN — DAPTOMYCIN 700 MG: 500 INJECTION, POWDER, LYOPHILIZED, FOR SOLUTION INTRAVENOUS at 06:29

## 2024-01-24 RX ADMIN — Medication 100 MG: at 09:47

## 2024-01-24 RX ADMIN — AMLODIPINE BESYLATE 5 MG: 5 TABLET ORAL at 09:46

## 2024-01-24 RX ADMIN — ACETAMINOPHEN 650 MG: 325 TABLET ORAL at 09:46

## 2024-01-24 RX ADMIN — AMLODIPINE BESYLATE 5 MG: 5 TABLET ORAL at 21:58

## 2024-01-24 RX ADMIN — ENOXAPARIN SODIUM 40 MG: 100 INJECTION SUBCUTANEOUS at 09:47

## 2024-01-24 RX ADMIN — PAROXETINE HYDROCHLORIDE 20 MG: 20 TABLET, FILM COATED ORAL at 09:47

## 2024-01-24 RX ADMIN — SODIUM CHLORIDE, PRESERVATIVE FREE 10 ML: 5 INJECTION INTRAVENOUS at 21:55

## 2024-01-24 RX ADMIN — TAMSULOSIN HYDROCHLORIDE 0.4 MG: 0.4 CAPSULE ORAL at 09:47

## 2024-01-24 RX ADMIN — SODIUM CHLORIDE, PRESERVATIVE FREE 10 ML: 5 INJECTION INTRAVENOUS at 09:48

## 2024-01-24 ASSESSMENT — PAIN DESCRIPTION - LOCATION
LOCATION: BACK

## 2024-01-24 ASSESSMENT — PAIN SCALES - GENERAL
PAINLEVEL_OUTOF10: 5
PAINLEVEL_OUTOF10: 2
PAINLEVEL_OUTOF10: 2
PAINLEVEL_OUTOF10: 4

## 2024-01-24 ASSESSMENT — PAIN DESCRIPTION - ORIENTATION
ORIENTATION: LOWER

## 2024-01-24 ASSESSMENT — PAIN DESCRIPTION - DESCRIPTORS
DESCRIPTORS: ACHING

## 2024-01-24 ASSESSMENT — PAIN - FUNCTIONAL ASSESSMENT
PAIN_FUNCTIONAL_ASSESSMENT: ACTIVITIES ARE NOT PREVENTED
PAIN_FUNCTIONAL_ASSESSMENT: ACTIVITIES ARE NOT PREVENTED

## 2024-01-24 NOTE — PROGRESS NOTES
Physical Therapy  Facility/Department: [facility]  Rehabilitation Physical Therapy Treatment Note    NAME: Reynaldo Seaman  : 1955 (68 y.o.)  MRN: [id number]  CODE STATUS: Full Code    Date of Service: 24       Restrictions:  Restrictions/Precautions: Bed Alarm, Fall Risk, General Precautions, Other (comment), Contact Precautions  Position Activity Restriction  Other position/activity restrictions: R UE IV; morris catheter; telemetry     SUBJECTIVE  Subjective  Subjective: pt in bed agreeable to PT               OBJECTIVE  Cognition  Overall Cognitive Status: Exceptions  Arousal/Alertness: Delayed responses to stimuli  Following Commands: Follows one step commands with repetition  Attention Span: Difficulty attending to directions;Attends with cues to redirect  Memory: Decreased recall of precautions  Safety Judgement: Decreased awareness of need for assistance;Decreased awareness of need for safety  Problem Solving: Assistance required to implement solutions;Assistance required to generate solutions  Insights: Not aware of deficits  Initiation: Requires cues for all  Sequencing: Requires cues for all  Cognition Comment: Pt. displayed increased alertness today and opened eyes most of session  Orientation  Overall Orientation Status: Impaired  Orientation Level: Oriented to person    Functional Mobility  Bed Mobility  Overall Assistance Level: Maximum Assistance;Requires x 2 Assistance  Roll Left  Assistance Level: Maximum assistance;Requires x 2 assistance  Roll Right  Assistance Level: Maximum assistance;Requires x 2 assistance;Moderate assistance  Sit to Supine  Assistance Level: Maximum assistance;Requires x 2 assistance  Supine to Sit  Assistance Level: Moderate assistance;Requires x 2 assistance  Scooting  Assistance Level: Moderate assistance;Requires x 2 assistance  Transfers  Surface: From bed;To bed  Additional Factors: Verbal cues      Environmental Mobility  Ambulation  Surface: Level  surface  Device: Rolling walker  Distance: 2 steps to HOB  Activity: Within Room  Additional Factors: Verbal cues  Assistance Level: Minimal assistance;Moderate assistance;Requires x 2 assistance  Gait Deviations: Decreased step length bilateral;Unsteady gait             PT Exercises  Exercise Treatment: seated LE jaquan and MARTIN's x 20 reps      ASSESSMENT/PROGRESS TOWARDS GOALS  Vital Signs  BP Location: Left upper arm  O2 Device: None (Room air)    Assessment  Assessment: pt able to increase activity tolerance this session. amb. 2 ft to HOB with 2 assist and RW  Activity Tolerance: Patient tolerated treatment well;Patient limited by endurance  Discharge Recommendations: Patient would benefit from continued therapy after discharge    Goals  Patient Goals   Patient Goals : to return to PLOF  Short Term Goals  Time Frame for Short Term Goals: 12 visits  Short Term Goal 1: Patient will demonstrate supine <> sit EOB with MI  Short Term Goal 2: Patient will sit EOB unsupported with good sitting balance for > 15 minutes  Short Term Goal 3: Patient to be re-evaluated to assess gait/transfers when appropriate.  Short Term Goal 4: Patient will be indep with HEP for B LE following handout to prepare for transfers/gait.    PLAN OF CARE/SAFETY  Physical Therapy Plan  General Plan: 5-7 times per week  Current Treatment Recommendations: Strengthening;Balance training;Functional mobility training;Transfer training;Home exercise program;Safety education & training;Patient/Caregiver education & training;Therapeutic activities  Safety Devices  Type of Devices: Call light within reach;Sitter present;Nurse notified;Gait belt;Patient at risk for falls;Left in bed    EDUCATION  Education  Education Given To: Patient  Education Provided: Role of Therapy;Safety;Plan of Care;ADL Function;Home Exercise Program;IADL Function;Precautions;Mobility Training  Education Method: Verbal  Barriers to Learning: Cognition  Education Outcome:

## 2024-01-24 NOTE — CARE COORDINATION
Discharge Planning    Received phone call from Seema lopez [facility] and she states pt's PCP office contacted her and said they would not sign orders for further home care as pt's last visit with them was July 2023 and he needs to be seen in the office before they will sign for anymore home care.

## 2024-01-24 NOTE — PROGRESS NOTES
Harrison Salazar MD   Urology Progress Note            Subjective: Follow-up urinary retention indwelling Peterson    Patient Vitals for the past 24 hrs:   BP Temp Temp src Pulse Resp SpO2   01/24/24 0532 125/75 99.1 °F (37.3 °C) -- 85 16 95 %   01/24/24 0117 116/72 98.6 °F (37 °C) -- 85 16 94 %   01/23/24 1949 121/73 100.2 °F (37.9 °C) -- 82 16 96 %   01/23/24 1547 120/64 99.5 °F (37.5 °C) Oral 77 18 94 %   01/23/24 1124 139/69 98.2 °F (36.8 °C) Oral 79 16 95 %   01/23/24 0825 126/63 99.1 °F (37.3 °C) Oral 87 16 93 %       Intake/Output Summary (Last 24 hours) at 1/24/2024 0713  Last data filed at 1/24/2024 0117  Gross per 24 hour   Intake --   Output 650 ml   Net -650 ml       Recent Labs     01/22/24  0528 01/23/24  0509 01/24/24  0504   WBC 8.8 13.7* 12.3*   HGB 12.7* 12.4* 11.3*   HCT 37.6* 36.4* 33.2*   MCV 98.9 97.1 96.2    279 290     Recent Labs     01/22/24  0528   *   K 4.3   CL 99   CO2 25   BUN 7*   CREATININE 0.9       No results for input(s): \"COLORU\", \"PHUR\", \"LABCAST\", \"WBCUA\", \"RBCUA\", \"MUCUS\", \"TRICHOMONAS\", \"YEAST\", \"BACTERIA\", \"CLARITYU\", \"SPECGRAV\", \"LEUKOCYTESUR\", \"UROBILINOGEN\", \"BILIRUBINUR\", \"BLOODU\" in the last 72 hours.    Invalid input(s): \"NITRATE\", \"GLUCOSEUKETONESUAMORPHOUS\"    Additional Lab/culture results:    Physical Exam: Patient seen in conjunction with nursing staff, discussed with the patient catheter removal and void trial    He still very weak he has not been able to get out of bed he requested to maintain the indwelling Peterson    Interval Imaging Findings:    Impression:    Patient Active Problem List   Diagnosis    ADRIANA (acute kidney injury) (HCC)    Acute ischemic left PCA stroke (HCC)    Right sided weakness    Seizure (HCC)    Mild malnutrition (HCC)    Urinary retention       Plan: Maintain indwelling catheter at this time patient weak unable to get out of bed for void trial    Harrison Salazar MD  7:13 AM 1/24/2024

## 2024-01-24 NOTE — CARE COORDINATION
Social Work- Contacted [facility]. They are currently full. Will check in the morning regarding bed availability Roge

## 2024-01-24 NOTE — FLOWSHEET NOTE
Midline placed into right basilic vein placement verified per hospital policy line is ready for use as needed

## 2024-01-24 NOTE — PROGRESS NOTES
[facility]  Progress Note    Reynaldo Seaman  MRN: [id number]  Date: 1/24/2024  LOS: 4     Reason for F/U :   VRE bacteremia    Impression :   Vancomycin-resistant Enterococcus faecium bacteremia  Cystitis, likely due to above  Urinary retention/Peterson catheter placement  EtOH abuse with concern for withdrawal  History of CVA with residual right-sided weakness  Diabetes mellitus  Hypertension    Recommendations:   The patient continues on IV antimicrobial therapy with daptomycin  2D echocardiogram ordered without evidence of valvular vegetation  The patient will need a 14-day course of therapy  I have written a prescription for the daptomycin  I have placed an order for to the midline    Infection Control Recommendations:   Contact precautions    Discharge Planning:   Estimated Length of IV antimicrobials: 14 days  Patient will need Midline Catheter Insertion  Patient will need: Home IV , Infusion Center,  SNF,  LTAC: Undetermined  Patient willneed outpatient wound care: No    Medical Decision making / Summary of Stay:   Reynaldo Seaman is a 68 y.o.-year-old male presented to the hospital with lower abdominal cramping associated with dysuria, generalized fatigue for 4 days prior to admission.  Symptoms moderate to severe, no alleviating or aggravating factors.  The patient was found to have urinary retention, Peterson catheter was placed.  1/19/2024 urinalysis showed 5-10 WBC, trace leukocyte esterase.,  No growth on urine culture  Blood cultures 1/20/ 2024   1 of 2 grew vancomycin-resistant Enterococcus faecium.  US RETROPERITONEAL COMPLETE   Final Result   1. 2.1 cm simple cyst the upper pole the left kidney.  The kidneys appear   otherwise sonographically normal, but a portion of the lower pole the right   kidney is obscured by bowel gas.  No follow-up imaging for this cyst is   recommended.   2. No hydronephrosis.      The patient was recently hospitalized with UTI, urine culture on 12/22/2023  Oral Daily       Electronically signed by CHARLENE LIGHT CNP on 1/24/2024 at 8:07 AM      [facility]  CHARLENE LIGHT CNP  [employer] messaging  OFFICE: [phone]    Thank you for allowing us to participate in the care of this patient. Please call with questions.    This note is created with the assistance of a speech recognition program.  While intending to generate a document that actually reflects the content of the visit, the document can still have some errors including those of syntax and sound a like substitutions which may escape proof reading.  In such instances, actual meaning can be extrapolated by contextual diversion.

## 2024-01-24 NOTE — PROGRESS NOTES
Occupational Therapy  Facility/Department: [facility] PROGRESSIVE CARE  Rehabilitation Occupational Therapy Daily Treatment Note    Date: 24  Patient Name: Reynaldo Seaman       Room: 1020/1020-01  MRN: [id number]  Account: [id number]   : 1955  (68 y.o.) Gender: male     Past Medical History:  has a past medical history of CVA (cerebral vascular accident) (HCC), Diabetes mellitus (HCC), and Hypertension.  Past Surgical History:   has a past surgical history that includes knee surgery; Carotid endarterectomy (2021); Carotid endarterectomy (N/A, 2021); and IR INSERT PICC VAD W SQ PORT >5 YEARS (2022).    Restrictions  Restrictions/Precautions: Bed Alarm, Fall Risk, General Precautions, Other (comment), Contact Precautions  Other position/activity restrictions: R UE IV; morris catheter; telemetry  Required Braces or Orthoses?: No    Subjective  Subjective: Pt. supine in bed and agreeable for treatment  Restrictions/Precautions: Bed Alarm;Fall Risk;General Precautions;Other (comment);Contact Precautions    Objective     Cognition  Overall Cognitive Status: Exceptions  Arousal/Alertness: Delayed responses to stimuli  Following Commands: Follows one step commands with repetition  Attention Span: Difficulty attending to directions;Attends with cues to redirect  Memory: Decreased recall of precautions  Safety Judgement: Decreased awareness of need for assistance;Decreased awareness of need for safety  Problem Solving: Assistance required to implement solutions;Assistance required to generate solutions  Insights: Not aware of deficits  Initiation: Requires cues for all  Sequencing: Requires cues for all  Cognition Comment: Pt. displayed increased alertness today and opened eyes most of session  Orientation  Overall Orientation Status: Impaired  Orientation Level: Oriented to person         ADL  Grooming/Oral Hygiene  Assistance Level: Stand by assist  Skilled Clinical Factors: Pt. completed simple  grooming with washing face with SBA while sitting unsupported at EOB.  Upper Extremity Dressing  Assistance Level: Moderate assistance  Skilled Clinical Factors: Mod assist with gown management  Lower Extremity Dressing  Assistance Level: Maximum assistance  Skilled Clinical Factors: Max assist with donning socks.  Toileting  Skilled Clinical Factors: No need at this time.    Functional Mobility  Device: Rolling walker  Assistance Level: Moderate assistance;Requires x 2 assistance  Skilled Clinical Factors: Pt. completes standing at bedside with mod assist x2 with use of RW.  Bed Mobility  Overall Assistance Level: Minimal Assistance;Moderate Assistance;Requires x 2 Assistance  Additional Factors: Verbal cues;Increased time to complete;With handrails  Roll Left  Assistance Level: Minimal assistance  Skilled Clinical Factors: Min assist with rolling side to side with use of grab bars.  Roll Right  Assistance Level: Minimal assistance  Sit to Supine  Assistance Level: Minimal assistance;Moderate assistance;Requires x 2 assistance  Skilled Clinical Factors: Min/mod assist to sit EOB with extra time and verbal cues on tech to lift legs to center of bed.  Supine to Sit  Assistance Level: Minimal assistance  Skilled Clinical Factors: Min assist x2 with max encouragement on sitting up and participating in therapy.  Scooting  Assistance Level: Moderate assistance  Skilled Clinical Factors: Mod assist x1 to scoot to EOB  Transfers  Surface: From bed;To bed  Additional Factors: Hand placement cues;Increased time to complete;Verbal cues;Set-up  Device: Walker  Sit to Stand  Assistance Level: Minimal assistance;Moderate assistance;Requires x 2 assistance  Skilled Clinical Factors: Pt. completed sit to stand from EOB with min/mod assist with use of RW and verbal cues to push up from sitting surface.  Stand to Sit  Assistance Level: Minimal assistance;Moderate assistance  Skilled Clinical Factors: Min/mod assist x2 with max

## 2024-01-24 NOTE — DISCHARGE SUMMARY
DISCHARGE SUMMARY  [facility].,    Adult Hospitalist      Patient ID: Reynaldo Seaman  MRN: [id number]     Acct:  [id number]       Patient's PCP: Imani Bolivar APRN - CNP    Admit Date: 1/19/2024     Discharge Date:   ***    Admitting Physician: Shay Hawkins MD    Discharge Physician: LILIAM MURILLO MD     CONSULTANTS: Patient Care Team:  Imani Bolivar APRN - CNP as PCP - General (Nurse Practitioner)    PROCEDURES PERFORMED: ***        Primary Problem  Urinary retention      HPI: ***              Active Discharge Diagnoses with hospital course:      Patient is discharged to home with home health care  Patient discharged on IV antibiotics noticed to have septicemia  Patient is discharged on antimicrobial therapy to complete a 14-day course, DC on daptomycin for 10 more days        At the time of discharge patient was hemodynamically stable and asymptomatic.    The plan was discussed in detail with patient who agreed with the plan and verbalized understanding .    The patient was seen and examined on day of discharge and this discharge summary is in conjunction with any daily progress note from day of discharge.    Hospital Data:    Labs:    Hematology:  Recent Labs     01/22/24  0528 01/23/24  0509 01/24/24  0504   WBC 8.8 13.7* 12.3*   RBC 3.80* 3.75* 3.45*   HGB 12.7* 12.4* 11.3*   HCT 37.6* 36.4* 33.2*   MCV 98.9 97.1 96.2   MCH 33.4 33.1 32.8   MCHC 33.8 34.1 34.0   RDW 15.9* 16.1* 16.2*    279 290   MPV 9.9 9.9 9.7   INR 1.2 1.4 1.5     Chemistry:  Recent Labs     01/22/24  0528   *   K 4.3   CL 99   CO2 25   GLUCOSE 155*   BUN 7*   CREATININE 0.9   ANIONGAP 9   LABGLOM >60   CALCIUM 7.7*     No results for input(s): \"PROT\", \"LABALBU\", \"LABA1C\", \"Z4VQFIT\", \"I0SZJLZ\", \"FT4\", \"TSH\", \"AST\", \"ALT\", \"LDH\", \"GGT\", \"ALKPHOS\", \"LABGGT\", \"BILITOT\", \"BILIDIR\", \"AMMONIA\", \"AMYLASE\", \"LIPASE\", \"LACTATE\", \"CHOL\", \"HDL\", \"LDLCHOLESTEROL\", \"CHOLHDLRATIO\", \"TRIG\", \"VLDL\", \"WPC83EC\",  PROTIME 15.1 (H) 2024     Lab Results   Component Value Date/Time    SPECIAL 20ML LT AC 2024 04:52 AM     Lab Results   Component Value Date/Time    CULTURE NO GROWTH 3 DAYS 2024 04:52 AM       Lab Results   Component Value Date/Time    POCPH 7.430 2022 11:00 AM    POCPCO2 35.2 2022 11:00 AM    POCPO2 79.7 2022 11:00 AM    POCHCO3 23.4 2022 11:00 AM    NBEA 1 2022 11:00 AM    PBEA 0 2022 12:45 PM    WWVA4PIG 96 2022 11:00 AM    FIO2 30.0 2022 11:00 AM       Radiology:    CT ABDOMEN PELVIS W IV CONTRAST Additional Contrast? None    Result Date: 2024  1. No acute intra-abdominal or intrapelvic process is identified. 2. There is a Peterson catheter in the urinary bladder which is empty but shows significantly thickened wall diffusely, likely representing cystitis. 3. Mild diverticulosis of the cecum and ascending colon without evidence of diverticulitis. 4. Normal appendix. 5. Focal irregular opacity at the left posterior costophrenic sulcus has the appearance of atelectasis or possibly tiny focal infiltrate.  Follow-up noncontrast chest CT recommended in 3 months.         All radiological studies reviewed      Reviews of Symptoms:    A 10 point system is reviewed and  negative except described in hospital course    Physical Exam:    Vitals:  /70   Pulse 70   Temp 97.3 °F (36.3 °C)   Resp 16   Ht 1.702 m (5' 7\")   Wt 68.5 kg (151 lb)   SpO2 97%   BMI 23.65 kg/m²   Temp (24hrs), Av.6 °F (37 °C), Min:97.3 °F (36.3 °C), Max:100.2 °F (37.9 °C)      General appearance - alert, well appearing, and in no acute distress  Mental status - oriented to person, place, and time with normal affect  Head - normocephalic and atraumatic  Eyes - pupils equal and reactive, extraocular eye movements intact, conjunctiva clear  Ears - hearing appears to be intact  Nose - no drainage noted  Mouth - mucous membranes moist  Neck - supple, no carotid bruits,

## 2024-01-24 NOTE — PLAN OF CARE
Problem: Discharge Planning  Goal: Discharge to home or other facility with appropriate resources  Outcome: Progressing  Discharge to home or other facility with appropriate resources: Identify barriers to discharge with patient and caregiver      Problem: Pain  Goal: Verbalizes/displays adequate comfort level or baseline comfort level  Outcome: Progressing  Verbalizes/displays adequate comfort level or baseline comfort level:   Encourage patient to monitor pain and request assistance   Assess pain using appropriate pain scale   Administer analgesics based on type and severity of pain and evaluate response      Problem: Skin/Tissue Integrity  Goal: Absence of new skin breakdown  Description: 1.  Monitor for areas of redness and/or skin breakdown  2.  Assess vascular access sites hourly  3.  Every 4-6 hours minimum:  Change oxygen saturation probe site  4.  Every 4-6 hours:  If on nasal continuous positive airway pressure, respiratory therapy assess nares and determine need for appliance change or resting period.  Outcome: Progressing      Problem: ABCDS Injury Assessment  Goal: Absence of physical injury  Outcome: Progressing  Absence of Physical Injury: Implement safety measures based on patient assessment

## 2024-01-25 ENCOUNTER — APPOINTMENT (OUTPATIENT)
Dept: INTERVENTIONAL RADIOLOGY/VASCULAR | Age: 69
DRG: 696 | End: 2024-01-25
Payer: MEDICARE

## 2024-01-25 VITALS
RESPIRATION RATE: 18 BRPM | BODY MASS INDEX: 23.7 KG/M2 | SYSTOLIC BLOOD PRESSURE: 136 MMHG | HEIGHT: 67 IN | DIASTOLIC BLOOD PRESSURE: 67 MMHG | OXYGEN SATURATION: 97 % | WEIGHT: 151 LBS | TEMPERATURE: 98.1 F | HEART RATE: 81 BPM

## 2024-01-25 LAB
BASOPHILS # BLD: 0.03 K/UL (ref 0–0.2)
BASOPHILS NFR BLD: 0 % (ref 0–2)
EOSINOPHIL # BLD: 0.04 K/UL (ref 0–0.44)
EOSINOPHILS RELATIVE PERCENT: 0 % (ref 1–4)
ERYTHROCYTE [DISTWIDTH] IN BLOOD BY AUTOMATED COUNT: 15.9 % (ref 11.8–14.4)
HCT VFR BLD AUTO: 32.4 % (ref 40.7–50.3)
HGB BLD-MCNC: 11.2 G/DL (ref 13–17)
IMM GRANULOCYTES # BLD AUTO: 0.04 K/UL (ref 0–0.3)
IMM GRANULOCYTES NFR BLD: 0 %
INR PPP: 1.4
LYMPHOCYTES NFR BLD: 1.26 K/UL (ref 1.1–3.7)
LYMPHOCYTES RELATIVE PERCENT: 12 % (ref 24–43)
MCH RBC QN AUTO: 33.2 PG (ref 25.2–33.5)
MCHC RBC AUTO-ENTMCNC: 34.6 G/DL (ref 28.4–34.8)
MCV RBC AUTO: 96.1 FL (ref 82.6–102.9)
MICROORGANISM SPEC CULT: NORMAL
MONOCYTES NFR BLD: 0.5 K/UL (ref 0.1–1.2)
MONOCYTES NFR BLD: 5 % (ref 3–12)
NEUTROPHILS NFR BLD: 83 % (ref 36–65)
NEUTS SEG NFR BLD: 8.9 K/UL (ref 1.5–8.1)
NRBC BLD-RTO: 0 PER 100 WBC
PLATELET # BLD AUTO: 336 K/UL (ref 138–453)
PMV BLD AUTO: 9.8 FL (ref 8.1–13.5)
PROTHROMBIN TIME: 17.1 SEC (ref 11.5–14.2)
RBC # BLD AUTO: 3.37 M/UL (ref 4.21–5.77)
RBC # BLD: ABNORMAL 10*6/UL
SERVICE CMNT-IMP: NORMAL
SPECIMEN DESCRIPTION: NORMAL
WBC OTHER # BLD: 10.8 K/UL (ref 3.5–11.3)

## 2024-01-25 PROCEDURE — 99232 SBSQ HOSP IP/OBS MODERATE 35: CPT | Performed by: NURSE PRACTITIONER

## 2024-01-25 PROCEDURE — 97116 GAIT TRAINING THERAPY: CPT

## 2024-01-25 PROCEDURE — 2580000003 HC RX 258: Performed by: NURSE PRACTITIONER

## 2024-01-25 PROCEDURE — 6370000000 HC RX 637 (ALT 250 FOR IP): Performed by: NURSE PRACTITIONER

## 2024-01-25 PROCEDURE — 97535 SELF CARE MNGMENT TRAINING: CPT

## 2024-01-25 PROCEDURE — 85610 PROTHROMBIN TIME: CPT

## 2024-01-25 PROCEDURE — 6370000000 HC RX 637 (ALT 250 FOR IP): Performed by: UROLOGY

## 2024-01-25 PROCEDURE — 6360000002 HC RX W HCPCS: Performed by: NURSE PRACTITIONER

## 2024-01-25 PROCEDURE — 97530 THERAPEUTIC ACTIVITIES: CPT

## 2024-01-25 PROCEDURE — C1751 CATH, INF, PER/CENT/MIDLINE: HCPCS

## 2024-01-25 PROCEDURE — 36415 COLL VENOUS BLD VENIPUNCTURE: CPT

## 2024-01-25 PROCEDURE — 6370000000 HC RX 637 (ALT 250 FOR IP): Performed by: FAMILY MEDICINE

## 2024-01-25 PROCEDURE — 36410 VNPNXR 3YR/> PHY/QHP DX/THER: CPT

## 2024-01-25 PROCEDURE — 85025 COMPLETE CBC W/AUTO DIFF WBC: CPT

## 2024-01-25 PROCEDURE — 76937 US GUIDE VASCULAR ACCESS: CPT

## 2024-01-25 PROCEDURE — 97110 THERAPEUTIC EXERCISES: CPT

## 2024-01-25 RX ADMIN — DAPTOMYCIN 700 MG: 500 INJECTION, POWDER, LYOPHILIZED, FOR SOLUTION INTRAVENOUS at 05:58

## 2024-01-25 RX ADMIN — AMLODIPINE BESYLATE 5 MG: 5 TABLET ORAL at 11:14

## 2024-01-25 RX ADMIN — PAROXETINE HYDROCHLORIDE 20 MG: 20 TABLET, FILM COATED ORAL at 11:14

## 2024-01-25 RX ADMIN — ATENOLOL 25 MG: 25 TABLET ORAL at 11:14

## 2024-01-25 RX ADMIN — Medication 100 MG: at 11:14

## 2024-01-25 RX ADMIN — ENOXAPARIN SODIUM 40 MG: 100 INJECTION SUBCUTANEOUS at 11:24

## 2024-01-25 RX ADMIN — TAMSULOSIN HYDROCHLORIDE 0.4 MG: 0.4 CAPSULE ORAL at 11:14

## 2024-01-25 RX ADMIN — SODIUM CHLORIDE, PRESERVATIVE FREE 10 ML: 5 INJECTION INTRAVENOUS at 11:14

## 2024-01-25 NOTE — PROGRESS NOTES
Pt had an uneventful night with no falls or injuries. CIWA score was 0. He remained in bed for entire shift, vitals were stable and urine output for shift was 200mL.      Problem: Discharge Planning  Goal: Discharge to home or other facility with appropriate resources  Outcome: Progressing  Discharge to home or other facility with appropriate resources: Identify barriers to discharge with patient and caregiver        Problem: Pain  Goal: Verbalizes/displays adequate comfort level or baseline comfort level  Outcome: Progressing  Verbalizes/displays adequate comfort level or baseline comfort level:   Encourage patient to monitor pain and request assistance   Assess pain using appropriate pain scale   Administer analgesics based on type and severity of pain and evaluate response        Problem: Skin/Tissue Integrity  Goal: Absence of new skin breakdown  Description: 1.  Monitor for areas of redness and/or skin breakdown  2.  Assess vascular access sites hourly  3.  Every 4-6 hours minimum:  Change oxygen saturation probe site  4.  Every 4-6 hours:  If on nasal continuous positive airway pressure, respiratory therapy assess nares and determine need for appliance change or resting period.  Outcome: Progressing        Problem: ABCDS Injury Assessment  Goal: Absence of physical injury  Outcome: Progressing  Absence of Physical Injury: Implement safety measures based on patient assessment

## 2024-01-25 NOTE — PROGRESS NOTES
Harrison Salazar MD   Urology Progress Note            Subjective: Follow-up urinary retention neurogenic bladder    Patient Vitals for the past 24 hrs:   BP Temp Temp src Pulse Resp SpO2   01/24/24 2340 114/76 98.8 °F (37.1 °C) Oral 77 19 96 %   01/24/24 1942 (!) 142/61 98.2 °F (36.8 °C) Oral 70 16 97 %   01/24/24 1518 121/70 97.3 °F (36.3 °C) -- 70 16 97 %   01/24/24 1126 138/70 98.2 °F (36.8 °C) Oral 74 18 95 %   01/24/24 0945 (!) 148/75 -- -- 82 -- --   01/24/24 0736 117/68 97.9 °F (36.6 °C) Oral 81 16 96 %   01/24/24 0532 125/75 99.1 °F (37.3 °C) -- 85 16 95 %   01/24/24 0117 116/72 98.6 °F (37 °C) -- 85 16 94 %       Intake/Output Summary (Last 24 hours) at 1/24/2024 2348  Last data filed at 1/24/2024 1819  Gross per 24 hour   Intake --   Output 1175 ml   Net -1175 ml       Recent Labs     01/22/24  0528 01/23/24  0509 01/24/24  0504   WBC 8.8 13.7* 12.3*   HGB 12.7* 12.4* 11.3*   HCT 37.6* 36.4* 33.2*   MCV 98.9 97.1 96.2    279 290     Recent Labs     01/22/24  0528   *   K 4.3   CL 99   CO2 25   BUN 7*   CREATININE 0.9       No results for input(s): \"COLORU\", \"PHUR\", \"LABCAST\", \"WBCUA\", \"RBCUA\", \"MUCUS\", \"TRICHOMONAS\", \"YEAST\", \"BACTERIA\", \"CLARITYU\", \"SPECGRAV\", \"LEUKOCYTESUR\", \"UROBILINOGEN\", \"BILIRUBINUR\", \"BLOODU\" in the last 72 hours.    Invalid input(s): \"NITRATE\", \"GLUCOSEUKETONESUAMORPHOUS\"    Additional Lab/culture results:    Physical Exam: Patient seen in conjunction with nursing staff urinary retention and hypotonic bladder indwelling Peterson  Discussed with the patient catheter removal and void trial prior to discharge    Interval Imaging Findings:    Impression:    Patient Active Problem List   Diagnosis    ADRIANA (acute kidney injury) (HCC)    Acute ischemic left PCA stroke (HCC)    Right sided weakness    Seizure (HCC)    Mild malnutrition (HCC)    Urinary retention       Plan: Maintain indwelling Peterson until ready for discharge    Harrison Salazar,

## 2024-01-25 NOTE — PROGRESS NOTES
[facility]  Progress Note    Reynaldo Seaman  MRN: [id number]  Date: 1/25/2024  LOS: 5   Reason for F/U :   VRE bacteremia     Impression :   Vancomycin-resistant Enterococcus faecium bacteremia  Cystitis, likely due to above  Urinary retention/Peterson catheter placement  EtOH abuse with concern for withdrawal  History of CVA with residual right-sided weakness  Diabetes mellitus  Hypertension     Recommendations:   The patient continues on IV antimicrobial therapy with daptomycin  2D echocardiogram ordered without evidence of valvular vegetation  The patient will need a 14-day course of therapy   Current discharge plans are for the patient to go to [facility]     Infection Control Recommendations:   Contact precautions       Discharge Planning:   Estimated Length of IV antimicrobials: 14 days  Patient will need Midline Catheter Insertion  Patient will need: Home IV , Infusion Center,  SNF,  LTAC: Undetermined  Patient willneed outpatient wound care: No    Medical Decision making / Summary of Stay:   Reynaldo Seaman is a 68 y.o.-year-old male presented to the hospital with lower abdominal cramping associated with dysuria, generalized fatigue for 4 days prior to admission.  Symptoms moderate to severe, no alleviating or aggravating factors.  The patient was found to have urinary retention, Peterson catheter was placed.  1/19/2024 urinalysis showed 5-10 WBC, trace leukocyte esterase.,  No growth on urine culture  Blood cultures 1/20/ 2024   1 of 2 grew vancomycin-resistant Enterococcus faecium.  US RETROPERITONEAL COMPLETE   Final Result   1. 2.1 cm simple cyst the upper pole the left kidney.  The kidneys appear   otherwise sonographically normal, but a portion of the lower pole the right   kidney is obscured by bowel gas.  No follow-up imaging for this cyst is   recommended.   2. No hydronephrosis.      The patient was recently hospitalized with UTI, urine culture on 12/22/2023 grew E. coli, was  Collected: 01/21/24 0452   Order Status: Completed Specimen: Blood Updated: 01/24/24 1016    Specimen Description .BLOOD    Special Requests 20ML LT AC    Culture NO GROWTH 3 DAYS   Culture, Blood 1 [[id number]] Collected: 01/20/24 0010   Order Status: Completed Specimen: Blood Updated: 01/24/24 1002    Specimen Description .BLOOD    Special Requests LT AC, 20 ML    Culture NO GROWTH 4 DAYS       Medications:      DAPTOmycin (CUBICIN) 700 mg in sodium chloride 0.9 % 50 mL IVPB  10 mg/kg (Order-Specific) IntraVENous Q24H    potassium chloride  40 mEq Oral Once    nicotine  1 patch TransDERmal Daily    sodium chloride  500 mL IntraVENous Once    enoxaparin  40 mg SubCUTAneous Daily    sodium chloride flush  5-40 mL IntraVENous 2 times per day    thiamine  100 mg Oral Daily    amLODIPine  5 mg Oral BID    atenolol  25 mg Oral Daily    PARoxetine  20 mg Oral QAM    tamsulosin  0.4 mg Oral Daily       Electronically signed by CHARLENE LIGHT CNP on 1/25/2024 at 8:42 AM      [facility]  CHARLENE LIGHT CNP  [employer] messaging  OFFICE: [phone]    Thank you for allowing us to participate in the care of this patient. Please call with questions.    This note is created with the assistance of a speech recognition program.  While intending to generate a document that actually reflects the content of the visit, the document can still have some errors including those of syntax and sound a like substitutions which may escape proof reading.  In such instances, actual meaning can be extrapolated by contextual diversion.

## 2024-01-25 NOTE — PLAN OF CARE
Pt A&O to self during shift and has remained free from falls bed alarm utilized.   Pt had complaint of back pain this morning given PRN Tylenol and had no other complaints throughout shift.   No new evidence of skin breakdown noted pt has redness in groin barrier cream applied to protect skin.   VS stable and pt normal sinus on monitor.   Pt had midline placed today to be discharged with antibiotics.   Peterson for retention remains patent with good output during this shift.   [facility]   Problem: Discharge Planning  Goal: Discharge to home or other facility with appropriate resources  Outcome: Progressing     Problem: Pain  Goal: Verbalizes/displays adequate comfort level or baseline comfort level  Outcome: Progressing     Problem: Skin/Tissue Integrity  Goal: Absence of new skin breakdown  Description: 1.  Monitor for areas of redness and/or skin breakdown  2.  Assess vascular access sites hourly  3.  Every 4-6 hours minimum:  Change oxygen saturation probe site  4.  Every 4-6 hours:  If on nasal continuous positive airway pressure, respiratory therapy assess nares and determine need for appliance change or resting period.  Outcome: Progressing     Problem: ABCDS Injury Assessment  Goal: Absence of physical injury  Outcome: Progressing     Problem: Safety - Adult  Goal: Free from fall injury  Outcome: Progressing     Problem: Chronic Conditions and Co-morbidities  Goal: Patient's chronic conditions and co-morbidity symptoms are monitored and maintained or improved  Outcome: Progressing

## 2024-01-25 NOTE — CARE COORDINATION
Social Work-[facility] will transport patient to [facility] at 2PM today. Orders faxed. Nurse to call report to [phone]. Discussed with patient. He is agreeable with dc plans. Roge

## 2024-01-25 NOTE — PROGRESS NOTES
Occupational Therapy  Facility/Department: [facility] PROGRESSIVE CARE  Rehabilitation Occupational Therapy Daily Treatment Note    Date: 24  Patient Name: Reynaldo Seaman       Room: 1020/1020-01  MRN: [id number]  Account: [id number]   : 1955  (68 y.o.) Gender: male     Past Medical History:  has a past medical history of CVA (cerebral vascular accident) (HCC), Diabetes mellitus (HCC), and Hypertension.  Past Surgical History:   has a past surgical history that includes knee surgery; Carotid endarterectomy (2021); Carotid endarterectomy (N/A, 2021); and IR INSERT PICC VAD W SQ PORT >5 YEARS (2022).    Restrictions  Restrictions/Precautions: Bed Alarm, Fall Risk, General Precautions, Other (comment), Contact Precautions  Other position/activity restrictions: R UE IV; morris catheter; telemetry  Required Braces or Orthoses?: No    Subjective  Subjective: Pt. supine in bed and agreeable for treatment.  Restrictions/Precautions: Bed Alarm;Fall Risk;General Precautions;Other (comment);Contact Precautions  Objective     Cognition  Overall Cognitive Status: WFL  Arousal/Alertness: Appropriate responses to stimuli  Following Commands: Follows one step commands with repetition  Attention Span: Appears intact  Memory: Appears intact  Safety Judgement: Decreased awareness of need for assistance  Problem Solving: Assistance required to implement solutions  Insights: Decreased awareness of deficits  Initiation: Requires cues for some  Sequencing: Requires cues for some  Cognition Comment: Pt. cont. to display increased alertness and able to conversate during session.  Orientation  Overall Orientation Status: Within Functional Limits  Orientation Level: Oriented X4         ADL  Grooming/Oral Hygiene  Assistance Level: Stand by assist  Skilled Clinical Factors: Pt. completed simple grooming with supervision while sitting EOB.  Upper Extremity Dressing  Assistance Level: Stand by assist  Skilled Clinical

## 2024-01-25 NOTE — PROGRESS NOTES
Physical Therapy  Facility/Department: [facility]  Rehabilitation Physical Therapy Treatment Note    NAME: Reynaldo Seaman  : 1955 (68 y.o.)  MRN: [id number]  CODE STATUS: Full Code    Date of Service: 24       Restrictions:  Restrictions/Precautions: Bed Alarm, Fall Risk, General Precautions, Other (comment), Contact Precautions  Position Activity Restriction  Other position/activity restrictions: R UE IV; morris catheter; telemetry     SUBJECTIVE  Subjective  Subjective: pt in bed agreeable to PT               OBJECTIVE  Cognition  Overall Cognitive Status: WFL  Arousal/Alertness: Appropriate responses to stimuli  Following Commands: Follows one step commands with repetition  Attention Span: Appears intact  Memory: Appears intact  Safety Judgement: Decreased awareness of need for assistance  Problem Solving: Assistance required to implement solutions  Insights: Decreased awareness of deficits  Initiation: Requires cues for some  Sequencing: Requires cues for some  Cognition Comment: Pt. cont. to display increased alertness and able to conversate during session.  Orientation  Overall Orientation Status: Within Functional Limits  Orientation Level: Oriented X4    Functional Mobility  Bed Mobility  Overall Assistance Level: Moderate Assistance;Requires x 2 Assistance  Roll Left  Assistance Level: Moderate assistance;Requires x 2 assistance  Roll Right  Assistance Level: Moderate assistance;Requires x 2 assistance  Supine to Sit  Assistance Level: Minimal assistance;Moderate assistance;Requires x 2 assistance  Transfers  Surface: From bed;To chair with arms  Additional Factors: Verbal cues;Hand placement cues  Device: Walker  Sit to Stand  Assistance Level: Minimal assistance  Stand to Sit  Assistance Level: Minimal assistance;Requires x 2 assistance    Skilled Clinical Factors: Pt. completed min assist x2 with use of RW to safety transfer to recliner. Pt. encouraged to cont to stand but  Function;Precautions;Mobility Training  Education Method: Verbal  Barriers to Learning: Cognition  Education Outcome: Verbalized understanding    AM-PAC Score    AM-PAC Inpatient Mobility Raw Score : 14  AM-PAC Inpatient T-Scale Score : 38.1  Mobility Inpatient CMS 0-100% Score: 61.29  Mobility Inpatient CMS G-Code Modifier:CL        Therapy Time   cotx Concurrent Group Co-treatment   Time In 1022         Time Out 1045         Minutes 23                 Co-treatment with OT warranted secondary to decreased safety and independence requiring 2 skilled therapy professionals to address individual discipline's goals. PT addressing preparation for  Transfers, gait and pre gait activities,  sitting balance for increased  sitting/activity tolerance, functional mobility, environmental safety/scanning, fall prevention, functional mobility  transfers and functional LE strength.  Upon writer exit, call light within reach, pt retired to bed. All lines intact and patient positioned comfortably. All patient needs addressed prior to ending therapy session. Chart reviewed prior to treatment and patient is agreeable for therapy.  RN reports patient is medically stable for therapy treatment this date.       BECKY SORTO, PTA, 01/25/24 at 1:48 PM

## 2024-01-25 NOTE — PROGRESS NOTES
tablet by mouth daily, Disp: , Rfl:     amLODIPine (NORVASC) 5 MG tablet, Take 1 tablet by mouth in the morning and at bedtime, Disp: 30 tablet, Rfl: 3      I/O (24Hr):    Intake/Output Summary (Last 24 hours) at 1/25/2024 1805  Last data filed at 1/25/2024 0426  Gross per 24 hour   Intake --   Output 725 ml   Net -725 ml         Data:           Labs:    Hematology:  Recent Labs     01/23/24  0509 01/24/24  0504 01/25/24  0457   WBC 13.7* 12.3* 10.8   RBC 3.75* 3.45* 3.37*   HGB 12.4* 11.3* 11.2*   HCT 36.4* 33.2* 32.4*   MCV 97.1 96.2 96.1   MCH 33.1 32.8 33.2   MCHC 34.1 34.0 34.6   RDW 16.1* 16.2* 15.9*    290 336   MPV 9.9 9.7 9.8   INR 1.4 1.5 1.4       Chemistry:  No results for input(s): \"NA\", \"K\", \"CL\", \"CO2\", \"GLUCOSE\", \"BUN\", \"CREATININE\", \"MG\", \"ANIONGAP\", \"LABGLOM\", \"GFRAA\", \"CALCIUM\", \"CAION\", \"PHOS\", \"PSA\", \"PROBNP\", \"TROPHS\", \"CKTOTAL\", \"CKMB\", \"CKMBINDEX\", \"MYOGLOBIN\", \"DIGOXIN\", \"LACTACIDWB\" in the last 72 hours.    No results for input(s): \"PROT\", \"LABALBU\", \"LABA1C\", \"V3BBKPE\", \"R8LSBMR\", \"FT4\", \"TSH\", \"AST\", \"ALT\", \"LDH\", \"GGT\", \"ALKPHOS\", \"LABGGT\", \"BILITOT\", \"BILIDIR\", \"AMMONIA\", \"AMYLASE\", \"LIPASE\", \"LACTATE\", \"CHOL\", \"HDL\", \"LDLCHOLESTEROL\", \"CHOLHDLRATIO\", \"TRIG\", \"VLDL\", \"JOC88QP\", \"PHENYTOIN\", \"PHENYF\", \"URICACID\", \"POCGLU\" in the last 72 hours.      Lab Results   Component Value Date/Time    SPECIAL 20ML LT AC 01/21/2024 04:52 AM     Lab Results   Component Value Date/Time    CULTURE NO GROWTH 4 DAYS 01/21/2024 04:52 AM       Lab Results   Component Value Date/Time    POCPH 7.430 06/04/2022 11:00 AM    POCPCO2 35.2 06/04/2022 11:00 AM    POCPO2 79.7 06/04/2022 11:00 AM    POCHCO3 23.4 06/04/2022 11:00 AM    NBEA 1 06/04/2022 11:00 AM    PBEA 0 05/27/2022 12:45 PM    KVQT8FRV 96 06/04/2022 11:00 AM    FIO2 30.0 06/04/2022 11:00 AM       Radiology:    CT ABDOMEN PELVIS W IV CONTRAST Additional Contrast? None    Result Date: 1/20/2024  1. No acute intra-abdominal or intrapelvic  process is identified. 2. There is a Peterson catheter in the urinary bladder which is empty but shows significantly thickened wall diffusely, likely representing cystitis. 3. Mild diverticulosis of the cecum and ascending colon without evidence of diverticulitis. 4. Normal appendix. 5. Focal irregular opacity at the left posterior costophrenic sulcus has the appearance of atelectasis or possibly tiny focal infiltrate.  Follow-up noncontrast chest CT recommended in 3 months.         All radiological studies reviewed  Code Status:  Prior        Electronically signed by LILIAM MURILLO MD on 1/25/2024 at 6:05 PM    This note was created with the assistance of a speech-recognition program.  Although the intention is to generate a document that actually reflects the content of the visit, no guarantees can be provided that every mistake has been identified and corrected by editing.     Note was updated later by me after  physical examination and  completion of the assessment.

## 2024-01-26 ENCOUNTER — HOSPITAL ENCOUNTER (OUTPATIENT)
Age: 69
Setting detail: SPECIMEN
Discharge: HOME | End: 2024-01-26

## 2024-01-26 LAB
ALBUMIN SERPL-MCNC: 2.7 G/DL (ref 3.5–5.2)
ALP SERPL-CCNC: 93 U/L (ref 40–129)
ALT SERPL-CCNC: 16 U/L (ref 5–41)
ANION GAP SERPL CALCULATED.3IONS-SCNC: 14 MMOL/L (ref 9–17)
AST SERPL-CCNC: 34 U/L
BASOPHILS # BLD: <0.03 K/UL (ref 0–0.2)
BASOPHILS NFR BLD: 0 % (ref 0–2)
BILIRUB SERPL-MCNC: 0.7 MG/DL (ref 0.3–1.2)
BUN SERPL-MCNC: 10 MG/DL (ref 8–23)
BUN/CREAT SERPL: 11 (ref 9–20)
CALCIUM SERPL-MCNC: 8.3 MG/DL (ref 8.6–10.4)
CHLORIDE SERPL-SCNC: 95 MMOL/L (ref 98–107)
CK SERPL-CCNC: 21 U/L (ref 39–308)
CO2 SERPL-SCNC: 22 MMOL/L (ref 20–31)
CREAT SERPL-MCNC: 0.9 MG/DL (ref 0.7–1.2)
EOSINOPHIL # BLD: 0.1 K/UL (ref 0–0.44)
EOSINOPHILS RELATIVE PERCENT: 1 % (ref 1–4)
ERYTHROCYTE [DISTWIDTH] IN BLOOD BY AUTOMATED COUNT: 15.9 % (ref 11.8–14.4)
GFR SERPL CREATININE-BSD FRML MDRD: >60 ML/MIN/1.73M2
GLUCOSE SERPL-MCNC: 120 MG/DL (ref 70–99)
HCT VFR BLD AUTO: 31.6 % (ref 40.7–50.3)
HGB BLD-MCNC: 11 G/DL (ref 13–17)
IMM GRANULOCYTES # BLD AUTO: 0.03 K/UL (ref 0–0.3)
IMM GRANULOCYTES NFR BLD: 0 %
LYMPHOCYTES NFR BLD: 1.23 K/UL (ref 1.1–3.7)
LYMPHOCYTES RELATIVE PERCENT: 15 % (ref 24–43)
MCH RBC QN AUTO: 34.1 PG (ref 25.2–33.5)
MCHC RBC AUTO-ENTMCNC: 34.8 G/DL (ref 28.4–34.8)
MCV RBC AUTO: 97.8 FL (ref 82.6–102.9)
MICROORGANISM SPEC CULT: NORMAL
MONOCYTES NFR BLD: 0.47 K/UL (ref 0.1–1.2)
MONOCYTES NFR BLD: 6 % (ref 3–12)
NEUTROPHILS NFR BLD: 78 % (ref 36–65)
NEUTS SEG NFR BLD: 6.17 K/UL (ref 1.5–8.1)
NRBC BLD-RTO: 0 PER 100 WBC
PHOSPHATE SERPL-MCNC: 2.7 MG/DL (ref 2.5–4.5)
PLATELET # BLD AUTO: 379 K/UL (ref 138–453)
PMV BLD AUTO: 9.6 FL (ref 8.1–13.5)
POTASSIUM SERPL-SCNC: 3.5 MMOL/L (ref 3.7–5.3)
PROT SERPL-MCNC: 6.4 G/DL (ref 6.4–8.3)
RBC # BLD AUTO: 3.23 M/UL (ref 4.21–5.77)
RBC # BLD: ABNORMAL 10*6/UL
SERVICE CMNT-IMP: NORMAL
SODIUM SERPL-SCNC: 131 MMOL/L (ref 135–144)
SPECIMEN DESCRIPTION: NORMAL
WBC OTHER # BLD: 8 K/UL (ref 3.5–11.3)

## 2024-01-26 PROCEDURE — 80053 COMPREHEN METABOLIC PANEL: CPT

## 2024-01-26 PROCEDURE — 36415 COLL VENOUS BLD VENIPUNCTURE: CPT

## 2024-01-26 PROCEDURE — 82550 ASSAY OF CK (CPK): CPT

## 2024-01-26 PROCEDURE — 84100 ASSAY OF PHOSPHORUS: CPT

## 2024-01-26 PROCEDURE — P9603 ONE-WAY ALLOW PRORATED MILES: HCPCS

## 2024-01-26 PROCEDURE — 85025 COMPLETE CBC W/AUTO DIFF WBC: CPT

## 2024-01-30 ENCOUNTER — HOSPITAL ENCOUNTER (OUTPATIENT)
Age: 69
Setting detail: SPECIMEN
Discharge: HOME | End: 2024-01-30

## 2024-01-30 LAB
ALBUMIN SERPL-MCNC: 3 G/DL (ref 3.5–5.2)
ALP SERPL-CCNC: 82 U/L (ref 40–129)
ALT SERPL-CCNC: 47 U/L (ref 5–41)
ANION GAP SERPL CALCULATED.3IONS-SCNC: 12 MMOL/L (ref 9–17)
AST SERPL-CCNC: 46 U/L
BASOPHILS # BLD: 0.04 K/UL (ref 0–0.2)
BASOPHILS NFR BLD: 1 % (ref 0–2)
BILIRUB SERPL-MCNC: 0.3 MG/DL (ref 0.3–1.2)
BUN SERPL-MCNC: 8 MG/DL (ref 8–23)
BUN/CREAT SERPL: 9 (ref 9–20)
CALCIUM SERPL-MCNC: 8.6 MG/DL (ref 8.6–10.4)
CHLORIDE SERPL-SCNC: 97 MMOL/L (ref 98–107)
CO2 SERPL-SCNC: 23 MMOL/L (ref 20–31)
CREAT SERPL-MCNC: 0.9 MG/DL (ref 0.7–1.2)
EOSINOPHIL # BLD: 0.17 K/UL (ref 0–0.44)
EOSINOPHILS RELATIVE PERCENT: 2 % (ref 1–4)
ERYTHROCYTE [DISTWIDTH] IN BLOOD BY AUTOMATED COUNT: 16.1 % (ref 11.8–14.4)
GFR SERPL CREATININE-BSD FRML MDRD: >60 ML/MIN/1.73M2
GLUCOSE SERPL-MCNC: 175 MG/DL (ref 70–99)
HCT VFR BLD AUTO: 31.6 % (ref 40.7–50.3)
HGB BLD-MCNC: 10.8 G/DL (ref 13–17)
IMM GRANULOCYTES # BLD AUTO: 0.04 K/UL (ref 0–0.3)
IMM GRANULOCYTES NFR BLD: 1 %
LYMPHOCYTES NFR BLD: 1.41 K/UL (ref 1.1–3.7)
LYMPHOCYTES RELATIVE PERCENT: 19 % (ref 24–43)
MCH RBC QN AUTO: 33.2 PG (ref 25.2–33.5)
MCHC RBC AUTO-ENTMCNC: 34.2 G/DL (ref 28.4–34.8)
MCV RBC AUTO: 97.2 FL (ref 82.6–102.9)
MONOCYTES NFR BLD: 0.44 K/UL (ref 0.1–1.2)
MONOCYTES NFR BLD: 6 % (ref 3–12)
NEUTROPHILS NFR BLD: 71 % (ref 36–65)
NEUTS SEG NFR BLD: 5.35 K/UL (ref 1.5–8.1)
NRBC BLD-RTO: 0 PER 100 WBC
PLATELET # BLD AUTO: 453 K/UL (ref 138–453)
PMV BLD AUTO: 9.1 FL (ref 8.1–13.5)
POTASSIUM SERPL-SCNC: 3.9 MMOL/L (ref 3.7–5.3)
PROT SERPL-MCNC: 6.5 G/DL (ref 6.4–8.3)
RBC # BLD AUTO: 3.25 M/UL (ref 4.21–5.77)
RBC # BLD: ABNORMAL 10*6/UL
SODIUM SERPL-SCNC: 132 MMOL/L (ref 135–144)
WBC OTHER # BLD: 7.5 K/UL (ref 3.5–11.3)

## 2024-01-30 PROCEDURE — 36415 COLL VENOUS BLD VENIPUNCTURE: CPT

## 2024-01-30 PROCEDURE — 80053 COMPREHEN METABOLIC PANEL: CPT

## 2024-01-30 PROCEDURE — P9603 ONE-WAY ALLOW PRORATED MILES: HCPCS

## 2024-01-30 PROCEDURE — 85025 COMPLETE CBC W/AUTO DIFF WBC: CPT

## 2024-02-02 ENCOUNTER — HOSPITAL ENCOUNTER (OUTPATIENT)
Age: 69
Setting detail: SPECIMEN
Discharge: HOME | End: 2024-02-02

## 2024-02-02 LAB
ALBUMIN SERPL-MCNC: 3.1 G/DL (ref 3.5–5.2)
ALP SERPL-CCNC: 79 U/L (ref 40–129)
ALT SERPL-CCNC: 51 U/L (ref 5–41)
ANION GAP SERPL CALCULATED.3IONS-SCNC: 13 MMOL/L (ref 9–17)
AST SERPL-CCNC: 38 U/L
BASOPHILS # BLD: 0.06 K/UL (ref 0–0.2)
BASOPHILS NFR BLD: 1 % (ref 0–2)
BILIRUB SERPL-MCNC: 0.2 MG/DL (ref 0.3–1.2)
BUN SERPL-MCNC: 10 MG/DL (ref 8–23)
BUN/CREAT SERPL: 11 (ref 9–20)
CALCIUM SERPL-MCNC: 8.9 MG/DL (ref 8.6–10.4)
CHLORIDE SERPL-SCNC: 98 MMOL/L (ref 98–107)
CK SERPL-CCNC: 30 U/L (ref 39–308)
CO2 SERPL-SCNC: 23 MMOL/L (ref 20–31)
CREAT SERPL-MCNC: 0.9 MG/DL (ref 0.7–1.2)
EOSINOPHIL # BLD: 0.23 K/UL (ref 0–0.44)
EOSINOPHILS RELATIVE PERCENT: 3 % (ref 1–4)
ERYTHROCYTE [DISTWIDTH] IN BLOOD BY AUTOMATED COUNT: 16.2 % (ref 11.8–14.4)
GFR SERPL CREATININE-BSD FRML MDRD: >60 ML/MIN/1.73M2
GLUCOSE SERPL-MCNC: 154 MG/DL (ref 70–99)
HCT VFR BLD AUTO: 34.9 % (ref 40.7–50.3)
HGB BLD-MCNC: 11.5 G/DL (ref 13–17)
IMM GRANULOCYTES # BLD AUTO: 0.07 K/UL (ref 0–0.3)
IMM GRANULOCYTES NFR BLD: 1 %
LYMPHOCYTES NFR BLD: 1.79 K/UL (ref 1.1–3.7)
LYMPHOCYTES RELATIVE PERCENT: 21 % (ref 24–43)
MCH RBC QN AUTO: 32.8 PG (ref 25.2–33.5)
MCHC RBC AUTO-ENTMCNC: 33 G/DL (ref 28.4–34.8)
MCV RBC AUTO: 99.4 FL (ref 82.6–102.9)
MONOCYTES NFR BLD: 0.53 K/UL (ref 0.1–1.2)
MONOCYTES NFR BLD: 6 % (ref 3–12)
NEUTROPHILS NFR BLD: 68 % (ref 36–65)
NEUTS SEG NFR BLD: 6.05 K/UL (ref 1.5–8.1)
NRBC BLD-RTO: 0 PER 100 WBC
PLATELET # BLD AUTO: 470 K/UL (ref 138–453)
PMV BLD AUTO: 8.9 FL (ref 8.1–13.5)
POTASSIUM SERPL-SCNC: 3.8 MMOL/L (ref 3.7–5.3)
PROT SERPL-MCNC: 6.9 G/DL (ref 6.4–8.3)
RBC # BLD AUTO: 3.51 M/UL (ref 4.21–5.77)
RBC # BLD: ABNORMAL 10*6/UL
SODIUM SERPL-SCNC: 134 MMOL/L (ref 135–144)
WBC OTHER # BLD: 8.7 K/UL (ref 3.5–11.3)

## 2024-02-02 PROCEDURE — 36415 COLL VENOUS BLD VENIPUNCTURE: CPT

## 2024-02-02 PROCEDURE — P9603 ONE-WAY ALLOW PRORATED MILES: HCPCS

## 2024-02-02 PROCEDURE — 85025 COMPLETE CBC W/AUTO DIFF WBC: CPT

## 2024-02-02 PROCEDURE — 80053 COMPREHEN METABOLIC PANEL: CPT

## 2024-02-02 PROCEDURE — 82550 ASSAY OF CK (CPK): CPT

## 2024-06-29 ENCOUNTER — APPOINTMENT (OUTPATIENT)
Dept: CT IMAGING | Age: 69
DRG: 682 | End: 2024-06-29
Payer: MEDICARE

## 2024-06-29 ENCOUNTER — HOSPITAL ENCOUNTER (INPATIENT)
Age: 69
LOS: 4 days | Discharge: NURSING, REHAB OR LONG-TERM CARE | DRG: 682 | End: 2024-07-03
Attending: EMERGENCY MEDICINE | Admitting: FAMILY MEDICINE
Payer: MEDICARE

## 2024-06-29 ENCOUNTER — APPOINTMENT (OUTPATIENT)
Dept: GENERAL RADIOLOGY | Age: 69
DRG: 682 | End: 2024-06-29
Payer: MEDICARE

## 2024-06-29 DIAGNOSIS — F10.221: ICD-10-CM

## 2024-06-29 DIAGNOSIS — N17.9 AKI (ACUTE KIDNEY INJURY) (HCC): Primary | ICD-10-CM

## 2024-06-29 DIAGNOSIS — E86.0 DEHYDRATION: ICD-10-CM

## 2024-06-29 LAB
ALBUMIN SERPL-MCNC: 4.1 G/DL (ref 3.5–5.2)
ALP SERPL-CCNC: 125 U/L (ref 40–129)
ALT SERPL-CCNC: 12 U/L (ref 5–41)
AMORPH SED URNS QL MICRO: ABNORMAL
ANION GAP SERPL CALCULATED.3IONS-SCNC: 18 MMOL/L (ref 9–17)
ANION GAP SERPL CALCULATED.3IONS-SCNC: 19 MMOL/L (ref 9–17)
AST SERPL-CCNC: 16 U/L
BASOPHILS # BLD: 0.03 K/UL (ref 0–0.2)
BASOPHILS NFR BLD: 0 % (ref 0–2)
BILIRUB SERPL-MCNC: 0.7 MG/DL (ref 0.3–1.2)
BILIRUB UR QL STRIP: NEGATIVE
BUN SERPL-MCNC: 18 MG/DL (ref 8–23)
BUN SERPL-MCNC: 20 MG/DL (ref 8–23)
BUN/CREAT SERPL: 13 (ref 9–20)
BUN/CREAT SERPL: 13 (ref 9–20)
CALCIUM SERPL-MCNC: 8.2 MG/DL (ref 8.6–10.4)
CALCIUM SERPL-MCNC: 9.3 MG/DL (ref 8.6–10.4)
CASTS #/AREA URNS LPF: ABNORMAL /LPF
CASTS #/AREA URNS LPF: ABNORMAL /LPF
CHLORIDE SERPL-SCNC: 101 MMOL/L (ref 98–107)
CHLORIDE SERPL-SCNC: 98 MMOL/L (ref 98–107)
CLARITY UR: ABNORMAL
CO2 SERPL-SCNC: 18 MMOL/L (ref 20–31)
CO2 SERPL-SCNC: 19 MMOL/L (ref 20–31)
COLOR UR: YELLOW
CREAT SERPL-MCNC: 1.4 MG/DL (ref 0.7–1.2)
CREAT SERPL-MCNC: 1.5 MG/DL (ref 0.7–1.2)
EKG ATRIAL RATE: 89 BPM
EKG P AXIS: 69 DEGREES
EKG P-R INTERVAL: 136 MS
EKG Q-T INTERVAL: 394 MS
EKG QRS DURATION: 84 MS
EKG QTC CALCULATION (BAZETT): 479 MS
EKG R AXIS: 44 DEGREES
EKG T AXIS: 90 DEGREES
EKG VENTRICULAR RATE: 89 BPM
EOSINOPHIL # BLD: 0.03 K/UL (ref 0–0.44)
EOSINOPHILS RELATIVE PERCENT: 0 % (ref 1–4)
EPI CELLS #/AREA URNS HPF: ABNORMAL /HPF (ref 0–5)
ERYTHROCYTE [DISTWIDTH] IN BLOOD BY AUTOMATED COUNT: 13.5 % (ref 11.8–14.4)
FLUAV RNA RESP QL NAA+PROBE: NOT DETECTED
FLUBV RNA RESP QL NAA+PROBE: NOT DETECTED
GFR, ESTIMATED: 50 ML/MIN/1.73M2
GFR, ESTIMATED: 55 ML/MIN/1.73M2
GLUCOSE BLD-MCNC: 199 MG/DL (ref 75–110)
GLUCOSE BLD-MCNC: 212 MG/DL (ref 75–110)
GLUCOSE SERPL-MCNC: 151 MG/DL (ref 70–99)
GLUCOSE SERPL-MCNC: 189 MG/DL (ref 70–99)
GLUCOSE UR STRIP-MCNC: ABNORMAL MG/DL
HCT VFR BLD AUTO: 46.6 % (ref 40.7–50.3)
HGB BLD-MCNC: 16 G/DL (ref 13–17)
HGB UR QL STRIP.AUTO: ABNORMAL
IMM GRANULOCYTES # BLD AUTO: 0.03 K/UL (ref 0–0.3)
IMM GRANULOCYTES NFR BLD: 0 %
KETONES UR STRIP-MCNC: NEGATIVE MG/DL
LEUKOCYTE ESTERASE UR QL STRIP: NEGATIVE
LYMPHOCYTES NFR BLD: 1.41 K/UL (ref 1.1–3.7)
LYMPHOCYTES RELATIVE PERCENT: 17 % (ref 24–43)
MAGNESIUM SERPL-MCNC: 1.6 MG/DL (ref 1.6–2.6)
MCH RBC QN AUTO: 33.3 PG (ref 25.2–33.5)
MCHC RBC AUTO-ENTMCNC: 34.3 G/DL (ref 28.4–34.8)
MCV RBC AUTO: 97.1 FL (ref 82.6–102.9)
MONOCYTES NFR BLD: 0.5 K/UL (ref 0.1–1.2)
MONOCYTES NFR BLD: 6 % (ref 3–12)
MUCOUS THREADS URNS QL MICRO: ABNORMAL
NEUTROPHILS NFR BLD: 77 % (ref 36–65)
NEUTS SEG NFR BLD: 6.51 K/UL (ref 1.5–8.1)
NITRITE UR QL STRIP: NEGATIVE
NRBC BLD-RTO: 0 PER 100 WBC
PH UR STRIP: 5.5 [PH] (ref 5–8)
PLATELET # BLD AUTO: 260 K/UL (ref 138–453)
PMV BLD AUTO: 9.4 FL (ref 8.1–13.5)
POTASSIUM SERPL-SCNC: 4 MMOL/L (ref 3.7–5.3)
POTASSIUM SERPL-SCNC: 4.1 MMOL/L (ref 3.7–5.3)
PROT SERPL-MCNC: 8.2 G/DL (ref 6.4–8.3)
PROT UR STRIP-MCNC: ABNORMAL MG/DL
RBC # BLD AUTO: 4.8 M/UL (ref 4.21–5.77)
RBC #/AREA URNS HPF: ABNORMAL /HPF (ref 0–2)
SARS-COV-2 RNA RESP QL NAA+PROBE: NOT DETECTED
SODIUM SERPL-SCNC: 135 MMOL/L (ref 135–144)
SODIUM SERPL-SCNC: 138 MMOL/L (ref 135–144)
SOURCE: NORMAL
SP GR UR STRIP: 1.02 (ref 1–1.03)
SPECIMEN DESCRIPTION: NORMAL
T4 FREE SERPL-MCNC: 1.4 NG/DL (ref 0.9–1.7)
TROPONIN I SERPL HS-MCNC: 24 NG/L (ref 0–22)
TROPONIN I SERPL HS-MCNC: 24 NG/L (ref 0–22)
TSH SERPL DL<=0.05 MIU/L-ACNC: 6.36 UIU/ML (ref 0.3–5)
UROBILINOGEN UR STRIP-ACNC: NORMAL EU/DL (ref 0–1)
WBC #/AREA URNS HPF: ABNORMAL /HPF (ref 0–5)
WBC OTHER # BLD: 8.5 K/UL (ref 3.5–11.3)

## 2024-06-29 PROCEDURE — 70450 CT HEAD/BRAIN W/O DYE: CPT

## 2024-06-29 PROCEDURE — 80053 COMPREHEN METABOLIC PANEL: CPT

## 2024-06-29 PROCEDURE — 85025 COMPLETE CBC W/AUTO DIFF WBC: CPT

## 2024-06-29 PROCEDURE — 1200000000 HC SEMI PRIVATE

## 2024-06-29 PROCEDURE — 84439 ASSAY OF FREE THYROXINE: CPT

## 2024-06-29 PROCEDURE — 2580000003 HC RX 258: Performed by: EMERGENCY MEDICINE

## 2024-06-29 PROCEDURE — 96374 THER/PROPH/DIAG INJ IV PUSH: CPT

## 2024-06-29 PROCEDURE — 81001 URINALYSIS AUTO W/SCOPE: CPT

## 2024-06-29 PROCEDURE — 2580000003 HC RX 258: Performed by: FAMILY MEDICINE

## 2024-06-29 PROCEDURE — 93005 ELECTROCARDIOGRAM TRACING: CPT | Performed by: EMERGENCY MEDICINE

## 2024-06-29 PROCEDURE — 99285 EMERGENCY DEPT VISIT HI MDM: CPT

## 2024-06-29 PROCEDURE — 96361 HYDRATE IV INFUSION ADD-ON: CPT

## 2024-06-29 PROCEDURE — 6360000002 HC RX W HCPCS: Performed by: EMERGENCY MEDICINE

## 2024-06-29 PROCEDURE — 93010 ELECTROCARDIOGRAM REPORT: CPT | Performed by: INTERNAL MEDICINE

## 2024-06-29 PROCEDURE — 87636 SARSCOV2 & INF A&B AMP PRB: CPT

## 2024-06-29 PROCEDURE — 6360000002 HC RX W HCPCS: Performed by: FAMILY MEDICINE

## 2024-06-29 PROCEDURE — 84443 ASSAY THYROID STIM HORMONE: CPT

## 2024-06-29 PROCEDURE — 84484 ASSAY OF TROPONIN QUANT: CPT

## 2024-06-29 PROCEDURE — 83735 ASSAY OF MAGNESIUM: CPT

## 2024-06-29 PROCEDURE — 72125 CT NECK SPINE W/O DYE: CPT

## 2024-06-29 PROCEDURE — 82947 ASSAY GLUCOSE BLOOD QUANT: CPT

## 2024-06-29 PROCEDURE — 71045 X-RAY EXAM CHEST 1 VIEW: CPT

## 2024-06-29 PROCEDURE — 36415 COLL VENOUS BLD VENIPUNCTURE: CPT

## 2024-06-29 PROCEDURE — 80048 BASIC METABOLIC PNL TOTAL CA: CPT

## 2024-06-29 RX ORDER — ATENOLOL 25 MG/1
25 TABLET ORAL DAILY
Status: DISCONTINUED | OUTPATIENT
Start: 2024-06-29 | End: 2024-07-03 | Stop reason: HOSPADM

## 2024-06-29 RX ORDER — ONDANSETRON 4 MG/1
4 TABLET, ORALLY DISINTEGRATING ORAL EVERY 8 HOURS PRN
Status: DISCONTINUED | OUTPATIENT
Start: 2024-06-29 | End: 2024-07-03 | Stop reason: HOSPADM

## 2024-06-29 RX ORDER — ACETAMINOPHEN 325 MG/1
650 TABLET ORAL EVERY 6 HOURS PRN
Status: DISCONTINUED | OUTPATIENT
Start: 2024-06-29 | End: 2024-07-03 | Stop reason: HOSPADM

## 2024-06-29 RX ORDER — SODIUM CHLORIDE 0.9 % (FLUSH) 0.9 %
10 SYRINGE (ML) INJECTION EVERY 12 HOURS SCHEDULED
Status: DISCONTINUED | OUTPATIENT
Start: 2024-06-29 | End: 2024-07-03 | Stop reason: HOSPADM

## 2024-06-29 RX ORDER — AMLODIPINE BESYLATE 5 MG/1
5 TABLET ORAL DAILY PRN
Status: DISCONTINUED | OUTPATIENT
Start: 2024-06-29 | End: 2024-06-29

## 2024-06-29 RX ORDER — DEXTROSE MONOHYDRATE 100 MG/ML
INJECTION, SOLUTION INTRAVENOUS CONTINUOUS PRN
Status: DISCONTINUED | OUTPATIENT
Start: 2024-06-29 | End: 2024-07-03 | Stop reason: HOSPADM

## 2024-06-29 RX ORDER — ONDANSETRON 2 MG/ML
4 INJECTION INTRAMUSCULAR; INTRAVENOUS EVERY 6 HOURS PRN
Status: DISCONTINUED | OUTPATIENT
Start: 2024-06-29 | End: 2024-07-03 | Stop reason: HOSPADM

## 2024-06-29 RX ORDER — SODIUM CHLORIDE 9 MG/ML
INJECTION, SOLUTION INTRAVENOUS CONTINUOUS
Status: DISCONTINUED | OUTPATIENT
Start: 2024-06-29 | End: 2024-07-03 | Stop reason: HOSPADM

## 2024-06-29 RX ORDER — MAGNESIUM SULFATE IN WATER 40 MG/ML
2000 INJECTION, SOLUTION INTRAVENOUS PRN
Status: DISCONTINUED | OUTPATIENT
Start: 2024-06-29 | End: 2024-07-03 | Stop reason: HOSPADM

## 2024-06-29 RX ORDER — ONDANSETRON 2 MG/ML
4 INJECTION INTRAMUSCULAR; INTRAVENOUS ONCE
Status: COMPLETED | OUTPATIENT
Start: 2024-06-29 | End: 2024-06-29

## 2024-06-29 RX ORDER — AMLODIPINE BESYLATE 5 MG/1
10 TABLET ORAL DAILY
Status: DISCONTINUED | OUTPATIENT
Start: 2024-06-29 | End: 2024-07-03 | Stop reason: HOSPADM

## 2024-06-29 RX ORDER — SODIUM CHLORIDE 0.9 % (FLUSH) 0.9 %
5-40 SYRINGE (ML) INJECTION EVERY 12 HOURS SCHEDULED
Status: DISCONTINUED | OUTPATIENT
Start: 2024-06-29 | End: 2024-06-29 | Stop reason: SDUPTHER

## 2024-06-29 RX ORDER — POLYETHYLENE GLYCOL 3350 17 G/17G
17 POWDER, FOR SOLUTION ORAL DAILY PRN
Status: DISCONTINUED | OUTPATIENT
Start: 2024-06-29 | End: 2024-07-03 | Stop reason: HOSPADM

## 2024-06-29 RX ORDER — HYDRALAZINE HYDROCHLORIDE 20 MG/ML
10 INJECTION INTRAMUSCULAR; INTRAVENOUS EVERY 8 HOURS PRN
Status: DISCONTINUED | OUTPATIENT
Start: 2024-06-29 | End: 2024-07-03 | Stop reason: HOSPADM

## 2024-06-29 RX ORDER — INSULIN LISPRO 100 [IU]/ML
0-8 INJECTION, SOLUTION INTRAVENOUS; SUBCUTANEOUS
Status: DISCONTINUED | OUTPATIENT
Start: 2024-06-29 | End: 2024-07-03 | Stop reason: HOSPADM

## 2024-06-29 RX ORDER — SODIUM CHLORIDE 9 MG/ML
INJECTION, SOLUTION INTRAVENOUS PRN
Status: DISCONTINUED | OUTPATIENT
Start: 2024-06-29 | End: 2024-06-29 | Stop reason: SDUPTHER

## 2024-06-29 RX ORDER — ENOXAPARIN SODIUM 100 MG/ML
40 INJECTION SUBCUTANEOUS DAILY
Status: DISCONTINUED | OUTPATIENT
Start: 2024-06-29 | End: 2024-07-03 | Stop reason: HOSPADM

## 2024-06-29 RX ORDER — ACETAMINOPHEN 325 MG/1
650 TABLET ORAL EVERY 4 HOURS PRN
Status: DISCONTINUED | OUTPATIENT
Start: 2024-06-29 | End: 2024-06-29 | Stop reason: SDUPTHER

## 2024-06-29 RX ORDER — SODIUM CHLORIDE 0.9 % (FLUSH) 0.9 %
10 SYRINGE (ML) INJECTION PRN
Status: DISCONTINUED | OUTPATIENT
Start: 2024-06-29 | End: 2024-07-03 | Stop reason: HOSPADM

## 2024-06-29 RX ORDER — POTASSIUM CHLORIDE 20 MEQ/1
40 TABLET, EXTENDED RELEASE ORAL PRN
Status: DISCONTINUED | OUTPATIENT
Start: 2024-06-29 | End: 2024-07-03 | Stop reason: HOSPADM

## 2024-06-29 RX ORDER — POTASSIUM CHLORIDE 7.45 MG/ML
10 INJECTION INTRAVENOUS PRN
Status: DISCONTINUED | OUTPATIENT
Start: 2024-06-29 | End: 2024-07-03 | Stop reason: HOSPADM

## 2024-06-29 RX ORDER — SODIUM CHLORIDE 0.9 % (FLUSH) 0.9 %
5-40 SYRINGE (ML) INJECTION PRN
Status: DISCONTINUED | OUTPATIENT
Start: 2024-06-29 | End: 2024-06-29 | Stop reason: SDUPTHER

## 2024-06-29 RX ORDER — SODIUM CHLORIDE 9 MG/ML
INJECTION, SOLUTION INTRAVENOUS PRN
Status: DISCONTINUED | OUTPATIENT
Start: 2024-06-29 | End: 2024-07-03 | Stop reason: HOSPADM

## 2024-06-29 RX ORDER — PAROXETINE HYDROCHLORIDE 20 MG/1
20 TABLET, FILM COATED ORAL EVERY MORNING
Status: DISCONTINUED | OUTPATIENT
Start: 2024-06-30 | End: 2024-07-03 | Stop reason: HOSPADM

## 2024-06-29 RX ORDER — 0.9 % SODIUM CHLORIDE 0.9 %
1000 INTRAVENOUS SOLUTION INTRAVENOUS ONCE
Status: COMPLETED | OUTPATIENT
Start: 2024-06-29 | End: 2024-06-29

## 2024-06-29 RX ORDER — INSULIN LISPRO 100 [IU]/ML
0-4 INJECTION, SOLUTION INTRAVENOUS; SUBCUTANEOUS NIGHTLY
Status: DISCONTINUED | OUTPATIENT
Start: 2024-06-29 | End: 2024-07-03 | Stop reason: HOSPADM

## 2024-06-29 RX ORDER — ACETAMINOPHEN 650 MG/1
650 SUPPOSITORY RECTAL EVERY 6 HOURS PRN
Status: DISCONTINUED | OUTPATIENT
Start: 2024-06-29 | End: 2024-07-03 | Stop reason: HOSPADM

## 2024-06-29 RX ADMIN — ONDANSETRON 4 MG: 2 INJECTION INTRAMUSCULAR; INTRAVENOUS at 10:12

## 2024-06-29 RX ADMIN — HYDRALAZINE HYDROCHLORIDE 10 MG: 20 INJECTION INTRAMUSCULAR; INTRAVENOUS at 23:52

## 2024-06-29 RX ADMIN — SODIUM CHLORIDE: 9 INJECTION, SOLUTION INTRAVENOUS at 20:14

## 2024-06-29 RX ADMIN — SODIUM CHLORIDE 1000 ML: 9 INJECTION, SOLUTION INTRAVENOUS at 11:19

## 2024-06-29 RX ADMIN — SODIUM CHLORIDE 1000 ML: 9 INJECTION, SOLUTION INTRAVENOUS at 12:15

## 2024-06-29 ASSESSMENT — PAIN - FUNCTIONAL ASSESSMENT: PAIN_FUNCTIONAL_ASSESSMENT: NONE - DENIES PAIN

## 2024-06-29 NOTE — ED NOTES
ED to inpatient nurses report     Chief Complaint   Patient presents with    Hypertension     Feels sweaty    Emesis     2 days ago      Present to ED from home  LOC: alert and orientated to name, place, date  Vital signs   Vitals:    06/29/24 0816 06/29/24 0820   BP:  (!) 147/87   Pulse: 96    Resp: 16    Temp: 98.1 °F (36.7 °C)    SpO2:  96%   Weight: 65.8 kg (145 lb)    Height: 1.702 m (5' 7\")       Oxygen Baseline     Current needs required    SEPSIS:   [] Lactate X 2 ordered (Yes or No)  [] Antibiotics given (Yes or No)  [] IV Fluids ordered (Yes or No)             [] 2nd IV completed (Yes or No)  [] Hourly Vital Signs (Validated)  [] Outstanding Orders:     LDAs:   Peripheral IV 06/29/24 Right Basilic (Active)   Site Assessment Clean, dry & intact 06/29/24 0917   Line Status Blood return noted 06/29/24 0917     Mobility: Independent  Fall Risk:    Pending ED orders:   Present condition:   Code Status:   Consults: IP CONSULT TO INTERNAL MEDICINE  []  Hospitalist  Completed  [] yes [] no Who:   []  Medicine  Completed  [] yes [] No Who:   []  Cardiology  Completed  [] yes [] No Who:   []  GI   Completed  [] yes [] No Who:   []  Neurology  Completed  [] yes [] No Who:   []  Nephrology Completed  [] yes [] No Who:    []  Vascular  Completed  [] yes [] No Who:   []  Ortho  Completed  [] yes [] No Who:     []  Surgery  Completed  [] yes [] No Who:    []  Urology  Completed  [] yes [] No Who:    []  CT Surgery Completed  [] yes [] No Who:   []  Podiatry  Completed  [] yes [] No Who:    []  Other    Completed  [] yes [] No Who:  Interventions: 2L NS. 2 NTG per EMS, Zofran  Important Events:       Electronically signed by TYRESE REDDY RN on 6/29/2024 at 2:10 PM

## 2024-06-29 NOTE — PROGRESS NOTES
Patient arrived to the room from ed. Patient walked to bed, made comfortable and given the call light. Orders released. Bed mechanics in good repair. Bed locked and lowered, alarm on. Patient is a fall/seizure risk. Seizure precautions placed.

## 2024-06-29 NOTE — PROGRESS NOTES
Transitions of Care Pharmacy Service   Medication Review    The patient's list of current home medications has been reviewed.     Source(s) of information: Patient    Based on information provided by the above source(s), I have updated the patient's home med list as described below.     Please review the ACTION REQUESTED section of this note below for any discrepancies on current hospital orders.      I changed or updated the following medications on the patient's home medication list:  Removed Amlodipine  Thiamine     Added None     Adjusted   None   Other Notes Patient states he is only on 2 medications             Please feel free to call me with any questions about this encounter. Thank you.    Alex Zambrano RPH   Transitions of Care Pharmacy Service  Phone:  [phone]  Fax: [phone]      Electronically signed by Alex Zambrano RPH on 6/29/2024 at 3:50 PM

## 2024-06-29 NOTE — ED PROVIDER NOTES
EMERGENCY DEPARTMENT ENCOUNTER    Pt Name: Reynaldo Seaman  MRN: [id number]  Birthdate 1955  Date of evaluation: 6/29/24  CHIEF COMPLAINT       Chief Complaint   Patient presents with    Hypertension     Feels sweaty    Emesis     2 days ago     HISTORY OF PRESENT ILLNESS   The history is provided by the patient and medical records.  The patient is a 69-year-old male who was brought in by ambulance for high blood pressure generally not feeling well.  Patient reports symptoms started last Monday when he was standing in front of his toilet urinating.  He subsequently felt lightheaded and lost consciousness falling into the bathtub striking the back of his head and back.  He was able to ambulate immediately after the fall.  He did not seek medical attention at that time.  Yesterday, he reports feeling \"lousy\" and his blood pressure was elevated all day long.  He states those symptoms have been present for \"years\".  What made him feel worse is that his wife told him that \"she does not love me\".  He denies feeling depressed.  He also denies suicidal ideations.    REVIEW OF SYSTEMS     Review of Systems  All other systems reviewed and are negative.    PASTMEDICAL HISTORY     Past Medical History:   Diagnosis Date    CVA (cerebral vascular accident) ([facility]) 02/2021    With residual right sided weakness    Diabetes mellitus ([facility])     Hypertension      Past Problem List  Patient Active Problem List   Diagnosis Code    ADRIANA (acute kidney injury) ([facility]) N17.9    Acute ischemic left PCA stroke ([facility]) I63.532    Right sided weakness R53.1    Seizure ([facility]) R56.9    Mild malnutrition ([facility]) E44.1    Urinary retention R33.9     SURGICAL HISTORY       Past Surgical History:   Procedure Laterality Date    CAROTID ENDARTERECTOMY  09/22/2021    CAROTID ENDARTERECTOMY (N/A Neck    CAROTID ENDARTERECTOMY N/A 9/22/2021    CAROTID ENDARTERECTOMY performed by Suman Fowler MD at [facility] OR     INS PICC VAD W SQ PORT GREATER THAN 5  5/26/2022  mL    sodium chloride flush 0.9 % injection 10 mL    0.9 % sodium chloride infusion    OR Linked Order Group     potassium chloride (KLOR-CON M) extended release tablet 40 mEq     potassium bicarb-citric acid (EFFER-K) effervescent tablet 40 mEq     potassium chloride 10 mEq/100 mL IVPB (Peripheral Line)    magnesium sulfate 2000 mg in 50 mL IVPB premix    enoxaparin (LOVENOX) injection 40 mg     Order Specific Question:   Indication of Use     Answer:   Prophylaxis-DVT/PE    polyethylene glycol (GLYCOLAX) packet 17 g    OR Linked Order Group     acetaminophen (TYLENOL) tablet 650 mg     acetaminophen (TYLENOL) suppository 650 mg    0.9 % sodium chloride infusion    DISCONTD: amLODIPine (NORVASC) tablet 5 mg    amLODIPine (NORVASC) tablet 10 mg    glucose chewable tablet 16 g    OR Linked Order Group     dextrose bolus 10% 125 mL     dextrose bolus 10% 250 mL    glucagon injection 1 mg    dextrose 10 % infusion    insulin lispro (HUMALOG,ADMELOG) injection vial 0-8 Units    insulin lispro (HUMALOG,ADMELOG) injection vial 0-4 Units    hydrALAZINE (APRESOLINE) injection 10 mg    labetalol (NORMODYNE;TRANDATE) injection 10 mg     DISCHARGE PRESCRIPTIONS:  Current Discharge Medication List        PHYSICIAN CONSULTS ORDERED THIS ENCOUNTER:  IP CONSULT TO INTERNAL MEDICINE  FINAL IMPRESSION      1. ADRIANA (acute kidney injury) (HCC)    2. Dehydration          DISPOSITION/PLAN   DISPOSITION Admitted 06/29/2024 04:11:58 PM      OUTPATIENT FOLLOW UP THE PATIENT:  No follow-up provider specified.    MD Jackie Burroughs Joseph R, MD  06/30/24 1571

## 2024-06-30 LAB
ALBUMIN SERPL-MCNC: 3.7 G/DL (ref 3.5–5.2)
ALP SERPL-CCNC: 97 U/L (ref 40–129)
ALT SERPL-CCNC: 8 U/L (ref 5–41)
ANION GAP SERPL CALCULATED.3IONS-SCNC: 13 MMOL/L (ref 9–17)
AST SERPL-CCNC: 15 U/L
BASOPHILS # BLD: 0.03 K/UL (ref 0–0.2)
BASOPHILS NFR BLD: 0 % (ref 0–2)
BILIRUB SERPL-MCNC: 0.8 MG/DL (ref 0.3–1.2)
BUN SERPL-MCNC: 19 MG/DL (ref 8–23)
BUN/CREAT SERPL: 12 (ref 9–20)
CALCIUM SERPL-MCNC: 8.7 MG/DL (ref 8.6–10.4)
CHLORIDE SERPL-SCNC: 106 MMOL/L (ref 98–107)
CO2 SERPL-SCNC: 20 MMOL/L (ref 20–31)
CREAT SERPL-MCNC: 1.6 MG/DL (ref 0.7–1.2)
EOSINOPHIL # BLD: 0.16 K/UL (ref 0–0.44)
EOSINOPHILS RELATIVE PERCENT: 2 % (ref 1–4)
ERYTHROCYTE [DISTWIDTH] IN BLOOD BY AUTOMATED COUNT: 13.9 % (ref 11.8–14.4)
GFR, ESTIMATED: 47 ML/MIN/1.73M2
GLUCOSE BLD-MCNC: 131 MG/DL (ref 75–110)
GLUCOSE BLD-MCNC: 186 MG/DL (ref 75–110)
GLUCOSE BLD-MCNC: 208 MG/DL (ref 75–110)
GLUCOSE BLD-MCNC: 216 MG/DL (ref 75–110)
GLUCOSE SERPL-MCNC: 179 MG/DL (ref 70–99)
HCT VFR BLD AUTO: 41.8 % (ref 40.7–50.3)
HGB BLD-MCNC: 14.2 G/DL (ref 13–17)
IMM GRANULOCYTES # BLD AUTO: 0.03 K/UL (ref 0–0.3)
IMM GRANULOCYTES NFR BLD: 0 %
LYMPHOCYTES NFR BLD: 1.74 K/UL (ref 1.1–3.7)
LYMPHOCYTES RELATIVE PERCENT: 23 % (ref 24–43)
MAGNESIUM SERPL-MCNC: 1.5 MG/DL (ref 1.6–2.6)
MCH RBC QN AUTO: 33.7 PG (ref 25.2–33.5)
MCHC RBC AUTO-ENTMCNC: 34 G/DL (ref 28.4–34.8)
MCV RBC AUTO: 99.3 FL (ref 82.6–102.9)
MONOCYTES NFR BLD: 0.63 K/UL (ref 0.1–1.2)
MONOCYTES NFR BLD: 8 % (ref 3–12)
NEUTROPHILS NFR BLD: 67 % (ref 36–65)
NEUTS SEG NFR BLD: 4.95 K/UL (ref 1.5–8.1)
NRBC BLD-RTO: 0 PER 100 WBC
PHOSPHATE SERPL-MCNC: 2.2 MG/DL (ref 2.5–4.5)
PLATELET # BLD AUTO: 203 K/UL (ref 138–453)
PMV BLD AUTO: 9.6 FL (ref 8.1–13.5)
POTASSIUM SERPL-SCNC: 3.9 MMOL/L (ref 3.7–5.3)
PROT SERPL-MCNC: 7.1 G/DL (ref 6.4–8.3)
RBC # BLD AUTO: 4.21 M/UL (ref 4.21–5.77)
SODIUM SERPL-SCNC: 139 MMOL/L (ref 135–144)
WBC OTHER # BLD: 7.5 K/UL (ref 3.5–11.3)

## 2024-06-30 PROCEDURE — 6360000002 HC RX W HCPCS: Performed by: FAMILY MEDICINE

## 2024-06-30 PROCEDURE — 6360000002 HC RX W HCPCS: Performed by: NURSE PRACTITIONER

## 2024-06-30 PROCEDURE — 83735 ASSAY OF MAGNESIUM: CPT

## 2024-06-30 PROCEDURE — 84100 ASSAY OF PHOSPHORUS: CPT

## 2024-06-30 PROCEDURE — 6370000000 HC RX 637 (ALT 250 FOR IP): Performed by: FAMILY MEDICINE

## 2024-06-30 PROCEDURE — 85025 COMPLETE CBC W/AUTO DIFF WBC: CPT

## 2024-06-30 PROCEDURE — 94761 N-INVAS EAR/PLS OXIMETRY MLT: CPT

## 2024-06-30 PROCEDURE — 82947 ASSAY GLUCOSE BLOOD QUANT: CPT

## 2024-06-30 PROCEDURE — 80053 COMPREHEN METABOLIC PANEL: CPT

## 2024-06-30 PROCEDURE — 1200000000 HC SEMI PRIVATE

## 2024-06-30 PROCEDURE — 36415 COLL VENOUS BLD VENIPUNCTURE: CPT

## 2024-06-30 PROCEDURE — 2580000003 HC RX 258: Performed by: FAMILY MEDICINE

## 2024-06-30 RX ORDER — LORAZEPAM 1 MG/1
1 TABLET ORAL
Status: DISCONTINUED | OUTPATIENT
Start: 2024-06-30 | End: 2024-07-03 | Stop reason: HOSPADM

## 2024-06-30 RX ORDER — LORAZEPAM 2 MG/ML
2 INJECTION INTRAMUSCULAR
Status: DISCONTINUED | OUTPATIENT
Start: 2024-06-30 | End: 2024-07-03 | Stop reason: HOSPADM

## 2024-06-30 RX ORDER — LORAZEPAM 1 MG/1
2 TABLET ORAL
Status: DISCONTINUED | OUTPATIENT
Start: 2024-06-30 | End: 2024-07-03 | Stop reason: HOSPADM

## 2024-06-30 RX ORDER — SODIUM CHLORIDE 0.9 % (FLUSH) 0.9 %
5-40 SYRINGE (ML) INJECTION EVERY 12 HOURS SCHEDULED
Status: DISCONTINUED | OUTPATIENT
Start: 2024-06-30 | End: 2024-07-03 | Stop reason: HOSPADM

## 2024-06-30 RX ORDER — SODIUM CHLORIDE 0.9 % (FLUSH) 0.9 %
5-40 SYRINGE (ML) INJECTION PRN
Status: DISCONTINUED | OUTPATIENT
Start: 2024-06-30 | End: 2024-07-03 | Stop reason: HOSPADM

## 2024-06-30 RX ORDER — LABETALOL HYDROCHLORIDE 5 MG/ML
10 INJECTION, SOLUTION INTRAVENOUS ONCE
Status: COMPLETED | OUTPATIENT
Start: 2024-06-30 | End: 2024-06-30

## 2024-06-30 RX ORDER — LORAZEPAM 2 MG/ML
1 INJECTION INTRAMUSCULAR
Status: DISCONTINUED | OUTPATIENT
Start: 2024-06-30 | End: 2024-07-03 | Stop reason: HOSPADM

## 2024-06-30 RX ORDER — LORAZEPAM 1 MG/1
3 TABLET ORAL
Status: DISCONTINUED | OUTPATIENT
Start: 2024-06-30 | End: 2024-07-03 | Stop reason: HOSPADM

## 2024-06-30 RX ORDER — LORAZEPAM 2 MG/ML
4 INJECTION INTRAMUSCULAR
Status: DISCONTINUED | OUTPATIENT
Start: 2024-06-30 | End: 2024-07-03 | Stop reason: HOSPADM

## 2024-06-30 RX ORDER — SODIUM CHLORIDE 9 MG/ML
INJECTION, SOLUTION INTRAVENOUS PRN
Status: DISCONTINUED | OUTPATIENT
Start: 2024-06-30 | End: 2024-07-03 | Stop reason: HOSPADM

## 2024-06-30 RX ORDER — GAUZE BANDAGE 2" X 2"
100 BANDAGE TOPICAL DAILY
Status: DISCONTINUED | OUTPATIENT
Start: 2024-06-30 | End: 2024-07-03 | Stop reason: HOSPADM

## 2024-06-30 RX ORDER — LORAZEPAM 2 MG/ML
3 INJECTION INTRAMUSCULAR
Status: DISCONTINUED | OUTPATIENT
Start: 2024-06-30 | End: 2024-07-03 | Stop reason: HOSPADM

## 2024-06-30 RX ORDER — LORAZEPAM 1 MG/1
4 TABLET ORAL
Status: DISCONTINUED | OUTPATIENT
Start: 2024-06-30 | End: 2024-07-03 | Stop reason: HOSPADM

## 2024-06-30 RX ADMIN — PAROXETINE HYDROCHLORIDE 20 MG: 20 TABLET, FILM COATED ORAL at 08:29

## 2024-06-30 RX ADMIN — ATENOLOL 25 MG: 25 TABLET ORAL at 08:29

## 2024-06-30 RX ADMIN — HYDRALAZINE HYDROCHLORIDE 10 MG: 20 INJECTION INTRAMUSCULAR; INTRAVENOUS at 17:55

## 2024-06-30 RX ADMIN — AMLODIPINE BESYLATE 10 MG: 5 TABLET ORAL at 08:29

## 2024-06-30 RX ADMIN — MAGNESIUM SULFATE HEPTAHYDRATE 2000 MG: 40 INJECTION, SOLUTION INTRAVENOUS at 18:50

## 2024-06-30 RX ADMIN — LORAZEPAM 1 MG: 1 TABLET ORAL at 21:16

## 2024-06-30 RX ADMIN — Medication 100 MG: at 12:04

## 2024-06-30 RX ADMIN — SODIUM CHLORIDE: 9 INJECTION, SOLUTION INTRAVENOUS at 17:19

## 2024-06-30 RX ADMIN — INSULIN LISPRO 2 UNITS: 100 INJECTION, SOLUTION INTRAVENOUS; SUBCUTANEOUS at 12:04

## 2024-06-30 RX ADMIN — LABETALOL HYDROCHLORIDE 10 MG: 5 INJECTION INTRAVENOUS at 06:02

## 2024-06-30 RX ADMIN — ENOXAPARIN SODIUM 40 MG: 100 INJECTION SUBCUTANEOUS at 08:29

## 2024-06-30 NOTE — H&P
Hospitalist - History & Physical      Patient: Reynaldo Seaman    Unit/Bed:2008/2008-02  YOB: 1955  MRN: [id number]   Acct: [id number]   PCP: Imani Bolivar APRN - CNP    Date of Service: Pt seen/examined on 06/30/24  and Admitted to Inpatient with expected LOS greater than two midnights due to medical therapy.     Chief Complaint:  Emesis    Assessment and Plan:-  Dehydration and ADRIANA  Lightheadedness  Alcohol Abuse  Tobacco Abuse  Seizure disorder  Nausea and Vomiting  Hx of VRE   Hypertensive Renal Disease  MDD  Hx of UTI and Urinary Retention    Admit to medical floor. Continue IVF and adv diet as tolerated.  Started on ciwa protocol for alcohol withdrawal  Seizure precautions  Counseled on alcohol and tobacco abuse  Antiemetics prn  Continue home medications  Will get 2D echo, telemetry monitoring for Fall.  Neurology consult regarding hx of seizure. Previously on vimpat. No meds at this time.     History Of Present Illness:    70 yo M with c/o CVA, DM, HTN, Seizure, who came in to the hospital for high bp, emesis. Patient states he was not feeling well since last Monday. He did have lightheadedness and LOC on Monday where he fell and did hit the back of his head. He denies prodromal sxms and or seizure like activity. No bowel or bladder incontinence. The fall was unwitnessed.  In the ER, patient was found to have mild elevation of Cr, he was given NS 0.9 2L bolus, zofran.  Work up was otherwise unremarkable. Trauma work up unremarkable.   Upon my evaluation, patient in bed. No acute concerns. He does endorse advil use , smokes 1ppd and drinks alcohol one fifth of a whiskey per day.   He will be admitted for further work up.       Past Medical History:        Diagnosis Date    CVA (cerebral vascular accident) ([facility]) 02/2021    With residual right sided weakness    Diabetes mellitus (HCC)     Hypertension        Past Surgical History:        Procedure Laterality Date    CAROTID  tissue swelling.     Chronic findings in the brain without acute CT abnormality identified. No acute osseous abnormality identified in the cervical spine.     CT CSpine W/O Contrast    Result Date: 6/29/2024  EXAMINATION: CT OF THE HEAD WITHOUT CONTRAST; CT OF THE CERVICAL SPINE WITHOUT CONTRAST 6/29/2024 9:42 am; 6/29/2024 9:44 am TECHNIQUE: CT of the head was performed without the administration of intravenous contrast. Automated exposure control, iterative reconstruction, and/or weight based adjustment of the mA/kV was utilized to reduce the radiation dose to as low as reasonably achievable.; CT of the cervical spine was performed without the administration of intravenous contrast. Multiplanar reformatted images are provided for review. Automated exposure control, iterative reconstruction, and/or weight based adjustment of the mA/kV was utilized to reduce the radiation dose to as low as reasonably achievable. COMPARISON: 06/04/2022 HISTORY: ORDERING SYSTEM PROVIDED HISTORY: fell in bathub 5 days ago TECHNOLOGIST PROVIDED HISTORY: fell in bathub 5 days ago Decision Support Exception - unselect if not a suspected or confirmed emergency medical condition->Emergency Medical Condition (MA) Reason for Exam: Fell in bathtub x5 days ago; ORDERING SYSTEM PROVIDED HISTORY: fell in bathub 5 days ago TECHNOLOGIST PROVIDED HISTORY: fell in bathub 5 days ago Decision Support Exception - unselect if not a suspected or confirmed emergency medical condition->Emergency Medical Condition (MA) Reason for Exam: Fell in bathtub x5 days ago FINDINGS: HEAD: BRAIN/VENTRICLES: There is no acute intracranial hemorrhage, mass effect or midline shift.  No abnormal extra-axial fluid collection.  Encephalomalacia in the right parietal and right occipital lobes again demonstrated.  Mild cortical atrophy and chronic periventricular and basal ganglia changes again noted. ORBITS: The visualized portion of the orbits demonstrate no acute  abnormality. SINUSES: The visualized paranasal sinuses and mastoid air cells demonstrate no acute abnormality. SOFT TISSUES/SKULL: No acute abnormality of the visualized skull or soft tissues. CERVICAL SPINE: BONES/ALIGNMENT: There is no evidence of an acute cervical spine fracture. No traumatic malalignment. DEGENERATIVE CHANGES: Advanced multilevel degenerative disc disease and facet arthropathy. SOFT TISSUES: There is no prevertebral soft tissue swelling.     Chronic findings in the brain without acute CT abnormality identified. No acute osseous abnormality identified in the cervical spine.     XR CHEST PORTABLE    Result Date: 6/29/2024  EXAMINATION: ONE XRAY VIEW OF THE CHEST 6/29/2024 8:46 am COMPARISON: 06/08/2022 HISTORY: ORDERING SYSTEM PROVIDED HISTORY: weakness TECHNOLOGIST PROVIDED HISTORY: weakness Reason for Exam: Hypertension, sweaty FINDINGS: The cardiomediastinal silhouette is within normal limits. There is no consolidation, pneumothorax or evidence for edema. No evidence for effusion. No acute osseous abnormality is identified.     No acute airspace disease identified.     Electronically signed by Mohsin Mohammad Reza, MD on 6/30/2024 at 8:41 AM

## 2024-06-30 NOTE — CARE COORDINATION
Case Management Assessment  Initial Evaluation    Date/Time of Evaluation: 6/30/2024 4:08 PM  Assessment Completed by: Luana Ayala    If patient is discharged prior to next notation, then this note serves as note for discharge by case management.    Patient Name: Reynaldo Seaman                   YOB: 1955  Diagnosis: Dehydration [E86.0]  ADRIANA (acute kidney injury) (HCC) [N17.9]                   Date / Time: 6/29/2024  8:21 AM    Patient Admission Status: Inpatient   Readmission Risk (Low < 19, Mod (19-27), High > 27): Readmission Risk Score: 14.4    Current PCP: Imani Bolivar APRN - CNP  PCP verified by CM? Yes    Chart Reviewed: Yes      History Provided by: Patient  Patient Orientation: Alert and Oriented    Patient Cognition: Alert    Hospitalization in the last 30 days (Readmission):  No    If yes, Readmission Assessment in CM Navigator will be completed.    Advance Directives:      Code Status: Full Code   Patient's Primary Decision Maker is: Patient Declined (Legal Next of Kin Remains as Decision Maker)      Discharge Planning:    Patient lives with: Spouse/Significant Other (lives with his girlfriend Eleonora) Type of Home: House  Primary Care Giver: Self  Patient Support Systems include: Spouse/Significant Other   Current Financial resources: Medicare (does not have a Part D drug plan)  Current community resources: None  Current services prior to admission: Durable Medical Equipment            Current DME: Wheelchair, Walker, Shower Chair (uses the wheelchair manual for longer distances)            Type of Home Care services:  None    ADLS  Prior functional level: Assistance with the following:, Cooking, Housework, Shopping (does not drive)  Current functional level: Assistance with the following:, Cooking, Housework, Shopping (does not drive)    PT AM-PAC:   /24  OT AM-PAC:   /24    Family can provide assistance at DC: Yes  Would you like Case Management to discuss the discharge plan  with any other family members/significant others, and if so, who? No  Plans to Return to Present Housing: Yes  Other Identified Issues/Barriers to RETURNING to current housing: ?endurance  Potential Assistance needed at discharge: Home Care            Potential DME:    Patient expects to discharge to: House  Plan for transportation at discharge: Friends    Financial    Payor: MEDICARE / Plan: MEDICARE PART A AND B / Product Type: *No Product type* /     Does insurance require precert for SNF: No. Pt does not have secondary insurance    Potential assistance Purchasing Medications: No  Meds-to-Beds request:        [facility] [id number] - [place], [place] - [address] - P [phone] - F [phone]  [address]  [facility] [address]  Phone: [phone] Fax: [phone]      Notes:    Factors facilitating achievement of predicted outcomes: Caregiver support, Motivated, Cooperative, Pleasant, and Has homemaker services    Barriers to discharge: Decreased endurance, Lower extremity weakness, and Long standing deficits    Additional Case Management Notes:   Met with patient to review plan of care.  Pt is now renting a house with his girlfriend.  He lives on the first floor and they have a shared kitchen.  He does not have a PartD drug plan has joined the [facility] plan and finds it reasonable.  Pt uses a walker prn.  Has a wheelchair for longer distances.    May benefit from PT eval for ? Home care or outpt services.  Echo pending.  Neurology consult for ? Seizure.    The Plan for Transition of Care is related to the following treatment goals of Dehydration [E86.0]  ADRIANA (acute kidney injury) (HCC) [N17.9]    I  The Patient and/or Patient Representative Agree with the Discharge Plan?      Luana Ayala  Case Management Department  Ph:  Fax:

## 2024-06-30 NOTE — PLAN OF CARE
Problem: Discharge Planning  Goal: Discharge to home or other facility with appropriate resources  6/30/2024 0630 by Virginia Rodriguez RN  Outcome: Progressing     Problem: Chronic Conditions and Co-morbidities  Goal: Patient's chronic conditions and co-morbidity symptoms are monitored and maintained or improved  6/30/2024 0630 by Virginia Rodriguez RN  Outcome: Progressing     Problem: Safety - Adult  Goal: Free from fall injury  6/30/2024 0630 by Virginia Rodriguez RN  Outcome: Progressing     Problem: Safety - Adult  Goal: Free from fall injury  6/30/2024 0630 by Virginia Rodriguez RN  Outcome: Progressing     Problem: Neurosensory - Adult  Goal: Achieves stable or improved neurological status  6/30/2024 0630 by Virginia Rodriguez RN  Outcome: Progressing     Problem: Neurosensory - Adult  Goal: Absence of seizures  6/30/2024 0630 by Virginia Rodriguez RN  Outcome: Progressing     Problem: Neurosensory - Adult  Goal: Remains free of injury related to seizures activity  6/30/2024 0630 by Virginia Rodriguez RN  Outcome: Progressing     Problem: Neurosensory - Adult  Goal: Achieves maximal functionality and self care  6/30/2024 0630 by Virginia Rodriguez RN  Outcome: Progressing     Problem: Cardiovascular - Adult  Goal: Maintains optimal cardiac output and hemodynamic stability  6/30/2024 0630 by Virginia Rodriguez RN  Outcome: Progressing     Problem: Skin/Tissue Integrity - Adult  Goal: Skin integrity remains intact  6/30/2024 0630 by Virginia Rodriguez RN  Outcome: Progressing     Problem: Skin/Tissue Integrity - Adult  Goal: Incisions, wounds, or drain sites healing without S/S of infection  6/30/2024 0630 by Virginia Rodriguez RN  Outcome: Progressing     Problem: Musculoskeletal - Adult  Goal: Return mobility to safest level of function  6/30/2024 0630 by Virginia Rodriguez RN  Outcome: Progressing     Problem: Musculoskeletal - Adult  Goal: Return ADL status to a safe level of function  6/30/2024 0630 by Virginia Rodriguez RN  Outcome: Progressing

## 2024-07-01 ENCOUNTER — APPOINTMENT (OUTPATIENT)
Dept: MRI IMAGING | Age: 69
DRG: 682 | End: 2024-07-01
Payer: MEDICARE

## 2024-07-01 ENCOUNTER — APPOINTMENT (OUTPATIENT)
Dept: GENERAL RADIOLOGY | Age: 69
DRG: 682 | End: 2024-07-01
Payer: MEDICARE

## 2024-07-01 ENCOUNTER — APPOINTMENT (OUTPATIENT)
Dept: ULTRASOUND IMAGING | Age: 69
DRG: 682 | End: 2024-07-01
Payer: MEDICARE

## 2024-07-01 LAB
ALBUMIN SERPL-MCNC: 3.4 G/DL (ref 3.5–5.2)
ALP SERPL-CCNC: 85 U/L (ref 40–129)
ALT SERPL-CCNC: 8 U/L (ref 5–41)
ANION GAP SERPL CALCULATED.3IONS-SCNC: 13 MMOL/L (ref 9–17)
AST SERPL-CCNC: 13 U/L
BASOPHILS # BLD: 0.04 K/UL (ref 0–0.2)
BASOPHILS NFR BLD: 1 % (ref 0–2)
BILIRUB SERPL-MCNC: 0.8 MG/DL (ref 0.3–1.2)
BUN SERPL-MCNC: 13 MG/DL (ref 8–23)
BUN/CREAT SERPL: 12 (ref 9–20)
CALCIUM SERPL-MCNC: 8.3 MG/DL (ref 8.6–10.4)
CHLORIDE SERPL-SCNC: 103 MMOL/L (ref 98–107)
CO2 SERPL-SCNC: 19 MMOL/L (ref 20–31)
CREAT SERPL-MCNC: 1.1 MG/DL (ref 0.7–1.2)
EOSINOPHIL # BLD: 0.18 K/UL (ref 0–0.44)
EOSINOPHILS RELATIVE PERCENT: 3 % (ref 1–4)
ERYTHROCYTE [DISTWIDTH] IN BLOOD BY AUTOMATED COUNT: 13.8 % (ref 11.8–14.4)
GFR, ESTIMATED: 73 ML/MIN/1.73M2
GLUCOSE BLD-MCNC: 120 MG/DL (ref 75–110)
GLUCOSE BLD-MCNC: 130 MG/DL (ref 75–110)
GLUCOSE BLD-MCNC: 143 MG/DL (ref 75–110)
GLUCOSE BLD-MCNC: 149 MG/DL (ref 75–110)
GLUCOSE SERPL-MCNC: 136 MG/DL (ref 70–99)
HCT VFR BLD AUTO: 37.7 % (ref 40.7–50.3)
HGB BLD-MCNC: 12.9 G/DL (ref 13–17)
IMM GRANULOCYTES # BLD AUTO: 0.02 K/UL (ref 0–0.3)
IMM GRANULOCYTES NFR BLD: 0 %
LYMPHOCYTES NFR BLD: 1.68 K/UL (ref 1.1–3.7)
LYMPHOCYTES RELATIVE PERCENT: 25 % (ref 24–43)
MAGNESIUM SERPL-MCNC: 2 MG/DL (ref 1.6–2.6)
MCH RBC QN AUTO: 33.5 PG (ref 25.2–33.5)
MCHC RBC AUTO-ENTMCNC: 34.2 G/DL (ref 28.4–34.8)
MCV RBC AUTO: 97.9 FL (ref 82.6–102.9)
MONOCYTES NFR BLD: 0.63 K/UL (ref 0.1–1.2)
MONOCYTES NFR BLD: 10 % (ref 3–12)
NEUTROPHILS NFR BLD: 61 % (ref 36–65)
NEUTS SEG NFR BLD: 4.08 K/UL (ref 1.5–8.1)
NRBC BLD-RTO: 0 PER 100 WBC
PLATELET # BLD AUTO: 179 K/UL (ref 138–453)
PMV BLD AUTO: 9.6 FL (ref 8.1–13.5)
POTASSIUM SERPL-SCNC: 3.5 MMOL/L (ref 3.7–5.3)
PROT SERPL-MCNC: 6.5 G/DL (ref 6.4–8.3)
RBC # BLD AUTO: 3.85 M/UL (ref 4.21–5.77)
SODIUM SERPL-SCNC: 135 MMOL/L (ref 135–144)
VIT B12 SERPL-MCNC: 190 PG/ML (ref 232–1245)
WBC OTHER # BLD: 6.6 K/UL (ref 3.5–11.3)

## 2024-07-01 PROCEDURE — 6370000000 HC RX 637 (ALT 250 FOR IP): Performed by: FAMILY MEDICINE

## 2024-07-01 PROCEDURE — 1200000000 HC SEMI PRIVATE

## 2024-07-01 PROCEDURE — 95816 EEG AWAKE AND DROWSY: CPT

## 2024-07-01 PROCEDURE — 6360000004 HC RX CONTRAST MEDICATION: Performed by: STUDENT IN AN ORGANIZED HEALTH CARE EDUCATION/TRAINING PROGRAM

## 2024-07-01 PROCEDURE — A9579 GAD-BASE MR CONTRAST NOS,1ML: HCPCS | Performed by: STUDENT IN AN ORGANIZED HEALTH CARE EDUCATION/TRAINING PROGRAM

## 2024-07-01 PROCEDURE — 76770 US EXAM ABDO BACK WALL COMP: CPT

## 2024-07-01 PROCEDURE — 95816 EEG AWAKE AND DROWSY: CPT | Performed by: PSYCHIATRY & NEUROLOGY

## 2024-07-01 PROCEDURE — 82947 ASSAY GLUCOSE BLOOD QUANT: CPT

## 2024-07-01 PROCEDURE — 82607 VITAMIN B-12: CPT

## 2024-07-01 PROCEDURE — 74018 RADEX ABDOMEN 1 VIEW: CPT

## 2024-07-01 PROCEDURE — 70553 MRI BRAIN STEM W/O & W/DYE: CPT

## 2024-07-01 PROCEDURE — 80053 COMPREHEN METABOLIC PANEL: CPT

## 2024-07-01 PROCEDURE — 85025 COMPLETE CBC W/AUTO DIFF WBC: CPT

## 2024-07-01 PROCEDURE — 2580000003 HC RX 258: Performed by: FAMILY MEDICINE

## 2024-07-01 PROCEDURE — 99222 1ST HOSP IP/OBS MODERATE 55: CPT | Performed by: STUDENT IN AN ORGANIZED HEALTH CARE EDUCATION/TRAINING PROGRAM

## 2024-07-01 PROCEDURE — 83735 ASSAY OF MAGNESIUM: CPT

## 2024-07-01 PROCEDURE — 36415 COLL VENOUS BLD VENIPUNCTURE: CPT

## 2024-07-01 PROCEDURE — 6360000002 HC RX W HCPCS: Performed by: FAMILY MEDICINE

## 2024-07-01 RX ADMIN — LORAZEPAM 2 MG: 1 TABLET ORAL at 01:58

## 2024-07-01 RX ADMIN — Medication 100 MG: at 10:14

## 2024-07-01 RX ADMIN — GADOTERIDOL 14 ML: 279.3 INJECTION, SOLUTION INTRAVENOUS at 17:54

## 2024-07-01 RX ADMIN — AMLODIPINE BESYLATE 10 MG: 5 TABLET ORAL at 10:15

## 2024-07-01 RX ADMIN — LORAZEPAM 3 MG: 1 TABLET ORAL at 03:11

## 2024-07-01 RX ADMIN — HYDRALAZINE HYDROCHLORIDE 10 MG: 20 INJECTION INTRAMUSCULAR; INTRAVENOUS at 01:50

## 2024-07-01 RX ADMIN — LORAZEPAM 3 MG: 1 TABLET ORAL at 10:11

## 2024-07-01 RX ADMIN — SODIUM CHLORIDE: 9 INJECTION, SOLUTION INTRAVENOUS at 18:38

## 2024-07-01 RX ADMIN — ATENOLOL 25 MG: 25 TABLET ORAL at 10:15

## 2024-07-01 RX ADMIN — POTASSIUM CHLORIDE 40 MEQ: 1500 TABLET, EXTENDED RELEASE ORAL at 10:23

## 2024-07-01 RX ADMIN — PAROXETINE HYDROCHLORIDE 20 MG: 20 TABLET, FILM COATED ORAL at 10:12

## 2024-07-01 NOTE — PROGRESS NOTES
Patient resting in bed, impulsive, patient continues to believe there are birds in his room. Attempt to reorient patient. A few minutes later, patient needs to be reoriented to place and reassured there are no birds in his room.

## 2024-07-01 NOTE — PROCEDURES
PROCEDURE NOTE  Date: 7/1/2024   Name: Reynaldo Seaman  YOB: 1955    Procedures    EEG REPORT       Patient: Reynaldo Seaman Age: 69 y.o.  MRN: [id number]      Referring Provider: No ref. provider found    History: This routine 30 minute scalp EEG was recorded with video- monitoring for a 69 y.o.. male who presented with encephalopathy. This EEG was performed to evaluate for focal and epileptiform abnormalities.     Reynaldo Seaman   Current Facility-Administered Medications   Medication Dose Route Frequency Provider Last Rate Last Admin    sodium chloride flush 0.9 % injection 5-40 mL  5-40 mL IntraVENous 2 times per day Reza, Mohsin Mohammad, MD        sodium chloride flush 0.9 % injection 5-40 mL  5-40 mL IntraVENous PRN Reza, Mohsin Mohammad, MD        0.9 % sodium chloride infusion   IntraVENous PRN Reza, Mohsin Mohammad, MD        thiamine mononitrate tablet 100 mg  100 mg Oral Daily Reza, Mohsin Mohammad, MD   100 mg at 07/01/24 1014    LORazepam (ATIVAN) tablet 1 mg  1 mg Oral Q1H PRN Reza, Mohsin Mohammad, MD   1 mg at 06/30/24 2116    Or    LORazepam (ATIVAN) injection 1 mg  1 mg IntraVENous Q1H PRN Reza, Mohsin Mohammad, MD        Or    LORazepam (ATIVAN) tablet 2 mg  2 mg Oral Q1H PRN Reza, Mohsin Mohammad, MD   2 mg at 07/01/24 0158    Or    LORazepam (ATIVAN) injection 2 mg  2 mg IntraVENous Q1H PRN Reza, Mohsin Mohammad, MD        Or    LORazepam (ATIVAN) tablet 3 mg  3 mg Oral Q1H PRN Reza, Mohsin Mohammad, MD   3 mg at 07/01/24 1011    Or    LORazepam (ATIVAN) injection 3 mg  3 mg IntraVENous Q1H PRN Reza, Mohsin Mohammad, MD        Or    LORazepam (ATIVAN) tablet 4 mg  4 mg Oral Q1H PRN Reza, Mohsin Mohammad, MD        Or    LORazepam (ATIVAN) injection 4 mg  4 mg IntraVENous Q1H PRN Reza, Mohsin Mohammad, MD        ondansetron (ZOFRAN-ODT) disintegrating tablet 4 mg  4 mg Oral Q8H PRN Reza, Mohsin Mohammad, MD        Or    ondansetron (ZOFRAN) injection 4 mg  4 mg IntraVENous Q6H  PRN Reza, Mohsin Mohammad, MD        atenolol (TENORMIN) tablet 25 mg  25 mg Oral Daily Reza, Mohsin Mohammad, MD   25 mg at 07/01/24 1015    PARoxetine (PAXIL) tablet 20 mg  20 mg Oral QAM Reza, Mohsin Mohammad, MD   20 mg at 07/01/24 1012    sodium chloride flush 0.9 % injection 10 mL  10 mL IntraVENous 2 times per day Reza, Mohsin Mohammad, MD        sodium chloride flush 0.9 % injection 10 mL  10 mL IntraVENous PRN Reza, Mohsin Mohammad, MD        0.9 % sodium chloride infusion   IntraVENous PRN Reza, Mohsin Mohammad, MD        potassium chloride (KLOR-CON M) extended release tablet 40 mEq  40 mEq Oral PRN Reza, Mohsin Mohammad, MD   40 mEq at 07/01/24 1023    Or    potassium bicarb-citric acid (EFFER-K) effervescent tablet 40 mEq  40 mEq Oral PRN Reza, Mohsin Mohammad, MD        Or    potassium chloride 10 mEq/100 mL IVPB (Peripheral Line)  10 mEq IntraVENous PRN Reza, Mohsin Mohammad, MD        magnesium sulfate 2000 mg in 50 mL IVPB premix  2,000 mg IntraVENous PRN Reza, Mohsin Mohammad, MD   Stopped at 06/30/24 2050    enoxaparin (LOVENOX) injection 40 mg  40 mg SubCUTAneous Daily Reza, Mohsin Mohammad, MD   40 mg at 06/30/24 0829    polyethylene glycol (GLYCOLAX) packet 17 g  17 g Oral Daily PRN Reza, Mohsin Mohammad, MD        acetaminophen (TYLENOL) tablet 650 mg  650 mg Oral Q6H PRN Reza, Mohsin Mohammad, MD        Or    acetaminophen (TYLENOL) suppository 650 mg  650 mg Rectal Q6H PRN Reza, Mohsin Mohammad, MD        0.9 % sodium chloride infusion   IntraVENous Continuous Reza, Mohsin Mohammad, MD 50 mL/hr at 06/30/24 1719 New Bag at 06/30/24 1719    amLODIPine (NORVASC) tablet 10 mg  10 mg Oral Daily Reza, Mohsin Mohammad, MD   10 mg at 07/01/24 1015    glucose chewable tablet 16 g  4 tablet Oral PRN Reza, Mohsin Mohammad, MD        dextrose bolus 10% 125 mL  125 mL IntraVENous PRN Reza, Mohsin Mohammad, MD        Or    dextrose bolus 10% 250 mL  250 mL IntraVENous PRN Reza, Mohsin Mohammad, MD

## 2024-07-01 NOTE — PROGRESS NOTES
Found patient incontinent of urine, removed clothing he was wearing from home, cleaned up, new gown placed, and  new linens. Bed alarm on

## 2024-07-01 NOTE — CONSULTS
[facility] Neurology   IN-PATIENT SERVICE   [facility]    Inpatient Neurology Consult Note             Date:   7/1/2024  Patient name:  Reynaldo Seaman  Date of admission:  6/29/2024  8:21 AM  MRN:   [id number]  Account:  [id number]  YOB: 1955  PCP:    Imani Bolivar APRN - CNP  Room:   2008/2008-02  Code Status:    Full Code  Late Note Entry: patient seen and care discussed with bedside nurse 7/1/24   Chief Complaint:     Chief Complaint   Patient presents with    Hypertension     Feels sweaty    Emesis     2 days ago   Neurology Consulted 6/30/24 afternoon for Syncope like episode and concern for seizure disorder.     History Obtained From:   patient, electronic medical record  History of Present Illness:   The patient is a 69 y.o.   male who presents to [facility] from home 6/29/24 with Hypertension (Feels sweaty) and Emesis (2 days ago) and he is admitted to the hospital for the management of ETOH dependence and withdrawal. 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 Patient unable to provide any history at time of my exam around noon shortly after he received 3mg IV ativan for combative and delirium 2/2 ETOH withdrawal. On my exam he was much more calm however required noxious stimulus to awaken and briskly falling back to sleep. Withdrawals to pain in all 4 less brisk at baseline on the right, Pupils equal round and reactive bilaterally 3mm.    Radiology Review and Neurologic History.   -multiple hospital admissions with alcohol related seizures.   Home medications reviewed:  -paxil 20  -atenalol 25  -Thiamine 100    MRI brain W/WO 7/1/24-atypical appearance of left parieto-occipital and even temporal/Splenium involvement to rule out underlying mass lesion   IMPRESSION:-->question of subacute right frontal lesion however  near non-diagnostic exam limiting sensitivity   1. Significantly motion degraded examination.  No appreciable acute  intracranial abnormality or abnormal enhancement.  2. Mild diffuse parenchymal atrophy with chronic microvascular ischemic  changes.  3. 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      Routine EEG 7/1/24  Interpretation  This EEG was abnormal due to disorganized and slow background in polymorphic delta and theta frequency.    Clinical correlation  This EEG was abnormal. Disorganized and slow background suggested mild encephalopathy of non specific etiology.     Routine 30 minute EEG 5/31/22  IMPRESSION:  This is an abnormal EEG due to mild diffuse slowing. No evidence of epileptiform activity is noted. These findings are consistent with a mild diffuse encephalopathy. This is a non-specific finding and can be seen in toxic, metabolic and parainfectious processes.     MRI brain W/WO 5/24/22 epilepsy protocal  IMPRESSION:  No acute intracranial abnormality.  No mass effect or abnormal intracranial  enhancement.  Old infarctions in the left frontal parietal lobes, new since September 21, 2021.  Old infarction in the left occipital lobe, likely increased in size since  September 21, 2021.  Laminar necrosis and gliosis associated with the old infarctions.  Old lacunar infarcts in the left basal ganglia, likely increased.  Mild parenchymal volume loss.    MRI brain W/ 2/25/21  IMPRESSION:  Enhancement in the area of restricted diffusion and FLAIR signal abnormality  in the medial left parietooccipital region.  Acute/subacute ischemia remains  a consideration.  Other etiologies in the differential include an infectious  etiology or underlying low-grade glioma.  Follow-up pre and post-contrast MRI  is recommended in 3 months.      MRA head and Neck W/WO 2/25/21  IMPRESSION:  Unremarkable MRA of the neck.  Mild stenosis in proximal and distal cavernous  (L) 3.7 - 5.3 mmol/L    Chloride 103 98 - 107 mmol/L    CO2 19 (L) 20 - 31 mmol/L    Anion Gap 13 9 - 17 mmol/L    Glucose 136 (H) 70 - 99 mg/dL    BUN 13 8 - 23 mg/dL    Creatinine 1.1 0.7 - 1.2 mg/dL    Est [place] Filt Rate 73 >60 mL/min/1.73m2    BUN/Creatinine Ratio 12 9 - 20    Calcium 8.3 (L) 8.6 - 10.4 mg/dL    Total Protein 6.5 6.4 - 8.3 g/dL    Albumin 3.4 (L) 3.5 - 5.2 g/dL    Total Bilirubin 0.8 0.3 - 1.2 mg/dL    Alkaline Phosphatase 85 40 - 129 U/L    ALT 8 5 - 41 U/L    AST 13 <40 U/L   CBC with Auto Differential    Collection Time: 07/01/24  5:28 AM   Result Value Ref Range    WBC 6.6 3.5 - 11.3 k/uL    RBC 3.85 (L) 4.21 - 5.77 m/uL    Hemoglobin 12.9 (L) 13.0 - 17.0 g/dL    Hematocrit 37.7 (L) 40.7 - 50.3 %    MCV 97.9 82.6 - 102.9 fL    MCH 33.5 25.2 - 33.5 pg    MCHC 34.2 28.4 - 34.8 g/dL    RDW 13.8 11.8 - 14.4 %    Platelets 179 138 - 453 k/uL    MPV 9.6 8.1 - 13.5 fL    NRBC Automated 0.0 0.0 per 100 WBC    Neutrophils % 61 36 - 65 %    Lymphocytes % 25 24 - 43 %    Monocytes % 10 3 - 12 %    Eosinophils % 3 1 - 4 %    Basophils % 1 0 - 2 %    Immature Granulocytes % 0 0 %    Neutrophils Absolute 4.08 1.50 - 8.10 k/uL    Lymphocytes Absolute 1.68 1.10 - 3.70 k/uL    Monocytes Absolute 0.63 0.10 - 1.20 k/uL    Eosinophils Absolute 0.18 0.00 - 0.44 k/uL    Basophils Absolute 0.04 0.00 - 0.20 k/uL    Immature Granulocytes Absolute 0.02 0.00 - 0.30 k/uL   Vitamin B12    Collection Time: 07/01/24  5:28 AM   Result Value Ref Range    Vitamin B-12 190 (L) 232 - 1245 pg/mL   Magnesium    Collection Time: 07/01/24  5:28 AM   Result Value Ref Range    Magnesium 2.0 1.6 - 2.6 mg/dL   POC Glucose Fingerstick    Collection Time: 07/01/24  6:29 AM   Result Value Ref Range    POC Glucose 143 (H) 75 - 110 mg/dL         Assessment :    ETOH Dependence and Withdrawal complicated by DT   Acute metabolic encephalopathy   B12 def level 190 7/1/24 (likely related to chronic malnutrition and inadequate intake)  Chronic  left parietal occipital encephalomalacia with residual right hemiparesis and hemianopia  Cerebrovascular disease with microvascular changes   History of bilateral clinoid ICA athero plaques   Subclincal hypothyroid TSH 6.36   Severe malnutrition 2/2 chronic alcoholism     Primary Problem  ADRIANA (acute kidney injury) (HCC)    Active Hospital Problems    Diagnosis Date Noted    ADRIANA (acute kidney injury) (HCC) [N17.9] 02/23/2021       Plan:     Patient status Admit as inpatient in the  Progressive Unit/Step down    -MRI brain W/WO repeated to rule out recurrent enhancement of left parietal occipital lesion. Motion limited however does not appear any enhancement   -Routine 30 minute EEG given prolong AMS and WNL for his metabolic encephalopathy and benzo for withdrawal.   -continue daily IV thiamine 100mg while altered and descalate to oral once improved PO intake  -benefit from dietary evaluation and recs given significant malnourished   -will give IM 1000mcg daily for 3 days B12 replacement and continue oral after   -secondary stroke prevention with aspirin 81mg daily  -Statin therapy LDL goal <70 LFT WNL will add on lipid panel and start lipitor 40mg nightly and defer up or down grading pending his LDL    DVT ppx-lovenox       Supportive care otherwise for his ETOH withdrawal and metabolic encephalopathy per primary internal medicine team. No further inpatient neurology workup planned at this time. Will arrange for outpatient FU    Consultations:   IP CONSULT TO INTERNAL MEDICINE  IP CONSULT TO SOCIAL WORK  IP CONSULT TO NEUROLOGY      The plan was discussed with the patient, patient's family and the medical staff.      Patient is admitted as inpatient status because of co-morbidities listed above, severity of signs and symptoms as outlined, requirement for current medical therapies and most importantly because of direct risk to patient if care not provided in a hospital setting.    Rolly Starr MD  7/1/2024  11:19

## 2024-07-01 NOTE — PROGRESS NOTES
Patient's bed alarmed, pct and writer to room, patient very agitated stating he is missing clothes. Writer attempts to talk with patient, patient alert and oriented x4, but believes the owner of the [employer] should be here in his office. Patient reoriented he is at [facility]. Patient becomes frustrated, writer able to speak with patient and he calms down. Writer instructed patient on \"ativan\". He is hesistant to take because he does not think he likes it. CIWAA questions reviewed, patient encouraged to take 1mg of ativan. Patient agrees.  Plan of care reviewed with patient including safety, vital checks, and hygiene. Patient is refusing to shower or get cleaned up at this time. Patient stated \"I will tomorrow\".

## 2024-07-01 NOTE — PROGRESS NOTES
Pt unable to answer questions for MRI screening. Attempted to call emergency contact in chart Eleonora, no answer. Will attempt to call again later.

## 2024-07-01 NOTE — PLAN OF CARE
Pt on CIWA PRN Ativan given, evaluated by Neuro, EEG and MRI completed.   Problem: Discharge Planning  Goal: Discharge to home or other facility with appropriate resources  Outcome: Progressing  Flowsheets (Taken 7/1/2024 0930)  Discharge to home or other facility with appropriate resources:   Identify barriers to discharge with patient and caregiver   Arrange for needed discharge resources and transportation as appropriate   Identify discharge learning needs (meds, wound care, etc)   Refer to discharge planning if patient needs post-hospital services based on physician order or complex needs related to functional status, cognitive ability or social support system     Problem: Chronic Conditions and Co-morbidities  Goal: Patient's chronic conditions and co-morbidity symptoms are monitored and maintained or improved  Outcome: Progressing  Flowsheets (Taken 7/1/2024 0930)  Care Plan - Patient's Chronic Conditions and Co-Morbidity Symptoms are Monitored and Maintained or Improved: Monitor and assess patient's chronic conditions and comorbid symptoms for stability, deterioration, or improvement     Problem: Safety - Adult  Goal: Free from fall injury  7/1/2024 1740 by Isac Ludwig, RN  Outcome: Progressing     Problem: Neurosensory - Adult  Goal: Achieves stable or improved neurological status  Outcome: Progressing  Flowsheets (Taken 7/1/2024 0930)  Achieves stable or improved neurological status: Assess for and report changes in neurological status     Problem: Neurosensory - Adult  Goal: Absence of seizures  7/1/2024 1740 by Isac Ludwig, RN  Outcome: Progressing  Flowsheets (Taken 7/1/2024 0930)  Absence of seizures:   Monitor for seizure activity.  If seizure occurs, document type and location of movements and any associated apnea   Administer anticonvulsants as ordered     Problem: Neurosensory - Adult  Goal: Remains free of injury related to seizures activity  Outcome: Progressing  Flowsheets (Taken  Progressing  Flowsheets (Taken 7/1/2024 0930)  Return ADL Status to a Safe Level of Function: Administer medication as ordered     Problem: Infection - Adult  Goal: Absence of infection at discharge  Outcome: Progressing  Flowsheets (Taken 7/1/2024 0930)  Absence of infection at discharge:   Assess and monitor for signs and symptoms of infection   Monitor lab/diagnostic results   Monitor all insertion sites i.e., indwelling lines, tubes and drains   Administer medications as ordered     Problem: Metabolic/Fluid and Electrolytes - Adult  Goal: Electrolytes maintained within normal limits  Outcome: Progressing  Flowsheets (Taken 7/1/2024 0930)  Electrolytes maintained within normal limits:   Monitor labs and assess patient for signs and symptoms of electrolyte imbalances   Administer electrolyte replacement as ordered     Problem: Metabolic/Fluid and Electrolytes - Adult  Goal: Hemodynamic stability and optimal renal function maintained  Outcome: Progressing  Flowsheets (Taken 7/1/2024 0930)  Hemodynamic stability and optimal renal function maintained:   Monitor labs and assess for signs and symptoms of volume excess or deficit   Monitor intake, output and patient weight     Problem: Hematologic - Adult  Goal: Maintains hematologic stability  Outcome: Progressing  Flowsheets (Taken 7/1/2024 0930)  Maintains hematologic stability: Assess for signs and symptoms of bleeding or hemorrhage     Problem: Anxiety  Goal: Will report anxiety at manageable levels  Description: INTERVENTIONS:  1. Administer medication as ordered  2. Teach and rehearse alternative coping skills  3. Provide emotional support with 1:1 interaction with staff  Outcome: Progressing  Flowsheets (Taken 7/1/2024 0930)  Will report anxiety at manageable levels: Administer medication as ordered     Problem: Drug Abuse/Detox  Goal: Will have no detox symptoms and will verbalize plan for changing drug-related behavior  Description: INTERVENTIONS:  1.

## 2024-07-01 NOTE — PLAN OF CARE
Problem: Neurosensory - Adult  Goal: Absence of seizures  Outcome: Progressing  Flowsheets (Taken 7/1/2024 0534)  Absence of seizures:   Monitor for seizure activity.  If seizure occurs, document type and location of movements and any associated apnea   If seizure occurs, turn head to side and suction secretions as needed   Support airway/breathing, administer oxygen as needed     Problem: Safety - Adult  Goal: Free from fall injury  Outcome: Progressing  Flowsheets (Taken 7/1/2024 0534)  Free From Fall Injury: Instruct family/caregiver on patient safety

## 2024-07-02 PROBLEM — F10.231 ALCOHOL DEPENDENCE WITH WITHDRAWAL DELIRIUM (HCC): Status: ACTIVE | Noted: 2024-07-02

## 2024-07-02 PROBLEM — I67.9 CEREBROVASCULAR DISEASE: Status: ACTIVE | Noted: 2024-07-02

## 2024-07-02 PROBLEM — E86.0 DEHYDRATION: Status: ACTIVE | Noted: 2024-07-02

## 2024-07-02 PROBLEM — Z86.73 HISTORY OF STROKE: Status: ACTIVE | Noted: 2024-07-02

## 2024-07-02 PROBLEM — G93.41 ACUTE METABOLIC ENCEPHALOPATHY: Status: ACTIVE | Noted: 2024-07-02

## 2024-07-02 PROBLEM — F10.221 ALCOHOL DEPENDENCE WITH DELIRIUM (HCC): Status: ACTIVE | Noted: 2024-07-02

## 2024-07-02 LAB
ALBUMIN SERPL-MCNC: 3.5 G/DL (ref 3.5–5.2)
ALP SERPL-CCNC: 85 U/L (ref 40–129)
ALT SERPL-CCNC: 10 U/L (ref 5–41)
ANION GAP SERPL CALCULATED.3IONS-SCNC: 12 MMOL/L (ref 9–17)
AST SERPL-CCNC: 16 U/L
BASOPHILS # BLD: 0.04 K/UL (ref 0–0.2)
BASOPHILS NFR BLD: 1 % (ref 0–2)
BILIRUB SERPL-MCNC: 0.9 MG/DL (ref 0.3–1.2)
BUN SERPL-MCNC: 12 MG/DL (ref 8–23)
BUN/CREAT SERPL: 12 (ref 9–20)
CALCIUM SERPL-MCNC: 8.5 MG/DL (ref 8.6–10.4)
CHLORIDE SERPL-SCNC: 106 MMOL/L (ref 98–107)
CHOLEST SERPL-MCNC: 163 MG/DL (ref 0–199)
CHOLESTEROL/HDL RATIO: 3
CO2 SERPL-SCNC: 19 MMOL/L (ref 20–31)
CREAT SERPL-MCNC: 1 MG/DL (ref 0.7–1.2)
EOSINOPHIL # BLD: 0.22 K/UL (ref 0–0.44)
EOSINOPHILS RELATIVE PERCENT: 3 % (ref 1–4)
ERYTHROCYTE [DISTWIDTH] IN BLOOD BY AUTOMATED COUNT: 13.8 % (ref 11.8–14.4)
GFR, ESTIMATED: 82 ML/MIN/1.73M2
GLUCOSE BLD-MCNC: 113 MG/DL (ref 75–110)
GLUCOSE BLD-MCNC: 133 MG/DL (ref 75–110)
GLUCOSE BLD-MCNC: 203 MG/DL (ref 75–110)
GLUCOSE SERPL-MCNC: 108 MG/DL (ref 70–99)
HCT VFR BLD AUTO: 38.9 % (ref 40.7–50.3)
HDLC SERPL-MCNC: 48 MG/DL
HGB BLD-MCNC: 13.2 G/DL (ref 13–17)
IMM GRANULOCYTES # BLD AUTO: 0.02 K/UL (ref 0–0.3)
IMM GRANULOCYTES NFR BLD: 0 %
LDLC SERPL CALC-MCNC: 93 MG/DL (ref 0–100)
LYMPHOCYTES NFR BLD: 1.54 K/UL (ref 1.1–3.7)
LYMPHOCYTES RELATIVE PERCENT: 23 % (ref 24–43)
MCH RBC QN AUTO: 33.5 PG (ref 25.2–33.5)
MCHC RBC AUTO-ENTMCNC: 33.9 G/DL (ref 28.4–34.8)
MCV RBC AUTO: 98.7 FL (ref 82.6–102.9)
MONOCYTES NFR BLD: 0.58 K/UL (ref 0.1–1.2)
MONOCYTES NFR BLD: 9 % (ref 3–12)
NEUTROPHILS NFR BLD: 64 % (ref 36–65)
NEUTS SEG NFR BLD: 4.37 K/UL (ref 1.5–8.1)
NRBC BLD-RTO: 0 PER 100 WBC
PLATELET # BLD AUTO: 186 K/UL (ref 138–453)
PMV BLD AUTO: 9.5 FL (ref 8.1–13.5)
POTASSIUM SERPL-SCNC: 4.5 MMOL/L (ref 3.7–5.3)
PROT SERPL-MCNC: 6.6 G/DL (ref 6.4–8.3)
RBC # BLD AUTO: 3.94 M/UL (ref 4.21–5.77)
SODIUM SERPL-SCNC: 137 MMOL/L (ref 135–144)
TRIGL SERPL-MCNC: 109 MG/DL
VLDLC SERPL CALC-MCNC: 22 MG/DL
WBC OTHER # BLD: 6.8 K/UL (ref 3.5–11.3)

## 2024-07-02 PROCEDURE — 1200000000 HC SEMI PRIVATE

## 2024-07-02 PROCEDURE — 6360000002 HC RX W HCPCS: Performed by: STUDENT IN AN ORGANIZED HEALTH CARE EDUCATION/TRAINING PROGRAM

## 2024-07-02 PROCEDURE — 97162 PT EVAL MOD COMPLEX 30 MIN: CPT

## 2024-07-02 PROCEDURE — 80061 LIPID PANEL: CPT

## 2024-07-02 PROCEDURE — 80053 COMPREHEN METABOLIC PANEL: CPT

## 2024-07-02 PROCEDURE — 2580000003 HC RX 258: Performed by: FAMILY MEDICINE

## 2024-07-02 PROCEDURE — 6370000000 HC RX 637 (ALT 250 FOR IP): Performed by: FAMILY MEDICINE

## 2024-07-02 PROCEDURE — 6370000000 HC RX 637 (ALT 250 FOR IP): Performed by: STUDENT IN AN ORGANIZED HEALTH CARE EDUCATION/TRAINING PROGRAM

## 2024-07-02 PROCEDURE — 97530 THERAPEUTIC ACTIVITIES: CPT

## 2024-07-02 PROCEDURE — 99232 SBSQ HOSP IP/OBS MODERATE 35: CPT | Performed by: STUDENT IN AN ORGANIZED HEALTH CARE EDUCATION/TRAINING PROGRAM

## 2024-07-02 PROCEDURE — 85025 COMPLETE CBC W/AUTO DIFF WBC: CPT

## 2024-07-02 PROCEDURE — 82947 ASSAY GLUCOSE BLOOD QUANT: CPT

## 2024-07-02 PROCEDURE — 36415 COLL VENOUS BLD VENIPUNCTURE: CPT

## 2024-07-02 PROCEDURE — 6360000002 HC RX W HCPCS: Performed by: FAMILY MEDICINE

## 2024-07-02 RX ORDER — CYANOCOBALAMIN 1000 UG/ML
1000 INJECTION, SOLUTION INTRAMUSCULAR; SUBCUTANEOUS DAILY
Status: DISCONTINUED | OUTPATIENT
Start: 2024-07-02 | End: 2024-07-03 | Stop reason: HOSPADM

## 2024-07-02 RX ORDER — ASPIRIN 81 MG/1
81 TABLET, CHEWABLE ORAL DAILY
Status: DISCONTINUED | OUTPATIENT
Start: 2024-07-02 | End: 2024-07-03 | Stop reason: HOSPADM

## 2024-07-02 RX ORDER — ATORVASTATIN CALCIUM 40 MG/1
40 TABLET, FILM COATED ORAL NIGHTLY
Status: DISCONTINUED | OUTPATIENT
Start: 2024-07-02 | End: 2024-07-03 | Stop reason: HOSPADM

## 2024-07-02 RX ADMIN — ATENOLOL 25 MG: 25 TABLET ORAL at 08:26

## 2024-07-02 RX ADMIN — ASPIRIN 81 MG 81 MG: 81 TABLET ORAL at 11:27

## 2024-07-02 RX ADMIN — AMLODIPINE BESYLATE 10 MG: 5 TABLET ORAL at 08:26

## 2024-07-02 RX ADMIN — Medication 100 MG: at 08:26

## 2024-07-02 RX ADMIN — PAROXETINE HYDROCHLORIDE 20 MG: 20 TABLET, FILM COATED ORAL at 08:26

## 2024-07-02 RX ADMIN — SODIUM CHLORIDE: 9 INJECTION, SOLUTION INTRAVENOUS at 16:47

## 2024-07-02 RX ADMIN — ENOXAPARIN SODIUM 40 MG: 100 INJECTION SUBCUTANEOUS at 08:29

## 2024-07-02 RX ADMIN — HYDRALAZINE HYDROCHLORIDE 10 MG: 20 INJECTION INTRAMUSCULAR; INTRAVENOUS at 12:20

## 2024-07-02 RX ADMIN — CYANOCOBALAMIN 1000 MCG: 1000 INJECTION, SOLUTION INTRAMUSCULAR; SUBCUTANEOUS at 11:25

## 2024-07-02 NOTE — PLAN OF CARE
Patient more oriented this shift, CIWA score was 1 this shift. Patient up working with therapy this shift.   Problem: Discharge Planning  Goal: Discharge to home or other facility with appropriate resources  7/2/2024 1748 by Shefali Zuñiga RN  Outcome: Progressing  Flowsheets (Taken 7/2/2024 0830)  Discharge to home or other facility with appropriate resources: Identify barriers to discharge with patient and caregiver     Problem: Chronic Conditions and Co-morbidities  Goal: Patient's chronic conditions and co-morbidity symptoms are monitored and maintained or improved  7/2/2024 1748 by Shefali Zuñiga, RN  Outcome: Progressing  Flowsheets  Taken 7/2/2024 1748  Care Plan - Patient's Chronic Conditions and Co-Morbidity Symptoms are Monitored and Maintained or Improved: Monitor and assess patient's chronic conditions and comorbid symptoms for stability, deterioration, or improvement  Taken 7/2/2024 0830  Care Plan - Patient's Chronic Conditions and Co-Morbidity Symptoms are Monitored and Maintained or Improved: Monitor and assess patient's chronic conditions and comorbid symptoms for stability, deterioration, or improvement     Problem: Safety - Adult  Goal: Free from fall injury  7/2/2024 1748 by Shefali Zuñiga, RN  Outcome: Progressing  Flowsheets (Taken 7/2/2024 1748)  Free From Fall Injury:   Instruct family/caregiver on patient safety   Based on caregiver fall risk screen, instruct family/caregiver to ask for assistance with transferring infant if caregiver noted to have fall risk factors

## 2024-07-02 NOTE — PROGRESS NOTES
Physical Therapy  Facility/Department: [facility] SURG  Physical Therapy Initial Assessment    Name: Reynaldo Seaman  : 1955  MRN: [id number]  Date of Service: 2024    Per H&P:68 yo M with c/o CVA, DM, HTN, Seizure, who came in to the hospital for high bp, emesis. Patient states he was not feeling well since last Monday. He did have lightheadedness and LOC on Monday where he fell and did hit the back of his head. He denies prodromal sxms and or seizure like activity. No bowel or bladder incontinence. The fall was unwitnessed.  In the ER, patient was found to have mild elevation of Cr, he was given NS 0.9 2L bolus, zofran.  Work up was otherwise unremarkable. Trauma work up unremarkable.   Upon my evaluation, patient in bed. No acute concerns. He does endorse advil use , smokes 1ppd and drinks alcohol one fifth of a whiskey per day.   He will be admitted for further work up.       VIPIN Meehan reports patient is medically stable for therapy treatment this date.    Chart reviewed prior to treatment and patient is agreeable for therapy.  All lines intact and patient positioned comfortably at end of treatment.  All patient needs addressed prior to ending therapy session.     Discharge Recommendations:  Patient would benefit from continued therapy after discharge   Pt currently functioning below baseline.  Recommend daily inpatient skilled therapy at time of discharge to maximize long term outcomes and prevent re-admission. Please refer to AM-PAC score for current level of function.          Patient Diagnosis(es): The primary encounter diagnosis was ADRIANA (acute kidney injury) (HCC). A diagnosis of Dehydration was also pertinent to this visit.  Past Medical History:  has a past medical history of CVA (cerebral vascular accident) (HCC), Diabetes mellitus (HCC), and Hypertension.  Past Surgical History:  has a past surgical history that includes knee surgery; Carotid endarterectomy (2021); Carotid endarterectomy  (N/A, 9/22/2021); and IR INSERT PICC VAD W SQ PORT >5 YEARS (5/26/2022).    Assessment   Body Structures, Functions, Activity Limitations Requiring Skilled Therapeutic Intervention: Decreased functional mobility ;Decreased balance;Decreased endurance;Decreased safe awareness;Decreased strength;Decreased tolerance to work activity  Assessment: 68 y/o male referred to PT. Cognition a barrier to therapy progression. Patient demonstrating bed mobility; transfers and gait with CGA-min A. Unsteady gait present.  No LOB. Patient is functioning below baseline and would benefit from continued PT.   Therapy Prognosis: Good  Decision Making: Medium Complexity  Requires PT Follow-Up: Yes  Activity Tolerance  Activity Tolerance: Treatment limited secondary to decreased cognition     Plan   Physical Therapy Plan  General Plan: 5-7 times per week  Current Treatment Recommendations: Strengthening, Balance training, Functional mobility training, Transfer training, Gait training, Safety education & training, Home exercise program, Patient/Caregiver education & training, Equipment evaluation, education, & procurement, Therapeutic activities  Safety Devices  Type of Devices: Bed alarm in place, Gait belt, Nurse notified, Left in bed, Call light within reach, Patient at risk for falls  Restraints  Restraints Initially in Place: No     Restrictions  Restrictions/Precautions  Restrictions/Precautions: General Precautions, Contact Precautions, Seizure  Position Activity Restriction  Other position/activity restrictions: L UE IV;  ALARMS     Subjective   General  Patient assessed for rehabilitation services?: Yes  Family / Caregiver Present: No  Subjective  Subjective: Patient supine in bed sleeping on therapist arrival. RN okayed patient for PT. Patient agreeable.         Social/Functional History  Social/Functional History  Lives With: Spouse  Type of Home: House  Home Layout: Two level, Able to Live on Main level with bedroom/bathroom,  required to correct errors made  Insights: Decreased awareness of deficits  Initiation: Requires cues for some  Sequencing: Requires cues for some  Cognition Comment: Inconsistent responses during social functional responses.     Objective      Observation/Palpation  Observation: Denies numbness/tingling; denies pain.  Gross Assessment  AROM: Generally decreased, functional  Strength: Generally decreased, functional     AROM RLE (degrees)  RLE AROM: WFL  AROM LLE (degrees)  LLE AROM : WFL  Strength RLE  Comment: difficulty following commands for MMT; demonstrates functional strength  Strength LLE  Comment: difficulty following commands for MMT; demonstrates functional strength           Bed mobility  Supine to Sit: Contact guard assistance  Sit to Supine: Contact guard assistance  Scooting: Contact guard assistance  Bed Mobility Comments: HOB flat. Supine <> sit EOB with CGA. Assist with line management.  Transfers  Sit to Stand: Minimal Assistance  Stand to Sit: Minimal Assistance  Comments: STS from EOB with min A; assist with line management; impulsive behaviors to stand; Unsteady initial standing balance requiring min A; no LOB  Ambulation  WB Status: as tolerated  Ambulation  Surface: Level tile  Device: Rolling Walker  Assistance: Minimal assistance  Gait Deviations: Slow Ruby;Decreased step length;Decreased step height  Distance: 25'  Comments: VC for scanning environment; VC to increase HOOD for increased stability            AM-PAC - Mobility  AM-PAC Basic Mobility - Inpatient   How much help is needed turning from your back to your side while in a flat bed without using bedrails?: A Little  How much help is needed moving from lying on your back to sitting on the side of a flat bed without using bedrails?: A Little  How much help is needed moving to and from a bed to a chair?: A Little  How much help is needed standing up from a chair using your arms?: A Little  How much help is needed walking in hospital

## 2024-07-02 NOTE — CARE COORDINATION
DC Planning    Spoke with pt he is a+o, knows name, year, president-does not recall previous situation. Now cooperative. PT recs snf. Discussed  w pt-has been to [facility] in past and prefers again but undecided if fully agreeable to rehab at this time.

## 2024-07-02 NOTE — PROGRESS NOTES
[facility] Neurology   IN-PATIENT SERVICE   [facility]    Progress note             Date:   7/2/2024  Patient name:  Reynaldo Seaman  Date of admission:  6/29/2024  8:21 AM  MRN:   [id number]  Account:  [id number]  YOB: 1955  PCP:    Imani Bolivar APRN - CNP  Room:   2008/2008-02  Code Status:    Full Code    Chief Complaint:     Chief Complaint   Patient presents with    Hypertension     Feels sweaty    Emesis     2 days ago   AMS and ETOH withdrawal     Interval hx:   The Patient was seen and examined at bedside  Is vitally stable alert oriented on my exam 7/2/24 around 11AM to person, place, city, state, situation and president however not month or year (his baseline or very near).   No acute events overnight  Patient awake eating lunch. Mentation much more appropriate and significantly improved on my exam today. Denies any acute focal neurologic deficits and at or very near his baseline.   Brief History of Present Illness:   The patient is a 69 y.o.   male who presents to [facility] from home 6/29/24 with Hypertension (Feels sweaty) and Emesis (2 days ago) and he is admitted to the hospital for the management of ETOH dependence and withdrawal. 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 Patient unable to provide any history at time of my exam around noon shortly after he received 3mg IV ativan for combative and delirium 2/2 ETOH withdrawal. On my exam he was much more calm however required noxious stimulus to awaken and briskly falling back to sleep. Withdrawals to pain in all 4 less brisk at baseline on the right, Pupils equal round and reactive bilaterally 3mm.     Radiology Review and Neurologic History.   -multiple hospital admissions with alcohol related seizures.   Home medications reviewed:  -paxil 20  -atenalol  LDL     DVT ppx-lovenox     Stable for DC from Neurology POV once cleared by medical and therapy teams. Outpatient neurology FU PLaced and routed message to our office. Neurology will sign off at this time.       Follow-up further recommendations after discussing the case with attending  The plan was discussed with the patient, patient's family and the medical staff.   Consultations:   IP CONSULT TO INTERNAL MEDICINE  IP CONSULT TO SOCIAL WORK  IP CONSULT TO NEUROLOGY    Patient is admitted as inpatient status because of co-morbidities listed above, severity of signs and symptoms as outlined, requirement for current medical therapies and most importantly because of direct risk to patient if care not provided in a hospital setting.    Rolly Starr MD  7/2/2024  5:56 PM    Copy sent to Imani Clements, APRN - CNP

## 2024-07-02 NOTE — PLAN OF CARE
Patient came in with ADRIANA and dehydration.  He also had a fall at home.  Patient does drink 5th of whiskey per day.  He is alert to self.  And Impulsive.  Bed alarm is on.  Patient is very unsteady while ambulating.  Pain is well controlled.  No complaints at the moment.  Plan of care is ongoing.     Siderails up x 2  Hourly rounding  Call light in reach  Instructed to call for assist before attempting out of bed.  Remains free from falls and accidental injury at this time   Floor free from obstacles  Bed is locked and in lowest position  Adequate lighting provided  Bed alarm on, Red Falling star and Stay with Me signs posted      Problem: Safety - Adult  Goal: Free from fall injury  7/2/2024 0419 by Shantel Amezquita, RN  Outcome: Progressing  7/1/2024 1740 by Isac Ludwig RN  Outcome: Progressing     Problem: Musculoskeletal - Adult  Goal: Return mobility to safest level of function  7/2/2024 0419 by Shantel Amezquita RN  Outcome: Progressing  Flowsheets (Taken 7/1/2024 1915)  Return Mobility to Safest Level of Function: Assess patient stability and activity tolerance for standing, transferring and ambulating with or without assistive devices  7/1/2024 1740 by Isac Ludwig RN  Outcome: Progressing  Flowsheets (Taken 7/1/2024 0930)  Return Mobility to Safest Level of Function:   Assess patient stability and activity tolerance for standing, transferring and ambulating with or without assistive devices   Assist with transfers and ambulation using safe patient handling equipment as needed   Ensure adequate protection for wounds/incisions during mobilization   Obtain physical therapy/occupational therapy consults as needed   Instruct patient/family in ordered activity level  Goal: Return ADL status to a safe level of function  7/2/2024 0419 by Shantel Amezquita, RN  Outcome: Progressing  Flowsheets (Taken 7/1/2024 1915)  Return ADL Status to a Safe Level of Function: Administer medication as  Achieves stable or improved neurological status  7/2/2024 0419 by Shantel Amezquita RN  Outcome: Progressing  Flowsheets (Taken 7/1/2024 1915)  Achieves stable or improved neurological status: Assess for and report changes in neurological status  7/1/2024 1740 by Isac Ludwig RN  Outcome: Progressing  Flowsheets (Taken 7/1/2024 0930)  Achieves stable or improved neurological status: Assess for and report changes in neurological status  Goal: Absence of seizures  7/2/2024 0419 by Shantel Amezquita RN  Outcome: Progressing  Flowsheets (Taken 7/1/2024 1915)  Absence of seizures: Monitor for seizure activity.  If seizure occurs, document type and location of movements and any associated apnea  7/1/2024 1740 by Isac Ludwig RN  Outcome: Progressing  Flowsheets (Taken 7/1/2024 0930)  Absence of seizures:   Monitor for seizure activity.  If seizure occurs, document type and location of movements and any associated apnea   Administer anticonvulsants as ordered  Goal: Remains free of injury related to seizures activity  7/2/2024 0419 by Shantel Amezquita RN  Outcome: Progressing  Flowsheets (Taken 7/1/2024 1915)  Remains free of injury related to seizure activity: Maintain airway, patient safety  and administer oxygen as ordered  7/1/2024 1740 by Isac Ludwig RN  Outcome: Progressing  Flowsheets (Taken 7/1/2024 0930)  Remains free of injury related to seizure activity: Maintain airway, patient safety  and administer oxygen as ordered  Goal: Achieves maximal functionality and self care  7/2/2024 0419 by Shantel Amezquita RN  Outcome: Progressing  Flowsheets (Taken 7/1/2024 1915)  Achieves maximal functionality and self care: Monitor swallowing and airway patency with patient fatigue and changes in neurological status  7/1/2024 1740 by Isac Ludwig RN  Outcome: Progressing  Flowsheets (Taken 7/1/2024 0930)  Achieves maximal functionality and self care: Monitor swallowing and airway patency

## 2024-07-02 NOTE — PROGRESS NOTES
in bathtub x5 days ago; ORDERING SYSTEM PROVIDED HISTORY: fell in bathub 5 days ago TECHNOLOGIST PROVIDED HISTORY: fell in bathub 5 days ago Decision Support Exception - unselect if not a suspected or confirmed emergency medical condition->Emergency Medical Condition (MA) Reason for Exam: Fell in bathtub x5 days ago FINDINGS: HEAD: BRAIN/VENTRICLES: There is no acute intracranial hemorrhage, mass effect or midline shift.  No abnormal extra-axial fluid collection.  Encephalomalacia in the right parietal and right occipital lobes again demonstrated.  Mild cortical atrophy and chronic periventricular and basal ganglia changes again noted. ORBITS: The visualized portion of the orbits demonstrate no acute abnormality. SINUSES: The visualized paranasal sinuses and mastoid air cells demonstrate no acute abnormality. SOFT TISSUES/SKULL: No acute abnormality of the visualized skull or soft tissues. CERVICAL SPINE: BONES/ALIGNMENT: There is no evidence of an acute cervical spine fracture. No traumatic malalignment. DEGENERATIVE CHANGES: Advanced multilevel degenerative disc disease and facet arthropathy. SOFT TISSUES: There is no prevertebral soft tissue swelling.     Chronic findings in the brain without acute CT abnormality identified. No acute osseous abnormality identified in the cervical spine.     CT CSpine W/O Contrast    Result Date: 6/29/2024  EXAMINATION: CT OF THE HEAD WITHOUT CONTRAST; CT OF THE CERVICAL SPINE WITHOUT CONTRAST 6/29/2024 9:42 am; 6/29/2024 9:44 am TECHNIQUE: CT of the head was performed without the administration of intravenous contrast. Automated exposure control, iterative reconstruction, and/or weight based adjustment of the mA/kV was utilized to reduce the radiation dose to as low as reasonably achievable.; CT of the cervical spine was performed without the administration of intravenous contrast. Multiplanar reformatted images are provided for review. Automated exposure control, iterative  reconstruction, and/or weight based adjustment of the mA/kV was utilized to reduce the radiation dose to as low as reasonably achievable. COMPARISON: 06/04/2022 HISTORY: ORDERING SYSTEM PROVIDED HISTORY: fell in bathub 5 days ago TECHNOLOGIST PROVIDED HISTORY: fell in bathub 5 days ago Decision Support Exception - unselect if not a suspected or confirmed emergency medical condition->Emergency Medical Condition (MA) Reason for Exam: Fell in bathtub x5 days ago; ORDERING SYSTEM PROVIDED HISTORY: fell in bathub 5 days ago TECHNOLOGIST PROVIDED HISTORY: fell in bathub 5 days ago Decision Support Exception - unselect if not a suspected or confirmed emergency medical condition->Emergency Medical Condition (MA) Reason for Exam: Fell in bathtub x5 days ago FINDINGS: HEAD: BRAIN/VENTRICLES: There is no acute intracranial hemorrhage, mass effect or midline shift.  No abnormal extra-axial fluid collection.  Encephalomalacia in the right parietal and right occipital lobes again demonstrated.  Mild cortical atrophy and chronic periventricular and basal ganglia changes again noted. ORBITS: The visualized portion of the orbits demonstrate no acute abnormality. SINUSES: The visualized paranasal sinuses and mastoid air cells demonstrate no acute abnormality. SOFT TISSUES/SKULL: No acute abnormality of the visualized skull or soft tissues. CERVICAL SPINE: BONES/ALIGNMENT: There is no evidence of an acute cervical spine fracture. No traumatic malalignment. DEGENERATIVE CHANGES: Advanced multilevel degenerative disc disease and facet arthropathy. SOFT TISSUES: There is no prevertebral soft tissue swelling.     Chronic findings in the brain without acute CT abnormality identified. No acute osseous abnormality identified in the cervical spine.     XR CHEST PORTABLE    Result Date: 6/29/2024  EXAMINATION: ONE XRAY VIEW OF THE CHEST 6/29/2024 8:46 am COMPARISON: 06/08/2022 HISTORY: ORDERING SYSTEM PROVIDED HISTORY: jacob TECHNOLOGIST  PROVIDED HISTORY: weakness Reason for Exam: Hypertension, sweaty FINDINGS: The cardiomediastinal silhouette is within normal limits. There is no consolidation, pneumothorax or evidence for edema. No evidence for effusion. No acute osseous abnormality is identified.     No acute airspace disease identified.     Electronically signed by Shay Hawkins MD on 7/2/2024 at 8:44 AM

## 2024-07-02 NOTE — PROGRESS NOTES
Hospitalist -progress note      Patient: Reynaldo Seaman    Unit/Bed:2008/2008-02  YOB: 1955  MRN: [id number]   Acct: [id number]   PCP: Imani Bolivar APRN - CNP    Date of Service: Pt seen/examined on 07/01/24  and Admitted to Inpatient with expected LOS greater than two midnights due to medical therapy.     Chief Complaint:  Emesis    Assessment and Plan:-  Dehydration and ADRIANA  IV fluids    Lightheadedness  Improved    Mild encephalopathy on EEG  Neurology on board    Alcohol Abuse  [facility] protocol  Thiamine  Folic acid    Tobacco Abuse  Counseled    Seizure disorder    Nausea and Vomiting    Hx of VRE     Hypertensive Renal Disease  Amlodipine, Tenormin    MDD  Paxil    Hx of UTI and Urinary Retention    Admit to medical floor. Continue IVF and adv diet as tolerated.  Started on [facility] protocol for alcohol withdrawal  Seizure precautions  Counseled on alcohol and tobacco abuse  Antiemetics prn  Continue home medications  Will get 2D echo, telemetry monitoring for Fall.  Neurology consult regarding hx of seizure. Previously on vimpat. No meds at this time.     History Of Present Illness:    70 yo M with c/o CVA, DM, HTN, Seizure, who came in to the hospital for high bp, emesis. Patient states he was not feeling well since last Monday. He did have lightheadedness and LOC on Monday where he fell and did hit the back of his head. He denies prodromal sxms and or seizure like activity. No bowel or bladder incontinence. The fall was unwitnessed.  In the ER, patient was found to have mild elevation of Cr, he was given NS 0.9 2L bolus, zofran.  Work up was otherwise unremarkable. Trauma work up unremarkable.   Upon my evaluation, patient in bed. No acute concerns. He does endorse advil use , smokes 1ppd and drinks alcohol one fifth of a whiskey per day.   He will be admitted for further work up.       Past Medical History:        Diagnosis Date    CVA (cerebral vascular accident) (HCC) 02/2021    With  Normal respiratory effort. Clear to auscultation, bilaterally without Rales/Wheezes/Rhonchi.  Cardiovascular: Regular rate and rhythm with normal S1/S2 without murmurs, rubs or gallops.  Abdomen: Soft, non-tender, non-distended with normal bowel sounds.  Musculoskeletal:  No clubbing, cyanosis or edema bilaterally.  Skin: Skin color, texture, turgor normal.  No rashes or lesions.  Neurologic: grossly non-focal.  Psychiatric: Alert and oriented, thought content appropriate, normal insight  Capillary Refill: Brisk,< 3 seconds   Peripheral Pulses: +2 palpable, equal bilaterally     Labs:   Recent Labs     06/29/24  0840 06/30/24  0638 07/01/24  0528   WBC 8.5 7.5 6.6   HGB 16.0 14.2 12.9*   HCT 46.6 41.8 37.7*    203 179       Recent Labs     06/29/24  1310 06/30/24  0638 07/01/24  0528    139 135   K 4.0 3.9 3.5*    106 103   CO2 18* 20 19*   BUN 18 19 13   CREATININE 1.4* 1.6* 1.1   CALCIUM 8.2* 8.7 8.3*   PHOS  --  2.2*  --        Recent Labs     06/29/24  0840 06/30/24  0638 07/01/24  0528   AST 16 15 13   ALT 12 8 8   BILITOT 0.7 0.8 0.8   ALKPHOS 125 97 85       No results for input(s): \"INR\" in the last 72 hours.  No results for input(s): \"CKTOTAL\", \"TROPONINI\" in the last 72 hours.    Urinalysis:    Lab Results   Component Value Date/Time    NITRU NEGATIVE 06/29/2024 11:08 AM    WBCUA 5 TO 10 06/29/2024 11:08 AM    BACTERIA RARE 01/19/2024 11:02 PM    RBCUA 2 TO 5 06/29/2024 11:08 AM    GLUCOSEU TRACE 06/29/2024 11:08 AM       Radiology:   MRI BRAIN W WO CONTRAST   Final Result   1. Significantly motion degraded examination.  No appreciable acute   intracranial abnormality or abnormal enhancement.   2. Mild diffuse parenchymal atrophy with chronic microvascular ischemic   changes.   3. Remote infarct in the left parieto-occipital region with associated   cortical laminar necrosis.         XR ABDOMEN (KUB) (SINGLE AP VIEW)   Preliminary Result   Nonobstructive bowel gas pattern. No evidence of  metallic objects in the   abdomen or pelvis.      No contraindication to MRI based on this abdominal and pelvic x-ray.         US RETROPERITONEAL COMPLETE   Final Result   Limited exam as the patient was uncooperative for imaging.      No hydronephrosis of either kidney.         CT CSpine W/O Contrast   Final Result   Chronic findings in the brain without acute CT abnormality identified.      No acute osseous abnormality identified in the cervical spine.         CT HEAD WO CONTRAST   Final Result   Chronic findings in the brain without acute CT abnormality identified.      No acute osseous abnormality identified in the cervical spine.         XR CHEST PORTABLE   Final Result   No acute airspace disease identified.           CT HEAD WO CONTRAST    Result Date: 6/29/2024  EXAMINATION: CT OF THE HEAD WITHOUT CONTRAST; CT OF THE CERVICAL SPINE WITHOUT CONTRAST 6/29/2024 9:42 am; 6/29/2024 9:44 am TECHNIQUE: CT of the head was performed without the administration of intravenous contrast. Automated exposure control, iterative reconstruction, and/or weight based adjustment of the mA/kV was utilized to reduce the radiation dose to as low as reasonably achievable.; CT of the cervical spine was performed without the administration of intravenous contrast. Multiplanar reformatted images are provided for review. Automated exposure control, iterative reconstruction, and/or weight based adjustment of the mA/kV was utilized to reduce the radiation dose to as low as reasonably achievable. COMPARISON: 06/04/2022 HISTORY: ORDERING SYSTEM PROVIDED HISTORY: fell in bathub 5 days ago TECHNOLOGIST PROVIDED HISTORY: fell in bathub 5 days ago Decision Support Exception - unselect if not a suspected or confirmed emergency medical condition->Emergency Medical Condition (MA) Reason for Exam: Fell in bathtub x5 days ago; ORDERING SYSTEM PROVIDED HISTORY: fell in bathub 5 days ago TECHNOLOGIST PROVIDED HISTORY: fell in bathub 5 days ago  is no consolidation, pneumothorax or evidence for edema. No evidence for effusion. No acute osseous abnormality is identified.     No acute airspace disease identified.     Electronically signed by Shay Hawkins MD on 7/1/2024 at 8:27 PM

## 2024-07-02 NOTE — PROGRESS NOTES
Patient broke his glasses and there are a few pieces that were unable to be found yet.  Will continue to look for these.

## 2024-07-03 VITALS
WEIGHT: 151.3 LBS | BODY MASS INDEX: 23.75 KG/M2 | TEMPERATURE: 97.5 F | HEIGHT: 67 IN | OXYGEN SATURATION: 96 % | RESPIRATION RATE: 16 BRPM | HEART RATE: 65 BPM | SYSTOLIC BLOOD PRESSURE: 149 MMHG | DIASTOLIC BLOOD PRESSURE: 71 MMHG

## 2024-07-03 LAB
GLUCOSE BLD-MCNC: 154 MG/DL (ref 75–110)
GLUCOSE BLD-MCNC: 167 MG/DL (ref 75–110)
GLUCOSE BLD-MCNC: 174 MG/DL (ref 75–110)
GLUCOSE BLD-MCNC: 193 MG/DL (ref 75–110)

## 2024-07-03 PROCEDURE — 97110 THERAPEUTIC EXERCISES: CPT

## 2024-07-03 PROCEDURE — 97116 GAIT TRAINING THERAPY: CPT

## 2024-07-03 PROCEDURE — 6370000000 HC RX 637 (ALT 250 FOR IP): Performed by: STUDENT IN AN ORGANIZED HEALTH CARE EDUCATION/TRAINING PROGRAM

## 2024-07-03 PROCEDURE — 97165 OT EVAL LOW COMPLEX 30 MIN: CPT

## 2024-07-03 PROCEDURE — 6370000000 HC RX 637 (ALT 250 FOR IP): Performed by: FAMILY MEDICINE

## 2024-07-03 PROCEDURE — 6360000002 HC RX W HCPCS: Performed by: STUDENT IN AN ORGANIZED HEALTH CARE EDUCATION/TRAINING PROGRAM

## 2024-07-03 PROCEDURE — 97535 SELF CARE MNGMENT TRAINING: CPT

## 2024-07-03 PROCEDURE — 6360000002 HC RX W HCPCS: Performed by: FAMILY MEDICINE

## 2024-07-03 PROCEDURE — 97530 THERAPEUTIC ACTIVITIES: CPT

## 2024-07-03 RX ORDER — CYANOCOBALAMIN 1000 UG/ML
1000 INJECTION, SOLUTION INTRAMUSCULAR; SUBCUTANEOUS DAILY
Qty: 1 ML | Refills: 0 | Status: SHIPPED | OUTPATIENT
Start: 2024-07-04 | End: 2024-07-05

## 2024-07-03 RX ORDER — AMLODIPINE BESYLATE 10 MG/1
10 TABLET ORAL DAILY
Qty: 30 TABLET | Refills: 3 | Status: SHIPPED | OUTPATIENT
Start: 2024-07-04

## 2024-07-03 RX ORDER — CHLORDIAZEPOXIDE HYDROCHLORIDE 10 MG/1
10 CAPSULE, GELATIN COATED ORAL 3 TIMES DAILY PRN
Qty: 9 CAPSULE | Refills: 0
Start: 2024-07-03 | End: 2024-07-06

## 2024-07-03 RX ORDER — ATORVASTATIN CALCIUM 40 MG/1
40 TABLET, FILM COATED ORAL NIGHTLY
Qty: 30 TABLET | Refills: 3 | Status: SHIPPED | OUTPATIENT
Start: 2024-07-03

## 2024-07-03 RX ORDER — ASPIRIN 81 MG/1
81 TABLET, CHEWABLE ORAL DAILY
Qty: 30 TABLET | Refills: 1 | Status: SHIPPED | OUTPATIENT
Start: 2024-07-04

## 2024-07-03 RX ADMIN — CYANOCOBALAMIN 1000 MCG: 1000 INJECTION, SOLUTION INTRAMUSCULAR; SUBCUTANEOUS at 09:12

## 2024-07-03 RX ADMIN — ASPIRIN 81 MG 81 MG: 81 TABLET ORAL at 09:09

## 2024-07-03 RX ADMIN — ATENOLOL 25 MG: 25 TABLET ORAL at 09:09

## 2024-07-03 RX ADMIN — ENOXAPARIN SODIUM 40 MG: 100 INJECTION SUBCUTANEOUS at 09:11

## 2024-07-03 RX ADMIN — PAROXETINE HYDROCHLORIDE 20 MG: 20 TABLET, FILM COATED ORAL at 09:09

## 2024-07-03 RX ADMIN — Medication 100 MG: at 09:09

## 2024-07-03 RX ADMIN — AMLODIPINE BESYLATE 10 MG: 5 TABLET ORAL at 09:09

## 2024-07-03 NOTE — DISCHARGE INSTR - COC
Continuity of Care Form    Patient Name: Reynaldo Seaman   :  1955  MRN:  [id number]    Admit date:  2024  Discharge date:  7/3/24    Code Status Order: Full Code   Advance Directives:     Admitting Physician:  Mohsin Mohammad Reza, MD  PCP: Imani Bolivar APRN - CNP    Discharging Nurse: Geoff [facility] Unit/Room#:   Discharging Unit Phone Number: [phone]    Emergency Contact:   Extended Emergency Contact Information  Primary Emergency Contact: Eleonora Lindsay  Home Phone: [phone]  Mobile Phone: [phone]  Relation: Girlfriend    Past Surgical History:  Past Surgical History:   Procedure Laterality Date    CAROTID ENDARTERECTOMY  2021    CAROTID ENDARTERECTOMY (N/A Neck    CAROTID ENDARTERECTOMY N/A 2021    CAROTID ENDARTERECTOMY performed by Suman Fowler MD at [facility]    IR INS PICC VAD W SQ PORT GREATER THAN 5  2022    IR INS PICC VAD W SQ PORT GREATER THAN 5 2022 [facility]    KNEE SURGERY         Immunization History:   Immunization History   Administered Date(s) Administered    COVID-19, MODERNA BLUE border, Primary or Immunocompromised, (age 12y+), IM, 100 mcg/0.5mL 2021, 2021       Active Problems:  Patient Active Problem List   Diagnosis Code    ADRIANA (acute kidney injury) ([facility]) N17.9    Acute ischemic left PCA stroke ([facility]) I63.532    Right sided weakness R53.1    Seizure ([facility]) R56.9    Mild malnutrition ([facility]) E44.1    Urinary retention R33.9    Alcohol dependence with delirium ([facility]CC) F10.221    Dehydration E86.0    Acute metabolic encephalopathy G93.41    Alcohol dependence with withdrawal delirium ([facility]) F10.231    Cerebrovascular disease I67.9    History of stroke Z86.73       Isolation/Infection:   Isolation            Contact          Patient Infection Status       Infection Onset Added Last Indicated Last Indicated By Review Planned Expiration Resolved Resolved By    VRE 24 Culture,  Blood 1                           Nurse Assessment:  Last Vital Signs: BP (!) 149/71   Pulse 65   Temp 97.5 °F (36.4 °C) (Oral)   Resp 16   Ht 1.702 m (5' 7\")   Wt 68.6 kg (151 lb 4.8 oz)   SpO2 96%   BMI 23.70 kg/m²     Last documented pain score (0-10 scale):    Last Weight:   Wt Readings from Last 1 Encounters:   07/01/24 68.6 kg (151 lb 4.8 oz)     Mental Status:  oriented and alert    IV Access:  - None    Nursing Mobility/ADLs:  Walking   Assisted  Transfer  Assisted  Bathing  Assisted  Dressing  Assisted  Toileting  Independent  Feeding  Independent  Med Admin  Independent  Med Delivery   whole    Wound Care Documentation and Therapy:        Elimination:  Continence:   Bowel: Yes  Bladder: Yes  Urinary Catheter: None   Colostomy/Ileostomy/Ileal Conduit: No       Date of Last BM: ***    Intake/Output Summary (Last 24 hours) at 7/3/2024 1429  Last data filed at 7/3/2024 0442  Gross per 24 hour   Intake 713.59 ml   Output 350 ml   Net 363.59 ml     I/O last 3 completed shifts:  In: 2895 [P.O.:720; I.V.:2138.2; IV Piggyback:36.7]  Out: 750 [Urine:750]    Safety Concerns:     History of Falls (last 30 days)    Impairments/Disabilities:      None    Nutrition Therapy:  Current Nutrition Therapy:   - Oral Diet:  Carb Control 4 carbs/meal (1800kcals/day)    Routes of Feeding: Oral  Liquids: No Restrictions  Daily Fluid Restriction: no  Last Modified Barium Swallow with Video (Video Swallowing Test): not done    Treatments at the Time of Hospital Discharge:   Respiratory Treatments: ***  Oxygen Therapy:  is not on home oxygen therapy.  Ventilator:    - No ventilator support    Rehab Therapies: Physical Therapy and Occupational Therapy  Weight Bearing Status/Restrictions: No weight bearing restrictions  Other Medical Equipment (for information only, NOT a DME order):  walker  Other Treatments: ***    Patient's personal belongings (please select all that are sent with patient):  Glasses, clothing    RN SIGNATURE:

## 2024-07-03 NOTE — PROGRESS NOTES
Patient discharge to [facility] with belongings, MATT no scripts. Report called and given to nurse. Pj

## 2024-07-03 NOTE — PROGRESS NOTES
Facility/Department: [facility] MED SURG  Physical Therapy Daily Treatment Note    Name: Reynaldo Seaman  : 1955  MRN: [id number]  Date of Service: 7/3/2024    Discharge Recommendations:  Patient would benefit from continued therapy after discharge    Pt currently functioning below baseline.  Recommend daily inpatient skilled therapy at time of discharge to maximize long term outcomes and prevent re-admission. Please refer to AM-PAC score for current level of function.        Patient Diagnosis(es): The primary encounter diagnosis was ADRIANA (acute kidney injury) (HCC). A diagnosis of Dehydration was also pertinent to this visit.  Past Medical History:  has a past medical history of CVA (cerebral vascular accident) (HCC), Diabetes mellitus (HCC), and Hypertension.  Past Surgical History:  has a past surgical history that includes knee surgery; Carotid endarterectomy (2021); Carotid endarterectomy (N/A, 2021); and IR INSERT PICC VAD W SQ PORT >5 YEARS (2022).    Assessment   Body Structures, Functions, Activity Limitations Requiring Skilled Therapeutic Intervention: Decreased functional mobility ;Decreased balance;Decreased endurance;Decreased safe awareness;Decreased strength;Decreased tolerance to work activity  Assessment: Decreased assist needed this date with gait and transfers, tolerated more activity, does need encouragement to participate with therapy. Recommend continued therapy following discharge.  Activity Tolerance  Activity Tolerance: Patient limited by fatigue     Plan   Physical Therapy Plan  General Plan: 5-7 times per week  Current Treatment Recommendations: Strengthening, Balance training, Functional mobility training, Transfer training, Gait training, Safety education & training, Home exercise program, Patient/Caregiver education & training, Equipment evaluation, education, & procurement, Therapeutic activities  Safety Devices  Type of Devices: Bed alarm in place, Gait belt, Nurse  demonstrating some confusion with questions.  Vision/Hearing       Cognition   Orientation  Overall Orientation Status: Impaired  Orientation Level: Oriented to place;Oriented to person;Disoriented to time;Disoriented to situation (does not recall why he is in the hospital and stated the month )  Cognition  Overall Cognitive Status: Exceptions  Following Commands: Follows one step commands consistently  Safety Judgement: Decreased awareness of need for assistance;Decreased awareness of need for safety  Problem Solving: Assistance required to identify errors made;Assistance required to correct errors made  Insights: Decreased awareness of deficits  Initiation: Requires cues for some  Sequencing: Requires cues for some     Objective   Temp: 97.5 °F (36.4 °C)  Pulse: 65  Heart Rate Source: Monitor  Respirations: 16  SpO2: 96 %  O2 Device: None (Room air)  BP: (!) 149/71  MAP (Calculated): 97  BP Location: Right upper arm  Patient Position: Supine     Observation/Palpation  Posture: Fair  Observation: resting in bed                       Bed mobility  Supine to Sit: Stand by assistance  Sit to Supine: Stand by assistance  Bed Mobility Comments: encouraged patient to sit up in chair but patient refused  Transfers  Sit to Stand: Contact guard assistance  Stand to Sit: Contact guard assistance  Ambulation  Surface: Level tile  Device: Rolling Walker  Assistance: Minimal assistance  Gait Deviations: Decreased step length;Decreased step height  Distance: 40 feet  Comments: needed assist with guiding walker and postural corrections     Balance  Sitting - Static: Good  Sitting - Dynamic: Good  Standing - Static: Fair;+  Standing - Dynamic: Fair  Single Leg Stance R Le  Single Leg Stance L Le  Exercise Treatment: Supine x15, LAQ x15, seated marching x2 minutes        OutComes Score                                                  AM-PAC - Mobility    AM-PAC Basic Mobility - Inpatient   How much help is needed

## 2024-07-03 NOTE — CARE COORDINATION
Social Work- [facility] will admit today. [facility] will transport at 6PM. Orders faxed. Nurse to call report to [phone]. Discussed with patient. He is agreeable with dc plans. Roge

## 2024-07-03 NOTE — PROGRESS NOTES
Occupational Therapy  Facility/Department: [facility] [[email or web address]] MED SURG  Occupational Therapy Initial Assessment    Name: Reynaldo Seaman  : 1955  MRN: [id number]  Date of Service: 7/3/2024    Discharge Recommendations:  Continue to assess pending progress  OT Equipment Recommendations  Equipment Needed: No     RN reports patient is medically stable for therapy treatment this date.    Chart reviewed prior to treatment and patient is agreeable for therapy.  All lines intact and patient positioned comfortably at end of treatment.  All patient needs addressed prior to ending therapy session.        Patient Diagnosis(es): The primary encounter diagnosis was ADRIANA (acute kidney injury) (HCC). A diagnosis of Dehydration was also pertinent to this visit.  Past Medical History:  has a past medical history of CVA (cerebral vascular accident) (HCC), Diabetes mellitus (HCC), and Hypertension.  Past Surgical History:  has a past surgical history that includes knee surgery; Carotid endarterectomy (2021); Carotid endarterectomy (N/A, 2021); and IR INSERT PICC VAD W SQ PORT >5 YEARS (2022).         Per H&P: Pt  came in to the hospital for high bp, emesis. Patient states he was not feeling well since last Monday. He did have lightheadedness and LOC on Monday where he fell and did hit the back of his head. He denies prodromal sxms and or seizure like activity. No bowel or bladder incontinence. The fall was unwitnessed.  In the ER, patient was found to have mild elevation of Cr, he was given NS 0.9 2L bolus, zofran.  Work up was otherwise unremarkable. Trauma work up unremarkable.    He does endorse advil use , smokes 1ppd and drinks alcohol one fifth of a whiskey per day.   He will be admitted for further work up.     Assessment   Performance deficits / Impairments: Decreased functional mobility ;Decreased ADL status;Decreased strength;Decreased safe awareness;Decreased cognition;Decreased endurance;Decreased  balance  Prognosis: Good  Decision Making: Low Complexity  REQUIRES OT FOLLOW-UP: Yes  Activity Tolerance  Activity Tolerance: Patient Tolerated treatment well  Activity Tolerance Comments: Pt tolerated OT session with any SOB or dizziness prior of after session.        Plan   Occupational Therapy Plan  Times Per Week: 4-5 X per week  Therapy Duration:  (LOS)  Current Treatment Recommendations: Strengthening, Balance training, Functional mobility training, Endurance training, Cognitive reorientation, Safety education & training, Patient/Caregiver education & training, Equipment evaluation, education, & procurement, Self-Care / ADL     Restrictions  Restrictions/Precautions  Restrictions/Precautions: General Precautions, Contact Precautions, Seizure  Position Activity Restriction  Other position/activity restrictions: L UE IV;  ALARMS, periwick    Subjective   General  Chart Reviewed: Yes  Patient assessed for rehabilitation services?: Yes  Family / Caregiver Present: No  Subjective  Subjective: Pt supine in bed with HOB elevated eating breakfast. Pt agreeable to OT session.  5/10 pain low back  Social/Functional History  Social/Functional History  Lives With: Spouse  Type of Home: House  Home Layout: Two level, Able to Live on Main level with bedroom/bathroom, Laundry in basement (6 steps to basement with HR)  Home Access: Stairs to enter with rails  Entrance Stairs - Number of Steps: 2  Entrance Stairs - Rails: Both  Bathroom Shower/Tub: Walk-in shower  Bathroom Toilet: Standard  Bathroom Equipment: Grab bars in shower, Built-in shower seat  Home Equipment: Walker - Rolling, Cane, Electric scooter, Wheelchair - Manual  Has the patient had two or more falls in the past year or any fall with injury in the past year?: Yes (reports 5 falls in the past year)  ADL Assistance: Independent (Independent with dressing and hygiene. SO assist with bathing. Pt reports needs assist with shower transfer but is then able to bathe  and from bathroom with los X 2. OT provided support with RW and gaitbelt to maintain stability.  Additional Comments: Pt requested to return to bed after OT sesison.  Skin Care: Soap and water  Tone RUE  RUE Tone: Normotonic  Tone LUE  LUE Tone: Normotonic     Bed mobility  Rolling to Left: Stand by assistance  Rolling to Right: Stand by assistance  Supine to Sit: Contact guard assistance  Sit to Supine: Contact guard assistance  Scooting: Contact guard assistance  Bed Mobility Comments: HOB flat. Supine <> sit EOB with CGA. Assist with line management.  Transfers  Sit to stand: Minimal assistance (cue for hand  and foot placement to increase safety)  Stand to sit: Minimal assistance (cues to sequence task and hand placement)  Vision  Vision: Impaired  Vision Exceptions: Wears glasses at all times (Pt wearing broken glassses)  Hearing  Hearing: Within functional limits  Cognition  Overall Cognitive Status: Exceptions  Arousal/Alertness: Appropriate responses to stimuli;Appears intact  Attention Span: Attends with cues to redirect  Memory: Impaired  Safety Judgement: Decreased awareness of need for assistance;Decreased awareness of need for safety  Problem Solving: Assistance required to identify errors made;Assistance required to generate solutions;Assistance required to implement solutions;Assistance required to correct errors made  Insights: Decreased awareness of deficits  Initiation: Requires cues for some  Sequencing: Requires cues for some  Cognition Comment: Inconsistent responses during social functional responses.  Orientation  Overall Orientation Status: Impaired  Orientation Level: Oriented to person;Disoriented to situation (Pt needed cues for month, day and place)                  Education Given To: Patient  Education Provided: Role of Therapy;Plan of Care;ADL Adaptive Strategies;Transfer Training;Mobility Training;Fall Prevention Strategies  Education Method: Demonstration;Verbal  Barriers to

## 2024-07-03 NOTE — PLAN OF CARE
Problem: Musculoskeletal - Adult  Goal: Return mobility to safest level of function  7/3/2024 1301 by Geoff Neumann, RN  Outcome: Progressing  Flowsheets (Taken 7/3/2024 1301)  Return Mobility to Safest Level of Function:   Assess patient stability and activity tolerance for standing, transferring and ambulating with or without assistive devices   Assist with transfers and ambulation using safe patient handling equipment as needed   Obtain physical therapy/occupational therapy consults as needed   Instruct patient/family in ordered activity level  Note: Patient admitted s/p fall due HTN, dehydration. Patient alert and oriented. No [facility]s needed per [facility] Patient up with walker and assist. Unsteady.   7/3/2024 0338 by Shantel Amezquita, RN  Outcome: Progressing  Flowsheets (Taken 7/2/2024 1924)  Return Mobility to Safest Level of Function: Assess patient stability and activity tolerance for standing, transferring and ambulating with or without assistive devices

## 2024-07-03 NOTE — PLAN OF CARE
Patient has done well most of the night. A few times he is impulsive and forgets that he is unstable to get up on his own.  He is easily reoriented. He did finish his dinner on our shift.  No other complaints at this time.  Plan of care is ongoing.    Siderails up x 2  Hourly rounding  Call light in reach  Instructed to call for assist before attempting out of bed.  Remains free from falls and accidental injury at this time   Floor free from obstacles  Bed is locked and in lowest position  Adequate lighting provided  Bed alarm on, Red Falling star and Stay with Me signs posted      Problem: Safety - Adult  Goal: Free from fall injury  7/3/2024 0338 by Shantel Amezquita, RN  Outcome: Progressing  7/2/2024 1748 by Shefali Zuñiga RN  Outcome: Progressing  Flowsheets (Taken 7/2/2024 1748)  Free From Fall Injury:   Instruct family/caregiver on patient safety   Based on caregiver fall risk screen, instruct family/caregiver to ask for assistance with transferring infant if caregiver noted to have fall risk factors     Problem: Musculoskeletal - Adult  Goal: Return mobility to safest level of function  7/3/2024 0338 by Shantel Amezquita RN  Outcome: Progressing  Flowsheets (Taken 7/2/2024 1924)  Return Mobility to Safest Level of Function: Assess patient stability and activity tolerance for standing, transferring and ambulating with or without assistive devices  7/2/2024 1748 by Shefali Zuñiga RN  Outcome: Progressing  Flowsheets (Taken 7/2/2024 0830)  Return Mobility to Safest Level of Function: Assess patient stability and activity tolerance for standing, transferring and ambulating with or without assistive devices  Goal: Return ADL status to a safe level of function  7/3/2024 0338 by Shantel Amezquita, RN  Outcome: Progressing  Flowsheets (Taken 7/2/2024 1924)  Return ADL Status to a Safe Level of Function: Administer medication as ordered  7/2/2024 1748 by Shefali Zuñiga RN  Outcome: Progressing  Flowsheets (Taken  7/2/2024 0830)  Return ADL Status to a Safe Level of Function: Administer medication as ordered   Checked for incontinence every 2 hours and prn.  Pericare as needed.  Assisted to reposition off back frequently.  On waffle mattress.  Heels off bed with pillows.    Problem: Skin/Tissue Integrity  Goal: Absence of new skin breakdown  Description: 1.  Monitor for areas of redness and/or skin breakdown  2.  Assess vascular access sites hourly  3.  Every 4-6 hours minimum:  Change oxygen saturation probe site  4.  Every 4-6 hours:  If on nasal continuous positive airway pressure, respiratory therapy assess nares and determine need for appliance change or resting period.  7/3/2024 0338 by Shantel Amezquita, RN  Outcome: Progressing  7/2/2024 1748 by Shefali Zuñiga RN  Outcome: Progressing     Problem: Drug Abuse/Detox  Goal: Will have no detox symptoms and will verbalize plan for changing drug-related behavior  Description: INTERVENTIONS:  1. Administer medication as ordered  2. Monitor physical status  3. Provide emotional support with 1:1 interaction with staff  4. Encourage  recovery focused treatment   7/3/2024 0338 by Shantel Amezquita, RN  Outcome: Progressing  Flowsheets (Taken 7/2/2024 1924)  Will have no detox symptoms and will verbalize plan for changing drug-related behavior: Administer medication as ordered  7/2/2024 1748 by Shefali Zuñiga RN  Outcome: Progressing  Flowsheets (Taken 7/2/2024 0830)  Will have no detox symptoms and will verbalize plan for changing drug-related behavior: Administer medication as ordered     Problem: Anxiety  Goal: Will report anxiety at manageable levels  Description: INTERVENTIONS:  1. Administer medication as ordered  2. Teach and rehearse alternative coping skills  3. Provide emotional support with 1:1 interaction with staff  7/3/2024 0338 by Shantel Amezquita, RN  Outcome: Progressing  Flowsheets (Taken 7/2/2024 1924)  Will report anxiety at manageable levels: Administer  related to seizure activity: Maintain airway, patient safety  and administer oxygen as ordered  7/2/2024 1748 by Shefali Zuñiga RN  Outcome: Progressing  Flowsheets (Taken 7/2/2024 0830)  Remains free of injury related to seizure activity: Maintain airway, patient safety  and administer oxygen as ordered  Goal: Achieves maximal functionality and self care  7/3/2024 0338 by Shantel Amezquita RN  Outcome: Progressing  Flowsheets (Taken 7/2/2024 1924)  Achieves maximal functionality and self care: Monitor swallowing and airway patency with patient fatigue and changes in neurological status  7/2/2024 1748 by Shefali Zuñiga RN  Outcome: Progressing  Flowsheets (Taken 7/2/2024 0830)  Achieves maximal functionality and self care: Monitor swallowing and airway patency with patient fatigue and changes in neurological status     Problem: Safety - Adult  Goal: Free from fall injury  7/3/2024 0338 by Shantel Amezquita RN  Outcome: Progressing  7/2/2024 1748 by Shefali Zuñiga RN  Outcome: Progressing  Flowsheets (Taken 7/2/2024 1748)  Free From Fall Injury:   Instruct family/caregiver on patient safety   Based on caregiver fall risk screen, instruct family/caregiver to ask for assistance with transferring infant if caregiver noted to have fall risk factors     Problem: Chronic Conditions and Co-morbidities  Goal: Patient's chronic conditions and co-morbidity symptoms are monitored and maintained or improved  7/3/2024 0338 by Shantel Amezquita RN  Outcome: Progressing  Flowsheets (Taken 7/2/2024 1924)  Care Plan - Patient's Chronic Conditions and Co-Morbidity Symptoms are Monitored and Maintained or Improved: Monitor and assess patient's chronic conditions and comorbid symptoms for stability, deterioration, or improvement  7/2/2024 1748 by Shefali Zuñiga RN  Outcome: Progressing  Flowsheets  Taken 7/2/2024 1748  Care Plan - Patient's Chronic Conditions and Co-Morbidity Symptoms are Monitored and Maintained or Improved: Monitor

## 2024-07-04 ENCOUNTER — HOSPITAL ENCOUNTER (OUTPATIENT)
Age: 69
Setting detail: SPECIMEN
Discharge: HOME | End: 2024-07-04

## 2024-07-04 LAB
ALBUMIN SERPL-MCNC: 3.8 G/DL (ref 3.5–5.2)
ALP SERPL-CCNC: 93 U/L (ref 40–129)
ALT SERPL-CCNC: 10 U/L (ref 5–41)
ANION GAP SERPL CALCULATED.3IONS-SCNC: 12 MMOL/L (ref 9–17)
AST SERPL-CCNC: 14 U/L
BASOPHILS # BLD: 0.04 K/UL (ref 0–0.2)
BASOPHILS NFR BLD: 1 % (ref 0–2)
BILIRUB SERPL-MCNC: 0.7 MG/DL (ref 0.3–1.2)
BUN SERPL-MCNC: 11 MG/DL (ref 8–23)
BUN/CREAT SERPL: 10 (ref 9–20)
CALCIUM SERPL-MCNC: 9.2 MG/DL (ref 8.6–10.4)
CHLORIDE SERPL-SCNC: 101 MMOL/L (ref 98–107)
CO2 SERPL-SCNC: 25 MMOL/L (ref 20–31)
CREAT SERPL-MCNC: 1.1 MG/DL (ref 0.7–1.2)
EOSINOPHIL # BLD: 0.17 K/UL (ref 0–0.44)
EOSINOPHILS RELATIVE PERCENT: 3 % (ref 1–4)
ERYTHROCYTE [DISTWIDTH] IN BLOOD BY AUTOMATED COUNT: 13.9 % (ref 11.8–14.4)
GFR, ESTIMATED: 73 ML/MIN/1.73M2
GLUCOSE SERPL-MCNC: 200 MG/DL (ref 70–99)
HCT VFR BLD AUTO: 43.7 % (ref 40.7–50.3)
HGB BLD-MCNC: 14.6 G/DL (ref 13–17)
IMM GRANULOCYTES # BLD AUTO: 0.02 K/UL (ref 0–0.3)
IMM GRANULOCYTES NFR BLD: 0 %
LYMPHOCYTES NFR BLD: 1.84 K/UL (ref 1.1–3.7)
LYMPHOCYTES RELATIVE PERCENT: 30 % (ref 24–43)
MCH RBC QN AUTO: 33.3 PG (ref 25.2–33.5)
MCHC RBC AUTO-ENTMCNC: 33.4 G/DL (ref 28.4–34.8)
MCV RBC AUTO: 99.8 FL (ref 82.6–102.9)
MONOCYTES NFR BLD: 0.54 K/UL (ref 0.1–1.2)
MONOCYTES NFR BLD: 9 % (ref 3–12)
NEUTROPHILS NFR BLD: 57 % (ref 36–65)
NEUTS SEG NFR BLD: 3.48 K/UL (ref 1.5–8.1)
NRBC BLD-RTO: 0 PER 100 WBC
PLATELET # BLD AUTO: 222 K/UL (ref 138–453)
PMV BLD AUTO: 9.6 FL (ref 8.1–13.5)
POTASSIUM SERPL-SCNC: 4.2 MMOL/L (ref 3.7–5.3)
PROT SERPL-MCNC: 7.5 G/DL (ref 6.4–8.3)
RBC # BLD AUTO: 4.38 M/UL (ref 4.21–5.77)
SODIUM SERPL-SCNC: 138 MMOL/L (ref 135–144)
WBC OTHER # BLD: 6.1 K/UL (ref 3.5–11.3)

## 2024-07-04 PROCEDURE — 80053 COMPREHEN METABOLIC PANEL: CPT

## 2024-07-04 PROCEDURE — 85025 COMPLETE CBC W/AUTO DIFF WBC: CPT

## 2024-07-04 PROCEDURE — P9603 ONE-WAY ALLOW PRORATED MILES: HCPCS

## 2024-07-04 PROCEDURE — 36415 COLL VENOUS BLD VENIPUNCTURE: CPT

## 2024-07-05 ENCOUNTER — HOSPITAL ENCOUNTER (OUTPATIENT)
Age: 69
Setting detail: SPECIMEN
Discharge: HOME | End: 2024-07-05

## 2024-07-05 LAB
ANION GAP SERPL CALCULATED.3IONS-SCNC: 13 MMOL/L (ref 9–17)
BASOPHILS # BLD: 0.03 K/UL (ref 0–0.2)
BASOPHILS NFR BLD: 1 % (ref 0–2)
BUN SERPL-MCNC: 15 MG/DL (ref 8–23)
BUN/CREAT SERPL: 15 (ref 9–20)
CALCIUM SERPL-MCNC: 9.1 MG/DL (ref 8.6–10.4)
CHLORIDE SERPL-SCNC: 100 MMOL/L (ref 98–107)
CO2 SERPL-SCNC: 21 MMOL/L (ref 20–31)
CREAT SERPL-MCNC: 1 MG/DL (ref 0.7–1.2)
EOSINOPHIL # BLD: 0.13 K/UL (ref 0–0.44)
EOSINOPHILS RELATIVE PERCENT: 2 % (ref 1–4)
ERYTHROCYTE [DISTWIDTH] IN BLOOD BY AUTOMATED COUNT: 13.5 % (ref 11.8–14.4)
GFR, ESTIMATED: 82 ML/MIN/1.73M2
GLUCOSE SERPL-MCNC: 179 MG/DL (ref 70–99)
HCT VFR BLD AUTO: 40.5 % (ref 40.7–50.3)
HGB BLD-MCNC: 13.9 G/DL (ref 13–17)
IMM GRANULOCYTES # BLD AUTO: 0.02 K/UL (ref 0–0.3)
IMM GRANULOCYTES NFR BLD: 0 %
LYMPHOCYTES NFR BLD: 1.73 K/UL (ref 1.1–3.7)
LYMPHOCYTES RELATIVE PERCENT: 28 % (ref 24–43)
MCH RBC QN AUTO: 33.3 PG (ref 25.2–33.5)
MCHC RBC AUTO-ENTMCNC: 34.3 G/DL (ref 28.4–34.8)
MCV RBC AUTO: 97.1 FL (ref 82.6–102.9)
MONOCYTES NFR BLD: 0.6 K/UL (ref 0.1–1.2)
MONOCYTES NFR BLD: 10 % (ref 3–12)
NEUTROPHILS NFR BLD: 59 % (ref 36–65)
NEUTS SEG NFR BLD: 3.67 K/UL (ref 1.5–8.1)
NRBC BLD-RTO: 0 PER 100 WBC
PLATELET # BLD AUTO: 201 K/UL (ref 138–453)
PMV BLD AUTO: 9.9 FL (ref 8.1–13.5)
POTASSIUM SERPL-SCNC: 4.1 MMOL/L (ref 3.7–5.3)
RBC # BLD AUTO: 4.17 M/UL (ref 4.21–5.77)
SODIUM SERPL-SCNC: 134 MMOL/L (ref 135–144)
WBC OTHER # BLD: 6.2 K/UL (ref 3.5–11.3)

## 2024-07-05 PROCEDURE — P9603 ONE-WAY ALLOW PRORATED MILES: HCPCS

## 2024-07-05 PROCEDURE — 36415 COLL VENOUS BLD VENIPUNCTURE: CPT

## 2024-07-05 PROCEDURE — 85025 COMPLETE CBC W/AUTO DIFF WBC: CPT

## 2024-07-05 PROCEDURE — 80048 BASIC METABOLIC PNL TOTAL CA: CPT

## 2024-07-06 NOTE — DISCHARGE SUMMARY
DISCHARGE SUMMARY  [facility]    Adult Hospitalist      Patient ID: Reynaldo Seaman  MRN: [id number]     Acct:  [id number]       Patient's PCP: Imani Bolivar APRN - CNP    Admit Date: 6/29/2024     Discharge Date: 7/3/2024      Admitting Physician: Mohsin Mohammad Reza, MD    Discharge Physician: Shay Hawkins MD     CONSULTANTS: Patient Care Team:  Imani Bolivar APRN - CNP as PCP - General (Nurse Practitioner)    PROCEDURES PERFORMED:     Active Discharge Diagnoses:    Dehydration and ADRIANA  IV fluids     Lightheadedness  Improved     Mild encephalopathy on EEG  Neurology on board     Alcohol Abuse  CIWA protocol  Thiamine  Folic acid     Tobacco Abuse  Counseled     Seizure disorder     Nausea and Vomiting     Hx of VRE      Hypertensive Renal Disease  Amlodipine, Tenormin     MDD  Paxil     Hx of UTI and Urinary Retention      Primary Problem  ADRIANA (acute kidney injury) (HCC)    Hospital Course: 68 yo M with c/o CVA, DM, HTN, Seizure, who came in to the hospital for high bp, emesis. Patient states he was not feeling well since last Monday. He did have lightheadedness and LOC on Monday where he fell and did hit the back of his head. He denies prodromal sxms and or seizure like activity. No bowel or bladder incontinence. The fall was unwitnessed.  In the ER, patient was found to have mild elevation of Cr, he was given NS 0.9 2L bolus, zofran.  Work up was otherwise unremarkable. Trauma work up unremarkable.   Upon my evaluation, patient in bed. No acute concerns. He does endorse advil use , smokes 1ppd and drinks alcohol one fifth of a whiskey per day.   He will be admitted for further work up    Patient had episodes of confusion.  He was given IV fluids for dehydration.  Acute kidney injury improved.  He was lightheaded initially which resolved.  Patient was able to ambulate with physical therapy.  Neurology were consulted who performed an EEG showing mild encephalopathy.    Patient had  Date: 7/2/2024  Nonobstructive bowel gas pattern. No evidence of metallic objects in the abdomen or pelvis. No contraindication to MRI based on this abdominal and pelvic x-ray.     MRI BRAIN W WO CONTRAST    Result Date: 7/1/2024  1. Significantly motion degraded examination.  No appreciable acute intracranial abnormality or abnormal enhancement. 2. Mild diffuse parenchymal atrophy with chronic microvascular ischemic changes. 3. Remote infarct in the left parieto-occipital region with associated cortical laminar necrosis.     US RETROPERITONEAL COMPLETE    Result Date: 7/1/2024  Limited exam as the patient was uncooperative for imaging. No hydronephrosis of either kidney.     CT HEAD WO CONTRAST    Result Date: 6/29/2024  Chronic findings in the brain without acute CT abnormality identified. No acute osseous abnormality identified in the cervical spine.     CT CSpine W/O Contrast    Result Date: 6/29/2024  Chronic findings in the brain without acute CT abnormality identified. No acute osseous abnormality identified in the cervical spine.     XR CHEST PORTABLE    Result Date: 6/29/2024  No acute airspace disease identified.         All radiological studies reviewed      Reviews of Symptoms:    A 10 point system is reviewed and  negative except described in hospital course    Physical Exam:    Vitals:  BP (!) 149/71   Pulse 65   Temp 97.5 °F (36.4 °C) (Oral)   Resp 16   Ht 1.702 m (5' 7\")   Wt 68.6 kg (151 lb 4.8 oz)   SpO2 96%   BMI 23.70 kg/m²   No data recorded.      General appearance - alert, well appearing, and in no acute distress  Mental status - oriented to person, place, and time with normal affect  Head - normocephalic and atraumatic  Eyes - pupils equal and reactive, extraocular eye movements intact, conjunctiva clear  Ears - hearing appears to be intact  Nose - no drainage noted  Mouth - mucous membranes moist  Neck - supple, no carotid bruits, thyroid not palpable  Chest - clear to auscultation,  tablet  atorvastatin 40 MG tablet  cyanocobalamin 1000 MCG/ML injection       Information about where to get these medications is not yet available    Ask your nurse or doctor about these medications  chlordiazePOXIDE 10 MG capsule         Code Status:  Prior    Time Spent on discharge is  35 mins in patient examination, evaluation, counseling as well as medication reconciliation, prescriptions for required medications, discharge plan and follow up.    Electronically signed by Shay Hawkins MD on 7/5/2024 at 9:07 PM     Thank you Imani Clements, APRN - CNP for the opportunity to be involved in this patient's care.    This note was created with the assistance of a speech-recognition program.  Although the intention is to generate a document that actually reflects the content of the visit, no guarantees can be provided that every mistake has been identified and corrected by editing.     Note was updated later by me after  physical examination and  completion of the assessment.

## 2024-07-09 ENCOUNTER — HOSPITAL ENCOUNTER (OUTPATIENT)
Age: 69
Setting detail: SPECIMEN
Discharge: HOME | End: 2024-07-09

## 2024-07-09 LAB
ANION GAP SERPL CALCULATED.3IONS-SCNC: 13 MMOL/L (ref 9–17)
BASOPHILS # BLD: 0.05 K/UL (ref 0–0.2)
BASOPHILS NFR BLD: 1 % (ref 0–2)
BUN SERPL-MCNC: 28 MG/DL (ref 8–23)
BUN/CREAT SERPL: 22 (ref 9–20)
CALCIUM SERPL-MCNC: 9 MG/DL (ref 8.6–10.4)
CHLORIDE SERPL-SCNC: 102 MMOL/L (ref 98–107)
CO2 SERPL-SCNC: 20 MMOL/L (ref 20–31)
CREAT SERPL-MCNC: 1.3 MG/DL (ref 0.7–1.2)
EOSINOPHIL # BLD: 0.23 K/UL (ref 0–0.44)
EOSINOPHILS RELATIVE PERCENT: 4 % (ref 1–4)
ERYTHROCYTE [DISTWIDTH] IN BLOOD BY AUTOMATED COUNT: 13.5 % (ref 11.8–14.4)
GFR, ESTIMATED: 60 ML/MIN/1.73M2
GLUCOSE SERPL-MCNC: 152 MG/DL (ref 70–99)
HCT VFR BLD AUTO: 43.4 % (ref 40.7–50.3)
HGB BLD-MCNC: 14.1 G/DL (ref 13–17)
IMM GRANULOCYTES # BLD AUTO: 0.02 K/UL (ref 0–0.3)
IMM GRANULOCYTES NFR BLD: 0 %
LYMPHOCYTES NFR BLD: 1.79 K/UL (ref 1.1–3.7)
LYMPHOCYTES RELATIVE PERCENT: 28 % (ref 24–43)
MCH RBC QN AUTO: 32.8 PG (ref 25.2–33.5)
MCHC RBC AUTO-ENTMCNC: 32.5 G/DL (ref 28.4–34.8)
MCV RBC AUTO: 100.9 FL (ref 82.6–102.9)
MONOCYTES NFR BLD: 0.77 K/UL (ref 0.1–1.2)
MONOCYTES NFR BLD: 12 % (ref 3–12)
NEUTROPHILS NFR BLD: 55 % (ref 36–65)
NEUTS SEG NFR BLD: 3.48 K/UL (ref 1.5–8.1)
NRBC BLD-RTO: 0 PER 100 WBC
PLATELET # BLD AUTO: 264 K/UL (ref 138–453)
PMV BLD AUTO: 10 FL (ref 8.1–13.5)
POTASSIUM SERPL-SCNC: 4.8 MMOL/L (ref 3.7–5.3)
RBC # BLD AUTO: 4.3 M/UL (ref 4.21–5.77)
SODIUM SERPL-SCNC: 135 MMOL/L (ref 135–144)
WBC OTHER # BLD: 6.3 K/UL (ref 3.5–11.3)

## 2024-07-09 PROCEDURE — 85025 COMPLETE CBC W/AUTO DIFF WBC: CPT

## 2024-07-09 PROCEDURE — 36415 COLL VENOUS BLD VENIPUNCTURE: CPT

## 2024-07-09 PROCEDURE — 80048 BASIC METABOLIC PNL TOTAL CA: CPT

## 2024-07-09 PROCEDURE — P9603 ONE-WAY ALLOW PRORATED MILES: HCPCS

## 2024-07-11 NOTE — PROGRESS NOTES
Occupational [address]  Occupational Therapy Not Seen Note    Patient not available for Occupational Therapy due to:    [] Testing:    [] Hemodialysis    [x] Cancelled by RN: 5/31: VIPIN Burroughs reporting pt not appropriate for therapy eval at this time. States she had to give pt ativan & requests OT ck back tomorrow. OT will continue to follow and ck back as able.      []Refusal by Patient:    [] Surgery:     [] Intubation:     [] Pain Medication:    [] Sedation:     [] Spine Precautions :    [] Medical Instability:    [] Other:      Larry Herring, OT full liquids

## 2024-07-16 NOTE — PROGRESS NOTES
Physician Progress Note      PATIENT:               PARKER POLK  [facility] #:                  [id number]  :                       1955  ADMIT DATE:       2024 8:21 AM  DISCH DATE:        7/3/2024 5:51 PM  RESPONDING  PROVIDER #:        Shay Pelaez MD          QUERY TEXT:    Patient admitted with ADRIANA. Noted documentation of Severe malnutrition in   Neurology consult note . In order to support the diagnosis of Severe   malnutrition , please include additional clinical indicators in your   documentation.  Or please document if the diagnosis of Severe malnutrition    has been ruled out after further study.      The medical record reflects the following:  Risk Factors: ADRIANA, Alcohol use  Clinical Indicators: Neurology consult note  Severe malnutrition 2/2   chronic alcoholism, BMI 23.70 kg/m? ; -benefit from dietary evaluation and   recs given significant malnourished, will give IM 1000mcg daily for 3 days B12   replacement and continue oral after\".    Treatment:  ADULT DIET; Regular; 4 carb choices (60 gm/meal); Low Fat/Low   Chol/High Fiber/JOSE E    Thank you  ALLISON Bolivar CDS  Options provided:  -- Severe malnutrition present as evidenced by, Please document evidence.  -- Severe malnutrition was ruled out  -- Other - I will add my own diagnosis  -- Disagree - Not applicable / Not valid  -- Disagree - Clinically unable to determine / Unknown  -- Refer to Clinical Documentation Reviewer    PROVIDER RESPONSE TEXT:    Severe malnutrition was ruled out after study.    Query created by: Zac Ruffin on 2024 7:47 AM      QUERY TEXT:    Patient admitted with ADRIANA. Noted documentation of Acute metabolic   encephalopathy on Neurology CN . In order to support the diagnosis of Acute   metabolic encephalopathy, please include additional clinical indicators in   your documentation.  Or please document if the diagnosis of Acute metabolic   encephalopathy has been ruled out after  further study.    The medical record reflects the following:  Risk Factors: ADRIANA  Clinical Indicators: Neurology CN 7/1 Acute metabolic encephalopathy, MRI   brain W/WO repeated to rule out recurrent enhancement of left parietal   occipital lesion. Motion limited however does not appear any enhancement  Routine 30 minute EEG given prolong AMS and WNL for his metabolic   encephalopathy and benzo for withdrawal.FM PN 7/1 Mild encephalopathy on EEG.  Neurology PN 7/2 Neurology notes combative and delirium secondary to alcohol   withdrawal., not oriented to person place time or situation, poor memory,   waxing and wanning confusion with episodes of delirium and visual   hallucinations.  DS 7/3 Patient continued to have episodes of confusion which was his baseline   mental status.    Treatment: IVF, Neurology consult    Thank you  ALLISON Bolivar CDS  Options provided:  -- Acute metabolic encephalopathy present as evidenced by, Please document   evidence.  -- Acute metabolic encephalopathy was ruled out  -- Other - I will add my own diagnosis  -- Disagree - Not applicable / Not valid  -- Disagree - Clinically unable to determine / Unknown  -- Refer to Clinical Documentation Reviewer    PROVIDER RESPONSE TEXT:    Acute metabolic encephalopathy was ruled out after study.    Query created by: Zac Ruffin on 7/10/2024 7:23 AM      Electronically signed by:  Shay Pelaez MD 7/16/2024 6:11 PM

## 2024-07-21 ENCOUNTER — APPOINTMENT (OUTPATIENT)
Dept: GENERAL RADIOLOGY | Age: 69
End: 2024-07-21
Payer: MEDICARE

## 2024-07-21 ENCOUNTER — HOSPITAL ENCOUNTER (EMERGENCY)
Age: 69
Discharge: HOME | End: 2024-07-21
Attending: STUDENT IN AN ORGANIZED HEALTH CARE EDUCATION/TRAINING PROGRAM
Payer: MEDICARE

## 2024-07-21 VITALS
OXYGEN SATURATION: 98 % | TEMPERATURE: 99.4 F | HEART RATE: 80 BPM | SYSTOLIC BLOOD PRESSURE: 145 MMHG | RESPIRATION RATE: 17 BRPM | DIASTOLIC BLOOD PRESSURE: 80 MMHG

## 2024-07-21 DIAGNOSIS — E86.0 DEHYDRATION: Primary | ICD-10-CM

## 2024-07-21 DIAGNOSIS — R53.83 OTHER FATIGUE: ICD-10-CM

## 2024-07-21 LAB
ANION GAP SERPL CALCULATED.3IONS-SCNC: 15 MMOL/L (ref 9–17)
BASOPHILS # BLD: 0.06 K/UL (ref 0–0.2)
BASOPHILS NFR BLD: 1 % (ref 0–2)
BILIRUB UR QL STRIP: ABNORMAL
BNP SERPL-MCNC: 822 PG/ML
BUN SERPL-MCNC: 19 MG/DL (ref 8–23)
BUN/CREAT SERPL: 15 (ref 9–20)
CALCIUM SERPL-MCNC: 9 MG/DL (ref 8.6–10.4)
CHLORIDE SERPL-SCNC: 97 MMOL/L (ref 98–107)
CLARITY UR: CLEAR
CO2 SERPL-SCNC: 21 MMOL/L (ref 20–31)
COLOR UR: ABNORMAL
CREAT SERPL-MCNC: 1.3 MG/DL (ref 0.7–1.2)
EOSINOPHIL # BLD: 0.11 K/UL (ref 0–0.44)
EOSINOPHILS RELATIVE PERCENT: 1 % (ref 1–4)
EPI CELLS #/AREA URNS HPF: NORMAL /HPF (ref 0–5)
ERYTHROCYTE [DISTWIDTH] IN BLOOD BY AUTOMATED COUNT: 13.4 % (ref 11.8–14.4)
FLUAV RNA RESP QL NAA+PROBE: NOT DETECTED
FLUBV RNA RESP QL NAA+PROBE: NOT DETECTED
GFR, ESTIMATED: 60 ML/MIN/1.73M2
GLUCOSE SERPL-MCNC: 129 MG/DL (ref 70–99)
GLUCOSE UR STRIP-MCNC: NEGATIVE MG/DL
HCT VFR BLD AUTO: 38.9 % (ref 40.7–50.3)
HGB BLD-MCNC: 13.5 G/DL (ref 13–17)
HGB UR QL STRIP.AUTO: NEGATIVE
IMM GRANULOCYTES # BLD AUTO: 0.05 K/UL (ref 0–0.3)
IMM GRANULOCYTES NFR BLD: 0 %
KETONES UR STRIP-MCNC: ABNORMAL MG/DL
LEUKOCYTE ESTERASE UR QL STRIP: NEGATIVE
LYMPHOCYTES NFR BLD: 1.98 K/UL (ref 1.1–3.7)
LYMPHOCYTES RELATIVE PERCENT: 17 % (ref 24–43)
MCH RBC QN AUTO: 33.1 PG (ref 25.2–33.5)
MCHC RBC AUTO-ENTMCNC: 34.7 G/DL (ref 28.4–34.8)
MCV RBC AUTO: 95.3 FL (ref 82.6–102.9)
MONOCYTES NFR BLD: 0.78 K/UL (ref 0.1–1.2)
MONOCYTES NFR BLD: 7 % (ref 3–12)
NEUTROPHILS NFR BLD: 75 % (ref 36–65)
NEUTS SEG NFR BLD: 8.84 K/UL (ref 1.5–8.1)
NITRITE UR QL STRIP: NEGATIVE
NRBC BLD-RTO: 0 PER 100 WBC
PH UR STRIP: 5.5 [PH] (ref 5–8)
PLATELET # BLD AUTO: 353 K/UL (ref 138–453)
PMV BLD AUTO: 9.7 FL (ref 8.1–13.5)
POTASSIUM SERPL-SCNC: 4 MMOL/L (ref 3.7–5.3)
PROT UR STRIP-MCNC: ABNORMAL MG/DL
RBC # BLD AUTO: 4.08 M/UL (ref 4.21–5.77)
RBC #/AREA URNS HPF: NORMAL /HPF (ref 0–2)
SARS-COV-2 RNA RESP QL NAA+PROBE: NOT DETECTED
SODIUM SERPL-SCNC: 133 MMOL/L (ref 135–144)
SOURCE: NORMAL
SP GR UR STRIP: 1.02 (ref 1–1.03)
SPECIMEN DESCRIPTION: NORMAL
TROPONIN I SERPL HS-MCNC: 31 NG/L (ref 0–22)
UROBILINOGEN UR STRIP-ACNC: NORMAL EU/DL (ref 0–1)
WBC #/AREA URNS HPF: NORMAL /HPF (ref 0–5)
WBC OTHER # BLD: 11.8 K/UL (ref 3.5–11.3)

## 2024-07-21 PROCEDURE — 71045 X-RAY EXAM CHEST 1 VIEW: CPT

## 2024-07-21 PROCEDURE — 99284 EMERGENCY DEPT VISIT MOD MDM: CPT

## 2024-07-21 PROCEDURE — 80048 BASIC METABOLIC PNL TOTAL CA: CPT

## 2024-07-21 PROCEDURE — 81001 URINALYSIS AUTO W/SCOPE: CPT

## 2024-07-21 PROCEDURE — 85025 COMPLETE CBC W/AUTO DIFF WBC: CPT

## 2024-07-21 PROCEDURE — 96360 HYDRATION IV INFUSION INIT: CPT

## 2024-07-21 PROCEDURE — 83880 ASSAY OF NATRIURETIC PEPTIDE: CPT

## 2024-07-21 PROCEDURE — 87636 SARSCOV2 & INF A&B AMP PRB: CPT

## 2024-07-21 PROCEDURE — 2580000003 HC RX 258: Performed by: STUDENT IN AN ORGANIZED HEALTH CARE EDUCATION/TRAINING PROGRAM

## 2024-07-21 PROCEDURE — 84484 ASSAY OF TROPONIN QUANT: CPT

## 2024-07-21 RX ORDER — 0.9 % SODIUM CHLORIDE 0.9 %
1000 INTRAVENOUS SOLUTION INTRAVENOUS ONCE
Status: COMPLETED | OUTPATIENT
Start: 2024-07-21 | End: 2024-07-21

## 2024-07-21 RX ADMIN — SODIUM CHLORIDE 1000 ML: 9 INJECTION, SOLUTION INTRAVENOUS at 04:30
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
## 2024-07-21 NOTE — DISCHARGE INSTRUCTIONS
Take your medication as indicated and prescribed.   Get up slowly; dangle your feet over the bed before standing up, do not stand up quickly.    If you have not had a stress test in over a year your primary care physician may order this test as further work-up for your lightheadedness.  If you have a cardiologist, then you should also call them to discuss further treatment options.    PLEASE RETURN TO THE EMERGENCY DEPARTMENT IMMEDIATELY for worsening symptoms of increasing pain, shortness of breath, feeling of your heart fluttering or racing, swelling to your feet, unable to lay flat, sensation of the room spinning, slurring of speech, loss of strength, or if you develop any concerning symptoms such as: high fever not relieved by acetaminophen (Tylenol) and/or ibuprofen (Motrin / Advil), chills, persistent nausea and/or vomiting, vomiting up blood, blood in your stool, loss of consciousness, numbness, weakness or tingling in the arms or legs or change in color of the extremities, changes in mental status, persistent headache, blurry vision, loss of bladder / bowel control, unable to follow up with your physician, or other any other care or concern.
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
## 2025-03-18 NOTE — ED NOTES
ANESTHESIA POSTOP CHECK    49y Female POSTOP DAY 1     No COMPLAINTS    NO APPARENT ANESTHESIA COMPLICATIONS       Anesthesia Pain Management Service    SUBJECTIVE: Patient is doing well with IV PCA and no significant problems reported.    Pain Scale Score	At rest: 6/10___ 	With Activity: ___ 	[X ] Refer to charted pain scores    THERAPY:    [ ] IV PCA Morphine		[ ] 5 mg/mL	[ ] 1 mg/mL  [X ] IV PCA Hydromorphone	[ ] 5 mg/mL	[X ] 1 mg/mL  [ ] IV PCA Fentanyl		[ ] 50 micrograms/mL    Demand dose __0.2_ lockout __6_ (minutes) Continuous Rate _0__ Total: 4.1___  mg used (in past 24 hours)      MEDICATIONS  (STANDING):  acetaminophen   IVPB .. 1000 milliGRAM(s) IV Intermittent every 6 hours  enoxaparin Injectable 40 milliGRAM(s) SubCutaneous every 24 hours  HYDROmorphone PCA (1 mG/mL) 30 milliLiter(s) PCA Continuous PCA Continuous  levothyroxine Injectable 37 MICROGram(s) IV Push at bedtime  pantoprazole  Injectable 40 milliGRAM(s) IV Push daily  sodium phosphate 15 milliMole(s)/250 mL IVPB 15 milliMole(s) IV Intermittent once    MEDICATIONS  (PRN):  HYDROmorphone PCA (1 mG/mL) Rescue Clinician Bolus 0.5 milliGRAM(s) IV Push every 15 minutes PRN for Pain Scale GREATER THAN 6  nalbuphine Injectable 2.5 milliGRAM(s) IV Push every 6 hours PRN Pruritus  naloxone Injectable 0.1 milliGRAM(s) IV Push every 3 minutes PRN For ANY of the following changes in patient status:  A. RR LESS THAN 10 breaths per minute, B. Oxygen saturation LESS THAN 90%, C. Sedation score of 6      OBJECTIVE: Patient sitting up in chair.    Sedation Score:	[ X] Alert	[ ] Drowsy 	[ ] Arousable	[ ] Asleep	[ ] Unresponsive    Side Effects:	[X ] None	[ ] Nausea	[ ] Vomiting	[ ] Pruritus  		[ ] Other:    Vital Signs Last 24 Hrs  T(C): 36.7 (15 Mar 2025 08:07), Max: 36.7 (15 Mar 2025 03:50)  T(F): 98.1 (15 Mar 2025 08:07), Max: 98.1 (15 Mar 2025 08:07)  HR: 69 (15 Mar 2025 08:07) (69 - 86)  BP: 101/61 (15 Mar 2025 08:07) (101/61 - 120/80)  BP(mean): 88 (14 Mar 2025 15:15) (79 - 94)  RR: 18 (15 Mar 2025 08:07) (10 - 22)  SpO2: 96% (15 Mar 2025 08:07) (92% - 99%)    Parameters below as of 15 Mar 2025 08:07  Patient On (Oxygen Delivery Method): room air        ASSESSMENT/ PLAN    Therapy to  be:	[ X] Continue   [ ] Discontinued   [ ] Change to prn Analgesics    Documentation and Verification of current medications:   [X] Done	[ ] Not done, not elligible    Comments: Continue IV PCA. Recommend non-opioid adjuvant analgesics to be used when possible and when allowed by primary surgical team.    Progress Note written now but Patient was seen earlier. Morning Surgical Progress Note  Patient is a 49y old  Female who presents with a chief complaint of "I have a cyst on my pancreas" (07 Mar 2025 08:10)    SUBJECTIVE: Patient seen and examined at bedside with surgical team. Patient reports feeling well. She denies any nausea or vomiting and states her pain is well controlled.    Vital Signs Last 24 Hrs  T(C): 36.8 (15 Mar 2025 11:35), Max: 36.8 (15 Mar 2025 11:35)  T(F): 98.2 (15 Mar 2025 11:35), Max: 98.2 (15 Mar 2025 11:35)  HR: 76 (15 Mar 2025 11:35) (69 - 80)  BP: 122/68 (15 Mar 2025 11:35) (101/61 - 122/68)  BP(mean): --  RR: 18 (15 Mar 2025 11:35) (16 - 18)  SpO2: 98% (15 Mar 2025 11:35) (96% - 99%)    Parameters below as of 15 Mar 2025 11:35  Patient On (Oxygen Delivery Method): room air    I&O's Detail    14 Mar 2025 07:01  -  15 Mar 2025 07:00  --------------------------------------------------------  IN:    Lactated Ringers: 1008 mL    Oral Fluid: 400 mL  Total IN: 1408 mL    OUT:    Bulb (mL): 44 mL    Indwelling Catheter - Urethral (mL): 3200 mL  Total OUT: 3244 mL    Total NET: -1836 mL      15 Mar 2025 07:01  -  15 Mar 2025 15:57  --------------------------------------------------------  IN:    Oral Fluid: 540 mL  Total IN: 540 mL    OUT:    Bulb (mL): 20 mL    Indwelling Catheter - Urethral (mL): 400 mL    Voided (mL): 800 mL  Total OUT: 1220 mL    Total NET: -680 mL        Medications  MEDICATIONS  (STANDING):  acetaminophen   IVPB .. 1000 milliGRAM(s) IV Intermittent every 6 hours  dextrose 5% + lactated ringers. 1000 milliLiter(s) (42 mL/Hr) IV Continuous <Continuous>  enoxaparin Injectable 40 milliGRAM(s) SubCutaneous every 24 hours  HYDROmorphone PCA (1 mG/mL) 30 milliLiter(s) PCA Continuous PCA Continuous  levothyroxine Injectable 37 MICROGram(s) IV Push at bedtime  pantoprazole  Injectable 40 milliGRAM(s) IV Push daily    MEDICATIONS  (PRN):  HYDROmorphone PCA (1 mG/mL) Rescue Clinician Bolus 0.5 milliGRAM(s) IV Push every 15 minutes PRN for Pain Scale GREATER THAN 6  nalbuphine Injectable 2.5 milliGRAM(s) IV Push every 6 hours PRN Pruritus  naloxone Injectable 0.1 milliGRAM(s) IV Push every 3 minutes PRN For ANY of the following changes in patient status:  A. RR LESS THAN 10 breaths per minute, B. Oxygen saturation LESS THAN 90%, C. Sedation score of 6    Physical Exam  Constitutional: A&Ox3, NAD  Gastrointestinal:Abdomen soft, nondistended, appropriate incisional tenderness, no rebound or guarding, no obvious masses or signs of hematoma, port sites with opsites c/d/i, midline small aquacell with small strikethrough c/d/i  Extremities: Moving all extremities, no edema  Skin: No Rashes, Hematoma, Ecchymosis  : Mirza in place with light yellow urine  Drains: KEVIN x 1 SS     LABS:                        9.6    12.54 )-----------( 312      ( 15 Mar 2025 05:45 )             29.1     03-15    135  |  100  |  6[L]  ----------------------------<  103[H]  4.0   |  21[L]  |  0.40[L]    Ca    8.7      15 Mar 2025 05:45  Phos  2.5     03-15  Mg     2.00     03-15    TPro  6.2  /  Alb  3.3  /  TBili  0.5  /  DBili  x   /  AST  30  /  ALT  34[H]  /  AlkPhos  55  03-15    PT/INR - ( 14 Mar 2025 13:23 )   PT: 12.4 sec;   INR: 1.07 ratio         PTT - ( 14 Mar 2025 13:23 )  PTT:25.6 sec  LIVER FUNCTIONS - ( 15 Mar 2025 05:45 )  Alb: 3.3 g/dL / Pro: 6.2 g/dL / ALK PHOS: 55 U/L / ALT: 34 U/L / AST: 30 U/L / GGT: x           Urinalysis Basic - ( 15 Mar 2025 05:45 )    Color: x / Appearance: x / SG: x / pH: x  Gluc: 103 mg/dL / Ketone: x  / Bili: x / Urobili: x   Blood: x / Protein: x / Nitrite: x   Leuk Esterase: x / RBC: x / WBC x   Sq Epi: x / Non Sq Epi: x / Bacteria: x       Pt remains agitated in CT and unable to sit still.   5mg Versed pushed IV per order of Dr. Nighat White, RN  05/25/22 7900 TEAM [ D ] Surgery Daily Progress Note  =====================================================    SUBJECTIVE: Patient seen and examined at bedside on AM rounds. Patient reports that they're feeling well. She is passing gas and tolerating a full liquid diet. She is ambulating. Denies fever, chills, N/V, chest pain, SOB    ALLERGIES:  No Known Allergies    -------------------------------------------------------------------------------------    MEDICATIONS:  acetaminophen   IVPB .. 1000 milliGRAM(s) IV Intermittent every 6 hours  enoxaparin Injectable 40 milliGRAM(s) SubCutaneous every 24 hours  levothyroxine Injectable 37 MICROGram(s) IV Push at bedtime  nalbuphine Injectable 2.5 milliGRAM(s) IV Push every 6 hours PRN  naloxone Injectable 0.1 milliGRAM(s) IV Push every 3 minutes PRN  oxyCODONE    IR 2.5 milliGRAM(s) Oral every 6 hours PRN  oxyCODONE    IR 5 milliGRAM(s) Oral every 6 hours PRN  pantoprazole  Injectable 40 milliGRAM(s) IV Push daily    --------------------------------------------------------------------------------------    VITAL SIGNS:  T(C): 36.7 (03-17-25 @ 04:35), Max: 37.7 (03-16-25 @ 20:28)  HR: 64 (03-17-25 @ 04:35) (64 - 98)  BP: 98/70 (03-17-25 @ 04:35) (98/70 - 116/67)  RR: 17 (03-17-25 @ 04:35) (16 - 17)  SpO2: 98% (03-17-25 @ 04:35) (95% - 99%)  --------------------------------------------------------------------------------------    INS AND OUTS:    03-16-25 @ 07:01  -  03-17-25 @ 07:00  --------------------------------------------------------  IN: 1698 mL / OUT: 2372.5 mL / NET: -674.5 mL      --------------------------------------------------------------------------------------      EXAM    General: NAD, resting in bed comfortably.  Cardiac: regular rate, warm and well perfused  Respiratory: Nonlabored respirations, normal cw expansion.  Abdomen: soft, nontender, nondistended, port sites c/d/i, L KEVIN x 1 SS  Extremities: normal strength, FROM, no deformities    --------------------------------------------------------------------------------------    LABS                        11.3   13.54 )-----------( 364      ( 17 Mar 2025 05:56 )             34.8   03-16    136  |  98  |  4[L]  ----------------------------<  87  3.6   |  24  |  0.50    Ca    9.2      16 Mar 2025 05:05  Phos  3.1     03-16  Mg     2.20     03-16    TPro  7.4  /  Alb  3.8  /  TBili  0.5  /  DBili  x   /  AST  26  /  ALT  32  /  AlkPhos  69  03-16         INTERVAL EVENTS: No acute events overnight.  SUBJECTIVE: Patient seen and examined at bedside with surgical team, patient without complaints. Denies fever, chills, CP, SOB nausea, vomiting, abdominal pain.    OBJECTIVE:    Vital Signs Last 24 Hrs  T(C): 36.7 (18 Mar 2025 05:00), Max: 36.9 (17 Mar 2025 12:21)  T(F): 98 (18 Mar 2025 05:00), Max: 98.5 (17 Mar 2025 12:21)  HR: 71 (18 Mar 2025 05:00) (70 - 97)  BP: 106/71 (18 Mar 2025 05:00) (91/58 - 108/57)  BP(mean): --  RR: 18 (18 Mar 2025 05:00) (18 - 18)  SpO2: 100% (18 Mar 2025 05:00) (97% - 100%)    Parameters below as of 18 Mar 2025 05:00  Patient On (Oxygen Delivery Method): room air    I&O's Detail    17 Mar 2025 07:01  -  18 Mar 2025 07:00  --------------------------------------------------------  IN:    Oral Fluid: 1150 mL  Total IN: 1150 mL    OUT:    Bulb (mL): 90 mL    Voided (mL): 1650 mL  Total OUT: 1740 mL    Total NET: -590 mL      MEDICATIONS  (STANDING):  acetaminophen     Tablet .. 1000 milliGRAM(s) Oral every 6 hours  atorvastatin 10 milliGRAM(s) Oral at bedtime  buPROPion XL (24-Hour) . 300 milliGRAM(s) Oral daily  enoxaparin Injectable 40 milliGRAM(s) SubCutaneous every 24 hours  levothyroxine 50 MICROGram(s) Oral daily  pantoprazole    Tablet 40 milliGRAM(s) Oral before breakfast  polyethylene glycol 3350 17 Gram(s) Oral daily  senna 2 Tablet(s) Oral at bedtime    MEDICATIONS  (PRN):  nalbuphine Injectable 2.5 milliGRAM(s) IV Push every 6 hours PRN Pruritus  naloxone Injectable 0.1 milliGRAM(s) IV Push every 3 minutes PRN For ANY of the following changes in patient status:  A. RR LESS THAN 10 breaths per minute, B. Oxygen saturation LESS THAN 90%, C. Sedation score of 6  oxyCODONE    IR 2.5 milliGRAM(s) Oral every 6 hours PRN Moderate Pain (4 - 6)  oxyCODONE    IR 5 milliGRAM(s) Oral every 6 hours PRN Severe Pain (7 - 10)      PHYSICAL EXAM:  General: NAD, resting in bed comfortably.  Cardiac: regular rate, warm and well perfused  Respiratory: Nonlabored respirations, normal cw expansion.  Abdomen: soft, nontender, nondistended, port sites c/d/i, L KEVIN x 1 SS  Extremities: normal strength, FROM, no deformities    LABS:                        11.3   13.54 )-----------( 364      ( 17 Mar 2025 05:56 )             34.8     03-17    131[L]  |  97[L]  |  6[L]  ----------------------------<  118[H]  3.9   |  20[L]  |  0.52    Ca    9.2      17 Mar 2025 05:56  Phos  4.1     03-17  Mg     2.00     03-17    TPro  7.2  /  Alb  3.3  /  TBili  0.6  /  DBili  x   /  AST  18  /  ALT  25  /  AlkPhos  75  03-17      LIVER FUNCTIONS - ( 17 Mar 2025 05:56 )  Alb: 3.3 g/dL / Pro: 7.2 g/dL / ALK PHOS: 75 U/L / ALT: 25 U/L / AST: 18 U/L / GGT: x           Urinalysis Basic - ( 17 Mar 2025 05:56 )    Color: x / Appearance: x / SG: x / pH: x  Gluc: 118 mg/dL / Ketone: x  / Bili: x / Urobili: x   Blood: x / Protein: x / Nitrite: x   Leuk Esterase: x / RBC: x / WBC x   Sq Epi: x / Non Sq Epi: x / Bacteria: x           Anesthesia Pain Management Service    SUBJECTIVE: Patient is doing well with IV PCA and no significant problems reported. 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 Passing flatus x1 post-op, tolerating diet and ambulating    Pain Scale Score	At rest: ___ 	With Activity: ___ 	[X ] Refer to charted pain scores    THERAPY:    [ ] IV PCA Morphine		[ ] 5 mg/mL	[ ] 1 mg/mL  [X ] IV PCA Hydromorphone	[ ] 5 mg/mL	[X ] 1 mg/mL  [ ] IV PCA Fentanyl		[ ] 50 micrograms/mL    Demand dose __0.2_ lockout __6_ (minutes) Continuous Rate _0__ Total: _3.4__  mg used (in past 24 hours)      MEDICATIONS  (STANDING):  enoxaparin Injectable 40 milliGRAM(s) SubCutaneous every 24 hours  HYDROmorphone PCA (1 mG/mL) 30 milliLiter(s) PCA Continuous PCA Continuous  levothyroxine Injectable 37 MICROGram(s) IV Push at bedtime  pantoprazole  Injectable 40 milliGRAM(s) IV Push daily    MEDICATIONS  (PRN):  HYDROmorphone PCA (1 mG/mL) Rescue Clinician Bolus 0.5 milliGRAM(s) IV Push every 15 minutes PRN for Pain Scale GREATER THAN 6  nalbuphine Injectable 2.5 milliGRAM(s) IV Push every 6 hours PRN Pruritus  naloxone Injectable 0.1 milliGRAM(s) IV Push every 3 minutes PRN For ANY of the following changes in patient status:  A. RR LESS THAN 10 breaths per minute, B. Oxygen saturation LESS THAN 90%, C. Sedation score of 6      OBJECTIVE: Patient sitting in bed.    Sedation Score:	[ X] Alert	[ ] Drowsy 	[ ] Arousable	[ ] Asleep	[ ] Unresponsive    Side Effects:	[X ] None	[ ] Nausea	[ ] Vomiting	[ ] Pruritus  		[ ] Other:    Vital Signs Last 24 Hrs  T(C): 36.9 (16 Mar 2025 08:00), Max: 37.2 (16 Mar 2025 04:00)  T(F): 98.4 (16 Mar 2025 08:00), Max: 98.9 (16 Mar 2025 04:00)  HR: 75 (16 Mar 2025 08:00) (75 - 80)  BP: 106/63 (16 Mar 2025 08:00) (99/56 - 111/62)  BP(mean): --  RR: 16 (16 Mar 2025 08:00) (16 - 17)  SpO2: 98% (16 Mar 2025 08:00) (97% - 100%)    Parameters below as of 16 Mar 2025 08:00  Patient On (Oxygen Delivery Method): room air        ASSESSMENT/ PLAN    Therapy to  be:	[ X] Continue   [ ] Discontinued   [ ] Change to prn Analgesics    Documentation and Verification of current medications:   [X] Done	[ ] Not done, not elligible    Comments: Continue PCA. Recommend non-opioid adjuvant analgesics to be used when possible and when allowed by primary surgical team.    Progress Note written now but Patient was seen earlier. SURGERY DAILY PROGRESS NOTE:     Patient seen and examined  Patient s/p distal pancreatectomy and splenectomy  Patient overall reports feeling well but tired  Denies CP, SOB, N, V, hiccups, LH, dizziness, fevers, chills  Tolerating   No flatus No BM  Voided via villanueva  Pain controlled on current regimen, baseline 6/10 has PCA    Exam  Awake alert responsive  Abdomen soft, nondistended, appropriate incisional tenderness, no rebound or guarding, no obvious masses or signs of hematoma, port sites with opsites c/d/i, midline small aquacell with small strikethrough c/d/i  : Villanueva in place with light yellow urine  Drains: KEVIN x 1 SS holding suction        OBJECTIVE:  Vital Signs Last 24 Hrs  T(C): 36.1 (14 Mar 2025 12:35), Max: 36.6 (14 Mar 2025 05:51)  T(F): 97 (14 Mar 2025 12:35), Max: 97.9 (14 Mar 2025 05:51)  HR: 85 (14 Mar 2025 15:15) (73 - 86)  BP: 115/76 (14 Mar 2025 15:15) (95/67 - 118/85)  BP(mean): 88 (14 Mar 2025 15:15) (79 - 94)  RR: 13 (14 Mar 2025 15:15) (10 - 22)  SpO2: 97% (14 Mar 2025 15:15) (92% - 100%)    Parameters below as of 14 Mar 2025 14:30  Patient On (Oxygen Delivery Method): room air      I&O's Detail    14 Mar 2025 07:01  -  14 Mar 2025 16:17  --------------------------------------------------------  IN:    Lactated Ringers: 252 mL  Total IN: 252 mL    OUT:    Bulb (mL): 4 mL    Indwelling Catheter - Urethral (mL): 600 mL  Total OUT: 604 mL    Total NET: -352 mL        Daily Height in cm: 160.02 (14 Mar 2025 05:51)    Daily     LABS:                        11.0   21.46 )-----------( 378      ( 14 Mar 2025 13:23 )             32.7     03-14    135  |  100  |  11  ----------------------------<  130[H]  3.5   |  22  |  0.52    Ca    8.7      14 Mar 2025 13:23  Phos  3.6     03-14  Mg     1.70     03-14    TPro  6.8  /  Alb  3.7  /  TBili  0.3  /  DBili  x   /  AST  37[H]  /  ALT  46[H]  /  AlkPhos  71  03-14    PT/INR - ( 14 Mar 2025 13:23 )   PT: 12.4 sec;   INR: 1.07 ratio         PTT - ( 14 Mar 2025 13:23 )  PTT:25.6 sec  Urinalysis Basic - ( 14 Mar 2025 13:23 )    Color: x / Appearance: x / SG: x / pH: x  Gluc: 130 mg/dL / Ketone: x  / Bili: x / Urobili: x   Blood: x / Protein: x / Nitrite: x   Leuk Esterase: x / RBC: x / WBC x   Sq Epi: x / Non Sq Epi: x / Bacteria: x         SURGERY DAILY PROGRESS NOTE:     Overnight Events:  No acute events overnight.  passed TOV    SUBJECTIVE:   Patient seen and evaluated on AM rounds.   Pt is resting comfortably in bed with no complaints.   Denies fever, chills, N/V, chest pain, or shortness of breath.   +/-  Tolerating diet.   Ambulating well.   Pain is adequately controlled on current regimen.    OBJECTIVE:  Vital Signs Last 24 Hrs  T(C): 36.9 (16 Mar 2025 08:00), Max: 37.2 (16 Mar 2025 04:00)  T(F): 98.4 (16 Mar 2025 08:00), Max: 98.9 (16 Mar 2025 04:00)  HR: 75 (16 Mar 2025 08:00) (75 - 80)  BP: 106/63 (16 Mar 2025 08:00) (99/56 - 122/68)  BP(mean): --  RR: 16 (16 Mar 2025 08:00) (16 - 18)  SpO2: 98% (16 Mar 2025 08:00) (97% - 100%)    Parameters below as of 16 Mar 2025 08:00  Patient On (Oxygen Delivery Method): room air      I&O's Detail    15 Mar 2025 07:01  -  16 Mar 2025 07:00  --------------------------------------------------------  IN:    dextrose 5% + lactated ringers: 378 mL    IV PiggyBack: 100 mL    Oral Fluid: 1270 mL  Total IN: 1748 mL    OUT:    Bulb (mL): 60 mL    Indwelling Catheter - Urethral (mL): 400 mL    Voided (mL): 2650 mL  Total OUT: 3110 mL    Total NET: -1362 mL      16 Mar 2025 07:01  -  16 Mar 2025 11:24  --------------------------------------------------------  IN:    dextrose 5% + lactated ringers: 168 mL    Oral Fluid: 240 mL  Total IN: 408 mL    OUT:    Bulb (mL): 2.5 mL    Voided (mL): 700 mL  Total OUT: 702.5 mL    Total NET: -294.5 mL        Daily     Daily     LABS:                        12.4   14.34 )-----------( 367      ( 16 Mar 2025 05:05 )             37.5     03-16    136  |  98  |  4[L]  ----------------------------<  87  3.6   |  24  |  0.50    Ca    9.2      16 Mar 2025 05:05  Phos  3.1     03-16  Mg     2.20     03-16    TPro  7.4  /  Alb  3.8  /  TBili  0.5  /  DBili  x   /  AST  26  /  ALT  32  /  AlkPhos  69  03-16    PT/INR - ( 14 Mar 2025 13:23 )   PT: 12.4 sec;   INR: 1.07 ratio         PTT - ( 14 Mar 2025 13:23 )  PTT:25.6 sec  Urinalysis Basic - ( 16 Mar 2025 05:05 )    Color: x / Appearance: x / SG: x / pH: x  Gluc: 87 mg/dL / Ketone: x  / Bili: x / Urobili: x   Blood: x / Protein: x / Nitrite: x   Leuk Esterase: x / RBC: x / WBC x   Sq Epi: x / Non Sq Epi: x / Bacteria: x        Physical Exam  Constitutional: A&Ox3, NAD  Gastrointestinal: Abdomen soft, nondistended, appropriate incisional tenderness, no rebound or guarding, no obvious masses or signs of hematoma, port sites w steris c/d/i, midline small aquacell with small strikethrough removed - incision c/d/i  Extremities: Moving all extremities, no edema, WWP  Drains: KEVIN x 1 SS

## 2025-04-18 NOTE — PROGRESS NOTES
Harrison Salazar MD   Urology Progress Note            Subjective: Follow-up urinary retention, indwelling Peterson    Patient Vitals for the past 24 hrs:   BP Temp Temp src Pulse Resp SpO2 Weight   01/22/24 0344 (!) 144/81 98.1 °F (36.7 °C) Oral 86 16 96 % --   01/22/24 0253 -- -- -- -- -- -- 68.9 kg (151 lb 14.4 oz)   01/21/24 2351 (!) 143/74 98.2 °F (36.8 °C) Oral 84 17 95 % --   01/21/24 1904 127/84 98.1 °F (36.7 °C) Oral 79 17 97 % --   01/21/24 1808 (!) 150/83 -- -- 80 16 97 % --   01/21/24 1500 123/66 98.8 °F (37.1 °C) Oral 72 16 98 % --   01/21/24 1345 120/71 -- -- 75 -- -- --   01/21/24 1123 117/74 98.8 °F (37.1 °C) Oral 72 16 96 % --   01/21/24 0811 (!) 142/77 -- -- -- -- -- --   01/21/24 0710 (!) 142/77 97.9 °F (36.6 °C) Oral 73 16 96 % --   01/21/24 0613 -- -- -- -- -- -- 70.2 kg (154 lb 11.2 oz)       Intake/Output Summary (Last 24 hours) at 1/22/2024 0448  Last data filed at 1/21/2024 1700  Gross per 24 hour   Intake --   Output 450 ml   Net -450 ml       Recent Labs     01/19/24 2355 01/20/24  0517 01/21/24  0453   WBC 7.0 5.9 6.2   HGB 14.1 12.9* 12.9*   HCT 40.0* 37.7* 36.9*   MCV 95.9 96.7 96.9    235 225     Recent Labs     01/19/24  2355 01/20/24  0517 01/21/24  0453   * 136 133*   K 3.4* 3.1* 3.5*   CL 90* 100 98   CO2 21 25 26   BUN 8 6* 6*   CREATININE 1.2 1.0 1.0       Recent Labs     01/19/24  2302   COLORU Yellow   PHUR 5.5   WBCUA 5 TO 10   RBCUA 0 TO 2   BACTERIA RARE*   SPECGRAV 1.005   LEUKOCYTESUR TRACE*   UROBILINOGEN Normal   BILIRUBINUR NEGATIVE       Additional Lab/culture results:    Physical Exam: Patient alert not in acute distress Peterson catheter was inserted for urinary retention presently being managed for urinary tract infection patient on Flomax CT imaging showed no hydronephrosis or obstructing calculi    Interval Imaging Findings:    Impression:    Patient Active Problem List   Diagnosis    ADRIANA (acute kidney injury) (HCC)    Acute  . Anemia

## (undated) DEVICE — SUTURE VCRL SZ 3-0 L54IN ABSRB UD LIGAPAK REEL POLYGLACTIN J285G

## (undated) DEVICE — GAUZE, BORDER, 3"X6", 1.5"X4"PAD, STERIL: Brand: MEDLINE INDUSTRIES, INC.

## (undated) DEVICE — DRAPE,THYROID,SOFT,STERILE: Brand: MEDLINE

## (undated) DEVICE — POSITIONER HD W8XH4XL8.5IN RASPBERRY FOAM SLT

## (undated) DEVICE — GARMENT,MEDLINE,DVT,INT,CALF,MED, GEN2: Brand: MEDLINE

## (undated) DEVICE — ADHESIVE SKIN CLSR 0.7ML TOP DERMBND ADV

## (undated) DEVICE — SUTURE PROL SZ 6-0 L30IN NONABSORBABLE BLU L13MM RB-2 1/2 8711H

## (undated) DEVICE — SUTURE PERMAHAND SZ 0 L30IN NONABSORBABLE BLK SILK BRAID A306H

## (undated) DEVICE — SOLUTION IV 500ML 0.9% SOD CHL PH 5 INJ USP VIAFLX PLAS

## (undated) DEVICE — SUTURE NONABSORBABLE MONOFILAMENT 6-0 BV-1 1X30 IN PROLENE 8709H

## (undated) DEVICE — ELECTRODE ES L3IN S STL BLDE INSUL DISP VALLEYLAB EDGE

## (undated) DEVICE — APPLICATOR MEDICATED 26 CC SOLUTION HI LT ORNG CHLORAPREP

## (undated) DEVICE — Device

## (undated) DEVICE — CORD,CAUTERY,BIPOLAR,STERILE: Brand: MEDLINE

## (undated) DEVICE — DRAPE,REIN 53X77,STERILE: Brand: MEDLINE

## (undated) DEVICE — BLANKET WRM W40.2XL55.9IN IORT LO BODY + MISTRAL AIR

## (undated) DEVICE — SUTURE MCRYL SZ 4-0 L27IN ABSRB UD L19MM PS-2 1/2 CIR PRIM Y426H

## (undated) DEVICE — SET CATH 20GA L1.75IN RAD ART POLYUR RADPQ W/ INTEGR

## (undated) DEVICE — GLOVE SURG SZ 8 L11.77IN FNGR THK9.8MIL STRW LTX POLYMER

## (undated) DEVICE — SUTURE VCRL SZ 3-0 L27IN ABSRB UD L26MM SH 1/2 CIR J416H

## (undated) DEVICE — PRESSURE MONITORING SET: Brand: TRUWAVE

## (undated) DEVICE — SOLUTION PREP 4OZ 3% H PEROX 1ST AID ANTISEP ORAL DEBRIDING

## (undated) DEVICE — SHUNT CV L30CM DIA3X5MM SIL EXT LOOP FULL SPR REINF SUNDT

## (undated) DEVICE — CONTAINER,SPECIMEN,OR STERILE,4OZ: Brand: MEDLINE